# Patient Record
Sex: FEMALE | Race: BLACK OR AFRICAN AMERICAN | NOT HISPANIC OR LATINO | URBAN - METROPOLITAN AREA
[De-identification: names, ages, dates, MRNs, and addresses within clinical notes are randomized per-mention and may not be internally consistent; named-entity substitution may affect disease eponyms.]

---

## 2018-01-27 ENCOUNTER — INPATIENT (INPATIENT)
Facility: HOSPITAL | Age: 68
LOS: 4 days | Discharge: HOME HEALTH | End: 2018-02-01
Attending: INTERNAL MEDICINE | Admitting: INTERNAL MEDICINE
Payer: COMMERCIAL

## 2018-01-27 VITALS
DIASTOLIC BLOOD PRESSURE: 91 MMHG | HEIGHT: 66 IN | RESPIRATION RATE: 20 BRPM | OXYGEN SATURATION: 94 % | SYSTOLIC BLOOD PRESSURE: 123 MMHG | TEMPERATURE: 101 F | WEIGHT: 293 LBS | HEART RATE: 149 BPM

## 2018-01-27 LAB
ALBUMIN SERPL ELPH-MCNC: 2.9 G/DL — LOW (ref 3.3–5)
ALP SERPL-CCNC: 190 U/L — HIGH (ref 40–120)
ALT FLD-CCNC: 34 U/L — SIGNIFICANT CHANGE UP (ref 12–78)
ANION GAP SERPL CALC-SCNC: 7 MMOL/L — SIGNIFICANT CHANGE UP (ref 5–17)
APPEARANCE UR: CLEAR — SIGNIFICANT CHANGE UP
AST SERPL-CCNC: 38 U/L — HIGH (ref 15–37)
BILIRUB SERPL-MCNC: 0.8 MG/DL — SIGNIFICANT CHANGE UP (ref 0.2–1.2)
BILIRUB UR-MCNC: NEGATIVE — SIGNIFICANT CHANGE UP
BUN SERPL-MCNC: 14 MG/DL — SIGNIFICANT CHANGE UP (ref 7–23)
CALCIUM SERPL-MCNC: 8.4 MG/DL — LOW (ref 8.5–10.1)
CHLORIDE SERPL-SCNC: 101 MMOL/L — SIGNIFICANT CHANGE UP (ref 96–108)
CO2 SERPL-SCNC: 28 MMOL/L — SIGNIFICANT CHANGE UP (ref 22–31)
COLOR SPEC: YELLOW — SIGNIFICANT CHANGE UP
CREAT SERPL-MCNC: 1.47 MG/DL — HIGH (ref 0.5–1.3)
DIFF PNL FLD: NEGATIVE — SIGNIFICANT CHANGE UP
FLUAV SPEC QL CULT: NEGATIVE — SIGNIFICANT CHANGE UP
FLUBV AG SPEC QL IA: NEGATIVE — SIGNIFICANT CHANGE UP
GLUCOSE SERPL-MCNC: 123 MG/DL — HIGH (ref 70–99)
GLUCOSE UR QL: NEGATIVE MG/DL — SIGNIFICANT CHANGE UP
HCT VFR BLD CALC: 36.9 % — SIGNIFICANT CHANGE UP (ref 34.5–45)
HGB BLD-MCNC: 11.4 G/DL — LOW (ref 11.5–15.5)
INR BLD: 1.24 RATIO — HIGH (ref 0.88–1.16)
KETONES UR-MCNC: NEGATIVE — SIGNIFICANT CHANGE UP
LACTATE SERPL-SCNC: 0.8 MMOL/L — SIGNIFICANT CHANGE UP (ref 0.7–2)
LEGIONELLA AG UR QL: NEGATIVE — SIGNIFICANT CHANGE UP
LEUKOCYTE ESTERASE UR-ACNC: ABNORMAL
MCHC RBC-ENTMCNC: 27.7 PG — SIGNIFICANT CHANGE UP (ref 27–34)
MCHC RBC-ENTMCNC: 30.9 GM/DL — LOW (ref 32–36)
MCV RBC AUTO: 89.8 FL — SIGNIFICANT CHANGE UP (ref 80–100)
NITRITE UR-MCNC: NEGATIVE — SIGNIFICANT CHANGE UP
NRBC # BLD: 0 /100 WBCS — SIGNIFICANT CHANGE UP (ref 0–0)
NT-PROBNP SERPL-SCNC: 77 PG/ML — SIGNIFICANT CHANGE UP (ref 0–125)
PH UR: 8 — SIGNIFICANT CHANGE UP (ref 5–8)
PLATELET # BLD AUTO: 322 K/UL — SIGNIFICANT CHANGE UP (ref 150–400)
POTASSIUM SERPL-MCNC: 4.7 MMOL/L — SIGNIFICANT CHANGE UP (ref 3.5–5.3)
POTASSIUM SERPL-SCNC: 4.7 MMOL/L — SIGNIFICANT CHANGE UP (ref 3.5–5.3)
PROT SERPL-MCNC: 8.9 GM/DL — HIGH (ref 6–8.3)
PROT UR-MCNC: 30 MG/DL
PROTHROM AB SERPL-ACNC: 13.6 SEC — HIGH (ref 9.8–12.7)
RBC # BLD: 4.11 M/UL — SIGNIFICANT CHANGE UP (ref 3.8–5.2)
RBC # FLD: 13.2 % — SIGNIFICANT CHANGE UP (ref 10.3–14.5)
SODIUM SERPL-SCNC: 136 MMOL/L — SIGNIFICANT CHANGE UP (ref 135–145)
SP GR SPEC: 1.01 — SIGNIFICANT CHANGE UP (ref 1.01–1.02)
TROPONIN I SERPL-MCNC: <.015 NG/ML — SIGNIFICANT CHANGE UP (ref 0.01–0.04)
UROBILINOGEN FLD QL: 8 MG/DL
WBC # BLD: 15.07 K/UL — HIGH (ref 3.8–10.5)
WBC # FLD AUTO: 15.07 K/UL — HIGH (ref 3.8–10.5)

## 2018-01-27 PROCEDURE — 93010 ELECTROCARDIOGRAM REPORT: CPT

## 2018-01-27 PROCEDURE — 74174 CTA ABD&PLVS W/CONTRAST: CPT | Mod: 26

## 2018-01-27 PROCEDURE — 99284 EMERGENCY DEPT VISIT MOD MDM: CPT | Mod: 25

## 2018-01-27 PROCEDURE — 71275 CT ANGIOGRAPHY CHEST: CPT | Mod: 26

## 2018-01-27 PROCEDURE — 71045 X-RAY EXAM CHEST 1 VIEW: CPT | Mod: 26

## 2018-01-27 RX ORDER — CEFTRIAXONE 500 MG/1
1 INJECTION, POWDER, FOR SOLUTION INTRAMUSCULAR; INTRAVENOUS EVERY 24 HOURS
Qty: 0 | Refills: 0 | Status: DISCONTINUED | OUTPATIENT
Start: 2018-01-27 | End: 2018-02-01

## 2018-01-27 RX ORDER — LOSARTAN POTASSIUM 100 MG/1
100 TABLET, FILM COATED ORAL DAILY
Qty: 0 | Refills: 0 | Status: DISCONTINUED | OUTPATIENT
Start: 2018-01-27 | End: 2018-02-01

## 2018-01-27 RX ORDER — ENOXAPARIN SODIUM 100 MG/ML
40 INJECTION SUBCUTANEOUS EVERY 24 HOURS
Qty: 0 | Refills: 0 | Status: DISCONTINUED | OUTPATIENT
Start: 2018-01-27 | End: 2018-02-01

## 2018-01-27 RX ORDER — SODIUM CHLORIDE 9 MG/ML
4500 INJECTION INTRAMUSCULAR; INTRAVENOUS; SUBCUTANEOUS ONCE
Qty: 0 | Refills: 0 | Status: COMPLETED | OUTPATIENT
Start: 2018-01-27 | End: 2018-01-27

## 2018-01-27 RX ORDER — PIPERACILLIN AND TAZOBACTAM 4; .5 G/20ML; G/20ML
3.38 INJECTION, POWDER, LYOPHILIZED, FOR SOLUTION INTRAVENOUS ONCE
Qty: 0 | Refills: 0 | Status: COMPLETED | OUTPATIENT
Start: 2018-01-27 | End: 2018-01-27

## 2018-01-27 RX ORDER — OXYCODONE AND ACETAMINOPHEN 5; 325 MG/1; MG/1
1 TABLET ORAL ONCE
Qty: 0 | Refills: 0 | Status: DISCONTINUED | OUTPATIENT
Start: 2018-01-27 | End: 2018-01-27

## 2018-01-27 RX ORDER — VANCOMYCIN HCL 1 G
1000 VIAL (EA) INTRAVENOUS ONCE
Qty: 0 | Refills: 0 | Status: COMPLETED | OUTPATIENT
Start: 2018-01-27 | End: 2018-01-27

## 2018-01-27 RX ORDER — AZITHROMYCIN 500 MG/1
500 TABLET, FILM COATED ORAL EVERY 24 HOURS
Qty: 0 | Refills: 0 | Status: DISCONTINUED | OUTPATIENT
Start: 2018-01-27 | End: 2018-02-01

## 2018-01-27 RX ORDER — ACETAMINOPHEN 500 MG
975 TABLET ORAL ONCE
Qty: 0 | Refills: 0 | Status: COMPLETED | OUTPATIENT
Start: 2018-01-27 | End: 2018-01-27

## 2018-01-27 RX ORDER — SODIUM CHLORIDE 9 MG/ML
1000 INJECTION INTRAMUSCULAR; INTRAVENOUS; SUBCUTANEOUS ONCE
Qty: 0 | Refills: 0 | Status: DISCONTINUED | OUTPATIENT
Start: 2018-01-27 | End: 2018-01-27

## 2018-01-27 RX ORDER — METOPROLOL TARTRATE 50 MG
100 TABLET ORAL
Qty: 0 | Refills: 0 | Status: DISCONTINUED | OUTPATIENT
Start: 2018-01-27 | End: 2018-02-01

## 2018-01-27 RX ORDER — MORPHINE SULFATE 50 MG/1
8 CAPSULE, EXTENDED RELEASE ORAL ONCE
Qty: 0 | Refills: 0 | Status: DISCONTINUED | OUTPATIENT
Start: 2018-01-27 | End: 2018-01-27

## 2018-01-27 RX ADMIN — ENOXAPARIN SODIUM 40 MILLIGRAM(S): 100 INJECTION SUBCUTANEOUS at 22:04

## 2018-01-27 RX ADMIN — Medication 200 MILLIGRAM(S): at 08:00

## 2018-01-27 RX ADMIN — Medication 975 MILLIGRAM(S): at 08:00

## 2018-01-27 RX ADMIN — AZITHROMYCIN 255 MILLIGRAM(S): 500 TABLET, FILM COATED ORAL at 22:04

## 2018-01-27 RX ADMIN — PIPERACILLIN AND TAZOBACTAM 100 GRAM(S): 4; .5 INJECTION, POWDER, LYOPHILIZED, FOR SOLUTION INTRAVENOUS at 08:30

## 2018-01-27 RX ADMIN — CEFTRIAXONE 100 GRAM(S): 500 INJECTION, POWDER, FOR SOLUTION INTRAMUSCULAR; INTRAVENOUS at 22:04

## 2018-01-27 RX ADMIN — SODIUM CHLORIDE 4500 MILLILITER(S): 9 INJECTION INTRAMUSCULAR; INTRAVENOUS; SUBCUTANEOUS at 08:20

## 2018-01-27 RX ADMIN — MORPHINE SULFATE 8 MILLIGRAM(S): 50 CAPSULE, EXTENDED RELEASE ORAL at 16:55

## 2018-01-27 RX ADMIN — Medication 975 MILLIGRAM(S): at 11:47

## 2018-01-27 RX ADMIN — OXYCODONE AND ACETAMINOPHEN 1 TABLET(S): 5; 325 TABLET ORAL at 23:04

## 2018-01-27 RX ADMIN — Medication 200 MILLIGRAM(S): at 22:03

## 2018-01-27 RX ADMIN — MORPHINE SULFATE 8 MILLIGRAM(S): 50 CAPSULE, EXTENDED RELEASE ORAL at 17:16

## 2018-01-27 RX ADMIN — Medication 250 MILLIGRAM(S): at 11:00

## 2018-01-27 RX ADMIN — LOSARTAN POTASSIUM 100 MILLIGRAM(S): 100 TABLET, FILM COATED ORAL at 22:03

## 2018-01-27 RX ADMIN — Medication 100 MILLIGRAM(S): at 22:04

## 2018-01-27 NOTE — H&P ADULT - FAMILY HISTORY
No pertinent family history in first degree relatives     No pertinent family history, No significant family history

## 2018-01-27 NOTE — ED ADULT NURSE NOTE - PMH
Arthropathy  Arthritis  Dysfunctional uterine bleeding    Essential hypertension  Hypertension  History of pulmonary embolism  2012 s/p IVC filter  Joint infection    Migraine  Migraines  Obesity    Obstructive sleep apnea syndrome  Obstructive sleep apnea  Osteoarthritis    Pulmonary embolism

## 2018-01-27 NOTE — ED PROVIDER NOTE - OBJECTIVE STATEMENT
68 yo F with cough and fever for 1 week.  Cough is productive of green sputum.  When she cough she has severe stabbing chest pain radiating to her back.  This is new, as per patient.  ROS: negative for headache, chest pain, shortness of breath, abd pain, nausea, vomiting, diarrhea, rash, paresthesia, and weakness.   PMH: htn, PE on coumadin, psh- bl knee sx, left knee septic arthritis x3,(transferred in august to [facility]),  hysterectomy; Meds: See EMR; SH: Denies smoking/drinking/drug use

## 2018-01-27 NOTE — ED PROVIDER NOTE - PHYSICAL EXAMINATION
Vitals: tachy at 149, febrile 100.7  Gen: AAOx3, NAD, laying in stretcher, appears uncomfortable, non-toxic  Head: ncat, perrla, eomi b/l  Neck: supple, no lymphadenopathy, no midline deviation  Heart: rrr, no m/r/g  Lungs: CTA b/l, no rales/ronchi/wheezes  Abd: soft, nontender, morbidly obese, albeit non-distended, no rebound or guarding  Ext: no clubbing/cyanosis/edema  Neuro: sensation and muscle strength intact b/l, moving all 4 ext with equal strength, no deficits

## 2018-01-27 NOTE — ED ADULT NURSE REASSESSMENT NOTE - NS ED NURSE REASSESS COMMENT FT1
improvement noted, patient is resting at this time , handoff, endorsed to AM nurse
REJ flushed without difficulty, VS STABLE AFEBRILE CALL HALL IN REACH, SAFETY MAINTAINED PT PLACED ON TELE .

## 2018-01-27 NOTE — H&P ADULT - HISTORY OF PRESENT ILLNESS
68 yo F with cough and fever for 1 week.  Cough is productive of green sputum.  When she cough she has severe stabbing chest pain radiating to her back.

## 2018-01-27 NOTE — H&P ADULT - NSHPPHYSICALEXAM_GEN_ALL_CORE
Constitutional: NAD, well-groomed, well-developed,obese++  HEENT: PERRLA, EOMI, Normal Hearing, MMM  Neck: No LAD, No JVD  Back: Normal spine flexure, No CVA tenderness  Respiratory: CTAB/L   Cardiovascular: S1 and S2, RRR, no M/G/R  Gastrointestinal: BS+, soft, NT/ND. old midline scar  Extremities: No peripheral edema, bilat ant knee scars  Vascular: 2+ peripheral pulses  Neurological: A/O x 3, no focal deficits  Skin: No rashes

## 2018-01-27 NOTE — H&P ADULT - PROBLEM SELECTOR PROBLEM 1
Class 3 severe obesity due to excess calories with serious comorbidity and body mass index (BMI) of 40.0 to 44.9 in adult

## 2018-01-27 NOTE — ED ADULT NURSE NOTE - PSH
History of total knee replacement  with revisions x 3  Other acquired absence of organ  History of cholecystectomy  Status post cholecystectomy    Status post hysterectomy    Status post total hysterectomy  partial  Total knee replacement status

## 2018-01-27 NOTE — ED PROVIDER NOTE - MEDICAL DECISION MAKING DETAILS
66 yo F with fever and cough, likely pneumonia, must r/o dissection based on complaints and PMH, r/o flu, r/o legionella  -labs, including flu, legionella, blood cultures, ua , cx, trop, bnp  -cxr, cta abd/pel/chest, fluids and antbx, robitussin for cough, tylenol for fever

## 2018-01-27 NOTE — ED PROVIDER NOTE - PROGRESS NOTE DETAILS
pt. has pneumonia causing sepsis, likely causing sepsis, lactate normal  will admit to tele for antbx

## 2018-01-27 NOTE — ED ADULT NURSE NOTE - OBJECTIVE STATEMENT
Pt complaining of back  pain  , chest pain , sob, chills and cough, took Robitussin with no relief. denies fever, nausea and vomiting

## 2018-01-27 NOTE — H&P ADULT - NSHPLABSRESULTS_GEN_ALL_CORE
11.4   15.07 )-----------( 322      ( 2018 08:25 )             36.9         136  |  101  |  14  ----------------------------<  123<H>  4.7   |  28  |  1.47<H>    Ca    8.4<L>      2018 08:25    TPro  8.9<H>  /  Alb  2.9<L>  /  TBili  0.8  /  DBili  x   /  AST  38<H>  /  ALT  34  /  AlkPhos  190<H>      PT/INR - ( 2018 08:25 )   PT: 13.6 sec;   INR: 1.24 ratio           Urinalysis Basic - ( 2018 09:28 )    Color: Yellow / Appearance: Clear / S.015 / pH: x  Gluc: x / Ketone: Negative  / Bili: Negative / Urobili: 8 mg/dL   Blood: x / Protein: 30 mg/dL / Nitrite: Negative   Leuk Esterase: Trace / RBC: x / WBC 3-5   Sq Epi: x / Non Sq Epi: Few / Bacteria: x        MICROBIOLOGY:  RECENT CULTURES:

## 2018-01-28 DIAGNOSIS — E66.01 MORBID (SEVERE) OBESITY DUE TO EXCESS CALORIES: ICD-10-CM

## 2018-01-28 DIAGNOSIS — J18.9 PNEUMONIA, UNSPECIFIED ORGANISM: ICD-10-CM

## 2018-01-28 DIAGNOSIS — M54.5 LOW BACK PAIN: ICD-10-CM

## 2018-01-28 DIAGNOSIS — I10 ESSENTIAL (PRIMARY) HYPERTENSION: ICD-10-CM

## 2018-01-28 DIAGNOSIS — G47.33 OBSTRUCTIVE SLEEP APNEA (ADULT) (PEDIATRIC): ICD-10-CM

## 2018-01-28 LAB
ALBUMIN SERPL ELPH-MCNC: 2.5 G/DL — LOW (ref 3.3–5)
ALP SERPL-CCNC: 177 U/L — HIGH (ref 40–120)
ALT FLD-CCNC: 27 U/L — SIGNIFICANT CHANGE UP (ref 12–78)
ANION GAP SERPL CALC-SCNC: 10 MMOL/L — SIGNIFICANT CHANGE UP (ref 5–17)
APTT BLD: 31.5 SEC — SIGNIFICANT CHANGE UP (ref 27.5–37.4)
AST SERPL-CCNC: 18 U/L — SIGNIFICANT CHANGE UP (ref 15–37)
BILIRUB SERPL-MCNC: 0.6 MG/DL — SIGNIFICANT CHANGE UP (ref 0.2–1.2)
BUN SERPL-MCNC: 13 MG/DL — SIGNIFICANT CHANGE UP (ref 7–23)
CALCIUM SERPL-MCNC: 8.1 MG/DL — LOW (ref 8.5–10.1)
CHLORIDE SERPL-SCNC: 103 MMOL/L — SIGNIFICANT CHANGE UP (ref 96–108)
CO2 SERPL-SCNC: 26 MMOL/L — SIGNIFICANT CHANGE UP (ref 22–31)
CREAT SERPL-MCNC: 1.52 MG/DL — HIGH (ref 0.5–1.3)
CULTURE RESULTS: NO GROWTH — SIGNIFICANT CHANGE UP
GLUCOSE SERPL-MCNC: 93 MG/DL — SIGNIFICANT CHANGE UP (ref 70–99)
HCT VFR BLD CALC: 35.4 % — SIGNIFICANT CHANGE UP (ref 34.5–45)
HGB BLD-MCNC: 10.8 G/DL — LOW (ref 11.5–15.5)
MCHC RBC-ENTMCNC: 27.7 PG — SIGNIFICANT CHANGE UP (ref 27–34)
MCHC RBC-ENTMCNC: 30.5 GM/DL — LOW (ref 32–36)
MCV RBC AUTO: 90.8 FL — SIGNIFICANT CHANGE UP (ref 80–100)
NRBC # BLD: 0 /100 WBCS — SIGNIFICANT CHANGE UP (ref 0–0)
PLATELET # BLD AUTO: 261 K/UL — SIGNIFICANT CHANGE UP (ref 150–400)
POTASSIUM SERPL-MCNC: 4 MMOL/L — SIGNIFICANT CHANGE UP (ref 3.5–5.3)
POTASSIUM SERPL-SCNC: 4 MMOL/L — SIGNIFICANT CHANGE UP (ref 3.5–5.3)
PROT SERPL-MCNC: 8.1 GM/DL — SIGNIFICANT CHANGE UP (ref 6–8.3)
RBC # BLD: 3.9 M/UL — SIGNIFICANT CHANGE UP (ref 3.8–5.2)
RBC # FLD: 13.5 % — SIGNIFICANT CHANGE UP (ref 10.3–14.5)
SODIUM SERPL-SCNC: 139 MMOL/L — SIGNIFICANT CHANGE UP (ref 135–145)
SPECIMEN SOURCE: SIGNIFICANT CHANGE UP
WBC # BLD: 13.79 K/UL — HIGH (ref 3.8–10.5)
WBC # FLD AUTO: 13.79 K/UL — HIGH (ref 3.8–10.5)

## 2018-01-28 RX ORDER — OXYCODONE AND ACETAMINOPHEN 5; 325 MG/1; MG/1
1 TABLET ORAL EVERY 6 HOURS
Qty: 0 | Refills: 0 | Status: DISCONTINUED | OUTPATIENT
Start: 2018-01-28 | End: 2018-02-01

## 2018-01-28 RX ADMIN — AZITHROMYCIN 255 MILLIGRAM(S): 500 TABLET, FILM COATED ORAL at 22:11

## 2018-01-28 RX ADMIN — Medication 200 MILLIGRAM(S): at 11:14

## 2018-01-28 RX ADMIN — OXYCODONE AND ACETAMINOPHEN 1 TABLET(S): 5; 325 TABLET ORAL at 18:03

## 2018-01-28 RX ADMIN — OXYCODONE AND ACETAMINOPHEN 1 TABLET(S): 5; 325 TABLET ORAL at 01:46

## 2018-01-28 RX ADMIN — LOSARTAN POTASSIUM 100 MILLIGRAM(S): 100 TABLET, FILM COATED ORAL at 05:22

## 2018-01-28 RX ADMIN — ENOXAPARIN SODIUM 40 MILLIGRAM(S): 100 INJECTION SUBCUTANEOUS at 22:10

## 2018-01-28 RX ADMIN — Medication 200 MILLIGRAM(S): at 23:52

## 2018-01-28 RX ADMIN — OXYCODONE AND ACETAMINOPHEN 1 TABLET(S): 5; 325 TABLET ORAL at 18:40

## 2018-01-28 RX ADMIN — Medication 200 MILLIGRAM(S): at 18:00

## 2018-01-28 RX ADMIN — Medication 200 MILLIGRAM(S): at 05:22

## 2018-01-28 RX ADMIN — CEFTRIAXONE 100 GRAM(S): 500 INJECTION, POWDER, FOR SOLUTION INTRAMUSCULAR; INTRAVENOUS at 22:39

## 2018-01-28 RX ADMIN — Medication 100 MILLIGRAM(S): at 05:22

## 2018-01-28 RX ADMIN — Medication 100 MILLIGRAM(S): at 18:01

## 2018-01-29 LAB
ANION GAP SERPL CALC-SCNC: 9 MMOL/L — SIGNIFICANT CHANGE UP (ref 5–17)
BUN SERPL-MCNC: 19 MG/DL — SIGNIFICANT CHANGE UP (ref 7–23)
CALCIUM SERPL-MCNC: 8.4 MG/DL — LOW (ref 8.5–10.1)
CHLORIDE SERPL-SCNC: 103 MMOL/L — SIGNIFICANT CHANGE UP (ref 96–108)
CO2 SERPL-SCNC: 29 MMOL/L — SIGNIFICANT CHANGE UP (ref 22–31)
CREAT SERPL-MCNC: 1.38 MG/DL — HIGH (ref 0.5–1.3)
GLUCOSE SERPL-MCNC: 94 MG/DL — SIGNIFICANT CHANGE UP (ref 70–99)
HCT VFR BLD CALC: 36 % — SIGNIFICANT CHANGE UP (ref 34.5–45)
HGB BLD-MCNC: 11 G/DL — LOW (ref 11.5–15.5)
MCHC RBC-ENTMCNC: 27.8 PG — SIGNIFICANT CHANGE UP (ref 27–34)
MCHC RBC-ENTMCNC: 30.6 GM/DL — LOW (ref 32–36)
MCV RBC AUTO: 91.1 FL — SIGNIFICANT CHANGE UP (ref 80–100)
NRBC # BLD: 0 /100 WBCS — SIGNIFICANT CHANGE UP (ref 0–0)
PLATELET # BLD AUTO: 282 K/UL — SIGNIFICANT CHANGE UP (ref 150–400)
POTASSIUM SERPL-MCNC: 3.6 MMOL/L — SIGNIFICANT CHANGE UP (ref 3.5–5.3)
POTASSIUM SERPL-SCNC: 3.6 MMOL/L — SIGNIFICANT CHANGE UP (ref 3.5–5.3)
RBC # BLD: 3.95 M/UL — SIGNIFICANT CHANGE UP (ref 3.8–5.2)
RBC # FLD: 13.6 % — SIGNIFICANT CHANGE UP (ref 10.3–14.5)
SODIUM SERPL-SCNC: 141 MMOL/L — SIGNIFICANT CHANGE UP (ref 135–145)
WBC # BLD: 7.92 K/UL — SIGNIFICANT CHANGE UP (ref 3.8–10.5)
WBC # FLD AUTO: 7.92 K/UL — SIGNIFICANT CHANGE UP (ref 3.8–10.5)

## 2018-01-29 RX ADMIN — ENOXAPARIN SODIUM 40 MILLIGRAM(S): 100 INJECTION SUBCUTANEOUS at 21:44

## 2018-01-29 RX ADMIN — Medication 200 MILLIGRAM(S): at 12:32

## 2018-01-29 RX ADMIN — OXYCODONE AND ACETAMINOPHEN 1 TABLET(S): 5; 325 TABLET ORAL at 13:30

## 2018-01-29 RX ADMIN — OXYCODONE AND ACETAMINOPHEN 1 TABLET(S): 5; 325 TABLET ORAL at 18:00

## 2018-01-29 RX ADMIN — Medication 200 MILLIGRAM(S): at 05:07

## 2018-01-29 RX ADMIN — CEFTRIAXONE 100 GRAM(S): 500 INJECTION, POWDER, FOR SOLUTION INTRAMUSCULAR; INTRAVENOUS at 21:44

## 2018-01-29 RX ADMIN — AZITHROMYCIN 255 MILLIGRAM(S): 500 TABLET, FILM COATED ORAL at 21:44

## 2018-01-29 RX ADMIN — OXYCODONE AND ACETAMINOPHEN 1 TABLET(S): 5; 325 TABLET ORAL at 12:30

## 2018-01-29 RX ADMIN — Medication 200 MILLIGRAM(S): at 18:02

## 2018-01-29 RX ADMIN — Medication 100 MILLIGRAM(S): at 18:02

## 2018-01-29 RX ADMIN — LOSARTAN POTASSIUM 100 MILLIGRAM(S): 100 TABLET, FILM COATED ORAL at 05:07

## 2018-01-29 RX ADMIN — OXYCODONE AND ACETAMINOPHEN 1 TABLET(S): 5; 325 TABLET ORAL at 19:00

## 2018-01-29 RX ADMIN — Medication 100 MILLIGRAM(S): at 05:07

## 2018-01-29 RX ADMIN — OXYCODONE AND ACETAMINOPHEN 1 TABLET(S): 5; 325 TABLET ORAL at 05:07

## 2018-01-29 RX ADMIN — OXYCODONE AND ACETAMINOPHEN 1 TABLET(S): 5; 325 TABLET ORAL at 06:07

## 2018-01-30 ENCOUNTER — TRANSCRIPTION ENCOUNTER (OUTPATIENT)
Age: 68
End: 2018-01-30

## 2018-01-30 DIAGNOSIS — J18.9 PNEUMONIA, UNSPECIFIED ORGANISM: ICD-10-CM

## 2018-01-30 LAB
ANION GAP SERPL CALC-SCNC: 7 MMOL/L — SIGNIFICANT CHANGE UP (ref 5–17)
BUN SERPL-MCNC: 23 MG/DL — SIGNIFICANT CHANGE UP (ref 7–23)
CALCIUM SERPL-MCNC: 8.2 MG/DL — LOW (ref 8.5–10.1)
CHLORIDE SERPL-SCNC: 105 MMOL/L — SIGNIFICANT CHANGE UP (ref 96–108)
CO2 SERPL-SCNC: 28 MMOL/L — SIGNIFICANT CHANGE UP (ref 22–31)
CREAT SERPL-MCNC: 1.42 MG/DL — HIGH (ref 0.5–1.3)
GLUCOSE SERPL-MCNC: 88 MG/DL — SIGNIFICANT CHANGE UP (ref 70–99)
HCT VFR BLD CALC: 37.1 % — SIGNIFICANT CHANGE UP (ref 34.5–45)
HGB BLD-MCNC: 11.4 G/DL — LOW (ref 11.5–15.5)
LEGIONELLA AG UR QL: NEGATIVE — SIGNIFICANT CHANGE UP
MCHC RBC-ENTMCNC: 27.9 PG — SIGNIFICANT CHANGE UP (ref 27–34)
MCHC RBC-ENTMCNC: 30.7 GM/DL — LOW (ref 32–36)
MCV RBC AUTO: 90.9 FL — SIGNIFICANT CHANGE UP (ref 80–100)
NRBC # BLD: 0 /100 WBCS — SIGNIFICANT CHANGE UP (ref 0–0)
PLATELET # BLD AUTO: 274 K/UL — SIGNIFICANT CHANGE UP (ref 150–400)
POTASSIUM SERPL-MCNC: 3.7 MMOL/L — SIGNIFICANT CHANGE UP (ref 3.5–5.3)
POTASSIUM SERPL-SCNC: 3.7 MMOL/L — SIGNIFICANT CHANGE UP (ref 3.5–5.3)
RBC # BLD: 4.08 M/UL — SIGNIFICANT CHANGE UP (ref 3.8–5.2)
RBC # FLD: 13.5 % — SIGNIFICANT CHANGE UP (ref 10.3–14.5)
SODIUM SERPL-SCNC: 140 MMOL/L — SIGNIFICANT CHANGE UP (ref 135–145)
WBC # BLD: 6.69 K/UL — SIGNIFICANT CHANGE UP (ref 3.8–10.5)
WBC # FLD AUTO: 6.69 K/UL — SIGNIFICANT CHANGE UP (ref 3.8–10.5)

## 2018-01-30 RX ORDER — ALBUTEROL 90 UG/1
2.5 AEROSOL, METERED ORAL EVERY 6 HOURS
Qty: 0 | Refills: 0 | Status: DISCONTINUED | OUTPATIENT
Start: 2018-01-30 | End: 2018-02-01

## 2018-01-30 RX ADMIN — Medication 200 MILLIGRAM(S): at 23:34

## 2018-01-30 RX ADMIN — Medication 100 MILLIGRAM(S): at 05:41

## 2018-01-30 RX ADMIN — OXYCODONE AND ACETAMINOPHEN 1 TABLET(S): 5; 325 TABLET ORAL at 15:20

## 2018-01-30 RX ADMIN — OXYCODONE AND ACETAMINOPHEN 1 TABLET(S): 5; 325 TABLET ORAL at 21:48

## 2018-01-30 RX ADMIN — OXYCODONE AND ACETAMINOPHEN 1 TABLET(S): 5; 325 TABLET ORAL at 08:00

## 2018-01-30 RX ADMIN — OXYCODONE AND ACETAMINOPHEN 1 TABLET(S): 5; 325 TABLET ORAL at 14:36

## 2018-01-30 RX ADMIN — ENOXAPARIN SODIUM 40 MILLIGRAM(S): 100 INJECTION SUBCUTANEOUS at 21:21

## 2018-01-30 RX ADMIN — Medication 200 MILLIGRAM(S): at 05:41

## 2018-01-30 RX ADMIN — OXYCODONE AND ACETAMINOPHEN 1 TABLET(S): 5; 325 TABLET ORAL at 20:48

## 2018-01-30 RX ADMIN — Medication 200 MILLIGRAM(S): at 17:20

## 2018-01-30 RX ADMIN — LOSARTAN POTASSIUM 100 MILLIGRAM(S): 100 TABLET, FILM COATED ORAL at 05:41

## 2018-01-30 RX ADMIN — Medication 200 MILLIGRAM(S): at 12:17

## 2018-01-30 RX ADMIN — OXYCODONE AND ACETAMINOPHEN 1 TABLET(S): 5; 325 TABLET ORAL at 00:15

## 2018-01-30 RX ADMIN — OXYCODONE AND ACETAMINOPHEN 1 TABLET(S): 5; 325 TABLET ORAL at 01:15

## 2018-01-30 RX ADMIN — Medication 100 MILLIGRAM(S): at 17:20

## 2018-01-30 RX ADMIN — Medication 200 MILLIGRAM(S): at 00:15

## 2018-01-30 RX ADMIN — OXYCODONE AND ACETAMINOPHEN 1 TABLET(S): 5; 325 TABLET ORAL at 07:09

## 2018-01-30 RX ADMIN — CEFTRIAXONE 100 GRAM(S): 500 INJECTION, POWDER, FOR SOLUTION INTRAMUSCULAR; INTRAVENOUS at 21:22

## 2018-01-30 RX ADMIN — AZITHROMYCIN 255 MILLIGRAM(S): 500 TABLET, FILM COATED ORAL at 21:21

## 2018-01-30 NOTE — PHYSICAL THERAPY INITIAL EVALUATION ADULT - CRITERIA FOR SKILLED THERAPEUTIC INTERVENTIONS
functional limitations in following categories/impairments found/rehab potential/anticipated discharge recommendation/risk reduction/prevention

## 2018-01-30 NOTE — DISCHARGE NOTE ADULT - SECONDARY DIAGNOSIS.
Obstructive sleep apnea syndrome Class 3 severe obesity due to excess calories with serious comorbidity and body mass index (BMI) of 40.0 to 44.9 in adult Chronic low back pain without sciatica, unspecified back pain laterality Essential hypertension

## 2018-01-30 NOTE — DISCHARGE NOTE ADULT - PATIENT PORTAL LINK FT
“You can access the Follow[employer] Patient Portal, offered by [facility], by registering with the following website: [email or web address]”

## 2018-01-30 NOTE — DISCHARGE NOTE ADULT - MEDICATION SUMMARY - MEDICATIONS TO TAKE
I will START or STAY ON the medications listed below when I get home from the hospital:    oxyCODONE-acetaminophen 5 mg-325 mg oral tablet  -- 1 tab(s) by mouth every 6 hours, As needed, Moderate Pain (4 - 6) MDD:4  -- Indication: For Pain    losartan 100 mg oral tablet  -- 1 tab(s) by mouth once a day  -- Indication: For HTN    metoprolol tartrate 100 mg oral tablet  -- 1 tab(s) by mouth 2 times a day  -- Indication: For HTN    albuterol 90 mcg/inh inhalation aerosol  -- 1 puff(s) inhaled every 6 hours PRN SOB  -- For inhalation only.  It is very important that you take or use this exactly as directed.  Do not skip doses or discontinue unless directed by your doctor.  Obtain medical advice before taking any non-prescription drugs as some may affect the action of this medication.  Shake well before use.    -- Indication: For SOB    guaiFENesin 100 mg/5 mL oral liquid  -- 10 milliliter(s) by mouth every 6 hours  -- Indication: For Cough    Augmentin 875 mg-125 mg oral tablet  -- 1 milligram(s) by mouth 2 times a day   -- Finish all this medication unless otherwise directed by prescriber.  Take with food or milk.    -- Indication: For PNEUMONIA DUE TO INFECTIOUS ORGANISM

## 2018-01-30 NOTE — PHYSICAL THERAPY INITIAL EVALUATION ADULT - ADDITIONAL COMMENTS
(Per DPT Jackson): Pt lives in a private house with 3 steps to enter home with B handrails, lives with her son and daughter. Pt has a commode, rolling walker, wheelchair and hospital bed, BiPAP at night (used sparingly). Pt ambulates short distances at home, wheelchair used when out. Pt does not drive, wears glasses all the time. There is a bathroom on the 2nd floor but sponge bathes instead secondary to being unable to negotiate steps, living on 1st floor. Pt has a previous history of physical therapy both at home and in short term rehab.

## 2018-01-30 NOTE — PHYSICAL THERAPY INITIAL EVALUATION ADULT - PLANNED THERAPY INTERVENTIONS, PT EVAL
ROM/strengthening/gait training/balance training/bed mobility training/stretching/neuromuscular re-education/postural re-education/transfer training

## 2018-01-30 NOTE — PHYSICAL THERAPY INITIAL EVALUATION ADULT - GENERAL OBSERVATIONS, REHAB EVAL
Patient supine in bed c stable vital signs. AAOx4. Patient reports pain to right knee and shortness of breath at rest. Cough: strong, effective. Cough: strong, dry, non-productive initially. Sputum: (expectorated after Active Cycle of Breathing to improve lung functions and gait) thick, creamy, moderate amount

## 2018-01-30 NOTE — PHYSICAL THERAPY INITIAL EVALUATION ADULT - PERTINENT HX OF CURRENT PROBLEM, REHAB EVAL
Patient came in with flu-like symptoms c cough. Chart reviewed and noted – imaging: right upper lobe patchy opacity representing pneumonia.

## 2018-01-30 NOTE — PHYSICAL THERAPY INITIAL EVALUATION ADULT - MODIFIED CLINICAL TEST OF SENSORY INTEGRATION IN BALANCE TEST
Barthel Index: Feeding Score _10__, Bathing Score _0__, Grooming Score _0__, Dressing Score _5__, Bowels Score _0 _, Bladder Score _0__, Toilet Score _0__, Transfers Score _10__, Mobility Score _0__, Stairs Score _0_,     Total Score __25_

## 2018-01-30 NOTE — PHYSICAL THERAPY INITIAL EVALUATION ADULT - IMPAIRMENTS FOUND, PT EVAL
arousal, attention, and cognition/neuromotor development and sensory integration/ROM/muscle strength/ventilation and respiration/gas exchange/aerobic capacity/endurance/decreased midline orientation/gait, locomotion, and balance/joint integrity and mobility

## 2018-01-30 NOTE — DISCHARGE NOTE ADULT - PLAN OF CARE
Complete ABX Follow up with PMD Follow up with pulm for CPAP Low calorie diet and exercise Pain management Continue home blood pressure medications  Follow up with PCP  Low-sodium diet  AHA Recipes - [email or web address]

## 2018-01-30 NOTE — DISCHARGE NOTE ADULT - CARE PLAN
Principal Discharge DX:	Pneumonia due to infectious organism, unspecified laterality, unspecified part of lung  Goal:	Complete ABX  Assessment and plan of treatment:	Follow up with PMD  Secondary Diagnosis:	Obstructive sleep apnea syndrome  Assessment and plan of treatment:	Follow up with pulm for CPAP  Secondary Diagnosis:	Class 3 severe obesity due to excess calories with serious comorbidity and body mass index (BMI) of 40.0 to 44.9 in adult  Assessment and plan of treatment:	Low calorie diet and exercise  Secondary Diagnosis:	Chronic low back pain without sciatica, unspecified back pain laterality  Assessment and plan of treatment:	Pain management  Secondary Diagnosis:	Essential hypertension  Assessment and plan of treatment:	Continue home blood pressure medications  Follow up with PCP  Low-sodium diet  AHA Recipes - [email or web address]

## 2018-01-30 NOTE — DISCHARGE NOTE ADULT - CARE PROVIDER_API CALL
Roberto Gaytan (DONTA), Internal Medicine  [address] [place] [address]  Phone: [phone]  Fax: [phone]

## 2018-01-30 NOTE — DISCHARGE NOTE ADULT - HOSPITAL COURSE
66 yo F with cough and fever for 1 week.  Cough is productive of green sputum.  When she cough she has severe stabbing chest pain radiating to her back.      Stable for discharge home w/ outpt follow up with PMD and pulmonologist.

## 2018-01-31 LAB
ANION GAP SERPL CALC-SCNC: 11 MMOL/L — SIGNIFICANT CHANGE UP (ref 5–17)
BUN SERPL-MCNC: 25 MG/DL — HIGH (ref 7–23)
CALCIUM SERPL-MCNC: 8.1 MG/DL — LOW (ref 8.5–10.1)
CHLORIDE SERPL-SCNC: 104 MMOL/L — SIGNIFICANT CHANGE UP (ref 96–108)
CO2 SERPL-SCNC: 26 MMOL/L — SIGNIFICANT CHANGE UP (ref 22–31)
CREAT SERPL-MCNC: 1.41 MG/DL — HIGH (ref 0.5–1.3)
GLUCOSE SERPL-MCNC: 102 MG/DL — HIGH (ref 70–99)
HCT VFR BLD CALC: 35.6 % — SIGNIFICANT CHANGE UP (ref 34.5–45)
HGB BLD-MCNC: 10.9 G/DL — LOW (ref 11.5–15.5)
MCHC RBC-ENTMCNC: 27.7 PG — SIGNIFICANT CHANGE UP (ref 27–34)
MCHC RBC-ENTMCNC: 30.6 GM/DL — LOW (ref 32–36)
MCV RBC AUTO: 90.6 FL — SIGNIFICANT CHANGE UP (ref 80–100)
NRBC # BLD: 0 /100 WBCS — SIGNIFICANT CHANGE UP (ref 0–0)
PLATELET # BLD AUTO: 309 K/UL — SIGNIFICANT CHANGE UP (ref 150–400)
POTASSIUM SERPL-MCNC: 3.9 MMOL/L — SIGNIFICANT CHANGE UP (ref 3.5–5.3)
POTASSIUM SERPL-SCNC: 3.9 MMOL/L — SIGNIFICANT CHANGE UP (ref 3.5–5.3)
RBC # BLD: 3.93 M/UL — SIGNIFICANT CHANGE UP (ref 3.8–5.2)
RBC # FLD: 13.4 % — SIGNIFICANT CHANGE UP (ref 10.3–14.5)
SODIUM SERPL-SCNC: 141 MMOL/L — SIGNIFICANT CHANGE UP (ref 135–145)
WBC # BLD: 6.5 K/UL — SIGNIFICANT CHANGE UP (ref 3.8–10.5)
WBC # FLD AUTO: 6.5 K/UL — SIGNIFICANT CHANGE UP (ref 3.8–10.5)

## 2018-01-31 RX ADMIN — ALBUTEROL 2.5 MILLIGRAM(S): 90 AEROSOL, METERED ORAL at 05:22

## 2018-01-31 RX ADMIN — Medication 200 MILLIGRAM(S): at 11:56

## 2018-01-31 RX ADMIN — ALBUTEROL 2.5 MILLIGRAM(S): 90 AEROSOL, METERED ORAL at 11:09

## 2018-01-31 RX ADMIN — LOSARTAN POTASSIUM 100 MILLIGRAM(S): 100 TABLET, FILM COATED ORAL at 06:10

## 2018-01-31 RX ADMIN — OXYCODONE AND ACETAMINOPHEN 1 TABLET(S): 5; 325 TABLET ORAL at 12:30

## 2018-01-31 RX ADMIN — OXYCODONE AND ACETAMINOPHEN 1 TABLET(S): 5; 325 TABLET ORAL at 23:47

## 2018-01-31 RX ADMIN — OXYCODONE AND ACETAMINOPHEN 1 TABLET(S): 5; 325 TABLET ORAL at 06:10

## 2018-01-31 RX ADMIN — OXYCODONE AND ACETAMINOPHEN 1 TABLET(S): 5; 325 TABLET ORAL at 17:51

## 2018-01-31 RX ADMIN — Medication 200 MILLIGRAM(S): at 06:10

## 2018-01-31 RX ADMIN — ALBUTEROL 2.5 MILLIGRAM(S): 90 AEROSOL, METERED ORAL at 01:09

## 2018-01-31 RX ADMIN — CEFTRIAXONE 100 GRAM(S): 500 INJECTION, POWDER, FOR SOLUTION INTRAMUSCULAR; INTRAVENOUS at 22:39

## 2018-01-31 RX ADMIN — OXYCODONE AND ACETAMINOPHEN 1 TABLET(S): 5; 325 TABLET ORAL at 00:47

## 2018-01-31 RX ADMIN — AZITHROMYCIN 255 MILLIGRAM(S): 500 TABLET, FILM COATED ORAL at 22:39

## 2018-01-31 RX ADMIN — ALBUTEROL 2.5 MILLIGRAM(S): 90 AEROSOL, METERED ORAL at 17:17

## 2018-01-31 RX ADMIN — OXYCODONE AND ACETAMINOPHEN 1 TABLET(S): 5; 325 TABLET ORAL at 11:58

## 2018-01-31 RX ADMIN — Medication 200 MILLIGRAM(S): at 17:05

## 2018-01-31 RX ADMIN — OXYCODONE AND ACETAMINOPHEN 1 TABLET(S): 5; 325 TABLET ORAL at 07:10

## 2018-01-31 RX ADMIN — OXYCODONE AND ACETAMINOPHEN 1 TABLET(S): 5; 325 TABLET ORAL at 18:30

## 2018-01-31 RX ADMIN — Medication 100 MILLIGRAM(S): at 06:10

## 2018-01-31 RX ADMIN — ENOXAPARIN SODIUM 40 MILLIGRAM(S): 100 INJECTION SUBCUTANEOUS at 22:39

## 2018-01-31 RX ADMIN — ALBUTEROL 2.5 MILLIGRAM(S): 90 AEROSOL, METERED ORAL at 23:50

## 2018-01-31 RX ADMIN — Medication 100 MILLIGRAM(S): at 17:04

## 2018-01-31 RX ADMIN — Medication 200 MILLIGRAM(S): at 23:47

## 2018-01-31 NOTE — PROGRESS NOTE ADULT - PROBLEM SELECTOR PROBLEM 4
Pneumonia due to infectious organism, unspecified laterality, unspecified part of lung

## 2018-01-31 NOTE — PROGRESS NOTE ADULT - SUBJECTIVE AND OBJECTIVE BOX
Patient is a 67y old  Female who presents with a chief complaint of cough (27 Jan 2018 22:18)      INTERVAL HPI/OVERNIGHT EVENTS:  continues to improved  residual cough    MEDICATIONS  (STANDING):  azithromycin  IVPB 500 milliGRAM(s) IV Intermittent every 24 hours  cefTRIAXone   IVPB 1 Gram(s) IV Intermittent every 24 hours  enoxaparin Injectable 40 milliGRAM(s) SubCutaneous every 24 hours  guaiFENesin    Syrup 200 milliGRAM(s) Oral every 6 hours  losartan 100 milliGRAM(s) Oral daily  metoprolol     tartrate 100 milliGRAM(s) Oral two times a day    MEDICATIONS  (PRN):  oxyCODONE    5 mG/acetaminophen 325 mG 1 Tablet(s) Oral every 6 hours PRN Moderate Pain (4 - 6)        REVIEW OF SYSTEMS:  CONSTITUTIONAL: No fever, weight loss, or fatigue  EYES: No eye pain, visual disturbances, or discharge  ENMT:  No difficulty hearing, tinnitus, vertigo; No sinus or throat pain  NECK: No pain or stiffness  RESPIRATORY: No cough, wheezing, chills or hemoptysis; No shortness of breath  CARDIOVASCULAR: No chest pain, palpitations, dizziness, or leg swelling  GASTROINTESTINAL: No abdominal or epigastric pain. No nausea, vomiting, or hematemesis; No diarrhea or constipation. No melena or hematochezia.  GENITOURINARY: No dysuria, frequency, hematuria, or incontinence  NEUROLOGICAL: No headaches, memory loss, loss of strength, numbness, or tremors  SKIN: No itching, burning, rashes, or lesions      Vital Signs Last 24 Hrs  T(C): 36.7 (29 Jan 2018 11:01), Max: 37.1 (28 Jan 2018 13:01)  T(F): 98 (29 Jan 2018 11:01), Max: 98.7 (28 Jan 2018 13:01)  HR: 72 (29 Jan 2018 11:01) (72 - 90)  BP: 145/70 (29 Jan 2018 11:01) (116/68 - 163/70)  BP(mean): --  RR: 18 (29 Jan 2018 11:01) (15 - 20)  SpO2: 97% (29 Jan 2018 11:01) (94% - 100%)    PHYSICAL EXAM:  GENERAL: NAD, well-groomed, well-developed  HEAD:  Atraumatic, Normocephalic  EYES: EOMI, PERRLA, conjunctiva and sclera clear  ENMT: No tonsillar erythema, exudates, or enlargement; Moist mucous membranes   NECK: Supple, No JVD   NERVOUS SYSTEM:  Alert & Oriented X3, Good concentration; Motor Strength 5/5 B/L upper and lower extremities; DTRs 2+ intact and symmetric  CHEST/LUNG: Clear to percussion bilaterally; No rales, rhonchi, wheezing, or rubs  HEART: Regular rate and rhythm; No murmurs, rubs, or gallops  ABDOMEN: Soft, Nontender, Nondistended; Bowel sounds present  EXTREMITIES:  2+ Peripheral Pulses, No clubbing, cyanosis, or edema  LYMPH: No lymphadenopathy noted  SKIN: No rashes or lesions    LABS:                        11.0   7.92  )-----------( 282      ( 29 Jan 2018 07:01 )             36.0     01-29    141  |  103  |  19  ----------------------------<  94  3.6   |  29  |  1.38<H>    Ca    8.4<L>      29 Jan 2018 07:01    TPro  8.1  /  Alb  2.5<L>  /  TBili  0.6  /  DBili  x   /  AST  18  /  ALT  27  /  AlkPhos  177<H>  01-28    PTT - ( 28 Jan 2018 06:18 )  PTT:31.5 sec    CAPILLARY BLOOD GLUCOSE          RADIOLOGY & ADDITIONAL TESTS:    Imaging Personally Reviewed:  [ ] YES  [ ] NO    Consultant(s) Notes Reviewed:  [ ] YES  [ ] NO    Care Discussed with Consultants/Other Providers [ ] YES  [ ] NO
Patient is a 67y old  Female who presents with a chief complaint of cough (30 Jan 2018 11:37)      INTERVAL HPI/OVERNIGHT EVENTS:  improving  vitals stable  resolved leucocytosis  MEDICATIONS  (STANDING):  ALBUTerol    0.083% 2.5 milliGRAM(s) Nebulizer every 6 hours  azithromycin  IVPB 500 milliGRAM(s) IV Intermittent every 24 hours  cefTRIAXone   IVPB 1 Gram(s) IV Intermittent every 24 hours  enoxaparin Injectable 40 milliGRAM(s) SubCutaneous every 24 hours  guaiFENesin    Syrup 200 milliGRAM(s) Oral every 6 hours  losartan 100 milliGRAM(s) Oral daily  metoprolol     tartrate 100 milliGRAM(s) Oral two times a day    MEDICATIONS  (PRN):  oxyCODONE    5 mG/acetaminophen 325 mG 1 Tablet(s) Oral every 6 hours PRN Moderate Pain (4 - 6)        REVIEW OF SYSTEMS:  CONSTITUTIONAL: No fever, weight loss, or fatigue  EYES: No eye pain, visual disturbances, or discharge  ENMT:  No difficulty hearing, tinnitus, vertigo; No sinus or throat pain  NECK: No pain or stiffness  RESPIRATORY: No cough, wheezing, chills or hemoptysis; No shortness of breath  CARDIOVASCULAR: No chest pain, palpitations, dizziness, or leg swelling  GASTROINTESTINAL: No abdominal or epigastric pain. No nausea, vomiting, or hematemesis; No diarrhea or constipation. No melena or hematochezia.  GENITOURINARY: No dysuria, frequency, hematuria, or incontinence  NEUROLOGICAL: No headaches, memory loss, loss of strength, numbness, or tremors  SKIN: No itching, burning, rashes, or lesions      Vital Signs Last 24 Hrs  T(C): 36.6 (31 Jan 2018 23:20), Max: 36.8 (31 Jan 2018 11:47)  T(F): 97.8 (31 Jan 2018 23:20), Max: 98.2 (31 Jan 2018 11:47)  HR: 84 (31 Jan 2018 23:50) (74 - 97)  BP: 149/75 (31 Jan 2018 23:20) (131/77 - 149/75)  BP(mean): --  RR: 18 (31 Jan 2018 23:20) (16 - 18)  SpO2: 95% (31 Jan 2018 23:50) (91% - 98%)    PHYSICAL EXAM:  GENERAL: NAD, well-groomed, well-developed  HEAD:  Atraumatic, Normocephalic  EYES: EOMI, PERRLA, conjunctiva and sclera clear  ENMT: No tonsillar erythema, exudates, or enlargement; Moist mucous membranes   NECK: Supple, No JVD   NERVOUS SYSTEM:  Alert & Oriented X3, Good concentration; Motor Strength 5/5 B/L upper and lower extremities; DTRs 2+ intact and symmetric  CHEST/LUNG: Clear to percussion bilaterally; No rales, rhonchi, wheezing, or rubs  HEART: Regular rate and rhythm; No murmurs, rubs, or gallops  ABDOMEN: Soft, Nontender, Nondistended; Bowel sounds present  EXTREMITIES:  2+ Peripheral Pulses, No clubbing, cyanosis, or edema  LYMPH: No lymphadenopathy noted  SKIN: No rashes or lesions    LABS:                        10.9   6.50  )-----------( 309      ( 31 Jan 2018 07:10 )             35.6     01-31    141  |  104  |  25<H>  ----------------------------<  102<H>  3.9   |  26  |  1.41<H>    Ca    8.1<L>      31 Jan 2018 07:10          CAPILLARY BLOOD GLUCOSE          RADIOLOGY & ADDITIONAL TESTS:    Imaging Personally Reviewed:  [ ] YES  [ ] NO    Consultant(s) Notes Reviewed:  [ ] YES  [ ] NO    Care Discussed with Consultants/Other Providers [ ] YES  [ ] NO
Patient is a 67y old  Female who presents with a chief complaint of cough (30 Jan 2018 11:37)      INTERVAL HPI/OVERNIGHT EVENTS:  still coughing  wbc normalized    MEDICATIONS  (STANDING):  ALBUTerol    0.083% 2.5 milliGRAM(s) Nebulizer every 6 hours  azithromycin  IVPB 500 milliGRAM(s) IV Intermittent every 24 hours  cefTRIAXone   IVPB 1 Gram(s) IV Intermittent every 24 hours  enoxaparin Injectable 40 milliGRAM(s) SubCutaneous every 24 hours  guaiFENesin    Syrup 200 milliGRAM(s) Oral every 6 hours  losartan 100 milliGRAM(s) Oral daily  metoprolol     tartrate 100 milliGRAM(s) Oral two times a day    MEDICATIONS  (PRN):  oxyCODONE    5 mG/acetaminophen 325 mG 1 Tablet(s) Oral every 6 hours PRN Moderate Pain (4 - 6)        REVIEW OF SYSTEMS:  CONSTITUTIONAL: No fever, weight loss, or fatigue  EYES: No eye pain, visual disturbances, or discharge  ENMT:  No difficulty hearing, tinnitus, vertigo; No sinus or throat pain  NECK: No pain or stiffness  RESPIRATORY: No cough, wheezing, chills or hemoptysis; No shortness of breath  CARDIOVASCULAR: No chest pain, palpitations, dizziness, or leg swelling  GASTROINTESTINAL: No abdominal or epigastric pain. No nausea, vomiting, or hematemesis; No diarrhea or constipation. No melena or hematochezia.  GENITOURINARY: No dysuria, frequency, hematuria, or incontinence  NEUROLOGICAL: No headaches, memory loss, loss of strength, numbness, or tremors  SKIN: No itching, burning, rashes, or lesions      Vital Signs Last 24 Hrs  T(C): 36.9 (30 Jan 2018 17:17), Max: 36.9 (30 Jan 2018 06:25)  T(F): 98.5 (30 Jan 2018 17:17), Max: 98.5 (30 Jan 2018 17:17)  HR: 85 (30 Jan 2018 17:17) (73 - 85)  BP: 131/67 (30 Jan 2018 17:17) (112/69 - 139/68)  BP(mean): --  RR: 17 (30 Jan 2018 17:17) (17 - 17)  SpO2: 97% (30 Jan 2018 17:17) (96% - 97%)    PHYSICAL EXAM:  GENERAL: NAD, well-groomed, well-developed  HEAD:  Atraumatic, Normocephalic  EYES: EOMI, PERRLA, conjunctiva and sclera clear  ENMT: No tonsillar erythema, exudates, or enlargement; Moist mucous membranes   NECK: Supple, No JVD   NERVOUS SYSTEM:  Alert & Oriented X3, Good concentration; Motor Strength 5/5 B/L upper and lower extremities; DTRs 2+ intact and symmetric  CHEST/LUNG: Clear to percussion bilaterally; No rales, rhonchi, wheezing, or rubs  HEART: Regular rate and rhythm; No murmurs, rubs, or gallops  ABDOMEN: Soft, Nontender, Nondistended; Bowel sounds present  EXTREMITIES:  2+ Peripheral Pulses, No clubbing, cyanosis, or edema  LYMPH: No lymphadenopathy noted  SKIN: No rashes or lesions    LABS:                        11.4   6.69  )-----------( 274      ( 30 Jan 2018 07:17 )             37.1     01-30    140  |  105  |  23  ----------------------------<  88  3.7   |  28  |  1.42<H>    Ca    8.2<L>      30 Jan 2018 07:17          CAPILLARY BLOOD GLUCOSE          RADIOLOGY & ADDITIONAL TESTS:    Imaging Personally Reviewed:  [ ] YES  [ ] NO    Consultant(s) Notes Reviewed:  [ ] YES  [ ] NO    Care Discussed with Consultants/Other Providers [ ] YES  [ ] NO
Patient is a 67y old  Female who presents with a chief complaint of cough (2018 22:18)      INTERVAL HPI/OVERNIGHT EVENTS:  still coughing with green sputum ++  back pain+  MEDICATIONS  (STANDING):  azithromycin  IVPB 500 milliGRAM(s) IV Intermittent every 24 hours  cefTRIAXone   IVPB 1 Gram(s) IV Intermittent every 24 hours  enoxaparin Injectable 40 milliGRAM(s) SubCutaneous every 24 hours  guaiFENesin    Syrup 200 milliGRAM(s) Oral every 6 hours  losartan 100 milliGRAM(s) Oral daily  metoprolol     tartrate 100 milliGRAM(s) Oral two times a day    MEDICATIONS  (PRN):        REVIEW OF SYSTEMS:  CONSTITUTIONAL: No fever, weight loss, or fatigue  EYES: No eye pain, visual disturbances, or discharge  ENMT:  No difficulty hearing, tinnitus, vertigo; No sinus or throat pain  NECK: No pain or stiffness  RESPIRATORY: No cough, wheezing, chills or hemoptysis; No shortness of breath  CARDIOVASCULAR: No chest pain, palpitations, dizziness, or leg swelling  GASTROINTESTINAL: No abdominal or epigastric pain. No nausea, vomiting, or hematemesis; No diarrhea or constipation. No melena or hematochezia.  GENITOURINARY: No dysuria, frequency, hematuria, or incontinence  NEUROLOGICAL: No headaches, memory loss, loss of strength, numbness, or tremors  SKIN: No itching, burning, rashes, or lesions      Vital Signs Last 24 Hrs  T(C): 37.1 (2018 13:01), Max: 37.9 (2018 20:12)  T(F): 98.7 (2018 13:01), Max: 100.3 (2018 20:12)  HR: 84 (2018 13:01) (82 - 140)  BP: 116/68 (2018 13:01) (116/68 - 164/74)  BP(mean): --  RR: 20 (2018 13:01) (14 - 25)  SpO2: 94% (2018 13:01) (94% - 100%)    PHYSICAL EXAM:  GENERAL: NAD, well-groomed, well-developed,obese++  HEAD:  Atraumatic, Normocephalic  EYES: EOMI, PERRLA, conjunctiva and sclera clear  ENMT: No tonsillar erythema, exudates, or enlargement; Moist mucous membranes   NECK: Supple, No JVD   NERVOUS SYSTEM:  Alert & Oriented X3, Good concentration; Motor Strength 5/5 B/L upper and lower extremities; DTRs 2+ intact and symmetric  CHEST/LUNG: Clear to percussion bilaterally; No rales, rhonchi, wheezing, or rubs  HEART: Regular rate and rhythm; No murmurs, rubs, or gallops  ABDOMEN: Soft, Nontender, Nondistended; Bowel sounds present  EXTREMITIES:  2+ Peripheral Pulses, No clubbing, cyanosis, or edema  LYMPH: No lymphadenopathy noted  SKIN: No rashes or lesions    LABS:                        10.8   13.79 )-----------( 261      ( 2018 04:37 )             35.4         139  |  103  |  13  ----------------------------<  93  4.0   |  26  |  1.52<H>    Ca    8.1<L>      2018 04:37    TPro  8.1  /  Alb  2.5<L>  /  TBili  0.6  /  DBili  x   /  AST  18  /  ALT  27  /  AlkPhos  177<H>      PT/INR - ( 2018 08:25 )   PT: 13.6 sec;   INR: 1.24 ratio         PTT - ( 2018 06:18 )  PTT:31.5 sec  Urinalysis Basic - ( 2018 09:28 )    Color: Yellow / Appearance: Clear / S.015 / pH: x  Gluc: x / Ketone: Negative  / Bili: Negative / Urobili: 8 mg/dL   Blood: x / Protein: 30 mg/dL / Nitrite: Negative   Leuk Esterase: Trace / RBC: x / WBC 3-5   Sq Epi: x / Non Sq Epi: Few / Bacteria: x      CAPILLARY BLOOD GLUCOSE          RADIOLOGY & ADDITIONAL TESTS:    Imaging Personally Reviewed:  [ ] YES  [ ] NO    Consultant(s) Notes Reviewed:  [ ] YES  [ ] NO    Care Discussed with Consultants/Other Providers [ ] YES  [ ] NO

## 2018-01-31 NOTE — PROGRESS NOTE ADULT - PROBLEM SELECTOR PROBLEM 5
Chronic low back pain without sciatica, unspecified back pain laterality

## 2018-01-31 NOTE — PROGRESS NOTE ADULT - PROBLEM SELECTOR PROBLEM 2
Obstructive sleep apnea syndrome

## 2018-01-31 NOTE — PROGRESS NOTE ADULT - PROBLEM SELECTOR PLAN 2
stable. Pulmonary follow up

## 2018-02-01 VITALS
TEMPERATURE: 98 F | OXYGEN SATURATION: 95 % | SYSTOLIC BLOOD PRESSURE: 132 MMHG | HEART RATE: 81 BPM | DIASTOLIC BLOOD PRESSURE: 73 MMHG | RESPIRATION RATE: 17 BRPM

## 2018-02-01 LAB
ANION GAP SERPL CALC-SCNC: 6 MMOL/L — SIGNIFICANT CHANGE UP (ref 5–17)
BUN SERPL-MCNC: 27 MG/DL — HIGH (ref 7–23)
CALCIUM SERPL-MCNC: 7.9 MG/DL — LOW (ref 8.5–10.1)
CHLORIDE SERPL-SCNC: 105 MMOL/L — SIGNIFICANT CHANGE UP (ref 96–108)
CO2 SERPL-SCNC: 29 MMOL/L — SIGNIFICANT CHANGE UP (ref 22–31)
CREAT SERPL-MCNC: 1.6 MG/DL — HIGH (ref 0.5–1.3)
CULTURE RESULTS: SIGNIFICANT CHANGE UP
CULTURE RESULTS: SIGNIFICANT CHANGE UP
GLUCOSE SERPL-MCNC: 95 MG/DL — SIGNIFICANT CHANGE UP (ref 70–99)
HCT VFR BLD CALC: 35.3 % — SIGNIFICANT CHANGE UP (ref 34.5–45)
HGB BLD-MCNC: 10.9 G/DL — LOW (ref 11.5–15.5)
MCHC RBC-ENTMCNC: 28.2 PG — SIGNIFICANT CHANGE UP (ref 27–34)
MCHC RBC-ENTMCNC: 30.9 GM/DL — LOW (ref 32–36)
MCV RBC AUTO: 91.2 FL — SIGNIFICANT CHANGE UP (ref 80–100)
NRBC # BLD: 0 /100 WBCS — SIGNIFICANT CHANGE UP (ref 0–0)
PLATELET # BLD AUTO: 299 K/UL — SIGNIFICANT CHANGE UP (ref 150–400)
POTASSIUM SERPL-MCNC: 4.2 MMOL/L — SIGNIFICANT CHANGE UP (ref 3.5–5.3)
POTASSIUM SERPL-SCNC: 4.2 MMOL/L — SIGNIFICANT CHANGE UP (ref 3.5–5.3)
RBC # BLD: 3.87 M/UL — SIGNIFICANT CHANGE UP (ref 3.8–5.2)
RBC # FLD: 13.5 % — SIGNIFICANT CHANGE UP (ref 10.3–14.5)
SODIUM SERPL-SCNC: 140 MMOL/L — SIGNIFICANT CHANGE UP (ref 135–145)
SPECIMEN SOURCE: SIGNIFICANT CHANGE UP
SPECIMEN SOURCE: SIGNIFICANT CHANGE UP
WBC # BLD: 6.28 K/UL — SIGNIFICANT CHANGE UP (ref 3.8–10.5)
WBC # FLD AUTO: 6.28 K/UL — SIGNIFICANT CHANGE UP (ref 3.8–10.5)

## 2018-02-01 RX ORDER — ALBUTEROL 90 UG/1
1 AEROSOL, METERED ORAL
Qty: 180 | Refills: 0
Start: 2018-02-01 | End: 2018-03-02

## 2018-02-01 RX ORDER — LOSARTAN POTASSIUM 100 MG/1
1 TABLET, FILM COATED ORAL
Qty: 0 | Refills: 0 | DISCHARGE
Start: 2018-02-01

## 2018-02-01 RX ORDER — METOPROLOL TARTRATE 50 MG
1 TABLET ORAL
Qty: 0 | Refills: 0 | DISCHARGE
Start: 2018-02-01

## 2018-02-01 RX ORDER — INFLUENZA VIRUS VACCINE 15; 15; 15; 15 UG/.5ML; UG/.5ML; UG/.5ML; UG/.5ML
0.5 SUSPENSION INTRAMUSCULAR ONCE
Qty: 0 | Refills: 0 | Status: COMPLETED | OUTPATIENT
Start: 2018-02-01 | End: 2018-02-01

## 2018-02-01 RX ADMIN — Medication 200 MILLIGRAM(S): at 11:03

## 2018-02-01 RX ADMIN — LOSARTAN POTASSIUM 100 MILLIGRAM(S): 100 TABLET, FILM COATED ORAL at 06:22

## 2018-02-01 RX ADMIN — INFLUENZA VIRUS VACCINE 0.5 MILLILITER(S): 15; 15; 15; 15 SUSPENSION INTRAMUSCULAR at 15:10

## 2018-02-01 RX ADMIN — ALBUTEROL 2.5 MILLIGRAM(S): 90 AEROSOL, METERED ORAL at 05:41

## 2018-02-01 RX ADMIN — OXYCODONE AND ACETAMINOPHEN 1 TABLET(S): 5; 325 TABLET ORAL at 06:22

## 2018-02-01 RX ADMIN — OXYCODONE AND ACETAMINOPHEN 1 TABLET(S): 5; 325 TABLET ORAL at 13:30

## 2018-02-01 RX ADMIN — Medication 200 MILLIGRAM(S): at 06:23

## 2018-02-01 RX ADMIN — Medication 100 MILLIGRAM(S): at 06:22

## 2018-02-01 RX ADMIN — OXYCODONE AND ACETAMINOPHEN 1 TABLET(S): 5; 325 TABLET ORAL at 12:53

## 2018-02-01 RX ADMIN — OXYCODONE AND ACETAMINOPHEN 1 TABLET(S): 5; 325 TABLET ORAL at 07:22

## 2018-02-01 RX ADMIN — ALBUTEROL 2.5 MILLIGRAM(S): 90 AEROSOL, METERED ORAL at 11:46

## 2018-02-05 DIAGNOSIS — G40.909 EPILEPSY, UNSPECIFIED, NOT INTRACTABLE, WITHOUT STATUS EPILEPTICUS: ICD-10-CM

## 2018-02-05 DIAGNOSIS — Z96.659 PRESENCE OF UNSPECIFIED ARTIFICIAL KNEE JOINT: ICD-10-CM

## 2018-02-05 DIAGNOSIS — G89.29 OTHER CHRONIC PAIN: ICD-10-CM

## 2018-02-05 DIAGNOSIS — Z99.89 DEPENDENCE ON OTHER ENABLING MACHINES AND DEVICES: ICD-10-CM

## 2018-02-05 DIAGNOSIS — Z88.2 ALLERGY STATUS TO SULFONAMIDES: ICD-10-CM

## 2018-02-05 DIAGNOSIS — E66.01 MORBID (SEVERE) OBESITY DUE TO EXCESS CALORIES: ICD-10-CM

## 2018-02-05 DIAGNOSIS — G47.33 OBSTRUCTIVE SLEEP APNEA (ADULT) (PEDIATRIC): ICD-10-CM

## 2018-02-05 DIAGNOSIS — Z86.711 PERSONAL HISTORY OF PULMONARY EMBOLISM: ICD-10-CM

## 2018-02-05 DIAGNOSIS — A41.9 SEPSIS, UNSPECIFIED ORGANISM: ICD-10-CM

## 2018-02-05 DIAGNOSIS — J18.9 PNEUMONIA, UNSPECIFIED ORGANISM: ICD-10-CM

## 2018-02-05 DIAGNOSIS — M19.90 UNSPECIFIED OSTEOARTHRITIS, UNSPECIFIED SITE: ICD-10-CM

## 2018-02-05 DIAGNOSIS — M54.5 LOW BACK PAIN: ICD-10-CM

## 2018-02-05 DIAGNOSIS — I10 ESSENTIAL (PRIMARY) HYPERTENSION: ICD-10-CM

## 2019-06-21 ENCOUNTER — INPATIENT (INPATIENT)
Facility: HOSPITAL | Age: 69
LOS: 7 days | Discharge: HOME HEALTH | End: 2019-06-29
Attending: INTERNAL MEDICINE | Admitting: INTERNAL MEDICINE
Payer: COMMERCIAL

## 2019-06-21 VITALS
WEIGHT: 279.99 LBS | RESPIRATION RATE: 19 BRPM | HEART RATE: 71 BPM | HEIGHT: 64 IN | TEMPERATURE: 97 F | OXYGEN SATURATION: 100 % | DIASTOLIC BLOOD PRESSURE: 79 MMHG | SYSTOLIC BLOOD PRESSURE: 156 MMHG

## 2019-06-21 LAB
ALBUMIN SERPL ELPH-MCNC: 3 G/DL — LOW (ref 3.3–5)
ALP SERPL-CCNC: 350 U/L — HIGH (ref 40–120)
ALT FLD-CCNC: 146 U/L — HIGH (ref 12–78)
ANION GAP SERPL CALC-SCNC: 8 MMOL/L — SIGNIFICANT CHANGE UP (ref 5–17)
APPEARANCE UR: CLEAR — SIGNIFICANT CHANGE UP
APTT BLD: 24.6 SEC — LOW (ref 27.5–36.3)
AST SERPL-CCNC: 242 U/L — HIGH (ref 15–37)
BACTERIA # UR AUTO: ABNORMAL
BASOPHILS # BLD AUTO: 0.03 K/UL — SIGNIFICANT CHANGE UP (ref 0–0.2)
BASOPHILS NFR BLD AUTO: 0.2 % — SIGNIFICANT CHANGE UP (ref 0–2)
BILIRUB SERPL-MCNC: 2.6 MG/DL — HIGH (ref 0.2–1.2)
BILIRUB UR-MCNC: ABNORMAL
BUN SERPL-MCNC: 25 MG/DL — HIGH (ref 7–23)
CALCIUM SERPL-MCNC: 8.3 MG/DL — LOW (ref 8.5–10.1)
CHLORIDE SERPL-SCNC: 102 MMOL/L — SIGNIFICANT CHANGE UP (ref 96–108)
CO2 SERPL-SCNC: 28 MMOL/L — SIGNIFICANT CHANGE UP (ref 22–31)
COLOR SPEC: ABNORMAL
CREAT SERPL-MCNC: 2.05 MG/DL — HIGH (ref 0.5–1.3)
DIFF PNL FLD: ABNORMAL
EOSINOPHIL # BLD AUTO: 0.02 K/UL — SIGNIFICANT CHANGE UP (ref 0–0.5)
EOSINOPHIL NFR BLD AUTO: 0.1 % — SIGNIFICANT CHANGE UP (ref 0–6)
EPI CELLS # UR: SIGNIFICANT CHANGE UP
GLUCOSE SERPL-MCNC: 111 MG/DL — HIGH (ref 70–99)
GLUCOSE UR QL: NEGATIVE MG/DL — SIGNIFICANT CHANGE UP
HCT VFR BLD CALC: 38.3 % — SIGNIFICANT CHANGE UP (ref 34.5–45)
HGB BLD-MCNC: 11.3 G/DL — LOW (ref 11.5–15.5)
IMM GRANULOCYTES NFR BLD AUTO: 0.8 % — SIGNIFICANT CHANGE UP (ref 0–1.5)
INR BLD: 1.1 RATIO — SIGNIFICANT CHANGE UP (ref 0.88–1.16)
KETONES UR-MCNC: NEGATIVE — SIGNIFICANT CHANGE UP
LACTATE SERPL-SCNC: 3.1 MMOL/L — HIGH (ref 0.7–2)
LEUKOCYTE ESTERASE UR-ACNC: ABNORMAL
LIDOCAIN IGE QN: 169 U/L — SIGNIFICANT CHANGE UP (ref 73–393)
LYMPHOCYTES # BLD AUTO: 0.7 K/UL — LOW (ref 1–3.3)
LYMPHOCYTES # BLD AUTO: 4 % — LOW (ref 13–44)
MCHC RBC-ENTMCNC: 27.6 PG — SIGNIFICANT CHANGE UP (ref 27–34)
MCHC RBC-ENTMCNC: 29.5 GM/DL — LOW (ref 32–36)
MCV RBC AUTO: 93.4 FL — SIGNIFICANT CHANGE UP (ref 80–100)
MONOCYTES # BLD AUTO: 0.64 K/UL — SIGNIFICANT CHANGE UP (ref 0–0.9)
MONOCYTES NFR BLD AUTO: 3.6 % — SIGNIFICANT CHANGE UP (ref 2–14)
NEUTROPHILS # BLD AUTO: 16.12 K/UL — HIGH (ref 1.8–7.4)
NEUTROPHILS NFR BLD AUTO: 91.3 % — HIGH (ref 43–77)
NITRITE UR-MCNC: NEGATIVE — SIGNIFICANT CHANGE UP
NRBC # BLD: 0 /100 WBCS — SIGNIFICANT CHANGE UP (ref 0–0)
NT-PROBNP SERPL-SCNC: 78 PG/ML — SIGNIFICANT CHANGE UP (ref 0–125)
PH UR: 5 — SIGNIFICANT CHANGE UP (ref 5–8)
PLATELET # BLD AUTO: 339 K/UL — SIGNIFICANT CHANGE UP (ref 150–400)
POTASSIUM SERPL-MCNC: 3.9 MMOL/L — SIGNIFICANT CHANGE UP (ref 3.5–5.3)
POTASSIUM SERPL-SCNC: 3.9 MMOL/L — SIGNIFICANT CHANGE UP (ref 3.5–5.3)
PROT SERPL-MCNC: 8.9 GM/DL — HIGH (ref 6–8.3)
PROT UR-MCNC: 30 MG/DL
PROTHROM AB SERPL-ACNC: 12.4 SEC — SIGNIFICANT CHANGE UP (ref 10–12.9)
RBC # BLD: 4.1 M/UL — SIGNIFICANT CHANGE UP (ref 3.8–5.2)
RBC # FLD: 14.2 % — SIGNIFICANT CHANGE UP (ref 10.3–14.5)
RBC CASTS # UR COMP ASSIST: SIGNIFICANT CHANGE UP /HPF (ref 0–4)
SODIUM SERPL-SCNC: 138 MMOL/L — SIGNIFICANT CHANGE UP (ref 135–145)
SP GR SPEC: 1.01 — SIGNIFICANT CHANGE UP (ref 1.01–1.02)
TROPONIN I SERPL-MCNC: 0.04 NG/ML — SIGNIFICANT CHANGE UP (ref 0.01–0.04)
UROBILINOGEN FLD QL: 8 MG/DL
WBC # BLD: 17.65 K/UL — HIGH (ref 3.8–10.5)
WBC # FLD AUTO: 17.65 K/UL — HIGH (ref 3.8–10.5)
WBC UR QL: SIGNIFICANT CHANGE UP

## 2019-06-21 PROCEDURE — 93010 ELECTROCARDIOGRAM REPORT: CPT

## 2019-06-21 PROCEDURE — 71275 CT ANGIOGRAPHY CHEST: CPT | Mod: 26

## 2019-06-21 PROCEDURE — 71045 X-RAY EXAM CHEST 1 VIEW: CPT | Mod: 26

## 2019-06-21 PROCEDURE — 74177 CT ABD & PELVIS W/CONTRAST: CPT | Mod: 26

## 2019-06-21 PROCEDURE — 99291 CRITICAL CARE FIRST HOUR: CPT

## 2019-06-21 RX ORDER — PIPERACILLIN AND TAZOBACTAM 4; .5 G/20ML; G/20ML
3.38 INJECTION, POWDER, LYOPHILIZED, FOR SOLUTION INTRAVENOUS ONCE
Refills: 0 | Status: COMPLETED | OUTPATIENT
Start: 2019-06-21 | End: 2019-06-21

## 2019-06-21 RX ORDER — ACETAMINOPHEN 500 MG
975 TABLET ORAL ONCE
Refills: 0 | Status: COMPLETED | OUTPATIENT
Start: 2019-06-21 | End: 2019-06-21

## 2019-06-21 RX ORDER — ONDANSETRON 8 MG/1
8 TABLET, FILM COATED ORAL ONCE
Refills: 0 | Status: COMPLETED | OUTPATIENT
Start: 2019-06-21 | End: 2019-06-21

## 2019-06-21 RX ORDER — SODIUM CHLORIDE 9 MG/ML
1000 INJECTION INTRAMUSCULAR; INTRAVENOUS; SUBCUTANEOUS ONCE
Refills: 0 | Status: COMPLETED | OUTPATIENT
Start: 2019-06-21 | End: 2019-06-21

## 2019-06-21 RX ORDER — IOHEXOL 300 MG/ML
30 INJECTION, SOLUTION INTRAVENOUS ONCE
Refills: 0 | Status: COMPLETED | OUTPATIENT
Start: 2019-06-21 | End: 2019-06-21

## 2019-06-21 RX ORDER — MORPHINE SULFATE 50 MG/1
4 CAPSULE, EXTENDED RELEASE ORAL ONCE
Refills: 0 | Status: COMPLETED | OUTPATIENT
Start: 2019-06-21 | End: 2019-06-21

## 2019-06-21 RX ORDER — SODIUM CHLORIDE 9 MG/ML
500 INJECTION INTRAMUSCULAR; INTRAVENOUS; SUBCUTANEOUS ONCE
Refills: 0 | Status: COMPLETED | OUTPATIENT
Start: 2019-06-21 | End: 2019-06-21

## 2019-06-21 RX ORDER — FAMOTIDINE 10 MG/ML
20 INJECTION INTRAVENOUS ONCE
Refills: 0 | Status: COMPLETED | OUTPATIENT
Start: 2019-06-21 | End: 2019-06-21

## 2019-06-21 RX ORDER — MORPHINE SULFATE 50 MG/1
4 CAPSULE, EXTENDED RELEASE ORAL ONCE
Refills: 0 | Status: DISCONTINUED | OUTPATIENT
Start: 2019-06-21 | End: 2019-06-21

## 2019-06-21 RX ADMIN — SODIUM CHLORIDE 1000 MILLILITER(S): 9 INJECTION INTRAMUSCULAR; INTRAVENOUS; SUBCUTANEOUS at 20:00

## 2019-06-21 RX ADMIN — IOHEXOL 30 MILLILITER(S): 300 INJECTION, SOLUTION INTRAVENOUS at 19:52

## 2019-06-21 RX ADMIN — SODIUM CHLORIDE 2000 MILLILITER(S): 9 INJECTION INTRAMUSCULAR; INTRAVENOUS; SUBCUTANEOUS at 21:13

## 2019-06-21 RX ADMIN — ONDANSETRON 8 MILLIGRAM(S): 8 TABLET, FILM COATED ORAL at 19:51

## 2019-06-21 RX ADMIN — MORPHINE SULFATE 4 MILLIGRAM(S): 50 CAPSULE, EXTENDED RELEASE ORAL at 19:52

## 2019-06-21 RX ADMIN — SODIUM CHLORIDE 2000 MILLILITER(S): 9 INJECTION INTRAMUSCULAR; INTRAVENOUS; SUBCUTANEOUS at 21:14

## 2019-06-21 RX ADMIN — Medication 975 MILLIGRAM(S): at 21:00

## 2019-06-21 RX ADMIN — FAMOTIDINE 20 MILLIGRAM(S): 10 INJECTION INTRAVENOUS at 19:52

## 2019-06-21 RX ADMIN — PIPERACILLIN AND TAZOBACTAM 200 GRAM(S): 4; .5 INJECTION, POWDER, LYOPHILIZED, FOR SOLUTION INTRAVENOUS at 21:17

## 2019-06-21 NOTE — ED ADULT NURSE NOTE - OBJECTIVE STATEMENT
68 y.o female with a hx of PE, htn and diabetes complains of right sided abdominal pain with nausea and vomiting. patient denies diarrhea but states "it feels like I have to go".

## 2019-06-21 NOTE — ED ADULT NURSE NOTE - NSIMPLEMENTINTERV_GEN_ALL_ED
Implemented All Fall Risk Interventions:  [place] to call system. Call bell, personal items and telephone within reach. Instruct patient to call for assistance. Room bathroom lighting operational. Non-slip footwear when patient is off stretcher. Physically safe environment: no spills, clutter or unnecessary equipment. Stretcher in lowest position, wheels locked, appropriate side rails in place. Provide visual cue, wrist band, yellow gown, etc. Monitor gait and stability. Monitor for mental status changes and reorient to person, place, and time. Review medications for side effects contributing to fall risk. Reinforce activity limits and safety measures with patient and family.

## 2019-06-21 NOTE — ED PROVIDER NOTE - OBJECTIVE STATEMENT
67yo female with pmh HTN, psh: todd present with rt sided abd pain vomiting and epigastric burning pain today. denies diarrhea, constipation, fever.     ROS: No fever/chills. No photophobia/eye pain/changes in vision, No ear pain/sore throat/dysphagia, No chest pain/palpitations. No SOB/cough. + abdominal pain, +N/V, no D, no black/bloody bm. No dysuria/frequency/discharge, No headache. No Dizziness.  No rash.  No numbness/tingling/weakness. 69yo female with pmh HTN, PE on xarelto, psh: todd present with rt sided abd pain vomiting and epigastric burning pain today. denies diarrhea, constipation, fever.     ROS: No fever/chills. No photophobia/eye pain/changes in vision, No ear pain/sore throat/dysphagia, No chest pain/palpitations. No SOB/cough. + abdominal pain, +N/V, no D, no black/bloody bm. No dysuria/frequency/discharge, No headache. No Dizziness.  No rash.  No numbness/tingling/weakness. 67yo female with pmh HTN, PE on xarelto, psh: todd present with rt sided abd pain vomiting and epigastric burning pain today. pt also admits to some cp and sob and inc fatigue similar to when she had her prior PE/DVT. denies diarrhea, constipation, fever.     ROS: No fever/chills. No photophobia/eye pain/changes in vision, No ear pain/sore throat/dysphagia, No chest pain/palpitations. No SOB/cough. + abdominal pain, +N/V, no D, no black/bloody bm. No dysuria/frequency/discharge, No headache. No Dizziness.  No rash.  No numbness/tingling/weakness.

## 2019-06-21 NOTE — ED PROVIDER NOTE - PHYSICAL EXAMINATION
Gen: Alert, appears uncomfortable  Head: NC, AT, PERRL, normal lids/conjunctiva   ENT: Bilateral TM WNL, patent oropharynx without erythema/exudate, uvula midline  Neck: supple, no tenderness/meningismus  Pulm: Bilateral clear BS, normal resp effort  CV: RRR, no M/R/G, +dist pulses   Abd: soft, + rt sided abd tenderness, and epigastric tender,  +BS, no guarding/rebound tenderness  Mskel: extremities x4 with normal ROM. no ctl spine ttp. no edema/erythema/cyanosis   Skin: no rash, no bruising  Neuro: AAOx3, no sensory/motor deficits, CN 2-12 intact Gen: Alert, appears uncomfortable, + warm  Head: NC, AT, PERRL, normal lids/conjunctiva   ENT: Bilateral TM WNL, patent oropharynx without erythema/exudate, uvula midline  Neck: supple, no tenderness/meningismus  Pulm: Bilateral clear BS, normal resp effort  CV: + tachy,  no M/R/G, +dist pulses   Abd: soft, + rt sided abd tenderness, and epigastric tender,  +BS, no guarding/rebound tenderness  Mskel: extremities x4 with normal ROM. no ctl spine ttp. no edema/erythema/cyanosis   Skin: no rash, no bruising  Neuro: AAOx3, no sensory/motor deficits, CN 2-12 intact

## 2019-06-22 DIAGNOSIS — A41.9 SEPSIS, UNSPECIFIED ORGANISM: ICD-10-CM

## 2019-06-22 DIAGNOSIS — N17.9 ACUTE KIDNEY FAILURE, UNSPECIFIED: ICD-10-CM

## 2019-06-22 LAB
-  COAGULASE NEGATIVE STAPHYLOCOCCUS: SIGNIFICANT CHANGE UP
-  K. PNEUMONIAE GROUP: SIGNIFICANT CHANGE UP
-  K. PNEUMONIAE GROUP: SIGNIFICANT CHANGE UP
ALBUMIN SERPL ELPH-MCNC: 2.5 G/DL — LOW (ref 3.3–5)
ALP SERPL-CCNC: 243 U/L — HIGH (ref 40–120)
ALT FLD-CCNC: 110 U/L — HIGH (ref 12–78)
ANION GAP SERPL CALC-SCNC: 7 MMOL/L — SIGNIFICANT CHANGE UP (ref 5–17)
AST SERPL-CCNC: 99 U/L — HIGH (ref 15–37)
BASE EXCESS BLDA CALC-SCNC: -1.6 MMOL/L — SIGNIFICANT CHANGE UP (ref -2–2)
BASE EXCESS BLDA CALC-SCNC: -1.7 MMOL/L — SIGNIFICANT CHANGE UP (ref -2–2)
BILIRUB SERPL-MCNC: 4.6 MG/DL — HIGH (ref 0.2–1.2)
BLOOD GAS COMMENTS: SIGNIFICANT CHANGE UP
BLOOD GAS SOURCE: SIGNIFICANT CHANGE UP
BLOOD GAS SOURCE: SIGNIFICANT CHANGE UP
BUN SERPL-MCNC: 27 MG/DL — HIGH (ref 7–23)
CALCIUM SERPL-MCNC: 8 MG/DL — LOW (ref 8.5–10.1)
CHLORIDE SERPL-SCNC: 100 MMOL/L — SIGNIFICANT CHANGE UP (ref 96–108)
CK MB BLD-MCNC: 0.9 % — SIGNIFICANT CHANGE UP (ref 0–3.5)
CK MB CFR SERPL CALC: 1.7 NG/ML — SIGNIFICANT CHANGE UP (ref 0.5–3.6)
CK SERPL-CCNC: 183 U/L — SIGNIFICANT CHANGE UP (ref 26–192)
CO2 SERPL-SCNC: 27 MMOL/L — SIGNIFICANT CHANGE UP (ref 22–31)
CREAT SERPL-MCNC: 2.24 MG/DL — HIGH (ref 0.5–1.3)
CULTURE RESULTS: NO GROWTH — SIGNIFICANT CHANGE UP
FLU A RESULT: SIGNIFICANT CHANGE UP
FLU A RESULT: SIGNIFICANT CHANGE UP
FLUAV AG NPH QL: SIGNIFICANT CHANGE UP
FLUBV AG NPH QL: SIGNIFICANT CHANGE UP
GLUCOSE SERPL-MCNC: 96 MG/DL — SIGNIFICANT CHANGE UP (ref 70–99)
GRAM STN FLD: SIGNIFICANT CHANGE UP
GRAM STN FLD: SIGNIFICANT CHANGE UP
HCO3 BLDA-SCNC: 25 MMOL/L — SIGNIFICANT CHANGE UP (ref 21–29)
HCO3 BLDA-SCNC: 25 MMOL/L — SIGNIFICANT CHANGE UP (ref 21–29)
HCT VFR BLD CALC: 33.9 % — LOW (ref 34.5–45)
HGB BLD-MCNC: 10.1 G/DL — LOW (ref 11.5–15.5)
HOROWITZ INDEX BLDA+IHG-RTO: 25 — SIGNIFICANT CHANGE UP
HOROWITZ INDEX BLDA+IHG-RTO: 28 — SIGNIFICANT CHANGE UP
LACTATE SERPL-SCNC: 2 MMOL/L — SIGNIFICANT CHANGE UP (ref 0.7–2)
MAGNESIUM SERPL-MCNC: 2.3 MG/DL — SIGNIFICANT CHANGE UP (ref 1.6–2.6)
MCHC RBC-ENTMCNC: 28.5 PG — SIGNIFICANT CHANGE UP (ref 27–34)
MCHC RBC-ENTMCNC: 29.8 GM/DL — LOW (ref 32–36)
MCV RBC AUTO: 95.5 FL — SIGNIFICANT CHANGE UP (ref 80–100)
METHOD TYPE: SIGNIFICANT CHANGE UP
METHOD TYPE: SIGNIFICANT CHANGE UP
NRBC # BLD: 0 /100 WBCS — SIGNIFICANT CHANGE UP (ref 0–0)
PCO2 BLDA: 51 MMHG — HIGH (ref 32–46)
PCO2 BLDA: 52 MMHG — HIGH (ref 32–46)
PH BLD: 7.3 — LOW (ref 7.35–7.45)
PH BLD: 7.3 — LOW (ref 7.35–7.45)
PHOSPHATE SERPL-MCNC: 2.9 MG/DL — SIGNIFICANT CHANGE UP (ref 2.5–4.5)
PLATELET # BLD AUTO: 263 K/UL — SIGNIFICANT CHANGE UP (ref 150–400)
PO2 BLDA: 124 MMHG — HIGH (ref 74–108)
PO2 BLDA: 96 MMHG — SIGNIFICANT CHANGE UP (ref 74–108)
POTASSIUM SERPL-MCNC: 4.9 MMOL/L — SIGNIFICANT CHANGE UP (ref 3.5–5.3)
POTASSIUM SERPL-SCNC: 4.9 MMOL/L — SIGNIFICANT CHANGE UP (ref 3.5–5.3)
PROCALCITONIN SERPL-MCNC: 59.7 NG/ML — HIGH (ref 0.02–0.1)
PROT SERPL-MCNC: 7.9 GM/DL — SIGNIFICANT CHANGE UP (ref 6–8.3)
RAPID RVP RESULT: SIGNIFICANT CHANGE UP
RBC # BLD: 3.55 M/UL — LOW (ref 3.8–5.2)
RBC # FLD: 14.5 % — SIGNIFICANT CHANGE UP (ref 10.3–14.5)
RSV RESULT: SIGNIFICANT CHANGE UP
RSV RNA RESP QL NAA+PROBE: SIGNIFICANT CHANGE UP
SAO2 % BLDA: 97 % — HIGH (ref 92–96)
SAO2 % BLDA: 99 % — HIGH (ref 92–96)
SODIUM SERPL-SCNC: 134 MMOL/L — LOW (ref 135–145)
SPECIMEN SOURCE: SIGNIFICANT CHANGE UP
T3 SERPL-MCNC: 91 NG/DL — SIGNIFICANT CHANGE UP (ref 80–200)
T4 AB SER-ACNC: 8 UG/DL — SIGNIFICANT CHANGE UP (ref 4.6–12)
TSH SERPL-MCNC: 0.43 UIU/ML — SIGNIFICANT CHANGE UP (ref 0.36–3.74)
WBC # BLD: 18.57 K/UL — HIGH (ref 3.8–10.5)
WBC # FLD AUTO: 18.57 K/UL — HIGH (ref 3.8–10.5)

## 2019-06-22 PROCEDURE — 93010 ELECTROCARDIOGRAM REPORT: CPT

## 2019-06-22 RX ORDER — VANCOMYCIN HCL 1 G
1250 VIAL (EA) INTRAVENOUS EVERY 12 HOURS
Refills: 0 | Status: DISCONTINUED | OUTPATIENT
Start: 2019-06-22 | End: 2019-06-22

## 2019-06-22 RX ORDER — CHLORHEXIDINE GLUCONATE 213 G/1000ML
1 SOLUTION TOPICAL EVERY 12 HOURS
Refills: 0 | Status: DISCONTINUED | OUTPATIENT
Start: 2019-06-22 | End: 2019-06-29

## 2019-06-22 RX ORDER — PIPERACILLIN AND TAZOBACTAM 4; .5 G/20ML; G/20ML
3.38 INJECTION, POWDER, LYOPHILIZED, FOR SOLUTION INTRAVENOUS EVERY 8 HOURS
Refills: 0 | Status: DISCONTINUED | OUTPATIENT
Start: 2019-06-22 | End: 2019-06-25

## 2019-06-22 RX ORDER — OXYCODONE AND ACETAMINOPHEN 5; 325 MG/1; MG/1
1 TABLET ORAL EVERY 6 HOURS
Refills: 0 | Status: DISCONTINUED | OUTPATIENT
Start: 2019-06-22 | End: 2019-06-24

## 2019-06-22 RX ORDER — RIVAROXABAN 15 MG-20MG
20 KIT ORAL DAILY
Refills: 0 | Status: DISCONTINUED | OUTPATIENT
Start: 2019-06-22 | End: 2019-06-29

## 2019-06-22 RX ORDER — OXYCODONE AND ACETAMINOPHEN 5; 325 MG/1; MG/1
2 TABLET ORAL EVERY 6 HOURS
Refills: 0 | Status: DISCONTINUED | OUTPATIENT
Start: 2019-06-22 | End: 2019-06-24

## 2019-06-22 RX ORDER — ACETAMINOPHEN 500 MG
650 TABLET ORAL EVERY 6 HOURS
Refills: 0 | Status: DISCONTINUED | OUTPATIENT
Start: 2019-06-22 | End: 2019-06-29

## 2019-06-22 RX ORDER — VANCOMYCIN HCL 1 G
1000 VIAL (EA) INTRAVENOUS EVERY 12 HOURS
Refills: 0 | Status: DISCONTINUED | OUTPATIENT
Start: 2019-06-22 | End: 2019-06-22

## 2019-06-22 RX ORDER — PIPERACILLIN AND TAZOBACTAM 4; .5 G/20ML; G/20ML
3.38 INJECTION, POWDER, LYOPHILIZED, FOR SOLUTION INTRAVENOUS EVERY 8 HOURS
Refills: 0 | Status: DISCONTINUED | OUTPATIENT
Start: 2019-06-22 | End: 2019-06-22

## 2019-06-22 RX ORDER — ALBUTEROL 90 UG/1
2.5 AEROSOL, METERED ORAL EVERY 6 HOURS
Refills: 0 | Status: DISCONTINUED | OUTPATIENT
Start: 2019-06-22 | End: 2019-06-29

## 2019-06-22 RX ORDER — ALBUTEROL 90 UG/1
1 AEROSOL, METERED ORAL EVERY 4 HOURS
Refills: 0 | Status: DISCONTINUED | OUTPATIENT
Start: 2019-06-22 | End: 2019-06-29

## 2019-06-22 RX ORDER — METOPROLOL TARTRATE 50 MG
100 TABLET ORAL
Refills: 0 | Status: DISCONTINUED | OUTPATIENT
Start: 2019-06-22 | End: 2019-06-22

## 2019-06-22 RX ADMIN — CHLORHEXIDINE GLUCONATE 1 APPLICATION(S): 213 SOLUTION TOPICAL at 05:26

## 2019-06-22 RX ADMIN — Medication 100 MILLIGRAM(S): at 05:20

## 2019-06-22 RX ADMIN — OXYCODONE AND ACETAMINOPHEN 2 TABLET(S): 5; 325 TABLET ORAL at 23:15

## 2019-06-22 RX ADMIN — PIPERACILLIN AND TAZOBACTAM 25 GRAM(S): 4; .5 INJECTION, POWDER, LYOPHILIZED, FOR SOLUTION INTRAVENOUS at 13:58

## 2019-06-22 RX ADMIN — OXYCODONE AND ACETAMINOPHEN 2 TABLET(S): 5; 325 TABLET ORAL at 10:00

## 2019-06-22 RX ADMIN — OXYCODONE AND ACETAMINOPHEN 2 TABLET(S): 5; 325 TABLET ORAL at 15:40

## 2019-06-22 RX ADMIN — PIPERACILLIN AND TAZOBACTAM 25 GRAM(S): 4; .5 INJECTION, POWDER, LYOPHILIZED, FOR SOLUTION INTRAVENOUS at 21:20

## 2019-06-22 RX ADMIN — ALBUTEROL 2.5 MILLIGRAM(S): 90 AEROSOL, METERED ORAL at 05:57

## 2019-06-22 RX ADMIN — OXYCODONE AND ACETAMINOPHEN 2 TABLET(S): 5; 325 TABLET ORAL at 22:35

## 2019-06-22 RX ADMIN — PIPERACILLIN AND TAZOBACTAM 25 GRAM(S): 4; .5 INJECTION, POWDER, LYOPHILIZED, FOR SOLUTION INTRAVENOUS at 05:20

## 2019-06-22 RX ADMIN — RIVAROXABAN 20 MILLIGRAM(S): KIT at 12:59

## 2019-06-22 RX ADMIN — ALBUTEROL 2.5 MILLIGRAM(S): 90 AEROSOL, METERED ORAL at 23:53

## 2019-06-22 RX ADMIN — ALBUTEROL 2.5 MILLIGRAM(S): 90 AEROSOL, METERED ORAL at 11:07

## 2019-06-22 RX ADMIN — OXYCODONE AND ACETAMINOPHEN 2 TABLET(S): 5; 325 TABLET ORAL at 16:45

## 2019-06-22 RX ADMIN — OXYCODONE AND ACETAMINOPHEN 2 TABLET(S): 5; 325 TABLET ORAL at 09:00

## 2019-06-22 RX ADMIN — Medication 250 MILLIGRAM(S): at 05:20

## 2019-06-22 RX ADMIN — ALBUTEROL 2.5 MILLIGRAM(S): 90 AEROSOL, METERED ORAL at 17:08

## 2019-06-22 NOTE — H&P ADULT - NSHPSOCIALHISTORY_GEN_ALL_CORE
non smoker, reports 2 sons, 1 daughter, 14 grandchildren and 12 great grandchildren with 2 additional on the way; lives with daughter

## 2019-06-22 NOTE — H&P ADULT - NSHPPHYSICALEXAM_GEN_ALL_CORE
Vital Signs Last 24 Hrs  T(C): 36.9 (22 Jun 2019 02:17), Max: 39.3 (21 Jun 2019 20:30)  T(F): 98.4 (22 Jun 2019 02:17), Max: 102.8 (21 Jun 2019 20:30)  HR: 120 (22 Jun 2019 03:43) (71 - 160)  BP: 122/103 (22 Jun 2019 01:41) (122/103 - 156/79)  RR: 24 (22 Jun 2019 02:17) (19 - 26)  SpO2: 98% (22 Jun 2019 03:43) (95% - 100%)  Constitutional: breathless , well-groomed, well-developed,obese++  HEENT: PERRLA, EOMI, Normal Hearing, MMM  Neck: No JVD, short neck thyroid palpable  Back: Normal spine flexure, No CVA tenderness  Respiratory: diminishhed bibasilar   Cardiovascular: S1 and S2, RRR, tachycardia                          EKG- intraventricular changes sinus tach  Gastrointestinal: BS+, soft, old midline scar, pendulous, right flank trender  Extremities: No peripheral edema, bilat ant knee scars  Vascular: 2+ peripheral pulses  Neurological: A/O x 3, no focal deficits  Skin: No rashes

## 2019-06-22 NOTE — H&P ADULT - PROBLEM SELECTOR PLAN 1
Admit to ICU; Dr. Mancilla. Case d/w Dr Derrick CHILDRESS, Cover for atypical organism, pan culture, mycoplasma, legionella, antibiotics iv with Levaquin, Zosyn and Vancomycin; BIPap  ID consult  Resp: technically difficult to ascertain PE, continue Xarelto, defer heparin gtt as pt with IVC filter  Endo- strict glycemic control, complete TFTs, thyroid ultrasound  Physical therapy consult

## 2019-06-22 NOTE — H&P ADULT - NSICDXPASTSURGICALHX_GEN_ALL_CORE_FT
PAST SURGICAL HISTORY:  History of total knee replacement with revisions x 3    Other acquired absence of organ History of cholecystectomy    Status post cholecystectomy     Status post hysterectomy     Status post total hysterectomy partial    Total knee replacement status

## 2019-06-22 NOTE — H&P ADULT - ASSESSMENT
68 year old female with community acquired recurrent pneumonia  with morbid obesity and h/o PE with IVC filter on Xarelto presents with shortness of breath found with leukocytosis, acute kidney injury and enlarged thyroid

## 2019-06-22 NOTE — PROVIDER CONTACT NOTE (EICU) - ACTION/TREATMENT ORDERED:
start on vanco, zosyn and levquin   BiPAP   f/u ABG  mycoplasma and legionella titre   continue eliquis  panculture

## 2019-06-22 NOTE — H&P ADULT - NSHPREVIEWOFSYSTEMS_GEN_ALL_CORE
Constitutional: No fever, weight loss or fatigue  Eyes: No eye pain, visual disturbances, or discharge  ENT:  No difficulty hearing, tinnitus, vertigo; No sinus or throat pain  Neck: No pain or stiffness  Breasts: No pain, masses or nipple discharge  Respiratory: No  wheezing, chills or hemoptysis  Cardiovascular: No chest pain, palpitations, shortness of breath or dizziness   Gastrointestinal: Positive epigastric pain and nausea, No vomiting or hematemesis; No diarrhea or constipation. No melena or hematochezia.  Genitourinary: No dysuria, frequency, hematuria or incontinence  Rectal: No pain, hemorrhoids or incontinence  Neurological: No headaches, memory loss, loss of strength, numbness or tremors  Skin: No itching, burning, rashes or lesions   Lymph Nodes: No enlarged glands  Endocrine: No heat or cold intolerance; No hair loss  Musculoskeletal: ambulates with walker, bilateral knee pain, positive left knee swelling by one week  Psychiatric: No depression, anxiety, mood swings or difficulty sleeping  Heme/Lymph: No easy bruising or bleeding gums  Allergy and Immunologic: No hives or eczema

## 2019-06-22 NOTE — PROVIDER CONTACT NOTE (CRITICAL VALUE NOTIFICATION) - TEST AND RESULT REPORTED:
1st set - Blood Culture 6/21- Preliminary result- growth in aerobic bottle gram negative rods    2nd set-Blood culture- 6/21- Growth in anerobic bottle gram negative rods and growth in aerobic bottle gram negative rods and gram positive cocci in clusters.

## 2019-06-22 NOTE — H&P ADULT - HISTORY OF PRESENT ILLNESS
67 yo F with PMH morbid obesity, HTN, Bilateral knee replacements complicated by DVT and pulmonary embolism (2013) maintained on Xarelto presents with two week history of URI cough and fever. Cough accompanied by fatigue, shortness of breath and intractable stomach pain. Today difficulty ambulating OOB to bathroom with severe dypsnea feeling like her PE in the past. Patient denies chest pain, palpitations, calf pain

## 2019-06-22 NOTE — PROVIDER CONTACT NOTE (EICU) - BACKGROUND
recent knee surgery  CT chest - bilateral ground glass opacities more UL   CT abdomen- IVC filter   increase LFT's and leukocytosis

## 2019-06-22 NOTE — H&P ADULT - NSHPLABSRESULTS_GEN_ALL_CORE
Lab Results:  CBC  CBC Full  -  ( 2019 20:01 )  WBC Count : 17.65 K/uL  RBC Count : 4.10 M/uL  Hemoglobin : 11.3 g/dL  Hematocrit : 38.3 %  Platelet Count - Automated : 339 K/uL  Mean Cell Volume : 93.4 fl  Mean Cell Hemoglobin : 27.6 pg  Mean Cell Hemoglobin Concentration : 29.5 gm/dL  Auto Neutrophil # : 16.12 K/uL  Auto Lymphocyte # : 0.70 K/uL  Auto Monocyte # : 0.64 K/uL  Auto Eosinophil # : 0.02 K/uL  Auto Basophil # : 0.03 K/uL  Auto Neutrophil % : 91.3 %  Auto Lymphocyte % : 4.0 %  Auto Monocyte % : 3.6 %  Auto Eosinophil % : 0.1 %  Auto Basophil % : 0.2 %    .		Differential:	[] Automated		[] Manual  Chemistry                        11.3   17.65 )-----------( 339      ( 2019 20:01 )             38.3     06-21    138  |  102  |  25<H>  ----------------------------<  111<H>  3.9   |  28  |  2.05<H>    Ca    8.3<L>      2019 20:01    TPro  8.9<H>  /  Alb  3.0<L>  /  TBili  2.6<H>  /  DBili  x   /  AST  242<H>  /  ALT  146<H>  /  AlkPhos  350<H>  -    LIVER FUNCTIONS - ( 2019 20:01 )  Alb: 3.0 g/dL / Pro: 8.9 gm/dL / ALK PHOS: 350 U/L / ALT: 146 U/L / AST: 242 U/L / GGT: x         PT/INR - ( 2019 20:01 )   PT: 12.4 sec;   INR: 1.10 ratio    PTT - ( 2019 20:01 )  PTT:24.6 sec  Urinalysis Basic - ( 2019 21:42 )  Color: Ester / Appearance: Clear / S.015 / pH: x  Gluc: x / Ketone: Negative  / Bili: Moderate / Urobili: 8 mg/dL   Blood: x / Protein: 30 mg/dL / Nitrite: Negative   Leuk Esterase: Trace / RBC: 0-2 /HPF / WBC 0-2   Sq Epi: x / Non Sq Epi: Occasional / Bacteria: Occasional  CARDIAC MARKERS ( 2019 20:01 )  .044 ng/mL    Lactate decreased    ABG - ( 2019 02:41 )  pH, Arterial: x     pH, Blood: 7.30  /  pCO2: 51    /  pO2: 124   / HCO3: 25    / Base Excess: -1.6  /  SaO2: 99      MICROBIOLOGY/CULTURES: pending   RADIOLOGY RESULTS: CT Angio  COMPARISON:    CT ANGIO CHEST WITHOUT AND OR WITH IV CONTRAST 2018 2:35 PM     FINDINGS:    Pulmonary arteries: Evaluation of the pulmonary embolism is difficult   secondary to patient's body habitus and poor visualization of the   distal   arteries. No filling defect is seen in the main pulmonary arteries.    Aorta: Unremarkable. No aortic aneurysm. No aortic dissection.    Thyroid: Left lobe of the thyroid gland is heterogeneous and enlarged   extending into the mediastinum, further evaluation with ultrasound is   recommended.    Lungs: Increase interstitial markings in bilateral lungs likely edema.    Pleural space: Unremarkable. No pneumothorax. No pleural effusion.    Heart: Unremarkable. No cardiomegaly. No pericardial effusion.    Lymph nodes: Unremarkable. No enlarged lymph nodes.    Bones/joints: Unremarkable. No acute fracture.    Soft tissues: Unremarkable.     IMPRESSION:   Evaluation of the pulmonary embolism is difficult secondary to   patient's body   habitus and poor visualization of the distal arteries. No filling defect is   seen in the main pulmonary arteries.     Left lobe of the thyroid gland is heterogeneous and enlarged extending into   the   mediastinum, further evaluation with ultrasound is recommended.   ******PRELIMINARY REPORT******

## 2019-06-22 NOTE — H&P ADULT - ATTENDING COMMENTS
Pt seen and examined on morning rounds with ICU team 6/22.     68F PMH morbid obesity, HTN, bilateral knee replacements complicated by DVT and pulmonary embolism (2013) maintained on Xarelto, hx of septic knee and bacteremia, CKD, LARRY non compliant with nocturnal BiPAP presents with two week history of URI cough and fever. Cough accompanied by fatigue, shortness of breath and right abdominal pain and flank pain associated with chills and rigors and urinary frequency without dysuria. Pt also reporting a "little odor with urine". On day of presentation pt had difficulty ambulating OOB to bathroom with severe dypsnea feeling like her PE in the past. Patient denies chest pain, palpitations, calf pain. Found to be hypercarbic placed on biPAP and admitted to ICU. Dx: severe sepsis, PNA, acute hypercarbic respiratory failure, r/o UTI, gram negative bacteremia, Pt seen and examined on morning rounds with ICU team 6/22.     68F PMH morbid obesity, HTN, bilateral knee replacements complicated by DVT and pulmonary embolism (2013) maintained on Xarelto, hx of septic knee and bacteremia, CKD, LARRY non compliant with nocturnal BiPAP presents with two week history of URI cough and fever. Cough accompanied by fatigue, shortness of breath and right abdominal pain and flank pain associated with chills and rigors and urinary frequency without dysuria. Pt also reporting a "little odor with urine". On day of presentation pt had difficulty ambulating OOB to bathroom with severe dyspnea feeling like her PE in the past. Patient denies chest pain, palpitations, calf pain. Found to be hypercarbic placed on biPAP and admitted to ICU. Dx: severe sepsis, PNA, acute hypercarbic respiratory failure, r/o UTI, gram negative bacteremia, acute on chronic renal insufficiency, elevated LFTs.     1. PULM  - wean off BiPAP  - will cont bipap at night and prn during day  - received zosyn, levaquin, vancomycin on admission  - cont with zosyn, check vanco level  - check sputum cx  - follow up viral nasal swab    2. CV  - BP stable  - will hold antihypertensives in setting of sepsis and monitor BP    3. ID  - blood cultures with gram negatives   - follow up urine cx  - cont zosyn  - s/p vanco  - if cultures without gram positives likely can d/c vanco  - procalcitonin elevated at 59.7    4. GI  - pt complaining of right abdominal pain and right flank pain  - also with complaints of urinary frequency  - CT without hydronephrosis or reports of cystitis/pyelonephritis  - monitor LFTs which may be elevated secondary to sepsis  - will consider abdominal US if no significant improvement    5. RENAL  - acute on chronic renal insufficiency  - sharma with turbid urine and lots of sediments  - trial of void  - trend Cr  - elevated Cr due to sepsis with some ATN, recent contrast use as well  - will cont to monitor    6. ENDO  - endo eval for possible goiter noted on CT      Critical Care Time: 45 min

## 2019-06-22 NOTE — H&P ADULT - NSICDXPASTMEDICALHX_GEN_ALL_CORE_FT
PAST MEDICAL HISTORY:  Arthropathy Arthritis    Dysfunctional uterine bleeding     Essential hypertension Hypertension    History of pulmonary embolism 2012 s/p IVC filter    Joint infection     Migraine Migraines    Morbid obesity     Obesity     Obstructive sleep apnea syndrome Obstructive sleep apnea    Osteoarthritis     Pulmonary embolism

## 2019-06-23 LAB
ALBUMIN SERPL ELPH-MCNC: 2.5 G/DL — LOW (ref 3.3–5)
ALP SERPL-CCNC: 225 U/L — HIGH (ref 40–120)
ALT FLD-CCNC: 87 U/L — HIGH (ref 12–78)
ANION GAP SERPL CALC-SCNC: 9 MMOL/L — SIGNIFICANT CHANGE UP (ref 5–17)
AST SERPL-CCNC: 60 U/L — HIGH (ref 15–37)
BILIRUB SERPL-MCNC: 4.2 MG/DL — HIGH (ref 0.2–1.2)
BUN SERPL-MCNC: 26 MG/DL — HIGH (ref 7–23)
CALCIUM SERPL-MCNC: 8 MG/DL — LOW (ref 8.5–10.1)
CHLORIDE SERPL-SCNC: 99 MMOL/L — SIGNIFICANT CHANGE UP (ref 96–108)
CO2 SERPL-SCNC: 26 MMOL/L — SIGNIFICANT CHANGE UP (ref 22–31)
CREAT SERPL-MCNC: 2.19 MG/DL — HIGH (ref 0.5–1.3)
GLUCOSE SERPL-MCNC: 89 MG/DL — SIGNIFICANT CHANGE UP (ref 70–99)
HCT VFR BLD CALC: 34.4 % — LOW (ref 34.5–45)
HCV AB S/CO SERPL IA: 0.12 S/CO — SIGNIFICANT CHANGE UP (ref 0–0.99)
HCV AB SERPL-IMP: SIGNIFICANT CHANGE UP
HGB BLD-MCNC: 10.1 G/DL — LOW (ref 11.5–15.5)
LEGIONELLA AG UR QL: NEGATIVE — SIGNIFICANT CHANGE UP
MAGNESIUM SERPL-MCNC: 2 MG/DL — SIGNIFICANT CHANGE UP (ref 1.6–2.6)
MCHC RBC-ENTMCNC: 28 PG — SIGNIFICANT CHANGE UP (ref 27–34)
MCHC RBC-ENTMCNC: 29.4 GM/DL — LOW (ref 32–36)
MCV RBC AUTO: 95.3 FL — SIGNIFICANT CHANGE UP (ref 80–100)
NRBC # BLD: 0 /100 WBCS — SIGNIFICANT CHANGE UP (ref 0–0)
PHOSPHATE SERPL-MCNC: 2.8 MG/DL — SIGNIFICANT CHANGE UP (ref 2.5–4.5)
PLATELET # BLD AUTO: 230 K/UL — SIGNIFICANT CHANGE UP (ref 150–400)
POTASSIUM SERPL-MCNC: 4.5 MMOL/L — SIGNIFICANT CHANGE UP (ref 3.5–5.3)
POTASSIUM SERPL-SCNC: 4.5 MMOL/L — SIGNIFICANT CHANGE UP (ref 3.5–5.3)
PROT SERPL-MCNC: 8 GM/DL — SIGNIFICANT CHANGE UP (ref 6–8.3)
RBC # BLD: 3.61 M/UL — LOW (ref 3.8–5.2)
RBC # FLD: 14.4 % — SIGNIFICANT CHANGE UP (ref 10.3–14.5)
SODIUM SERPL-SCNC: 134 MMOL/L — LOW (ref 135–145)
WBC # BLD: 9.94 K/UL — SIGNIFICANT CHANGE UP (ref 3.8–10.5)
WBC # FLD AUTO: 9.94 K/UL — SIGNIFICANT CHANGE UP (ref 3.8–10.5)

## 2019-06-23 PROCEDURE — 76536 US EXAM OF HEAD AND NECK: CPT | Mod: 26

## 2019-06-23 PROCEDURE — 71045 X-RAY EXAM CHEST 1 VIEW: CPT | Mod: 26

## 2019-06-23 RX ORDER — VANCOMYCIN HCL 1 G
1000 VIAL (EA) INTRAVENOUS ONCE
Refills: 0 | Status: COMPLETED | OUTPATIENT
Start: 2019-06-23 | End: 2019-06-23

## 2019-06-23 RX ORDER — VANCOMYCIN HCL 1 G
1000 VIAL (EA) INTRAVENOUS EVERY 12 HOURS
Refills: 0 | Status: DISCONTINUED | OUTPATIENT
Start: 2019-06-23 | End: 2019-06-23

## 2019-06-23 RX ADMIN — OXYCODONE AND ACETAMINOPHEN 2 TABLET(S): 5; 325 TABLET ORAL at 13:00

## 2019-06-23 RX ADMIN — CHLORHEXIDINE GLUCONATE 1 APPLICATION(S): 213 SOLUTION TOPICAL at 05:19

## 2019-06-23 RX ADMIN — OXYCODONE AND ACETAMINOPHEN 2 TABLET(S): 5; 325 TABLET ORAL at 12:09

## 2019-06-23 RX ADMIN — Medication 650 MILLIGRAM(S): at 01:00

## 2019-06-23 RX ADMIN — PIPERACILLIN AND TAZOBACTAM 25 GRAM(S): 4; .5 INJECTION, POWDER, LYOPHILIZED, FOR SOLUTION INTRAVENOUS at 21:06

## 2019-06-23 RX ADMIN — RIVAROXABAN 20 MILLIGRAM(S): KIT at 11:53

## 2019-06-23 RX ADMIN — OXYCODONE AND ACETAMINOPHEN 2 TABLET(S): 5; 325 TABLET ORAL at 21:07

## 2019-06-23 RX ADMIN — PIPERACILLIN AND TAZOBACTAM 25 GRAM(S): 4; .5 INJECTION, POWDER, LYOPHILIZED, FOR SOLUTION INTRAVENOUS at 14:00

## 2019-06-23 RX ADMIN — ALBUTEROL 2.5 MILLIGRAM(S): 90 AEROSOL, METERED ORAL at 05:10

## 2019-06-23 RX ADMIN — Medication 650 MILLIGRAM(S): at 00:01

## 2019-06-23 RX ADMIN — ALBUTEROL 2.5 MILLIGRAM(S): 90 AEROSOL, METERED ORAL at 11:00

## 2019-06-23 RX ADMIN — OXYCODONE AND ACETAMINOPHEN 2 TABLET(S): 5; 325 TABLET ORAL at 22:07

## 2019-06-23 RX ADMIN — Medication 250 MILLIGRAM(S): at 05:14

## 2019-06-23 RX ADMIN — PIPERACILLIN AND TAZOBACTAM 25 GRAM(S): 4; .5 INJECTION, POWDER, LYOPHILIZED, FOR SOLUTION INTRAVENOUS at 05:14

## 2019-06-23 RX ADMIN — ALBUTEROL 2.5 MILLIGRAM(S): 90 AEROSOL, METERED ORAL at 17:00

## 2019-06-23 NOTE — PHYSICAL THERAPY INITIAL EVALUATION ADULT - ACTIVE RANGE OF MOTION EXAMINATION, REHAB EVAL
bilateral upper extremity Active ROM was WFL (within functional limits)/bilateral  lower extremity Active ROM was WFL (within functional limits)/limitations due to soft tissue approximation

## 2019-06-23 NOTE — PROGRESS NOTE ADULT - SUBJECTIVE AND OBJECTIVE BOX
HPI:  68F PMH morbid obesity, HTN, bilateral knee replacements complicated by DVT and pulmonary embolism (2013) maintained on Xarelto, hx of septic knee and bacteremia, CKD, LARRY non compliant with nocturnal BiPAP presents with two week history of URI cough and fever. Cough accompanied by fatigue, shortness of breath and right abdominal pain and flank pain associated with chills and rigors and urinary frequency without dysuria. On day of presentation pt had difficulty ambulating OOB to bathroom with severe dypsnea feeling like her PE in the past. Patient denies chest pain, palpitations, calf pain. Found to be hypercarbic placed on biPAP and admitted to ICU.       24 hr events:  weaned off bipap  doing well on nasal cannula with bipap at night for LARRY  blood cultures with gram negative bacteremia  abdominal pain and flank pain improved  no further chills  SOB resolved  febrile yesterday night 101.5, afebrile this morning    ## ROS:  CONSTITUTIONAL: No chills, no rigors  EYES: No visual disturbances  ENMT:  No difficulty hearing, No sinus or throat pain  NECK: No pain or stiffness  RESPIRATORY: No cough, no wheezing, shortness of breath resolved  CARDIOVASCULAR: No chest pain, no palpitations  GASTROINTESTINAL: right sided abdominal pain and flank pain subsiding "very mild today", no nausea, no emesis  GENITOURINARY: No dysuria,+ urinary frequency  NEUROLOGICAL: No headaches, no loss of strength, no numbness  SKIN: No itching, burning, rashes  MUSCULOSKELETAL: intermittent bilateral knee pain      ## Labs:  CBC:                        10.1   9.94  )-----------( 230      ( 23 Jun 2019 04:09 )             34.4     Chem:  06-23    134<L>  |  99  |  26<H>  --------------------------------<  89  4.5         |  26  |  2.19<H>    Ca    8.0<L>      23 Jun 2019 04:09  Phos  2.8     06-23  Mg     2.0     06-23      TPro  8.0  /  Alb  2.5<L>  /  TBili  4.2<H>  / AST  60<H>  /  ALT  87<H>  /  AlkPhos  225<H>  06-23      Coags:  PT/INR - ( 21 Jun 2019 20:01 )   PT: 12.4 sec;   INR: 1.10 ratio    PTT - ( 21 Jun 2019 20:01 )  PTT:24.6 sec    Culture - Blood (06.22.19 @ 00:43)    Gram Stain:   Growth in anaerobic bottle: Gram Negative Rods  Growth in aerobic bottle: Gram Negative Rods and Gram Positive Cocci in Clusters    -  Coagulase negative Staphylococcus: Detec    -  Klebsiella pneumoniae: Detec    -  Klebsiella pneumoniae: Detec    Specimen Source: .Blood    Organism: Blood Culture PCR        Culture - Blood (06.22.19 @ 00:43)    Specimen Source: .Blood    Culture Results: Growth in aerobic and anaerobic bottles: Gram Negative Rods      ## Imaging:  CXR < from: Xray Chest 1 View- PORTABLE-Urgent (06.21.19 @ 20:33) >  The lungs are clear.  No pleural abnormality is seen.      Cardiomediastinal silhouette is intact.      CT abdomen < from: CT Abdomen and Pelvis w/ Oral Cont and w/ IV Cont (06.21.19 @ 22:38) >  COMPARISON: CT of the abdomen dated 01/27/2019    FINDINGS:    LIVER: Pneumobilia within the left hepatic lobe, unchanged.  BILE DUCTS: Normal caliber.  GALLBLADDER: Status post cholecystectomy.  SPLEEN: Within normal limits.  PANCREAS: Within normal limits.  ADRENALS: Within normal limits.  KIDNEYS/URETERS: Lobulated contour of the left kidney.    BLADDER: Underdistended.  REPRODUCTIVE ORGANS: Status post hysterectomy.    BOWEL: No bowel obstruction. Moderate to large amount of stool within the rectum.  PERITONEUM: No ascites.  VESSELS:  IVC filter.  RETROPERITONEUM: No lymphadenopathy.  ABDOMINAL WALL: Within normal limits.  BONES: Within normal limits.      CT chest < from: CT Angio Chest w/ IV Cont (06.21.19 @ 22:38) >  There is suboptimal opacification of pulmonary arteries and evaluation is   also limited by streak artifact and respiratory motion. No saddle PE identified.    LUNGS AND AIRWAYS: Patent central airways.  Faint groundglass opacities   in the upper lobes. Small bibasilar atelectasis.    PLEURA: No pleural effusion.    MEDIASTINUM AND SILVIA: Large exophytic thyroid nodule which extends into   the superior mediastinum measuring 6.6 x 3.5 x 4.3 cm.    VESSELS: Within normal limits.    HEART: Heart size is normal. No pericardial effusion.    CHEST WALL AND LOWER NECK: Within normal limits.    VISUALIZED UPPER ABDOMEN: Lobulated contour of the visualized left kidney.    BONES: Degenerative changes of the spine.        ## Medications:  piperacillin/tazobactam IVPB.. 3.375 Gram(s) IV Intermittent every 8 hours      ALBUTerol    0.083% 2.5 milliGRAM(s) Nebulizer every 6 hours      rivaroxaban 20 milliGRAM(s) Oral daily      acetaminophen   Tablet .. 650 milliGRAM(s) Oral every 6 hours PRN  oxyCODONE    5 mG/acetaminophen 325 mG 1 Tablet(s) Oral every 6 hours PRN  oxyCODONE    5 mG/acetaminophen 325 mG 2 Tablet(s) Oral every 6 hours PRN      ## Vitals:  T(C): 37.7 (06-23-19 @ 11:15), Max: 38.6 (06-22-19 @ 23:21)  HR: 118 (06-23-19 @ 12:05) (71 - 120)  BP: 141/79 (06-23-19 @ 12:05) (87/57 - 146/91)  BP(mean): 88 (06-23-19 @ 09:00) (54 - 121)  RR: 16 (06-23-19 @ 12:05) (15 - 32)  SpO2: 94% (06-23-19 @ 12:05) (51% - 100%)    ABG: ABG - ( 22 Jun 2019 05:58 )  pH, Arterial: x     pH, Blood: 7.30  /  pCO2: 52    /  pO2: 96    / HCO3: 25    / Base Excess: -1.7  /  SaO2: 97                06-22 @ 07:01  -  06-23 @ 07:00  --------------------------------------------------------  IN: 1380 mL / OUT: 650 mL / NET: 730 mL        ## P/E:  Gen: obese female, in no distress, lying comfortably in bed  HEENT: PERRL, EOMI, large neck circumference   Resp: CTA B/L, no wheeze, no rhonchi  CVS: RRR  Abd: soft NT/ND +BS, obese abdomen, no guarding, no rebound  Ext: no c/c/e, bilateral scars noted over both knees from prior surgery, no erythema, no warmness, no significant tenderness to palpation  Neuro: A&Ox3, moves all extremities equally      CENTRAL LINE: [ ] YES [x] NO  LOCATION:   DATE INSERTED:  REMOVE: [ ] YES [ ] NO      KAUR: [ ] YES [x] NO    DATE INSERTED:  REMOVE:  [ ] YES [ ] NO      A-LINE:  [ ] YES [x] NO  LOCATION:   DATE INSERTED:  REMOVE:  [ ] YES [ ] NO  EXPLAIN:    GLOBAL ISSUE/BEST PRACTICE:  Analgesia: percocet prn  Sedation: n/a  HOB elevation: yes  Stress ulcer prophylaxis: n/a  VTE prophylaxis: on xarelto  Oral Care: n/a  Glycemic control: n/a  Nutrition: po diet    CODE STATUS: [x] full code  [ ] DNR  [ ] DNI  [ ] MOL  Goals of care discussion: [ ] yes

## 2019-06-23 NOTE — PHYSICAL THERAPY INITIAL EVALUATION ADULT - PERTINENT HX OF CURRENT PROBLEM, REHAB EVAL
Pt is a 67yo F admitted with URI symptoms, cough, & fever. Pt admitted & transferred to CCU for management of sepsis due to PNA.

## 2019-06-23 NOTE — PHYSICAL THERAPY INITIAL EVALUATION ADULT - BALANCE TRAINING, PT EVAL
GOAL: pt will be able to independently ambulate 25ft with bariatric rolling walker without loss of balance within 4 weeks

## 2019-06-23 NOTE — PHYSICAL THERAPY INITIAL EVALUATION ADULT - ADDITIONAL COMMENTS
Pt states she lives with her son & daughter in a private home, 3 entry steps (B/L rails), flight to 2nd level (+ rails). Pt sleeps on main floor in a hospital bed. Pt typically performs bed mobility, transfers, & ambulation independently. Pt uses bariatric rolling walker for transfers & ambulation. Pt reports since last admission in 2018 she now only ambulates bed to wheelchair (which is currently broken). Pt also has a bariatric commode at home. Pt wears eye glasses for reading & distance. Pt is retired. Pt is unable to negotiate stairs. Goal of therapy: return home.

## 2019-06-23 NOTE — PHYSICAL THERAPY INITIAL EVALUATION ADULT - TRANSFER TRAINING, PT EVAL
GOAL: pt will be able to perform sit to stand transfers independently with use of bariatric rolling walker within 4 weeks

## 2019-06-23 NOTE — CONSULT NOTE ADULT - SUBJECTIVE AND OBJECTIVE BOX
Patient is a 68y old  Female who presents with a chief complaint of Shortness of breath (22 Jun 2019 04:02)      HPI:    Pt is a 68 y.o. Female with PMHx of Severe Obesity, HTN, OA, Hx of DVT/PE s/p IVC filter presented with right sided flank pain, SOA, and nausea/emesis. She has been coughing and having fevers the past 2 weeks. Admitted for community acquired PNA and acute renal failure. CT of chest noted large exophytic thyroid nodule in the superior mediastinum 6.6 x 3.5 x 4.3 cm.  Endocrine consulted for goiter. Denies previous hx of thyroid disease. No family hx of thyroid cancer.  She complains of dyspnea on exertion for the past month. Denies of neck pain, swelling, or dysphagia of liquids/solids. She has lost ~ 20 lbs in the past few months.  She has difficulty walking due to left foot fracture.  Denies of any other complaints currently.        PAST MEDICAL & SURGICAL HISTORY:  Morbid obesity  History of pulmonary embolism: 2012 s/p IVC filter  Arthropathy: Arthritis  Migraine: Migraines  Obstructive sleep apnea syndrome: Obstructive sleep apnea  Essential hypertension: Hypertension  Obesity  Dysfunctional uterine bleeding  Joint infection  Pulmonary embolism  Osteoarthritis  History of total knee replacement: with revisions x 3  Other acquired absence of organ: History of cholecystectomy  Status post total hysterectomy: partial  Status post cholecystectomy  Status post hysterectomy  Total knee replacement status      Diabetes History:    FAMILY HISTORY:  FHx: hypertension  FH: type 2 diabetes mellitus        Social History:    Allergies    sulfa drugs (Other; Rash)  Sulfur (Unknown)  trimethoprim (Other; Rash)    Intolerances        MEDICATIONS  (STANDING):  ALBUTerol    0.083% 2.5 milliGRAM(s) Nebulizer every 6 hours  ALBUTerol    90 MICROgram(s) HFA Inhaler 1 Puff(s) Inhalation every 4 hours  chlorhexidine 4% Liquid 1 Application(s) Topical every 12 hours  piperacillin/tazobactam IVPB.. 3.375 Gram(s) IV Intermittent every 8 hours  rivaroxaban 20 milliGRAM(s) Oral daily    MEDICATIONS  (PRN):  acetaminophen   Tablet .. 650 milliGRAM(s) Oral every 6 hours PRN Temp greater or equal to 38C (100.4F), Moderate Pain (4 - 6)  oxyCODONE    5 mG/acetaminophen 325 mG 1 Tablet(s) Oral every 6 hours PRN Moderate Pain (4 - 6)  oxyCODONE    5 mG/acetaminophen 325 mG 2 Tablet(s) Oral every 6 hours PRN Severe Pain (7 - 10)      REVIEW OF SYSTEMS:  CONSTITUTIONAL:  as per HPI  HEENT:  Eyes:  No diplopia or blurred vision. ENT:  No earache, sore throat or runny nose.  CARDIOVASCULAR:  No pressure, squeezing, strangling, tightness, heaviness or aching about the chest, neck, axilla or epigastrium.  RESPIRATORY:  SOA w/ exertion and in supine position.   GASTROINTESTINAL:  No nausea, vomiting or diarrhea.  GENITOURINARY:  No dysuria, frequency or urgency. No Blood in urine  MUSCULOSKELETAL:  no joint aches, no muscle pain, myalgia  SKIN:  No change in skin, hair or nails.  NEUROLOGIC:  No paresthesias, fasciculations, seizures or weakness.  PSYCHIATRIC:  No disorder of thought or mood.  ENDOCRINE:  No heat or cold intolerance, polyuria or polydipsia. abnormal weight gain or loss, oral thrush  HEMATOLOGICAL:  No easy bruising or bleeding.     T(C): 36.7 (06-23-19 @ 07:13), Max: 38.6 (06-22-19 @ 23:21)  HR: 105 (06-23-19 @ 09:00) (71 - 119)  BP: 126/76 (06-23-19 @ 09:00) (87/57 - 146/91)  RR: 23 (06-23-19 @ 09:00) (12 - 32)  SpO2: 100% (06-23-19 @ 09:00) (51% - 100%)  Wt(kg): --    PHYSICAL EXAM:  GENERAL: NAD, well-groomed, well-developed  HEAD:  Atraumatic, Normocephalic  NECK: Supple, difficult to palpate thyroid due to obesity.  NERVOUS SYSTEM:  Alert & Oriented X3, Good concentration;  CHEST/LUNG: Clear to percussion bilaterally; diminished  HEART: S1/S2 w/ tachycardia.   ABDOMEN: Soft, Nontender, Nondistended; Bowel sounds present, Obese.   EXTREMITIES:  1+ Peripheral Pulses, No clubbing, cyanosis, or edema  SKIN: No rashes or lesions    CAPILLARY BLOOD GLUCOSE                                10.1   9.94  )-----------( 230      ( 23 Jun 2019 04:09 )             34.4       CMP:  06-23 @ 04:09  SGPT 87  Albumin 2.5   Alk Phos 225   Anion Gap 9   SGOT 60   Total Bili 4.2   BUN 26   Calcium Total 8.0   CO2 26   Chloride 99   Creatinine 2.19   eGFR if AA 26   eGFR if non AA 23   Glucose 89   Potassium 4.5   Protein 8.0   Sodium 134      Thyroid Function Tests:  06-22 @ 10:55 TSH -- FreeT4 -- T3 91 Anti TPO -- Anti Thyroglobulin Ab -- TSI --  06-22 @ 06:14 TSH 0.431 FreeT4 -- T3 -- Anti TPO -- Anti Thyroglobulin Ab -- TSI --

## 2019-06-23 NOTE — PROGRESS NOTE ADULT - ASSESSMENT
68F PMH morbid obesity, HTN, bilateral knee replacements complicated by DVT and pulmonary embolism (2013) maintained on Xarelto, hx of septic knee and bacteremia, CKD, LARRY here with severe sepsis, gram negative bacteremia, PNA, acute hypercarbic respiratory failure requiring non invasive ventilation with BiPAP, acute on chronic renal insufficiency. Incidentally found to have goiter vs thyroid mass/nodule.    1. PULM  - cont nocturnal BiPAP for underlying LARRY  - awake, alert, responsive  - weaned off bipap during the day  - cont antibiotics for underlying PNA    2. CV  - BP stable  - did not require pressors  - lopressor held as pt admitted for sepsis and is bacteremia with SBP 90-120s, if BP continues to uptrend can resume home antihypertensive    3. ID  - sepsis due to gram negative organisms  - klebsiella bacteremia  - follow up sensitivities  - leukocytosis improving  - cont on zosyn  - coag negative staph in one set of blood cultures likely a contaminant  - both sets of culture however with gram negative rods  - originally thought to have UTI due to complaints of urinary frequency and right flank pain with noted very turbid looking urine however urine culture negative to date  - will have ID evaluate    4. GI  - noted pneumobilia on CT abdomen, RUQ non tender on exam  - pneumobilia present on prior CT scan as well  - unclear significance  - will have GI evaluate pt    5. RENAL  - acute on chronic renal insufficiency in setting of sepsis likely component of ATN  - pt voiding  - Cr stable, cont to monitor    6. ENDO  - goiter vs thyroid mass  - pt without respiratory compromise  - follow up neck ultrasound  - appreciate endo input, likely for outpatient follow up     7. GEN  - stable for transfer to medical floor  - physical therapy  - d/w patient

## 2019-06-23 NOTE — PHYSICAL THERAPY INITIAL EVALUATION ADULT - PRECAUTIONS/LIMITATIONS, REHAB EVAL
fall precautions/oxygen therapy device and L/min/obesity precautions/vision precautions/2L O2 via nasal cannula/cardiac precautions

## 2019-06-23 NOTE — PHYSICAL THERAPY INITIAL EVALUATION ADULT - GENERAL OBSERVATIONS, REHAB EVAL
Pt encountered semi-supine in bed + cardiac monitor, IV lock, continuous pulse ox, O2 via nasal cannula at 2L/min, primafit, eye glasses donned

## 2019-06-23 NOTE — CHART NOTE - NSCHARTNOTEFT_GEN_A_CORE
69 yo F with PMH morbid obesity, HTN, Bilateral knee replacements complicated by DVT and pulmonary embolism (2013) maintained on Xarelto presents with two week history of URI cough and fever. Cough accompanied by fatigue, shortness of breath and intractable stomach pain. Today difficulty ambulating OOB to bathroom with severe dypsnea feeling like her PE in the past. Patient denies chest pain, palpitations, calf pain (2019 04:02)        PAST MEDICAL & SURGICAL HISTORY:  Morbid obesity  History of pulmonary embolism:  s/p IVC filter  Arthropathy: Arthritis  Migraine: Migraines  Obstructive sleep apnea syndrome: Obstructive sleep apnea  Essential hypertension: Hypertension  Obesity  Dysfunctional uterine bleeding  Joint infection  Pulmonary embolism  Osteoarthritis  History of total knee replacement: with revisions x 3  Other acquired absence of organ: History of cholecystectomy  Status post total hysterectomy: partial  Status post cholecystectomy  Status post hysterectomy  Total knee replacement status        ICU Vital Signs Last 24 Hrs  T(C): 37.7 (2019 11:15), Max: 38.6 (2019 23:21)  T(F): 99.9 (2019 11:15), Max: 101.5 (2019 23:21)  HR: 118 (2019 12:05) (71 - 120)  BP: 141/79 (2019 12:05) (87/57 - 146/91)  BP(mean): 88 (2019 09:00) (54 - 121)  RR: 16 (2019 12:05) (15 - 32)  SpO2: 94% (2019 12:05) (51% - 100%)    Qtts:     I&O's Summary    2019 07:01  -  2019 07:00  --------------------------------------------------------  IN: 1380 mL / OUT: 650 mL / NET: 730 mL          PHYSICAL EXAM:  gen: awake and alert, NAD off bipap  Chest: CTA b/l  Heart: S1S2, RRR            LABS:                        10.1   9.94  )-----------( 230      ( 2019 04:09 )             34.4     06-23    134<L>  |  99  |  26<H>  ----------------------------<  89  4.5   |  26  |  2.19<H>    Ca    8.0<L>      2019 04:09  Phos  2.8       Mg     2.0         TPro  8.0  /  Alb  2.5<L>  /  TBili  4.2<H>  /  DBili  x   /  AST  60<H>  /  ALT  87<H>  /  AlkPhos  225<H>      PT/INR - ( 2019 20:01 )   PT: 12.4 sec;   INR: 1.10 ratio         PTT - ( 2019 20:01 )  PTT:24.6 sec  Urinalysis Basic - ( 2019 21:42 )    Color: Ester / Appearance: Clear / S.015 / pH: x  Gluc: x / Ketone: Negative  / Bili: Moderate / Urobili: 8 mg/dL   Blood: x / Protein: 30 mg/dL / Nitrite: Negative   Leuk Esterase: Trace / RBC: 0-2 /HPF / WBC 0-2   Sq Epi: x / Non Sq Epi: Occasional / Bacteria: Occasional      ABG - ( 2019 05:58 )  pH, Arterial: x     pH, Blood: 7.30  /  pCO2: 52    /  pO2: 96    / HCO3: 25    / Base Excess: -1.7  /  SaO2: 97                    A/P:  Patient is a68 year old F with PMH of morbid obesity, HTN, b/l TKR, complicated by PE and DVT with IVC filter on Xarelto, LARRY on bipap at night, admitted to icu for sepsis, klebsiella pneumonia bacteremia and pneumonia, improving  Patient is now on Xarelto for PE  and zosyn for G- bacteremia  Patient still needs bipap at night  Patient is now stable for transfer to medicine floor        CRITICAL CARE TIME SPENT: 67 yo F with PMH morbid obesity, HTN, Bilateral knee replacements complicated by DVT and pulmonary embolism (2013) maintained on Xarelto presents with two week history of URI cough and fever. Cough accompanied by fatigue, shortness of breath and intractable stomach pain. Today difficulty ambulating OOB to bathroom with severe dypsnea feeling like her PE in the past. Patient denies chest pain, palpitations, calf pain (2019 04:02)        PAST MEDICAL & SURGICAL HISTORY:  Morbid obesity  History of pulmonary embolism:  s/p IVC filter  Arthropathy: Arthritis  Migraine: Migraines  Obstructive sleep apnea syndrome: Obstructive sleep apnea  Essential hypertension: Hypertension  Obesity  Dysfunctional uterine bleeding  Joint infection  Pulmonary embolism  Osteoarthritis  History of total knee replacement: with revisions x 3  Other acquired absence of organ: History of cholecystectomy  Status post total hysterectomy: partial  Status post cholecystectomy  Status post hysterectomy  Total knee replacement status        ICU Vital Signs Last 24 Hrs  T(C): 37.7 (2019 11:15), Max: 38.6 (2019 23:21)  T(F): 99.9 (2019 11:15), Max: 101.5 (2019 23:21)  HR: 118 (2019 12:05) (71 - 120)  BP: 141/79 (2019 12:05) (87/57 - 146/91)  BP(mean): 88 (2019 09:00) (54 - 121)  RR: 16 (2019 12:05) (15 - 32)  SpO2: 94% (2019 12:05) (51% - 100%)    Qtts:     I&O's Summary    2019 07:01  -  2019 07:00  --------------------------------------------------------  IN: 1380 mL / OUT: 650 mL / NET: 730 mL          PHYSICAL EXAM:  gen: awake and alert, NAD off bipap  Chest: CTA b/l  Heart: S1S2, RRR            LABS:                        10.1   9.94  )-----------( 230      ( 2019 04:09 )             34.4     06-23    134<L>  |  99  |  26<H>  ----------------------------<  89  4.5   |  26  |  2.19<H>    Ca    8.0<L>      2019 04:09  Phos  2.8       Mg     2.0         TPro  8.0  /  Alb  2.5<L>  /  TBili  4.2<H>  /  DBili  x   /  AST  60<H>  /  ALT  87<H>  /  AlkPhos  225<H>      PT/INR - ( 2019 20:01 )   PT: 12.4 sec;   INR: 1.10 ratio         PTT - ( 2019 20:01 )  PTT:24.6 sec  Urinalysis Basic - ( 2019 21:42 )    Color: Ester / Appearance: Clear / S.015 / pH: x  Gluc: x / Ketone: Negative  / Bili: Moderate / Urobili: 8 mg/dL   Blood: x / Protein: 30 mg/dL / Nitrite: Negative   Leuk Esterase: Trace / RBC: 0-2 /HPF / WBC 0-2   Sq Epi: x / Non Sq Epi: Occasional / Bacteria: Occasional      ABG - ( 2019 05:58 )  pH, Arterial: x     pH, Blood: 7.30  /  pCO2: 52    /  pO2: 96    / HCO3: 25    / Base Excess: -1.7  /  SaO2: 97                    A/P:  Patient is a68 year old F with PMH of morbid obesity, HTN, b/l TKR, complicated by PE and DVT with IVC filter on Xarelto, LARRY on bipap at night, admitted to icu for sepsis, klebsiella pneumonia bacteremia and pneumonia, improving  Patient is now on Xarelto for PE  and zosyn for G- bacteremia  Patient still needs bipap at night  Patient is now stable for transfer to medicine floor  signed out to Hospitalist carrying BP #[phone]

## 2019-06-24 DIAGNOSIS — E66.01 MORBID (SEVERE) OBESITY DUE TO EXCESS CALORIES: ICD-10-CM

## 2019-06-24 DIAGNOSIS — E04.1 NONTOXIC SINGLE THYROID NODULE: ICD-10-CM

## 2019-06-24 DIAGNOSIS — J15.6 PNEUMONIA DUE TO OTHER GRAM-NEGATIVE BACTERIA: ICD-10-CM

## 2019-06-24 DIAGNOSIS — R78.81 BACTEREMIA: ICD-10-CM

## 2019-06-24 LAB
-  AMIKACIN: SIGNIFICANT CHANGE UP
-  AMPICILLIN/SULBACTAM: SIGNIFICANT CHANGE UP
-  AMPICILLIN: SIGNIFICANT CHANGE UP
-  AZTREONAM: SIGNIFICANT CHANGE UP
-  CEFEPIME: SIGNIFICANT CHANGE UP
-  CEFOXITIN: SIGNIFICANT CHANGE UP
-  CEFTRIAXONE: SIGNIFICANT CHANGE UP
-  CIPROFLOXACIN: SIGNIFICANT CHANGE UP
-  ERTAPENEM: SIGNIFICANT CHANGE UP
-  GENTAMICIN: SIGNIFICANT CHANGE UP
-  IMIPENEM: SIGNIFICANT CHANGE UP
-  LEVOFLOXACIN: SIGNIFICANT CHANGE UP
-  MEROPENEM: SIGNIFICANT CHANGE UP
-  PIPERACILLIN/TAZOBACTAM: SIGNIFICANT CHANGE UP
-  TOBRAMYCIN: SIGNIFICANT CHANGE UP
-  TRIMETHOPRIM/SULFAMETHOXAZOLE: SIGNIFICANT CHANGE UP
ALBUMIN SERPL ELPH-MCNC: 2.4 G/DL — LOW (ref 3.3–5)
ALP SERPL-CCNC: 229 U/L — HIGH (ref 40–120)
ALT FLD-CCNC: 63 U/L — SIGNIFICANT CHANGE UP (ref 12–78)
ANION GAP SERPL CALC-SCNC: 6 MMOL/L — SIGNIFICANT CHANGE UP (ref 5–17)
AST SERPL-CCNC: 37 U/L — SIGNIFICANT CHANGE UP (ref 15–37)
BASOPHILS # BLD AUTO: 0.01 K/UL — SIGNIFICANT CHANGE UP (ref 0–0.2)
BASOPHILS NFR BLD AUTO: 0.2 % — SIGNIFICANT CHANGE UP (ref 0–2)
BILIRUB SERPL-MCNC: 3.5 MG/DL — HIGH (ref 0.2–1.2)
BUN SERPL-MCNC: 19 MG/DL — SIGNIFICANT CHANGE UP (ref 7–23)
CALCIUM SERPL-MCNC: 8 MG/DL — LOW (ref 8.5–10.1)
CHLORIDE SERPL-SCNC: 101 MMOL/L — SIGNIFICANT CHANGE UP (ref 96–108)
CO2 SERPL-SCNC: 29 MMOL/L — SIGNIFICANT CHANGE UP (ref 22–31)
CREAT SERPL-MCNC: 1.93 MG/DL — HIGH (ref 0.5–1.3)
CULTURE RESULTS: SIGNIFICANT CHANGE UP
EOSINOPHIL # BLD AUTO: 0.11 K/UL — SIGNIFICANT CHANGE UP (ref 0–0.5)
EOSINOPHIL NFR BLD AUTO: 2.2 % — SIGNIFICANT CHANGE UP (ref 0–6)
GLUCOSE SERPL-MCNC: 117 MG/DL — HIGH (ref 70–99)
GRAM STN FLD: SIGNIFICANT CHANGE UP
HCT VFR BLD CALC: 32.7 % — LOW (ref 34.5–45)
HGB BLD-MCNC: 9.6 G/DL — LOW (ref 11.5–15.5)
IMM GRANULOCYTES NFR BLD AUTO: 0.8 % — SIGNIFICANT CHANGE UP (ref 0–1.5)
LYMPHOCYTES # BLD AUTO: 0.84 K/UL — LOW (ref 1–3.3)
LYMPHOCYTES # BLD AUTO: 16.7 % — SIGNIFICANT CHANGE UP (ref 13–44)
MAGNESIUM SERPL-MCNC: 2.7 MG/DL — HIGH (ref 1.6–2.6)
MCHC RBC-ENTMCNC: 27.8 PG — SIGNIFICANT CHANGE UP (ref 27–34)
MCHC RBC-ENTMCNC: 29.4 GM/DL — LOW (ref 32–36)
MCV RBC AUTO: 94.8 FL — SIGNIFICANT CHANGE UP (ref 80–100)
METHOD TYPE: SIGNIFICANT CHANGE UP
MONOCYTES # BLD AUTO: 0.36 K/UL — SIGNIFICANT CHANGE UP (ref 0–0.9)
MONOCYTES NFR BLD AUTO: 7.2 % — SIGNIFICANT CHANGE UP (ref 2–14)
NEUTROPHILS # BLD AUTO: 3.67 K/UL — SIGNIFICANT CHANGE UP (ref 1.8–7.4)
NEUTROPHILS NFR BLD AUTO: 72.9 % — SIGNIFICANT CHANGE UP (ref 43–77)
NRBC # BLD: 0 /100 WBCS — SIGNIFICANT CHANGE UP (ref 0–0)
PHOSPHATE SERPL-MCNC: 3.5 MG/DL — SIGNIFICANT CHANGE UP (ref 2.5–4.5)
PLATELET # BLD AUTO: 231 K/UL — SIGNIFICANT CHANGE UP (ref 150–400)
POTASSIUM SERPL-MCNC: 3.8 MMOL/L — SIGNIFICANT CHANGE UP (ref 3.5–5.3)
POTASSIUM SERPL-SCNC: 3.8 MMOL/L — SIGNIFICANT CHANGE UP (ref 3.5–5.3)
PROT SERPL-MCNC: 8.1 GM/DL — SIGNIFICANT CHANGE UP (ref 6–8.3)
RBC # BLD: 3.45 M/UL — LOW (ref 3.8–5.2)
RBC # FLD: 14.7 % — HIGH (ref 10.3–14.5)
SODIUM SERPL-SCNC: 136 MMOL/L — SIGNIFICANT CHANGE UP (ref 135–145)
SPECIMEN SOURCE: SIGNIFICANT CHANGE UP
T4 AB SER-ACNC: 7.8 UG/DL — SIGNIFICANT CHANGE UP (ref 4.6–12)
THYROPEROXIDASE AB SERPL-ACNC: 21 IU/ML — SIGNIFICANT CHANGE UP (ref 0–34.9)
WBC # BLD: 5.03 K/UL — SIGNIFICANT CHANGE UP (ref 3.8–10.5)
WBC # FLD AUTO: 5.03 K/UL — SIGNIFICANT CHANGE UP (ref 3.8–10.5)

## 2019-06-24 PROCEDURE — 99223 1ST HOSP IP/OBS HIGH 75: CPT

## 2019-06-24 PROCEDURE — 99222 1ST HOSP IP/OBS MODERATE 55: CPT

## 2019-06-24 PROCEDURE — 99233 SBSQ HOSP IP/OBS HIGH 50: CPT

## 2019-06-24 RX ORDER — OXYCODONE AND ACETAMINOPHEN 5; 325 MG/1; MG/1
1 TABLET ORAL EVERY 4 HOURS
Refills: 0 | Status: DISCONTINUED | OUTPATIENT
Start: 2019-06-24 | End: 2019-06-25

## 2019-06-24 RX ORDER — CEFTRIAXONE 500 MG/1
2000 INJECTION, POWDER, FOR SOLUTION INTRAMUSCULAR; INTRAVENOUS EVERY 24 HOURS
Refills: 0 | Status: COMPLETED | OUTPATIENT
Start: 2019-06-24 | End: 2019-06-26

## 2019-06-24 RX ADMIN — OXYCODONE AND ACETAMINOPHEN 1 TABLET(S): 5; 325 TABLET ORAL at 16:11

## 2019-06-24 RX ADMIN — OXYCODONE AND ACETAMINOPHEN 1 TABLET(S): 5; 325 TABLET ORAL at 23:00

## 2019-06-24 RX ADMIN — ALBUTEROL 2.5 MILLIGRAM(S): 90 AEROSOL, METERED ORAL at 18:04

## 2019-06-24 RX ADMIN — ALBUTEROL 2.5 MILLIGRAM(S): 90 AEROSOL, METERED ORAL at 11:41

## 2019-06-24 RX ADMIN — OXYCODONE AND ACETAMINOPHEN 1 TABLET(S): 5; 325 TABLET ORAL at 22:21

## 2019-06-24 RX ADMIN — OXYCODONE AND ACETAMINOPHEN 1 TABLET(S): 5; 325 TABLET ORAL at 17:03

## 2019-06-24 RX ADMIN — CHLORHEXIDINE GLUCONATE 1 APPLICATION(S): 213 SOLUTION TOPICAL at 05:20

## 2019-06-24 RX ADMIN — RIVAROXABAN 20 MILLIGRAM(S): KIT at 11:44

## 2019-06-24 RX ADMIN — CHLORHEXIDINE GLUCONATE 1 APPLICATION(S): 213 SOLUTION TOPICAL at 17:05

## 2019-06-24 RX ADMIN — ALBUTEROL 2.5 MILLIGRAM(S): 90 AEROSOL, METERED ORAL at 05:39

## 2019-06-24 RX ADMIN — ALBUTEROL 2.5 MILLIGRAM(S): 90 AEROSOL, METERED ORAL at 00:58

## 2019-06-24 RX ADMIN — ALBUTEROL 2.5 MILLIGRAM(S): 90 AEROSOL, METERED ORAL at 23:13

## 2019-06-24 RX ADMIN — CEFTRIAXONE 100 MILLIGRAM(S): 500 INJECTION, POWDER, FOR SOLUTION INTRAMUSCULAR; INTRAVENOUS at 16:11

## 2019-06-24 RX ADMIN — PIPERACILLIN AND TAZOBACTAM 25 GRAM(S): 4; .5 INJECTION, POWDER, LYOPHILIZED, FOR SOLUTION INTRAVENOUS at 22:20

## 2019-06-24 RX ADMIN — PIPERACILLIN AND TAZOBACTAM 25 GRAM(S): 4; .5 INJECTION, POWDER, LYOPHILIZED, FOR SOLUTION INTRAVENOUS at 05:20

## 2019-06-24 RX ADMIN — PIPERACILLIN AND TAZOBACTAM 25 GRAM(S): 4; .5 INJECTION, POWDER, LYOPHILIZED, FOR SOLUTION INTRAVENOUS at 13:15

## 2019-06-24 NOTE — CONSULT NOTE ADULT - PROBLEM SELECTOR RECOMMENDATION 2
based on pt's history possibly started as UTI which lead to BSI and then pneumonia   no IA source of infection   CT chest and abdo reviewed

## 2019-06-24 NOTE — PROGRESS NOTE ADULT - SUBJECTIVE AND OBJECTIVE BOX
INTERVAL HPI/OVERNIGHT EVENTS:  Pt seen and examined at bedside.     Allergies/Intolerance: sulfa drugs (Other; Rash)  Sulfur (Unknown)  trimethoprim (Other; Rash)      MEDICATIONS  (STANDING):  ALBUTerol    0.083% 2.5 milliGRAM(s) Nebulizer every 6 hours  ALBUTerol    90 MICROgram(s) HFA Inhaler 1 Puff(s) Inhalation every 4 hours  chlorhexidine 4% Liquid 1 Application(s) Topical every 12 hours  piperacillin/tazobactam IVPB.. 3.375 Gram(s) IV Intermittent every 8 hours  rivaroxaban 20 milliGRAM(s) Oral daily    MEDICATIONS  (PRN):  acetaminophen   Tablet .. 650 milliGRAM(s) Oral every 6 hours PRN Temp greater or equal to 38C (100.4F), Moderate Pain (4 - 6)  oxyCODONE    5 mG/acetaminophen 325 mG 1 Tablet(s) Oral every 6 hours PRN Moderate Pain (4 - 6)  oxyCODONE    5 mG/acetaminophen 325 mG 2 Tablet(s) Oral every 6 hours PRN Severe Pain (7 - 10)        ROS: all systems reviewed and wnl      PHYSICAL EXAMINATION:  Vital Signs Last 24 Hrs  T(C): 36.7 (24 Jun 2019 11:23), Max: 37.6 (23 Jun 2019 23:33)  T(F): 98 (24 Jun 2019 11:23), Max: 99.7 (23 Jun 2019 23:33)  HR: 108 (24 Jun 2019 11:23) (90 - 120)  BP: 123/76 (24 Jun 2019 11:23) (99/72 - 149/78)  BP(mean): --  RR: 18 (24 Jun 2019 11:23) (16 - 20)  SpO2: 99% (24 Jun 2019 11:23) (94% - 100%)  CAPILLARY BLOOD GLUCOSE          06-23 @ 07:01  -  06-24 @ 07:00  --------------------------------------------------------  IN: 800 mL / OUT: 1200 mL / NET: -400 mL        GENERAL:   NECK: supple, No JVD  CHEST/LUNG: clear to auscultation bilaterally; no rales, rhonchi, or wheezing b/l  HEART: normal S1, S2  ABDOMEN: BS+, soft, ND, NT   EXTREMITIES:  pulses palpable; no clubbing, cyanosis, or edema b/l LEs  SKIN: no rashes or lesions      LABS:                        9.6    5.03  )-----------( 231      ( 24 Jun 2019 06:42 )             32.7     06-24    136  |  101  |  19  ----------------------------<  117<H>  3.8   |  29  |  1.93<H>    Ca    8.0<L>      24 Jun 2019 06:42  Phos  3.5     06-24  Mg     2.7     06-24    TPro  8.1  /  Alb  2.4<L>  /  TBili  3.5<H>  /  DBili  x   /  AST  37  /  ALT  63  /  AlkPhos  229<H>  06-24 INTERVAL HPI/OVERNIGHT EVENTS:  Pt seen and examined at bedside.     Allergies/Intolerance: sulfa drugs (Other; Rash)  Sulfur (Unknown)  trimethoprim (Other; Rash)      MEDICATIONS  (STANDING):  ALBUTerol    0.083% 2.5 milliGRAM(s) Nebulizer every 6 hours  ALBUTerol    90 MICROgram(s) HFA Inhaler 1 Puff(s) Inhalation every 4 hours  chlorhexidine 4% Liquid 1 Application(s) Topical every 12 hours  piperacillin/tazobactam IVPB.. 3.375 Gram(s) IV Intermittent every 8 hours  rivaroxaban 20 milliGRAM(s) Oral daily    MEDICATIONS  (PRN):  acetaminophen   Tablet .. 650 milliGRAM(s) Oral every 6 hours PRN Temp greater or equal to 38C (100.4F), Moderate Pain (4 - 6)  oxyCODONE    5 mG/acetaminophen 325 mG 1 Tablet(s) Oral every 6 hours PRN Moderate Pain (4 - 6)  oxyCODONE    5 mG/acetaminophen 325 mG 2 Tablet(s) Oral every 6 hours PRN Severe Pain (7 - 10)        ROS: all systems reviewed and wnl      PHYSICAL EXAMINATION:  Vital Signs Last 24 Hrs  T(C): 36.7 (24 Jun 2019 11:23), Max: 37.6 (23 Jun 2019 23:33)  T(F): 98 (24 Jun 2019 11:23), Max: 99.7 (23 Jun 2019 23:33)  HR: 108 (24 Jun 2019 11:23) (90 - 120)  BP: 123/76 (24 Jun 2019 11:23) (99/72 - 149/78)  BP(mean): --  RR: 18 (24 Jun 2019 11:23) (16 - 20)  SpO2: 99% (24 Jun 2019 11:23) (94% - 100%)  CAPILLARY BLOOD GLUCOSE          06-23 @ 07:01  -  06-24 @ 07:00  --------------------------------------------------------  IN: 800 mL / OUT: 1200 mL / NET: -400 mL        GENERAL: in bed, stable on oxygen, no CP, SOB or fevers   NECK: supple, No JVD  CHEST/LUNG: clear to auscultation bilaterally; no rales, rhonchi, or wheezing b/l  HEART: normal S1, S2  ABDOMEN: BS+, soft, ND, NT   EXTREMITIES:  pulses palpable; no clubbing, cyanosis, or edema b/l LEs  SKIN: no rashes or lesions      LABS:                        9.6    5.03  )-----------( 231      ( 24 Jun 2019 06:42 )             32.7     06-24    136  |  101  |  19  ----------------------------<  117<H>  3.8   |  29  |  1.93<H>    Ca    8.0<L>      24 Jun 2019 06:42  Phos  3.5     06-24  Mg     2.7     06-24    TPro  8.1  /  Alb  2.4<L>  /  TBili  3.5<H>  /  DBili  x   /  AST  37  /  ALT  63  /  AlkPhos  229<H>  06-24

## 2019-06-24 NOTE — CONSULT NOTE ADULT - SUBJECTIVE AND OBJECTIVE BOX
Patient was examined Chart reviewed Detailed note will be inserted below after going over latest data Meanwhile please refer to my previous notes for Pulmonary/Critical Care assessment recommendations MAXWELL OLIVO  00 [id number]   1950 [address]A 2019 DR HEMANT CONTRERAS   ALLERGY     Sulfa Trimethoprim    CONTACT Sloane Stephenson/Edgar  selv  Relative Brian REYNOLDS             Initial evaluation/Pulmonary Critical Care consultation requested on  2019  by Dr Contreras   from Dr Matute   Patient examined chart reviewed    HOSPITAL ADMISSION   PATIENT CAME  FROM (if information available)        TYPE OF VISIT      Initial evaluation/Pulmonary Critical Care consultation requested on  2019  by Dr Contreras   from Dr Matute   REASON FOR VISIT  For list of problems evaluated/addressed, please see problem list in the note below     PATIENT MAXWELL OLIVO  00 509   1950 DOA 2019 DR HEMANT CONTRERAS     PT DESCRIPTION     This section excerpted from ER md note and independently verified by me     · Chief Complaint: The patient is a 68y Female complaining of abdominal pain.  · HPI Objective Statement: 69yo female with pmh HTN, PE on xarelto, psh: todd present with rt sided abd pain vomiting and epigastric burning pain today. pt also admits to some cp and sob and inc fatigue similar to when she had her prior PE/DVT. denies diarrhea, constipation, fever.     ROS: No fever/chills. No photophobia/eye pain/changes in vision, No ear pain/sore throat/dysphagia, No chest pain/palpitations. No SOB/cough. + abdominal pain, +N/V, no D, no black/bloody bm. No dysuria/frequency/discharge, No headache. No Dizziness.  No rash.  No numbness/tingling/weakness.      ALLERGIES AND HOME MEDICATIONS:   Allergies:        Allergies:  trimethoprim: Drug, Other, Rash, Originally Entered as [Rash-General] [Other - Mild] reactions to [trimethoprim] ,[Fever]  sulfa drugs: Drug Category, Other, Rash, Originally Entered as [Rash-General] [Other - Mild] reactions to [sulfa drugs] ,[Fever]  Sulfur: Drug, Unknown    Home Medications:   * Patient Currently Takes Medications as of 2018 14:45 documented in Structured Notes  · Augmentin 875 mg-125 mg oral tablet: 1 milligram(s) orally 2 times a day   · oxyCODONE-acetaminophen 5 mg-325 mg oral tablet: 1 tab(s) orally every 6 hours, As needed, Moderate Pain (4 - 6) MDD:4  · guaiFENesin 100 mg/5 mL oral liquid: 10 milliliter(s) orally every 6 hours  · albuterol 90 mcg/inh inhalation aerosol: 1 puff(s) inhaled every 6 hours PRN SOB  · metoprolol tartrate 100 mg oral tablet: 1 tab(s) orally 2 times a day  · losartan 100 mg oral tablet: 1 tab(s) orally once a day    PHYSICAL EXAM:   · Physical Examination: Gen: Alert, appears uncomfortable, + warm  Head: NC, AT, PERRL, normal lids/conjunctiva   ENT: Bilateral TM WNL, patent oropharynx without erythema/exudate, uvula midline  Neck: supple, no tenderness/meningismus  Pulm: Bilateral clear BS, normal resp effort  CV: + tachy,  no M/R/G, +dist pulses   Abd: soft, + rt sided abd tenderness, and epigastric tender,  +BS, no guarding/rebound tenderness  Mskel: extremities x4 with normal ROM. no ctl spine ttp. no edema/erythema/cyanosis   Skin: no rash, no bruising  Neuro: AAOx3, no sensory/motor deficits, CN 2-12 intact    PATIENT MAXWELL MELISSA OLIVO  00 509   1950 DOA 2019 DR HEMANT CONTRERAS      VITALS LABS   2019 99f 113 130/80   2019 W 5 Hb 9.6 Plt 231 Na 136 K 3.8 CO2 29 Cr 1.9     PATIENT MAXWELL OLIVO  00 509   1950 DOA 2019 DR HEMANT CONTREARS       MEDICATIONS   VTE   Rivaroxab 20 ()   ABIO  rocephin 2 () (Dr BEAULIEU) (Klebs bacteremia)   Zosyn ()   PAIN  percocet ()   COPD   albuterl ()       GLOBAL ISSUE/BEST PRACTICE:      PROBLEM: HOB elevation:   y            PROBLEM: Stress ulcer proph:    na                      PROBLEM: VTE prophylaxis:       PROBLEM: Glycemic control:    na  PROBLEM: Nutrition:   Reg diet ()   PROBLEM: Advanced directive: na     PROBLEM: Allergies:  sulfa trimethoprim     EVENT 2019 Transferred out of icu     REVIEW OF SYMPTOMS     NOTE [place] if any  in ROS and PE are updated in daily HOSPITAL COURSE below      Able to give ROS  Yes     RELIABLE No   CONSTITUTIONAL Weakness Yes  Chills No Vision changes No  ENDOCRINE No unexplained hair loss No heat or cold intolerance    ALLERGY No hives  Sore throat No   RESP Coughing blood no  Shortness of breath YES   NEURO No Headache  Confusion Pain neck No   CARDIAC No Chest pain No Palpitations   GI No Pain abdomen NO   Vomiting NO     PHYSICAL EXAM    HEENT Unremarkable PERRLA atraumatic   RESP Fair air entry EXP prolonged    Harsh breath sound Resp distres mild   CARDIAC S1 S2 No S3     NO JVD    ABDOMEN SOFT BS PRESENT NOT DISTENDED No hepatosplenomegaly PEDAL EDEMA present No calf tenderness  NO rash   GENERAL Not TOXIC

## 2019-06-24 NOTE — CONSULT NOTE ADULT - SUBJECTIVE AND OBJECTIVE BOX
Infectious Diseases - Attending at Dr. Cabrera    HPI:  69 yo F with PMH morbid obesity, HTN, Bilateral knee replacements complicated by DVT and pulmonary embolism (2013) maintained on Xarelto presents with one week history of URI, cough and fever. Cough accompanied by fatigue, shortness of breath and intractable stomach pain. Today difficulty ambulating OOB to bathroom with severe dypsnea feeling like her PE in the past. Patient denies chest pain, palpitations, calf pain (22 Jun 2019 04:02)    She was having urgency and increased frequency of urination for two weeks. was in ICU on admission  and transferred out yesterday   Doing better now     PAST MEDICAL & SURGICAL HISTORY:  Morbid obesity  History of pulmonary embolism: 2012 s/p IVC filter  Arthropathy: Arthritis  Migraine: Migraines  Obstructive sleep apnea syndrome: Obstructive sleep apnea  Essential hypertension: Hypertension  Obesity  Dysfunctional uterine bleeding  Joint infection  Pulmonary embolism  Osteoarthritis  History of total knee replacement: with revisions x 3  Other acquired absence of organ: History of cholecystectomy  Status post total hysterectomy: partial  Status post cholecystectomy  Status post hysterectomy  Total knee replacement status      Allergies    sulfa drugs (Other; Rash)  Sulfur (Unknown)  trimethoprim (Other; Rash)    Intolerances        FAMILY HISTORY:  FHx: hypertension  FH: type 2 diabetes mellitus      SOCIAL HISTORY: non smoker    REVIEW OF SYSTEMS:    Constitutional: No fever, weight loss or fatigue  Eyes: No eye pain, visual disturbances, or discharge  ENT:  No difficulty hearing, tinnitus, vertigo; No sinus or throat pain  Neck: No pain or stiffness  Respiratory: No cough, wheezing, chills or hemoptysis  Cardiovascular: No chest pain, palpitations, shortness of breath, dizziness or leg swelling  Gastrointestinal: No abdominal or epigastric pain. No nausea, vomiting or hematemesis; No diarrhea or constipation. No melena or hematochezia.  Genitourinary: No dysuria, +frequency,+ urgency ,no  hematuria or incontinence  Neurological: No headaches, memory loss, loss of strength, numbness or tremors  Skin: No itching, burning, rashes or lesions       MEDICATIONS  (STANDING):  ALBUTerol    0.083% 2.5 milliGRAM(s) Nebulizer every 6 hours  ALBUTerol    90 MICROgram(s) HFA Inhaler 1 Puff(s) Inhalation every 4 hours  cefTRIAXone   IVPB 2000 milliGRAM(s) IV Intermittent every 24 hours  chlorhexidine 4% Liquid 1 Application(s) Topical every 12 hours  piperacillin/tazobactam IVPB.. 3.375 Gram(s) IV Intermittent every 8 hours  rivaroxaban 20 milliGRAM(s) Oral daily    MEDICATIONS  (PRN):  acetaminophen   Tablet .. 650 milliGRAM(s) Oral every 6 hours PRN Temp greater or equal to 38C (100.4F), Moderate Pain (4 - 6)  oxyCODONE    5 mG/acetaminophen 325 mG 1 Tablet(s) Oral every 6 hours PRN Moderate Pain (4 - 6)      Vital Signs Last 24 Hrs  T(C): 36.7 (24 Jun 2019 11:23), Max: 37.6 (23 Jun 2019 23:33)  T(F): 98 (24 Jun 2019 11:23), Max: 99.7 (23 Jun 2019 23:33)  HR: 100 (24 Jun 2019 12:49) (90 - 114)  BP: 123/76 (24 Jun 2019 11:23) (117/69 - 149/78)  BP(mean): --  RR: 18 (24 Jun 2019 13:14) (18 - 20)  SpO2: 97% (24 Jun 2019 13:14) (95% - 100%)    PHYSICAL EXAM:    Constitutional: NAD, well-groomed, well-developed  HEENT: PERRLA, EOMI, Normal Hearing, MMM  Neck: No LAD, No JVD  Back: Normal spine flexure, No CVA tenderness  Respiratory: CTAB/L  Cardiovascular: S1 and S2, RRR, no M/G/R  Gastrointestinal: BS+, soft, NT/ND  Extremities: No peripheral edema  Vascular: 2+ peripheral pulses  Neurological: A/O x 3, no focal deficits  Skin: No rashes      LABS:                        9.6    5.03  )-----------( 231      ( 24 Jun 2019 06:42 )             32.7     06-24    136  |  101  |  19  ----------------------------<  117<H>  3.8   |  29  |  1.93<H>    Ca    8.0<L>      24 Jun 2019 06:42  Phos  3.5     06-24  Mg     2.7     06-24    TPro  8.1  /  Alb  2.4<L>  /  TBili  3.5<H>  /  DBili  x   /  AST  37  /  ALT  63  /  AlkPhos  229<H>  06-24          MICROBIOLOGY:  RECENT CULTURES:  06-22 .Blood Blood Culture PCR  Klebsiella pneumoniae  Blood Culture PCR   Growth in aerobic and anaerobic bottles: Gram Negative Rods   Growth in aerobic and anaerobic bottles: Klebsiella pneumoniae  See previous culture [id number]    06-22 .Urine XXXX XXXX   No growth          RADIOLOGY & ADDITIONAL STUDIES:

## 2019-06-24 NOTE — PROGRESS NOTE ADULT - SUBJECTIVE AND OBJECTIVE BOX
Patient is a 68y old  Female who presents with a chief complaint of Shortness of breath (24 Jun 2019 11:37)      Interval History: USG of thyroid Pending   thyroid function tests within normal limits     MEDICATIONS  (STANDING):  ALBUTerol    0.083% 2.5 milliGRAM(s) Nebulizer every 6 hours  ALBUTerol    90 MICROgram(s) HFA Inhaler 1 Puff(s) Inhalation every 4 hours  chlorhexidine 4% Liquid 1 Application(s) Topical every 12 hours  piperacillin/tazobactam IVPB.. 3.375 Gram(s) IV Intermittent every 8 hours  rivaroxaban 20 milliGRAM(s) Oral daily    MEDICATIONS  (PRN):  acetaminophen   Tablet .. 650 milliGRAM(s) Oral every 6 hours PRN Temp greater or equal to 38C (100.4F), Moderate Pain (4 - 6)  oxyCODONE    5 mG/acetaminophen 325 mG 1 Tablet(s) Oral every 6 hours PRN Moderate Pain (4 - 6)  oxyCODONE    5 mG/acetaminophen 325 mG 2 Tablet(s) Oral every 6 hours PRN Severe Pain (7 - 10)      Allergies    sulfa drugs (Other; Rash)  Sulfur (Unknown)  trimethoprim (Other; Rash)    Intolerances        REVIEW OF SYSTEMS:  CONSTITUTIONAL: no changes  EYES: No eye pain, visual disturbances, or discharge  ENMT:  No difficulty hearing, No sinus or throat pain  NECK: No pain or stiffness  RESPIRATORY: No cough, wheezing, chills or hemoptysis; No shortness of breath  CARDIOVASCULAR: No chest pain, palpitations or leg swelling  GASTROINTESTINAL: No abdominal or epigastric pain. No nausea, vomiting, or hematemesis; No diarrhea or constipation. No melena or hematochezia.  GENITOURINARY: No dysuria, frequency, hematuria, or incontinence  NEUROLOGICAL: No headaches, memory loss, loss of strength, numbness, or tremors  SKIN: No itching, burning, rashes, or lesions   ENDOCRINE: No heat or cold intolerance; No hair loss  MUSCULOSKELETAL: No joint pain or swelling; No muscle, back, or extremity pain  PSYCHIATRIC: No depression, anxiety, mood swings, or difficulty sleeping  HEME/LYMPH: No easy bruising, or bleeding gums  ALLERY AND IMMUNOLOGIC: No hives or eczema    Vital Signs Last 24 Hrs  T(C): 36.7 (24 Jun 2019 11:23), Max: 37.6 (23 Jun 2019 23:33)  T(F): 98 (24 Jun 2019 11:23), Max: 99.7 (23 Jun 2019 23:33)  HR: 100 (24 Jun 2019 12:49) (90 - 114)  BP: 123/76 (24 Jun 2019 11:23) (117/69 - 149/78)  BP(mean): --  RR: 18 (24 Jun 2019 12:48) (18 - 20)  SpO2: 96% (24 Jun 2019 12:49) (95% - 100%)    PHYSICAL EXAM:  GENERAL:   HEAD: Atraumatic, Normocephalic  EYES: PERRLA, conjunctiva and sclera clear  ENMT: No tonsillar erythema, exudates, or enlargement; Moist mucous membranes, Good dentition, No lesions  NECK: Supple, No JVD, Normal thyroid  NERVOUS SYSTEM:  Alert & Oriented X3, Good concentration; Motor Strength 5/5 B/L upper and lower extremities  CHEST/LUNG: Clear to auscultaion bilaterally; No rales, rhonchi, wheezing, or rubs  HEART: Regular rate and rhythm; No murmurs, rubs, or gallops  ABDOMEN: Soft, Nontender, Nondistended; Bowel sounds present  EXTREMITIES:  2+ Peripheral Pulses, no edema  SKIN: No rashes or lesions    LABS:        CAPILLARY BLOOD GLUCOSE        Lipid panel:           Thyroid:  Diabetes Tests:  Parathyroid Panel:  Adrenals:  RADIOLOGY & ADDITIONAL TESTS:    Imaging Personally Reviewed:  [ ] YES  [ ] NO    Consultant(s) Notes Reviewed:  [ ] YES  [ ] NO    Care Discussed with Consultants/Other Providers [ ] YES  [ ] NO

## 2019-06-24 NOTE — CONSULT NOTE ADULT - ASSESSMENT
1. Large exophytic thyroid nodule extending into the superior mediastinum 6.6 x 3.5 x 4.3 cm.  TSH was 0.431 uIU/ml. Pt appears euthyroid.   No signs of tracheal compression.    - Pending Thyroid US.  - Check Free T4 and TPO abs.   - Patient will follow up with our office for further outpatient work up.     Thank you for allowing us to participate in patient's care.
68 yr old female seen with
HPI:  69 yo F with PMH morbid obesity, HTN, Bilateral knee replacements complicated by DVT and pulmonary embolism (2013) maintained on Xarelto presents with two week history of URI cough and fever. Cough accompanied by fatigue, shortness of breath and intractable stomach pain. Today difficulty ambulating OOB to bathroom with severe dypsnea feeling like her PE in the past. Patient denies chest pain, palpitations, calf pain (22 Jun 2019 04:02)  ----------------------- As Above ----------------------------------------  Consult called for increased LFTs. Patient has history of CBD stones s/p ERCP / cholecystectomy. In addition to above symptoms, patient had N/V and abdominal pain ( RUQ ). Started Friday, resolved Sunday. Increased LFTs which show improvement. Denies Viral   Normal colonoscopy 3 years ago    Increased LFTs, history of CBD stones - Improving  r/o CBD stones, meds ( antibiotics , etc ) 1) MRCP  - patient refusing, only wants open MRI 2) check sono 3) F/U labs. 4) ERCP if LFTs start to increase and patient refuses MRCP
PATIENT MAXWELL OLIVO  [id number]   1950 DOA 2019 DR HEMANT PINO     PT  SUMMARY    68F PMH morbid obesity, HTN, bilateral knee replacements complicated by DVT and pulmonary embolism () maintained on Xarelto, hx of septic knee and bacteremia, CKD, LARRY here with severe sepsis, gram negative bacteremia, PNA, acute hypercarbic respiratory failure requiring non invasive ventilation with BiPAP, acute on chronic renal insufficiency. Incidentally found to have goiter vs thyroid mass/nodule.              PATIENT MAXWELL OLIVO  00 [id number]   1950 DOA 2019 DR HEMANT PINO      PROBLEM/ASSESSMENT/RECOMMENDATIONS (A/R)     RESP GAS EXCHANGE HYPERCAPNIC RESP FAILURE   2019 ra 94%   2019 730/52/96  2019 Check abg am   LARRY  bpap  ()   Cont Rx   HO VENOUS THROMBOEMBOLISM  On Rivaroxab 20 ()   2019 Check echo ro ph    ANEMIA norm  --- Hb 11.3-10.1-10.1-9.6   ELEVATED LFTS   ---  [phone]  [id number]  [id number]  Improving  OC  --- Cr 2- 2.2-2.1-1.9    Possibly sec atn sepsis   INFECTION KLEBSIELLA BACTEREMIA PNEUMONIA   ---  W 17.6-18.5-9.9-5   P  Procalc 59.7   M  blood culture Klebsiella   M  Leg n   m  urine culture n   C 2019 cta ch Faint ggo upper lobes Thyroid goiter   M 2019 Flu AB n RSV n   On rocephin 2 () (Dr BEAULIEU) (Klebs bacteremia)   Zosyn ()   2019 Recommend DC zosyn   POSSIBLE HYPOTHYROID   TSH .431   GOITER  2019 Thyroid us L thyroid enlarged heterogenous 8.2x1.7x4.2 cm with large nodule 5.6x4.1x3.9 cm extending into sup mediastinum   2019 cta ch thyroid goiter extending into upper mediast     TIME SPENT Over 55 minutes aggregate care time spent on encounter; activities included   direct pt care, counseling and/or coordinating care reviewing notes, lab data/ imaging , discussion with multidisciplinary team/ pt /family. Risks, benefits, alternatives  discussed in detail

## 2019-06-24 NOTE — CONSULT NOTE ADULT - SUBJECTIVE AND OBJECTIVE BOX
HPI:  69 yo F with PMH morbid obesity, HTN, Bilateral knee replacements complicated by DVT and pulmonary embolism (2013) maintained on Xarelto presents with two week history of URI cough and fever. Cough accompanied by fatigue, shortness of breath and intractable stomach pain. Today difficulty ambulating OOB to bathroom with severe dypsnea feeling like her PE in the past. Patient denies chest pain, palpitations, calf pain (22 Jun 2019 04:02)  ----------------------- As Above ----------------------------------------  Consult called for increased LFTs. Patient has history of CBD stones s/p ERCP / cholecystectomy. In addition to above symptoms, patient had N/V and abdominal pain ( RUQ ). Started Friday, resolved Sunday. Increased LFTs which show improvement. Denies Viral   Normal colonoscopy 3 years ago      PAST MEDICAL & SURGICAL HISTORY:  Morbid obesity  History of pulmonary embolism: 2012 s/p IVC filter  Arthropathy: Arthritis  Migraine: Migraines  Obstructive sleep apnea syndrome: Obstructive sleep apnea  Essential hypertension: Hypertension  Obesity  Dysfunctional uterine bleeding  Joint infection  Pulmonary embolism  Osteoarthritis  History of total knee replacement: with revisions x 3  Other acquired absence of organ: History of cholecystectomy  Status post total hysterectomy: partial  Status post cholecystectomy  Status post hysterectomy  Total knee replacement status      MEDICATIONS  (STANDING):  ALBUTerol    0.083% 2.5 milliGRAM(s) Nebulizer every 6 hours  ALBUTerol    90 MICROgram(s) HFA Inhaler 1 Puff(s) Inhalation every 4 hours  cefTRIAXone   IVPB 2000 milliGRAM(s) IV Intermittent every 24 hours  chlorhexidine 4% Liquid 1 Application(s) Topical every 12 hours  piperacillin/tazobactam IVPB.. 3.375 Gram(s) IV Intermittent every 8 hours  rivaroxaban 20 milliGRAM(s) Oral daily    MEDICATIONS  (PRN):  acetaminophen   Tablet .. 650 milliGRAM(s) Oral every 6 hours PRN Temp greater or equal to 38C (100.4F), Moderate Pain (4 - 6)  oxyCODONE    5 mG/acetaminophen 325 mG 1 Tablet(s) Oral every 6 hours PRN Moderate Pain (4 - 6)      Allergies    sulfa drugs (Other; Rash)  Sulfur (Unknown)  trimethoprim (Other; Rash)    Intolerances        FAMILY HISTORY:  FHx: hypertension  FH: type 2 diabetes mellitus      REVIEW OF SYSTEMS:    CONSTITUTIONAL: No fever, weight loss, or fatigue  EYES: No eye pain, visual disturbances, or discharge  ENMT:  No difficulty hearing, tinnitus, vertigo; No sinus or throat pain  NECK: No pain or stiffness  BREASTS: No pain, masses, or nipple discharge  RESPIRATORY: No cough, wheezing, chills or hemoptysis;   CARDIOVASCULAR: No chest pain, palpitations, dizziness, or leg swelling  GASTROINTESTINAL: See above  GENITOURINARY: No dysuria, frequency, hematuria, or incontinence  NEUROLOGICAL: No headaches, memory loss, loss of strength, numbness, or tremors  SKIN: No itching, burning, rashes, or lesions   LYMPH NODES: No enlarged glands  ENDOCRINE: No heat or cold intolerance; No hair loss  MUSCULOSKELETAL: No joint pain or swelling; No muscle, back, or extremity pain  PSYCHIATRIC: No depression, anxiety, mood swings, or difficulty sleeping  HEME/LYMPH: No easy bruising, or bleeding gums  ALLERGY AND IMMUNOLOGIC: No hives or eczema          SOCIAL HISTORY:    FAMILY HISTORY:  FHx: hypertension  FH: type 2 diabetes mellitus      Vital Signs Last 24 Hrs  T(C): 36.7 (24 Jun 2019 11:23), Max: 37.6 (23 Jun 2019 23:33)  T(F): 98 (24 Jun 2019 11:23), Max: 99.7 (23 Jun 2019 23:33)  HR: 100 (24 Jun 2019 12:49) (90 - 114)  BP: 123/76 (24 Jun 2019 11:23) (120/78 - 149/78)  BP(mean): --  RR: 18 (24 Jun 2019 13:14) (18 - 18)  SpO2: 97% (24 Jun 2019 13:14) (95% - 100%)    PHYSICAL EXAM:    GENERAL: NAD, well-groomed, well-developed  HEAD:  Atraumatic, Normocephalic  EYES: EOMI, PERRLA, conjunctiva and sclera clear  NECK: Supple, No JVD, Normal thyroid  NERVOUS SYSTEM:  Alert & Oriented X3, Good concentration; Motor Strength 5/5 B/L upper and lower extremities;   CHEST/LUNG: Clear to percussion bilaterally; No rales, rhonchi, wheezing, or rubs  HEART: Regular rate and rhythm; No murmurs, rubs, or gallops  ABDOMEN: Soft, Nontender, Nondistended; Bowel sounds present  EXTREMITIES:  2+ Peripheral Pulses, No clubbing, cyanosis, or edema  LYMPH: No lymphadenopathy noted   RECTAL: No masses, prostate normal size and smooth, Guaiaci negative   BREAST: No palpable masses, skin no lesions no retractions, no discharges. adnexal no palpable masses noted   GYN: uterus normal size, adnexal, no palpable masses, no CMT, no uterine discharge   SKIN: No rashes or lesions    LABS:                        9.6    5.03  )-----------( 231      ( 24 Jun 2019 06:42 )             32.7       CBC:  06-24 @ 06:42  WBC  5.03  HGB 9.6  HCT 32.7 Plate 231  MCV 94.8  06-23 @ 04:09  WBC  9.94  HGB 10.1  HCT 34.4 Plate 230  MCV 95.3  06-22 @ 14:33  WBC  18.57  HGB 10.1  HCT 33.9 Plate 263  MCV 95.5  06-21 @ 20:01  WBC  17.65  HGB 11.3  HCT 38.3 Plate 339  MCV 93.4           24 Jun 2019 06:42    136    |  101    |  19     ----------------------------<  117    3.8     |  29     |  1.93   23 Jun 2019 04:09    134    |  99     |  26     ----------------------------<  89     4.5     |  26     |  2.19   22 Jun 2019 14:34    134    |  100    |  27     ----------------------------<  96     4.9     |  27     |  2.24   21 Jun 2019 20:01    138    |  102    |  25     ----------------------------<  111    3.9     |  28     |  2.05     Ca    8.0        24 Jun 2019 06:42  Ca    8.0        23 Jun 2019 04:09  Ca    8.0        22 Jun 2019 14:34  Ca    8.3        21 Jun 2019 20:01  Phos  3.5       24 Jun 2019 06:42  Phos  2.8       23 Jun 2019 04:09  Phos  2.9       22 Jun 2019 14:34  Mg     2.7       24 Jun 2019 06:42  Mg     2.0       23 Jun 2019 04:09  Mg     2.3       22 Jun 2019 14:34    TPro  8.1    /  Alb  2.4    /  TBili  3.5    /  DBili  x      /  AST  37     /  ALT  63     /  AlkPhos  229    24 Jun 2019 06:42  TPro  8.0    /  Alb  2.5    /  TBili  4.2    /  DBili  x      /  AST  60     /  ALT  87     /  AlkPhos  225    23 Jun 2019 04:09  TPro  7.9    /  Alb  2.5    /  TBili  4.6    /  DBili  x      /  AST  99     /  ALT  110    /  AlkPhos  243    22 Jun 2019 14:34  TPro  8.9    /  Alb  3.0    /  TBili  2.6    /  DBili  x      /  AST  242    /  ALT  146    /  AlkPhos  350    21 Jun 2019 20:01            RADIOLOGY & ADDITIONAL STUDIES:  < from: CT Abdomen and Pelvis w/ Oral Cont and w/ IV Cont (06.21.19 @ 22:38) >    EXAM:  CT ABDOMEN AND PELVIS OC IC                            PROCEDURE DATE:  06/21/2019          INTERPRETATION:  CLINICAL INFORMATION: Right-sided and epigastric   abdominal pain and vomiting    COMPARISON: CT of the abdomen dated 01/27/2019    PROCEDURE:   CT of the Abdomen and Pelvis was performed with intravenous contrast.   Intravenous contrast: 90 ml Omnipaque 350. 10 ml discarded.  Oral contrast: positive contrast was administered.  Sagittal and coronal reformats were performed.    FINDINGS:    Please note that images from this exam are under accession #[id number]    LOWER CHEST: Correlate with dedicated CT of the chest performed on the   same day.    The study is markedly limited by streak artifact.  LIVER: Pneumobilia within the left hepatic lobe, unchanged.  BILE DUCTS: Normal caliber.  GALLBLADDER: Status post cholecystectomy.  SPLEEN: Within normal limits.  PANCREAS: Within normal limits.  ADRENALS: Within normal limits.  KIDNEYS/URETERS: Lobulated contour of the left kidney.    BLADDER: Underdistended.  REPRODUCTIVE ORGANS: Status post hysterectomy.    BOWEL: No bowel obstruction. Moderate to large amount of stool within the   rectum.  PERITONEUM: No ascites.  VESSELS:  IVC filter.  RETROPERITONEUM: No lymphadenopathy.  ABDOMINAL WALL: Within normal limits.  BONES: Within normal limits.    IMPRESSION:     Technically limited study. No bowel obstruction.                    GOLDY CALDERÓN M.D., ATTENDING RADIOLOGIST  This document has been electronically signed. Jun22 2019  1:12PM        < end of copied text >

## 2019-06-24 NOTE — PROGRESS NOTE ADULT - ASSESSMENT
68F PMH morbid obesity, HTN, bilateral knee replacements complicated by DVT and pulmonary embolism (2013) maintained on Xarelto, hx of septic knee and bacteremia, CKD, LARRY here with severe sepsis, gram negative bacteremia, PNA, acute hypercarbic respiratory failure requiring non invasive ventilation with BiPAP, acute on chronic renal insufficiency. Incidentally found to have goiter vs thyroid mass/nodule.    1. PULM  - cont nocturnal BiPAP for underlying LARRY  - awake, alert, responsive, weaned off bipap during the day  - cont antibiotics for underlying PNA    2. CV  - BP stable, did not require pressors  - lopressor held as pt admitted for sepsis and is bacteremia with SBP 90-120s, if BP continues to uptrend can resume home antihypertensive    3. ID  - sepsis due to gram negative organisms  - klebsiella bacteremia  - follow up sensitivities  - leukocytosis improving  - cont on zosyn  - coag negative staph in one set of blood cultures likely a contaminant  - both sets of culture however with gram negative rods  - originally thought to have UTI due to complaints of urinary frequency and right flank pain with noted very turbid looking urine however urine culture negative to date  - will have ID evaluate    4. GI  - noted pneumobilia on CT abdomen, RUQ non tender on exam  - pneumobilia present on prior CT scan as well  - unclear significance  - will have GI evaluate pt    5. RENAL  - acute on chronic renal insufficiency in setting of sepsis likely component of ATN  - pt voiding  - Cr stable, cont to monitor    6. ENDO  - goiter vs thyroid mass  - pt without respiratory compromise  - follow up neck ultrasound  - appreciate endo input, likely for outpatient follow up     7. GEN  - stable for transfer to medical floor  - physical therapy  - d/w patient 68F PMH morbid obesity, HTN, bilateral knee replacements complicated by DVT and pulmonary embolism (2013) maintained on Xarelto, hx of septic knee and bacteremia, CKD, LARRY here with severe sepsis, gram negative bacteremia, PNA, acute hypercarbic respiratory failure requiring non invasive ventilation with BiPAP, acute on chronic renal insufficiency. Incidentally found to have goiter vs thyroid mass/nodule.    Note: BMI of 62 consistent with morbid obesity    1. PULM  - cont nocturnal BiPAP 16/6 25% for underlying LARRY  - awake, alert, responsive, weaned off bipap during the day  - cont antibiotics for underlying questionable PNA. Will order CT chest, doubt need for ABX,     2. CV  - BP stable, did not require pressors  - lopressor held as pt admitted for sepsis and is bacteremia with SBP 90-120s, if BP continues to uptrend can resume home antihypertensive    Will order TTE as routine, given BMI of 62 and morbid obesity. Continue Xarelto 20 mg/day.     3. ID  - sepsis due to gram negative organisms  - klebsiella bacteremia, leukocytosis improving  - cont on zosyn for now  - coag negative staph in one set of blood cultures likely a contaminant  - both sets of culture however with gram negative rods  - originally thought to have UTI due to complaints of urinary frequency and right flank pain with noted very turbid looking urine however urine culture negative to date  - will have ID evaluate    4. GI  - noted pneumobilia on CT abdomen, RUQ non tender on exam  - pneumobilia present on prior CT scan as well  - unclear significance, will have GI evaluate pt, abdominal sonogram ordered.     5. RENAL  - acute on chronic renal insufficiency in setting of sepsis likely component of ATN  - pt voiding, Cr stable, cont to monitor. Creatinine 1.93.     6. ENDO  - goiter vs thyroid mass  - pt without respiratory compromise  - follow up neck ultrasound  - appreciate endo input, all outpatient follow up     7. GEN  - stable for transfer to medical floor  - physical therapy  - d/w patient

## 2019-06-25 DIAGNOSIS — R94.5 ABNORMAL RESULTS OF LIVER FUNCTION STUDIES: ICD-10-CM

## 2019-06-25 LAB
ALBUMIN SERPL ELPH-MCNC: 2.3 G/DL — LOW (ref 3.3–5)
ALP SERPL-CCNC: 260 U/L — HIGH (ref 40–120)
ALT FLD-CCNC: 57 U/L — SIGNIFICANT CHANGE UP (ref 12–78)
AST SERPL-CCNC: 44 U/L — HIGH (ref 15–37)
BASE EXCESS BLDA CALC-SCNC: 1.9 MMOL/L — SIGNIFICANT CHANGE UP (ref -2–2)
BILIRUB DIRECT SERPL-MCNC: 2.9 MG/DL — HIGH (ref 0.05–0.2)
BILIRUB INDIRECT FLD-MCNC: 0.7 MG/DL — SIGNIFICANT CHANGE UP (ref 0.2–1)
BILIRUB SERPL-MCNC: 3.6 MG/DL — HIGH (ref 0.2–1.2)
BLOOD GAS COMMENTS: SIGNIFICANT CHANGE UP
BLOOD GAS COMMENTS: SIGNIFICANT CHANGE UP
BLOOD GAS SOURCE: SIGNIFICANT CHANGE UP
CULTURE RESULTS: SIGNIFICANT CHANGE UP
GRAM STN FLD: SIGNIFICANT CHANGE UP
HAV IGM SER-ACNC: SIGNIFICANT CHANGE UP
HBV CORE IGM SER-ACNC: SIGNIFICANT CHANGE UP
HBV SURFACE AG SER-ACNC: SIGNIFICANT CHANGE UP
HCO3 BLDA-SCNC: 24 MMOL/L — SIGNIFICANT CHANGE UP (ref 21–29)
HCV AB S/CO SERPL IA: 0.14 S/CO — SIGNIFICANT CHANGE UP (ref 0–0.99)
HCV AB SERPL-IMP: SIGNIFICANT CHANGE UP
HOROWITZ INDEX BLDA+IHG-RTO: 21 — SIGNIFICANT CHANGE UP
ORGANISM # SPEC MICROSCOPIC CNT: SIGNIFICANT CHANGE UP
PCO2 BLDA: 29 MMHG — LOW (ref 32–46)
PH BLD: 7.52 — HIGH (ref 7.35–7.45)
PO2 BLDA: 90 MMHG — SIGNIFICANT CHANGE UP (ref 74–108)
PROT SERPL-MCNC: 8.2 GM/DL — SIGNIFICANT CHANGE UP (ref 6–8.3)
SAO2 % BLDA: 99 % — HIGH (ref 92–96)
SPECIMEN SOURCE: SIGNIFICANT CHANGE UP

## 2019-06-25 PROCEDURE — 99232 SBSQ HOSP IP/OBS MODERATE 35: CPT

## 2019-06-25 PROCEDURE — 71250 CT THORAX DX C-: CPT | Mod: 26

## 2019-06-25 PROCEDURE — 99233 SBSQ HOSP IP/OBS HIGH 50: CPT

## 2019-06-25 PROCEDURE — 93306 TTE W/DOPPLER COMPLETE: CPT | Mod: 26

## 2019-06-25 RX ORDER — OXYCODONE AND ACETAMINOPHEN 5; 325 MG/1; MG/1
2 TABLET ORAL EVERY 6 HOURS
Refills: 0 | Status: DISCONTINUED | OUTPATIENT
Start: 2019-06-25 | End: 2019-06-29

## 2019-06-25 RX ADMIN — ALBUTEROL 2.5 MILLIGRAM(S): 90 AEROSOL, METERED ORAL at 23:37

## 2019-06-25 RX ADMIN — CEFTRIAXONE 100 MILLIGRAM(S): 500 INJECTION, POWDER, FOR SOLUTION INTRAMUSCULAR; INTRAVENOUS at 16:45

## 2019-06-25 RX ADMIN — OXYCODONE AND ACETAMINOPHEN 1 TABLET(S): 5; 325 TABLET ORAL at 12:35

## 2019-06-25 RX ADMIN — OXYCODONE AND ACETAMINOPHEN 1 TABLET(S): 5; 325 TABLET ORAL at 06:27

## 2019-06-25 RX ADMIN — ALBUTEROL 2.5 MILLIGRAM(S): 90 AEROSOL, METERED ORAL at 05:01

## 2019-06-25 RX ADMIN — OXYCODONE AND ACETAMINOPHEN 1 TABLET(S): 5; 325 TABLET ORAL at 07:12

## 2019-06-25 RX ADMIN — CHLORHEXIDINE GLUCONATE 1 APPLICATION(S): 213 SOLUTION TOPICAL at 17:25

## 2019-06-25 RX ADMIN — RIVAROXABAN 20 MILLIGRAM(S): KIT at 12:35

## 2019-06-25 RX ADMIN — OXYCODONE AND ACETAMINOPHEN 1 TABLET(S): 5; 325 TABLET ORAL at 13:35

## 2019-06-25 RX ADMIN — CHLORHEXIDINE GLUCONATE 1 APPLICATION(S): 213 SOLUTION TOPICAL at 05:17

## 2019-06-25 RX ADMIN — PIPERACILLIN AND TAZOBACTAM 25 GRAM(S): 4; .5 INJECTION, POWDER, LYOPHILIZED, FOR SOLUTION INTRAVENOUS at 05:17

## 2019-06-25 RX ADMIN — ALBUTEROL 2.5 MILLIGRAM(S): 90 AEROSOL, METERED ORAL at 11:54

## 2019-06-25 RX ADMIN — ALBUTEROL 2.5 MILLIGRAM(S): 90 AEROSOL, METERED ORAL at 17:17

## 2019-06-25 NOTE — CHART NOTE - NSCHARTNOTEFT_GEN_A_CORE
Upon Nutritional Assessment by the Registered Dietitian your patient was determined to meet criteria / has evidence of the following diagnosis/diagnoses:          [ ]  Mild Protein Calorie Malnutrition        [ ]  Moderate Protein Calorie Malnutrition        [ ] Severe Protein Calorie Malnutrition        [ ] Unspecified Protein Calorie Malnutrition        [ ] Underweight / BMI <19        [x ] Morbid Obesity / BMI > 40      Findings as based on:  •  Comprehensive nutrition assessment and consultation  •  Calorie counts (nutrient intake analysis)  •  Food acceptance and intake status from observations by staff  •  Follow up  •  Patient education  •  Intervention secondary to interdisciplinary rounds  •   concerns      Treatment:    The following diet has been recommended:  Continue Regular diet    PROVIDER Section:     By signing this assessment you are acknowledging and agree with the diagnosis/diagnoses assigned by the Registered Dietitian    Comments:

## 2019-06-25 NOTE — PHARMACY COMMUNICATION NOTE - COMMENTS
Dr. Contreras agreed that the piperacillin/tazobactam should be d/josue due to duplicate therapy with ceftriaxone for pan-susceptible K. pneumoniae BSI. I confirmed that I had d/josue the order per Dr. Johnson's note.

## 2019-06-25 NOTE — DIETITIAN INITIAL EVALUATION ADULT. - PERTINENT LABORATORY DATA
06-25 Na 136  Glu 117   K+ 3.8   Cr 1.93   BUN 19   Phos 3.5   Alb 2.3 g/dL<L> PAB n/a   Hgb 9.6   Hct 32.7   HgA1C n/a     24Hr FS:

## 2019-06-25 NOTE — DIETITIAN INITIAL EVALUATION ADULT. - OTHER INFO
Pt lives with daughter & son; pt's family does cooking & food shopping. Pt followed Low Na diet PTA & consumed 2 meals daily(breakfast & dinner). Pt qualifies for food insecurity; program explained. Pt with good appetite consuming % meals during hospitalization. Pt reports last BM x 1(6/21); NP made aware. No GI distress.

## 2019-06-25 NOTE — PROGRESS NOTE ADULT - ASSESSMENT
HPI:  67 yo F with PMH morbid obesity, HTN, Bilateral knee replacements complicated by DVT and pulmonary embolism (2013) maintained on Xarelto presents with two week history of URI cough and fever. Cough accompanied by fatigue, shortness of breath and intractable stomach pain. Today difficulty ambulating OOB to bathroom with severe dypsnea feeling like her PE in the past. Patient denies chest pain, palpitations, calf pain (22 Jun 2019 04:02)  ----------------------- As Above ----------------------------------------  Consult called for increased LFTs. Patient has history of CBD stones s/p ERCP / cholecystectomy. In addition to above symptoms, patient had N/V and abdominal pain ( RUQ ). Started Friday, resolved Sunday. Increased LFTs which show improvement. Denies Viral   Normal colonoscopy 3 years ago    Increased LFTs, history of CBD stones - Improving  r/o CBD stones, meds ( antibiotics , etc ) 1) MRCP  - patient refusing, only wants open MRI 2) check sono 3) F/U labs. 4) ERCP if LFTs start to increase and patient refuses MRCP

## 2019-06-25 NOTE — PROGRESS NOTE ADULT - SUBJECTIVE AND OBJECTIVE BOX
Patient is a 68y old  Female who presents with a chief complaint of Shortness of breath (25 Jun 2019 14:56)      HPI:  67 yo F with PMH morbid obesity, HTN, Bilateral knee replacements complicated by DVT and pulmonary embolism (2013) maintained on Xarelto presents with two week history of URI cough and fever. Cough accompanied by fatigue, shortness of breath and intractable stomach pain. Today difficulty ambulating OOB to bathroom with severe dypsnea feeling like her PE in the past. Patient denies chest pain, palpitations, calf pain (22 Jun 2019 04:02)      INTERVAL HPI/OVERNIGHT EVENTS:  The patient denies melena, hematochezia, hematemesis, nausea, vomiting, abdominal pain, constipation, diarrhea, or change in bowel movements Tolerating regular diet    MEDICATIONS  (STANDING):  ALBUTerol    0.083% 2.5 milliGRAM(s) Nebulizer every 6 hours  ALBUTerol    90 MICROgram(s) HFA Inhaler 1 Puff(s) Inhalation every 4 hours  cefTRIAXone   IVPB 2000 milliGRAM(s) IV Intermittent every 24 hours  chlorhexidine 4% Liquid 1 Application(s) Topical every 12 hours  rivaroxaban 20 milliGRAM(s) Oral daily    MEDICATIONS  (PRN):  acetaminophen   Tablet .. 650 milliGRAM(s) Oral every 6 hours PRN Temp greater or equal to 38C (100.4F), Moderate Pain (4 - 6)  oxyCODONE    5 mG/acetaminophen 325 mG 1 Tablet(s) Oral every 4 hours PRN Mild Pain (1 - 3)      FAMILY HISTORY:  FHx: hypertension  FH: type 2 diabetes mellitus      Allergies    sulfa drugs (Other; Rash)  Sulfur (Unknown)  trimethoprim (Other; Rash)    Intolerances        PMH/PSH:  Morbid obesity  History of pulmonary embolism  Arthropathy  Migraine  Obstructive sleep apnea syndrome  Essential hypertension  Obesity  Dysfunctional uterine bleeding  Joint infection  Pulmonary embolism  Osteoarthritis  History of total knee replacement  Other acquired absence of organ  Status post total hysterectomy  Status post cholecystectomy  Status post hysterectomy  Total knee replacement status        REVIEW OF SYSTEMS:  CONSTITUTIONAL: No fever, weight loss, or fatigue  EYES: No eye pain, visual disturbances, or discharge  ENMT:  No difficulty hearing, tinnitus, vertigo; No sinus or throat pain  NECK: No pain or stiffness  BREASTS: No pain, masses, or nipple discharge  RESPIRATORY: No cough, wheezing, chills or hemoptysis; No shortness of breath  CARDIOVASCULAR: No chest pain, palpitations, dizziness, or leg swelling  GASTROINTESTINAL:  see above  GENITOURINARY: No dysuria, frequency, hematuria, or incontinence  NEUROLOGICAL: No headaches, memory loss, loss of strength, numbness, or tremors  SKIN: No itching, burning, rashes, or lesions   LYMPH NODES: No enlarged glands  ENDOCRINE: No heat or cold intolerance; No hair loss  MUSCULOSKELETAL: No joint pain or swelling; No muscle, back, or extremity pain  PSYCHIATRIC: No depression, anxiety, mood swings, or difficulty sleeping  HEME/LYMPH: No easy bruising, or bleeding gums  ALLERGY AND IMMUNOLOGIC: No hives or eczema    Vital Signs Last 24 Hrs  T(C): 36.7 (25 Jun 2019 13:38), Max: 37.3 (24 Jun 2019 17:00)  T(F): 98 (25 Jun 2019 13:38), Max: 99.1 (24 Jun 2019 17:00)  HR: 97 (25 Jun 2019 14:46) (88 - 113)  BP: 141/69 (25 Jun 2019 14:46) (126/73 - 141/69)  BP(mean): --  RR: 19 (25 Jun 2019 14:46) (17 - 19)  SpO2: 98% (25 Jun 2019 14:46) (94% - 100%)    PHYSICAL EXAM:  GENERAL: NAD, well-groomed, well-developed  HEAD:  Atraumatic, Normocephalic  EYES: EOMI, PERRLA, conjunctiva and sclera clear  NECK: Supple, No JVD, Normal thyroid  NERVOUS SYSTEM:  Alert & Oriented X3, Good concentration; Motor Strength 5/5 B/L upper and lower extremities;   CHEST/LUNG: Clear to percussion bilaterally; No rales, rhonchi, wheezing, or rubs  HEART: Regular rate and rhythm; No murmurs, rubs, or gallops  ABDOMEN: Soft, Nontender, Nondistended; Bowel sounds present  EXTREMITIES:  2+ Peripheral Pulses, No clubbing, cyanosis, or edema  LYMPH: No lymphadenopathy noted  SKIN: No rashes or lesions    LAB  06-21 @ 20:01  amylase --   lipase 169                           9.6    5.03  )-----------( 231      ( 24 Jun 2019 06:42 )             32.7       CBC:  06-24 @ 06:42  WBC 5.03   Hgb 9.6   Hct 32.7   Plts 231  MCV 94.8  06-23 @ 04:09  WBC 9.94   Hgb 10.1   Hct 34.4   Plts 230  MCV 95.3  06-22 @ 14:33  WBC 18.57   Hgb 10.1   Hct 33.9   Plts 263  MCV 95.5  06-21 @ 20:01  WBC 17.65   Hgb 11.3   Hct 38.3   Plts 339  MCV 93.4      Chemistry:  06-24 @ 06:42  Na+ 136  K+ 3.8  Cl- 101  CO2 29  BUN 19  Cr 1.93     06-23 @ 04:09  Na+ 134  K+ 4.5  Cl- 99  CO2 26  BUN 26  Cr 2.19     06-22 @ 14:34  Na+ 134  K+ 4.9  Cl- 100  CO2 27  BUN 27  Cr 2.24     06-21 @ 20:01  Na+ 138  K+ 3.9  Cl- 102  CO2 28  BUN 25  Cr 2.05         Glucose, Serum: 117 mg/dL (06-24 @ 06:42)  Glucose, Serum: 89 mg/dL (06-23 @ 04:09)  Glucose, Serum: 96 mg/dL (06-22 @ 14:34)  Glucose, Serum: 111 mg/dL (06-21 @ 20:01)      24 Jun 2019 06:42    136    |  101    |  19     ----------------------------<  117    3.8     |  29     |  1.93   23 Jun 2019 04:09    134    |  99     |  26     ----------------------------<  89     4.5     |  26     |  2.19   22 Jun 2019 14:34    134    |  100    |  27     ----------------------------<  96     4.9     |  27     |  2.24   21 Jun 2019 20:01    138    |  102    |  25     ----------------------------<  111    3.9     |  28     |  2.05     Ca    8.0        24 Jun 2019 06:42  Ca    8.0        23 Jun 2019 04:09  Ca    8.0        22 Jun 2019 14:34  Ca    8.3        21 Jun 2019 20:01  Phos  3.5       24 Jun 2019 06:42  Phos  2.8       23 Jun 2019 04:09  Phos  2.9       22 Jun 2019 14:34  Mg     2.7       24 Jun 2019 06:42  Mg     2.0       23 Jun 2019 04:09  Mg     2.3       22 Jun 2019 14:34    TPro  8.2    /  Alb  2.3    /  TBili  3.6    /  DBili  2.90   /  AST  44     /  ALT  57     /  AlkPhos  260    25 Jun 2019 06:37  TPro  8.1    /  Alb  2.4    /  TBili  3.5    /  DBili  x      /  AST  37     /  ALT  63     /  AlkPhos  229    24 Jun 2019 06:42  TPro  8.0    /  Alb  2.5    /  TBili  4.2    /  DBili  x      /  AST  60     /  ALT  87     /  AlkPhos  225    23 Jun 2019 04:09  TPro  7.9    /  Alb  2.5    /  TBili  4.6    /  DBili  x      /  AST  99     /  ALT  110    /  AlkPhos  243    22 Jun 2019 14:34  TPro  8.9    /  Alb  3.0    /  TBili  2.6    /  DBili  x      /  AST  242    /  ALT  146    /  AlkPhos  350    21 Jun 2019 20:01              CAPILLARY BLOOD GLUCOSE              RADIOLOGY & ADDITIONAL TESTS:    Imaging Personally Reviewed:  [ ] YES  [ ] NO    Consultant(s) Notes Reviewed:  [ ] YES  [ ] NO    Care Discussed with Consultants/Other Providers [ ] YES  [ ] NO

## 2019-06-25 NOTE — PROGRESS NOTE ADULT - SUBJECTIVE AND OBJECTIVE BOX
Patient is a 68y old  Female who presents with a chief complaint of Shortness of breath (25 Jun 2019 13:29)      INTERVAL HPI / OVERNIGHT EVENTS: doing ok     MEDICATIONS  (STANDING):  ALBUTerol    0.083% 2.5 milliGRAM(s) Nebulizer every 6 hours  ALBUTerol    90 MICROgram(s) HFA Inhaler 1 Puff(s) Inhalation every 4 hours  cefTRIAXone   IVPB 2000 milliGRAM(s) IV Intermittent every 24 hours  chlorhexidine 4% Liquid 1 Application(s) Topical every 12 hours  rivaroxaban 20 milliGRAM(s) Oral daily    MEDICATIONS  (PRN):  acetaminophen   Tablet .. 650 milliGRAM(s) Oral every 6 hours PRN Temp greater or equal to 38C (100.4F), Moderate Pain (4 - 6)  oxyCODONE    5 mG/acetaminophen 325 mG 1 Tablet(s) Oral every 4 hours PRN Mild Pain (1 - 3)      Vital Signs Last 24 Hrs  T(C): 36.7 (25 Jun 2019 13:38), Max: 37.3 (24 Jun 2019 17:00)  T(F): 98 (25 Jun 2019 13:38), Max: 99.1 (24 Jun 2019 17:00)  HR: 97 (25 Jun 2019 14:46) (88 - 113)  BP: 141/69 (25 Jun 2019 14:46) (126/73 - 141/69)  BP(mean): --  RR: 19 (25 Jun 2019 14:46) (17 - 19)  SpO2: 98% (25 Jun 2019 14:46) (94% - 100%)    Review of systems:  General :  right flank pain +, no fever /chills, fatigue  CVS : no chest pain, palpitations  Lungs : no shortness of breath, cough  GI : no abdominal pain, vomiting, diarrhea   : no dysuria, hematuria        PHYSICAL EXAM:  General :NAD  Constitutional:  well-groomed, well-developed  Respiratory: CTAB/L  Cardiovascular: S1 and S2, RRR, no M/G/R  Gastrointestinal: BS+, soft, NT/ND  Extremities: No peripheral edema  Vascular: 2+ peripheral pulses  Skin: No rashes      LABS:                        9.6    5.03  )-----------( 231      ( 24 Jun 2019 06:42 )             32.7     06-24    136  |  101  |  19  ----------------------------<  117<H>  3.8   |  29  |  1.93<H>    Ca    8.0<L>      24 Jun 2019 06:42  Phos  3.5     06-24  Mg     2.7     06-24    TPro  8.2  /  Alb  2.3<L>  /  TBili  3.6<H>  /  DBili  2.90<H>  /  AST  44<H>  /  ALT  57  /  AlkPhos  260<H>  06-25          MICROBIOLOGY:  RECENT CULTURES:  06-24 .Blood XXXX XXXX   No growth to date.    06-22 .Blood Blood Culture PCR  Klebsiella pneumoniae  Blood Culture PCR   Growth in aerobic and anaerobic bottles: Gram Negative Rods   Growth in aerobic and anaerobic bottles: Klebsiella pneumoniae  See previous culture [id number]    06-22 .Urine XXXX XXXX   No growth          RADIOLOGY & ADDITIONAL STUDIES:    CT chest:    IMPRESSION:     Patchy focal right apical and infiltrate. Mild bibasilar dependent   changes.    Left substernal thyroid nodule unchanged.    Other incidental findings as above.    CT abdomen :  LIVER: Pneumobilia within the left hepatic lobe, unchanged.  BILE DUCTS: Normal caliber.  GALLBLADDER: Status post cholecystectomy.  SPLEEN: Within normal limits.  PANCREAS: Within normal limits.  ADRENALS: Within normal limits.  KIDNEYS/URETERS: Lobulated contour of the left kidney.    BLADDER: Underdistended.  REPRODUCTIVE ORGANS: Status post hysterectomy.    BOWEL: No bowel obstruction. Moderate to large amount of stool within the   rectum.  PERITONEUM: No ascites.  VESSELS:  IVC filter.  RETROPERITONEUM: No lymphadenopathy.  ABDOMINAL WALL: Within normal limits.  BONES: Within normal limits.    IMPRESSION:     Technically limited study. No bowel obstruction.

## 2019-06-25 NOTE — PROGRESS NOTE ADULT - ASSESSMENT
PATIENT MAXWELL OLIVO  00 509   1950 DOA 2019 DR HEMANT PINO     PT  SUMMARY    68F nonsmoker disabled severe arthritis not on Home O2 Has sleep apnea but does not use cpap PMH morbid obesity, HTN, bilateral knee replacements complicated by DVT and pulmonary embolism () maintained on Xarelto, hx of septic knee and bacteremia, CKD, LARRY here with severe sepsis, gram negative bacteremia, PNA, acute hypercarbic respiratory failure requiring non invasive ventilation with BiPAP, acute on chronic renal insufficiency. Incidentally found to have goiter vs thyroid mass/nodule.              PATIENT MAXWELL OLIVO  00 509   1950 DOA 2019 DR HEMANT PINO     PT  SUMMARY    68F nonsmoker disabled severe arthritis not on Home O2 Has sleep apnea but does not use cpap PMH morbid obesity, HTN, bilateral knee replacements complicated by DVT and pulmonary embolism (2013) maintained on Xarelto, hx of septic knee and bacteremia, CKD, LARRY here with severe sepsis, gram negative bacteremia, PNA, acute hypercarbic respiratory failure requiring non invasive ventilation with BiPAP, acute on chronic renal insufficiency. Incidentally found to have goiter vs thyroid mass/nodule.              PATIENT MAXWELL OLIVO  00 509   1950 DOA 2019 DR HEMANT PINO      PROBLEM/ASSESSMENT/RECOMMENDATIONS (A/R)     RESP GAS EXCHANGE HYPERCAPNIC RESP FAILURE   2019 ra 752/29/90   2019 ra 94%   2019 730/52/96  2019 Check abg am   LARRY  bpap  ()   Cont Rx   A/R Will need sleep study after discharge Call me for followup [phone] Does not qualify for bpap   HO VENOUS THROMBOEMBOLISM  On Rivaroxab 20 ()   2019 Check echo ro ph    ANEMIA norm  --- Hb 11.3-10.1-10.1-9.6   ELEVATED LFTS   ---  [id number]  -[id number]  -[id number]  Improving  OC  --- Cr 2- 2.2-2.1-1.9    Possibly sec atn sepsis   INFECTION KLEBSIELLA BACTEREMIA PNEUMONIA   ---  W 17.6-18.5-9.9-5   P  Procalc 59.7   M  blood culture n   M  blood culture Klebsiella   M  Leg n   m  urine culture n   C 2019 CT ch patchy focal r apical infiltr Mild basal dependent changes   C 2019 cta ch Faint ggo upper lobes Thyroid goiter   M 2019 Flu AB n RSV n   On rocephin 2 () (Dr BEAULIEU) (Klebs bacteremia)   Zosyn ()   A/R Klebsiella bacteremia Source iunclear ? urine ? pneumonia on Rocephin   2019 Recommend DC zosyn   POSSIBLE HYPOTHYROID   TSH .431   GOITER  2019 Thyroid us L thyroid enlarged heterogenous 8.2x1.7x4.2 cm with large nodule 5.6x4.1x3.9 cm extending into sup mediastinum   2019 cta ch thyroid goiter extending into upper mediast   A/R Will need followup with endocrine and CT surgery after discharge     TIME SPENT Over 25 minutes aggregate care time spent on encounter; activities included   direct pt care, counseling and/or coordinating care reviewing notes, lab data/ imaging , discussion with multidisciplinary team/ pt /family. Risks, benefits, alternatives  discussed in detail

## 2019-06-25 NOTE — PROGRESS NOTE ADULT - ASSESSMENT
68F PMH morbid obesity, HTN, bilateral knee replacements complicated by DVT and pulmonary embolism (2013) maintained on Xarelto, hx of septic knee and bacteremia, CKD, LARRY here with severe sepsis, gram negative bacteremia, PNA, acute hypercarbic respiratory failure requiring non invasive ventilation with BiPAP, acute on chronic renal insufficiency. Incidentally found to have goiter vs thyroid mass/nodule.    Note: BMI of 62 consistent with morbid obesity    1. PULM  - cont nocturnal BiPAP 16/6 25% for underlying LARRY  - awake, alert, responsive, weaned off bipap during the day  - cont antibiotics for underlying questionable PNA. Will order CT chest confirmed pneumonia. Will complete 4 more days of IV Ceftriaxone.       2. CV  - BP stable, did not require pressors  - lopressor held as pt admitted for sepsis and is bacteremia with SBP 90-120s, if BP continues to uptrend can resume home antihypertensive    Will order TTE as routine, given BMI of 62 and morbid obesity. Continue Xarelto 20 mg/day.     3. ID  - sepsis due to gram negative organisms  - klebsiella bacteremia, leukocytosis improving  - cont on Ceftriaxone now, 2 grams per day.    - both sets of culture however with gram negative rods  -   4. GI  - noted pneumobilia on CT abdomen, RUQ non tender on exam  - pneumobilia present on prior CT scan as well  - unclear significance, not willing to do MRI. Observe for now. Not willing to do abdominal sonogram.      5. RENAL  - acute on chronic renal insufficiency in setting of sepsis likely component of ATN  - pt voiding, Cr stable, cont to monitor. Creatinine 1.93.     6. ENDO  - goiter vs thyroid mass  - pt without respiratory compromise  - follow up neck ultrasound  - appreciate endo input, all outpatient follow up     7. GEN  - stable for transfer to medical floor  - physical therapy  - d/w patient,  Discharge home Friday with home PT.

## 2019-06-25 NOTE — DIETITIAN INITIAL EVALUATION ADULT. - PERTINENT MEDS FT
acetaminophen   Tablet .. PRN  ALBUTerol    0.083%  ALBUTerol    90 MICROgram(s) HFA Inhaler  cefTRIAXone   IVPB  chlorhexidine 4% Liquid  oxyCODONE    5 mG/acetaminophen 325 mG PRN  rivaroxaban

## 2019-06-25 NOTE — PROGRESS NOTE ADULT - SUBJECTIVE AND OBJECTIVE BOX
MAXWELL OLIVO  00 509   1950 DOA 2019 DR HEMANT CONTRERAS   ALLERGY     Sulfa Trimethoprim    CONTACT [place] Sachin/Edgar  selv  Relative Brian REYNOLDS             Initial evaluation/Pulmonary Critical Care consultation requested on  2019  by Dr Contreras   from Dr Matute   Patient examined chart reviewed    HOSPITAL ADMISSION   PATIENT CAME  FROM (if information available)        TYPE OF VISIT      Subsequent pulmonary followup      REASON FOR VISIT  For list of problems evaluated/addressed, please see problem list in the note below     PATIENT MAXWELL OLIVO  00 509   1950 DOA 2019 DR HEMANT CONTRERAS      VITALS LABS   2019 afeb 96 130/80 18 100%   2019 99f 113 130/80   2019 W 5 Hb 9.6 Plt 231 Na 136 K 3.8 CO2 29 Cr 1.9     PATIENT MAXWELL OLIVO  00 509   1950 DOA 2019 DR HEMANT CONTRERAS       MEDICATIONS   VTE   Rivaroxab 20 ()   ABIO  rocephin 2 () (Dr BEAULIEU) (Klebs bacteremia)   Zosyn ()   PAIN  percocet ()   COPD   albuterl ()       GLOBAL ISSUE/BEST PRACTICE:      PROBLEM: HOB elevation:   y            PROBLEM: Stress ulcer proph:    na                      PROBLEM: VTE prophylaxis:       PROBLEM: Glycemic control:    na  PROBLEM: Nutrition:   Reg diet ()   PROBLEM: Advanced directive: na     PROBLEM: Allergies:  sulfa trimethoprim     EVENT 2019 Transferred out of icu     REVIEW OF SYMPTOMS     NOTE [place] if any  in ROS and PE are updated in daily HOSPITAL COURSE below      Able to give ROS  Yes     RELIABLE No   CONSTITUTIONAL Weakness Yes  Chills No Vision changes No  ENDOCRINE No unexplained hair loss No heat or cold intolerance    ALLERGY No hives  Sore throat No   RESP Coughing blood no  Shortness of breath YES   NEURO No Headache  Confusion Pain neck No   CARDIAC No Chest pain No Palpitations   GI No Pain abdomen NO   Vomiting NO     PHYSICAL EXAM    HEENT Unremarkable PERRLA atraumatic   RESP Fair air entry EXP prolonged    Harsh breath sound Resp distres mild   CARDIAC S1 S2 No S3     NO JVD    ABDOMEN SOFT BS PRESENT NOT DISTENDED No hepatosplenomegaly PEDAL EDEMA present No calf tenderness  NO rash   GENERAL Not TOXIC

## 2019-06-25 NOTE — PROGRESS NOTE ADULT - ASSESSMENT
1. Large exophytic thyroid nodule extending into the superior mediastinum 6.6 x 3.5 x 4.3 cm per CT of chest.  TSH was 0.431 uIU/ml.    Thyroid and Neck Ultrasound:  5.6 x 4.1 x 3.9 cm of the right lower pole extending into the mediastinum.     Pending TPO abs.    - Patient will follow up with our office for further outpatient work up, will need FNA.

## 2019-06-25 NOTE — PHARMACOTHERAPY INTERVENTION NOTE - COMMENTS
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 Potential sources include urine or lung, though UA is mostly negative, CXR shows clear lungs, and CT chest shows non-specific faint groundglass opacities. Currently on piperacillin/tazobactam. This could be de-escalated to ceftriaxone. Per Dr. Johnson, change piperacillin/tazobactam to ceftriaxone. Ceftriaxone was added but piperacillin/tazobactam was not discontinued. Duplicate therapy. Attempted to contact Dr. Contreras without response. D/Erick piperacillin/tazobactam per ID note from 6/24. Will continue to monitor.

## 2019-06-25 NOTE — PROGRESS NOTE ADULT - SUBJECTIVE AND OBJECTIVE BOX
Patient is a 68y old  Female who presents with a chief complaint of Shortness of breath (24 Jun 2019 16:11)      INTERVAL HPI/OVERNIGHT EVENTS:    Pt not at bedside.   Pt is at Radiology for Echo/CT of chest.     MEDICATIONS  (STANDING):  ALBUTerol    0.083% 2.5 milliGRAM(s) Nebulizer every 6 hours  ALBUTerol    90 MICROgram(s) HFA Inhaler 1 Puff(s) Inhalation every 4 hours  cefTRIAXone   IVPB 2000 milliGRAM(s) IV Intermittent every 24 hours  chlorhexidine 4% Liquid 1 Application(s) Topical every 12 hours  rivaroxaban 20 milliGRAM(s) Oral daily    MEDICATIONS  (PRN):  acetaminophen   Tablet .. 650 milliGRAM(s) Oral every 6 hours PRN Temp greater or equal to 38C (100.4F), Moderate Pain (4 - 6)  oxyCODONE    5 mG/acetaminophen 325 mG 1 Tablet(s) Oral every 4 hours PRN Mild Pain (1 - 3)      REVIEW OF SYSTEMS:  Unable to obtain.     Vital Signs Last 24 Hrs  T(C): 36.1 (25 Jun 2019 05:27), Max: 37.3 (24 Jun 2019 17:00)  T(F): 97 (25 Jun 2019 05:27), Max: 99.1 (24 Jun 2019 17:00)  HR: 96 (25 Jun 2019 08:10) (88 - 113)  BP: 137/83 (25 Jun 2019 05:27) (123/76 - 139/74)  BP(mean): --  RR: 18 (25 Jun 2019 05:27) (18 - 18)  SpO2: 95% (25 Jun 2019 08:10) (94% - 100%)    PHYSICAL EXAM:  Pt not at bedside for examination.       LABS:                        9.6    5.03  )-----------( 231      ( 24 Jun 2019 06:42 )             32.7     06-24    136  |  101  |  19  ----------------------------<  117<H>  3.8   |  29  |  1.93<H>    Ca    8.0<L>      24 Jun 2019 06:42  Phos  3.5     06-24  Mg     2.7     06-24    TPro  8.2  /  Alb  2.3<L>  /  TBili  3.6<H>  /  DBili  2.90<H>  /  AST  44<H>  /  ALT  57  /  AlkPhos  260<H>  06-25        CAPILLARY BLOOD GLUCOSE        Lipid panel:       ABG - ( 25 Jun 2019 08:55 )  pH, Arterial: x     pH, Blood: 7.52  /  pCO2: 29    /  pO2: 90    / HCO3: 24    / Base Excess: 1.9   /  SaO2: 99                      RADIOLOGY & ADDITIONAL TESTS:

## 2019-06-25 NOTE — PROGRESS NOTE ADULT - SUBJECTIVE AND OBJECTIVE BOX
INTERVAL HPI/OVERNIGHT EVENTS:  Pt seen and examined at bedside.     Allergies/Intolerance: sulfa drugs (Other; Rash)  Sulfur (Unknown)  trimethoprim (Other; Rash)      MEDICATIONS  (STANDING):  ALBUTerol    0.083% 2.5 milliGRAM(s) Nebulizer every 6 hours  ALBUTerol    90 MICROgram(s) HFA Inhaler 1 Puff(s) Inhalation every 4 hours  cefTRIAXone   IVPB 2000 milliGRAM(s) IV Intermittent every 24 hours  chlorhexidine 4% Liquid 1 Application(s) Topical every 12 hours  rivaroxaban 20 milliGRAM(s) Oral daily    MEDICATIONS  (PRN):  acetaminophen   Tablet .. 650 milliGRAM(s) Oral every 6 hours PRN Temp greater or equal to 38C (100.4F), Moderate Pain (4 - 6)  oxyCODONE    5 mG/acetaminophen 325 mG 1 Tablet(s) Oral every 4 hours PRN Mild Pain (1 - 3)        ROS: all systems reviewed and wnl      PHYSICAL EXAMINATION:  Vital Signs Last 24 Hrs  T(C): 36.7 (25 Jun 2019 13:38), Max: 37.3 (24 Jun 2019 17:00)  T(F): 98 (25 Jun 2019 13:38), Max: 99.1 (24 Jun 2019 17:00)  HR: 97 (25 Jun 2019 14:46) (88 - 113)  BP: 141/69 (25 Jun 2019 14:46) (126/73 - 141/69)  BP(mean): --  RR: 19 (25 Jun 2019 14:46) (17 - 19)  SpO2: 98% (25 Jun 2019 14:46) (94% - 100%)  CAPILLARY BLOOD GLUCOSE          06-24 @ 07:01  -  06-25 @ 07:00  --------------------------------------------------------  IN: 175 mL / OUT: 800 mL / NET: -625 mL        GENERAL:   NECK: supple, No JVD  CHEST/LUNG: clear to auscultation bilaterally; no rales, rhonchi, or wheezing b/l  HEART: normal S1, S2  ABDOMEN: BS+, soft, ND, NT   EXTREMITIES:  pulses palpable; no clubbing, cyanosis, or edema b/l LEs  SKIN: no rashes or lesions      LABS:                        9.6    5.03  )-----------( 231      ( 24 Jun 2019 06:42 )             32.7     06-24    136  |  101  |  19  ----------------------------<  117<H>  3.8   |  29  |  1.93<H>    Ca    8.0<L>      24 Jun 2019 06:42  Phos  3.5     06-24  Mg     2.7     06-24    TPro  8.2  /  Alb  2.3<L>  /  TBili  3.6<H>  /  DBili  2.90<H>  /  AST  44<H>  /  ALT  57  /  AlkPhos  260<H>  06-25 INTERVAL HPI/OVERNIGHT EVENTS:  Pt seen and examined at bedside.     Allergies/Intolerance: sulfa drugs (Other; Rash)  Sulfur (Unknown)  trimethoprim (Other; Rash)      MEDICATIONS  (STANDING):  ALBUTerol    0.083% 2.5 milliGRAM(s) Nebulizer every 6 hours  ALBUTerol    90 MICROgram(s) HFA Inhaler 1 Puff(s) Inhalation every 4 hours  cefTRIAXone   IVPB 2000 milliGRAM(s) IV Intermittent every 24 hours  chlorhexidine 4% Liquid 1 Application(s) Topical every 12 hours  rivaroxaban 20 milliGRAM(s) Oral daily    MEDICATIONS  (PRN):  acetaminophen   Tablet .. 650 milliGRAM(s) Oral every 6 hours PRN Temp greater or equal to 38C (100.4F), Moderate Pain (4 - 6)  oxyCODONE    5 mG/acetaminophen 325 mG 1 Tablet(s) Oral every 4 hours PRN Mild Pain (1 - 3)        ROS: all systems reviewed and wnl      PHYSICAL EXAMINATION:  Vital Signs Last 24 Hrs  T(C): 36.7 (25 Jun 2019 13:38), Max: 37.3 (24 Jun 2019 17:00)  T(F): 98 (25 Jun 2019 13:38), Max: 99.1 (24 Jun 2019 17:00)  HR: 97 (25 Jun 2019 14:46) (88 - 113)  BP: 141/69 (25 Jun 2019 14:46) (126/73 - 141/69)  BP(mean): --  RR: 19 (25 Jun 2019 14:46) (17 - 19)  SpO2: 98% (25 Jun 2019 14:46) (94% - 100%)  CAPILLARY BLOOD GLUCOSE          06-24 @ 07:01  -  06-25 @ 07:00  --------------------------------------------------------  IN: 175 mL / OUT: 800 mL / NET: -625 mL        GENERAL: stable in bed, comfortable, no fevers or SOB  NECK: supple, No JVD  CHEST/LUNG: clear to auscultation bilaterally; no rales, rhonchi, or wheezing b/l  HEART: normal S1, S2  ABDOMEN: BS+, soft, ND, NT   EXTREMITIES:  pulses palpable; no clubbing, cyanosis, or edema b/l LEs  SKIN: no rashes or lesions      LABS:                        9.6    5.03  )-----------( 231      ( 24 Jun 2019 06:42 )             32.7     06-24    136  |  101  |  19  ----------------------------<  117<H>  3.8   |  29  |  1.93<H>    Ca    8.0<L>      24 Jun 2019 06:42  Phos  3.5     06-24  Mg     2.7     06-24    TPro  8.2  /  Alb  2.3<L>  /  TBili  3.6<H>  /  DBili  2.90<H>  /  AST  44<H>  /  ALT  57  /  AlkPhos  260<H>  06-25

## 2019-06-26 LAB
ALBUMIN SERPL ELPH-MCNC: 2.3 G/DL — LOW (ref 3.3–5)
ALP SERPL-CCNC: 285 U/L — HIGH (ref 40–120)
ALT FLD-CCNC: 54 U/L — SIGNIFICANT CHANGE UP (ref 12–78)
ANION GAP SERPL CALC-SCNC: 6 MMOL/L — SIGNIFICANT CHANGE UP (ref 5–17)
AST SERPL-CCNC: 53 U/L — HIGH (ref 15–37)
BILIRUB DIRECT SERPL-MCNC: 2.44 MG/DL — HIGH (ref 0.05–0.2)
BILIRUB INDIRECT FLD-MCNC: 0.7 MG/DL — SIGNIFICANT CHANGE UP (ref 0.2–1)
BILIRUB SERPL-MCNC: 3.1 MG/DL — HIGH (ref 0.2–1.2)
BUN SERPL-MCNC: 16 MG/DL — SIGNIFICANT CHANGE UP (ref 7–23)
CALCIUM SERPL-MCNC: 8.3 MG/DL — LOW (ref 8.5–10.1)
CHLORIDE SERPL-SCNC: 104 MMOL/L — SIGNIFICANT CHANGE UP (ref 96–108)
CO2 SERPL-SCNC: 28 MMOL/L — SIGNIFICANT CHANGE UP (ref 22–31)
CREAT SERPL-MCNC: 1.76 MG/DL — HIGH (ref 0.5–1.3)
GLUCOSE SERPL-MCNC: 130 MG/DL — HIGH (ref 70–99)
HCT VFR BLD CALC: 33.8 % — LOW (ref 34.5–45)
HGB BLD-MCNC: 10.1 G/DL — LOW (ref 11.5–15.5)
MCHC RBC-ENTMCNC: 27.7 PG — SIGNIFICANT CHANGE UP (ref 27–34)
MCHC RBC-ENTMCNC: 29.9 GM/DL — LOW (ref 32–36)
MCV RBC AUTO: 92.9 FL — SIGNIFICANT CHANGE UP (ref 80–100)
NRBC # BLD: 0 /100 WBCS — SIGNIFICANT CHANGE UP (ref 0–0)
PLATELET # BLD AUTO: 264 K/UL — SIGNIFICANT CHANGE UP (ref 150–400)
POTASSIUM SERPL-MCNC: 4.3 MMOL/L — SIGNIFICANT CHANGE UP (ref 3.5–5.3)
POTASSIUM SERPL-SCNC: 4.3 MMOL/L — SIGNIFICANT CHANGE UP (ref 3.5–5.3)
PROT SERPL-MCNC: 8.2 GM/DL — SIGNIFICANT CHANGE UP (ref 6–8.3)
RBC # BLD: 3.64 M/UL — LOW (ref 3.8–5.2)
RBC # FLD: 15.3 % — HIGH (ref 10.3–14.5)
SODIUM SERPL-SCNC: 138 MMOL/L — SIGNIFICANT CHANGE UP (ref 135–145)
WBC # BLD: 5.63 K/UL — SIGNIFICANT CHANGE UP (ref 3.8–10.5)
WBC # FLD AUTO: 5.63 K/UL — SIGNIFICANT CHANGE UP (ref 3.8–10.5)

## 2019-06-26 PROCEDURE — 99233 SBSQ HOSP IP/OBS HIGH 50: CPT

## 2019-06-26 PROCEDURE — 99232 SBSQ HOSP IP/OBS MODERATE 35: CPT

## 2019-06-26 RX ORDER — CEFTRIAXONE 500 MG/1
2000 INJECTION, POWDER, FOR SOLUTION INTRAMUSCULAR; INTRAVENOUS EVERY 24 HOURS
Refills: 0 | Status: DISCONTINUED | OUTPATIENT
Start: 2019-06-26 | End: 2019-06-29

## 2019-06-26 RX ADMIN — ALBUTEROL 2.5 MILLIGRAM(S): 90 AEROSOL, METERED ORAL at 12:38

## 2019-06-26 RX ADMIN — ALBUTEROL 2.5 MILLIGRAM(S): 90 AEROSOL, METERED ORAL at 23:49

## 2019-06-26 RX ADMIN — OXYCODONE AND ACETAMINOPHEN 2 TABLET(S): 5; 325 TABLET ORAL at 22:41

## 2019-06-26 RX ADMIN — OXYCODONE AND ACETAMINOPHEN 2 TABLET(S): 5; 325 TABLET ORAL at 14:00

## 2019-06-26 RX ADMIN — OXYCODONE AND ACETAMINOPHEN 2 TABLET(S): 5; 325 TABLET ORAL at 04:07

## 2019-06-26 RX ADMIN — CEFTRIAXONE 100 MILLIGRAM(S): 500 INJECTION, POWDER, FOR SOLUTION INTRAMUSCULAR; INTRAVENOUS at 15:20

## 2019-06-26 RX ADMIN — OXYCODONE AND ACETAMINOPHEN 2 TABLET(S): 5; 325 TABLET ORAL at 03:48

## 2019-06-26 RX ADMIN — ALBUTEROL 2.5 MILLIGRAM(S): 90 AEROSOL, METERED ORAL at 05:56

## 2019-06-26 RX ADMIN — OXYCODONE AND ACETAMINOPHEN 2 TABLET(S): 5; 325 TABLET ORAL at 13:03

## 2019-06-26 RX ADMIN — OXYCODONE AND ACETAMINOPHEN 2 TABLET(S): 5; 325 TABLET ORAL at 23:41

## 2019-06-26 RX ADMIN — ALBUTEROL 2.5 MILLIGRAM(S): 90 AEROSOL, METERED ORAL at 17:27

## 2019-06-26 RX ADMIN — RIVAROXABAN 20 MILLIGRAM(S): KIT at 11:06

## 2019-06-26 RX ADMIN — CHLORHEXIDINE GLUCONATE 1 APPLICATION(S): 213 SOLUTION TOPICAL at 17:08

## 2019-06-26 RX ADMIN — CHLORHEXIDINE GLUCONATE 1 APPLICATION(S): 213 SOLUTION TOPICAL at 05:10

## 2019-06-26 NOTE — PROGRESS NOTE ADULT - SUBJECTIVE AND OBJECTIVE BOX
Preliminary note based on available patient data was entered below in order to expedite patient management  Patient will be examined within the next few hours (and this note will be edited if so dictated by the then latest available data) Please note that  by the end of this day the below note should be considered as my final patient progress note for today     MAXWELL OLIVO  00 [id number]   1950 DOA 2019 DR HEMANT CONTRERAS   ALLERGY     Sulfa Trimethoprim    CONTACT Sloane Stephenson/Edgar  selv  Relative Brian REYNOLDS             Initial evaluation/Pulmonary Critical Care consultation requested on  2019  by Dr Contreras   from Dr Matute   Patient examined chart reviewed    HOSPITAL ADMISSION   PATIENT CAME  FROM (if information available)        TYPE OF VISIT      Subsequent pulmonary followup      REASON FOR VISIT  For list of problems evaluated/addressed, please see problem list in the note below     PATIENT MAXWELL OLIVO  00 509   1950 DOA 2019 DR HEMANT CONTRERAS      VITALS LABS   2019 afeb 109 130/82 97%  2019 W 5.6 Hb 10.1 Plt 264 Na 138 K 4.3 CO2 28 Cr 1.7    PATIENT MAXWELL OLIVO  00 509   1950 DOA 2019 DR HEMANT CONTRERAS       MEDICATIONS   VTE   Rivaroxab 20 ()   ABIO  rocephin 2 () (Dr BEAULIEU) (Klebs bacteremia)   Zosyn ()   PAIN  percocet ()   COPD   albuterl ()       GLOBAL ISSUE/BEST PRACTICE:      PROBLEM: HOB elevation:   y            PROBLEM: Stress ulcer proph:    na                      PROBLEM: VTE prophylaxis:       PROBLEM: Glycemic control:    na  PROBLEM: Nutrition:   Reg diet ()   PROBLEM: Advanced directive: na     PROBLEM: Allergies:  sulfa trimethoprim     EVENT 2019 Transferred out of icu     REVIEW OF SYMPTOMS     NOTE [place] if any  in ROS and PE are updated in daily HOSPITAL COURSE below      Able to give ROS  Yes     RELIABLE No   CONSTITUTIONAL Weakness Yes  Chills No Vision changes No  ENDOCRINE No unexplained hair loss No heat or cold intolerance    ALLERGY No hives  Sore throat No   RESP Coughing blood no  Shortness of breath YES   NEURO No Headache  Confusion Pain neck No   CARDIAC No Chest pain No Palpitations   GI No Pain abdomen NO   Vomiting NO     PHYSICAL EXAM    HEENT Unremarkable PERRLA atraumatic   RESP Fair air entry EXP prolonged    Harsh breath sound Resp distres mild   CARDIAC S1 S2 No S3     NO JVD    ABDOMEN SOFT BS PRESENT NOT DISTENDED No hepatosplenomegaly PEDAL EDEMA present No calf tenderness  NO rash   GENERAL Not TOXIC

## 2019-06-26 NOTE — PROGRESS NOTE ADULT - SUBJECTIVE AND OBJECTIVE BOX
INTERVAL HPI/OVERNIGHT EVENTS:  Pt seen and examined at bedside.     Allergies/Intolerance: sulfa drugs (Other; Rash)  Sulfur (Unknown)  trimethoprim (Other; Rash)      MEDICATIONS  (STANDING):  ALBUTerol    0.083% 2.5 milliGRAM(s) Nebulizer every 6 hours  ALBUTerol    90 MICROgram(s) HFA Inhaler 1 Puff(s) Inhalation every 4 hours  cefTRIAXone   IVPB 2000 milliGRAM(s) IV Intermittent every 24 hours  chlorhexidine 4% Liquid 1 Application(s) Topical every 12 hours  rivaroxaban 20 milliGRAM(s) Oral daily    MEDICATIONS  (PRN):  acetaminophen   Tablet .. 650 milliGRAM(s) Oral every 6 hours PRN Temp greater or equal to 38C (100.4F), Moderate Pain (4 - 6)  oxyCODONE    5 mG/acetaminophen 325 mG 2 Tablet(s) Oral every 6 hours PRN Mild Pain (1 - 3)        ROS: all systems reviewed and wnl      PHYSICAL EXAMINATION:  Vital Signs Last 24 Hrs  T(C): 36.7 (26 Jun 2019 11:06), Max: 36.7 (26 Jun 2019 11:06)  T(F): 98 (26 Jun 2019 11:06), Max: 98 (26 Jun 2019 11:06)  HR: 114 (26 Jun 2019 12:45) (77 - 115)  BP: 120/77 (26 Jun 2019 11:06) (120/77 - 141/69)  BP(mean): --  RR: 16 (26 Jun 2019 11:06) (16 - 19)  SpO2: 100% (26 Jun 2019 12:45) (94% - 100%)  CAPILLARY BLOOD GLUCOSE          06-25 @ 07:01 - 06-26 @ 07:00  --------------------------------------------------------  IN: 50 mL / OUT: 650 mL / NET: -600 mL    06-26 @ 07:01 - 06-26 @ 13:47  --------------------------------------------------------  IN: 0 mL / OUT: 1 mL / NET: -1 mL        GENERAL:   NECK: supple, No JVD  CHEST/LUNG: clear to auscultation bilaterally; no rales, rhonchi, or wheezing b/l  HEART: normal S1, S2  ABDOMEN: BS+, soft, ND, NT   EXTREMITIES:  pulses palpable; no clubbing, cyanosis, or edema b/l LEs  SKIN: no rashes or lesions      LABS:                        10.1   5.63  )-----------( 264      ( 26 Jun 2019 10:08 )             33.8     06-26    138  |  104  |  16  ----------------------------<  130<H>  4.3   |  28  |  1.76<H>    Ca    8.3<L>      26 Jun 2019 10:08    TPro  8.2  /  Alb  2.3<L>  /  TBili  3.1<H>  /  DBili  2.44<H>  /  AST  53<H>  /  ALT  54  /  AlkPhos  285<H>  06-26 INTERVAL HPI/OVERNIGHT EVENTS:  Pt seen and examined at bedside.     Allergies/Intolerance: sulfa drugs (Other; Rash)  Sulfur (Unknown)  trimethoprim (Other; Rash)      MEDICATIONS  (STANDING):  ALBUTerol    0.083% 2.5 milliGRAM(s) Nebulizer every 6 hours  ALBUTerol    90 MICROgram(s) HFA Inhaler 1 Puff(s) Inhalation every 4 hours  cefTRIAXone   IVPB 2000 milliGRAM(s) IV Intermittent every 24 hours  chlorhexidine 4% Liquid 1 Application(s) Topical every 12 hours  rivaroxaban 20 milliGRAM(s) Oral daily    MEDICATIONS  (PRN):  acetaminophen   Tablet .. 650 milliGRAM(s) Oral every 6 hours PRN Temp greater or equal to 38C (100.4F), Moderate Pain (4 - 6)  oxyCODONE    5 mG/acetaminophen 325 mG 2 Tablet(s) Oral every 6 hours PRN Mild Pain (1 - 3)        ROS: all systems reviewed and wnl      PHYSICAL EXAMINATION:  Vital Signs Last 24 Hrs  T(C): 36.7 (26 Jun 2019 11:06), Max: 36.7 (26 Jun 2019 11:06)  T(F): 98 (26 Jun 2019 11:06), Max: 98 (26 Jun 2019 11:06)  HR: 114 (26 Jun 2019 12:45) (77 - 115)  BP: 120/77 (26 Jun 2019 11:06) (120/77 - 141/69)  BP(mean): --  RR: 16 (26 Jun 2019 11:06) (16 - 19)  SpO2: 100% (26 Jun 2019 12:45) (94% - 100%)  CAPILLARY BLOOD GLUCOSE          06-25 @ 07:01 - 06-26 @ 07:00  --------------------------------------------------------  IN: 50 mL / OUT: 650 mL / NET: -600 mL    06-26 @ 07:01 - 06-26 @ 13:47  --------------------------------------------------------  IN: 0 mL / OUT: 1 mL / NET: -1 mL        GENERAL: No SOB or fevers, less dyspnea   NECK: supple, No JVD  CHEST/LUNG: clear to auscultation bilaterally; no rales, rhonchi, or wheezing b/l  HEART: normal S1, S2  ABDOMEN: BS+, soft, ND, NT   EXTREMITIES:  pulses palpable; no clubbing, cyanosis, or edema b/l LEs  SKIN: no rashes or lesions      LABS:                        10.1   5.63  )-----------( 264      ( 26 Jun 2019 10:08 )             33.8     06-26    138  |  104  |  16  ----------------------------<  130<H>  4.3   |  28  |  1.76<H>    Ca    8.3<L>      26 Jun 2019 10:08    TPro  8.2  /  Alb  2.3<L>  /  TBili  3.1<H>  /  DBili  2.44<H>  /  AST  53<H>  /  ALT  54  /  AlkPhos  285<H>  06-26

## 2019-06-26 NOTE — PROGRESS NOTE ADULT - SUBJECTIVE AND OBJECTIVE BOX
Patient is a 68y old  Female who presents with a chief complaint of Shortness of breath (26 Jun 2019 15:33)      Interval History: no changes in clinical status   exophytic thyroid nodule but no compressive symptoms       MEDICATIONS  (STANDING):  ALBUTerol    0.083% 2.5 milliGRAM(s) Nebulizer every 6 hours  ALBUTerol    90 MICROgram(s) HFA Inhaler 1 Puff(s) Inhalation every 4 hours  cefTRIAXone   IVPB 2000 milliGRAM(s) IV Intermittent every 24 hours  chlorhexidine 4% Liquid 1 Application(s) Topical every 12 hours  rivaroxaban 20 milliGRAM(s) Oral daily    MEDICATIONS  (PRN):  acetaminophen   Tablet .. 650 milliGRAM(s) Oral every 6 hours PRN Temp greater or equal to 38C (100.4F), Moderate Pain (4 - 6)  oxyCODONE    5 mG/acetaminophen 325 mG 2 Tablet(s) Oral every 6 hours PRN Mild Pain (1 - 3)      Allergies    sulfa drugs (Other; Rash)  Sulfur (Unknown)  trimethoprim (Other; Rash)    Intolerances        REVIEW OF SYSTEMS: deferred       Vital Signs Last 24 Hrs  T(C): 36.6 (26 Jun 2019 17:35), Max: 36.7 (26 Jun 2019 11:06)  T(F): 97.9 (26 Jun 2019 17:35), Max: 98 (26 Jun 2019 11:06)  HR: 105 (26 Jun 2019 17:35) (77 - 115)  BP: 155/93 (26 Jun 2019 17:35) (120/77 - 157/89)  BP(mean): --  RR: 18 (26 Jun 2019 17:35) (16 - 19)  SpO2: 95% (26 Jun 2019 17:35) (94% - 100%)    PHYSICAL EXAM: deferred     LABS:        CAPILLARY BLOOD GLUCOSE        Lipid panel:       ABG - ( 25 Jun 2019 08:55 )  pH, Arterial: x     pH, Blood: 7.52  /  pCO2: 29    /  pO2: 90    / HCO3: 24    / Base Excess: 1.9   /  SaO2: 99                  Thyroid:  Diabetes Tests:  Parathyroid Panel:  Adrenals:  RADIOLOGY & ADDITIONAL TESTS:    Imaging Personally Reviewed:  [ ] YES  [ ] NO    Consultant(s) Notes Reviewed:  [ ] YES  [ ] NO    Care Discussed with Consultants/Other Providers [ ] YES  [ ] NO

## 2019-06-26 NOTE — PROGRESS NOTE ADULT - SUBJECTIVE AND OBJECTIVE BOX
Patient is a 68y old  Female who presents with a chief complaint of Shortness of breath (26 Jun 2019 13:47)      INTERVAL HPI / OVERNIGHT EVENTS: doing ok     MEDICATIONS  (STANDING):  ALBUTerol    0.083% 2.5 milliGRAM(s) Nebulizer every 6 hours  ALBUTerol    90 MICROgram(s) HFA Inhaler 1 Puff(s) Inhalation every 4 hours  chlorhexidine 4% Liquid 1 Application(s) Topical every 12 hours  rivaroxaban 20 milliGRAM(s) Oral daily    MEDICATIONS  (PRN):  acetaminophen   Tablet .. 650 milliGRAM(s) Oral every 6 hours PRN Temp greater or equal to 38C (100.4F), Moderate Pain (4 - 6)  oxyCODONE    5 mG/acetaminophen 325 mG 2 Tablet(s) Oral every 6 hours PRN Mild Pain (1 - 3)      Vital Signs Last 24 Hrs  T(C): 36.7 (26 Jun 2019 11:06), Max: 36.7 (26 Jun 2019 11:06)  T(F): 98 (26 Jun 2019 11:06), Max: 98 (26 Jun 2019 11:06)  HR: 114 (26 Jun 2019 12:45) (77 - 115)  BP: 120/77 (26 Jun 2019 11:06) (120/77 - 134/70)  BP(mean): --  RR: 16 (26 Jun 2019 11:06) (16 - 17)  SpO2: 100% (26 Jun 2019 12:45) (94% - 100%)    Review of systems:  General : no fever /chills,fatigue  CVS : no chest pain, palpitations  Lungs : no shortness of breath, cough  GI : no abdominal pain,vomiting, diarrhea   : no dysuria,hematuria        PHYSICAL EXAM:  General :NAD  Constitutional: morbid obesity  Respiratory: CTAB/L  Cardiovascular: S1 and S2, RRR, no M/G/R  Gastrointestinal: BS+, soft, NT/ND  Extremities: No peripheral edema  Vascular: 2+ peripheral pulses  Skin: No rashes      LABS:                        10.1   5.63  )-----------( 264      ( 26 Jun 2019 10:08 )             33.8     06-26    138  |  104  |  16  ----------------------------<  130<H>  4.3   |  28  |  1.76<H>    Ca    8.3<L>      26 Jun 2019 10:08    TPro  8.2  /  Alb  2.3<L>  /  TBili  3.1<H>  /  DBili  2.44<H>  /  AST  53<H>  /  ALT  54  /  AlkPhos  285<H>  06-26          MICROBIOLOGY:  RECENT CULTURES:  06-24 .Blood XXXX XXXX   No growth to date.    06-22 .Blood Blood Culture PCR  Klebsiella pneumoniae  Blood Culture PCR   Growth in aerobic and anaerobic bottles: Gram Negative Rods   Growth in aerobic and anaerobic bottles: Klebsiella pneumoniae  See previous culture [id number]    06-22 .Urine XXXX XXXX   No growth          RADIOLOGY & ADDITIONAL STUDIES:

## 2019-06-26 NOTE — PROGRESS NOTE ADULT - ASSESSMENT
PATIENT MAXWELL OLIVO  00 509   1950 DOA 2019 DR HEMANT PINO     PT  SUMMARY    68F nonsmoker disabled severe arthritis not on Home O2 Has sleep apnea but does not use cpap PMH morbid obesity, HTN, bilateral knee replacements complicated by DVT and pulmonary embolism () maintained on Xarelto, hx of septic knee and bacteremia, CKD, LARRY here with severe sepsis, gram negative bacteremia, PNA, acute hypercarbic respiratory failure requiring non invasive ventilation with BiPAP, acute on chronic renal insufficiency. Incidentally found to have goiter vs thyroid mass/nodule.              Hospital course   2019 ABG showed no hypercapnia  CT ch showed r apical infiltr Is on rocephin ()  blood culture Klebsiella     PATIENT MAXWELL OLIVO  00 509   1950 DOA 2019 DR HEMANT PINO      PROBLEM/ASSESSMENT/RECOMMENDATIONS (A/R)     RESP GAS EXCHANGE HYPERCAPNIC RESP FAILURE   2019 ra 752/29/90   2019 ra 94%   2019 730/52/96  2019 Check abg am   LARRY  bpap  ()   Cont Rx   A/R Will need sleep study after discharge Call me for followup [phone] Does not qualify for bpap   HO VENOUS THROMBOEMBOLISM  On Rivaroxab 20 ()   2019 Check echo ro ph    ANEMIA norm  ---- Hb 11.3-10.1-10.1-9.6-10.1    ELEVATED LFTS   ----  [phone] - 285  -[id number]  -[id number]   Improving  OC  ---- Cr 2- 2.2-2.1-1.9-1.7    Possibly sec atn sepsis   INFECTION KLEBSIELLA BACTEREMIA PNEUMONIA   ---  W 17.6-18.5-9.9-5   P  Procalc 59.7   M  blood culture n   M  blood culture Klebsiella   M  Leg n   m  urine culture n   C 2019 CT ch patchy focal r apical infiltr Mild basal dependent changes   C 2019 cta ch Faint ggo upper lobes Thyroid goiter   M 2019 Flu AB n RSV n   On rocephin 2 () (Dr BEAULIEU) (Klebs bacteremia)   POSSIBLE HYPOTHYROID   TSH .431   GOITER  2019 Thyroid us L thyroid enlarged heterogenous 8.2x1.7x4.2 cm with large nodule 5.6x4.1x3.9 cm extending into sup mediastinum   2019 cta ch thyroid goiter extending into upper mediast   A/R Will need followup with endocrine and CT surgery after discharge     TIME SPENT Over 25 minutes aggregate care time spent on encounter; activities included   direct pt care, counseling and/or coordinating care reviewing notes, lab data/ imaging , discussion with multidisciplinary team/ pt /family. Risks, benefits, alternatives  discussed in detail.

## 2019-06-26 NOTE — PROGRESS NOTE ADULT - ASSESSMENT
68F PMH morbid obesity, HTN, bilateral knee replacements complicated by DVT and pulmonary embolism (2013) maintained on Xarelto, hx of septic knee and bacteremia, CKD, LARRY here with severe sepsis, gram negative bacteremia, PNA, acute hypercarbic respiratory failure requiring non invasive ventilation with BiPAP, acute on chronic renal insufficiency. Incidentally found to have goiter vs thyroid mass/nodule.    Note: BMI of 62 consistent with morbid obesity    1. PULM  - cont nocturnal BiPAP 16/6 25% for underlying LARRY  - awake, alert, responsive, weaned off bipap during the day  - cont antibiotics for underlying questionable PNA. Will order CT chest confirmed pneumonia. Will complete 4 more days of IV Ceftriaxone.       2. CV  - BP stable, did not require pressors  - lopressor held as pt admitted for sepsis and is bacteremia with SBP 90-120s, if BP continues to uptrend can resume home antihypertensive    Will order TTE as routine, given BMI of 62 and morbid obesity. Continue Xarelto 20 mg/day.     3. ID  - sepsis due to gram negative organisms  - klebsiella bacteremia, leukocytosis improving  - cont on Ceftriaxone now, 2 grams per day.    - both sets of culture however with gram negative rods  -   4. GI  - noted pneumobilia on CT abdomen, RUQ non tender on exam  - pneumobilia present on prior CT scan as well  - unclear significance, not willing to do MRI. Observe for now. Not willing to do abdominal sonogram.      5. RENAL  - acute on chronic renal insufficiency in setting of sepsis likely component of ATN  - pt voiding, Cr stable, cont to monitor. Creatinine 1.93.     6. ENDO  - goiter vs thyroid mass  - pt without respiratory compromise  - follow up neck ultrasound  - appreciate endo input, all outpatient follow up     7. GEN  - stable for transfer to medical floor  - physical therapy  - d/w patient,  Discharge home Friday with home PT. 68F PMH morbid obesity, HTN, bilateral knee replacements complicated by DVT and pulmonary embolism (2013) maintained on Xarelto, hx of septic knee and bacteremia, CKD, LARRY here with severe sepsis, gram negative bacteremia, PNA, acute hypercarbic respiratory failure requiring non invasive ventilation with BiPAP, acute on chronic renal insufficiency. Incidentally found to have goiter vs thyroid mass/nodule.    Note: BMI of 62 consistent with morbid obesity    1. PULM  - cont nocturnal BiPAP 16/6 25% for underlying LARRY. Oxygen sats 98 % on RA.   - awake, alert, responsive, weaned off bipap during the day  - cont antibiotics for underlying questionable PNA. Will order CT chest confirmed pneumonia. Will complete 3 more days of IV Ceftriaxone 2 grams per day.        2. CV  - BP stable, did not require pressors  - lopressor held as pt admitted for sepsis and is bacteremia with SBP 90-120s, if BP continues to uptrend can resume home antihypertensive    Will order TTE as routine, given BMI of 62 and morbid obesity. Continue Xarelto 20 mg/day.     3. ID  - sepsis due to gram negative organisms  - klebsiella bacteremia, leukocytosis improving  - cont on Ceftriaxone now, 2 grams per day.    - both sets of culture however with gram negative rods  -   4. GI  - noted pneumobilia on CT abdomen, RUQ non tender on exam  - pneumobilia present on prior CT scan as well  - unclear significance, not willing to do MRI. Observe for now. Not willing to do abdominal sonogram.      5. RENAL  - acute on chronic renal insufficiency in setting of sepsis likely component of ATN  - pt voiding, Cr stable, cont to monitor. Creatinine 1.93.     6. ENDO  - goiter vs thyroid mass  - pt without respiratory compromise  - follow up neck ultrasound  - appreciate endo input, all outpatient follow up     7. GEN  - stable for transfer to medical floor  - physical therapy  - d/w patient,  Discharge home Friday with home PT.

## 2019-06-26 NOTE — PROGRESS NOTE ADULT - SUBJECTIVE AND OBJECTIVE BOX
Patient is a 68y old  Female who presents with a chief complaint of Shortness of breath (25 Jun 2019 16:19)      HPI:  67 yo F with PMH morbid obesity, HTN, Bilateral knee replacements complicated by DVT and pulmonary embolism (2013) maintained on Xarelto presents with two week history of URI cough and fever. Cough accompanied by fatigue, shortness of breath and intractable stomach pain. Today difficulty ambulating OOB to bathroom with severe dypsnea feeling like her PE in the past. Patient denies chest pain, palpitations, calf pain (22 Jun 2019 04:02)      INTERVAL HPI/OVERNIGHT EVENTS:  Patient's abdominal pain recurred last night for a while similar to her symptoms on admission but less severe. Feels fine this AM. Otherwise, the patient denies melena, hematochezia, hematemesis, nausea, vomiting, constipation, diarrhea, or change in bowel movements     MEDICATIONS  (STANDING):  ALBUTerol    0.083% 2.5 milliGRAM(s) Nebulizer every 6 hours  ALBUTerol    90 MICROgram(s) HFA Inhaler 1 Puff(s) Inhalation every 4 hours  cefTRIAXone   IVPB 2000 milliGRAM(s) IV Intermittent every 24 hours  chlorhexidine 4% Liquid 1 Application(s) Topical every 12 hours  rivaroxaban 20 milliGRAM(s) Oral daily    MEDICATIONS  (PRN):  acetaminophen   Tablet .. 650 milliGRAM(s) Oral every 6 hours PRN Temp greater or equal to 38C (100.4F), Moderate Pain (4 - 6)  oxyCODONE    5 mG/acetaminophen 325 mG 2 Tablet(s) Oral every 6 hours PRN Mild Pain (1 - 3)      FAMILY HISTORY:  FHx: hypertension  FH: type 2 diabetes mellitus      Allergies    sulfa drugs (Other; Rash)  Sulfur (Unknown)  trimethoprim (Other; Rash)    Intolerances        PMH/PSH:  Morbid obesity  History of pulmonary embolism  Arthropathy  Migraine  Obstructive sleep apnea syndrome  Essential hypertension  Obesity  Dysfunctional uterine bleeding  Joint infection  Pulmonary embolism  Osteoarthritis  History of total knee replacement  Other acquired absence of organ  Status post total hysterectomy  Status post cholecystectomy  Status post hysterectomy  Total knee replacement status        REVIEW OF SYSTEMS:  CONSTITUTIONAL: No fever, weight loss, or fatigue  EYES: No eye pain, visual disturbances, or discharge  NECK: No pain or stiffness  BREASTS: No pain, masses, or nipple discharge  RESPIRATORY: No cough, wheezing, chills or hemoptysis; No shortness of breath  CARDIOVASCULAR: No chest pain, palpitations, dizziness, or leg swelling  GASTROINTESTINAL: See above  GENITOURINARY: No dysuria, frequency, hematuria, or incontinence  NEUROLOGICAL: No headaches, memory loss, loss of strength, numbness, or tremors  SKIN: No itching, burning, rashes, or lesions   LYMPH NODES: No enlarged glands  ENDOCRINE: No heat or cold intolerance; No hair loss  MUSCULOSKELETAL: No joint pain or swelling; No muscle, back, or extremity pain  PSYCHIATRIC: No depression, anxiety, mood swings, or difficulty sleeping  HEME/LYMPH: No easy bruising, or bleeding gums  ALLERGY AND IMMUNOLOGIC: No hives or eczema    Vital Signs Last 24 Hrs  T(C): 36.5 (26 Jun 2019 05:12), Max: 36.7 (25 Jun 2019 13:38)  T(F): 97.7 (26 Jun 2019 05:12), Max: 98 (25 Jun 2019 13:38)  HR: 104 (26 Jun 2019 06:36) (97 - 110)  BP: 133/82 (26 Jun 2019 05:12) (125/69 - 141/69)  BP(mean): --  RR: 17 (26 Jun 2019 05:12) (16 - 19)  SpO2: 97% (26 Jun 2019 06:36) (95% - 100%)    PHYSICAL EXAM:  GENERAL: NAD, well-groomed, well-developed  HEAD:  Atraumatic, Normocephalic  EYES: EOMI, PERRLA, conjunctiva and sclera clear  NECK: Supple, No JVD, Normal thyroid  NERVOUS SYSTEM:  Alert & Oriented X3, Good concentration;  CHEST/LUNG: Clear to percussion bilaterally; No rales, rhonchi, wheezing, or rubs  HEART: Regular rate and rhythm; No murmurs, rubs, or gallops  ABDOMEN: Soft, Nontender, Nondistended; Bowel sounds present  EXTREMITIES:  2+ Peripheral Pulses, No clubbing, cyanosis, or edema  LYMPH: No lymphadenopathy noted  SKIN: No rashes or lesions    LAB  06-21 @ 20:01  amylase --   lipase 169         CBC:  06-24 @ 06:42  WBC 5.03   Hgb 9.6   Hct 32.7   Plts 231  MCV 94.8  06-23 @ 04:09  WBC 9.94   Hgb 10.1   Hct 34.4   Plts 230  MCV 95.3  06-22 @ 14:33  WBC 18.57   Hgb 10.1   Hct 33.9   Plts 263  MCV 95.5  06-21 @ 20:01  WBC 17.65   Hgb 11.3   Hct 38.3   Plts 339  MCV 93.4      Chemistry:  06-24 @ 06:42  Na+ 136  K+ 3.8  Cl- 101  CO2 29  BUN 19  Cr 1.93     06-23 @ 04:09  Na+ 134  K+ 4.5  Cl- 99  CO2 26  BUN 26  Cr 2.19     06-22 @ 14:34  Na+ 134  K+ 4.9  Cl- 100  CO2 27  BUN 27  Cr 2.24     06-21 @ 20:01  Na+ 138  K+ 3.9  Cl- 102  CO2 28  BUN 25  Cr 2.05         Glucose, Serum: 117 mg/dL (06-24 @ 06:42)  Glucose, Serum: 89 mg/dL (06-23 @ 04:09)  Glucose, Serum: 96 mg/dL (06-22 @ 14:34)  Glucose, Serum: 111 mg/dL (06-21 @ 20:01)      24 Jun 2019 06:42    136    |  101    |  19     ----------------------------<  117    3.8     |  29     |  1.93   23 Jun 2019 04:09    134    |  99     |  26     ----------------------------<  89     4.5     |  26     |  2.19   22 Jun 2019 14:34    134    |  100    |  27     ----------------------------<  96     4.9     |  27     |  2.24   21 Jun 2019 20:01    138    |  102    |  25     ----------------------------<  111    3.9     |  28     |  2.05     Ca    8.0        24 Jun 2019 06:42  Ca    8.0        23 Jun 2019 04:09  Ca    8.0        22 Jun 2019 14:34  Ca    8.3        21 Jun 2019 20:01  Phos  3.5       24 Jun 2019 06:42  Phos  2.8       23 Jun 2019 04:09  Phos  2.9       22 Jun 2019 14:34  Mg     2.7       24 Jun 2019 06:42  Mg     2.0       23 Jun 2019 04:09  Mg     2.3       22 Jun 2019 14:34    TPro  8.2    /  Alb  2.3    /  TBili  3.6    /  DBili  2.90   /  AST  44     /  ALT  57     /  AlkPhos  260    25 Jun 2019 06:37  TPro  8.1    /  Alb  2.4    /  TBili  3.5    /  DBili  x      /  AST  37     /  ALT  63     /  AlkPhos  229    24 Jun 2019 06:42  TPro  8.0    /  Alb  2.5    /  TBili  4.2    /  DBili  x      /  AST  60     /  ALT  87     /  AlkPhos  225    23 Jun 2019 04:09  TPro  7.9    /  Alb  2.5    /  TBili  4.6    /  DBili  x      /  AST  99     /  ALT  110    /  AlkPhos  243    22 Jun 2019 14:34  TPro  8.9    /  Alb  3.0    /  TBili  2.6    /  DBili  x      /  AST  242    /  ALT  146    /  AlkPhos  350    21 Jun 2019 20:01              CAPILLARY BLOOD GLUCOSE              RADIOLOGY & ADDITIONAL TESTS:    Imaging Personally Reviewed:  [ ] YES  [ ] NO    Consultant(s) Notes Reviewed:  [ ] YES  [ ] NO    Care Discussed with Consultants/Other Providers [ ] YES  [ ] NO

## 2019-06-27 DIAGNOSIS — R10.9 UNSPECIFIED ABDOMINAL PAIN: ICD-10-CM

## 2019-06-27 LAB
ALBUMIN SERPL ELPH-MCNC: 2.3 G/DL — LOW (ref 3.3–5)
ALP SERPL-CCNC: 292 U/L — HIGH (ref 40–120)
ALT FLD-CCNC: 58 U/L — SIGNIFICANT CHANGE UP (ref 12–78)
AST SERPL-CCNC: 62 U/L — HIGH (ref 15–37)
BILIRUB DIRECT SERPL-MCNC: 1.75 MG/DL — HIGH (ref 0.05–0.2)
BILIRUB INDIRECT FLD-MCNC: 0.6 MG/DL — SIGNIFICANT CHANGE UP (ref 0.2–1)
BILIRUB SERPL-MCNC: 2.4 MG/DL — HIGH (ref 0.2–1.2)
PROT SERPL-MCNC: 8.2 GM/DL — SIGNIFICANT CHANGE UP (ref 6–8.3)

## 2019-06-27 PROCEDURE — 99232 SBSQ HOSP IP/OBS MODERATE 35: CPT

## 2019-06-27 RX ORDER — METOPROLOL TARTRATE 50 MG
100 TABLET ORAL DAILY
Refills: 0 | Status: DISCONTINUED | OUTPATIENT
Start: 2019-06-27 | End: 2019-06-29

## 2019-06-27 RX ORDER — DILTIAZEM HCL 120 MG
60 CAPSULE, EXT RELEASE 24 HR ORAL THREE TIMES A DAY
Refills: 0 | Status: DISCONTINUED | OUTPATIENT
Start: 2019-06-27 | End: 2019-06-27

## 2019-06-27 RX ADMIN — Medication 100 MILLIGRAM(S): at 21:38

## 2019-06-27 RX ADMIN — OXYCODONE AND ACETAMINOPHEN 2 TABLET(S): 5; 325 TABLET ORAL at 22:30

## 2019-06-27 RX ADMIN — RIVAROXABAN 20 MILLIGRAM(S): KIT at 12:22

## 2019-06-27 RX ADMIN — CHLORHEXIDINE GLUCONATE 1 APPLICATION(S): 213 SOLUTION TOPICAL at 17:50

## 2019-06-27 RX ADMIN — CEFTRIAXONE 100 MILLIGRAM(S): 500 INJECTION, POWDER, FOR SOLUTION INTRAMUSCULAR; INTRAVENOUS at 15:06

## 2019-06-27 RX ADMIN — ALBUTEROL 2.5 MILLIGRAM(S): 90 AEROSOL, METERED ORAL at 17:16

## 2019-06-27 RX ADMIN — ALBUTEROL 2.5 MILLIGRAM(S): 90 AEROSOL, METERED ORAL at 05:09

## 2019-06-27 RX ADMIN — OXYCODONE AND ACETAMINOPHEN 2 TABLET(S): 5; 325 TABLET ORAL at 21:34

## 2019-06-27 RX ADMIN — CHLORHEXIDINE GLUCONATE 1 APPLICATION(S): 213 SOLUTION TOPICAL at 05:51

## 2019-06-27 RX ADMIN — OXYCODONE AND ACETAMINOPHEN 2 TABLET(S): 5; 325 TABLET ORAL at 12:22

## 2019-06-27 RX ADMIN — OXYCODONE AND ACETAMINOPHEN 2 TABLET(S): 5; 325 TABLET ORAL at 13:30

## 2019-06-27 RX ADMIN — ALBUTEROL 2.5 MILLIGRAM(S): 90 AEROSOL, METERED ORAL at 11:41

## 2019-06-27 NOTE — PROGRESS NOTE ADULT - ASSESSMENT
PATIENT MAXWELL OLIVO  00 509   1950 DOA 2019 DR HEMANT PINO     PT  SUMMARY    68F nonsmoker disabled severe arthritis not on Home O2 Has sleep apnea but does not use cpap PMH morbid obesity, HTN, bilateral knee replacements complicated by DVT and pulmonary embolism () maintained on Xarelto, hx of septic knee and bacteremia, CKD, LARRY here with severe sepsis, gram negative bacteremia, PNA, acute hypercarbic respiratory failure requiring non invasive ventilation with BiPAP, acute on chronic renal insufficiency. Incidentally found to have goiter vs thyroid mass/nodule.              Hospital course   2019 ABG showed no hypercapnia  CT ch showed r apical infiltr Is on rocephin ()  blood culture Klebsiella     PATIENT MAXWELL OLIVO  00 509   1950 DOA 2019 DR HEMANT PINO      PROBLEM/ASSESSMENT/RECOMMENDATIONS (A/R)     RESP GAS EXCHANGE HYPERCAPNIC RESP FAILURE   2019 ra 752/29/90   2019 ra 94%   2019 730/52/96  2019 Check abg am   LARRY  bpap  ()   Cont Rx   A/R Will need sleep study after discharge Call me for followup [phone] Does not qualify for bpap   HO VENOUS THROMBOEMBOLISM  On Rivaroxab 20 ()   2019 Check echo ro ph    ANEMIA norm  ---- Hb 11.3-10.1-10.1-9.6-10.1    ELEVATED LFTS   ----  [phone] - 285  -[id number]  -[id number]   Improving  OC  ---- Cr 2- 2.2-2.1-1.9-1.7    Possibly sec atn sepsis   INFECTION KLEBSIELLA BACTEREMIA PNEUMONIA   ---  W 17.6-18.5-9.9-5   P  Procalc 59.7   M  blood culture n   M  blood culture Klebsiella   M  Leg n   m  urine culture n   C 2019 CT ch patchy focal r apical infiltr Mild basal dependent changes   C 2019 cta ch Faint ggo upper lobes Thyroid goiter   M 2019 Flu AB n RSV n   On rocephin 2 () (Dr BEAULIEU) (Klebs bacteremia)   POSSIBLE HYPOTHYROID   TSH .431   GOITER  2019 Thyroid us L thyroid enlarged heterogenous 8.2x1.7x4.2 cm with large nodule 5.6x4.1x3.9 cm extending into sup mediastinum   2019 cta ch thyroid goiter extending into upper mediast   A/R Will need followup with endocrine and CT surgery after discharge     TIME SPENT Over 25 minutes aggregate care time spent on encounter; activities included   direct pt care, counseling and/or coordinating care reviewing notes, lab data/ imaging , discussion with multidisciplinary team/ pt /family. Risks, benefits, alternatives  discussed in detail.

## 2019-06-27 NOTE — PROGRESS NOTE ADULT - SUBJECTIVE AND OBJECTIVE BOX
INTERVAL HPI/OVERNIGHT EVENTS:  Pt seen and examined at bedside.     Allergies/Intolerance: sulfa drugs (Other; Rash)  Sulfur (Unknown)  trimethoprim (Other; Rash)      MEDICATIONS  (STANDING):  ALBUTerol    0.083% 2.5 milliGRAM(s) Nebulizer every 6 hours  ALBUTerol    90 MICROgram(s) HFA Inhaler 1 Puff(s) Inhalation every 4 hours  cefTRIAXone   IVPB 2000 milliGRAM(s) IV Intermittent every 24 hours  chlorhexidine 4% Liquid 1 Application(s) Topical every 12 hours  rivaroxaban 20 milliGRAM(s) Oral daily    MEDICATIONS  (PRN):  acetaminophen   Tablet .. 650 milliGRAM(s) Oral every 6 hours PRN Temp greater or equal to 38C (100.4F), Moderate Pain (4 - 6)  oxyCODONE    5 mG/acetaminophen 325 mG 2 Tablet(s) Oral every 6 hours PRN Mild Pain (1 - 3)        ROS: all systems reviewed and wnl      PHYSICAL EXAMINATION:  Vital Signs Last 24 Hrs  T(C): 36.1 (27 Jun 2019 04:57), Max: 36.6 (26 Jun 2019 17:35)  T(F): 97 (27 Jun 2019 04:57), Max: 97.9 (26 Jun 2019 17:35)  HR: 96 (27 Jun 2019 11:48) (96 - 115)  BP: 134/74 (27 Jun 2019 04:57) (134/74 - 157/89)  BP(mean): --  RR: 18 (27 Jun 2019 04:57) (18 - 19)  SpO2: 97% (27 Jun 2019 11:48) (95% - 100%)  CAPILLARY BLOOD GLUCOSE          06-26 @ 07:01  -  06-27 @ 07:00  --------------------------------------------------------  IN: 0 mL / OUT: 1 mL / NET: -1 mL        GENERAL:   NECK: supple, No JVD  CHEST/LUNG: clear to auscultation bilaterally; no rales, rhonchi, or wheezing b/l  HEART: normal S1, S2  ABDOMEN: BS+, soft, ND, NT   EXTREMITIES:  pulses palpable; no clubbing, cyanosis, or edema b/l LEs  SKIN: no rashes or lesions      LABS:                        10.1   5.63  )-----------( 264      ( 26 Jun 2019 10:08 )             33.8     06-26    138  |  104  |  16  ----------------------------<  130<H>  4.3   |  28  |  1.76<H>    Ca    8.3<L>      26 Jun 2019 10:08    TPro  8.2  /  Alb  2.3<L>  /  TBili  2.4<H>  /  DBili  1.75<H>  /  AST  62<H>  /  ALT  58  /  AlkPhos  292<H>  06-27 INTERVAL HPI/OVERNIGHT EVENTS:  Pt seen and examined at bedside.     Allergies/Intolerance: sulfa drugs (Other; Rash)  Sulfur (Unknown)  trimethoprim (Other; Rash)      MEDICATIONS  (STANDING):  ALBUTerol    0.083% 2.5 milliGRAM(s) Nebulizer every 6 hours  ALBUTerol    90 MICROgram(s) HFA Inhaler 1 Puff(s) Inhalation every 4 hours  cefTRIAXone   IVPB 2000 milliGRAM(s) IV Intermittent every 24 hours  chlorhexidine 4% Liquid 1 Application(s) Topical every 12 hours  rivaroxaban 20 milliGRAM(s) Oral daily    MEDICATIONS  (PRN):  acetaminophen   Tablet .. 650 milliGRAM(s) Oral every 6 hours PRN Temp greater or equal to 38C (100.4F), Moderate Pain (4 - 6)  oxyCODONE    5 mG/acetaminophen 325 mG 2 Tablet(s) Oral every 6 hours PRN Mild Pain (1 - 3)        ROS: all systems reviewed and wnl      PHYSICAL EXAMINATION:  Vital Signs Last 24 Hrs  T(C): 36.1 (27 Jun 2019 04:57), Max: 36.6 (26 Jun 2019 17:35)  T(F): 97 (27 Jun 2019 04:57), Max: 97.9 (26 Jun 2019 17:35)  HR: 96 (27 Jun 2019 11:48) (96 - 115)  BP: 134/74 (27 Jun 2019 04:57) (134/74 - 157/89)  BP(mean): --  RR: 18 (27 Jun 2019 04:57) (18 - 19)  SpO2: 97% (27 Jun 2019 11:48) (95% - 100%)  CAPILLARY BLOOD GLUCOSE          06-26 @ 07:01  -  06-27 @ 07:00  --------------------------------------------------------  IN: 0 mL / OUT: 1 mL / NET: -1 mL        GENERAL: stable, no abdominal pain, appetite good, no SOB  NECK: supple, No JVD  CHEST/LUNG: clear to auscultation bilaterally; no rales, rhonchi, or wheezing b/l  HEART: normal S1, S2  ABDOMEN: BS+, soft, ND, NT   EXTREMITIES:  pulses palpable; no clubbing, cyanosis, or edema b/l LEs  SKIN: no rashes or lesions      LABS:                        10.1   5.63  )-----------( 264      ( 26 Jun 2019 10:08 )             33.8     06-26    138  |  104  |  16  ----------------------------<  130<H>  4.3   |  28  |  1.76<H>    Ca    8.3<L>      26 Jun 2019 10:08    TPro  8.2  /  Alb  2.3<L>  /  TBili  2.4<H>  /  DBili  1.75<H>  /  AST  62<H>  /  ALT  58  /  AlkPhos  292<H>  06-27

## 2019-06-27 NOTE — PROGRESS NOTE ADULT - SUBJECTIVE AND OBJECTIVE BOX
Patient was examined Chart reviewed Detailed note will be inserted below after going over latest data Meanwhile please refer to my previous notes for Pulmonary/Critical Care assessment recommendations MAXWELL OLIVO  00 509   1950 DOA 2019 DR HEMANT CONTRERAS   ALLERGY     Sulfa Trimethoprim    CONTACT [place] Sachin/Edgar  selv  Relative Brian REYNOLDS             Initial evaluation/Pulmonary Critical Care consultation requested on  2019  by Dr Contreras   from Dr Matute   Patient examined chart reviewed    HOSPITAL ADMISSION   PATIENT CAME  FROM (if information available)        TYPE OF VISIT      Subsequent pulmonary followup      REASON FOR VISIT  For list of problems evaluated/addressed, please see problem list in the note below     PATIENT MAXWELL OLIVO  00 509   1950 DOA 2019 DR HEMANT CONTRERAS      VITALS LABS   2019 afeb 104 160/80 99%  2019 afeb 109 130/82 97%  2019 W 5.6 Hb 10.1 Plt 264 Na 138 K 4.3 CO2 28 Cr 1.7    PATIENT MAXWELL OLIVO  00 509   1950 DOA 2019 DR HEMANT CONTRERAS       MEDICATIONS   VTE   Rivaroxab 20 ()   ABIO  rocephin 2 () (Dr BEAULIEU) (Klebs bacteremia)   Zosyn ()   PAIN  percocet ()   COPD   albuterl ()       GLOBAL ISSUE/BEST PRACTICE:      PROBLEM: HOB elevation:   y            PROBLEM: Stress ulcer proph:    na                      PROBLEM: VTE prophylaxis:    rivaroxa (   PROBLEM: Glycemic control:    na  PROBLEM: Nutrition:   Reg diet ()   PROBLEM: Advanced directive: na     PROBLEM: Allergies:  sulfa trimethoprim     EVENT 2019 Transferred out of icu     REVIEW OF SYMPTOMS     NOTE [place] if any  in ROS and PE are updated in daily HOSPITAL COURSE below      Able to give ROS  Yes     RELIABLE No   CONSTITUTIONAL Weakness Yes  Chills No Vision changes No  ENDOCRINE No unexplained hair loss No heat or cold intolerance    ALLERGY No hives  Sore throat No   RESP Coughing blood no  Shortness of breath YES   NEURO No Headache  Confusion Pain neck No   CARDIAC No Chest pain No Palpitations   GI No Pain abdomen NO   Vomiting NO     PHYSICAL EXAM    HEENT Unremarkable PERRLA atraumatic   RESP Fair air entry EXP prolonged    Harsh breath sound Resp distres mild   CARDIAC S1 S2 No S3     NO JVD    ABDOMEN SOFT BS PRESENT NOT DISTENDED No hepatosplenomegaly PEDAL EDEMA present No calf tenderness  NO rash   GENERAL Not TOXIC

## 2019-06-27 NOTE — PROGRESS NOTE ADULT - ASSESSMENT
HPI:  69 yo F with PMH morbid obesity, HTN, Bilateral knee replacements complicated by DVT and pulmonary embolism (2013) maintained on Xarelto presents with two week history of URI cough and fever. Cough accompanied by fatigue, shortness of breath and intractable stomach pain. Today difficulty ambulating OOB to bathroom with severe dypsnea feeling like her PE in the past. Patient denies chest pain, palpitations, calf pain (22 Jun 2019 04:02)  ----------------------- As Above ----------------------------------------  Consult called for increased LFTs. Patient has history of CBD stones s/p ERCP / cholecystectomy. In addition to above symptoms, patient had N/V and abdominal pain ( RUQ ). Started Friday, resolved Sunday. Increased LFTs which show improvement. Denies Viral   Normal colonoscopy 3 years ago    Increased LFTs, history of CBD stones - Improving  r/o CBD stones, meds ( antibiotics , etc ) 1) MRCP  - patient refusing, only wants open MRI 2) check sono 3) F/U labs. 4) ERCP if LFTs start to increase and patient refuses MRCP

## 2019-06-27 NOTE — PROGRESS NOTE ADULT - SUBJECTIVE AND OBJECTIVE BOX
Patient is a 68y old  Female who presents with a chief complaint of Shortness of breath (27 Jun 2019 13:38)      INTERVAL HPI / OVERNIGHT EVENTS: doing ok     MEDICATIONS  (STANDING):  ALBUTerol    0.083% 2.5 milliGRAM(s) Nebulizer every 6 hours  ALBUTerol    90 MICROgram(s) HFA Inhaler 1 Puff(s) Inhalation every 4 hours  cefTRIAXone   IVPB 2000 milliGRAM(s) IV Intermittent every 24 hours  chlorhexidine 4% Liquid 1 Application(s) Topical every 12 hours  diltiazem    Tablet 60 milliGRAM(s) Oral three times a day  rivaroxaban 20 milliGRAM(s) Oral daily    MEDICATIONS  (PRN):  acetaminophen   Tablet .. 650 milliGRAM(s) Oral every 6 hours PRN Temp greater or equal to 38C (100.4F), Moderate Pain (4 - 6)  oxyCODONE    5 mG/acetaminophen 325 mG 2 Tablet(s) Oral every 6 hours PRN Mild Pain (1 - 3)      Vital Signs Last 24 Hrs  T(C): 36.7 (27 Jun 2019 13:11), Max: 36.7 (27 Jun 2019 13:11)  T(F): 98 (27 Jun 2019 13:11), Max: 98 (27 Jun 2019 13:11)  HR: 104 (27 Jun 2019 13:11) (96 - 111)  BP: 167/83 (27 Jun 2019 13:11) (134/74 - 167/83)  BP(mean): --  RR: 18 (27 Jun 2019 13:11) (18 - 19)  SpO2: 99% (27 Jun 2019 13:11) (95% - 100%)    Review of systems:  General : no fever /chills,fatigue  CVS : no chest pain, palpitations  Lungs : no shortness of breath, cough  GI : no abdominal pain,vomiting, diarrhea   : no dysuria,hematuria        PHYSICAL EXAM:  General :NAD  Constitutional: obese  Respiratory: CTAB/L  Cardiovascular: S1 and S2, RRR, no M/G/R  Gastrointestinal: BS+, soft, NT/ND  Extremities: No peripheral edema  Vascular: 2+ peripheral pulses  Skin: No rashes      LABS:                        10.1   5.63  )-----------( 264      ( 26 Jun 2019 10:08 )             33.8     06-26    138  |  104  |  16  ----------------------------<  130<H>  4.3   |  28  |  1.76<H>    Ca    8.3<L>      26 Jun 2019 10:08    TPro  8.2  /  Alb  2.3<L>  /  TBili  2.4<H>  /  DBili  1.75<H>  /  AST  62<H>  /  ALT  58  /  AlkPhos  292<H>  06-27          MICROBIOLOGY:  RECENT CULTURES:  06-24 .Blood XXXX XXXX   No growth to date.    06-22 .Blood Blood Culture PCR  Klebsiella pneumoniae  Blood Culture PCR   Growth in aerobic and anaerobic bottles: Gram Negative Rods   Growth in aerobic and anaerobic bottles: Klebsiella pneumoniae  See previous culture [id number]    06-22 .Urine XXXX XXXX   No growth          RADIOLOGY & ADDITIONAL STUDIES:

## 2019-06-27 NOTE — PROGRESS NOTE ADULT - ASSESSMENT
68F PMH morbid obesity, HTN, bilateral knee replacements complicated by DVT and pulmonary embolism (2013) maintained on Xarelto, hx of septic knee and bacteremia, CKD, LARRY here with severe sepsis, gram negative bacteremia, PNA, acute hypercarbic respiratory failure requiring non invasive ventilation with BiPAP, acute on chronic renal insufficiency. Incidentally found to have goiter vs thyroid mass/nodule.    Note: BMI of 62 consistent with morbid obesity    1. PULM  - stop BiPAP, RA sat 100 %. awake, alert, responsive. CT chest confirmed pneumonia. Will complete 3 more days of IV Ceftriaxone 2 grams per day.        Finish ABX friday.     2. CV  - BP stable, did not require pressors  - started Cardizem 60 mg tid for HR, BP control.  TTE shows normal LV,RV function. Continue Xarelto 20 mg/day.     3. ID  - sepsis due to gram negative organisms  - klebsiella bacteremia, leukocytosis improving  - cont on Ceftriaxone now, 2 grams per day.    - both sets of culture however with gram negative rods  -   4. GI  - noted pneumobilia on CT abdomen, RUQ non tender on exam  - pneumobilia present on prior CT scan as well  - unclear significance, not willing to do MRI. Observe for now. Not willing to do abdominal sonogram.      Will need open MRCP next week.     5. RENAL  - acute on chronic renal insufficiency in setting of sepsis likely component of ATN  - pt voiding, Cr stable, cont to monitor. Creatinine 1.93.     6. ENDO  - goiter vs thyroid mass  - pt without respiratory compromise  - follow up neck ultrasound  - appreciate endo input, all outpatient follow up     7. GEN  - stable for transfer to medical floor  - physical therapy  - d/w patient,  Discharge home Friday with home PT. 68F PMH morbid obesity, HTN, bilateral knee replacements complicated by DVT and pulmonary embolism (2013) maintained on Xarelto, hx of septic knee and bacteremia, CKD, LARRY here with severe sepsis, gram negative bacteremia, PNA, acute hypercarbic respiratory failure requiring non invasive ventilation with BiPAP, acute on chronic renal insufficiency. Incidentally found to have goiter vs thyroid mass/nodule.    Note: BMI of 62 consistent with morbid obesity    1. PULM  - stop BiPAP, RA sat 100 %. awake, alert, responsive. CT chest confirmed pneumonia. Will complete 3 more days of IV Ceftriaxone 2 grams per day.      Finish ABX friday.     2. CV  - BP stable, did not require pressors. Restarted Toprol 100 mg/day, home meds.  TTE shows normal LV,RV function. Continue Xarelto 20 mg/day.     3. ID  - sepsis due to gram negative organisms  - klebsiella bacteremia, leukocytosis improving  - cont on Ceftriaxone now, 2 grams per day.    - both sets of culture however with gram negative rods  -   4. GI  - noted pneumobilia on CT abdomen, RUQ non tender on exam  - pneumobilia present on prior CT scan as well  - unclear significance, not willing to do MRI. Observe for now. Not willing to do abdominal sonogram.      Will need open MRCP next week.     5. RENAL  - acute on chronic renal insufficiency in setting of sepsis likely component of ATN  - pt voiding, Cr stable, cont to monitor. Creatinine 1.93.     6. ENDO  - goiter vs thyroid mass  - pt without respiratory compromise  - follow up neck ultrasound  - appreciate endo input, all outpatient follow up     7. GEN  - stable for transfer to medical floor  - physical therapy  - d/w patient,  Discharge home Friday with home PT.

## 2019-06-27 NOTE — PROGRESS NOTE ADULT - SUBJECTIVE AND OBJECTIVE BOX
Patient is a 68y old  Female who presents with a chief complaint of Shortness of breath (27 Jun 2019 09:21)      HPI:  69 yo F with PMH morbid obesity, HTN, Bilateral knee replacements complicated by DVT and pulmonary embolism (2013) maintained on Xarelto presents with two week history of URI cough and fever. Cough accompanied by fatigue, shortness of breath and intractable stomach pain. Today difficulty ambulating OOB to bathroom with severe dypsnea feeling like her PE in the past. Patient denies chest pain, palpitations, calf pain (22 Jun 2019 04:02)      INTERVAL HPI/OVERNIGHT EVENTS:  The patient denies melena, hematochezia, hematemesis, nausea, vomiting, abdominal pain, constipation, diarrhea, or change in bowel movements Patient occasionally feels full. tolerating regular diet    MEDICATIONS  (STANDING):  ALBUTerol    0.083% 2.5 milliGRAM(s) Nebulizer every 6 hours  ALBUTerol    90 MICROgram(s) HFA Inhaler 1 Puff(s) Inhalation every 4 hours  cefTRIAXone   IVPB 2000 milliGRAM(s) IV Intermittent every 24 hours  chlorhexidine 4% Liquid 1 Application(s) Topical every 12 hours  rivaroxaban 20 milliGRAM(s) Oral daily    MEDICATIONS  (PRN):  acetaminophen   Tablet .. 650 milliGRAM(s) Oral every 6 hours PRN Temp greater or equal to 38C (100.4F), Moderate Pain (4 - 6)  oxyCODONE    5 mG/acetaminophen 325 mG 2 Tablet(s) Oral every 6 hours PRN Mild Pain (1 - 3)      FAMILY HISTORY:  FHx: hypertension  FH: type 2 diabetes mellitus      Allergies    sulfa drugs (Other; Rash)  Sulfur (Unknown)  trimethoprim (Other; Rash)    Intolerances        PMH/PSH:  Morbid obesity  History of pulmonary embolism  Arthropathy  Migraine  Obstructive sleep apnea syndrome  Essential hypertension  Obesity  Dysfunctional uterine bleeding  Joint infection  Pulmonary embolism  Osteoarthritis  History of total knee replacement  Other acquired absence of organ  Status post total hysterectomy  Status post cholecystectomy  Status post hysterectomy  Total knee replacement status        REVIEW OF SYSTEMS:  CONSTITUTIONAL: No fever, weight loss, or fatigue  EYES: No eye pain, visual disturbances, or discharge  ENMT:  No difficulty hearing, tinnitus, vertigo; No sinus or throat pain  NECK: No pain or stiffness  BREASTS: No pain, masses, or nipple discharge  RESPIRATORY: No cough, wheezing, chills or hemoptysis; No shortness of breath  CARDIOVASCULAR: No chest pain, palpitations, dizziness, or leg swelling  GASTROINTESTINAL:  See above  GENITOURINARY: No dysuria, frequency, hematuria, or incontinence  NEUROLOGICAL: No headaches, memory loss, loss of strength, numbness, or tremors  SKIN: No itching, burning, rashes, or lesions   LYMPH NODES: No enlarged glands  ENDOCRINE: No heat or cold intolerance; No hair loss  MUSCULOSKELETAL: No joint pain or swelling; No muscle, back, or extremity pain  PSYCHIATRIC: No depression, anxiety, mood swings, or difficulty sleeping  HEME/LYMPH: No easy bruising, or bleeding gums  ALLERGY AND IMMUNOLOGIC: No hives or eczema    Vital Signs Last 24 Hrs  T(C): 36.1 (27 Jun 2019 04:57), Max: 36.6 (26 Jun 2019 17:35)  T(F): 97 (27 Jun 2019 04:57), Max: 97.9 (26 Jun 2019 17:35)  HR: 96 (27 Jun 2019 11:48) (96 - 115)  BP: 134/74 (27 Jun 2019 04:57) (134/74 - 157/89)  BP(mean): --  RR: 18 (27 Jun 2019 04:57) (18 - 19)  SpO2: 97% (27 Jun 2019 11:48) (95% - 100%)    PHYSICAL EXAM:  GENERAL: NAD, well-groomed, well-developed  HEAD:  Atraumatic, Normocephalic  EYES: EOMI, PERRLA, conjunctiva and sclera clear  NECK: Supple, No JVD, Normal thyroid  NERVOUS SYSTEM:  Alert & Oriented X3, Good concentration; Motor Strength 5/5 B/L upper and lower extremities;   CHEST/LUNG: Clear to percussion bilaterally; No rales, rhonchi, wheezing, or rubs  HEART: Regular rate and rhythm; No murmurs, rubs, or gallops  ABDOMEN: Soft, Nontender, Nondistended; Bowel sounds present  EXTREMITIES:  2+ Peripheral Pulses, No clubbing, cyanosis, or edema  LYMPH: No lymphadenopathy noted  SKIN: No rashes or lesions    LAB                          10.1   5.63  )-----------( 264      ( 26 Jun 2019 10:08 )             33.8       CBC:  06-26 @ 10:08  WBC 5.63   Hgb 10.1   Hct 33.8   Plts 264  MCV 92.9  06-24 @ 06:42  WBC 5.03   Hgb 9.6   Hct 32.7   Plts 231  MCV 94.8  06-23 @ 04:09  WBC 9.94   Hgb 10.1   Hct 34.4   Plts 230  MCV 95.3  06-22 @ 14:33  WBC 18.57   Hgb 10.1   Hct 33.9   Plts 263  MCV 95.5  06-21 @ 20:01  WBC 17.65   Hgb 11.3   Hct 38.3   Plts 339  MCV 93.4      Chemistry:  06-26 @ 10:08  Na+ 138  K+ 4.3  Cl- 104  CO2 28  BUN 16  Cr 1.76     06-24 @ 06:42  Na+ 136  K+ 3.8  Cl- 101  CO2 29  BUN 19  Cr 1.93     06-23 @ 04:09  Na+ 134  K+ 4.5  Cl- 99  CO2 26  BUN 26  Cr 2.19     06-22 @ 14:34  Na+ 134  K+ 4.9  Cl- 100  CO2 27  BUN 27  Cr 2.24     06-21 @ 20:01  Na+ 138  K+ 3.9  Cl- 102  CO2 28  BUN 25  Cr 2.05         Glucose, Serum: 130 mg/dL (06-26 @ 10:08)  Glucose, Serum: 117 mg/dL (06-24 @ 06:42)  Glucose, Serum: 89 mg/dL (06-23 @ 04:09)  Glucose, Serum: 96 mg/dL (06-22 @ 14:34)  Glucose, Serum: 111 mg/dL (06-21 @ 20:01)      26 Jun 2019 10:08    138    |  104    |  16     ----------------------------<  130    4.3     |  28     |  1.76   24 Jun 2019 06:42    136    |  101    |  19     ----------------------------<  117    3.8     |  29     |  1.93   23 Jun 2019 04:09    134    |  99     |  26     ----------------------------<  89     4.5     |  26     |  2.19   22 Jun 2019 14:34    134    |  100    |  27     ----------------------------<  96     4.9     |  27     |  2.24   21 Jun 2019 20:01    138    |  102    |  25     ----------------------------<  111    3.9     |  28     |  2.05     Ca    8.3        26 Jun 2019 10:08  Ca    8.0        24 Jun 2019 06:42  Ca    8.0        23 Jun 2019 04:09  Ca    8.0        22 Jun 2019 14:34  Ca    8.3        21 Jun 2019 20:01  Phos  3.5       24 Jun 2019 06:42  Phos  2.8       23 Jun 2019 04:09  Phos  2.9       22 Jun 2019 14:34  Mg     2.7       24 Jun 2019 06:42  Mg     2.0       23 Jun 2019 04:09  Mg     2.3       22 Jun 2019 14:34    TPro  8.2    /  Alb  2.3    /  TBili  2.4    /  DBili  1.75   /  AST  62     /  ALT  58     /  AlkPhos  292    27 Jun 2019 08:29  TPro  8.2    /  Alb  2.3    /  TBili  3.1    /  DBili  2.44   /  AST  53     /  ALT  54     /  AlkPhos  285    26 Jun 2019 10:08  TPro  8.2    /  Alb  2.3    /  TBili  3.6    /  DBili  2.90   /  AST  44     /  ALT  57     /  AlkPhos  260    25 Jun 2019 06:37  TPro  8.1    /  Alb  2.4    /  TBili  3.5    /  DBili  x      /  AST  37     /  ALT  63     /  AlkPhos  229    24 Jun 2019 06:42  TPro  8.0    /  Alb  2.5    /  TBili  4.2    /  DBili  x      /  AST  60     /  ALT  87     /  AlkPhos  225    23 Jun 2019 04:09  TPro  7.9    /  Alb  2.5    /  TBili  4.6    /  DBili  x      /  AST  99     /  ALT  110    /  AlkPhos  243    22 Jun 2019 14:34  TPro  8.9    /  Alb  3.0    /  TBili  2.6    /  DBili  x      /  AST  242    /  ALT  146    /  AlkPhos  350    21 Jun 2019 20:01              CAPILLARY BLOOD GLUCOSE              RADIOLOGY & ADDITIONAL TESTS:    Imaging Personally Reviewed:  [ ] YES  [ ] NO    Consultant(s) Notes Reviewed:  [ ] YES  [ ] NO    Care Discussed with Consultants/Other Providers [ ] YES  [ ] NO

## 2019-06-27 NOTE — PROGRESS NOTE ADULT - SUBJECTIVE AND OBJECTIVE BOX
Patient is a 68y old  Female who presents with a chief complaint of Shortness of breath (26 Jun 2019 19:08)      Interval History: no change in clinical status     MEDICATIONS  (STANDING):  ALBUTerol    0.083% 2.5 milliGRAM(s) Nebulizer every 6 hours  ALBUTerol    90 MICROgram(s) HFA Inhaler 1 Puff(s) Inhalation every 4 hours  cefTRIAXone   IVPB 2000 milliGRAM(s) IV Intermittent every 24 hours  chlorhexidine 4% Liquid 1 Application(s) Topical every 12 hours  rivaroxaban 20 milliGRAM(s) Oral daily    MEDICATIONS  (PRN):  acetaminophen   Tablet .. 650 milliGRAM(s) Oral every 6 hours PRN Temp greater or equal to 38C (100.4F), Moderate Pain (4 - 6)  oxyCODONE    5 mG/acetaminophen 325 mG 2 Tablet(s) Oral every 6 hours PRN Mild Pain (1 - 3)      Allergies    sulfa drugs (Other; Rash)  Sulfur (Unknown)  trimethoprim (Other; Rash)    Intolerances        REVIEW OF SYSTEMS: stable   CONSTITUTIONAL: no changes  EYES: No eye pain, visual disturbances, or discharge    Vital Signs Last 24 Hrs  T(C): 36.1 (27 Jun 2019 04:57), Max: 36.7 (26 Jun 2019 11:06)  T(F): 97 (27 Jun 2019 04:57), Max: 98 (26 Jun 2019 11:06)  HR: 103 (27 Jun 2019 05:08) (77 - 115)  BP: 134/74 (27 Jun 2019 04:57) (120/77 - 157/89)  BP(mean): --  RR: 18 (27 Jun 2019 04:57) (16 - 19)  SpO2: 97% (27 Jun 2019 05:08) (95% - 100%)    PHYSICAL EXAM:  GENERAL: deferred   goiter   LABS:        CAPILLARY BLOOD GLUCOSE        Lipid panel:           Thyroid:  Diabetes Tests:  Parathyroid Panel:  Adrenals:  RADIOLOGY & ADDITIONAL TESTS:    Imaging Personally Reviewed:  [ ] YES  [ ] NO    Consultant(s) Notes Reviewed:  [ ] YES  [ ] NO    Care Discussed with Consultants/Other Providers [ ] YES  [ ] NO

## 2019-06-28 ENCOUNTER — TRANSCRIPTION ENCOUNTER (OUTPATIENT)
Age: 69
End: 2019-06-28

## 2019-06-28 LAB
ALBUMIN SERPL ELPH-MCNC: 2.4 G/DL — LOW (ref 3.3–5)
ALP SERPL-CCNC: 332 U/L — HIGH (ref 40–120)
ALT FLD-CCNC: 85 U/L — HIGH (ref 12–78)
AST SERPL-CCNC: 99 U/L — HIGH (ref 15–37)
BILIRUB DIRECT SERPL-MCNC: 0.95 MG/DL — HIGH (ref 0.05–0.2)
BILIRUB INDIRECT FLD-MCNC: 0.6 MG/DL — SIGNIFICANT CHANGE UP (ref 0.2–1)
BILIRUB SERPL-MCNC: 1.6 MG/DL — HIGH (ref 0.2–1.2)
PROT SERPL-MCNC: 8.6 GM/DL — HIGH (ref 6–8.3)

## 2019-06-28 PROCEDURE — 99232 SBSQ HOSP IP/OBS MODERATE 35: CPT

## 2019-06-28 PROCEDURE — 99239 HOSP IP/OBS DSCHRG MGMT >30: CPT

## 2019-06-28 RX ORDER — HYDROCHLOROTHIAZIDE 25 MG
25 TABLET ORAL DAILY
Refills: 0 | Status: DISCONTINUED | OUTPATIENT
Start: 2019-06-28 | End: 2019-06-29

## 2019-06-28 RX ORDER — LOSARTAN POTASSIUM 100 MG/1
100 TABLET, FILM COATED ORAL DAILY
Refills: 0 | Status: DISCONTINUED | OUTPATIENT
Start: 2019-06-28 | End: 2019-06-29

## 2019-06-28 RX ADMIN — OXYCODONE AND ACETAMINOPHEN 2 TABLET(S): 5; 325 TABLET ORAL at 10:15

## 2019-06-28 RX ADMIN — ALBUTEROL 2.5 MILLIGRAM(S): 90 AEROSOL, METERED ORAL at 11:55

## 2019-06-28 RX ADMIN — OXYCODONE AND ACETAMINOPHEN 2 TABLET(S): 5; 325 TABLET ORAL at 09:15

## 2019-06-28 RX ADMIN — OXYCODONE AND ACETAMINOPHEN 2 TABLET(S): 5; 325 TABLET ORAL at 19:37

## 2019-06-28 RX ADMIN — OXYCODONE AND ACETAMINOPHEN 2 TABLET(S): 5; 325 TABLET ORAL at 20:39

## 2019-06-28 RX ADMIN — LOSARTAN POTASSIUM 100 MILLIGRAM(S): 100 TABLET, FILM COATED ORAL at 19:35

## 2019-06-28 RX ADMIN — ALBUTEROL 2.5 MILLIGRAM(S): 90 AEROSOL, METERED ORAL at 17:38

## 2019-06-28 RX ADMIN — CHLORHEXIDINE GLUCONATE 1 APPLICATION(S): 213 SOLUTION TOPICAL at 18:51

## 2019-06-28 RX ADMIN — ALBUTEROL 2.5 MILLIGRAM(S): 90 AEROSOL, METERED ORAL at 00:02

## 2019-06-28 RX ADMIN — RIVAROXABAN 20 MILLIGRAM(S): KIT at 13:16

## 2019-06-28 RX ADMIN — CEFTRIAXONE 100 MILLIGRAM(S): 500 INJECTION, POWDER, FOR SOLUTION INTRAMUSCULAR; INTRAVENOUS at 15:08

## 2019-06-28 RX ADMIN — CHLORHEXIDINE GLUCONATE 1 APPLICATION(S): 213 SOLUTION TOPICAL at 05:16

## 2019-06-28 RX ADMIN — ALBUTEROL 2.5 MILLIGRAM(S): 90 AEROSOL, METERED ORAL at 06:00

## 2019-06-28 RX ADMIN — Medication 200 MILLIGRAM(S): at 21:48

## 2019-06-28 NOTE — PROGRESS NOTE ADULT - ASSESSMENT
PATIENT MAXWELL OLIVO  00 509   1950 DOA 2019 DR HEMANT PINO     PT  SUMMARY    68F nonsmoker disabled severe arthritis not on Home O2 Has sleep apnea but does not use cpap PMH morbid obesity, HTN, bilateral knee replacements complicated by DVT and pulmonary embolism () maintained on Xarelto, hx of septic knee and bacteremia, CKD, LARRY here with severe sepsis, gram negative bacteremia, PNA, acute hypercarbic respiratory failure requiring non invasive ventilation with BiPAP, acute on chronic renal insufficiency. Incidentally found to have goiter vs thyroid mass/nodule.              Hospital course   2019 ABG showed no hypercapnia  CT ch showed r apical infiltr Is on rocephin ()  blood culture Klebsiella     PATIENT MAXWELL OLIVO  00 509   1950 DOA 2019 DR HEMANT PINO      PROBLEM/ASSESSMENT/RECOMMENDATIONS (A/R)     RESP GAS EXCHANGE HYPERCAPNIC RESP FAILURE   2019 ra 752/29/90   2019 ra 94%   2019 730/52/96  LARRY  bpap  ()   Cont Rx   A/R Will need sleep study after discharge Call me for followup [phone] Does not qualify for bpap   HO VENOUS THROMBOEMBOLISM  On Rivaroxab 20 ()   2019 Check echo ro ph    ANEMIA norm  ---- Hb 11.3-10.1-10.1-9.6-10.1    ELEVATED LFTS   ----  [phone] - [id number]  [id number]  [id number]   Improving  OC  ---- Cr 2- 2.2-2.1-1.9-1.7    Possibly sec atn sepsis   INFECTION KLEBSIELLA BACTEREMIA PNEUMONIA   ---  W 17.6-18.5-9.9-5   P  Procalc 59.7   M  blood culture n   M  blood culture Klebsiella   M  Leg n   m  urine culture n   C 2019 CT ch patchy focal r apical infiltr Mild basal dependent changes   C 2019 cta ch Faint ggo upper lobes Thyroid goiter   M 2019 Flu AB n RSV n   On rocephin 2 () (Dr BEAULIEU) (Klebs bacteremia)   POSSIBLE HYPOTHYROID   TSH .431   GOITER  2019 Thyroid us L thyroid enlarged heterogenous 8.2x1.7x4.2 cm with large nodule 5.6x4.1x3.9 cm extending into sup mediastinum   2019 cta ch thyroid goiter extending into upper mediast   A/R Will need followup with endocrine and CT surgery after discharge     TIME SPENT Over 25 minutes aggregate care time spent on encounter; activities included   direct pt care, counseling and/or coordinating care reviewing notes, lab data/ imaging , discussion with multidisciplinary team/ pt /family. Risks, benefits, alternatives  discussed in detail.

## 2019-06-28 NOTE — PROGRESS NOTE ADULT - PROBLEM SELECTOR PLAN 1
likely  sec to pneumonia   cont Rocephin for four more days
continue with same management   work up including repeat USG and FNA ? can be done as out-patient
3.6 / 44 / 57 / 260 - Todays pending. Abdominal pain recurred last night for a while. 1) Patient's BMI to large for MRI machine ( The one in [place] is Open and is appropriate for high BMI patient's 2) Sonogram was refused by patient and refuses it today ( discussed importance )  3)ERCP if signs of cholangitis 4)  f/u labs 5) D/C ceftriaxone when possible
3.6 / 44 / 57 / 260 - slightly worse cholestasis picture. Asymptomatic 1) Patient's BMI to large for MRI machine ( The one in [place] is Open and is appropriate for high BMI patient's 2) Sonogram was cancelled 3)ERCP if signs of cholangitis 4)  f/u labs 5) D/C ceftriaxone when possible
3.6 / 44 / 57 / 260 ==>  2.4 / 62 / 58 / 292  Alk phos unchanged.  Abdominal pain resolved, now c/o of occasional fullness. Not constipated. 1) Patient's BMI to large for MRI machine ( The one in [place] is Open and is appropriate for high BMI patient's 2) Sonogram was refused by patient  )  3)ERCP if signs of cholangitis 4)  f/u labs 5) D/C ceftriaxone when possible
likely  sec to pneumonia   cont Rocephin for  three  more days
likely  sec to pneumonia   cont Rocephin for  two more days
likely  sec to pneumonia   cont Rocephin till tomorrow am and then d/c home
work up can be done as an out-patient   no symptoms of compression
work up of nodule is on   Check USG reports   further work up can be done as out-patient
3.6 / 44 / 57 / 260 ==>  2.4 / 62 / 58 / 292  ==> 1.6 / 99 / 85 / 332 Alk phos continues to rise.  Abdominal pain resolved, . Not constipated. 1) Patient's BMI to large for MRI machine ( The one in [place] is Open and is appropriate for high BMI patient's 2) Sonogram was refused by patient  )  3)ERCP if signs of cholangitis 4)  f/u labs 5) D/C ceftriaxone when possible

## 2019-06-28 NOTE — PROGRESS NOTE ADULT - SUBJECTIVE AND OBJECTIVE BOX
Preliminary note based on available patient data was entered below in order to expedite patient management  Patient will be examined within the next few hours (and this note will be edited if so dictated by the then latest available data) Please note that  by the end of this day the below note should be considered as my final patient progress note for today     MAXWELL OLIVO  00 509   1950 DOA 2019 DR HEMANT CONTRERAS   ALLERGY     Sulfa Trimethoprim    CONTACT Sloane Stephenson/Edgar  selv  Relative rBian REYNOLDS             Initial evaluation/Pulmonary Critical Care consultation requested on  2019  by Dr Contreras   from Dr Matute   Patient examined chart reviewed    HOSPITAL ADMISSION   PATIENT CAME  FROM (if information available)        TYPE OF VISIT      Subsequent pulmonary followup      REASON FOR VISIT  For list of problems evaluated/addressed, please see problem list in the note below     PATIENT MAXWELL OLIVO  00 509   1950 DOA 2019 DR HEMANT CONTRERAS      VITALS LABS   2019 afeb 84 140/90 20 94%   2019 W 5.6 Hb 10.1 Plt 264 Na 138 K 4.3 CO2 28 Cr 1.7    PATIENT MAXWELL OLIVO  00 509   1950 DOA 2019 DR HEMANT CONTRERAS       MEDICATIONS   VTE   Rivaroxab 20 ()   ABIO  rocephin 2 () (Dr BEAULIEU) (Klebs bacteremia)   Zosyn ()   PAIN  percocet ()   COPD   albuterl ()       GLOBAL ISSUE/BEST PRACTICE:      PROBLEM: HOB elevation:   y            PROBLEM: Stress ulcer proph:    na                      PROBLEM: VTE prophylaxis:    rivaroxa (   PROBLEM: Glycemic control:    na  PROBLEM: Nutrition:   Reg diet ()   PROBLEM: Advanced directive: na     PROBLEM: Allergies:  sulfa trimethoprim     EVENT 2019 Transferred out of icu     REVIEW OF SYMPTOMS     NOTE [facility] if any  in ROS and PE are updated in daily HOSPITAL COURSE below      Able to give ROS  Yes     RELIABLE No   CONSTITUTIONAL Weakness Yes  Chills No Vision changes No  ENDOCRINE No unexplained hair loss No heat or cold intolerance    ALLERGY No hives  Sore throat No   RESP Coughing blood no  Shortness of breath YES   NEURO No Headache  Confusion Pain neck No   CARDIAC No Chest pain No Palpitations   GI No Pain abdomen NO   Vomiting NO     PHYSICAL EXAM    HEENT Unremarkable PERRLA atraumatic   RESP Fair air entry EXP prolonged    Harsh breath sound Resp distres mild   CARDIAC S1 S2 No S3     NO JVD    ABDOMEN SOFT BS PRESENT NOT DISTENDED No hepatosplenomegaly PEDAL EDEMA present No calf tenderness  NO rash   GENERAL Not TOXIC

## 2019-06-28 NOTE — PROGRESS NOTE ADULT - SUBJECTIVE AND OBJECTIVE BOX
Preliminary note based on available patient data was entered below in order to expedite patient management  Patient will be examined within the next few hours (and this note will be edited if so dictated by the then latest available data) Please note that  by the end of this day the below note should be considered as my final patient progress note for today     MAXWELL OLIVO  00 509   1950 DOA 2019 DR HEMANT CONTRERAS   ALLERGY     Sulfa Trimethoprim    CONTACT Sloane Stephenson/Edgar  selv  Relative Brian REYNOLDS             Initial evaluation/Pulmonary Critical Care consultation requested on  2019  by Dr Contreras   from Dr Matute   Patient examined chart reviewed    HOSPITAL ADMISSION   PATIENT CAME  FROM (if information available)        TYPE OF VISIT      Subsequent pulmonary followup      REASON FOR VISIT  For list of problems evaluated/addressed, please see problem list in the note below     PATIENT MAXWELL OLIVO  00 509   1950 DOA 2019 DR HEMANT CONTRERAS      VITALS LABS   2019 afeb 84 140/90 20 94%   2019 W 5.6 Hb 10.1 Plt 264 Na 138 K 4.3 CO2 28 Cr 1.7    PATIENT MAXWELL OLIVO  00 509   1950 DOA 2019 DR HEMANT CONTRERAS       MEDICATIONS   VTE   Rivaroxab 20 ()   ABIO  rocephin 2 () (Dr BEAULIEU) (Klebs bacteremia)   Zosyn ()   PAIN  percocet ()   COPD   albuterl ()       GLOBAL ISSUE/BEST PRACTICE:      PROBLEM: HOB elevation:   y            PROBLEM: Stress ulcer proph:    na                      PROBLEM: VTE prophylaxis:    rivaroxa (   PROBLEM: Glycemic control:    na  PROBLEM: Nutrition:   Reg diet ()   PROBLEM: Advanced directive: na     PROBLEM: Allergies:  sulfa trimethoprim     EVENT 2019 Transferred out of icu     REVIEW OF SYMPTOMS     NOTE [facility] if any  in ROS and PE are updated in daily HOSPITAL COURSE below      Able to give ROS  Yes     RELIABLE No   CONSTITUTIONAL Weakness Yes  Chills No Vision changes No  ENDOCRINE No unexplained hair loss No heat or cold intolerance    ALLERGY No hives  Sore throat No   RESP Coughing blood no  Shortness of breath YES   NEURO No Headache  Confusion Pain neck No   CARDIAC No Chest pain No Palpitations   GI No Pain abdomen NO   Vomiting NO     PHYSICAL EXAM    HEENT Unremarkable PERRLA atraumatic   RESP Fair air entry EXP prolonged    Harsh breath sound Resp distres mild   CARDIAC S1 S2 No S3     NO JVD    ABDOMEN SOFT BS PRESENT NOT DISTENDED No hepatosplenomegaly PEDAL EDEMA present No calf tenderness  NO rash   GENERAL Not TOXIC

## 2019-06-28 NOTE — PROGRESS NOTE ADULT - ASSESSMENT
PATIENT MAXWELL OLIVO  00 509   1950 DOA 2019 DR HEMANT PINO     PT  SUMMARY    68F nonsmoker disabled severe arthritis not on Home O2 Has sleep apnea but does not use cpap PMH morbid obesity, HTN, bilateral knee replacements complicated by DVT and pulmonary embolism () maintained on Xarelto, hx of septic knee and bacteremia, CKD, LARRY here with severe sepsis, gram negative bacteremia, PNA, acute hypercarbic respiratory failure requiring non invasive ventilation with BiPAP, acute on chronic renal insufficiency. Incidentally found to have goiter vs thyroid mass/nodule.              Hospital course   2019 ABG showed no hypercapnia  CT ch showed r apical infiltr Is on rocephin ()  blood culture Klebsiella     PATIENT MAXWELL OLIVO  00 509   1950 DOA 2019 DR HEMANT PINO      PROBLEM/ASSESSMENT/RECOMMENDATIONS (A/R)     RESP GAS EXCHANGE HYPERCAPNIC RESP FAILURE   2019 ra 752/29/90   2019 ra 94%   2019 730/52/96  LARRY  bpap  ()   Cont Rx   A/R Will need sleep study after discharge Call me for followup [phone] Does not qualify for bpap   HO VENOUS THROMBOEMBOLISM  On Rivaroxab 20 ()   2019 Check echo ro ph    ANEMIA norm  ---- Hb 11.3-10.1-10.1-9.6-10.1    ELEVATED LFTS   ----  [id number] - 285  -[id number]  -[id number]   Improving  OC  ---- Cr 2- 2.2-2.1-1.9-1.7    Possibly sec atn sepsis   INFECTION KLEBSIELLA BACTEREMIA PNEUMONIA   ---  W 17.6-18.5-9.9-5   P  Procalc 59.7   M  blood culture n   M  blood culture Klebsiella   M  Leg n   m  urine culture n   C 2019 CT ch patchy focal r apical infiltr Mild basal dependent changes   C 2019 cta ch Faint ggo upper lobes Thyroid goiter   M 2019 Flu AB n RSV n   On rocephin 2 () (Dr BEAULIEU) (Klebs bacteremia)   POSSIBLE HYPOTHYROID   TSH .431   GOITER  2019 Thyroid us L thyroid enlarged heterogenous 8.2x1.7x4.2 cm with large nodule 5.6x4.1x3.9 cm extending into sup mediastinum   2019 cta ch thyroid goiter extending into upper mediast   A/R Will need followup with endocrine and CT surgery after discharge     TIME SPENT Over 25 minutes aggregate care time spent on encounter; activities included   direct pt care, counseling and/or coordinating care reviewing notes, lab data/ imaging , discussion with multidisciplinary team/ pt /family. Risks, benefits, alternatives  discussed in detail.

## 2019-06-28 NOTE — PROGRESS NOTE ADULT - SUBJECTIVE AND OBJECTIVE BOX
INTERVAL HPI/OVERNIGHT EVENTS:  Pt seen and examined at bedside.     Allergies/Intolerance: sulfa drugs (Other; Rash)  Sulfur (Unknown)  trimethoprim (Other; Rash)      MEDICATIONS  (STANDING):  ALBUTerol    0.083% 2.5 milliGRAM(s) Nebulizer every 6 hours  ALBUTerol    90 MICROgram(s) HFA Inhaler 1 Puff(s) Inhalation every 4 hours  cefTRIAXone   IVPB 2000 milliGRAM(s) IV Intermittent every 24 hours  chlorhexidine 4% Liquid 1 Application(s) Topical every 12 hours  metoprolol succinate  milliGRAM(s) Oral daily  rivaroxaban 20 milliGRAM(s) Oral daily    MEDICATIONS  (PRN):  acetaminophen   Tablet .. 650 milliGRAM(s) Oral every 6 hours PRN Temp greater or equal to 38C (100.4F), Moderate Pain (4 - 6)  oxyCODONE    5 mG/acetaminophen 325 mG 2 Tablet(s) Oral every 6 hours PRN Mild Pain (1 - 3)        ROS: all systems reviewed and wnl      PHYSICAL EXAMINATION:  Vital Signs Last 24 Hrs  T(C): 36.7 (28 Jun 2019 11:46), Max: 36.7 (27 Jun 2019 13:11)  T(F): 98 (28 Jun 2019 11:46), Max: 98 (27 Jun 2019 13:11)  HR: 84 (28 Jun 2019 11:46) (84 - 107)  BP: 143/96 (28 Jun 2019 11:46) (118/74 - 167/83)  BP(mean): --  RR: 20 (28 Jun 2019 11:46) (18 - 20)  SpO2: 94% (28 Jun 2019 11:46) (94% - 99%)  CAPILLARY BLOOD GLUCOSE          06-27 @ 07:01  -  06-28 @ 07:00  --------------------------------------------------------  IN: 0 mL / OUT: 1001 mL / NET: -1001 mL        GENERAL:   NECK: supple, No JVD  CHEST/LUNG: clear to auscultation bilaterally; no rales, rhonchi, or wheezing b/l  HEART: normal S1, S2  ABDOMEN: BS+, soft, ND, NT   EXTREMITIES:  pulses palpable; no clubbing, cyanosis, or edema b/l LEs  SKIN: no rashes or lesions      LABS:        TPro  8.6<H>  /  Alb  2.4<L>  /  TBili  1.6<H>  /  DBili  .95<H>  /  AST  99<H>  /  ALT  85<H>  /  AlkPhos  332<H>  06-28 INTERVAL HPI/OVERNIGHT EVENTS:  Pt seen and examined at bedside.     Allergies/Intolerance: sulfa drugs (Other; Rash)  Sulfur (Unknown)  trimethoprim (Other; Rash)      MEDICATIONS  (STANDING):  ALBUTerol    0.083% 2.5 milliGRAM(s) Nebulizer every 6 hours  ALBUTerol    90 MICROgram(s) HFA Inhaler 1 Puff(s) Inhalation every 4 hours  cefTRIAXone   IVPB 2000 milliGRAM(s) IV Intermittent every 24 hours  chlorhexidine 4% Liquid 1 Application(s) Topical every 12 hours  metoprolol succinate  milliGRAM(s) Oral daily  rivaroxaban 20 milliGRAM(s) Oral daily    MEDICATIONS  (PRN):  acetaminophen   Tablet .. 650 milliGRAM(s) Oral every 6 hours PRN Temp greater or equal to 38C (100.4F), Moderate Pain (4 - 6)  oxyCODONE    5 mG/acetaminophen 325 mG 2 Tablet(s) Oral every 6 hours PRN Mild Pain (1 - 3)        ROS: all systems reviewed and wnl      PHYSICAL EXAMINATION:  Vital Signs Last 24 Hrs  T(C): 36.7 (28 Jun 2019 11:46), Max: 36.7 (27 Jun 2019 13:11)  T(F): 98 (28 Jun 2019 11:46), Max: 98 (27 Jun 2019 13:11)  HR: 84 (28 Jun 2019 11:46) (84 - 107)  BP: 143/96 (28 Jun 2019 11:46) (118/74 - 167/83)  BP(mean): --  RR: 20 (28 Jun 2019 11:46) (18 - 20)  SpO2: 94% (28 Jun 2019 11:46) (94% - 99%)  CAPILLARY BLOOD GLUCOSE          06-27 @ 07:01  -  06-28 @ 07:00  --------------------------------------------------------  IN: 0 mL / OUT: 1001 mL / NET: -1001 mL        GENERAL: comfortable, no fevers or SOB  NECK: supple, No JVD  CHEST/LUNG: clear to auscultation bilaterally; no rales, rhonchi, or wheezing b/l  HEART: normal S1, S2  ABDOMEN: BS+, soft, ND, NT   EXTREMITIES:  pulses palpable; no clubbing, cyanosis, or edema b/l LEs  SKIN: no rashes or lesions      LABS:        TPro  8.6<H>  /  Alb  2.4<L>  /  TBili  1.6<H>  /  DBili  .95<H>  /  AST  99<H>  /  ALT  85<H>  /  AlkPhos  332<H>  06-28

## 2019-06-28 NOTE — PROGRESS NOTE ADULT - PROBLEM SELECTOR PROBLEM 2
Gram-negative pneumonia
Abdominal pain, unspecified abdominal location
Gram-negative pneumonia
Abdominal pain, unspecified abdominal location

## 2019-06-28 NOTE — PROGRESS NOTE ADULT - ASSESSMENT
68F PMH morbid obesity, HTN, bilateral knee replacements complicated by DVT and pulmonary embolism (2013) maintained on Xarelto, hx of septic knee and bacteremia, CKD, LARRY here with severe sepsis, gram negative bacteremia, PNA, acute hypercarbic respiratory failure requiring non invasive ventilation with BiPAP, acute on chronic renal insufficiency. Incidentally found to have goiter vs thyroid mass/nodule.    Note: BMI of 62 consistent with morbid obesity    1. PULM  - stop BiPAP, RA sat 100 %. awake, alert, responsive. CT chest confirmed pneumonia. Will complete 1 more days of IV Ceftriaxone 2 grams per day.      Finish ABX Saturday AM.      2. CV  - BP stable, did not require pressors. Restarted Toprol 100 mg/day, home meds.  TTE shows normal LV,RV function. Continue Xarelto 20 mg/day.     HR decreased to 84 today on Toprol.      3. ID  - sepsis due to gram negative organisms  - klebsiella bacteremia, leukocytosis improving  - cont on Ceftriaxone now, 2 grams per day.    - both sets of culture however with gram negative rods  -   4. GI  - noted pneumobilia on CT abdomen, RUQ non tender on exam  - pneumobilia present on prior CT scan as well  - unclear significance, not willing to do MRI. Observe for now. Not willing to do abdominal sonogram.      Will need open MRCP next week.     5. RENAL  - acute on chronic renal insufficiency in setting of sepsis likely component of ATN  - pt voiding, Cr stable, cont to monitor. Creatinine 1.93.     6. ENDO  - goiter vs thyroid mass  - pt without respiratory compromise  - follow up neck ultrasound  - appreciate endo input, all outpatient follow up     7. GEN  - stable for transfer to medical floor  - physical therapy  - d/w patient,  Discharge home Saturday AM with home PT.

## 2019-06-28 NOTE — PROGRESS NOTE ADULT - PROBLEM SELECTOR PLAN 2
as above
No pain, just (?) fullness - supportive care  ( oob to chair )
as above
Resolved

## 2019-06-28 NOTE — PROGRESS NOTE ADULT - SUBJECTIVE AND OBJECTIVE BOX
Patient is a 68y old  Female who presents with a chief complaint of Shortness of breath (28 Jun 2019 14:52)      INTERVAL HPI / OVERNIGHT EVENTS: doing ok     MEDICATIONS  (STANDING):  ALBUTerol    0.083% 2.5 milliGRAM(s) Nebulizer every 6 hours  ALBUTerol    90 MICROgram(s) HFA Inhaler 1 Puff(s) Inhalation every 4 hours  cefTRIAXone   IVPB 2000 milliGRAM(s) IV Intermittent every 24 hours  chlorhexidine 4% Liquid 1 Application(s) Topical every 12 hours  hydrochlorothiazide 25 milliGRAM(s) Oral daily  losartan 100 milliGRAM(s) Oral daily  metoprolol succinate  milliGRAM(s) Oral daily  rivaroxaban 20 milliGRAM(s) Oral daily    MEDICATIONS  (PRN):  acetaminophen   Tablet .. 650 milliGRAM(s) Oral every 6 hours PRN Temp greater or equal to 38C (100.4F), Moderate Pain (4 - 6)  oxyCODONE    5 mG/acetaminophen 325 mG 2 Tablet(s) Oral every 6 hours PRN Mild Pain (1 - 3)      Vital Signs Last 24 Hrs  T(C): 36.7 (28 Jun 2019 11:46), Max: 36.7 (28 Jun 2019 11:46)  T(F): 98 (28 Jun 2019 11:46), Max: 98 (28 Jun 2019 11:46)  HR: 90 (28 Jun 2019 13:16) (84 - 107)  BP: 143/96 (28 Jun 2019 11:46) (118/74 - 157/96)  BP(mean): --  RR: 20 (28 Jun 2019 11:46) (18 - 20)  SpO2: 97% (28 Jun 2019 13:16) (94% - 98%)    Review of systems:  General : no fever /chills,fatigue  CVS : no chest pain, palpitations  Lungs : no shortness of breath, cough  GI : no abdominal pain,vomiting, diarrhea   : no dysuria,hematuria        PHYSICAL EXAM:  General :NAD  Constitutional: obese  Respiratory: CTAB/L  Cardiovascular: S1 and S2, RRR, no M/G/R  Gastrointestinal: BS+, soft, NT/ND  Extremities: No peripheral edema  Vascular: 2+ peripheral pulses  Skin: No rashes      LABS:        TPro  8.6<H>  /  Alb  2.4<L>  /  TBili  1.6<H>  /  DBili  .95<H>  /  AST  99<H>  /  ALT  85<H>  /  AlkPhos  332<H>  06-28          MICROBIOLOGY:  RECENT CULTURES:  06-24 .Blood XXXX XXXX   No growth to date.    06-22 .Blood Blood Culture PCR  Klebsiella pneumoniae  Blood Culture PCR   Growth in aerobic and anaerobic bottles: Gram Negative Rods   Growth in aerobic and anaerobic bottles: Klebsiella pneumoniae  See previous culture [id number]    06-22 .Urine XXXX XXXX   No growth          RADIOLOGY & ADDITIONAL STUDIES:

## 2019-06-28 NOTE — DISCHARGE NOTE NURSING/CASE MANAGEMENT/SOCIAL WORK - NSDCDPATPORTLINK_GEN_ALL_CORE
You can access the [facility] Patient Portal, offered by [facility], by registering with the following website: [email or web address]

## 2019-06-28 NOTE — PROGRESS NOTE ADULT - SUBJECTIVE AND OBJECTIVE BOX
Patient is a 68y old  Female who presents with a chief complaint of Shortness of breath (28 Jun 2019 15:37)      HPI:  69 yo F with PMH morbid obesity, HTN, Bilateral knee replacements complicated by DVT and pulmonary embolism (2013) maintained on Xarelto presents with two week history of URI cough and fever. Cough accompanied by fatigue, shortness of breath and intractable stomach pain. Today difficulty ambulating OOB to bathroom with severe dypsnea feeling like her PE in the past. Patient denies chest pain, palpitations, calf pain (22 Jun 2019 04:02)      INTERVAL HPI/OVERNIGHT EVENTS:  The patient denies melena, hematochezia, hematemesis, nausea, vomiting, abdominal pain, constipation, diarrhea, or change in bowel movements Tolerating diet    MEDICATIONS  (STANDING):  ALBUTerol    0.083% 2.5 milliGRAM(s) Nebulizer every 6 hours  ALBUTerol    90 MICROgram(s) HFA Inhaler 1 Puff(s) Inhalation every 4 hours  cefTRIAXone   IVPB 2000 milliGRAM(s) IV Intermittent every 24 hours  chlorhexidine 4% Liquid 1 Application(s) Topical every 12 hours  hydrochlorothiazide 25 milliGRAM(s) Oral daily  losartan 100 milliGRAM(s) Oral daily  metoprolol succinate  milliGRAM(s) Oral daily  rivaroxaban 20 milliGRAM(s) Oral daily    MEDICATIONS  (PRN):  acetaminophen   Tablet .. 650 milliGRAM(s) Oral every 6 hours PRN Temp greater or equal to 38C (100.4F), Moderate Pain (4 - 6)  oxyCODONE    5 mG/acetaminophen 325 mG 2 Tablet(s) Oral every 6 hours PRN Mild Pain (1 - 3)      FAMILY HISTORY:  FHx: hypertension  FH: type 2 diabetes mellitus      Allergies    sulfa drugs (Other; Rash)  Sulfur (Unknown)  trimethoprim (Other; Rash)    Intolerances        PMH/PSH:  Morbid obesity  History of pulmonary embolism  Arthropathy  Migraine  Obstructive sleep apnea syndrome  Essential hypertension  Obesity  Dysfunctional uterine bleeding  Joint infection  Pulmonary embolism  Osteoarthritis  History of total knee replacement  Other acquired absence of organ  Status post total hysterectomy  Status post cholecystectomy  Status post hysterectomy  Total knee replacement status        REVIEW OF SYSTEMS:  CONSTITUTIONAL: No fever, weight loss, or fatigue  EYES: No eye pain, visual disturbances, or discharge  ENMT:  No difficulty hearing, tinnitus, vertigo; No sinus or throat pain  NECK: No pain or stiffness  BREASTS: No pain, masses, or nipple discharge  RESPIRATORY: No cough, wheezing, chills or hemoptysis; No shortness of breath  CARDIOVASCULAR: No chest pain, palpitations, dizziness, or leg swelling  GASTROINTESTINAL: See above  GENITOURINARY: No dysuria, frequency, hematuria, or incontinence  NEUROLOGICAL: No headaches, memory loss, loss of strength, numbness, or tremors  SKIN: No itching, burning, rashes, or lesions   LYMPH NODES: No enlarged glands  ENDOCRINE: No heat or cold intolerance; No hair loss  MUSCULOSKELETAL: No joint pain or swelling; No muscle, back, or extremity pain  PSYCHIATRIC: No depression, anxiety, mood swings, or difficulty sleeping  HEME/LYMPH: No easy bruising, or bleeding gums  ALLERGY AND IMMUNOLOGIC: No hives or eczema    Vital Signs Last 24 Hrs  T(C): 36.7 (28 Jun 2019 11:46), Max: 36.7 (28 Jun 2019 11:46)  T(F): 98 (28 Jun 2019 11:46), Max: 98 (28 Jun 2019 11:46)  HR: 90 (28 Jun 2019 13:16) (84 - 107)  BP: 143/96 (28 Jun 2019 11:46) (118/74 - 157/96)  BP(mean): --  RR: 20 (28 Jun 2019 11:46) (18 - 20)  SpO2: 97% (28 Jun 2019 13:16) (94% - 98%)    PHYSICAL EXAM:  GENERAL: NAD, well-groomed, well-developed  HEAD:  Atraumatic, Normocephalic  EYES: EOMI, PERRLA, conjunctiva and sclera clear  ENMT: No tonsillar erythema, exudates, or enlargement; Moist mucous membranes, Good dentition, No lesions  NECK: Supple, No JVD, Normal thyroid  NERVOUS SYSTEM:  Alert & Oriented X3, Good concentration;   CHEST/LUNG: Clear to percussion bilaterally; No rales, rhonchi, wheezing, or rubs  HEART: Regular rate and rhythm; No murmurs, rubs, or gallops  ABDOMEN: Soft, Nontender, Nondistended; Bowel sounds present  EXTREMITIES:  2+ Peripheral Pulses, No clubbing, cyanosis, or edema  LYMPH: No lymphadenopathy noted  SKIN: No rashes or lesions    LAB        CBC:  06-26 @ 10:08  WBC 5.63   Hgb 10.1   Hct 33.8   Plts 264  MCV 92.9  06-24 @ 06:42  WBC 5.03   Hgb 9.6   Hct 32.7   Plts 231  MCV 94.8  06-23 @ 04:09  WBC 9.94   Hgb 10.1   Hct 34.4   Plts 230  MCV 95.3  06-22 @ 14:33  WBC 18.57   Hgb 10.1   Hct 33.9   Plts 263  MCV 95.5  06-21 @ 20:01  WBC 17.65   Hgb 11.3   Hct 38.3   Plts 339  MCV 93.4      Chemistry:  06-26 @ 10:08  Na+ 138  K+ 4.3  Cl- 104  CO2 28  BUN 16  Cr 1.76     06-24 @ 06:42  Na+ 136  K+ 3.8  Cl- 101  CO2 29  BUN 19  Cr 1.93     06-23 @ 04:09  Na+ 134  K+ 4.5  Cl- 99  CO2 26  BUN 26  Cr 2.19     06-22 @ 14:34  Na+ 134  K+ 4.9  Cl- 100  CO2 27  BUN 27  Cr 2.24     06-21 @ 20:01  Na+ 138  K+ 3.9  Cl- 102  CO2 28  BUN 25  Cr 2.05         Glucose, Serum: 130 mg/dL (06-26 @ 10:08)  Glucose, Serum: 117 mg/dL (06-24 @ 06:42)  Glucose, Serum: 89 mg/dL (06-23 @ 04:09)  Glucose, Serum: 96 mg/dL (06-22 @ 14:34)  Glucose, Serum: 111 mg/dL (06-21 @ 20:01)      26 Jun 2019 10:08    138    |  104    |  16     ----------------------------<  130    4.3     |  28     |  1.76   24 Jun 2019 06:42    136    |  101    |  19     ----------------------------<  117    3.8     |  29     |  1.93   23 Jun 2019 04:09    134    |  99     |  26     ----------------------------<  89     4.5     |  26     |  2.19   22 Jun 2019 14:34    134    |  100    |  27     ----------------------------<  96     4.9     |  27     |  2.24   21 Jun 2019 20:01    138    |  102    |  25     ----------------------------<  111    3.9     |  28     |  2.05     Ca    8.3        26 Jun 2019 10:08  Ca    8.0        24 Jun 2019 06:42  Ca    8.0        23 Jun 2019 04:09  Ca    8.0        22 Jun 2019 14:34  Ca    8.3        21 Jun 2019 20:01  Phos  3.5       24 Jun 2019 06:42  Phos  2.8       23 Jun 2019 04:09  Phos  2.9       22 Jun 2019 14:34  Mg     2.7       24 Jun 2019 06:42  Mg     2.0       23 Jun 2019 04:09  Mg     2.3       22 Jun 2019 14:34    TPro  8.6    /  Alb  2.4    /  TBili  1.6    /  DBili  .95    /  AST  99     /  ALT  85     /  AlkPhos  332    28 Jun 2019 08:05  TPro  8.2    /  Alb  2.3    /  TBili  2.4    /  DBili  1.75   /  AST  62     /  ALT  58     /  AlkPhos  292    27 Jun 2019 08:29  TPro  8.2    /  Alb  2.3    /  TBili  3.1    /  DBili  2.44   /  AST  53     /  ALT  54     /  AlkPhos  285    26 Jun 2019 10:08  TPro  8.2    /  Alb  2.3    /  TBili  3.6    /  DBili  2.90   /  AST  44     /  ALT  57     /  AlkPhos  260    25 Jun 2019 06:37  TPro  8.1    /  Alb  2.4    /  TBili  3.5    /  DBili  x      /  AST  37     /  ALT  63     /  AlkPhos  229    24 Jun 2019 06:42  TPro  8.0    /  Alb  2.5    /  TBili  4.2    /  DBili  x      /  AST  60     /  ALT  87     /  AlkPhos  225    23 Jun 2019 04:09  TPro  7.9    /  Alb  2.5    /  TBili  4.6    /  DBili  x      /  AST  99     /  ALT  110    /  AlkPhos  243    22 Jun 2019 14:34              CAPILLARY BLOOD GLUCOSE              RADIOLOGY & ADDITIONAL TESTS:    Imaging Personally Reviewed:  [ ] YES  [ ] NO    Consultant(s) Notes Reviewed:  [ ] YES  [ ] NO    Care Discussed with Consultants/Other Providers [ ] YES  [ ] NO

## 2019-06-28 NOTE — PROGRESS NOTE ADULT - PROBLEM SELECTOR PROBLEM 1
Thyroid nodule
Bacteremia due to Klebsiella pneumoniae
Elevated liver function tests
Thyroid nodule
Thyroid nodule
Elevated liver function tests
Bacteremia due to Klebsiella pneumoniae

## 2019-06-28 NOTE — PROGRESS NOTE ADULT - PROBLEM SELECTOR PLAN 4
CT abdo : no biliary or liver abnormality  monitor LFT while on Rocephin (Bili and ALP improving )

## 2019-06-29 ENCOUNTER — TRANSCRIPTION ENCOUNTER (OUTPATIENT)
Age: 69
End: 2019-06-29

## 2019-06-29 VITALS
RESPIRATION RATE: 18 BRPM | HEART RATE: 88 BPM | OXYGEN SATURATION: 99 % | DIASTOLIC BLOOD PRESSURE: 61 MMHG | TEMPERATURE: 98 F | SYSTOLIC BLOOD PRESSURE: 142 MMHG

## 2019-06-29 LAB
CULTURE RESULTS: SIGNIFICANT CHANGE UP
SPECIMEN SOURCE: SIGNIFICANT CHANGE UP

## 2019-06-29 PROCEDURE — 99232 SBSQ HOSP IP/OBS MODERATE 35: CPT

## 2019-06-29 PROCEDURE — 99239 HOSP IP/OBS DSCHRG MGMT >30: CPT

## 2019-06-29 RX ORDER — ALBUTEROL 90 UG/1
2 AEROSOL, METERED ORAL
Qty: 1 | Refills: 0
Start: 2019-06-29 | End: 2019-07-28

## 2019-06-29 RX ORDER — METOPROLOL TARTRATE 50 MG
1 TABLET ORAL
Qty: 30 | Refills: 0
Start: 2019-06-29 | End: 2019-07-28

## 2019-06-29 RX ORDER — RIVAROXABAN 15 MG-20MG
1 KIT ORAL
Qty: 30 | Refills: 0
Start: 2019-06-29 | End: 2019-07-28

## 2019-06-29 RX ADMIN — OXYCODONE AND ACETAMINOPHEN 2 TABLET(S): 5; 325 TABLET ORAL at 05:48

## 2019-06-29 RX ADMIN — OXYCODONE AND ACETAMINOPHEN 2 TABLET(S): 5; 325 TABLET ORAL at 11:42

## 2019-06-29 RX ADMIN — RIVAROXABAN 20 MILLIGRAM(S): KIT at 11:04

## 2019-06-29 RX ADMIN — CEFTRIAXONE 100 MILLIGRAM(S): 500 INJECTION, POWDER, FOR SOLUTION INTRAMUSCULAR; INTRAVENOUS at 11:05

## 2019-06-29 RX ADMIN — Medication 25 MILLIGRAM(S): at 05:45

## 2019-06-29 RX ADMIN — CHLORHEXIDINE GLUCONATE 1 APPLICATION(S): 213 SOLUTION TOPICAL at 05:45

## 2019-06-29 RX ADMIN — OXYCODONE AND ACETAMINOPHEN 2 TABLET(S): 5; 325 TABLET ORAL at 06:40

## 2019-06-29 NOTE — DISCHARGE NOTE PROVIDER - NSDCHOSPICE_GEN_A_CORE
Detail Level: Detailed Body Location Override (Optional - Billing Will Still Be Based On Selected Body Map Location If Applicable): Right neck No X Size Of Lesion In Cm (Optional): 0

## 2019-06-29 NOTE — DISCHARGE NOTE PROVIDER - CARE PROVIDER_API CALL
Candido Josue)  Medicine  [address] [place] [address]  Phone: [phone]  Fax: [phone]  Follow Up Time:     Sonali Johnson)  Infectious Disease; Internal Medicine  [address] [place] [address]  Phone: [phone]  Fax: [phone]  Follow Up Time:     Arnoldo Parmar)  EndocrinologyMetabDiabetes  [address] [facility] [facility], [address]  [place] [address]  Phone: [phone]  Fax: [phone]  Follow Up Time:     Aubrey Matute)  Critical Care Medicine; Internal Medicine; Pulmonary Disease  [address] [place] [address]  Phone: [phone]  Fax: [phone]  Follow Up Time:

## 2019-06-29 NOTE — PROGRESS NOTE ADULT - PROVIDER SPECIALTY LIST ADULT
Endocrinology
Gastroenterology
Hospitalist
Infectious Disease
Pulmonology
Gastroenterology
Critical Care
Endocrinology
Infectious Disease

## 2019-06-29 NOTE — PROGRESS NOTE ADULT - ASSESSMENT
68F PMH morbid obesity, HTN, bilateral knee replacements complicated by DVT and pulmonary embolism (2013) maintained on Xarelto, hx of septic knee and bacteremia, CKD, LARRY here with severe sepsis, gram negative bacteremia, PNA, acute hypercarbic respiratory failure requiring non invasive ventilation with BiPAP, acute on chronic renal insufficiency. Incidentally found to have goiter vs thyroid mass/nodule.    Note: BMI of 62 consistent with morbid obesity    1. PULM  - stop BiPAP, RA sat 100 %. awake, alert, responsive. CT chest confirmed pneumonia. s/p rocephin    Finished ABX Saturday AM.      2. CV  HTN BP stable, ,    TTE shows normal LV,RV function.   Continue Xarelto 20 mg/day.          3. ID  - sepsis due to klebsiella bacteremia, leukocytosis improving  - s/p Ceftriaxone.    -   4. GI  - noted pneumobilia on CT abdomen, RUQ non tender on exam  - pneumobilia present on prior CT scan as well  - unclear significance, not willing to do MRI. Observe for now. Not willing to do abdominal sonogram.      Will need open MRCP next week. to follow up with GI     5. RENAL   bhargav resolving   - acute on chronic renal insufficiency in setting of sepsis likely component of ATN  - pt voiding, Cr stable, cont to monitor. Creatinine 1.93.     6. ENDO   thyroid mass   - goiter vs thyroid mass  - pt without respiratory compromise  - follow up neck ultrasound  - appreciate endo input, all outpatient follow up     7. GEN    - physical therapy  - d/w patient,  Discharge home today 68F PMH morbid obesity, HTN, bilateral knee replacements complicated by DVT and pulmonary embolism (2013) maintained on Xarelto, hx of septic knee and bacteremia, CKD, LARRY here with severe sepsis, gram negative bacteremia, PNA, acute hypercarbic respiratory failure requiring non invasive ventilation with BiPAP, acute on chronic renal insufficiency. Incidentally found to have goiter vs thyroid mass/nodule.    Note: BMI of 62 consistent with morbid obesity    1. PULM  - stop BiPAP, RA sat 100 %. awake, alert, responsive. CT chest confirmed RUL peumonia. s/p rocephin    Finished ABX Saturday AM.      2. CV  HTN BP stable, ,    TTE shows normal LV,RV function.   Continue Xarelto 20 mg/day.          3. ID  - sepsis due to klebsiella bacteremia, leukocytosis improving  - s/p Ceftriaxone.    -   4. GI  - noted pneumobilia on CT abdomen, RUQ non tender on exam  - pneumobilia present on prior CT scan as well  - unclear significance, not willing to do MRI. Observe for now. Not willing to do abdominal sonogram.      Will need open MRCP next week. to follow up with GI     5. RENAL   bhargav resolving   - acute on chronic renal insufficiency in setting of sepsis likely component of ATN  - pt voiding, Cr stable, cont to monitor. Creatinine 1.93.     6. ENDO   thyroid mass   - goiter vs thyroid mass  - pt without respiratory compromise  - follow up neck ultrasound  - appreciate endo input, all outpatient follow up     7. GEN    - physical therapy  - d/w patient,  Discharge home today

## 2019-06-29 NOTE — PROGRESS NOTE ADULT - SUBJECTIVE AND OBJECTIVE BOX
INTERVAL HPI/OVERNIGHT EVENTS:  Pt seen and examined at bedside.     Allergies/Intolerance: sulfa drugs (Other; Rash)  Sulfur (Unknown)  trimethoprim (Other; Rash)    MEDICATIONS  (STANDING):  ALBUTerol    0.083% 2.5 milliGRAM(s) Nebulizer every 6 hours  ALBUTerol    90 MICROgram(s) HFA Inhaler 1 Puff(s) Inhalation every 4 hours  cefTRIAXone   IVPB 2000 milliGRAM(s) IV Intermittent every 24 hours  chlorhexidine 4% Liquid 1 Application(s) Topical every 12 hours  hydrochlorothiazide 25 milliGRAM(s) Oral daily  losartan 100 milliGRAM(s) Oral daily  metoprolol succinate  milliGRAM(s) Oral daily  rivaroxaban 20 milliGRAM(s) Oral daily    MEDICATIONS  (PRN):  acetaminophen   Tablet .. 650 milliGRAM(s) Oral every 6 hours PRN Temp greater or equal to 38C (100.4F), Moderate Pain (4 - 6)  guaiFENesin    Syrup 200 milliGRAM(s) Oral every 6 hours PRN Cough  oxyCODONE    5 mG/acetaminophen 325 mG 2 Tablet(s) Oral every 6 hours PRN Mild Pain (1 - 3)      ROS: all systems reviewed and wnl        Vital Signs Last 24 Hrs  T(C): 36.6 (29 Jun 2019 04:30), Max: 36.7 (28 Jun 2019 11:46)  T(F): 97.8 (29 Jun 2019 04:30), Max: 98 (28 Jun 2019 11:46)  HR: 92 (29 Jun 2019 04:30) (84 - 93)  BP: 118/85 (29 Jun 2019 04:30) (118/85 - 151/84)  BP(mean): --  RR: 18 (29 Jun 2019 04:30) (18 - 20)  SpO2: 99% (29 Jun 2019 04:30) (94% - 99%)      GENERAL: comfortable, no fevers or SOB  NECK: supple, No JVD  CHEST/LUNG: clear to auscultation bilaterally; no rales, rhonchi, or wheezing b/l  HEART: normal S1, S2  ABDOMEN: BS+, soft, ND, NT   EXTREMITIES:  pulses palpable; no clubbing, cyanosis, or edema b/l LEs  SKIN: no rashes or lesions      LABS:

## 2019-06-29 NOTE — DISCHARGE NOTE PROVIDER - HOSPITAL COURSE
Assessment and Plan:     · Assessment	    68F PMH morbid obesity, HTN, bilateral knee replacements complicated by DVT and pulmonary embolism (2013) maintained on Xarelto, hx of septic knee and bacteremia, CKD, LARRY here with severe sepsis, gram negative bacteremia, PNA, acute hypercarbic respiratory failure requiring non invasive ventilation with BiPAP, acute on chronic renal insufficiency. Incidentally found to have goiter vs thyroid mass/nodule.        Note: BMI of 62 consistent with morbid obesity        1. PULM    - stop BiPAP, RA sat 100 %. awake, alert, responsive. CT chest confirmed RUL peumonia. s/p rocephin    Finished ABX Saturday AM.       per pulm presume     LARRY need outpt f/u with clover zheng and sleep study .     2. CV    HTN BP stable, ,      TTE shows normal LV,RV function.     Continue Xarelto 20 mg/day. h/o dvt               3. ID    - sepsis due to klebsiella bacteremia, leukocytosis resolved     - s/p Ceftriaxone.      -     4. GI    - noted pneumobilia on CT abdomen, RUQ non tender on exam    - pneumobilia present on prior CT scan as well    - unclear significance, not willing to do MRI. Observe for now. Not willing to do abdominal sonogram.        Will need open MRCP next week. to follow up with GI         5. RENAL     bhargav resolving     - acute on chronic renal insufficiency in setting of sepsis likely component of ATN    - pt voiding, Cr stable, cont to monitor. Creatinine 1.76.         6. ENDO     thyroid mass     - goiter vs thyroid mass    - pt without respiratory compromise    - follow up neck ultrasound    - appreciate endo input, all outpatient follow up         7. GEN        - physical therapy at home     - d/w patient,  Discharge home today      TIME SPENT COMPLETING DISCHARGE AND COORDINATING CARE WITH NURSING,  AND CASE MANAGEMENT APPROX 40MINUTES

## 2019-06-29 NOTE — DISCHARGE NOTE PROVIDER - PROVIDER TOKENS
PROVIDER:[TOKEN:[[id number]]],PROVIDER:[TOKEN:[[id number]]],PROVIDER:[TOKEN:[[id number]]],PROVIDER:[TOKEN:[[id number]]]

## 2019-06-29 NOTE — PROGRESS NOTE ADULT - SUBJECTIVE AND OBJECTIVE BOX
Patient was examined Chart reviewed Detailed note will be inserted below after going over latest data Meanwhile please refer to my previous notes for Pulmonary/Critical Care assessment recommendations MAXWELL OLIVO  00 509   1950 DOA 2019 DR HEMANT CONTRERAS   ALLERGY     Sulfa Trimethoprim    CONTACT Sloane Stephenson/Edgar  selv  Relative Brian REYNOLDS             Initial evaluation/Pulmonary Critical Care consultation requested on  2019  by Dr Contreras   from Dr Matute   Patient examined chart reviewed    HOSPITAL ADMISSION   PATIENT CAME  FROM (if information available)        TYPE OF VISIT      Subsequent pulmonary followup      REASON FOR VISIT  For list of problems evaluated/addressed, please see problem list in the note below     PATIENT MAXWELL OLIVO  00 509   1950 DOA 2019 DR HEMANT CONTRERAS      VITALS LABS   2019 afeb 88 140/60 18 99%   2019 W 5.6 Hb 10.1 Plt 264 Na 138 K 4.3 CO2 28 Cr 1.7    PATIENT MAXWELL OLIVO  00 509   1950 DOA 2019 DR HEMANT CONTRERAS       MEDICATIONS   VTE   Rivaroxab 20 ()   ABIO  rocephin 2 () (Dr BEAULIEU) (Klebs bacteremia)   Zosyn ()   PAIN  percocet ()   COPD   albuterl ()       GLOBAL ISSUE/BEST PRACTICE:      PROBLEM: HOB elevation:   y            PROBLEM: Stress ulcer proph:    na                      PROBLEM: VTE prophylaxis:    rivaroxa (   PROBLEM: Glycemic control:    na  PROBLEM: Nutrition:   Reg diet ()   PROBLEM: Advanced directive: na     PROBLEM: Allergies:  sulfa trimethoprim     EVENT 2019 Transferred out of icu     REVIEW OF SYMPTOMS     NOTE [place] if any  in ROS and PE are updated in daily HOSPITAL COURSE below      Able to give ROS  Yes     RELIABLE No   CONSTITUTIONAL Weakness Yes  Chills No Vision changes No  ENDOCRINE No unexplained hair loss No heat or cold intolerance    ALLERGY No hives  Sore throat No   RESP Coughing blood no  Shortness of breath YES   NEURO No Headache  Confusion Pain neck No   CARDIAC No Chest pain No Palpitations   GI No Pain abdomen NO   Vomiting NO     PHYSICAL EXAM    HEENT Unremarkable PERRLA atraumatic   RESP Fair air entry EXP prolonged    Harsh breath sound Resp distres mild   CARDIAC S1 S2 No S3     NO JVD    ABDOMEN SOFT BS PRESENT NOT DISTENDED No hepatosplenomegaly PEDAL EDEMA present No calf tenderness  NO rash   GENERAL Not TOXIC

## 2019-06-29 NOTE — DISCHARGE NOTE PROVIDER - NSDCCPCAREPLAN_GEN_ALL_CORE_FT
PRINCIPAL DISCHARGE DIAGNOSIS  Diagnosis: Morbid obesity with body mass index (BMI) of 40.0 to 44.9 in adult  Assessment and Plan of Treatment:       SECONDARY DISCHARGE DIAGNOSES  Diagnosis: Deep vein thrombosis (DVT)  Assessment and Plan of Treatment: continue with xarelto    Diagnosis: LARRY (obstructive sleep apnea)  Assessment and Plan of Treatment: needs follw up sanford zheng    Diagnosis: Gram-negative pneumonia  Assessment and Plan of Treatment: Gram-negative pneumonia    Diagnosis: Thyroid nodule  Assessment and Plan of Treatment: need follow up dr. murphy    Diagnosis: OC (acute kidney injury)  Assessment and Plan of Treatment: OC (acute kidney injury)    Diagnosis: Elevated liver function tests  Assessment and Plan of Treatment: need follwo up dr. ng

## 2019-06-29 NOTE — PROGRESS NOTE ADULT - ASSESSMENT
PATIENT MAXWELL OLIVO  00 509   1950 DOA 2019 DR HEMANT PINO     PT  SUMMARY    68F nonsmoker disabled severe arthritis not on Home O2 Has sleep apnea but does not use cpap PMH morbid obesity, HTN, bilateral knee replacements complicated by DVT and pulmonary embolism () maintained on Xarelto, hx of septic knee and bacteremia, CKD, LARRY here with severe sepsis, gram negative bacteremia, PNA, acute hypercarbic respiratory failure requiring non invasive ventilation with BiPAP, acute on chronic renal insufficiency. Incidentally found to have goiter vs thyroid mass/nodule.              Hospital course   2019 ABG showed no hypercapnia  CT ch showed r apical infiltr Is on rocephin ()  blood culture Klebsiella     PATIENT MAXWELL OLIVO  00 509   1950 DOA 2019 DR HEMANT PINO      PROBLEM/ASSESSMENT/RECOMMENDATIONS (A/R)     RESP GAS EXCHANGE HYPERCAPNIC RESP FAILURE   2019 ra 752/29/90   2019 ra 94%   2019 730/52/96  LARRY  bpap  ()   Cont Rx   A/R Will need sleep study after discharge Call me for followup [phone] Does not qualify for bpap   HO VENOUS THROMBOEMBOLISM  On Rivaroxab 20 ()   2019 Check echo ro ph    ANEMIA norm  ---- Hb 11.3-10.1-10.1-9.6-10.1    ELEVATED LFTS   ----  -129-176-229 - 285  -99-10-27-53  -848-24-63-54   Improving  OC  ---- Cr 2- 2.2-2.1-1.9-1.7    Possibly sec atn sepsis   INFECTION KLEBSIELLA BACTEREMIA PNEUMONIA   ---  W 17.6-18.5-9.9-5   P  Procalc 59.7   M  blood culture n   M  blood culture Klebsiella   M  Leg n   m  urine culture n   C 2019 CT ch patchy focal r apical infiltr Mild basal dependent changes   C 2019 cta ch Faint ggo upper lobes Thyroid goiter   M 2019 Flu AB n RSV n   On rocephin 2 () (Dr BEAULIEU) (Klebs bacteremia)   POSSIBLE HYPOTHYROID   TSH .431   GOITER  2019 Thyroid us L thyroid enlarged heterogenous 8.2x1.7x4.2 cm with large nodule 5.6x4.1x3.9 cm extending into sup mediastinum   2019 cta ch thyroid goiter extending into upper mediast   A/R Will need followup with endocrine and CT surgery after discharge     TIME SPENT Over 25 minutes aggregate care time spent on encounter; activities included   direct pt care, counseling and/or coordinating care reviewing notes, lab data/ imaging , discussion with multidisciplinary team/ pt /family. Risks, benefits, alternatives  discussed in detail.

## 2019-07-04 DIAGNOSIS — E66.01 MORBID (SEVERE) OBESITY DUE TO EXCESS CALORIES: ICD-10-CM

## 2019-07-04 DIAGNOSIS — Z88.2 ALLERGY STATUS TO SULFONAMIDES: ICD-10-CM

## 2019-07-04 DIAGNOSIS — R06.02 SHORTNESS OF BREATH: ICD-10-CM

## 2019-07-04 DIAGNOSIS — Z86.718 PERSONAL HISTORY OF OTHER VENOUS THROMBOSIS AND EMBOLISM: ICD-10-CM

## 2019-07-04 DIAGNOSIS — J18.9 PNEUMONIA, UNSPECIFIED ORGANISM: ICD-10-CM

## 2019-07-04 DIAGNOSIS — A41.59 OTHER GRAM-NEGATIVE SEPSIS: ICD-10-CM

## 2019-07-04 DIAGNOSIS — Z96.653 PRESENCE OF ARTIFICIAL KNEE JOINT, BILATERAL: ICD-10-CM

## 2019-07-04 DIAGNOSIS — R65.20 SEVERE SEPSIS WITHOUT SEPTIC SHOCK: ICD-10-CM

## 2019-07-04 DIAGNOSIS — Z86.711 PERSONAL HISTORY OF PULMONARY EMBOLISM: ICD-10-CM

## 2019-07-04 DIAGNOSIS — J44.9 CHRONIC OBSTRUCTIVE PULMONARY DISEASE, UNSPECIFIED: ICD-10-CM

## 2019-07-04 DIAGNOSIS — Z79.01 LONG TERM (CURRENT) USE OF ANTICOAGULANTS: ICD-10-CM

## 2019-07-04 DIAGNOSIS — I10 ESSENTIAL (PRIMARY) HYPERTENSION: ICD-10-CM

## 2019-07-04 DIAGNOSIS — N17.0 ACUTE KIDNEY FAILURE WITH TUBULAR NECROSIS: ICD-10-CM

## 2019-07-04 DIAGNOSIS — G47.33 OBSTRUCTIVE SLEEP APNEA (ADULT) (PEDIATRIC): ICD-10-CM

## 2019-07-04 DIAGNOSIS — Z79.1 LONG TERM (CURRENT) USE OF NON-STEROIDAL ANTI-INFLAMMATORIES (NSAID): ICD-10-CM

## 2019-07-04 DIAGNOSIS — Z79.891 LONG TERM (CURRENT) USE OF OPIATE ANALGESIC: ICD-10-CM

## 2019-07-04 DIAGNOSIS — Z53.29 PROCEDURE AND TREATMENT NOT CARRIED OUT BECAUSE OF PATIENT'S DECISION FOR OTHER REASONS: ICD-10-CM

## 2019-07-04 DIAGNOSIS — J96.02 ACUTE RESPIRATORY FAILURE WITH HYPERCAPNIA: ICD-10-CM

## 2019-07-04 DIAGNOSIS — G43.909 MIGRAINE, UNSPECIFIED, NOT INTRACTABLE, WITHOUT STATUS MIGRAINOSUS: ICD-10-CM

## 2019-09-16 NOTE — DISCHARGE NOTE PROVIDER - CARE PROVIDERS DIRECT ADDRESSES
Outpatient Infusion Center Short Visit Progress Note    1330  Patient admitted to [facility] for Venofer 2/5 ambulatory in stable condition. Assessment completed. Patient c/o back pain and swelling in bilateral legs. Vital Signs:  Patient Vitals for the past 12 hrs:   Temp Pulse Resp BP   09/16/19 1536  85 18 106/57   09/16/19 1331 97.8 °F (36.6 °C) 94 18 118/58       PIV placed in right AC  with positive blood return. No labs drawn today. Medications:  Medications Administered     iron sucrose (VENOFER) 200 mg in 0.9% sodium chloride 100 mL, overfill volume 10 mL IVPB     Admin Date  09/16/2019 Action  New Bag Dose  200 mg Rate  110 mL/hr Route  IntraVENous Administered By  Beba Mccollum RN            *administered over 60 minutes*    PIV flushed and removed. Pressure dressing applied. 1545 Patient tolerated treatment well. Patient discharged from [facility] ambulatory in no distress at 1545. Patient aware of next appointment.     Future Appointments   Date Time Provider Dhruv Biswas   9/18/2019  2:00 PM SS INF7 CH2 <1H [facility]   9/19/2019  9:00 AM Stevie Ortiz MD [address] [place] [address]   9/19/2019 10:15 AM ULTRASOUND 1 SFM [facility]   9/20/2019  2:30 PM [facility]   9/23/2019  7:30 AM Stevie Ortiz MD [address] [place] [address]   10/31/2019  4:20 PM Nadine Hassan MD [address] [place] [address]
,DirectAddress_Unknown,[email or web address],DirectAddress_Unknown,[email or web address]

## 2019-10-10 ENCOUNTER — HOSPITAL ENCOUNTER (EMERGENCY)
Facility: HOSPITAL | Age: 69
Discharge: HOME | End: 2019-10-10
Attending: EMERGENCY MEDICINE
Payer: MEDICARE

## 2019-10-10 VITALS
DIASTOLIC BLOOD PRESSURE: 87 MMHG | HEIGHT: 59 IN | OXYGEN SATURATION: 97 % | RESPIRATION RATE: 18 BRPM | WEIGHT: 144 LBS | HEART RATE: 86 BPM | BODY MASS INDEX: 29.03 KG/M2 | SYSTOLIC BLOOD PRESSURE: 167 MMHG | TEMPERATURE: 98 F

## 2019-10-10 DIAGNOSIS — I99.8 ISCHEMIC TOE: Primary | ICD-10-CM

## 2019-10-10 PROCEDURE — 36415 COLL VENOUS BLD VENIPUNCTURE: CPT

## 2019-10-10 PROCEDURE — 99283 EMERGENCY DEPT VISIT LOW MDM: CPT

## 2019-10-10 PROCEDURE — 85025 COMPLETE CBC W/AUTO DIFF WBC: CPT | Performed by: EMERGENCY MEDICINE

## 2019-10-10 PROCEDURE — 80048 BASIC METABOLIC PNL TOTAL CA: CPT | Performed by: EMERGENCY MEDICINE

## 2019-10-10 RX ORDER — TRAMADOL HYDROCHLORIDE 50 MG/1
50 TABLET ORAL EVERY 6 HOURS PRN
Qty: 10 TABLET | Refills: 0 | Status: SHIPPED | OUTPATIENT
Start: 2019-10-10 | End: 2019-10-17

## 2019-10-10 NOTE — ED PROVIDER NOTES
Patient Seen in: [facility] AND [facility] Emergency Department      History   Patient presents with:  Lower Extremity Injury (musculoskeletal)    Stated Complaint:     HPI    The patient is a 42-year-old female with a history of coronary artery disease status p Cardiovascular: Normal rate, regular rhythm, normal heart sounds and intact distal pulses. Dorsalis pedal pulses obtained by Doppler bilaterally   Pulmonary/Chest: Effort normal and breath sounds normal.   Abdominal: Soft.  Bowel sounds are normal. She ex Cardiac Monitor: Pulse Readings from Last 1 Encounters:  10/10/19 : 86  , sinus, normal        Disposition and Plan     Clinical Impression:  Ischemic toe  (primary encounter diagnosis)    Disposition:  Discharge  10/15/2019  4:47 pm    Follow-up:  [facility]

## 2019-10-24 VITALS — WEIGHT: 143 LBS | BODY MASS INDEX: 29 KG/M2

## 2019-10-24 RX ORDER — PNV NO.95/FERROUS FUM/FOLIC AC 28MG-0.8MG
TABLET ORAL DAILY
COMMUNITY

## 2019-10-24 RX ORDER — ACETAMINOPHEN 500 MG
1000 TABLET ORAL EVERY 6 HOURS PRN
COMMUNITY

## 2019-10-24 RX ORDER — SPIRONOLACTONE 25 MG/1
25 TABLET ORAL DAILY
COMMUNITY

## 2019-10-24 RX ORDER — BUMETANIDE 1 MG/1
3 TABLET ORAL 2 TIMES DAILY
COMMUNITY

## 2019-10-24 RX ORDER — MULTIVITAMIN WITH IRON
250 TABLET ORAL DAILY
COMMUNITY

## 2019-10-25 RX ORDER — SODIUM CHLORIDE 9 MG/ML
INJECTION, SOLUTION INTRAVENOUS CONTINUOUS
Status: CANCELLED | OUTPATIENT
Start: 2019-10-25

## 2019-10-28 ENCOUNTER — HOSPITAL ENCOUNTER (OUTPATIENT)
Dept: INTERVENTIONAL RADIOLOGY/VASCULAR | Facility: HOSPITAL | Age: 69
Discharge: HOME | End: 2019-10-28
Attending: SURGERY
Payer: MEDICARE

## 2019-11-11 ENCOUNTER — HOSPITAL ENCOUNTER (EMERGENCY)
Facility: HOSPITAL | Age: 69
Discharge: HOME | End: 2019-11-11
Attending: EMERGENCY MEDICINE
Payer: MEDICARE

## 2019-11-11 ENCOUNTER — APPOINTMENT (OUTPATIENT)
Dept: GENERAL RADIOLOGY | Facility: HOSPITAL | Age: 69
End: 2019-11-11
Attending: EMERGENCY MEDICINE
Payer: MEDICARE

## 2019-11-11 VITALS
HEART RATE: 90 BPM | BODY MASS INDEX: 28.02 KG/M2 | RESPIRATION RATE: 18 BRPM | HEIGHT: 59 IN | OXYGEN SATURATION: 96 % | DIASTOLIC BLOOD PRESSURE: 65 MMHG | SYSTOLIC BLOOD PRESSURE: 144 MMHG | TEMPERATURE: 98 F | WEIGHT: 139 LBS

## 2019-11-11 DIAGNOSIS — R06.02 SHORTNESS OF BREATH: Primary | ICD-10-CM

## 2019-11-11 PROCEDURE — 84484 ASSAY OF TROPONIN QUANT: CPT | Performed by: EMERGENCY MEDICINE

## 2019-11-11 PROCEDURE — 99284 EMERGENCY DEPT VISIT MOD MDM: CPT

## 2019-11-11 PROCEDURE — 94640 AIRWAY INHALATION TREATMENT: CPT

## 2019-11-11 PROCEDURE — 93010 ELECTROCARDIOGRAM REPORT: CPT | Performed by: EMERGENCY MEDICINE

## 2019-11-11 PROCEDURE — 93005 ELECTROCARDIOGRAM TRACING: CPT

## 2019-11-11 PROCEDURE — 36415 COLL VENOUS BLD VENIPUNCTURE: CPT

## 2019-11-11 PROCEDURE — 71045 X-RAY EXAM CHEST 1 VIEW: CPT | Performed by: EMERGENCY MEDICINE

## 2019-11-11 PROCEDURE — 83880 ASSAY OF NATRIURETIC PEPTIDE: CPT | Performed by: EMERGENCY MEDICINE

## 2019-11-11 PROCEDURE — 85379 FIBRIN DEGRADATION QUANT: CPT | Performed by: EMERGENCY MEDICINE

## 2019-11-11 PROCEDURE — 85025 COMPLETE CBC W/AUTO DIFF WBC: CPT | Performed by: EMERGENCY MEDICINE

## 2019-11-11 PROCEDURE — 80048 BASIC METABOLIC PNL TOTAL CA: CPT | Performed by: EMERGENCY MEDICINE

## 2019-11-11 RX ORDER — PREDNISONE 20 MG/1
40 TABLET ORAL DAILY
Qty: 10 TABLET | Refills: 0 | Status: SHIPPED | OUTPATIENT
Start: 2019-11-11 | End: 2019-11-16

## 2019-11-11 RX ORDER — PREDNISONE 20 MG/1
60 TABLET ORAL ONCE
Status: COMPLETED | OUTPATIENT
Start: 2019-11-11 | End: 2019-11-11

## 2019-11-11 RX ORDER — IPRATROPIUM BROMIDE AND ALBUTEROL SULFATE 2.5; .5 MG/3ML; MG/3ML
3 SOLUTION RESPIRATORY (INHALATION) ONCE
Status: COMPLETED | OUTPATIENT
Start: 2019-11-11 | End: 2019-11-11

## 2019-11-12 NOTE — ED PROVIDER NOTES
Patient Seen in: [facility] AND [facility] Emergency Department      History   Patient presents with:  Dyspnea DENTON SOB (respiratory)    Stated Complaint: sob    HPI    27-year-old female with history of CAD, CHF, COPD, diabetes, hypertension, hyperlipidemia, lup Negative for dysuria, flank pain and frequency. Musculoskeletal: Negative for back pain. Skin: Negative for rash. Neurological: Negative for weakness, light-headedness and headaches. All other systems reviewed and are negative.       Positive for st normal speech             ED Course     EKG    Rate, intervals and axes as noted on EKG Report.   Rate: 93  Rhythm: Sinus Rhythm, RBBB  Reading: normal for rate, abnormal for rhythm, ST depressions in V1-V6      Cardiac Monitor:    Patient placed on the car Range    Pro-Beta Natriuretic Peptide 1,024 (H) <125 pg/mL   CBC W/ DIFFERENTIAL    Collection Time: 11/11/19  7:56 PM   Result Value Ref Range    WBC 7.8 4.0 - 11.0 x10(3) uL    RBC 4.40 3.80 - 5.30 x10(6)uL    HGB 12.6 12.0 - 16.0 g/dL    HCT 38.9 35.0 - electrolytes reassuring  - duoneb ordered  - pt feeling improved after neb  - prednisone ordered  - supportive care discussed  - return precautions discussed      The patient was informed of their elevated blood pressure reading in the Emergency Department medications    predniSONE 20 MG Oral Tab  Take 2 tablets (40 mg total) by mouth daily for 5 days.   Qty: 10 tablet Refills: 0

## 2019-11-12 NOTE — CM/SW NOTE
Spoke with patient regarding transportation home. Patient stated that she has no one to pick her up and no money for a taxi. LYFT arranged. Walked patient to waiting area. RN updated.

## 2020-01-12 NOTE — PATIENT PROFILE ADULT. - FUNCTIONAL SCREEN CURRENT LEVEL: COMMUNICATION, MLM
PLEASE READ YOUR DISCHARGE INSTRUCTIONS ENTIRELY AS IT CONTAINS IMPORTANT INFORMATION.      Please drink plenty of fluids.    Please get plenty of rest.    Please return here or go to the Emergency Department for any concerns or worsening of condition.    Can continue mucinex    If not allergic, please take over the counter Tylenol (Acetaminophen) and/or Motrin (Ibuprofen) as directed for control of pain and/or fever.  Please follow up with your primary care doctor or specialist as needed.    Sore throat recommendations: Warm fluids, warm salt water gargles, throat lozenges, tea, honey, soup, rest, hydration.    Use over the counter flonase: one spray each nostril twice daily OR two sprays each nostril once daily.     Sinus rinses DO NOT USE TAP WATER, if you must, water must be a rolling boil for 1 minute, let it cool, then use.  May use distilled water, or over the counter nasal saline rinses.  Vics vapor rub in shower to help open nasal passages.  May use nasal gel to keep passages moisturized.  May use Nasal saline sprays during the day for added relief of congestion.   For those who go to the gym, please do not use the sauna or steam room now to clear sinuses.    If you  smoke, please stop smoking.      Please return or see your primary care doctor if you develop new or worsening symptoms.     Please arrange follow up with your primary medical clinic as soon as possible. You must understand that you've received an Urgent Care treatment only and that you may be released before all of your medical problems are known or treated. You, the patient, will arrange for follow up as instructed. If your symptoms worsen or fail to improve you should go to the Emergency Room.    Sinusitis (Antibiotic Treatment)    The sinuses are air-filled spaces within the bones of the face. They connect to the inside of the nose. Sinusitis is an inflammation of the tissue lining the sinus cavity. Sinus inflammation can occur during a  cold. It can also be due to allergies to pollens and other particles in the air. Sinusitis can cause symptoms of sinus congestion and fullness. A sinus infection causes fever, headache and facial pain. There is often green or yellow drainage from the nose or into the back of the throat (post-nasal drip). You have been given antibiotics to treat this condition.  Home care:  · Take the full course of antibiotics as instructed. Do not stop taking them, even if you feel better.  · Drink plenty of water, hot tea, and other liquids. This may help thin mucus. It also may promote sinus drainage.  · Heat may help soothe painful areas of the face. Use a towel soaked in hot water. Or,  the shower and direct the hot spray onto your face. Using a vaporizer along with a menthol rub at night may also help.   · An expectorant containing guaifenesin may help thin the mucus and promote drainage from the sinuses.  · Over-the-counter decongestants may be used unless a similar medicine was prescribed. Nasal sprays work the fastest. Use one that contains phenylephrine or oxymetazoline. First blow the nose gently. Then use the spray. Do not use these medicines more often than directed on the label or symptoms may get worse. You may also use tablets containing pseudoephedrine. Avoid products that combine ingredients, because side effects may be increased. Read labels. You can also ask the pharmacist for help. (NOTE: Persons with high blood pressure should not use decongestants. They can raise blood pressure.)  · Over-the-counter antihistamines may help if allergies contributed to your sinusitis.    · Do not use nasal rinses or irrigation during an acute sinus infection, unless told to by your health care provider. Rinsing may spread the infection to other sinuses.  · Use acetaminophen or ibuprofen to control pain, unless another pain medicine was prescribed. (If you have chronic liver or kidney disease or ever had a stomach ulcer,  talk with your doctor before using these medicines. Aspirin should never be used in anyone under 18 years of age who is ill with a fever. It may cause severe liver damage.)  · Don't smoke. This can worsen symptoms.  Follow-up care  Follow up with your healthcare provider or our staff if you are not improving within the next week.  When to seek medical advice  Call your healthcare provider if any of these occur:  · Facial pain or headache becoming more severe  · Stiff neck  · Unusual drowsiness or confusion  · Swelling of the forehead or eyelids  · Vision problems, including blurred or double vision  · Fever of 100.4ºF (38ºC) or higher, or as directed by your healthcare provider  · Seizure  · Breathing problems  · Symptoms not resolving within 10 days  Date Last Reviewed: 4/13/2015  © 8605-5153 [employer]. [address] [place] [address], [place] [address]. All rights reserved. This information is not intended as a substitute for professional medical care. Always follow your healthcare professional's instructions.         (0) understands/communicates without difficulty

## 2020-01-30 NOTE — H&P ADULT - NSHPPOACENTRALVENOUSCATHETER_GEN_ALL_CORE
NS dose changed to 1000 mL secondary to pt dialysis hx. Order from ROMERO Linares via secure chat   no

## 2020-02-03 NOTE — DISCHARGE NOTE ADULT - DO YOU HAVE DIFFICULTY CLIMBING STAIRS
Yes Imiquimod Counseling:  I discussed with the patient the risks of imiquimod including but not limited to erythema, scaling, itching, weeping, crusting, and pain.  Patient understands that the inflammatory response to imiquimod is variable from person to person and was educated regarded proper titration schedule.  If flu-like symptoms develop, patient knows to discontinue the medication and contact us.

## 2021-05-11 NOTE — DISCHARGE NOTE NURSING/CASE MANAGEMENT/SOCIAL WORK - NSDCCRNUMBER_GEN_ALL_CORE_FT
[address] [facility] ACUTE CARE PHYSICAL THERAPY EVALUATION  Ghulam Padilla, 1941, 1115/1115-A, 5/11/2021    History  [place]:  The primary encounter diagnosis was Injury of left knee, initial encounter. A diagnosis of Closed fracture of left tibial plateau, initial encounter was also pertinent to this visit. Patient  has a past medical history of Acid reflux, Anxiety, Bronchitis, Cancer ([address] Carotid stenosis, Cerebral embolism with cerebral infarction ([address] Chronic back pain, Depression, History of blood transfusion, History of external beam radiation therapy, Hyperlipidemia, Hypertension, Prolonged emergence from general anesthesia, RA (rheumatoid arthritis) ([address] Seizure ([address] Sleep apnea, Teeth missing, Urinary incontinence, UTI (urinary tract infection), Wears dentures, Wears glasses, and Wears partial dentures. Patient  has a past surgical history that includes Carpal tunnel release; Dental surgery; Colonoscopy (In Past); Hysterectomy, vaginal (1960's); Dilation and curettage of uterus (1960's); Carotid endarterectomy (Right, [id number]); Port Surgery (N/A, 3/2/2021); and ARTHROCENTESIS / ASPIRATION / INJECTION KNEE (5/11/2021). Subjective:  Patient states:  \"I've got a lot going on. \"    Pain:  8/10 pain L knee, aching.     Communication with other providers:  Handoff to RN, OT  Restrictions: general precautions, WBAT L LE, fall risk    Home Setup/Prior level of function  Social/Functional History  Lives With: Son(pt reports son is handicapped; daughter stays at night but works during day)  Type of Home: [address] [facility] [facility] [place],[address]: One level  Home Access: Stairs to enter without rails  Entrance Stairs - Number of Steps: 1  Bathroom Shower/Tub: Tub/Shower unit, Shower chair with back  [employer]: Grab bars in [address], 4 wheeled walker(Uses cane in home; Delsie Frock in community)  [address] Help From: Family  ADL Assistance: Needs assistance(family assists with tub transfers and lower pain, decreased endurance, impaired balance, impaired transfers, and impaired gait. Pt would benefit from continued acute care PT as well as SNF placement after discharge to continue to address impairments. Complexity: moderate    Prognosis: Good, no significant barriers to participation at this time.      Plan Times per week: 3+/week     Equipment: TBD at next level of care    Goals:  Short term goals  Time Frame for Short term goals: 1 week  Short term goal 1: Pt to complete all bed mobility mod I  Short term goal 2: Pt to complete all STS transfers to/from bed, commode, and chair mod I  Short term goal 3: Pt to ambulate 48' with LRAD CGA       Treatment plan:  Bed mobility, transfers, balance, gait, TA, TX    Recommendations for NURSING mobility: amb with RW and gait belt    Time:   Time in: 0958  Time out: 1031  Timed treatment minutes: 23  Total time: 33    Electronically signed by:    Anne Marie Kendrick PT  5/11/2021, 12:34 PM [phone]

## 2022-04-22 ENCOUNTER — INPATIENT (INPATIENT)
Facility: HOSPITAL | Age: 72
LOS: 7 days | Discharge: HOME | End: 2022-04-30
Attending: INTERNAL MEDICINE | Admitting: INTERNAL MEDICINE
Payer: MEDICARE

## 2022-04-22 VITALS
SYSTOLIC BLOOD PRESSURE: 183 MMHG | HEART RATE: 119 BPM | HEIGHT: 66 IN | DIASTOLIC BLOOD PRESSURE: 103 MMHG | RESPIRATION RATE: 22 BRPM | TEMPERATURE: 98 F | OXYGEN SATURATION: 97 % | WEIGHT: 293 LBS

## 2022-04-22 DIAGNOSIS — I10 ESSENTIAL (PRIMARY) HYPERTENSION: ICD-10-CM

## 2022-04-22 DIAGNOSIS — R79.89 OTHER SPECIFIED ABNORMAL FINDINGS OF BLOOD CHEMISTRY: ICD-10-CM

## 2022-04-22 DIAGNOSIS — E66.01 MORBID (SEVERE) OBESITY DUE TO EXCESS CALORIES: ICD-10-CM

## 2022-04-22 DIAGNOSIS — R04.2 HEMOPTYSIS: ICD-10-CM

## 2022-04-22 DIAGNOSIS — N18.9 CHRONIC KIDNEY DISEASE, UNSPECIFIED: ICD-10-CM

## 2022-04-22 DIAGNOSIS — G47.33 OBSTRUCTIVE SLEEP APNEA (ADULT) (PEDIATRIC): ICD-10-CM

## 2022-04-22 DIAGNOSIS — R91.8 OTHER NONSPECIFIC ABNORMAL FINDING OF LUNG FIELD: ICD-10-CM

## 2022-04-22 LAB
ALBUMIN SERPL ELPH-MCNC: 2.3 G/DL — LOW (ref 3.3–5)
ALP SERPL-CCNC: 180 U/L — HIGH (ref 40–120)
ALT FLD-CCNC: 11 U/L — LOW (ref 12–78)
ANION GAP SERPL CALC-SCNC: 10 MMOL/L — SIGNIFICANT CHANGE UP (ref 5–17)
APTT BLD: 31.5 SEC — SIGNIFICANT CHANGE UP (ref 27.5–35.5)
AST SERPL-CCNC: 13 U/L — LOW (ref 15–37)
BASE EXCESS BLDA CALC-SCNC: -2.9 MMOL/L — LOW (ref -2–3)
BASOPHILS # BLD AUTO: 0.02 K/UL — SIGNIFICANT CHANGE UP (ref 0–0.2)
BASOPHILS NFR BLD AUTO: 0.2 % — SIGNIFICANT CHANGE UP (ref 0–2)
BILIRUB SERPL-MCNC: 0.2 MG/DL — SIGNIFICANT CHANGE UP (ref 0.2–1.2)
BLD GP AB SCN SERPL QL: SIGNIFICANT CHANGE UP
BLOOD GAS COMMENTS: SIGNIFICANT CHANGE UP
BLOOD GAS SOURCE: SIGNIFICANT CHANGE UP
BUN SERPL-MCNC: 52 MG/DL — HIGH (ref 7–23)
CALCIUM SERPL-MCNC: 6.6 MG/DL — LOW (ref 8.5–10.1)
CHLORIDE SERPL-SCNC: 109 MMOL/L — HIGH (ref 96–108)
CO2 BLDA-SCNC: 24 MMOL/L — SIGNIFICANT CHANGE UP (ref 19–24)
CO2 SERPL-SCNC: 24 MMOL/L — SIGNIFICANT CHANGE UP (ref 22–31)
CREAT SERPL-MCNC: 7.23 MG/DL — HIGH (ref 0.5–1.3)
D DIMER BLD IA.RAPID-MCNC: 802 NG/ML DDU — HIGH
EGFR: 6 ML/MIN/1.73M2 — LOW
EOSINOPHIL # BLD AUTO: 0.15 K/UL — SIGNIFICANT CHANGE UP (ref 0–0.5)
EOSINOPHIL NFR BLD AUTO: 1.7 % — SIGNIFICANT CHANGE UP (ref 0–6)
FERRITIN SERPL-MCNC: 358 NG/ML — HIGH (ref 15–150)
FLUAV AG NPH QL: SIGNIFICANT CHANGE UP
FLUBV AG NPH QL: SIGNIFICANT CHANGE UP
GLUCOSE SERPL-MCNC: 93 MG/DL — SIGNIFICANT CHANGE UP (ref 70–99)
HCO3 BLDA-SCNC: 23 MMOL/L — SIGNIFICANT CHANGE UP (ref 21–28)
HCT VFR BLD CALC: 31.6 % — LOW (ref 34.5–45)
HGB BLD-MCNC: 9.8 G/DL — LOW (ref 11.5–15.5)
HOROWITZ INDEX BLDA+IHG-RTO: 32 — SIGNIFICANT CHANGE UP
IMM GRANULOCYTES NFR BLD AUTO: 0.6 % — SIGNIFICANT CHANGE UP (ref 0–1.5)
INR BLD: 1.04 RATIO — SIGNIFICANT CHANGE UP (ref 0.88–1.16)
LACTATE SERPL-SCNC: 1.1 MMOL/L — SIGNIFICANT CHANGE UP (ref 0.7–2)
LYMPHOCYTES # BLD AUTO: 1.7 K/UL — SIGNIFICANT CHANGE UP (ref 1–3.3)
LYMPHOCYTES # BLD AUTO: 19.3 % — SIGNIFICANT CHANGE UP (ref 13–44)
MAGNESIUM SERPL-MCNC: 1.3 MG/DL — LOW (ref 1.6–2.6)
MCHC RBC-ENTMCNC: 30.4 PG — SIGNIFICANT CHANGE UP (ref 27–34)
MCHC RBC-ENTMCNC: 31 G/DL — LOW (ref 32–36)
MCV RBC AUTO: 98.1 FL — SIGNIFICANT CHANGE UP (ref 80–100)
MONOCYTES # BLD AUTO: 0.35 K/UL — SIGNIFICANT CHANGE UP (ref 0–0.9)
MONOCYTES NFR BLD AUTO: 4 % — SIGNIFICANT CHANGE UP (ref 2–14)
NEUTROPHILS # BLD AUTO: 6.55 K/UL — SIGNIFICANT CHANGE UP (ref 1.8–7.4)
NEUTROPHILS NFR BLD AUTO: 74.2 % — SIGNIFICANT CHANGE UP (ref 43–77)
NRBC # BLD: 0 /100 WBCS — SIGNIFICANT CHANGE UP (ref 0–0)
NT-PROBNP SERPL-SCNC: 6540 PG/ML — HIGH (ref 0–125)
PCO2 BLDA: 41 MMHG — SIGNIFICANT CHANGE UP (ref 32–46)
PH BLD: 7.35 — SIGNIFICANT CHANGE UP (ref 7.35–7.45)
PLATELET # BLD AUTO: 303 K/UL — SIGNIFICANT CHANGE UP (ref 150–400)
PO2 BLDA: 136 MMHG — HIGH (ref 83–108)
POTASSIUM SERPL-MCNC: 4.5 MMOL/L — SIGNIFICANT CHANGE UP (ref 3.5–5.3)
POTASSIUM SERPL-SCNC: 4.5 MMOL/L — SIGNIFICANT CHANGE UP (ref 3.5–5.3)
PROT SERPL-MCNC: 7.3 GM/DL — SIGNIFICANT CHANGE UP (ref 6–8.3)
PROTHROM AB SERPL-ACNC: 12.4 SEC — SIGNIFICANT CHANGE UP (ref 10.5–13.4)
RBC # BLD: 3.22 M/UL — LOW (ref 3.8–5.2)
RBC # FLD: 12.5 % — SIGNIFICANT CHANGE UP (ref 10.3–14.5)
SAO2 % BLDA: 99.4 % — HIGH (ref 94–98)
SARS-COV-2 RNA SPEC QL NAA+PROBE: SIGNIFICANT CHANGE UP
SODIUM SERPL-SCNC: 143 MMOL/L — SIGNIFICANT CHANGE UP (ref 135–145)
TROPONIN I, HIGH SENSITIVITY RESULT: 18.3 NG/L — SIGNIFICANT CHANGE UP
WBC # BLD: 8.82 K/UL — SIGNIFICANT CHANGE UP (ref 3.8–10.5)
WBC # FLD AUTO: 8.82 K/UL — SIGNIFICANT CHANGE UP (ref 3.8–10.5)

## 2022-04-22 PROCEDURE — 88346 IMFLUOR 1ST 1ANTB STAIN PX: CPT | Mod: 26

## 2022-04-22 PROCEDURE — 88305 TISSUE EXAM BY PATHOLOGIST: CPT | Mod: 26

## 2022-04-22 PROCEDURE — 88350 IMFLUOR EA ADDL 1ANTB STN PX: CPT | Mod: 26

## 2022-04-22 PROCEDURE — 99291 CRITICAL CARE FIRST HOUR: CPT

## 2022-04-22 PROCEDURE — 99223 1ST HOSP IP/OBS HIGH 75: CPT

## 2022-04-22 PROCEDURE — 76770 US EXAM ABDO BACK WALL COMP: CPT | Mod: 26

## 2022-04-22 PROCEDURE — 93010 ELECTROCARDIOGRAM REPORT: CPT

## 2022-04-22 PROCEDURE — 74176 CT ABD & PELVIS W/O CONTRAST: CPT | Mod: 26,MA

## 2022-04-22 PROCEDURE — 88348 ELECTRON MICROSCOPY DX: CPT | Mod: 26

## 2022-04-22 PROCEDURE — 88313 SPECIAL STAINS GROUP 2: CPT | Mod: 26

## 2022-04-22 PROCEDURE — 71275 CT ANGIOGRAPHY CHEST: CPT | Mod: 26,MA

## 2022-04-22 RX ORDER — AMLODIPINE BESYLATE 2.5 MG/1
5 TABLET ORAL DAILY
Refills: 0 | Status: DISCONTINUED | OUTPATIENT
Start: 2022-04-22 | End: 2022-04-25

## 2022-04-22 RX ORDER — ACETAMINOPHEN 500 MG
650 TABLET ORAL EVERY 6 HOURS
Refills: 0 | Status: DISCONTINUED | OUTPATIENT
Start: 2022-04-22 | End: 2022-04-30

## 2022-04-22 RX ORDER — LANOLIN ALCOHOL/MO/W.PET/CERES
3 CREAM (GRAM) TOPICAL AT BEDTIME
Refills: 0 | Status: DISCONTINUED | OUTPATIENT
Start: 2022-04-22 | End: 2022-04-30

## 2022-04-22 RX ORDER — ALBUTEROL 90 UG/1
2 AEROSOL, METERED ORAL EVERY 6 HOURS
Refills: 0 | Status: DISCONTINUED | OUTPATIENT
Start: 2022-04-22 | End: 2022-04-30

## 2022-04-22 RX ORDER — ACETAMINOPHEN 500 MG
975 TABLET ORAL ONCE
Refills: 0 | Status: COMPLETED | OUTPATIENT
Start: 2022-04-22 | End: 2022-04-22

## 2022-04-22 RX ORDER — METOPROLOL TARTRATE 50 MG
100 TABLET ORAL DAILY
Refills: 0 | Status: DISCONTINUED | OUTPATIENT
Start: 2022-04-22 | End: 2022-04-30

## 2022-04-22 RX ORDER — SODIUM CHLORIDE 9 MG/ML
1000 INJECTION, SOLUTION INTRAVENOUS
Refills: 0 | Status: DISCONTINUED | OUTPATIENT
Start: 2022-04-22 | End: 2022-04-28

## 2022-04-22 RX ORDER — MAGNESIUM SULFATE 500 MG/ML
1 VIAL (ML) INJECTION ONCE
Refills: 0 | Status: COMPLETED | OUTPATIENT
Start: 2022-04-22 | End: 2022-04-22

## 2022-04-22 RX ORDER — OXYCODONE HYDROCHLORIDE 5 MG/1
10 TABLET ORAL ONCE
Refills: 0 | Status: DISCONTINUED | OUTPATIENT
Start: 2022-04-22 | End: 2022-04-22

## 2022-04-22 RX ADMIN — Medication 100 MILLIGRAM(S): at 18:34

## 2022-04-22 RX ADMIN — OXYCODONE HYDROCHLORIDE 10 MILLIGRAM(S): 5 TABLET ORAL at 22:53

## 2022-04-22 RX ADMIN — Medication 975 MILLIGRAM(S): at 13:43

## 2022-04-22 RX ADMIN — Medication 3 MILLIGRAM(S): at 22:13

## 2022-04-22 RX ADMIN — Medication 100 GRAM(S): at 16:31

## 2022-04-22 RX ADMIN — AMLODIPINE BESYLATE 5 MILLIGRAM(S): 2.5 TABLET ORAL at 22:13

## 2022-04-22 NOTE — H&P ADULT - PROBLEM SELECTOR PLAN 3
CT chest with Enlarging semisolid opacity at the right lung apex, which may represent adenocarcinoma  pulmonary consulted- Dr Canseco  oncology consulted- Dr Meneses

## 2022-04-22 NOTE — ED PROVIDER NOTE - PHYSICAL EXAMINATION
Gen: Alert, NAD  Head: NC, AT   Eyes: PERRL, EOMI, normal lids/conjunctiva  ENT: normal hearing, patent oropharynx without erythema/exudate, uvula midline  Neck: supple, no tenderness, Trachea midline  Pulm: Bilateral BS, normal resp effort, no wheeze/stridor/retractions. active hemoptysis. on 3lNC   CV: RRR, no M/R/G, 2+ radial and dp pulses bl, no edema  Abd: soft, NT/ND, +BS, no hepatosplenomegaly  Mskel: extremities x4 with normal ROM and no joint effusions. no ctl spine ttp.   Skin: no rash, no bruising   Neuro: AAOx3, no sensory/motor deficits, CN 2-12 intact

## 2022-04-22 NOTE — ED ADULT NURSE NOTE - OBJECTIVE STATEMENT
As per patient, coughing up blood starting this morning. Endorsing SOB and headache. hx: DVT. pt is axo4, breathing spontaneous and unlabored.

## 2022-04-22 NOTE — CONSULT NOTE ADULT - SUBJECTIVE AND OBJECTIVE BOX
Patient chart reviewed, full consult to follow.     No NSAIDs or constitutional sx  Increasing PINTO for 1 month     OC, lung mass hemoptysis;   R/o lung ca vs occult pulm renal vasculitis     Serologies; empiric IVF  IR, Heme onc eval    Will follow course     Thank you for the courtesy of this consultation. [facility] NEPHROLOGY SERVICES, [facility]  NEPHROLOGY AND HYPERTENSION  [address]  [address]  [place] [address]  [phone]    MD ARVIND VENCES MD ANDREY GONCHARUK, MD MADHU KORRAPATI, MD YELENA ROSENBERG, MD BINNY KOSHY, MD CHRISTOPHER CAPUTO, MD EDWARD BOVER, MD      Information from chart:  "Patient is a 71y old  Female who presents with a chief complaint of OC, hemoptysis, right lung mass (2022 17:40)    HPI:  71 years old female with h/o HTN, LARRY not using CPAP, h/o TKA complicated by DVT/PE in  ( stopped AC in ), morbid obesity prsent to ED with hemoptysis for 1 day. Patient reported gradual worsening SOB for 1 month, associated with loss of appetite. Hemoptysis started today AM with coughing up some blood clots. Not on blood thinner. Never smoke. No family h/o malignancy.  Hypertensive, tachycardic in ED. EKG with sinus tachy, , QTc 516. No leukocytosis, Plt 303, ddimer 802, K 4.5, Cr 7.23 ( Cr 1.7 in 2021 [facility]), hsTnT 18.3, proBNP 6540, magnesium 1.3. CT chest with no PE. Enlarging semisolid opacity at the right lung apex, which may represent adenocarcinoma. CT abd/pelvis with no acute pathology    SH- no toxic habits  FH: mother-HTN, no family h/o malignancy (2022 15:35)   "    Dyspnea on exertion for 1 month  Hemoptysis for 1 day  hx of DVT and PE post knee surgery in the past     Cr 1.7 in 2021    PAST MEDICAL & SURGICAL HISTORY:  Osteoarthritis    Pulmonary embolism    Joint infection    Dysfunctional uterine bleeding    Obesity    Essential hypertension  Hypertension    Obstructive sleep apnea syndrome  Obstructive sleep apnea    History of pulmonary embolism   s/p IVC filter    Arthropathy  Arthritis    Migraine  Migraines    Morbid obesity    Total knee replacement status    Status post hysterectomy    Status post cholecystectomy    Other acquired absence of organ  History of cholecystectomy    Status post total hysterectomy  partial    History of total knee replacement  with revisions x 3      FAMILY HISTORY:  FH: type 2 diabetes mellitus    FHx: hypertension      Allergies    sulfa drugs (Other; Rash)  Sulfur (Unknown)  trimethoprim (Other; Rash)    Intolerances      Home Medications:  losartan 100 mg oral tablet: 1 tab(s) orally once a day (2022 13:15)    MEDICATIONS  (STANDING):  amLODIPine   Tablet 5 milliGRAM(s) Oral daily  metoprolol succinate  milliGRAM(s) Oral daily    MEDICATIONS  (PRN):  acetaminophen     Tablet .. 650 milliGRAM(s) Oral every 6 hours PRN Mild Pain (1 - 3), Moderate Pain (4 - 6)  ALBUTerol    90 MICROgram(s) HFA Inhaler 2 Puff(s) Inhalation every 6 hours PRN Shortness of Breath and/or Wheezing  melatonin 3 milliGRAM(s) Oral at bedtime PRN Insomnia    Vital Signs Last 24 Hrs  T(C): 36.9 (2022 17:49), Max: 36.9 (2022 17:49)  T(F): 98.4 (2022 17:49), Max: 98.4 (2022 17:49)  HR: 105 (2022 17:49) (105 - 119)  BP: 178/96 (2022 17:49) (156/78 - 183/103)  BP(mean): --  RR: 16 (2022 17:49) ( - )  SpO2: 98% (2022 17:49) (97% - 99%)    Daily Height in cm: 167.64 (2022 12:09)    Daily Weight in k.5 (2022 17:49)    CAPILLARY BLOOD GLUCOSE        PHYSICAL EXAM:      T(C): 36.9 (22 @ 17:49), Max: 36.9 (22 @ 17:49)  HR: 105 (22 @ 17:49) (105 - 119)  BP: 178/96 (22 @ 17:49) (156/78 - 183/103)  RR: 16 (22 @ 17:49) ( - )  SpO2: 98% (22 @ 17:49) (97% - 99%)  Wt(kg): --  Lungs clear  Heart S1S2  Abd soft NT ND  Extremities:   tr edema                  143  |  109<H>  |  52<H>  ----------------------------<  93  4.5   |  24  |  7.23<H>    Ca    6.6<L>      2022 13:02  Mg     1.3         TPro  7.3  /  Alb  2.3<L>  /  TBili  0.2  /  DBili  x   /  AST  13<L>  /  ALT  11<L>  /  AlkPhos  180<H>                            9.8    8.82  )-----------( 303      ( 2022 13:02 )             31.6     Creatinine Trend: 7.23<--    ABG - ( 2022 12:41 )  pH, Arterial: x     pH, Blood: 7.35  /  pCO2: 41    /  pO2: 136   / HCO3: 23    / Base Excess: -2.9  /  SaO2: 99.4                  Assessment     Plan      Chay Mora MD        OC, CKD 3, advancing; lung mass hemoptysis;   R/o lung ca vs occult pulm renal vasculitis; CTD disease;     Plan  Serologies; empiric IVF  IR, Heme onc eval  Will follow course.    Will follow course     Thank you for the courtesy of this consultation. [facility] NEPHROLOGY SERVICES, [facility]  NEPHROLOGY AND HYPERTENSION  [address]  [address]  [place] [address]  [phone]    MD ARVIND VENCES MD ANDREY GONCHARUK, MD MADHU KORRAPATI, MD YELENA ROSENBERG, MD BINNY KOSHY, MD CHRISTOPHER CAPUTO, MD EDWARD BOVER, MD      Information from chart:  "Patient is a 71y old  Female who presents with a chief complaint of OC, hemoptysis, right lung mass (2022 17:40)    HPI:  71 years old female with h/o HTN, LARRY not using CPAP, h/o TKA complicated by DVT/PE in  ( stopped AC in ), morbid obesity prsent to ED with hemoptysis for 1 day. Patient reported gradual worsening SOB for 1 month, associated with loss of appetite. Hemoptysis started today AM with coughing up some blood clots. Not on blood thinner. Never smoke. No family h/o malignancy.  Hypertensive, tachycardic in ED. EKG with sinus tachy, , QTc 516. No leukocytosis, Plt 303, ddimer 802, K 4.5, Cr 7.23 ( Cr 1.7 in 2021 [facility]), hsTnT 18.3, proBNP 6540, magnesium 1.3. CT chest with no PE. Enlarging semisolid opacity at the right lung apex, which may represent adenocarcinoma. CT abd/pelvis with no acute pathology    SH- no toxic habits  FH: mother-HTN, no family h/o malignancy (2022 15:35)   "    Dyspnea on exertion for 1 month  Hemoptysis for 1 day  hx of DVT and PE post knee surgery in the past     Cr 1.7 in 2021    PAST MEDICAL & SURGICAL HISTORY:  Osteoarthritis    Pulmonary embolism    Joint infection    Dysfunctional uterine bleeding    Obesity    Essential hypertension  Hypertension    Obstructive sleep apnea syndrome  Obstructive sleep apnea    History of pulmonary embolism   s/p IVC filter    Arthropathy  Arthritis    Migraine  Migraines    Morbid obesity    Total knee replacement status    Status post hysterectomy    Status post cholecystectomy    Other acquired absence of organ  History of cholecystectomy    Status post total hysterectomy  partial    History of total knee replacement  with revisions x 3      FAMILY HISTORY:  FH: type 2 diabetes mellitus    FHx: hypertension      Allergies    sulfa drugs (Other; Rash)  Sulfur (Unknown)  trimethoprim (Other; Rash)    Intolerances      Home Medications:  losartan 100 mg oral tablet: 1 tab(s) orally once a day (2022 13:15)    MEDICATIONS  (STANDING):  amLODIPine   Tablet 5 milliGRAM(s) Oral daily  metoprolol succinate  milliGRAM(s) Oral daily    MEDICATIONS  (PRN):  acetaminophen     Tablet .. 650 milliGRAM(s) Oral every 6 hours PRN Mild Pain (1 - 3), Moderate Pain (4 - 6)  ALBUTerol    90 MICROgram(s) HFA Inhaler 2 Puff(s) Inhalation every 6 hours PRN Shortness of Breath and/or Wheezing  melatonin 3 milliGRAM(s) Oral at bedtime PRN Insomnia    Vital Signs Last 24 Hrs  T(C): 36.9 (2022 17:49), Max: 36.9 (2022 17:49)  T(F): 98.4 (2022 17:49), Max: 98.4 (2022 17:49)  HR: 105 (2022 17:49) (105 - 119)  BP: 178/96 (2022 17:49) (156/78 - 183/103)  BP(mean): --  RR: 16 (2022 17:49) ( - )  SpO2: 98% (2022 17:49) (97% - 99%)    Daily Height in cm: 167.64 (2022 12:09)    Daily Weight in k.5 (2022 17:49)    CAPILLARY BLOOD GLUCOSE        PHYSICAL EXAM:      T(C): 36.9 (22 @ 17:49), Max: 36.9 (22 @ 17:49)  HR: 105 (22 @ 17:49) (105 - 119)  BP: 178/96 (22 @ 17:49) (156/78 - 183/103)  RR: 16 (22 @ 17:49) ( - )  SpO2: 98% (22 @ 17:49) (97% - 99%)  Wt(kg): --  Lungs clear  Heart S1S2  Abd soft NT ND  Extremities:   tr edema                  143  |  109<H>  |  52<H>  ----------------------------<  93  4.5   |  24  |  7.23<H>    Ca    6.6<L>      2022 13:02  Mg     1.3         TPro  7.3  /  Alb  2.3<L>  /  TBili  0.2  /  DBili  x   /  AST  13<L>  /  ALT  11<L>  /  AlkPhos  180<H>                            9.8    8.82  )-----------( 303      ( 2022 13:02 )             31.6     Creatinine Trend: 7.23<--    ABG - ( 2022 12:41 )  pH, Arterial: x     pH, Blood: 7.35  /  pCO2: 41    /  pO2: 136   / HCO3: 23    / Base Excess: -2.9  /  SaO2: 99.4                  Assessment     OC, CKD 3, advancing; lung mass hemoptysis;   R/o lung ca vs occult pulm renal vasculitis; CTD disease;     Plan  Serologies; empiric IVF  IR, Heme onc eval  Will follow course.        Thank you for the courtesy of this consultation.      Chay Mora MD

## 2022-04-22 NOTE — ED ADULT TRIAGE NOTE - CHIEF COMPLAINT QUOTE
BIBA from home coughing up blood with clots, Hx DVT, b/l knee replacement, HTN 93% on room air placed on 4L nc  by EMS, did not take metoprolol and amlodipine today. c/o SOB BIBA from home accompanied by granddaughter  coughing up blood with clots, Hx DVT, b/l knee replacement, HTN 93% on room air placed on 4L nc  by EMS, did not take metoprolol and amlodipine today. c/o SOB

## 2022-04-22 NOTE — H&P ADULT - NSHPPHYSICALEXAM_GEN_ALL_CORE
CONSTITUTIONAL: Well developed, large body habitus, alert and cooperative, no acute distress  EYES: PERRL, no scleral icterus  ENT: Mucosa moist, tongue normal.   NECK: Neck supple, trachea midline, non-tender.  CARDIAC: Normal S1 and S2. Regular rate and rhythms. No murmurs. No Pedal edema.   LUNGS: Equal air entry both lungs. increase respiratory effort.   ABDOMEN: Soft, nondistended, nontender. No guarding or rebound tenderness. No hepatomegaly or splenomegaly. Bowel sound normal  MUSCULOSKELETAL: Normocephalic, atraumatic. No significant deformity or joint abnormality  NEUROLOGICAL: No gross motor or sensory deficits.  SKIN: no lesions or eruptions. Normal turgor  PSYCHIATRIC: A&O x 3, appropriate mood and affect

## 2022-04-22 NOTE — H&P ADULT - HISTORY OF PRESENT ILLNESS
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 Patient reported gradual worsening SOB for 1 month, associated with loss of appetite. Hemoptysis started today AM with coughing up some blood clots. Not on blood thinner. Never smoke. No family h/o malignancy.  Hypertensive, tachycardic in ED. EKG with sinus tachy, , QTc 516. No leukocytosis, Plt 303, ddimer 802, K 4.5, Cr 7.23 ( Cr 1.7 in 07/2021 [facility]), hsTnT 18.3, proBNP 6540, magnesium 1.3. CT chest with no PE. Enlarging semisolid opacity at the right lung apex, which may represent adenocarcinoma. CT abd/pelvis with no acute pathology    SH- no toxic habits  FH: mother-HTN, no family h/o malignancy

## 2022-04-22 NOTE — ED ADULT NURSE NOTE - CHIEF COMPLAINT QUOTE
BIBA from home accompanied by granddaughter  coughing up blood with clots, Hx DVT, b/l knee replacement, HTN 93% on room air placed on 4L nc  by EMS, did not take metoprolol and amlodipine today. c/o SOB

## 2022-04-22 NOTE — H&P ADULT - ASSESSMENT
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 Patient reported gradual worsening SOB for 1 month, associated with loss of appetite. Hemoptysis started today AM with coughing up some blood clots. Not on blood thinner. Never smoke. No family h/o malignancy.  Hypertensive, tachycardic in ED. EKG with sinus tachy, , QTc 516. No leukocytosis, Plt 303, ddimer 802, K 4.5, Cr 7.23 ( Cr 1.7 in 07/2021 [facility]), hsTnT 18.3, proBNP 6540, magnesium 1.3. CT chest with no PE. Enlarging semisolid opacity at the right lung apex, which may represent adenocarcinoma. CT abd/pelvis with no acute pathology    Admitted with OC, right lung mass, elevated BNP

## 2022-04-22 NOTE — PATIENT PROFILE ADULT - FALL HARM RISK - HARM RISK INTERVENTIONS
No
Assistance with ambulation/Assistance OOB with selected safe patient handling equipment/Communicate Risk of Fall with Harm to all staff/Discuss with provider need for PT consult/Monitor gait and stability/Provide patient with walking aids - walker, cane, crutches/Reinforce activity limits and safety measures with patient and family/Tailored Fall Risk Interventions/Visual Cue: Yellow wristband and red socks/Bed in lowest position, wheels locked, appropriate side rails in place/Call bell, personal items and telephone in reach/Instruct patient to call for assistance before getting out of bed or chair/Non-slip footwear when patient is out of bed/[place] to call system/Physically safe environment - no spills, clutter or unnecessary equipment/Purposeful Proactive Rounding/Room/bathroom lighting operational, light cord in reach

## 2022-04-22 NOTE — ED ADULT TRIAGE NOTE - ACCOMPANIED BY
EMT/paramedic I have reviewed and confirmed nurses' notes for patient's medications, allergies, medical history, and surgical history.

## 2022-04-22 NOTE — ED PROVIDER NOTE - CLINICAL SUMMARY MEDICAL DECISION MAKING FREE TEXT BOX
hemoptysis. pe vs cancer vs tb vs pna. hemoptysis. pe vs cancer vs tb vs pna.  I read ekg as sinus tach rate 110, rbbb, no st elevation or depression, qtc 516. hemoptysis. pe vs cancer vs tb vs pna. creatinine is noted to be elevated, but given active hemoptysis, the dx of pe or vascular pathology of the lungs is critical to know. will perform cta anyway and have nephrology help treat renal failure. will hold on IVF for now, given elevated bnp and mild hypoxia.   I read ekg as sinus tach rate 110, rbbb, no st elevation or depression, qtc 516. hemoptysis. pe vs cancer vs tb vs pna. creatinine is noted to be elevated, but given active hemoptysis, the dx of pe or vascular pathology of the lungs is critical to know. will perform cta anyway and have nephrology help treat renal failure. will hold on IVF for now, given elevated bnp and mild hypoxia.   I read ekg as sinus tach rate 110, rbbb, no st elevation or depression, qtc 516.  ct shows likely lung cancer. case dw dr patterson from nephrology. will admit.

## 2022-04-22 NOTE — ED CLERICAL - BED REQUESTED
22-Apr-2022 14:57 Post-Care Instructions: I reviewed with the patient in detail post-care instructions. Patient is to keep the biopsy site dry overnight, and then apply bacitracin twice daily until healed. Patient may apply hydrogen peroxide soaks to remove any crusting.

## 2022-04-22 NOTE — H&P ADULT - PROBLEM SELECTOR PLAN 1
Labs with significantly elevated Cr. Cr 7.23 ( Cr 1.7 in 07/2021 [facility])  Hold HCTZ  Monitor renal function  Renal/bladder ultrasound  Protein/Cr ration  Nephrology consulted Labs with significantly elevated Cr. Cr 7.23 ( Cr 1.7 in 07/2021 [facility])  Hold HCTZ  Monitor renal function  Renal/bladder ultrasound  Protein/Cr ration  Nephrology consulted  Mg 1.3, replaced with IV magnesium Labs with significantly elevated Cr. Cr 7.23 ( Cr 1.7 in 07/2021 [facility])  Hold HCTZ  Monitor renal function  Renal/bladder ultrasound  Protein/Cr ration  Nephrology consulted by ED  Mg 1.3, replaced with IV magnesium

## 2022-04-22 NOTE — H&P ADULT - PROBLEM SELECTOR PLAN 2
Hemoptysis for 1 day  CT chest with no PE but noted Enlarging semisolid opacity at the right lung apex, which may represent adenocarcinoma  Likely due to malignancy  Monitor respiratory status  Pul/oncology consulted

## 2022-04-22 NOTE — ED PROVIDER NOTE - INTERNATIONAL TRAVEL
No
If you are a smoker, it is important for your health to stop smoking. Please be aware that second hand smoke is also harmful.

## 2022-04-22 NOTE — H&P ADULT - NSHPLABSRESULTS_GEN_ALL_CORE
ECHO in 06/2019  Summary:   1. Left ventricular ejection fraction, by visual estimation, is 55 to 60%.   2. Technically difficult study.   3. Normal global left ventricular systolic function.   4. Normal left ventricular internal cavity size.   5. Spectral Doppler shows impaired relaxation pattern of left ventricular myocardial filling (Grade I diastolic dysfunction).   6. There is mild concentric left ventricular hypertrophy.   7. Normal right ventricular size and function.   8. There is no evidence of pericardial effusion.   9. Mild thickening and calcification of the anterior and posterior mitral valve leaflets.  10. Trace mitral valve regurgitation.  11. No clear evidence of valvular vegetations however limited study hampers the overall interpretation of this study. Consider HUSSAIN if clinically needed.

## 2022-04-23 DIAGNOSIS — G43.909 MIGRAINE, UNSPECIFIED, NOT INTRACTABLE, WITHOUT STATUS MIGRAINOSUS: ICD-10-CM

## 2022-04-23 DIAGNOSIS — E83.51 HYPOCALCEMIA: ICD-10-CM

## 2022-04-23 DIAGNOSIS — Z86.39 PERSONAL HISTORY OF OTHER ENDOCRINE, NUTRITIONAL AND METABOLIC DISEASE: ICD-10-CM

## 2022-04-23 LAB
ALBUMIN SERPL ELPH-MCNC: 2.1 G/DL — LOW (ref 3.3–5)
ALP SERPL-CCNC: 171 U/L — HIGH (ref 40–120)
ALT FLD-CCNC: 13 U/L — SIGNIFICANT CHANGE UP (ref 12–78)
ANION GAP SERPL CALC-SCNC: 9 MMOL/L — SIGNIFICANT CHANGE UP (ref 5–17)
APPEARANCE UR: ABNORMAL
AST SERPL-CCNC: 9 U/L — LOW (ref 15–37)
BACTERIA # UR AUTO: ABNORMAL
BILIRUB SERPL-MCNC: 0.3 MG/DL — SIGNIFICANT CHANGE UP (ref 0.2–1.2)
BILIRUB UR-MCNC: NEGATIVE — SIGNIFICANT CHANGE UP
BUN SERPL-MCNC: 59 MG/DL — HIGH (ref 7–23)
CALCIUM SERPL-MCNC: 6.2 MG/DL — CRITICAL LOW (ref 8.4–10.5)
CALCIUM SERPL-MCNC: 6.2 MG/DL — CRITICAL LOW (ref 8.5–10.1)
CHLORIDE SERPL-SCNC: 106 MMOL/L — SIGNIFICANT CHANGE UP (ref 96–108)
CO2 SERPL-SCNC: 24 MMOL/L — SIGNIFICANT CHANGE UP (ref 22–31)
COLOR SPEC: YELLOW — SIGNIFICANT CHANGE UP
CREAT ?TM UR-MCNC: 68 MG/DL — SIGNIFICANT CHANGE UP
CREAT SERPL-MCNC: 7.51 MG/DL — HIGH (ref 0.5–1.3)
DIFF PNL FLD: ABNORMAL
EGFR: 5 ML/MIN/1.73M2 — LOW
EPI CELLS # UR: ABNORMAL
GLUCOSE SERPL-MCNC: 83 MG/DL — SIGNIFICANT CHANGE UP (ref 70–99)
GLUCOSE UR QL: NEGATIVE MG/DL — SIGNIFICANT CHANGE UP
HCT VFR BLD CALC: 30.8 % — LOW (ref 34.5–45)
HGB BLD-MCNC: 9.1 G/DL — LOW (ref 11.5–15.5)
IRON SATN MFR SERPL: 27 % — SIGNIFICANT CHANGE UP (ref 14–50)
IRON SATN MFR SERPL: 54 UG/DL — SIGNIFICANT CHANGE UP (ref 30–160)
KETONES UR-MCNC: NEGATIVE — SIGNIFICANT CHANGE UP
LEUKOCYTE ESTERASE UR-ACNC: ABNORMAL
MAGNESIUM SERPL-MCNC: 1.6 MG/DL — SIGNIFICANT CHANGE UP (ref 1.6–2.6)
MCHC RBC-ENTMCNC: 29.5 G/DL — LOW (ref 32–36)
MCHC RBC-ENTMCNC: 29.7 PG — SIGNIFICANT CHANGE UP (ref 27–34)
MCV RBC AUTO: 100.7 FL — HIGH (ref 80–100)
NITRITE UR-MCNC: NEGATIVE — SIGNIFICANT CHANGE UP
NRBC # BLD: 0 /100 WBCS — SIGNIFICANT CHANGE UP (ref 0–0)
PH UR: 6 — SIGNIFICANT CHANGE UP (ref 5–8)
PHOSPHATE SERPL-MCNC: 5.7 MG/DL — HIGH (ref 2.5–4.5)
PLATELET # BLD AUTO: 307 K/UL — SIGNIFICANT CHANGE UP (ref 150–400)
POTASSIUM SERPL-MCNC: 4.7 MMOL/L — SIGNIFICANT CHANGE UP (ref 3.5–5.3)
POTASSIUM SERPL-SCNC: 4.7 MMOL/L — SIGNIFICANT CHANGE UP (ref 3.5–5.3)
PROT ?TM UR-MCNC: 632 MG/DL — HIGH (ref 0–12)
PROT ?TM UR-MCNC: 664 MG/DL — HIGH (ref 0–12)
PROT SERPL-MCNC: 5.8 G/DL — LOW (ref 6–8.3)
PROT SERPL-MCNC: 5.8 G/DL — LOW (ref 6–8.3)
PROT SERPL-MCNC: 6.9 GM/DL — SIGNIFICANT CHANGE UP (ref 6–8.3)
PROT UR-MCNC: 500 MG/DL
PROT/CREAT UR-RTO: 9.3 RATIO — HIGH (ref 0–0.2)
PTH-INTACT FLD-MCNC: 325 PG/ML — HIGH (ref 15–65)
RBC # BLD: 3.06 M/UL — LOW (ref 3.8–5.2)
RBC # FLD: 12.5 % — SIGNIFICANT CHANGE UP (ref 10.3–14.5)
RBC CASTS # UR COMP ASSIST: ABNORMAL /HPF (ref 0–4)
SODIUM SERPL-SCNC: 139 MMOL/L — SIGNIFICANT CHANGE UP (ref 135–145)
SP GR SPEC: 1.01 — SIGNIFICANT CHANGE UP (ref 1.01–1.02)
T4 FREE SERPL-MCNC: 0.8 NG/DL — LOW (ref 0.9–1.8)
TIBC SERPL-MCNC: 202 UG/DL — LOW (ref 220–430)
TSH SERPL-MCNC: 2.25 UIU/ML — SIGNIFICANT CHANGE UP (ref 0.36–3.74)
UIBC SERPL-MCNC: 148 UG/DL — SIGNIFICANT CHANGE UP (ref 110–370)
UROBILINOGEN FLD QL: NEGATIVE MG/DL — SIGNIFICANT CHANGE UP
WBC # BLD: 8.51 K/UL — SIGNIFICANT CHANGE UP (ref 3.8–10.5)
WBC # FLD AUTO: 8.51 K/UL — SIGNIFICANT CHANGE UP (ref 3.8–10.5)
WBC UR QL: ABNORMAL

## 2022-04-23 PROCEDURE — 99233 SBSQ HOSP IP/OBS HIGH 50: CPT

## 2022-04-23 PROCEDURE — 93306 TTE W/DOPPLER COMPLETE: CPT | Mod: 26

## 2022-04-23 RX ORDER — METOCLOPRAMIDE HCL 10 MG
5 TABLET ORAL EVERY 8 HOURS
Refills: 0 | Status: DISCONTINUED | OUTPATIENT
Start: 2022-04-23 | End: 2022-04-30

## 2022-04-23 RX ORDER — CALCIUM CARBONATE 500(1250)
1 TABLET ORAL ONCE
Refills: 0 | Status: COMPLETED | OUTPATIENT
Start: 2022-04-23 | End: 2022-04-24

## 2022-04-23 RX ORDER — METOCLOPRAMIDE HCL 10 MG
5 TABLET ORAL EVERY 8 HOURS
Refills: 0 | Status: DISCONTINUED | OUTPATIENT
Start: 2022-04-23 | End: 2022-04-23

## 2022-04-23 RX ORDER — OXYCODONE AND ACETAMINOPHEN 5; 325 MG/1; MG/1
1 TABLET ORAL EVERY 6 HOURS
Refills: 0 | Status: DISCONTINUED | OUTPATIENT
Start: 2022-04-23 | End: 2022-04-30

## 2022-04-23 RX ADMIN — Medication 100 MILLIGRAM(S): at 05:12

## 2022-04-23 RX ADMIN — OXYCODONE AND ACETAMINOPHEN 1 TABLET(S): 5; 325 TABLET ORAL at 22:25

## 2022-04-23 RX ADMIN — OXYCODONE AND ACETAMINOPHEN 1 TABLET(S): 5; 325 TABLET ORAL at 23:25

## 2022-04-23 RX ADMIN — SODIUM CHLORIDE 100 MILLILITER(S): 9 INJECTION, SOLUTION INTRAVENOUS at 02:42

## 2022-04-23 RX ADMIN — AMLODIPINE BESYLATE 5 MILLIGRAM(S): 2.5 TABLET ORAL at 05:12

## 2022-04-23 RX ADMIN — OXYCODONE AND ACETAMINOPHEN 1 TABLET(S): 5; 325 TABLET ORAL at 12:39

## 2022-04-23 RX ADMIN — OXYCODONE AND ACETAMINOPHEN 1 TABLET(S): 5; 325 TABLET ORAL at 13:39

## 2022-04-23 RX ADMIN — Medication 5 MILLIGRAM(S): at 12:39

## 2022-04-23 RX ADMIN — SODIUM CHLORIDE 100 MILLILITER(S): 9 INJECTION, SOLUTION INTRAVENOUS at 06:35

## 2022-04-23 NOTE — CONSULT NOTE ADULT - SUBJECTIVE AND OBJECTIVE BOX
Patient is a 71y old  Female who presents with a chief complaint of OC, hemoptysis, right lung mass (2022 11:34)    HPI:  71 years old female HTN, Morbid Obesity, LARRY not using CPAP, Arthritis, S/P TKA complicated by DVT/PE in  ( stopped AC in ), Migraine and tachycardia.  Presented  to ED with hemoptysis for 1 day, which started in the morning of presentation as coughing up some blood clots. Reports having sore throat few days prior to this episode. Denies high fever, chills or chest pain.  Reports  gradual worsening of  SOB for 1 month, associated with loss of appetite.     Never smoke, but 2nd hand exposure from  for 20 years,  No family h/o malignancy.  Hypertensive, tachycardic in ED. EKG with sinus tachy,    PAST MEDICAL & SURGICAL HISTORY:  Osteoarthritis    Pulmonary embolism    Joint infection    Dysfunctional uterine bleeding    Obesity    Essential hypertension  Hypertension    Obstructive sleep apnea syndrome  Obstructive sleep apnea    History of pulmonary embolism   s/p IVC filter    Arthropathy  Arthritis    Migraine  Migraines    Morbid obesity    Total knee replacement status    Status post hysterectomy    Status post cholecystectomy    Other acquired absence of organ  History of cholecystectomy    Status post total hysterectomy  partial    History of total knee replacement  with revisions x 3    FAMILY HISTORY:  FH: type 2 diabetes mellitus    FHx: hypertension    SOCIAL HISTORY:  non smoker    Allergies  sulfa drugs (Other; Rash)  Sulfur (Unknown)  trimethoprim (Other; Rash)    MEDICATIONS  (STANDING):  amLODIPine   Tablet 5 milliGRAM(s) Oral daily  metoprolol succinate  milliGRAM(s) Oral daily  sodium chloride 0.45%. 1000 milliLiter(s) (100 mL/Hr) IV Continuous <Continuous>    MEDICATIONS  (PRN):  acetaminophen     Tablet .. 650 milliGRAM(s) Oral every 6 hours PRN Mild Pain (1 - 3), Moderate Pain (4 - 6)  ALBUTerol    90 MICROgram(s) HFA Inhaler 2 Puff(s) Inhalation every 6 hours PRN Shortness of Breath and/or Wheezing  melatonin 3 milliGRAM(s) Oral at bedtime PRN Insomnia  metoclopramide Injectable 5 milliGRAM(s) IV Push every 8 hours PRN headache/nausea  oxycodone    5 mG/acetaminophen 325 mG 1 Tablet(s) Oral every 6 hours PRN Severe Pain (7 - 10)    REVIEW OF SYSTEMS:    Constitutional:            No fever, +ve weight loss.  HEENT:                       No difficulty hearing, tinnitus, vertigo; No sinus or throat pain  Respiratory:               hemoptysis  Cardiovascular:           No chest pain, palpitations  Gastrointestinal:        No abdominal or epigastric pain. No N/V/diarrhea or hematemesis  Genitourinary:            No dysuria, frequency, hematuria or incontinence  SKIN:                             no rash  Musculoskeletal:        No joint pain or swelling  Extremities:                No swelling  Neurological:              No headaches  Psychiatric:                 No depression, anxiety    MACRA & MIPS:  non smoker.  no flu or pneumovax,   BP: 133/81  Meds reviewed.      Vital Signs Last 24 Hrs  T(C): 36.7 (2022 17:27), Max: 36.9 (2022 23:10)  T(F): 98 (:27), Max: 98.5 (2022 23:10)  HR: 76 (:) (76 - 90)  BP: 133/81 (2022 17:27) (133/81 - 154/80)  BP(mean): --  RR: 18 (2022 17:27) (17 - 18)  SpO2: 98% (:27) (95% - 98%)    PHYSICAL EXAM:  GEN:         Awake, responsive and comfortable.  HEENT:    Normal.    RESP:      no wheezing  CVS:         Regular rate and rhythm.   ABD:         Soft, non-tender, non-distended;   SKIN:           Warm and dry.  EXTR:            No clubbing, cyanosis or edema  CNS:              Intact sensory and motor function.  PSYCH:        cooperative, no anxiety or depression    LABS:                        9.1    8.51  )-----------( 307      ( 2022 06:15 )             30.8         139  |  106  |  59<H>  ----------------------------<  83  4.7   |  24  |  7.51<H>    Ca    6.2<LL>      2022 06:15  Phos  5.7       Mg     1.6         TPro  5.8<L>  /  Alb  x   /  TBili  x   /  DBili  x   /  AST  x   /  ALT  x   /  AlkPhos  x       PT/INR - ( 2022 13:02 )   PT: 12.4 sec;   INR: 1.04 ratio      PTT - ( 2022 13:02 )  PTT:31.5 sec   @ 12:41  pH: 7.35  pCO2: 41  pO2: 136  SaO2: 99.4    Urinalysis Basic - ( 2022 14:41 )    Color: Yellow / Appearance: Slightly Turbid / S.010 / pH: x  Gluc: x / Ketone: Negative  / Bili: Negative / Urobili: Negative mg/dL   Blood: x / Protein: 500 mg/dL / Nitrite: Negative   Leuk Esterase: Trace / RBC: 3-5 /HPF / WBC 6-10   Sq Epi: x / Non Sq Epi: Moderate / Bacteria: Few    EKG:  sinus rhythm, RBBB    RADIOLOGY & ADDITIONAL STUDIES:  < from: CT Angio Chest PE Protocol w/ IV Cont (22 @ 14:06) >  ACC: [id number] EXAM:  CT ANGIO CHEST PULM ART [facility]                        ACC: [id number] EXAM:  CT ABDOMEN AND PELVIS                          PROCEDURE DATE:  2022      INTERPRETATION:  CLINICAL INFORMATION: Shortness of breath. Right-sided   and epigastric pain, vomiting.    COMPARISON: 2019.    CONTRAST/COMPLICATIONS:  IV Contrast: IV contrast documented in associated exam (accession   [id number]), Omnipaque 350 (accession [id number])  90 cc administered   10 cc   discarded  Oral Contrast: NONE  Complications: None reported at time of study completion    PROCEDURE:  CT Angiography of the Chest was performed followed by portal venous phase   imaging of the Abdomen and Pelvis. There is mild respiratory motion   artifact.  Sagittal and coronal reformats were performed as well as 3D (MIP)   reconstructions.    FINDINGS:  CHEST:  LUNGS AND LARGE AIRWAYS: Patent central airways. There has been increase   in density of a semisolid 11 mm nodular opacity at the right lung apex on   image 28 of series 5 with trace adjacent groundglass density. No   confluent airspace opacity is identified.  PLEURA: No pleural effusion. No pneumothorax or pneumomediastinum.  VESSELS: There is mild respiratory motion artifact. Otherwise, there is   no evidence of filling defect in the first, second, or third or branches   of the pulmonary arteries. The pulmonary trunk and main pulmonary   arteries are unremarkable. The thoracic aorta and great vessels are   unremarkable.  HEART: Heart size is normal. No pericardial effusion.  MEDIASTINUM AND SILVIA: No lymphadenopathy.  CHEST WALL AND LOWER NECK: There is been interval increase in size of a   left mediastinal goiter with mass effect upon the trachea and left   brachiocephalic vein.    ABDOMEN AND PELVIS:  LIVER: Within normal limits.  BILE DUCTS: Mild pneumobilia.  GALLBLADDER: Removed.  SPLEEN: Within normal limits.  PANCREAS: Moderately atrophic and fatty replaced.  ADRENALS: Within normal limits.  KIDNEYS/URETERS: Moderate left andmild right renal cortical scarring. No   evidence of hydronephrosis, mass, stone, or perinephric stranding   bilaterally. Normal limits.    BLADDER: Within normal limits.  REPRODUCTIVE ORGANS: 3.8 cm left ovarian cyst. 2.4 cm right ovarian cyst.   The uterus has been removed.    BOWEL: No bowel obstruction. Appendix is normal.  PERITONEUM: No ascites.  VESSELS: IVC filter. The abdominal aorta is nonaneurysmal.  RETROPERITONEUM/LYMPH NODES: No lymphadenopathy.  ABDOMINAL WALL: Within normal limits.  BONES: Within normal limits.    IMPRESSION:  1. Enlarging semisolid opacity at the right lung apex, which may   represent adenocarcinoma. Correlate with smoking history.  2. No evidence of pulmonary embolism.  3. No evidence of acute inflammatory or obstructive process in the   abdomen and pelvis.    ROSA RICHARDSON MD; Attending Radiologist  This document has been electronically signed. 2022  2:43PM    ASSESSMENT AND PLAN:  ·	Hemoptysis episode likely from URI.  ·	RUL lung nodule.  ·	Morbid Obesity.  ·	LARRY, non compliant with CPAP.  ·	HTN.  ·	Arthritis.  ·	Migraine.  ·	Anemia.    No more hemoptysis since this morning.   SPO2 95% on room air at rest.  Follow up chest CT in 2-3 months.  Weight reduction.

## 2022-04-23 NOTE — CONSULT NOTE ADULT - SUBJECTIVE AND OBJECTIVE BOX
Patient is a 71y old  Female who presents with a chief complaint of OC, hemoptysis, right lung mass (23 Apr 2022 11:34)      Reason For Consult:     HPI:  71 years old female with h/o HTN, LARRY not using CPAP, h/o TKA complicated by DVT/PE in 2013 ( stopped AC in 2015), morbid obesity prsent to ED with hemoptysis for 1 day. Patient reported gradual worsening SOB for 1 month, associated with loss of appetite. Hemoptysis started today AM with coughing up some blood clots. Not on blood thinner. Never smoke. No family h/o malignancy.  Hypertensive, tachycardic in ED. EKG with sinus tachy, , QTc 516. No leukocytosis, Plt 303, ddimer 802, K 4.5, Cr 7.23 ( Cr 1.7 in 07/2021 [facility]), hsTnT 18.3, proBNP 6540, magnesium 1.3. CT chest with no PE. Enlarging semisolid opacity at the right lung apex, which may represent adenocarcinoma. CT abd/pelvis with no acute pathology    SH- no toxic habits  FH: mother-HTN, no family h/o malignancy (22 Apr 2022 15:35)  Endocrine History : Patient's TSH is 2.4.  She has a left-sided mediastinal thyroid extension.  Possibly multinodular trachea is deviated.  She is being worked up for a left lung apical mass.  Clinically euthyroid  PAST MEDICAL & SURGICAL HISTORY:  Osteoarthritis    Pulmonary embolism    Joint infection    Dysfunctional uterine bleeding    Obesity    Essential hypertension  Hypertension    Obstructive sleep apnea syndrome  Obstructive sleep apnea    History of pulmonary embolism  2012 s/p IVC filter    Arthropathy  Arthritis    Migraine  Migraines    Morbid obesity    Total knee replacement status    Status post hysterectomy    Status post cholecystectomy    Other acquired absence of organ  History of cholecystectomy    Status post total hysterectomy  partial    History of total knee replacement  with revisions x 3        FAMILY HISTORY:  FH: type 2 diabetes mellitus    FHx: hypertension          Social History:    MEDICATIONS  (STANDING):  amLODIPine   Tablet 5 milliGRAM(s) Oral daily  metoprolol succinate  milliGRAM(s) Oral daily  sodium chloride 0.45%. 1000 milliLiter(s) (100 mL/Hr) IV Continuous <Continuous>    MEDICATIONS  (PRN):  acetaminophen     Tablet .. 650 milliGRAM(s) Oral every 6 hours PRN Mild Pain (1 - 3), Moderate Pain (4 - 6)  ALBUTerol    90 MICROgram(s) HFA Inhaler 2 Puff(s) Inhalation every 6 hours PRN Shortness of Breath and/or Wheezing  melatonin 3 milliGRAM(s) Oral at bedtime PRN Insomnia  metoclopramide Injectable 5 milliGRAM(s) IV Push every 8 hours PRN headache/nausea  oxycodone    5 mG/acetaminophen 325 mG 1 Tablet(s) Oral every 6 hours PRN Severe Pain (7 - 10)      REVIEW OF SYSTEMS:      T(C): 36.7 (04-23-22 @ 17:27), Max: 36.9 (04-22-22 @ 23:10)  HR: 76 (04-23-22 @ 17:27) (76 - 90)  BP: 133/81 (04-23-22 @ 17:27) (133/81 - 154/80)  RR: 18 (04-23-22 @ 17:27) (17 - 18)  SpO2: 98% (04-23-22 @ 17:27) (95% - 98%)  Wt(kg): --    PHYSICAL EXAM: morbidly obese  GENERAL: NAD, well-groomed, well-developed  HEAD:  Atraumatic, Normocephalic  EYES: PERRLA, conjunctiva and sclera clear  ENMT: No  exudates,, Moist mucous membranes,, No lesions  NECK: Supple, No JVD,   NERVOUS SYSTEM:  Alert & Oriented   CHEST/LUNG: Clear to percussion bilaterally; No rales, rhonchi, wheezing, or rubs  HEART: Regular rate and rhythm; No murmurs, rubs, or gallops  ABDOMEN: Soft, Nontender, Nondistended; Bowel sounds present  EXTREMITIES:  2+ Peripheral Pulses, No clubbing, cyanosis, or edema  LYMPH: No lymphadenopathy noted  SKIN: No rashes or lesions    CAPILLARY BLOOD GLUCOSE                                9.1    8.51  )-----------( 307      ( 23 Apr 2022 06:15 )             30.8       CMP:  04-23 @ 11:29  SGPT --  Albumin --   Alk Phos --   Anion Gap --   SGOT --   Total Bili --   BUN --   Calcium Total --   CO2 --   Chloride --   Creatinine --   eGFR if AA --   eGFR if non AA --   Glucose --   Potassium --   Protein 5.8   Sodium --      Thyroid Function Tests:  04-23 @ 16:41 TSH 2.250 FreeT4 -- T3 -- Anti TPO -- Anti Thyroglobulin Ab -- TSI --      Diabetes Tests:     Parathyroids:     Adrenals:       Radiology:

## 2022-04-23 NOTE — PROGRESS NOTE ADULT - SUBJECTIVE AND OBJECTIVE BOX
Patient is a 71y old  Female who presents with a chief complaint of OC, hemoptysis, right lung mass (23 Apr 2022 10:32)      INTERVAL HPI/OVERNIGHT EVENTS: c/o HA, no new hemoptysis     MEDICATIONS  (STANDING):  amLODIPine   Tablet 5 milliGRAM(s) Oral daily  metoprolol succinate  milliGRAM(s) Oral daily  sodium chloride 0.45%. 1000 milliLiter(s) (100 mL/Hr) IV Continuous <Continuous>    MEDICATIONS  (PRN):  acetaminophen     Tablet .. 650 milliGRAM(s) Oral every 6 hours PRN Mild Pain (1 - 3), Moderate Pain (4 - 6)  ALBUTerol    90 MICROgram(s) HFA Inhaler 2 Puff(s) Inhalation every 6 hours PRN Shortness of Breath and/or Wheezing  melatonin 3 milliGRAM(s) Oral at bedtime PRN Insomnia  oxycodone    5 mG/acetaminophen 325 mG 1 Tablet(s) Oral every 6 hours PRN Severe Pain (7 - 10)      Allergies    sulfa drugs (Other; Rash)  Sulfur (Unknown)  trimethoprim (Other; Rash)    Intolerances        REVIEW OF SYSTEMS:  CONSTITUTIONAL: No fever, weight loss  EYES: No eye pain, visual disturbances, or discharge  ENMT:  No difficulty hearing, tinnitus, vertigo; No sinus or throat pain  RESPIRATORY: No cough, wheezing, chills or hemoptysis; No shortness of breath  CARDIOVASCULAR: No chest pain, palpitations, dizziness, or leg swelling  GASTROINTESTINAL: No abdominal or epigastric pain. No nausea, vomiting, or hematemesis; No diarrhea or constipation. No melena or hematochezia.  GENITOURINARY: No dysuria, frequency, hematuria, or incontinence  NEUROLOGICAL: No headaches, memory loss, loss of strength, numbness, or tremors  SKIN: No itching, burning, rashes, or lesions   MUSCULOSKELETAL: No joint pain or swelling; No muscle, back, or extremity pain  PSYCHIATRIC: No depression, anxiety, mood swings, or difficulty sleeping  HEME/LYMPH: No easy bruising, or bleeding gums      Vital Signs Last 24 Hrs  T(C): 36.6 (23 Apr 2022 11:10), Max: 36.9 (22 Apr 2022 17:49)  T(F): 97.8 (23 Apr 2022 11:10), Max: 98.5 (22 Apr 2022 23:10)  HR: 80 (23 Apr 2022 11:10) (80 - 119)  BP: 146/83 (23 Apr 2022 11:10) (146/83 - 183/103)  BP(mean): --  RR: 18 (23 Apr 2022 11:10) (11 - 22)  SpO2: 97% (23 Apr 2022 11:10) (95% - 99%)    PHYSICAL EXAM:  GENERAL: NAD  HEAD:  Atraumatic, Normocephalic  EYES: EOMI, PERRLA, conjunctiva and sclera clear  ENMT: No tonsillar erythema, exudates, or enlargement;   NECK: Supple, Normal thyroid  NERVOUS SYSTEM:  Alert & Oriented X3, Good concentration; Motor Strength 5/5 B/L upper and lower extremities; DTRs 2+ intact and symmetric  CHEST/LUNG: CTABL; No rales, rhonchi, wheezing, or rubs  HEART: Regular rate and rhythm; No murmurs, rubs, or gallops  ABDOMEN: Soft, Nontender, Nondistended; Bowel sounds present  EXTREMITIES:  2+ Peripheral Pulses, No clubbing, cyanosis, or edema  LYMPH: No lymphadenopathy noted  SKIN: No rashes or lesions    LABS:                        9.1    8.51  )-----------( 307      ( 23 Apr 2022 06:15 )             30.8     04-23    139  |  106  |  59<H>  ----------------------------<  83  4.7   |  24  |  7.51<H>    Ca    6.2<LL>      23 Apr 2022 06:15  Phos  5.7     04-23  Mg     1.6     04-23    TPro  6.9  /  Alb  2.1<L>  /  TBili  0.3  /  DBili  x   /  AST  9<L>  /  ALT  13  /  AlkPhos  171<H>  04-23    PT/INR - ( 22 Apr 2022 13:02 )   PT: 12.4 sec;   INR: 1.04 ratio         PTT - ( 22 Apr 2022 13:02 )  PTT:31.5 sec    CAPILLARY BLOOD GLUCOSE          RADIOLOGY & ADDITIONAL TESTS:    Imaging Personally Reviewed:  [ ] YES  [ ] NO    Consultant(s) Notes Reviewed:  [ ] YES  [ ] NO    Care Discussed with Consultants/Other Providers [ ] YES  [ ] NO

## 2022-04-23 NOTE — CONSULT NOTE ADULT - PROBLEM SELECTOR RECOMMENDATION 9
Patient is morbidly obese and most of her shortness of breath etc. are coming from it.  Although she has left-sided extension of her goiter into the mediastinum, it is not involved in any significant shortness of breath.  Check thyroid antibodies.  All these thyroid abnormalities can be managed as an outpatient  Thank you for the courtesy of this consult
- 1.1 cm lung Nodule RUL   - Pulmonary Consult  - PET/CT as outpatient  - Renal F/u   - enlarged thyroid - suggest Endocrine Evaluation

## 2022-04-23 NOTE — PROGRESS NOTE ADULT - PROBLEM SELECTOR PLAN 8
CT shows there is been interval increase in size of a   left mediastinal goiter with mass effect upon the trachea and left   brachiocephalic vein.  - unsure if this is causing the bleeding but unlikely its eroding into the trachea. will check TFTs, and get endocrine

## 2022-04-23 NOTE — PROVIDER CONTACT NOTE (CRITICAL VALUE NOTIFICATION) - ACTION/TREATMENT ORDERED:
Spoke with Patient and inquired about symptoms  Denies pain at this time but states that he had "bad pain" last night  Patient states that he spoke with Dr Shayy Jamison this morning, "about 5 minutes ago" and was told that the appointment on 2/5/2020 would be canceled and Dr Shayy Jamison would be scheduling a procedure to "take out the stone"  Patient is still requesting pain medication as he is unsure as to date/time of procedure at this time  Informed that prescription for hydrocodone was sent to pharmacy as requested  Will route to provider to review and advise on upcoming procedure  Patient repeats back understanding and agrees with plan  No intervention at this moment. Awaiting for further instructions.

## 2022-04-23 NOTE — PROGRESS NOTE ADULT - SUBJECTIVE AND OBJECTIVE BOX
Subjective: denies nausea, pain.       MEDICATIONS  (STANDING):  amLODIPine   Tablet 5 milliGRAM(s) Oral daily  metoprolol succinate  milliGRAM(s) Oral daily  sodium chloride 0.45%. 1000 milliLiter(s) (100 mL/Hr) IV Continuous <Continuous>    MEDICATIONS  (PRN):  acetaminophen     Tablet .. 650 milliGRAM(s) Oral every 6 hours PRN Mild Pain (1 - 3), Moderate Pain (4 - 6)  ALBUTerol    90 MICROgram(s) HFA Inhaler 2 Puff(s) Inhalation every 6 hours PRN Shortness of Breath and/or Wheezing  melatonin 3 milliGRAM(s) Oral at bedtime PRN Insomnia          T(C): 36.9 (04-23-22 @ 05:25), Max: 36.9 (04-22-22 @ 17:49)  HR: 90 (04-23-22 @ 05:25) (88 - 119)  BP: 147/94 (04-23-22 @ 05:25) (147/94 - 183/103)  RR: 18 (04-23-22 @ 05:25) (11 - 22)  SpO2: 97% (04-23-22 @ 05:25) (95% - 99%)  Wt(kg): --    ABG - ( 22 Apr 2022 12:41 )  pH, Arterial: x     pH, Blood: 7.35  /  pCO2: 41    /  pO2: 136   / HCO3: 23    / Base Excess: -2.9  /  SaO2: 99.4                I&O's Detail    22 Apr 2022 07:01  -  23 Apr 2022 07:00  --------------------------------------------------------  IN:  Total IN: 0 mL    OUT:    Voided (mL): 500 mL  Total OUT: 500 mL    Total NET: -500 mL               PHYSICAL EXAM:    GENERAL: NAD  NECK: Supple, no inc in JVP  CHEST/LUNG: dec BS  HEART: S1S2  ABDOMEN: Soft  EXTREMITIES:  no edema  NEURO: no asterixis      LABS:  CBC Full  -  ( 23 Apr 2022 06:15 )  WBC Count : 8.51 K/uL  RBC Count : 3.06 M/uL  Hemoglobin : 9.1 g/dL  Hematocrit : 30.8 %  Platelet Count - Automated : 307 K/uL  Mean Cell Volume : 100.7 fl  Mean Cell Hemoglobin : 29.7 pg  Mean Cell Hemoglobin Concentration : 29.5 g/dL  Auto Neutrophil # : x  Auto Lymphocyte # : x  Auto Monocyte # : x  Auto Eosinophil # : x  Auto Basophil # : x  Auto Neutrophil % : x  Auto Lymphocyte % : x  Auto Monocyte % : x  Auto Eosinophil % : x  Auto Basophil % : x    04-23    139  |  106  |  59<H>  ----------------------------<  83  4.7   |  24  |  7.51<H>    Ca    6.2<LL>      23 Apr 2022 06:15  Phos  5.7     04-23  Mg     1.6     04-23    TPro  6.9  /  Alb  2.1<L>  /  TBili  0.3  /  DBili  x   /  AST  9<L>  /  ALT  13  /  AlkPhos  171<H>  04-23    PT/INR - ( 22 Apr 2022 13:02 )   PT: 12.4 sec;   INR: 1.04 ratio         PTT - ( 22 Apr 2022 13:02 )  PTT:31.5 sec          Assessment :  OC, CKD 3. Renal US with echogenic kidneys. UA still not done   Lung mass hemoptysis;   DDx; pulmonary-renal vasculitis, malignancy associated paraneoplastic GN, nephrosis.     Plan:  Obtain UA, SPC  Extensive serologic w/u requested  SPEP, UPEP  No dialytic urgency. Will cont to reeval daily  IR, Heme onc eval  Will follow course.

## 2022-04-23 NOTE — CONSULT NOTE ADULT - SUBJECTIVE AND OBJECTIVE BOX
Reason for Consultation: RUL Lung Nodule     HPI: Patient is a 71y Female seen on consultatioin for the evaluation and management ofRUL Lung Nodule    71 years old female with h/o HTN, LARRY not using CPAP, h/o TKA complicated by DVT/PE in 2013 ( stopped AC in 2015), morbid obesity prsent to ED with hemoptysis for 1 day. Patient reported gradual worsening SOB for 1 month, associated with loss of appetite. Hemoptysis started today AM with coughing up some blood clots. Not on blood thinner. Never smoke. No family h/o malignancy.  Hypertensive, tachycardic in ED. EKG with sinus tachy, , QTc 516. No leukocytosis, Plt 303, ddimer 802, K 4.5, Cr 7.23 ( Cr 1.7 in 07/2021 [facility]), hsTnT 18.3, proBNP 6540, magnesium 1.3. CT chest with no PE. Enlarging semisolid opacity at the right lung apex, which may represent adenocarcinoma. CT abd/pelvis with no acute pathology    Exposed to second hand smoke -  was smoker for 20 years, never had colonscopy    PAST MEDICAL & SURGICAL HISTORY:  Osteoarthritis    Pulmonary embolism    Joint infection    Dysfunctional uterine bleeding    Obesity    Essential hypertension  Hypertension    Obstructive sleep apnea syndrome  Obstructive sleep apnea    History of pulmonary embolism  2012 s/p IVC filter    Arthropathy  Arthritis    Migraine  Migraines    Morbid obesity    Total knee replacement status    Status post hysterectomy    Status post cholecystectomy    Other acquired absence of organ  History of cholecystectomy    Status post total hysterectomy  partial    History of total knee replacement  with revisions x 3      MEDICATIONS  (STANDING):  amLODIPine   Tablet 5 milliGRAM(s) Oral daily  metoprolol succinate  milliGRAM(s) Oral daily  sodium chloride 0.45%. 1000 milliLiter(s) (100 mL/Hr) IV Continuous <Continuous>    MEDICATIONS  (PRN):  acetaminophen     Tablet .. 650 milliGRAM(s) Oral every 6 hours PRN Mild Pain (1 - 3), Moderate Pain (4 - 6)  ALBUTerol    90 MICROgram(s) HFA Inhaler 2 Puff(s) Inhalation every 6 hours PRN Shortness of Breath and/or Wheezing  melatonin 3 milliGRAM(s) Oral at bedtime PRN Insomnia      Allergies    sulfa drugs (Other; Rash)  Sulfur (Unknown)  trimethoprim (Other; Rash)    Intolerances        SOCIAL HISTORY:    Smoking Status: no  Alcohol: no  Occupation: retired    FAMILY HISTORY:  FH: type 2 diabetes mellitus    FHx: hypertension        Vital Signs Last 24 Hrs  T(C): 36.9 (23 Apr 2022 05:25), Max: 36.9 (22 Apr 2022 17:49)  T(F): 98.4 (23 Apr 2022 05:25), Max: 98.5 (22 Apr 2022 23:10)  HR: 90 (23 Apr 2022 05:25) (88 - 119)  BP: 147/94 (23 Apr 2022 05:25) (147/94 - 183/103)  BP(mean): --  RR: 18 (23 Apr 2022 05:25) (11 - 22)  SpO2: 97% (23 Apr 2022 05:25) (95% - 99%)    PHYSICAL EXAM:    general - AAO x 3  HEENT - No Icterus  CVS - RRR  RS - AE B/L  Abd - soft, NT  Ext - Pulses +        LABS:                        9.1    8.51  )-----------( 307      ( 23 Apr 2022 06:15 )             30.8     04-23    139  |  106  |  59<H>  ----------------------------<  83  4.7   |  24  |  7.51<H>    Ca    6.2<LL>      23 Apr 2022 06:15  Phos  5.7     04-23  Mg     1.6     04-23    TPro  6.9  /  Alb  2.1<L>  /  TBili  0.3  /  DBili  x   /  AST  9<L>  /  ALT  13  /  AlkPhos  171<H>  04-23    PT/INR - ( 22 Apr 2022 13:02 )   PT: 12.4 sec;   INR: 1.04 ratio         PTT - ( 22 Apr 2022 13:02 )  PTT:31.5 sec

## 2022-04-24 LAB
A1C WITH ESTIMATED AVERAGE GLUCOSE RESULT: 4.6 % — SIGNIFICANT CHANGE UP (ref 4–5.6)
ALBUMIN SERPL ELPH-MCNC: 2 G/DL — LOW (ref 3.3–5)
ALP SERPL-CCNC: 175 U/L — HIGH (ref 40–120)
ALT FLD-CCNC: 15 U/L — SIGNIFICANT CHANGE UP (ref 12–78)
ANION GAP SERPL CALC-SCNC: 8 MMOL/L — SIGNIFICANT CHANGE UP (ref 5–17)
AST SERPL-CCNC: 10 U/L — LOW (ref 15–37)
BILIRUB SERPL-MCNC: 0.2 MG/DL — SIGNIFICANT CHANGE UP (ref 0.2–1.2)
BUN SERPL-MCNC: 60 MG/DL — HIGH (ref 7–23)
CALCIUM SERPL-MCNC: 6.5 MG/DL — CRITICAL LOW (ref 8.5–10.1)
CHLORIDE SERPL-SCNC: 106 MMOL/L — SIGNIFICANT CHANGE UP (ref 96–108)
CO2 SERPL-SCNC: 23 MMOL/L — SIGNIFICANT CHANGE UP (ref 22–31)
CREAT SERPL-MCNC: 7.36 MG/DL — HIGH (ref 0.5–1.3)
CREATININE, URINE RESULT: 71 MG/DL — SIGNIFICANT CHANGE UP
EGFR: 6 ML/MIN/1.73M2 — LOW
ESTIMATED AVERAGE GLUCOSE: 85 MG/DL — SIGNIFICANT CHANGE UP (ref 68–114)
FOLATE SERPL-MCNC: 3.3 NG/ML — LOW
GLUCOSE SERPL-MCNC: 86 MG/DL — SIGNIFICANT CHANGE UP (ref 70–99)
HCT VFR BLD CALC: 31.1 % — LOW (ref 34.5–45)
HGB BLD-MCNC: 9.3 G/DL — LOW (ref 11.5–15.5)
MCHC RBC-ENTMCNC: 29.9 G/DL — LOW (ref 32–36)
MCHC RBC-ENTMCNC: 30 PG — SIGNIFICANT CHANGE UP (ref 27–34)
MCV RBC AUTO: 100.3 FL — HIGH (ref 80–100)
NRBC # BLD: 0 /100 WBCS — SIGNIFICANT CHANGE UP (ref 0–0)
PLATELET # BLD AUTO: 266 K/UL — SIGNIFICANT CHANGE UP (ref 150–400)
POTASSIUM SERPL-MCNC: 4.2 MMOL/L — SIGNIFICANT CHANGE UP (ref 3.5–5.3)
POTASSIUM SERPL-SCNC: 4.2 MMOL/L — SIGNIFICANT CHANGE UP (ref 3.5–5.3)
PROT SERPL-MCNC: 6.1 G/DL — SIGNIFICANT CHANGE UP (ref 6–8.3)
PROT SERPL-MCNC: 6.1 G/DL — SIGNIFICANT CHANGE UP (ref 6–8.3)
PROT SERPL-MCNC: 6.7 GM/DL — SIGNIFICANT CHANGE UP (ref 6–8.3)
RBC # BLD: 3.1 M/UL — LOW (ref 3.8–5.2)
RBC # BLD: 3.1 M/UL — LOW (ref 3.8–5.2)
RBC # FLD: 12.5 % — SIGNIFICANT CHANGE UP (ref 10.3–14.5)
RETICS #: 76.6 K/UL — SIGNIFICANT CHANGE UP (ref 25–125)
RETICS/RBC NFR: 2.5 % — SIGNIFICANT CHANGE UP (ref 0.5–2.5)
SODIUM SERPL-SCNC: 137 MMOL/L — SIGNIFICANT CHANGE UP (ref 135–145)
T4 FREE SERPL-MCNC: 0.8 NG/DL — LOW (ref 0.9–1.8)
VIT B12 SERPL-MCNC: 635 PG/ML — SIGNIFICANT CHANGE UP (ref 232–1245)
WBC # BLD: 8.44 K/UL — SIGNIFICANT CHANGE UP (ref 3.8–10.5)
WBC # FLD AUTO: 8.44 K/UL — SIGNIFICANT CHANGE UP (ref 3.8–10.5)

## 2022-04-24 PROCEDURE — 99233 SBSQ HOSP IP/OBS HIGH 50: CPT

## 2022-04-24 RX ORDER — CALCIUM GLUCONATE 100 MG/ML
2 VIAL (ML) INTRAVENOUS ONCE
Refills: 0 | Status: COMPLETED | OUTPATIENT
Start: 2022-04-24 | End: 2022-04-24

## 2022-04-24 RX ORDER — FOLIC ACID 0.8 MG
1 TABLET ORAL DAILY
Refills: 0 | Status: DISCONTINUED | OUTPATIENT
Start: 2022-04-24 | End: 2022-04-30

## 2022-04-24 RX ADMIN — AMLODIPINE BESYLATE 5 MILLIGRAM(S): 2.5 TABLET ORAL at 05:18

## 2022-04-24 RX ADMIN — OXYCODONE AND ACETAMINOPHEN 1 TABLET(S): 5; 325 TABLET ORAL at 23:27

## 2022-04-24 RX ADMIN — Medication 100 MILLIGRAM(S): at 05:18

## 2022-04-24 RX ADMIN — OXYCODONE AND ACETAMINOPHEN 1 TABLET(S): 5; 325 TABLET ORAL at 13:40

## 2022-04-24 RX ADMIN — Medication 200 GRAM(S): at 10:08

## 2022-04-24 RX ADMIN — Medication 1 MILLIGRAM(S): at 15:52

## 2022-04-24 RX ADMIN — SODIUM CHLORIDE 100 MILLILITER(S): 9 INJECTION, SOLUTION INTRAVENOUS at 22:26

## 2022-04-24 RX ADMIN — SODIUM CHLORIDE 100 MILLILITER(S): 9 INJECTION, SOLUTION INTRAVENOUS at 05:19

## 2022-04-24 RX ADMIN — Medication 1 TABLET(S): at 05:18

## 2022-04-24 RX ADMIN — SODIUM CHLORIDE 100 MILLILITER(S): 9 INJECTION, SOLUTION INTRAVENOUS at 12:19

## 2022-04-24 RX ADMIN — OXYCODONE AND ACETAMINOPHEN 1 TABLET(S): 5; 325 TABLET ORAL at 14:10

## 2022-04-24 RX ADMIN — OXYCODONE AND ACETAMINOPHEN 1 TABLET(S): 5; 325 TABLET ORAL at 22:27

## 2022-04-24 NOTE — PROGRESS NOTE ADULT - SUBJECTIVE AND OBJECTIVE BOX
Patient is a 71y old  Female who presents with a chief complaint of OC, hemoptysis, right lung mass (2022 10:32)      INTERVAL HPI/OVERNIGHT EVENTS:  no new hemoptysis     MEDICATIONS  (STANDING):  amLODIPine   Tablet 5 milliGRAM(s) Oral daily  metoprolol succinate  milliGRAM(s) Oral daily  sodium chloride 0.45%. 1000 milliLiter(s) (100 mL/Hr) IV Continuous <Continuous>    MEDICATIONS  (PRN):  acetaminophen     Tablet .. 650 milliGRAM(s) Oral every 6 hours PRN Mild Pain (1 - 3), Moderate Pain (4 - 6)  ALBUTerol    90 MICROgram(s) HFA Inhaler 2 Puff(s) Inhalation every 6 hours PRN Shortness of Breath and/or Wheezing  melatonin 3 milliGRAM(s) Oral at bedtime PRN Insomnia  metoclopramide Injectable 5 milliGRAM(s) IV Push every 8 hours PRN headache/nausea  oxycodone    5 mG/acetaminophen 325 mG 1 Tablet(s) Oral every 6 hours PRN Severe Pain (7 - 10)    Allergies    sulfa drugs (Other; Rash)  Sulfur (Unknown)  trimethoprim (Other; Rash)    Intolerances        REVIEW OF SYSTEMS:  CONSTITUTIONAL: No fever, weight loss  EYES: No eye pain, visual disturbances, or discharge  ENMT:  No difficulty hearing, tinnitus, vertigo; No sinus or throat pain  RESPIRATORY: No cough, wheezing, chills or hemoptysis; No shortness of breath  CARDIOVASCULAR: No chest pain, palpitations, dizziness, or leg swelling  GASTROINTESTINAL: No abdominal or epigastric pain. No nausea, vomiting, or hematemesis; No diarrhea or constipation. No melena or hematochezia.  GENITOURINARY: No dysuria, frequency, hematuria, or incontinence  NEUROLOGICAL: No headaches, memory loss, loss of strength, numbness, or tremors  SKIN: No itching, burning, rashes, or lesions   MUSCULOSKELETAL: No joint pain or swelling; No muscle, back, or extremity pain  PSYCHIATRIC: No depression, anxiety, mood swings, or difficulty sleeping  HEME/LYMPH: No easy bruising, or bleeding gums      Vital Signs Last 24 Hrs  T(C): 36.7 (2022 05:08), Max: 36.7 (2022 17:27)  T(F): 98 (2022 05:08), Max: 98.1 (2022 23:19)  HR: 75 (2022 05:08) (75 - 77)  BP: 159/83 (2022 05:08) (132/84 - 159/83)  BP(mean): --  RR: 18 (2022 05:08) (18 - 18)  SpO2: 99% (2022 05:08) (97% - 99%)      PHYSICAL EXAM:  GENERAL: NAD  HEAD:  Atraumatic, Normocephalic  EYES: EOMI, PERRLA, conjunctiva and sclera clear  ENMT: No tonsillar erythema, exudates, or enlargement;   NECK: Supple, Normal thyroid  NERVOUS SYSTEM:  Alert & Oriented X3, Good concentration; Motor Strength 5/5 B/L upper and lower extremities; DTRs 2+ intact and symmetric  CHEST/LUNG: CTABL; No rales, rhonchi, wheezing, or rubs  HEART: Regular rate and rhythm; No murmurs, rubs, or gallops  ABDOMEN: Soft, Nontender, Nondistended; Bowel sounds present  EXTREMITIES:  2+ Peripheral Pulses, No clubbing, cyanosis, or edema  LYMPH: No lymphadenopathy noted  SKIN: No rashes or lesions    LABS:                                             9.3    8.44  )-----------( 266      ( 2022 06:35 )             31.1     04    137  |  106  |  60<H>  ----------------------------<  86  4.2   |  23  |  7.36<H>    Ca    6.5<LL>      2022 06:35  Phos  5.7       Mg     1.6         TPro  6.7  /  Alb  2.0<L>  /  TBili  0.2  /  DBili  x   /  AST  10<L>  /  ALT  15  /  AlkPhos  175<H>  04-24    PT/INR - ( 2022 13:02 )   PT: 12.4 sec;   INR: 1.04 ratio         PTT - ( 2022 13:02 )  PTT:31.5 sec  Urinalysis Basic - ( 2022 14:41 )    Color: Yellow / Appearance: Slightly Turbid / S.010 / pH: x  Gluc: x / Ketone: Negative  / Bili: Negative / Urobili: Negative mg/dL   Blood: x / Protein: 500 mg/dL / Nitrite: Negative   Leuk Esterase: Trace / RBC: 3-5 /HPF / WBC 6-10   Sq Epi: x / Non Sq Epi: Moderate / Bacteria: Few      CAPILLARY BLOOD GLUCOSE          RADIOLOGY & ADDITIONAL TESTS:    Imaging Personally Reviewed:  [ ] YES  [ ] NO    Consultant(s) Notes Reviewed:  [ ] YES  [ ] NO    Care Discussed with Consultants/Other Providers [ ] YES  [ ] NO

## 2022-04-24 NOTE — PROGRESS NOTE ADULT - SUBJECTIVE AND OBJECTIVE BOX
feels well    Vital Signs Last 24 Hrs  T(C): 36.7 (2022 05:08), Max: 36.7 (2022 17:27)  T(F): 98 (2022 05:08), Max: 98.1 (2022 23:19)  HR: 75 (2022 05:08) (75 - 80)  BP: 159/83 (2022 05:08) (132/84 - 159/83)  BP(mean): --  RR: 18 (2022 05:08) (18 - 18)  SpO2: 99% (2022 05:08) (97% - 99%)    PHYSICAL EXAM:    general - AAO x 3  HEENT - No Icterus  CVS - RRR  RS - AE B/L  Abd - soft, NT  Ext - Pulses +        LABS:                        9.3    8.44  )-----------( 266      ( 2022 06:35 )             31.1     04-24    137  |  106  |  60<H>  ----------------------------<  86  4.2   |  23  |  7.36<H>    Ca    6.5<LL>      2022 06:35  Phos  5.7     04-23  Mg     1.6     04-23    TPro  6.7  /  Alb  2.0<L>  /  TBili  0.2  /  DBili  x   /  AST  10<L>  /  ALT  15  /  AlkPhos  175<H>  04-24    PT/INR - ( 2022 13:02 )   PT: 12.4 sec;   INR: 1.04 ratio         PTT - ( 2022 13:02 )  PTT:31.5 sec  Urinalysis Basic - ( 2022 14:41 )    Color: Yellow / Appearance: Slightly Turbid / S.010 / pH: x  Gluc: x / Ketone: Negative  / Bili: Negative / Urobili: Negative mg/dL   Blood: x / Protein: 500 mg/dL / Nitrite: Negative   Leuk Esterase: Trace / RBC: 3-5 /HPF / WBC 6-10   Sq Epi: x / Non Sq Epi: Moderate / Bacteria: Few        Culture - Blood (collected 2022 00:27)  Source: .Blood Blood  Preliminary Report (2022 01:13):    No growth to date.    Culture - Blood (collected 2022 00:27)  Source: .Blood Blood  Preliminary Report (2022 01:13):    No growth to date.

## 2022-04-24 NOTE — PROGRESS NOTE ADULT - SUBJECTIVE AND OBJECTIVE BOX
Patient is a 71y old  Female who presents with a chief complaint of OC, hemoptysis, right lung mass (2022 11:30)      Interval History: Stable without any compressive symptoms from the goiter.  Free thyroxine is still pending.  TSH is normal clinically better     MEDICATIONS  (STANDING):  amLODIPine   Tablet 5 milliGRAM(s) Oral daily  folic acid Injectable 1 milliGRAM(s) IV Push daily  metoprolol succinate  milliGRAM(s) Oral daily  sodium chloride 0.45%. 1000 milliLiter(s) (100 mL/Hr) IV Continuous <Continuous>    MEDICATIONS  (PRN):  acetaminophen     Tablet .. 650 milliGRAM(s) Oral every 6 hours PRN Mild Pain (1 - 3), Moderate Pain (4 - 6)  ALBUTerol    90 MICROgram(s) HFA Inhaler 2 Puff(s) Inhalation every 6 hours PRN Shortness of Breath and/or Wheezing  melatonin 3 milliGRAM(s) Oral at bedtime PRN Insomnia  metoclopramide Injectable 5 milliGRAM(s) IV Push every 8 hours PRN headache/nausea  oxycodone    5 mG/acetaminophen 325 mG 1 Tablet(s) Oral every 6 hours PRN Severe Pain (7 - 10)      Allergies    sulfa drugs (Other; Rash)  Sulfur (Unknown)  trimethoprim (Other; Rash)    Intolerances        REVIEW OF SYSTEMS:  CONSTITUTIONAL: no changes  EYES: No eye pain, visual disturbances, or discharge  ENMT:  No difficulty hearing, No sinus or throat pain  NECK: No pain or stiffness  RESPIRATORY: No cough, wheezing, chills or hemoptysis; No shortness of breath  CARDIOVASCULAR: No chest pain, palpitations or leg swelling  GASTROINTESTINAL: No abdominal or epigastric pain. No nausea, vomiting, or hematemesis; No diarrhea or constipation. No melena or hematochezia.  GENITOURINARY: No dysuria, frequency, hematuria, or incontinence  NEUROLOGICAL: No headaches, memory loss, loss of strength, numbness, or tremors  SKIN: No itching, burning, rashes, or lesions   ENDOCRINE: No heat or cold intolerance; No hair loss  MUSCULOSKELETAL: No joint pain or swelling; No muscle, back, or extremity pain  PSYCHIATRIC: No depression, anxiety, mood swings, or difficulty sleeping  HEME/LYMPH: No easy bruising, or bleeding gums  ALLERY AND IMMUNOLOGIC: No hives or eczema    Vital Signs Last 24 Hrs  T(C): 36.9 (2022 17:00), Max: 36.9 (2022 17:00)  T(F): 98.5 (2022 17:00), Max: 98.5 (2022 17:00)  HR: 77 (2022 17:00) (75 - 86)  BP: 142/72 (2022 17:00) (132/84 - 159/83)  BP(mean): --  RR: 18 (2022 17:00) (18 - 18)  SpO2: 100% (2022 17:00) (97% - 100%)    PHYSICAL EXAM:  GENERAL:   HEAD: Atraumatic, Normocephalic  EYES: PERRLA, conjunctiva and sclera clear  ENMT: No  exudates,; Moist mucous membranes,, No lesions  NECK: Supple, No JVD, Normal thyroid  NERVOUS SYSTEM:  Alert & Oriented,   CHEST/LUNG: Clear to auscultation bilaterally; No rales, rhonchi, wheezing, or rubs  HEART: Regular rate and rhythm; No murmurs, rubs, or gallops  ABDOMEN: Soft, Nontender, Nondistended; Bowel sounds present  EXTREMITIES:  2+ Peripheral Pulses, no edema  SKIN: No rashes or lesions    LABS:      Urinalysis Basic - ( 2022 14:41 )    Color: Yellow / Appearance: Slightly Turbid / S.010 / pH: x  Gluc: x / Ketone: Negative  / Bili: Negative / Urobili: Negative mg/dL   Blood: x / Protein: 500 mg/dL / Nitrite: Negative   Leuk Esterase: Trace / RBC: 3-5 /HPF / WBC 6-10   Sq Epi: x / Non Sq Epi: Moderate / Bacteria: Few      CAPILLARY BLOOD GLUCOSE        Lipid panel:           Thyroid: @ 10:45 TSH -- <<Free T4>> 0.8 <<T3>> -- <<TG Ab>> -- <<ATPOAB>> -- <<TBG>> -- <<TSI>> -- Reverse T3 --    Diabetes Tests:  Parathyroid Panel:  Adrenals:  RADIOLOGY & ADDITIONAL TESTS:    Imaging Personally Reviewed:  [ ] YES  [ ] NO    Consultant(s) Notes Reviewed:  [ ] YES  [ ] NO    Care Discussed with Consultants/Other Providers [ ] YES  [ ] NO

## 2022-04-24 NOTE — PROGRESS NOTE ADULT - SUBJECTIVE AND OBJECTIVE BOX
Subjective: no complaints today. Denies nausea, depressed appetite.       MEDICATIONS  (STANDING):  amLODIPine   Tablet 5 milliGRAM(s) Oral daily  metoprolol succinate  milliGRAM(s) Oral daily  sodium chloride 0.45%. 1000 milliLiter(s) (100 mL/Hr) IV Continuous <Continuous>    MEDICATIONS  (PRN):  acetaminophen     Tablet .. 650 milliGRAM(s) Oral every 6 hours PRN Mild Pain (1 - 3), Moderate Pain (4 - 6)  ALBUTerol    90 MICROgram(s) HFA Inhaler 2 Puff(s) Inhalation every 6 hours PRN Shortness of Breath and/or Wheezing  melatonin 3 milliGRAM(s) Oral at bedtime PRN Insomnia  metoclopramide Injectable 5 milliGRAM(s) IV Push every 8 hours PRN headache/nausea  oxycodone    5 mG/acetaminophen 325 mG 1 Tablet(s) Oral every 6 hours PRN Severe Pain (7 - 10)          T(C): 36.7 (22 @ 05:08), Max: 36.7 (22 @ 17:27)  HR: 75 (22 @ 05:08) (75 - 80)  BP: 159/83 (22 @ 05:08) (132/84 - 159/83)  RR: 18 (22 @ 05:08) (18 - 18)  SpO2: 99% (22 @ 05:08) (97% - 99%)  Wt(kg): --    ABG - ( 2022 12:41 )  pH, Arterial: x     pH, Blood: 7.35  /  pCO2: 41    /  pO2: 136   / HCO3: 23    / Base Excess: -2.9  /  SaO2: 99.4                I&O's Detail    2022 07:01  -  2022 07:00  --------------------------------------------------------  IN:    Oral Fluid: 480 mL    sodium chloride 0.45%: 1200 mL  Total IN: 1680 mL    OUT:  Total OUT: 0 mL    Total NET: 1680 mL               PHYSICAL EXAM:    GENERAL: NAD  NECK: Supple, no inc in JVP  CHEST/LUNG: Clear  HEART: S1S2  ABDOMEN: Soft, Nontender, Nondistended; Bowel sounds present  EXTREMITIES: no edema   NEURO: no asterixis      LABS:  CBC Full  -  ( 2022 06:35 )  WBC Count : 8.44 K/uL  RBC Count : 3.10 M/uL  Hemoglobin : 9.3 g/dL  Hematocrit : 31.1 %  Platelet Count - Automated : 266 K/uL  Mean Cell Volume : 100.3 fl  Mean Cell Hemoglobin : 30.0 pg  Mean Cell Hemoglobin Concentration : 29.9 g/dL  Auto Neutrophil # : x  Auto Lymphocyte # : x  Auto Monocyte # : x  Auto Eosinophil # : x  Auto Basophil # : x  Auto Neutrophil % : x  Auto Lymphocyte % : x  Auto Monocyte % : x  Auto Eosinophil % : x  Auto Basophil % : x        137  |  106  |  60<H>  ----------------------------<  86  4.2   |  23  |  7.36<H>    Ca    6.5<LL>      2022 06:35  Phos  5.7       Mg     1.6         TPro  6.7  /  Alb  2.0<L>  /  TBili  0.2  /  DBili  x   /  AST  10<L>  /  ALT  15  /  AlkPhos  175<H>      PT/INR - ( 2022 13:02 )   PT: 12.4 sec;   INR: 1.04 ratio         PTT - ( 2022 13:02 )  PTT:31.5 sec  Urinalysis Basic - ( 2022 14:41 )    Color: Yellow / Appearance: Slightly Turbid / S.010 / pH: x  Gluc: x / Ketone: Negative  / Bili: Negative / Urobili: Negative mg/dL   Blood: x / Protein: 500 mg/dL / Nitrite: Negative   Leuk Esterase: Trace / RBC: 3-5 /HPF / WBC 6-10   Sq Epi: x / Non Sq Epi: Moderate / Bacteria: Few            Assessment :  OC, CKD 3. Renal US with echogenic kidneys. 9-10g proteinuria, hypoAlb  Lung mass hemoptysis;   DDx; pulmonary-renal vasculitis, malignancy associated paraneoplastic GN, nephrosis.     Plan:  Extensive serologic w/u requested  SPEP, UPEP  No dialytic urgency. Will cont to reeval daily  Heme onc eval  Will follow course.

## 2022-04-25 LAB
24R-OH-CALCIDIOL SERPL-MCNC: 5.1 NG/ML — LOW (ref 30–80)
ACE SERPL-CCNC: 55 U/L — SIGNIFICANT CHANGE UP (ref 14–82)
ANION GAP SERPL CALC-SCNC: 7 MMOL/L — SIGNIFICANT CHANGE UP (ref 5–17)
BUN SERPL-MCNC: 57 MG/DL — HIGH (ref 7–23)
C3 SERPL-MCNC: 178 MG/DL — HIGH (ref 81–157)
C4 SERPL-MCNC: 45 MG/DL — HIGH (ref 13–39)
CA-I BLD-SCNC: 3.5 MG/DL — LOW (ref 4.5–5.6)
CALCIUM SERPL-MCNC: 6.6 MG/DL — LOW (ref 8.5–10.1)
CHLORIDE SERPL-SCNC: 105 MMOL/L — SIGNIFICANT CHANGE UP (ref 96–108)
CO2 SERPL-SCNC: 25 MMOL/L — SIGNIFICANT CHANGE UP (ref 22–31)
CREAT SERPL-MCNC: 6.94 MG/DL — HIGH (ref 0.5–1.3)
EGFR: 6 ML/MIN/1.73M2 — LOW
GAMMA INTERFERON BACKGROUND BLD IA-ACNC: 0 IU/ML — SIGNIFICANT CHANGE UP
GBM IGG SER-ACNC: 3 — SIGNIFICANT CHANGE UP (ref 0–20)
GLUCOSE SERPL-MCNC: 91 MG/DL — SIGNIFICANT CHANGE UP (ref 70–99)
IGA FLD-MCNC: 214 MG/DL — SIGNIFICANT CHANGE UP (ref 84–499)
IGG FLD-MCNC: 979 MG/DL — SIGNIFICANT CHANGE UP (ref 610–1660)
IGM SERPL-MCNC: 157 MG/DL — SIGNIFICANT CHANGE UP (ref 35–242)
KAPPA LC SER QL IFE: 11.11 MG/DL — HIGH (ref 0.33–1.94)
KAPPA/LAMBDA FREE LIGHT CHAIN RATIO, SERUM: 1.56 RATIO — SIGNIFICANT CHANGE UP (ref 0.26–1.65)
LAMBDA LC SER QL IFE: 7.12 MG/DL — HIGH (ref 0.57–2.63)
M TB IFN-G BLD-IMP: NEGATIVE — SIGNIFICANT CHANGE UP
M TB IFN-G CD4+ BCKGRND COR BLD-ACNC: 0 IU/ML — SIGNIFICANT CHANGE UP
M TB IFN-G CD4+CD8+ BCKGRND COR BLD-ACNC: 0 IU/ML — SIGNIFICANT CHANGE UP
POTASSIUM SERPL-MCNC: 4.4 MMOL/L — SIGNIFICANT CHANGE UP (ref 3.5–5.3)
POTASSIUM SERPL-SCNC: 4.4 MMOL/L — SIGNIFICANT CHANGE UP (ref 3.5–5.3)
PROT ?TM UR-MCNC: 632 MG/DL — HIGH (ref 0–12)
QUANT TB PLUS MITOGEN MINUS NIL: 0.69 IU/ML — SIGNIFICANT CHANGE UP
SODIUM SERPL-SCNC: 137 MMOL/L — SIGNIFICANT CHANGE UP (ref 135–145)
THYROGLOB AB FLD IA-ACNC: <20 IU/ML — SIGNIFICANT CHANGE UP
THYROGLOB AB SERPL-ACNC: <20 IU/ML — SIGNIFICANT CHANGE UP
THYROPEROXIDASE AB SERPL-ACNC: <10 IU/ML — SIGNIFICANT CHANGE UP

## 2022-04-25 PROCEDURE — 99232 SBSQ HOSP IP/OBS MODERATE 35: CPT

## 2022-04-25 RX ORDER — AMLODIPINE BESYLATE 2.5 MG/1
10 TABLET ORAL DAILY
Refills: 0 | Status: DISCONTINUED | OUTPATIENT
Start: 2022-04-25 | End: 2022-04-30

## 2022-04-25 RX ADMIN — OXYCODONE AND ACETAMINOPHEN 1 TABLET(S): 5; 325 TABLET ORAL at 17:58

## 2022-04-25 RX ADMIN — OXYCODONE AND ACETAMINOPHEN 1 TABLET(S): 5; 325 TABLET ORAL at 11:30

## 2022-04-25 RX ADMIN — SODIUM CHLORIDE 100 MILLILITER(S): 9 INJECTION, SOLUTION INTRAVENOUS at 21:31

## 2022-04-25 RX ADMIN — AMLODIPINE BESYLATE 5 MILLIGRAM(S): 2.5 TABLET ORAL at 06:05

## 2022-04-25 RX ADMIN — SODIUM CHLORIDE 100 MILLILITER(S): 9 INJECTION, SOLUTION INTRAVENOUS at 10:38

## 2022-04-25 RX ADMIN — OXYCODONE AND ACETAMINOPHEN 1 TABLET(S): 5; 325 TABLET ORAL at 10:35

## 2022-04-25 RX ADMIN — Medication 1 MILLIGRAM(S): at 12:18

## 2022-04-25 RX ADMIN — Medication 100 MILLIGRAM(S): at 06:05

## 2022-04-25 RX ADMIN — OXYCODONE AND ACETAMINOPHEN 1 TABLET(S): 5; 325 TABLET ORAL at 16:58

## 2022-04-25 RX ADMIN — Medication 3 MILLIGRAM(S): at 21:29

## 2022-04-25 NOTE — PROGRESS NOTE ADULT - SUBJECTIVE AND OBJECTIVE BOX
[facility] NEPHROLOGY SERVICES, [facility]  NEPHROLOGY AND HYPERTENSION  [address]  [address]  [place] [address]  [phone]    MD ARVIND VENCES, MD MUNA PEREZ, MD YADIEL ANGELO, MD MEAGAN JO, MD GONZALEZ FLORES, MD BELIA CHA MD          Patient events noted    MEDICATIONS  (STANDING):  amLODIPine   Tablet 10 milliGRAM(s) Oral daily  folic acid Injectable 1 milliGRAM(s) IV Push daily  metoprolol succinate  milliGRAM(s) Oral daily  sodium chloride 0.45%. 1000 milliLiter(s) (100 mL/Hr) IV Continuous <Continuous>    MEDICATIONS  (PRN):  acetaminophen     Tablet .. 650 milliGRAM(s) Oral every 6 hours PRN Mild Pain (1 - 3), Moderate Pain (4 - 6)  ALBUTerol    90 MICROgram(s) HFA Inhaler 2 Puff(s) Inhalation every 6 hours PRN Shortness of Breath and/or Wheezing  melatonin 3 milliGRAM(s) Oral at bedtime PRN Insomnia  metoclopramide Injectable 5 milliGRAM(s) IV Push every 8 hours PRN headache/nausea  oxycodone    5 mG/acetaminophen 325 mG 1 Tablet(s) Oral every 6 hours PRN Severe Pain (7 - 10)      04-24-22 @ 07:01  -  04-25-22 @ 07:00  --------------------------------------------------------  IN: 2040 mL / OUT: 0 mL / NET: 2040 mL    04-25-22 @ 07:01  -  04-25-22 @ 22:48  --------------------------------------------------------  IN: 0 mL / OUT: 1 mL / NET: -1 mL      PHYSICAL EXAM:      T(C): 37.1 (04-25-22 @ 15:22), Max: 37.1 (04-25-22 @ 15:22)  HR: 77 (04-25-22 @ 15:22) (77 - 80)  BP: 144/73 (04-25-22 @ 15:22) (122/72 - 144/73)  RR: 18 (04-25-22 @ 15:22) (18 - 18)  SpO2: 98% (04-25-22 @ 15:22) (97% - 98%)  Wt(kg): --  Lungs clear  Heart S1S2  Abd soft NT ND  Extremities:   tr edema                                    9.3    8.44  )-----------( 266      ( 24 Apr 2022 06:35 )             31.1     04-25    137  |  105  |  57<H>  ----------------------------<  91  4.4   |  25  |  6.94<H>    Ca    6.6<L>      25 Apr 2022 07:32    TPro  6.7  /  Alb  2.0<L>  /  TBili  0.2  /  DBili  x   /  AST  10<L>  /  ALT  15  /  AlkPhos  175<H>  04-24      LIVER FUNCTIONS - ( 24 Apr 2022 06:35 )  Alb: 2.0 g/dL / Pro: 6.7 gm/dL / ALK PHOS: 175 U/L / ALT: 15 U/L / AST: 10 U/L / GGT: x           Creatinine Trend: 6.94<--, 7.36<--, 7.51<--, 7.23<--        Assessment :  OC, CKD 3. Renal US with echogenic kidneys. 9-10g proteinuria, hypoAlb  Lung mass hemoptysis;   DDx; pulmonary-renal vasculitis, malignancy associated paraneoplastic GN, nephrosis.     Plan    Follow serologies  SPEP, UPEP  No dialytic urgency. Will cont to reeval daily  Renal bx Wed  Heme onc eval  Will follow course.      Chay Mora MD

## 2022-04-25 NOTE — DIETITIAN INITIAL EVALUATION ADULT - OTHER INFO
Pt lives c daughter who does shopping & cooking; reports that she follows a low Na diet at home. Pt admitted c SOB x 1 month; coughing up blood day PTA; found c lung mass; recommendation is for outpatient f/u in 2 months.  Denies any V/C; c/o some nausea & loose BMs today; no chewing/swallowing difficulty.  Pt reports wt has been stable & appetite is usually good. Reviewed DASH diet recommendations; educational material provided.

## 2022-04-25 NOTE — PROGRESS NOTE ADULT - SUBJECTIVE AND OBJECTIVE BOX
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  Presented  to ED with hemoptysis for 1 day, which started in the morning of presentation as coughing up some blood clots. Reports having sore throat few days prior to this episode. Denies high fever, chills or chest pain.  Reports  gradual worsening of  SOB for 1 month, associated with loss of appetite.     Never smoke, but 2nd hand exposure from  for 20 years,  No family h/o malignancy.  Hypertensive, tachycardic in ED. EKG with sinus tachy,    OVERNIGHT EVENTS:  No more hemoptysis.    Vital Signs Last 24 Hrs  T(C): 37.1 (25 Apr 2022 15:22), Max: 37.1 (25 Apr 2022 15:22)  T(F): 98.7 (25 Apr 2022 15:22), Max: 98.7 (25 Apr 2022 15:22)  HR: 77 (25 Apr 2022 15:22) (77 - 80)  BP: 144/73 (25 Apr 2022 15:22) (122/72 - 144/73)  BP(mean): --  RR: 18 (25 Apr 2022 15:22) (18 - 18)  SpO2: 98% (25 Apr 2022 15:22) (97% - 98%)    PHYSICAL EXAM:  GEN:         Awake, responsive and comfortable.  HEENT:    Normal.    RESP:        no wheezing  CVS:             Regular rate and rhythm.     MEDICATIONS  (STANDING):  amLODIPine   Tablet 10 milliGRAM(s) Oral daily  folic acid Injectable 1 milliGRAM(s) IV Push daily  metoprolol succinate  milliGRAM(s) Oral daily  sodium chloride 0.45%. 1000 milliLiter(s) (100 mL/Hr) IV Continuous <Continuous>    MEDICATIONS  (PRN):  acetaminophen     Tablet .. 650 milliGRAM(s) Oral every 6 hours PRN Mild Pain (1 - 3), Moderate Pain (4 - 6)  ALBUTerol    90 MICROgram(s) HFA Inhaler 2 Puff(s) Inhalation every 6 hours PRN Shortness of Breath and/or Wheezing  melatonin 3 milliGRAM(s) Oral at bedtime PRN Insomnia  metoclopramide Injectable 5 milliGRAM(s) IV Push every 8 hours PRN headache/nausea  oxycodone    5 mG/acetaminophen 325 mG 1 Tablet(s) Oral every 6 hours PRN Severe Pain (7 - 10)    LABS:                        9.3    8.44  )-----------( 266      ( 24 Apr 2022 06:35 )             31.1     04-25    137  |  105  |  57<H>  ----------------------------<  91  4.4   |  25  |  6.94<H>    Ca    6.6<L>      25 Apr 2022 07:32    TPro  6.7  /  Alb  2.0<L>  /  TBili  0.2  /  DBili  x   /  AST  10<L>  /  ALT  15  /  AlkPhos  175<H>  04-24 04-22 @ 12:41  pH: 7.35  pCO2: 41  pO2: 136  SaO2: 99.4    ASSESSMENT AND PLAN:  ·	Hemoptysis episode likely from URI.  ·	RUL lung nodule.  ·	Morbid Obesity.  ·	LARRY, non compliant with CPAP.  ·	CKD  ·	HTN.  ·	Arthritis.  ·	Migraine.  ·	Anemia.    Hemoptysis resolved.  Follow up chest CT in 2-3 months.  Nephrology working up for Vasculitis.

## 2022-04-25 NOTE — PROGRESS NOTE ADULT - SUBJECTIVE AND OBJECTIVE BOX
Patient is a 71y old  Female who presents with a chief complaint of OC, hemoptysis, right lung mass (2022 10:32)      INTERVAL HPI/OVERNIGHT EVENTS:  no new hemoptysis     MEDICATIONS  (STANDING):  amLODIPine   Tablet 5 milliGRAM(s) Oral daily  folic acid Injectable 1 milliGRAM(s) IV Push daily  metoprolol succinate  milliGRAM(s) Oral daily  sodium chloride 0.45%. 1000 milliLiter(s) (100 mL/Hr) IV Continuous <Continuous>    MEDICATIONS  (PRN):  acetaminophen     Tablet .. 650 milliGRAM(s) Oral every 6 hours PRN Mild Pain (1 - 3), Moderate Pain (4 - 6)  ALBUTerol    90 MICROgram(s) HFA Inhaler 2 Puff(s) Inhalation every 6 hours PRN Shortness of Breath and/or Wheezing  melatonin 3 milliGRAM(s) Oral at bedtime PRN Insomnia  metoclopramide Injectable 5 milliGRAM(s) IV Push every 8 hours PRN headache/nausea  oxycodone    5 mG/acetaminophen 325 mG 1 Tablet(s) Oral every 6 hours PRN Severe Pain (7 - 10)      Allergies    sulfa drugs (Other; Rash)  Sulfur (Unknown)  trimethoprim (Other; Rash)    Intolerances        REVIEW OF SYSTEMS:  CONSTITUTIONAL: No fever, weight loss  EYES: No eye pain, visual disturbances, or discharge  ENMT:  No difficulty hearing, tinnitus, vertigo; No sinus or throat pain  RESPIRATORY: No cough, wheezing, chills or hemoptysis; No shortness of breath  CARDIOVASCULAR: No chest pain, palpitations, dizziness, or leg swelling  GASTROINTESTINAL: No abdominal or epigastric pain. No nausea, vomiting, or hematemesis; No diarrhea or constipation. No melena or hematochezia.  GENITOURINARY: No dysuria, frequency, hematuria, or incontinence  NEUROLOGICAL: No headaches, memory loss, loss of strength, numbness, or tremors  SKIN: No itching, burning, rashes, or lesions   MUSCULOSKELETAL: No joint pain or swelling; No muscle, back, or extremity pain  PSYCHIATRIC: No depression, anxiety, mood swings, or difficulty sleeping  HEME/LYMPH: No easy bruising, or bleeding gums      Vital Signs Last 24 Hrs  T(C): 36.7 (2022 05:13), Max: 36.9 (2022 17:00)  T(F): 98.1 (2022 05:13), Max: 98.5 (2022 17:00)  HR: 79 (2022 05:13) (77 - 80)  BP: 126/66 (2022 05:13) (122/72 - 142/72)  BP(mean): --  RR: 18 (2022 05:13) (18 - 18)  SpO2: 97% (2022 05:13) (97% - 100%)    PHYSICAL EXAM:  GENERAL: NAD  HEAD:  Atraumatic, Normocephalic  EYES: EOMI, PERRLA, conjunctiva and sclera clear  ENMT: No tonsillar erythema, exudates, or enlargement;   NECK: Supple, Normal thyroid  NERVOUS SYSTEM:  Alert & Oriented X3, Good concentration; Motor Strength 5/5 B/L upper and lower extremities; DTRs 2+ intact and symmetric  CHEST/LUNG: CTABL; No rales, rhonchi, wheezing, or rubs  HEART: Regular rate and rhythm; No murmurs, rubs, or gallops  ABDOMEN: Soft, Nontender, Nondistended; Bowel sounds present  EXTREMITIES:  2+ Peripheral Pulses, No clubbing, cyanosis, or edema  LYMPH: No lymphadenopathy noted  SKIN: No rashes or lesions    LABS:                                 9.3    8.44  )-----------( 266      ( 2022 06:35 )             31.1         137  |  105  |  57<H>  ----------------------------<  91  4.4   |  25  |  6.94<H>    Ca    6.6<L>      2022 07:32    TPro  6.7  /  Alb  2.0<L>  /  TBili  0.2  /  DBili  x   /  AST  10<L>  /  ALT  15  /  AlkPhos  175<H>  -24      Urinalysis Basic - ( 2022 14:41 )    Color: Yellow / Appearance: Slightly Turbid / S.010 / pH: x  Gluc: x / Ketone: Negative  / Bili: Negative / Urobili: Negative mg/dL   Blood: x / Protein: 500 mg/dL / Nitrite: Negative   Leuk Esterase: Trace / RBC: 3-5 /HPF / WBC 6-10   Sq Epi: x / Non Sq Epi: Moderate / Bacteria: Few                                   CAPILLARY BLOOD GLUCOSE          RADIOLOGY & ADDITIONAL TESTS:    Imaging Personally Reviewed:  [x ] YES  [ ] NO    Consultant(s) Notes Reviewed:  [ ] YES  [ ] NO    Care Discussed with Consultants/Other Providers [ ] YES  [ ] NO

## 2022-04-25 NOTE — PROGRESS NOTE ADULT - SUBJECTIVE AND OBJECTIVE BOX
lying in bed    Vital Signs Last 24 Hrs  T(C): 36.7 (2022 05:13), Max: 36.9 (2022 17:00)  T(F): 98.1 (2022 05:13), Max: 98.5 (2022 17:00)  HR: 79 (2022 05:13) (77 - 86)  BP: 126/66 (2022 05:13) (122/72 - 142/87)  BP(mean): --  RR: 18 (2022 05:13) (18 - 18)  SpO2: 97% (2022 05:13) (97% - 100%)    PHYSICAL EXAM:    general - AAO x 3  HEENT - No Icterus  CVS - RRR  RS - AE B/L  Abd - soft, NT  Ext - Pulses +        LABS:                        9.3    8.44  )-----------( 266      ( 2022 06:35 )             31.1     04-    137  |  105  |  57<H>  ----------------------------<  91  4.4   |  25  |  6.94<H>    Ca    6.6<L>      2022 07:32    TPro  6.7  /  Alb  2.0<L>  /  TBili  0.2  /  DBili  x   /  AST  10<L>  /  ALT  15  /  AlkPhos  175<H>  04-24      Urinalysis Basic - ( 2022 14:41 )    Color: Yellow / Appearance: Slightly Turbid / S.010 / pH: x  Gluc: x / Ketone: Negative  / Bili: Negative / Urobili: Negative mg/dL   Blood: x / Protein: 500 mg/dL / Nitrite: Negative   Leuk Esterase: Trace / RBC: 3-5 /HPF / WBC 6-10   Sq Epi: x / Non Sq Epi: Moderate / Bacteria: Few        Culture - Blood (collected 2022 00:27)  Source: .Blood Blood  Preliminary Report (2022 01:13):    No growth to date.    Culture - Blood (collected 2022 00:27)  Source: .Blood Blood  Preliminary Report (2022 01:13):    No growth to date.

## 2022-04-25 NOTE — PROGRESS NOTE ADULT - SUBJECTIVE AND OBJECTIVE BOX
Patient is a 71y old  Female who presents with a chief complaint of OC, hemoptysis, right lung mass (25 Apr 2022 22:48)      Interval History: Patient has hyperinsulinemia with HbA1c of 4.6.  Also has elevated intact PTH secondary to hypocalcemia (secondary hyperparathyroidism).  Also has left-sided thyroid enlargement extending into the anterior mediastinum but without any compressive symptoms    MEDICATIONS  (STANDING):  amLODIPine   Tablet 10 milliGRAM(s) Oral daily  folic acid Injectable 1 milliGRAM(s) IV Push daily  metoprolol succinate  milliGRAM(s) Oral daily  sodium chloride 0.45%. 1000 milliLiter(s) (100 mL/Hr) IV Continuous <Continuous>    MEDICATIONS  (PRN):  acetaminophen     Tablet .. 650 milliGRAM(s) Oral every 6 hours PRN Mild Pain (1 - 3), Moderate Pain (4 - 6)  ALBUTerol    90 MICROgram(s) HFA Inhaler 2 Puff(s) Inhalation every 6 hours PRN Shortness of Breath and/or Wheezing  melatonin 3 milliGRAM(s) Oral at bedtime PRN Insomnia  metoclopramide Injectable 5 milliGRAM(s) IV Push every 8 hours PRN headache/nausea  oxycodone    5 mG/acetaminophen 325 mG 1 Tablet(s) Oral every 6 hours PRN Severe Pain (7 - 10)      Allergies    sulfa drugs (Other; Rash)  Sulfur (Unknown)  trimethoprim (Other; Rash)    Intolerances        REVIEW OF SYSTEMS:  CONSTITUTIONAL: no changes  EYES: No eye pain, visual disturbances, or discharge  ENMT:  No difficulty hearing, No sinus or throat pain  NECK: No pain or stiffness  RESPIRATORY: No cough, wheezing, chills or hemoptysis; No shortness of breath  CARDIOVASCULAR: No chest pain, palpitations or leg swelling  GASTROINTESTINAL: No abdominal or epigastric pain. No nausea, vomiting, or hematemesis; No diarrhea or constipation. No melena or hematochezia.  GENITOURINARY: No dysuria, frequency, hematuria, or incontinence  NEUROLOGICAL: No headaches, memory loss, loss of strength, numbness, or tremors  SKIN: No itching, burning, rashes, or lesions   ENDOCRINE: No heat or cold intolerance; No hair loss  MUSCULOSKELETAL: No joint pain or swelling; No muscle, back, or extremity pain  PSYCHIATRIC: No depression, anxiety, mood swings, or difficulty sleeping  HEME/LYMPH: No easy bruising, or bleeding gums  ALLERY AND IMMUNOLOGIC: No hives or eczema    Vital Signs Last 24 Hrs  T(C): 37.1 (25 Apr 2022 15:22), Max: 37.1 (25 Apr 2022 15:22)  T(F): 98.7 (25 Apr 2022 15:22), Max: 98.7 (25 Apr 2022 15:22)  HR: 77 (25 Apr 2022 15:22) (77 - 79)  BP: 144/73 (25 Apr 2022 15:22) (126/66 - 144/73)  BP(mean): --  RR: 18 (25 Apr 2022 15:22) (18 - 18)  SpO2: 98% (25 Apr 2022 15:22) (97% - 98%)    PHYSICAL EXAM:  GENERAL: Morbidly obese  HEAD: Atraumatic, Normocephalic  EYES: PERRLA, conjunctiva and sclera clear  ENMT: No  exudates,; Moist mucous membranes,, No lesions  NECK: Supple, No JVD, Normal thyroid  NERVOUS SYSTEM:  Alert & Oriented,   CHEST/LUNG: Clear to auscultation bilaterally; No rales, rhonchi, wheezing, or rubs  HEART: Regular rate and rhythm; No murmurs, rubs, or gallops  ABDOMEN: Soft, Nontender, Nondistended; Bowel sounds present  EXTREMITIES:  2+ Peripheral Pulses, no edema  SKIN: No rashes or lesions    LABS:        CAPILLARY BLOOD GLUCOSE        Lipid panel:           Thyroid:  Diabetes Tests:  Parathyroid Panel:  Adrenals:  RADIOLOGY & ADDITIONAL TESTS:    Imaging Personally Reviewed:  [ ] YES  [ ] NO    Consultant(s) Notes Reviewed:  [ ] YES  [ ] NO    Care Discussed with Consultants/Other Providers [ ] YES  [ ] NO

## 2022-04-25 NOTE — DIETITIAN INITIAL EVALUATION ADULT - PERTINENT LABORATORY DATA
04-25    137  |  105  |  57<H>  ----------------------------<  91  4.4   |  25  |  6.94<H>    Ca    6.6<L>      25 Apr 2022     TPro  6.7  /  Alb  2.0<L>  /  TBili  0.2  /  DBili  x   /  AST  10<L>  /  ALT  15  /  AlkPhos  175<H>  04-24  A1C Result: 4.6 % (04-23-22)

## 2022-04-26 LAB
ANION GAP SERPL CALC-SCNC: 8 MMOL/L — SIGNIFICANT CHANGE UP (ref 5–17)
BUN SERPL-MCNC: 57 MG/DL — HIGH (ref 7–23)
CALCIUM SERPL-MCNC: 6.5 MG/DL — CRITICAL LOW (ref 8.5–10.1)
CHLORIDE SERPL-SCNC: 107 MMOL/L — SIGNIFICANT CHANGE UP (ref 96–108)
CO2 SERPL-SCNC: 23 MMOL/L — SIGNIFICANT CHANGE UP (ref 22–31)
CREAT SERPL-MCNC: 6.7 MG/DL — HIGH (ref 0.5–1.3)
EGFR: 6 ML/MIN/1.73M2 — LOW
GLUCOSE SERPL-MCNC: 93 MG/DL — SIGNIFICANT CHANGE UP (ref 70–99)
POTASSIUM SERPL-MCNC: 4.8 MMOL/L — SIGNIFICANT CHANGE UP (ref 3.5–5.3)
POTASSIUM SERPL-SCNC: 4.8 MMOL/L — SIGNIFICANT CHANGE UP (ref 3.5–5.3)
SODIUM SERPL-SCNC: 138 MMOL/L — SIGNIFICANT CHANGE UP (ref 135–145)

## 2022-04-26 PROCEDURE — 99232 SBSQ HOSP IP/OBS MODERATE 35: CPT

## 2022-04-26 RX ORDER — CALCIUM GLUCONATE 100 MG/ML
2 VIAL (ML) INTRAVENOUS ONCE
Refills: 0 | Status: COMPLETED | OUTPATIENT
Start: 2022-04-26 | End: 2022-04-26

## 2022-04-26 RX ORDER — ERGOCALCIFEROL 1.25 MG/1
50000 CAPSULE ORAL ONCE
Refills: 0 | Status: COMPLETED | OUTPATIENT
Start: 2022-04-26 | End: 2022-04-26

## 2022-04-26 RX ORDER — CALCIUM CARBONATE 500(1250)
3 TABLET ORAL AT BEDTIME
Refills: 0 | Status: DISCONTINUED | OUTPATIENT
Start: 2022-04-26 | End: 2022-04-30

## 2022-04-26 RX ORDER — CALCITRIOL 0.5 UG/1
0.5 CAPSULE ORAL DAILY
Refills: 0 | Status: DISCONTINUED | OUTPATIENT
Start: 2022-04-26 | End: 2022-04-30

## 2022-04-26 RX ADMIN — SODIUM CHLORIDE 100 MILLILITER(S): 9 INJECTION, SOLUTION INTRAVENOUS at 07:47

## 2022-04-26 RX ADMIN — Medication 100 MILLIGRAM(S): at 05:52

## 2022-04-26 RX ADMIN — SODIUM CHLORIDE 75 MILLILITER(S): 9 INJECTION, SOLUTION INTRAVENOUS at 17:58

## 2022-04-26 RX ADMIN — OXYCODONE AND ACETAMINOPHEN 1 TABLET(S): 5; 325 TABLET ORAL at 15:04

## 2022-04-26 RX ADMIN — OXYCODONE AND ACETAMINOPHEN 1 TABLET(S): 5; 325 TABLET ORAL at 16:04

## 2022-04-26 RX ADMIN — OXYCODONE AND ACETAMINOPHEN 1 TABLET(S): 5; 325 TABLET ORAL at 06:51

## 2022-04-26 RX ADMIN — SODIUM CHLORIDE 75 MILLILITER(S): 9 INJECTION, SOLUTION INTRAVENOUS at 12:00

## 2022-04-26 RX ADMIN — ERGOCALCIFEROL 50000 UNIT(S): 1.25 CAPSULE ORAL at 11:50

## 2022-04-26 RX ADMIN — Medication 200 GRAM(S): at 18:23

## 2022-04-26 RX ADMIN — SODIUM CHLORIDE 100 MILLILITER(S): 9 INJECTION, SOLUTION INTRAVENOUS at 05:58

## 2022-04-26 RX ADMIN — OXYCODONE AND ACETAMINOPHEN 1 TABLET(S): 5; 325 TABLET ORAL at 05:51

## 2022-04-26 RX ADMIN — AMLODIPINE BESYLATE 10 MILLIGRAM(S): 2.5 TABLET ORAL at 05:53

## 2022-04-26 RX ADMIN — CALCITRIOL 0.5 MICROGRAM(S): 0.5 CAPSULE ORAL at 11:50

## 2022-04-26 RX ADMIN — Medication 3 TABLET(S): at 21:01

## 2022-04-26 RX ADMIN — Medication 1 MILLIGRAM(S): at 11:50

## 2022-04-26 NOTE — PROGRESS NOTE ADULT - SUBJECTIVE AND OBJECTIVE BOX
Patient is a 71y old  Female who presents with a chief complaint of OC, hemoptysis, right lung mass (23 Apr 2022 10:32)      INTERVAL HPI/OVERNIGHT EVENTS:  no new hemoptysis     MEDICATIONS  (STANDING):  amLODIPine   Tablet 10 milliGRAM(s) Oral daily  folic acid Injectable 1 milliGRAM(s) IV Push daily  metoprolol succinate  milliGRAM(s) Oral daily  sodium chloride 0.45%. 1000 milliLiter(s) (100 mL/Hr) IV Continuous <Continuous>    MEDICATIONS  (PRN):  acetaminophen     Tablet .. 650 milliGRAM(s) Oral every 6 hours PRN Mild Pain (1 - 3), Moderate Pain (4 - 6)  ALBUTerol    90 MICROgram(s) HFA Inhaler 2 Puff(s) Inhalation every 6 hours PRN Shortness of Breath and/or Wheezing  melatonin 3 milliGRAM(s) Oral at bedtime PRN Insomnia  metoclopramide Injectable 5 milliGRAM(s) IV Push every 8 hours PRN headache/nausea  oxycodone    5 mG/acetaminophen 325 mG 1 Tablet(s) Oral every 6 hours PRN Severe Pain (7 - 10)        Allergies    sulfa drugs (Other; Rash)  Sulfur (Unknown)  trimethoprim (Other; Rash)    Intolerances        REVIEW OF SYSTEMS:  CONSTITUTIONAL: No fever, weight loss  EYES: No eye pain, visual disturbances, or discharge  ENMT:  No difficulty hearing, tinnitus, vertigo; No sinus or throat pain  RESPIRATORY: No cough, wheezing, chills or hemoptysis; No shortness of breath  CARDIOVASCULAR: No chest pain, palpitations, dizziness, or leg swelling  GASTROINTESTINAL: No abdominal or epigastric pain. No nausea, vomiting, or hematemesis; No diarrhea or constipation. No melena or hematochezia.  GENITOURINARY: No dysuria, frequency, hematuria, or incontinence  NEUROLOGICAL: No headaches, memory loss, loss of strength, numbness, or tremors  SKIN: No itching, burning, rashes, or lesions   MUSCULOSKELETAL: No joint pain or swelling; No muscle, back, or extremity pain  PSYCHIATRIC: No depression, anxiety, mood swings, or difficulty sleeping  HEME/LYMPH: No easy bruising, or bleeding gums      Vital Signs Last 24 Hrs  T(C): 36.3 (26 Apr 2022 05:57), Max: 37.1 (25 Apr 2022 15:22)  T(F): 97.4 (26 Apr 2022 05:57), Max: 98.7 (25 Apr 2022 15:22)  HR: 82 (26 Apr 2022 05:57) (77 - 82)  BP: 145/98 (26 Apr 2022 05:57) (133/89 - 145/98)  BP(mean): --  RR: 18 (26 Apr 2022 05:57) (18 - 18)  SpO2: 97% (26 Apr 2022 05:57) (97% - 99%)    PHYSICAL EXAM:  GENERAL: NAD  HEAD:  Atraumatic, Normocephalic  EYES: EOMI, PERRLA, conjunctiva and sclera clear  ENMT: No tonsillar erythema, exudates, or enlargement;   NECK: Supple, Normal thyroid  NERVOUS SYSTEM:  Alert & Oriented X3, Good concentration; Motor Strength 5/5 B/L upper and lower extremities; DTRs 2+ intact and symmetric  CHEST/LUNG: CTABL; No rales, rhonchi, wheezing, or rubs  HEART: Regular rate and rhythm; No murmurs, rubs, or gallops  ABDOMEN: Soft, Nontender, Nondistended; Bowel sounds present  EXTREMITIES:  2+ Peripheral Pulses, No clubbing, cyanosis, or edema  LYMPH: No lymphadenopathy noted  SKIN: No rashes or lesions    LABS:      04-26    138  |  107  |  57<H>  ----------------------------<  93  4.8   |  23  |  6.70<H>    Ca    6.5<LL>      26 Apr 2022 07:24                                             CAPILLARY BLOOD GLUCOSE          RADIOLOGY & ADDITIONAL TESTS:    Imaging Personally Reviewed:  [x ] YES  [ ] NO    Consultant(s) Notes Reviewed:  [ ] YES  [ ] NO    Care Discussed with Consultants/Other Providers [ ] YES  [ ] NO

## 2022-04-26 NOTE — PROGRESS NOTE ADULT - SUBJECTIVE AND OBJECTIVE BOX
Patient is a 71y old  Female who presents with a chief complaint of OC, hemoptysis, right lung mass (26 Apr 2022 18:16)      Interval History: No change in endocrine situation.  Goiter left-sided without any obstructive features.  Hyperinsulinemia with morbid obesity    MEDICATIONS  (STANDING):  amLODIPine   Tablet 10 milliGRAM(s) Oral daily  calcitriol   Capsule 0.5 MICROGram(s) Oral daily  calcium carbonate    500 mG (Tums) Chewable 3 Tablet(s) Chew at bedtime  desmopressin IVPB 21 MICROGram(s) IV Intermittent once  folic acid Injectable 1 milliGRAM(s) IV Push daily  metoprolol succinate  milliGRAM(s) Oral daily  sodium chloride 0.45%. 1000 milliLiter(s) (75 mL/Hr) IV Continuous <Continuous>    MEDICATIONS  (PRN):  acetaminophen     Tablet .. 650 milliGRAM(s) Oral every 6 hours PRN Mild Pain (1 - 3), Moderate Pain (4 - 6)  ALBUTerol    90 MICROgram(s) HFA Inhaler 2 Puff(s) Inhalation every 6 hours PRN Shortness of Breath and/or Wheezing  melatonin 3 milliGRAM(s) Oral at bedtime PRN Insomnia  metoclopramide Injectable 5 milliGRAM(s) IV Push every 8 hours PRN headache/nausea  oxycodone    5 mG/acetaminophen 325 mG 1 Tablet(s) Oral every 6 hours PRN Severe Pain (7 - 10)      Allergies    sulfa drugs (Other; Rash)  Sulfur (Unknown)  trimethoprim (Other; Rash)    Intolerances        REVIEW OF SYSTEMS:  CONSTITUTIONAL: no changes      Vital Signs Last 24 Hrs  T(C): 36.8 (27 Apr 2022 05:57), Max: 36.9 (26 Apr 2022 11:28)  T(F): 98.2 (27 Apr 2022 05:57), Max: 98.5 (26 Apr 2022 23:24)  HR: 88 (27 Apr 2022 05:57) (78 - 88)  BP: 131/84 (27 Apr 2022 05:57) (118/78 - 153/84)  BP(mean): --  RR: 18 (27 Apr 2022 05:57) (17 - 18)  SpO2: 97% (27 Apr 2022 05:57) (97% - 100%)    PHYSICAL EXAM:  GENERAL:   HEAD: Atraumatic, Normocephalic  EYES: PERRLA, conjunctiva and sclera clear  ENMT: No  exudates,; Moist mucous membranes,, No lesions  NECK: Supple, No JVD, Normal thyroid  NERVOUS SYSTEM:  Alert & Oriented,   CHEST/LUNG: Clear to auscultation bilaterally; No rales, rhonchi, wheezing, or rubs  HEART: Regular rate and rhythm; No murmurs, rubs, or gallops  ABDOMEN: Soft, Nontender, Nondistended; Bowel sounds present  EXTREMITIES:  2+ Peripheral Pulses, no edema  SKIN: No rashes or lesions    LABS:        CAPILLARY BLOOD GLUCOSE        Lipid panel:           Thyroid:  Diabetes Tests:  Parathyroid Panel:  Adrenals:  RADIOLOGY & ADDITIONAL TESTS:    Imaging Personally Reviewed:  [ ] YES  [ ] NO    Consultant(s) Notes Reviewed:  [ ] YES  [ ] NO    Care Discussed with Consultants/Other Providers [ ] YES  [ ] NO

## 2022-04-26 NOTE — PROGRESS NOTE ADULT - SUBJECTIVE AND OBJECTIVE BOX
[facility] NEPHROLOGY SERVICES, [facility]  NEPHROLOGY AND HYPERTENSION  [address]  [address]  [place] [address]  [phone]    MD ARVIND VENCES MD ANDREY GONCHARUK, MD MADHU KORRAPATI, MD YELENA ROSENBERG, MD BINNY KOSHY, MD CHRISTOPHER CAPUTO, MD BELIA CHA MD          Patient events noted  No distress    MEDICATIONS  (STANDING):  amLODIPine   Tablet 10 milliGRAM(s) Oral daily  calcitriol   Capsule 0.5 MICROGram(s) Oral daily  calcium carbonate    500 mG (Tums) Chewable 3 Tablet(s) Chew at bedtime  folic acid Injectable 1 milliGRAM(s) IV Push daily  metoprolol succinate  milliGRAM(s) Oral daily  sodium chloride 0.45%. 1000 milliLiter(s) (75 mL/Hr) IV Continuous <Continuous>    MEDICATIONS  (PRN):  acetaminophen     Tablet .. 650 milliGRAM(s) Oral every 6 hours PRN Mild Pain (1 - 3), Moderate Pain (4 - 6)  ALBUTerol    90 MICROgram(s) HFA Inhaler 2 Puff(s) Inhalation every 6 hours PRN Shortness of Breath and/or Wheezing  melatonin 3 milliGRAM(s) Oral at bedtime PRN Insomnia  metoclopramide Injectable 5 milliGRAM(s) IV Push every 8 hours PRN headache/nausea  oxycodone    5 mG/acetaminophen 325 mG 1 Tablet(s) Oral every 6 hours PRN Severe Pain (7 - 10)      04-25-22 @ 07:01  -  04-26-22 @ 07:00  --------------------------------------------------------  IN: 1840 mL / OUT: 851 mL / NET: 989 mL      PHYSICAL EXAM:      T(C): 36.9 (04-26-22 @ 11:28), Max: 36.9 (04-26-22 @ 11:28)  HR: 78 (04-26-22 @ 11:28) (78 - 82)  BP: 124/75 (04-26-22 @ 11:28) (124/75 - 145/98)  RR: 17 (04-26-22 @ 11:28) (17 - 18)  SpO2: 99% (04-26-22 @ 11:28) (97% - 99%)  Wt(kg): --  Lungs clear  Heart S1S2  Abd soft NT ND  Extremities:   tr edema                04-26    138  |  107  |  57<H>  ----------------------------<  93  4.8   |  23  |  6.70<H>    Ca    6.5<LL>      26 Apr 2022 07:24          Creatinine Trend: 6.70<--, 6.94<--, 7.36<--, 7.51<--, 7.23<--        Assessment :  OC, CKD 3. Renal US with echogenic kidneys. 9-10g proteinuria, hypoAlb  Lung mass hemoptysis;   DDx; pulmonary-renal vasculitis, malignancy associated paraneoplastic GN, nephrosis.     Plan    Follow serologies  PO Calcium and Vitamin D after IVP'   SPEP, UPEP  No dialytic urgency. Will cont to reeval daily  Renal bx Wed  Heme onc eval  Will follow course.      Chay Mora MD

## 2022-04-26 NOTE — PROGRESS NOTE ADULT - SUBJECTIVE AND OBJECTIVE BOX
lying in bed    Vital Signs Last 24 Hrs  T(C): 36.3 (26 Apr 2022 05:57), Max: 37.1 (25 Apr 2022 15:22)  T(F): 97.4 (26 Apr 2022 05:57), Max: 98.7 (25 Apr 2022 15:22)  HR: 82 (26 Apr 2022 05:57) (77 - 82)  BP: 145/98 (26 Apr 2022 05:57) (133/89 - 145/98)  BP(mean): --  RR: 18 (26 Apr 2022 05:57) (18 - 18)  SpO2: 97% (26 Apr 2022 05:57) (97% - 99%)    PHYSICAL EXAM:    general - AAO x 3  HEENT - No Icterus  CVS - RRR  RS - AE B/L  Abd - soft, NT  Ext - Pulses +        LABS:    04-26    138  |  107  |  57<H>  ----------------------------<  93  4.8   |  23  |  6.70<H>    Ca    6.5<LL>      26 Apr 2022 07:24            Culture - Blood (collected 23 Apr 2022 00:27)  Source: .Blood Blood  Preliminary Report (24 Apr 2022 01:13):    No growth to date.    Culture - Blood (collected 23 Apr 2022 00:27)  Source: .Blood Blood  Preliminary Report (24 Apr 2022 01:13):    No growth to date.

## 2022-04-26 NOTE — PROVIDER CONTACT NOTE (CRITICAL VALUE NOTIFICATION) - SITUATION
Patient recently admitted due to hemoptysis
calcium has been critically low and pt recently received calcium gluconate.   pt stable with no signs of distress
Patient recently admitted due to hemoptysis

## 2022-04-26 NOTE — PROVIDER CONTACT NOTE (CRITICAL VALUE NOTIFICATION) - BACKGROUND
Hx of obesity, LARRY, hypertension, migraines
came in w/ hemoptysis, has kdiney disease. nephro on case
Hx of migraine, arthropathy, PE, Pt will remain free of clot in extremities and organs. Pt will maintain anticoagulation therapy if applicable, OA, LARRY

## 2022-04-26 NOTE — PROGRESS NOTE ADULT - SUBJECTIVE AND OBJECTIVE BOX
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  Presented  to ED with hemoptysis for 1 day, which started in the morning of presentation as coughing up some blood clots. Reports having sore throat few days prior to this episode. Denies high fever, chills or chest pain.  Reports  gradual worsening of  SOB for 1 month, associated with loss of appetite.     Never smoke, but 2nd hand exposure from  for 20 years,  No family h/o malignancy.  Hypertensive, tachycardic in ED. EKG with sinus tachy,    OVERNIGHT EVENTS:  Awake and comfortable.    Vital Signs Last 24 Hrs  T(C): 36.4 (26 Apr 2022 17:03), Max: 36.9 (26 Apr 2022 11:28)  T(F): 97.5 (26 Apr 2022 17:03), Max: 98.4 (26 Apr 2022 11:28)  HR: 81 (26 Apr 2022 17:03) (78 - 82)  BP: 118/78 (26 Apr 2022 17:03) (118/78 - 145/98)  BP(mean): --  RR: 18 (26 Apr 2022 17:03) (17 - 18)  SpO2: 100% (26 Apr 2022 17:03) (97% - 100%)    PHYSICAL EXAM:  GEN:         Awake, responsive and comfortable.  HEENT:    Normal.    RESP:      no wheezing  CVS:         Regular rate and rhythm.     MEDICATIONS  (STANDING):  amLODIPine   Tablet 10 milliGRAM(s) Oral daily  calcitriol   Capsule 0.5 MICROGram(s) Oral daily  calcium carbonate    500 mG (Tums) Chewable 3 Tablet(s) Chew at bedtime  folic acid Injectable 1 milliGRAM(s) IV Push daily  metoprolol succinate  milliGRAM(s) Oral daily  sodium chloride 0.45%. 1000 milliLiter(s) (75 mL/Hr) IV Continuous <Continuous>    MEDICATIONS  (PRN):  acetaminophen     Tablet .. 650 milliGRAM(s) Oral every 6 hours PRN Mild Pain (1 - 3), Moderate Pain (4 - 6)  ALBUTerol    90 MICROgram(s) HFA Inhaler 2 Puff(s) Inhalation every 6 hours PRN Shortness of Breath and/or Wheezing  melatonin 3 milliGRAM(s) Oral at bedtime PRN Insomnia  metoclopramide Injectable 5 milliGRAM(s) IV Push every 8 hours PRN headache/nausea  oxycodone    5 mG/acetaminophen 325 mG 1 Tablet(s) Oral every 6 hours PRN Severe Pain (7 - 10)    LABS:    04-26    138  |  107  |  57<H>  ----------------------------<  93  4.8   |  23  |  6.70<H>    Ca    6.5<LL>      26 Apr 2022 07:24    04-22 @ 12:41  pH: 7.35  pCO2: 41  pO2: 136  SaO2: 99.4    ASSESSMENT AND PLAN:  ·	Hemoptysis episode likely from URI.  ·	RUL lung nodule.  ·	Morbid Obesity.  ·	LARRY, non compliant with CPAP.  ·	CKD  ·	HTN.  ·	Arthritis.  ·	Migraine.  ·	Anemia.    Oxygenation status improved.  hemoptysis resolved.  Follow chest CT in 2-3 months.  Being worked up for vasculitis.  Will get sedrate, CRP  For renal biopsy.

## 2022-04-27 ENCOUNTER — RESULT REVIEW (OUTPATIENT)
Age: 72
End: 2022-04-27

## 2022-04-27 DIAGNOSIS — R60.9 EDEMA, UNSPECIFIED: ICD-10-CM

## 2022-04-27 LAB
% ALBUMIN: 41.9 % — SIGNIFICANT CHANGE UP
% ALPHA 1: 7.1 % — SIGNIFICANT CHANGE UP
% ALPHA 2: 20.6 % — SIGNIFICANT CHANGE UP
% BETA: 13 % — SIGNIFICANT CHANGE UP
% GAMMA, URINE: 12.6 % — SIGNIFICANT CHANGE UP
% GAMMA: 17.4 % — SIGNIFICANT CHANGE UP
ALBUMIN 24H MFR UR ELPH: 56.1 % — SIGNIFICANT CHANGE UP
ALBUMIN SERPL ELPH-MCNC: 2.4 G/DL — LOW (ref 3.6–5.5)
ALBUMIN/GLOB SERPL ELPH: 0.7 RATIO — SIGNIFICANT CHANGE UP
ALPHA1 GLOB 24H MFR UR ELPH: 13.9 % — SIGNIFICANT CHANGE UP
ALPHA1 GLOB SERPL ELPH-MCNC: 0.4 G/DL — SIGNIFICANT CHANGE UP (ref 0.1–0.4)
ALPHA2 GLOB 24H MFR UR ELPH: 7.6 % — SIGNIFICANT CHANGE UP
ALPHA2 GLOB SERPL ELPH-MCNC: 1.2 G/DL — HIGH (ref 0.5–1)
ANA TITR SER: NEGATIVE — SIGNIFICANT CHANGE UP
ANION GAP SERPL CALC-SCNC: 9 MMOL/L — SIGNIFICANT CHANGE UP (ref 5–17)
AUTO DIFF PNL BLD: NEGATIVE — SIGNIFICANT CHANGE UP
B-GLOBULIN 24H MFR UR ELPH: 9.8 % — SIGNIFICANT CHANGE UP
B-GLOBULIN SERPL ELPH-MCNC: 0.8 G/DL — SIGNIFICANT CHANGE UP (ref 0.5–1)
BUN SERPL-MCNC: 57 MG/DL — HIGH (ref 7–23)
C-ANCA SER-ACNC: NEGATIVE — SIGNIFICANT CHANGE UP
CALCIUM SERPL-MCNC: 7.1 MG/DL — LOW (ref 8.5–10.1)
CHLORIDE SERPL-SCNC: 108 MMOL/L — SIGNIFICANT CHANGE UP (ref 96–108)
CO2 SERPL-SCNC: 23 MMOL/L — SIGNIFICANT CHANGE UP (ref 22–31)
CREAT SERPL-MCNC: 6.88 MG/DL — HIGH (ref 0.5–1.3)
CRP SERPL-MCNC: 42 MG/L — HIGH
EGFR: 6 ML/MIN/1.73M2 — LOW
ERYTHROCYTE [SEDIMENTATION RATE] IN BLOOD: 109 MM/HR — HIGH (ref 0–20)
FLUAV AG NPH QL: SIGNIFICANT CHANGE UP
FLUBV AG NPH QL: SIGNIFICANT CHANGE UP
GAMMA GLOBULIN: 1 G/DL — SIGNIFICANT CHANGE UP (ref 0.6–1.6)
GLUCOSE SERPL-MCNC: 87 MG/DL — SIGNIFICANT CHANGE UP (ref 70–99)
HCT VFR BLD CALC: 29.2 % — LOW (ref 34.5–45)
HGB BLD-MCNC: 8.8 G/DL — LOW (ref 11.5–15.5)
INTERPRETATION 24H UR IFE-IMP: SIGNIFICANT CHANGE UP
INTERPRETATION 24H UR IFE-IMP: SIGNIFICANT CHANGE UP
INTERPRETATION SERPL IFE-IMP: SIGNIFICANT CHANGE UP
M PROTEIN 24H UR ELPH-MRATE: SIGNIFICANT CHANGE UP
M PROTEIN 24H UR ELPH-MRATE: SIGNIFICANT CHANGE UP
MCHC RBC-ENTMCNC: 29.9 PG — SIGNIFICANT CHANGE UP (ref 27–34)
MCHC RBC-ENTMCNC: 30.1 G/DL — LOW (ref 32–36)
MCV RBC AUTO: 99.3 FL — SIGNIFICANT CHANGE UP (ref 80–100)
NRBC # BLD: 0 /100 WBCS — SIGNIFICANT CHANGE UP (ref 0–0)
P-ANCA SER-ACNC: NEGATIVE — SIGNIFICANT CHANGE UP
PLATELET # BLD AUTO: 272 K/UL — SIGNIFICANT CHANGE UP (ref 150–400)
POTASSIUM SERPL-MCNC: 4.7 MMOL/L — SIGNIFICANT CHANGE UP (ref 3.5–5.3)
POTASSIUM SERPL-SCNC: 4.7 MMOL/L — SIGNIFICANT CHANGE UP (ref 3.5–5.3)
PROT ?TM UR-MCNC: 632 MG/DL — HIGH (ref 0–12)
PROT PATTERN 24H UR ELPH-IMP: SIGNIFICANT CHANGE UP
PROT PATTERN SERPL ELPH-IMP: SIGNIFICANT CHANGE UP
RBC # BLD: 2.94 M/UL — LOW (ref 3.8–5.2)
RBC # FLD: 12.7 % — SIGNIFICANT CHANGE UP (ref 10.3–14.5)
SARS-COV-2 RNA SPEC QL NAA+PROBE: SIGNIFICANT CHANGE UP
SODIUM SERPL-SCNC: 140 MMOL/L — SIGNIFICANT CHANGE UP (ref 135–145)
TOTAL VOLUME - 24 HOUR: SIGNIFICANT CHANGE UP ML
URINE CREATININE CALCULATION: SIGNIFICANT CHANGE UP G/24 H (ref 0.8–1.8)
VIT D25+D1,25 OH+D1,25 PNL SERPL-MCNC: 7.9 PG/ML — LOW (ref 19.9–79.3)
WBC # BLD: 8.23 K/UL — SIGNIFICANT CHANGE UP (ref 3.8–10.5)
WBC # FLD AUTO: 8.23 K/UL — SIGNIFICANT CHANGE UP (ref 3.8–10.5)

## 2022-04-27 PROCEDURE — 50200 RENAL BIOPSY PERQ: CPT | Mod: 50

## 2022-04-27 PROCEDURE — 77012 CT SCAN FOR NEEDLE BIOPSY: CPT | Mod: 26

## 2022-04-27 PROCEDURE — 99233 SBSQ HOSP IP/OBS HIGH 50: CPT

## 2022-04-27 PROCEDURE — 93971 EXTREMITY STUDY: CPT | Mod: 26

## 2022-04-27 RX ORDER — DESMOPRESSIN ACETATE 0.1 MG/1
44 TABLET ORAL ONCE
Refills: 0 | Status: COMPLETED | OUTPATIENT
Start: 2022-04-27 | End: 2022-04-27

## 2022-04-27 RX ORDER — DESMOPRESSIN ACETATE 0.1 MG/1
21 TABLET ORAL ONCE
Refills: 0 | Status: DISCONTINUED | OUTPATIENT
Start: 2022-04-27 | End: 2022-04-27

## 2022-04-27 RX ADMIN — OXYCODONE AND ACETAMINOPHEN 1 TABLET(S): 5; 325 TABLET ORAL at 22:10

## 2022-04-27 RX ADMIN — DESMOPRESSIN ACETATE 244 MICROGRAM(S): 0.1 TABLET ORAL at 10:45

## 2022-04-27 RX ADMIN — OXYCODONE AND ACETAMINOPHEN 1 TABLET(S): 5; 325 TABLET ORAL at 15:30

## 2022-04-27 RX ADMIN — OXYCODONE AND ACETAMINOPHEN 1 TABLET(S): 5; 325 TABLET ORAL at 21:10

## 2022-04-27 RX ADMIN — SODIUM CHLORIDE 75 MILLILITER(S): 9 INJECTION, SOLUTION INTRAVENOUS at 12:57

## 2022-04-27 RX ADMIN — Medication 1 MILLIGRAM(S): at 14:35

## 2022-04-27 RX ADMIN — SODIUM CHLORIDE 75 MILLILITER(S): 9 INJECTION, SOLUTION INTRAVENOUS at 01:22

## 2022-04-27 RX ADMIN — CALCITRIOL 0.5 MICROGRAM(S): 0.5 CAPSULE ORAL at 12:55

## 2022-04-27 RX ADMIN — Medication 3 MILLIGRAM(S): at 21:09

## 2022-04-27 RX ADMIN — AMLODIPINE BESYLATE 10 MILLIGRAM(S): 2.5 TABLET ORAL at 05:35

## 2022-04-27 RX ADMIN — Medication 100 MILLIGRAM(S): at 05:35

## 2022-04-27 RX ADMIN — OXYCODONE AND ACETAMINOPHEN 1 TABLET(S): 5; 325 TABLET ORAL at 14:35

## 2022-04-27 RX ADMIN — Medication 3 TABLET(S): at 21:08

## 2022-04-27 RX ADMIN — OXYCODONE AND ACETAMINOPHEN 1 TABLET(S): 5; 325 TABLET ORAL at 01:21

## 2022-04-27 RX ADMIN — OXYCODONE AND ACETAMINOPHEN 1 TABLET(S): 5; 325 TABLET ORAL at 02:21

## 2022-04-27 NOTE — PROGRESS NOTE ADULT - SUBJECTIVE AND OBJECTIVE BOX
lying in bed    Vital Signs Last 24 Hrs  T(C): 36.8 (27 Apr 2022 05:57), Max: 36.9 (26 Apr 2022 11:28)  T(F): 98.2 (27 Apr 2022 05:57), Max: 98.5 (26 Apr 2022 23:24)  HR: 88 (27 Apr 2022 05:57) (78 - 88)  BP: 131/84 (27 Apr 2022 05:57) (118/78 - 153/84)  BP(mean): --  RR: 18 (27 Apr 2022 05:57) (17 - 18)  SpO2: 97% (27 Apr 2022 05:57) (97% - 100%)    PHYSICAL EXAM:    general - AAO x 3  HEENT - No Icterus  CVS - RRR  RS - AE B/L  Abd - soft, NT  Ext - Pulses +        LABS:    04-27    140  |  108  |  57<H>  ----------------------------<  87  4.7   |  23  |  6.88<H>    Ca    7.1<L>      27 Apr 2022 06:33            Culture - Blood (collected 23 Apr 2022 00:27)  Source: .Blood Blood  Preliminary Report (24 Apr 2022 01:13):    No growth to date.    Culture - Blood (collected 23 Apr 2022 00:27)  Source: .Blood Blood  Preliminary Report (24 Apr 2022 01:13):    No growth to date.

## 2022-04-27 NOTE — PROGRESS NOTE ADULT - SUBJECTIVE AND OBJECTIVE BOX
Patient is a 71y old  Female who presents with a chief complaint of OC, hemoptysis, right lung mass (23 Apr 2022 10:32)      INTERVAL HPI/OVERNIGHT EVENTS:  no new hemoptysis     MEDICATIONS  (STANDING):  amLODIPine   Tablet 10 milliGRAM(s) Oral daily  calcitriol   Capsule 0.5 MICROGram(s) Oral daily  calcium carbonate    500 mG (Tums) Chewable 3 Tablet(s) Chew at bedtime  folic acid Injectable 1 milliGRAM(s) IV Push daily  metoprolol succinate  milliGRAM(s) Oral daily  sodium chloride 0.45%. 1000 milliLiter(s) (75 mL/Hr) IV Continuous <Continuous>    MEDICATIONS  (PRN):  acetaminophen     Tablet .. 650 milliGRAM(s) Oral every 6 hours PRN Mild Pain (1 - 3), Moderate Pain (4 - 6)  ALBUTerol    90 MICROgram(s) HFA Inhaler 2 Puff(s) Inhalation every 6 hours PRN Shortness of Breath and/or Wheezing  melatonin 3 milliGRAM(s) Oral at bedtime PRN Insomnia  metoclopramide Injectable 5 milliGRAM(s) IV Push every 8 hours PRN headache/nausea  oxycodone    5 mG/acetaminophen 325 mG 1 Tablet(s) Oral every 6 hours PRN Severe Pain (7 - 10)        Allergies    sulfa drugs (Other; Rash)  Sulfur (Unknown)  trimethoprim (Other; Rash)    Intolerances      Vital Signs Last 24 Hrs  T(C): 36.4 (27 Apr 2022 11:30), Max: 36.9 (26 Apr 2022 23:24)  T(F): 97.5 (27 Apr 2022 11:30), Max: 98.5 (26 Apr 2022 23:24)  HR: 83 (27 Apr 2022 11:30) (81 - 88)  BP: 150/78 (27 Apr 2022 11:30) (118/78 - 153/84)  BP(mean): --  RR: 18 (27 Apr 2022 11:30) (18 - 18)  SpO2: 100% (27 Apr 2022 11:30) (96% - 100%)      PHYSICAL EXAM:  GENERAL: NAD  HEAD:  Atraumatic, Normocephalic  EYES: EOMI, PERRLA, conjunctiva and sclera clear  ENMT: No tonsillar erythema, exudates, or enlargement;   NECK: Supple, Normal thyroid  NERVOUS SYSTEM:  Alert & Oriented X3, Good concentration; Motor Strength 5/5 B/L upper and lower extremities; DTRs 2+ intact and symmetric  CHEST/LUNG: CTABL; No rales, rhonchi, wheezing, or rubs  HEART: Regular rate and rhythm; No murmurs, rubs, or gallops  ABDOMEN: Soft, Nontender, Nondistended; Bowel sounds present  EXTREMITIES:  2+ Peripheral Pulses, No clubbing, cyanosis, or edema  SKIN: No rashes or lesions    LABS:                                             CAPILLARY BLOOD GLUCOSE          RADIOLOGY & ADDITIONAL TESTS:    Imaging Personally Reviewed:  [x ] YES  [ ] NO    Consultant(s) Notes Reviewed:  [ ] YES  [ ] NO    Care Discussed with Consultants/Other Providers [ ] YES  [ ] NO Patient is a 71y old  Female who presents with a chief complaint of OC, hemoptysis, right lung mass (23 Apr 2022 10:32)      INTERVAL HPI/OVERNIGHT EVENTS:  no new hemoptysis     MEDICATIONS  (STANDING):  amLODIPine   Tablet 10 milliGRAM(s) Oral daily  calcitriol   Capsule 0.5 MICROGram(s) Oral daily  calcium carbonate    500 mG (Tums) Chewable 3 Tablet(s) Chew at bedtime  folic acid Injectable 1 milliGRAM(s) IV Push daily  metoprolol succinate  milliGRAM(s) Oral daily  sodium chloride 0.45%. 1000 milliLiter(s) (75 mL/Hr) IV Continuous <Continuous>    MEDICATIONS  (PRN):  acetaminophen     Tablet .. 650 milliGRAM(s) Oral every 6 hours PRN Mild Pain (1 - 3), Moderate Pain (4 - 6)  ALBUTerol    90 MICROgram(s) HFA Inhaler 2 Puff(s) Inhalation every 6 hours PRN Shortness of Breath and/or Wheezing  melatonin 3 milliGRAM(s) Oral at bedtime PRN Insomnia  metoclopramide Injectable 5 milliGRAM(s) IV Push every 8 hours PRN headache/nausea  oxycodone    5 mG/acetaminophen 325 mG 1 Tablet(s) Oral every 6 hours PRN Severe Pain (7 - 10)        Allergies    sulfa drugs (Other; Rash)  Sulfur (Unknown)  trimethoprim (Other; Rash)    Intolerances      Vital Signs Last 24 Hrs  T(C): 36.4 (27 Apr 2022 11:30), Max: 36.9 (26 Apr 2022 23:24)  T(F): 97.5 (27 Apr 2022 11:30), Max: 98.5 (26 Apr 2022 23:24)  HR: 83 (27 Apr 2022 11:30) (81 - 88)  BP: 150/78 (27 Apr 2022 11:30) (118/78 - 153/84)  BP(mean): --  RR: 18 (27 Apr 2022 11:30) (18 - 18)  SpO2: 100% (27 Apr 2022 11:30) (96% - 100%)      PHYSICAL EXAM:  GENERAL: NAD  HEAD:  Atraumatic, Normocephalic  EYES: EOMI, PERRLA, conjunctiva and sclera clear  ENMT: No tonsillar erythema, exudates, or enlargement;   NECK: Supple, Normal thyroid  NERVOUS SYSTEM:  Alert & Oriented X3, Good concentration; Motor Strength 5/5 B/L upper and lower extremities; DTRs 2+ intact and symmetric  CHEST/LUNG: CTABL; No rales, rhonchi, wheezing, or rubs  HEART: Regular rate and rhythm; No murmurs, rubs, or gallops  ABDOMEN: Soft, Nontender, Nondistended; Bowel sounds present  EXTREMITIES:  2+ Peripheral Pulses, No clubbing, cyanosis, left lower extremity  edema  SKIN: No rashes or lesions    LABS:                                             CAPILLARY BLOOD GLUCOSE          RADIOLOGY & ADDITIONAL TESTS:    Imaging Personally Reviewed:  [x ] YES  [ ] NO    Consultant(s) Notes Reviewed:  [ ] YES  [ ] NO    Care Discussed with Consultants/Other Providers [ ] YES  [ ] NO

## 2022-04-27 NOTE — PRE-OP CHECKLIST - LOOSE TEETH
Initial [facility] SW Note    Phoned patient this date and introduced self to patient and reason for call. Patient reported she was unable to talk at this time. Patient would prefer a call back in a few days between 9-11.   Reviewed chart and patient does have Medicare and Medicaid. Will explore with patient cost of medications     SW will complete comprehensive assessment at next encounter. Unable to complete on brief assessment.  Did not complete assessment yet  Housing:    Nutrition:    Financial:    Employment:    Transportation:    Advance Directives:  Aware of note in chart regarding phone number changes on 5 wishes. Will continue to follow in the chart or assist as needed    Legal:    Family/ patient coping:    Insurance:    Supportive in home services:    End of Life Planning:    Additional Information:       NICHOLAS next step:      SW next contact:    Early next week    
Population Health SW Follow Up Outreach    SW note:    Received referral from telephonic [facility] regarding patient having difficulty paying for medications.     Aware of referral and will follow up with patient early next week to assess patient needs  SW next step:   Follow up with to assess needs      Next Contact:  Week of March 5th           
no

## 2022-04-27 NOTE — PROGRESS NOTE ADULT - SUBJECTIVE AND OBJECTIVE BOX
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  Presented  to ED with hemoptysis for 1 day, which started in the morning of presentation as coughing up some blood clots. Reports having sore throat few days prior to this episode. Denies high fever, chills or chest pain.  Reports  gradual worsening of  SOB for 1 month, associated with loss of appetite.     Never smoke, but 2nd hand exposure from  for 20 years,  No family h/o malignancy.  Hypertensive, tachycardic in ED. EKG with sinus tachy,  04/27/22:  Right renal biopsy.    OVERNIGHT EVENTS:  Comfortable in bed.    Vital Signs Last 24 Hrs  T(C): 36.3 (27 Apr 2022 12:46), Max: 36.9 (26 Apr 2022 23:24)  T(F): 97.4 (27 Apr 2022 12:46), Max: 98.5 (26 Apr 2022 23:24)  HR: 85 (27 Apr 2022 12:46) (81 - 88)  BP: 125/67 (27 Apr 2022 12:46) (118/78 - 153/84)  BP(mean): --  RR: 18 (27 Apr 2022 12:46) (18 - 18)  SpO2: 96% (27 Apr 2022 12:46) (96% - 100%)    PHYSICAL EXAM:  GEN:         Awake, responsive and comfortable.  HEENT:    Normal.    RESP:        no wheezing.  CVS:          Regular rate and rhythm.     MEDICATIONS  (STANDING):  amLODIPine   Tablet 10 milliGRAM(s) Oral daily  calcitriol   Capsule 0.5 MICROGram(s) Oral daily  calcium carbonate    500 mG (Tums) Chewable 3 Tablet(s) Chew at bedtime  folic acid Injectable 1 milliGRAM(s) IV Push daily  metoprolol succinate  milliGRAM(s) Oral daily  sodium chloride 0.45%. 1000 milliLiter(s) (75 mL/Hr) IV Continuous <Continuous>    MEDICATIONS  (PRN):  acetaminophen     Tablet .. 650 milliGRAM(s) Oral every 6 hours PRN Mild Pain (1 - 3), Moderate Pain (4 - 6)  ALBUTerol    90 MICROgram(s) HFA Inhaler 2 Puff(s) Inhalation every 6 hours PRN Shortness of Breath and/or Wheezing  melatonin 3 milliGRAM(s) Oral at bedtime PRN Insomnia  metoclopramide Injectable 5 milliGRAM(s) IV Push every 8 hours PRN headache/nausea  oxycodone    5 mG/acetaminophen 325 mG 1 Tablet(s) Oral every 6 hours PRN Severe Pain (7 - 10)    LABS:    04-27    140  |  108  |  57<H>  ----------------------------<  87  4.7   |  23  |  6.88<H>    Ca    7.1<L>      27 Apr 2022 06:33  04-22 @ 12:41  pH: 7.35  pCO2: 41  pO2: 136  SaO2: 99.4    ASSESSMENT AND PLAN:  ·	Hemoptysis episode likely from URI.  ·	RUL lung nodule.  ·	Morbid Obesity.  ·	LARRY, non compliant with CPAP.  ·	CKD, S/P right renal biopsy.  ·	HTN.  ·	Arthritis.  ·	Migraine.  ·	Anemia.    Had right renal biopsy today.  SPO2 96% on room air,  hemoptysis resolved.  Follow chest CT in 2-3 months.  Being worked up for vasculitis.  Will get sedrate, CRP

## 2022-04-27 NOTE — PROGRESS NOTE ADULT - PROBLEM SELECTOR PLAN 8
CT shows there is been interval increase in size of a   left mediastinal goiter with mass effect upon the trachea and left   brachiocephalic vein.  - unsure if this is causing the bleeding but unlikely its eroding into the trachea. will check TFTs, and get endocrine CT shows there is been interval increase in size of a   left mediastinal goiter with mass effect upon the trachea and left   brachiocephalic vein.  appreciate endocrine note

## 2022-04-27 NOTE — PROGRESS NOTE ADULT - SUBJECTIVE AND OBJECTIVE BOX
Patient is a 71y old  Female who presents with a chief complaint of OC, hemoptysis, right lung mass (27 Apr 2022 12:42)      Interval History: Endocrine wise  stable   TSH normal and L sided goiter without any obstructive features   HbA1C 4.9 and severe hyperinsulinemia and morbid obesity     MEDICATIONS  (STANDING):  amLODIPine   Tablet 10 milliGRAM(s) Oral daily  calcitriol   Capsule 0.5 MICROGram(s) Oral daily  calcium carbonate    500 mG (Tums) Chewable 3 Tablet(s) Chew at bedtime  folic acid Injectable 1 milliGRAM(s) IV Push daily  metoprolol succinate  milliGRAM(s) Oral daily  sodium chloride 0.45%. 1000 milliLiter(s) (75 mL/Hr) IV Continuous <Continuous>    MEDICATIONS  (PRN):  acetaminophen     Tablet .. 650 milliGRAM(s) Oral every 6 hours PRN Mild Pain (1 - 3), Moderate Pain (4 - 6)  ALBUTerol    90 MICROgram(s) HFA Inhaler 2 Puff(s) Inhalation every 6 hours PRN Shortness of Breath and/or Wheezing  melatonin 3 milliGRAM(s) Oral at bedtime PRN Insomnia  metoclopramide Injectable 5 milliGRAM(s) IV Push every 8 hours PRN headache/nausea  oxycodone    5 mG/acetaminophen 325 mG 1 Tablet(s) Oral every 6 hours PRN Severe Pain (7 - 10)      Allergies    sulfa drugs (Other; Rash)  Sulfur (Unknown)  trimethoprim (Other; Rash)    Intolerances        REVIEW OF SYSTEMS:  CONSTITUTIONAL: no changes      Vital Signs Last 24 Hrs  T(C): 36.3 (27 Apr 2022 12:46), Max: 36.9 (26 Apr 2022 23:24)  T(F): 97.4 (27 Apr 2022 12:46), Max: 98.5 (26 Apr 2022 23:24)  HR: 85 (27 Apr 2022 12:46) (81 - 88)  BP: 125/67 (27 Apr 2022 12:46) (118/78 - 153/84)  BP(mean): --  RR: 18 (27 Apr 2022 12:46) (18 - 18)  SpO2: 96% (27 Apr 2022 12:46) (96% - 100%)    PHYSICAL EXAM:  GENERAL:   HEAD: Atraumatic, Normocephalic  EYES: PERRLA, conjunctiva and sclera clear  ENMT: No  exudates,; Moist mucous membranes,, No lesions  NECK: Supple, No JVD, Normal thyroid  NERVOUS SYSTEM:  Alert & Oriented,   CHEST/LUNG: Clear to auscultation bilaterally; No rales, rhonchi, wheezing, or rubs  HEART: Regular rate and rhythm; No murmurs, rubs, or gallops  ABDOMEN: Soft, Nontender, Nondistended; Bowel sounds present  EXTREMITIES:  2+ Peripheral Pulses, no edema  SKIN: No rashes or lesions    LABS:        CAPILLARY BLOOD GLUCOSE        Lipid panel:           Thyroid:  Diabetes Tests:  Parathyroid Panel:  Adrenals:  RADIOLOGY & ADDITIONAL TESTS:    Imaging Personally Reviewed:  [ ] YES  [ ] NO    Consultant(s) Notes Reviewed:  [ ] YES  [ ] NO    Care Discussed with Consultants/Other Providers [ ] YES  [ ] NO

## 2022-04-27 NOTE — PRE PROCEDURE NOTE - PRE PROCEDURE EVALUATION
Interventional Radiology  HPI: 71 years old female with h/o HTN, LARRY not using CPAP, h/o TKA complicated by DVT/PE in 2013 ( stopped AC in 2015), morbid obesity prsent to ED with hemoptysis for 1 day reports  coughing up some blood clots (4/22). Patient reported gradual worsening SOB for 1 month, associated with loss of appetite. Found to have OC on CKD and presents to IR for kidney biopsy   to r/o pulmonary-renal vasculitis, malignancy associated paraneoplastic GN, nephrosis.    Allergies:   sulfa drugs (Other; Rash)  trimethoprim (Other; Rash)    Medications (Abx/Cardiac/Anticoagulation/Blood Products)  amLODIPine   Tablet: 10 milliGRAM(s) Oral (04-27 @ 05:35)  metoprolol succinate ER: 100 milliGRAM(s) Oral (04-27 @ 05:35)    Data:  T(C): 36.8  HR: 88  BP: 131/84  RR: 18  SpO2: 97%    Exam  General: No acute distress  Chest: Non labored breathing  Abdomen: Non-distended  Extremities: No swelling, warm    -WBC 8.44 / HgB 9.3 / Hct 31.1 / Plt 266  -Na 140 / Cl 108 / BUN 57 / Glucose 87  -K 4.7 / CO2 23 / Cr 6.88  -ALT -- / Alk Phos -- / T.Bili --  -INR1.04    Plan: 71y Female presents for kidney biopsy; will give DDAVP pre-procedure  -Risks/Benefits/alternatives explained with the patient and/or healthcare proxy and witnessed informed consent obtained.    Interventional Radiology  HPI: 71 years old female with h/o HTN, LARRY not using CPAP, h/o TKA complicated by DVT/PE in 2013 ( stopped AC in 2015), morbid obesity prsent to ED with hemoptysis for 1 day reports  coughing up some blood clots (4/22). Patient reported gradual worsening SOB for 1 month, associated with loss of appetite. Found to have OC on CKD and presents to IR for kidney biopsy   to r/o pulmonary-renal vasculitis, malignancy associated paraneoplastic GN, nephrosis.    Allergies:   sulfa drugs (Other; Rash)  trimethoprim (Other; Rash)    Medications (Abx/Cardiac/Anticoagulation/Blood Products)  amLODIPine   Tablet: 10 milliGRAM(s) Oral (04-27 @ 05:35)  metoprolol succinate ER: 100 milliGRAM(s) Oral (04-27 @ 05:35)    Data:  T(C): 36.8  HR: 88  BP: 131/84  RR: 18  SpO2: 97%    Exam  General: No acute distress  Chest: Non labored breathing  Abdomen: Non-distended  Extremities: No swelling, warm    -WBC 8.44 / HgB 9.3 / Hct 31.1 / Plt 266  -Na 140 / Cl 108 / BUN 57 / Glucose 87  -K 4.7 / CO2 23 / Cr 6.88  -ALT -- / Alk Phos -- / T.Bili --  -INR1.04    Plan: 71y Female presents for kidney biopsy; will give DDAVP pre-procedure ( dose re-ordered to 44mcg IVPB as per pharmacy recommendation)  -Risks/Benefits/alternatives explained with the patient and/or healthcare proxy and witnessed informed consent obtained.

## 2022-04-28 DIAGNOSIS — M25.511 PAIN IN RIGHT SHOULDER: ICD-10-CM

## 2022-04-28 LAB
% ALBUMIN: 41.9 % — SIGNIFICANT CHANGE UP
% ALPHA 1: 7.4 % — SIGNIFICANT CHANGE UP
% ALPHA 2: 20.1 % — SIGNIFICANT CHANGE UP
% BETA: 13.3 % — SIGNIFICANT CHANGE UP
% GAMMA: 17.3 % — SIGNIFICANT CHANGE UP
ALBUMIN SERPL ELPH-MCNC: 2.6 G/DL — LOW (ref 3.6–5.5)
ALBUMIN/GLOB SERPL ELPH: 0.7 RATIO — SIGNIFICANT CHANGE UP
ALPHA1 GLOB SERPL ELPH-MCNC: 0.5 G/DL — HIGH (ref 0.1–0.4)
ALPHA2 GLOB SERPL ELPH-MCNC: 1.2 G/DL — HIGH (ref 0.5–1)
B-GLOBULIN SERPL ELPH-MCNC: 0.8 G/DL — SIGNIFICANT CHANGE UP (ref 0.5–1)
CA-I BLD-SCNC: 4 MG/DL — LOW (ref 4.5–5.6)
CULTURE RESULTS: SIGNIFICANT CHANGE UP
CULTURE RESULTS: SIGNIFICANT CHANGE UP
GAMMA GLOBULIN: 1.1 G/DL — SIGNIFICANT CHANGE UP (ref 0.6–1.6)
INTERPRETATION SERPL IFE-IMP: SIGNIFICANT CHANGE UP
PROT PATTERN SERPL ELPH-IMP: SIGNIFICANT CHANGE UP
SPECIMEN SOURCE: SIGNIFICANT CHANGE UP
SPECIMEN SOURCE: SIGNIFICANT CHANGE UP

## 2022-04-28 PROCEDURE — 36410 VNPNXR 3YR/> PHY/QHP DX/THER: CPT

## 2022-04-28 PROCEDURE — 76937 US GUIDE VASCULAR ACCESS: CPT | Mod: 26

## 2022-04-28 PROCEDURE — 36569 INSJ PICC 5 YR+ W/O IMAGING: CPT

## 2022-04-28 PROCEDURE — 99233 SBSQ HOSP IP/OBS HIGH 50: CPT

## 2022-04-28 PROCEDURE — 73200 CT UPPER EXTREMITY W/O DYE: CPT | Mod: 26,RT

## 2022-04-28 PROCEDURE — 72125 CT NECK SPINE W/O DYE: CPT | Mod: 26

## 2022-04-28 PROCEDURE — 76937 US GUIDE VASCULAR ACCESS: CPT | Mod: 26,59

## 2022-04-28 RX ADMIN — OXYCODONE AND ACETAMINOPHEN 1 TABLET(S): 5; 325 TABLET ORAL at 11:37

## 2022-04-28 RX ADMIN — CALCITRIOL 0.5 MICROGRAM(S): 0.5 CAPSULE ORAL at 12:20

## 2022-04-28 RX ADMIN — OXYCODONE AND ACETAMINOPHEN 1 TABLET(S): 5; 325 TABLET ORAL at 10:37

## 2022-04-28 RX ADMIN — Medication 3 TABLET(S): at 21:20

## 2022-04-28 RX ADMIN — OXYCODONE AND ACETAMINOPHEN 1 TABLET(S): 5; 325 TABLET ORAL at 22:16

## 2022-04-28 RX ADMIN — OXYCODONE AND ACETAMINOPHEN 1 TABLET(S): 5; 325 TABLET ORAL at 21:19

## 2022-04-28 RX ADMIN — AMLODIPINE BESYLATE 10 MILLIGRAM(S): 2.5 TABLET ORAL at 05:25

## 2022-04-28 RX ADMIN — Medication 100 MILLIGRAM(S): at 05:25

## 2022-04-28 NOTE — PROGRESS NOTE ADULT - SUBJECTIVE AND OBJECTIVE BOX
Patient is a 71y old  Female who presents with a chief complaint of OC, hemoptysis, right lung mass (28 Apr 2022 16:33)      Interval History: no interval changes   MEDICATIONS  (STANDING):  amLODIPine   Tablet 10 milliGRAM(s) Oral daily  calcitriol   Capsule 0.5 MICROGram(s) Oral daily  calcium carbonate    500 mG (Tums) Chewable 3 Tablet(s) Chew at bedtime  folic acid Injectable 1 milliGRAM(s) IV Push daily  metoprolol succinate  milliGRAM(s) Oral daily    MEDICATIONS  (PRN):  acetaminophen     Tablet .. 650 milliGRAM(s) Oral every 6 hours PRN Mild Pain (1 - 3), Moderate Pain (4 - 6)  ALBUTerol    90 MICROgram(s) HFA Inhaler 2 Puff(s) Inhalation every 6 hours PRN Shortness of Breath and/or Wheezing  melatonin 3 milliGRAM(s) Oral at bedtime PRN Insomnia  metoclopramide Injectable 5 milliGRAM(s) IV Push every 8 hours PRN headache/nausea  oxycodone    5 mG/acetaminophen 325 mG 1 Tablet(s) Oral every 6 hours PRN Severe Pain (7 - 10)      Allergies    sulfa drugs (Other; Rash)  Sulfur (Unknown)  trimethoprim (Other; Rash)    Intolerances        REVIEW OF SYSTEMS:  CONSTITUTIONAL: no changes  EYES: No eye pain, visual disturbances, or discharge  ENMT:  No difficulty hearing, No sinus or throat pain  NECK: No pain or stiffness  RESPIRATORY: No cough, wheezing, chills or hemoptysis; No shortness of breath  CARDIOVASCULAR: No chest pain, palpitations or leg swelling  GASTROINTESTINAL: No abdominal or epigastric pain. No nausea, vomiting, or hematemesis; No diarrhea or constipation. No melena or hematochezia.  GENITOURINARY: No dysuria, frequency, hematuria, or incontinence  NEUROLOGICAL: No headaches, memory loss, loss of strength, numbness, or tremors  SKIN: No itching, burning, rashes, or lesions   ENDOCRINE: No heat or cold intolerance; No hair loss  MUSCULOSKELETAL: No joint pain or swelling; No muscle, back, or extremity pain  PSYCHIATRIC: No depression, anxiety, mood swings, or difficulty sleeping  HEME/LYMPH: No easy bruising, or bleeding gums  ALLERY AND IMMUNOLOGIC: No hives or eczema    Vital Signs Last 24 Hrs  T(C): 37.2 (28 Apr 2022 16:34), Max: 37.2 (28 Apr 2022 16:34)  T(F): 98.9 (28 Apr 2022 16:34), Max: 98.9 (28 Apr 2022 16:34)  HR: 95 (28 Apr 2022 16:34) (80 - 95)  BP: 146/87 (28 Apr 2022 16:34) (126/71 - 146/87)  BP(mean): --  RR: 17 (28 Apr 2022 16:34) (17 - 18)  SpO2: 100% (28 Apr 2022 16:34) (92% - 100%)    PHYSICAL EXAM:  GENERAL:   HEAD: Atraumatic, Normocephalic  EYES: PERRLA, conjunctiva and sclera clear  ENMT: No  exudates,; Moist mucous membranes,, No lesions  NECK: Supple, No JVD, Normal thyroid  NERVOUS SYSTEM:  Alert & Oriented,   CHEST/LUNG: Clear to auscultation bilaterally; No rales, rhonchi, wheezing, or rubs  HEART: Regular rate and rhythm; No murmurs, rubs, or gallops  ABDOMEN: Soft, Nontender, Nondistended; Bowel sounds present  EXTREMITIES:  2+ Peripheral Pulses, no edema  SKIN: No rashes or lesions    LABS:        CAPILLARY BLOOD GLUCOSE        Lipid panel:           Thyroid:  Diabetes Tests:  Parathyroid Panel:  Adrenals:  RADIOLOGY & ADDITIONAL TESTS:    Imaging Personally Reviewed:  [ ] YES  [ ] NO    Consultant(s) Notes Reviewed:  [ ] YES  [ ] NO    Care Discussed with Consultants/Other Providers [ ] YES  [ ] NO

## 2022-04-28 NOTE — PROGRESS NOTE ADULT - PROBLEM SELECTOR PLAN 8
CT shows there is been interval increase in size of a   left mediastinal goiter with mass effect upon the trachea and left   brachiocephalic vein.  appreciate endocrine note

## 2022-04-28 NOTE — PROGRESS NOTE ADULT - SUBJECTIVE AND OBJECTIVE BOX
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  Presented  to ED with hemoptysis for 1 day, which started in the morning of presentation as coughing up some blood clots. Reports having sore throat few days prior to this episode. Denies high fever, chills or chest pain.  Reports  gradual worsening of  SOB for 1 month, associated with loss of appetite.     Never smoke, but 2nd hand exposure from  for 20 years,  No family h/o malignancy.  Hypertensive, tachycardic in ED. EKG with sinus tachy,  04/27/22:  Right renal biopsy.    OVERNIGHT EVENTS:  Awake, comfortable in chair.    Vital Signs Last 24 Hrs  T(C): 36.8 (28 Apr 2022 11:17), Max: 36.8 (28 Apr 2022 11:17)  T(F): 98.3 (28 Apr 2022 11:17), Max: 98.3 (28 Apr 2022 11:17)  HR: 86 (28 Apr 2022 11:17) (80 - 87)  BP: 126/71 (28 Apr 2022 11:17) (111/66 - 144/82)  BP(mean): --  RR: 17 (28 Apr 2022 11:17) (17 - 18)  SpO2: 92% (28 Apr 2022 11:17) (92% - 98%)    PHYSICAL EXAM:  GEN:         Awake, responsive and comfortable.  HEENT:    Normal.    RESP:       no wheezing  CVS:          Regular rate and rhythm.   ABD:         Soft, non-tender, non-distended;     MEDICATIONS  (STANDING):  amLODIPine   Tablet 10 milliGRAM(s) Oral daily  calcitriol   Capsule 0.5 MICROGram(s) Oral daily  calcium carbonate    500 mG (Tums) Chewable 3 Tablet(s) Chew at bedtime  folic acid Injectable 1 milliGRAM(s) IV Push daily  metoprolol succinate  milliGRAM(s) Oral daily  sodium chloride 0.45%. 1000 milliLiter(s) (75 mL/Hr) IV Continuous <Continuous>    MEDICATIONS  (PRN):  acetaminophen     Tablet .. 650 milliGRAM(s) Oral every 6 hours PRN Mild Pain (1 - 3), Moderate Pain (4 - 6)  ALBUTerol    90 MICROgram(s) HFA Inhaler 2 Puff(s) Inhalation every 6 hours PRN Shortness of Breath and/or Wheezing  melatonin 3 milliGRAM(s) Oral at bedtime PRN Insomnia  metoclopramide Injectable 5 milliGRAM(s) IV Push every 8 hours PRN headache/nausea  oxycodone    5 mG/acetaminophen 325 mG 1 Tablet(s) Oral every 6 hours PRN Severe Pain (7 - 10)    LABS:                        8.8    8.23  )-----------( 272      ( 27 Apr 2022 22:55 )             29.2     04-27    140  |  108  |  57<H>  ----------------------------<  87  4.7   |  23  |  6.88<H>    Ca    7.1<L>      27 Apr 2022 06:33    04-22 @ 12:41  pH: 7.35  pCO2: 41  pO2: 136  SaO2: 99.4    ASSESSMENT AND PLAN:  ·	Hemoptysis episode likely from URI.  ·	RUL lung nodule.  ·	Morbid Obesity.  ·	LARRY, non compliant with CPAP.  ·	CKD, S/P right renal biopsy.  ·	HTN.  ·	Arthritis.  ·	Migraine.  ·	Anemia.    SPO2 stable on room air.  Follow serologies and renal biopsy results.  Sed rate and CRP elevated,  Follow up chest CT in 2-3 months.

## 2022-04-28 NOTE — CONSULT NOTE ADULT - ASSESSMENT
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 Patient is presently being managed by medical service and nephrology for kidney injury. Patient noted to have a semisolid 11 mm nodular opacity at the right lung apex. IR evaluated CT and stated due to size and location of nodular opacity, they are unable to biopsy.    Recommend continuing medical management and supportive care while patient is admitted to the hospital  Patient requires outpatient follow up, post discharge, for further workup of semisolid opacity.
RUL Lung Nodule

## 2022-04-28 NOTE — CONSULT NOTE ADULT - SUBJECTIVE AND OBJECTIVE BOX
Chief Complaint:  Patient is a 71y old  Female who presents with a chief complaint of OC, hemoptysis, right lung mass (2022 13:26)    Patient seen and examined at bedside, no longer complaining of hemoptysis. States hemoptysis resolved after 1 day.    HPI:  71 years old female with h/o HTN, LARRY not using CPAP, h/o TKA complicated by DVT/PE in  ( stopped AC in ), morbid obesity prsent to ED with hemoptysis for 1 day. Patient reported gradual worsening SOB for 1 month, associated with loss of appetite. Hemoptysis started today AM with coughing up some blood clots. Not on blood thinner. Never smoke. No family h/o malignancy.  Hypertensive, tachycardic in ED. EKG with sinus tachy, , QTc 516. No leukocytosis, Plt 303, ddimer 802, K 4.5, Cr 7.23 ( Cr 1.7 in 2021 [facility]), hsTnT 18.3, proBNP 6540, magnesium 1.3. CT chest with no PE. Enlarging semisolid opacity at the right lung apex, which may represent adenocarcinoma. CT abd/pelvis with no acute pathology    SH- no toxic habits  FH: mother-HTN, no family h/o malignancy (2022 15:35)      PMH/PSH:PAST MEDICAL & SURGICAL HISTORY:  Osteoarthritis    Pulmonary embolism    Joint infection    Dysfunctional uterine bleeding    Obesity    Essential hypertension  Hypertension    Obstructive sleep apnea syndrome  Obstructive sleep apnea    History of pulmonary embolism   s/p IVC filter    Arthropathy  Arthritis    Migraine  Migraines    Morbid obesity    Total knee replacement status    Status post hysterectomy    Status post cholecystectomy    Other acquired absence of organ  History of cholecystectomy    Status post total hysterectomy  partial    History of total knee replacement  with revisions x 3        Allergies:  sulfa drugs (Other; Rash)  Sulfur (Unknown)  trimethoprim (Other; Rash)      Medications:  acetaminophen     Tablet .. 650 milliGRAM(s) Oral every 6 hours PRN  ALBUTerol    90 MICROgram(s) HFA Inhaler 2 Puff(s) Inhalation every 6 hours PRN  amLODIPine   Tablet 10 milliGRAM(s) Oral daily  calcitriol   Capsule 0.5 MICROGram(s) Oral daily  calcium carbonate    500 mG (Tums) Chewable 3 Tablet(s) Chew at bedtime  folic acid Injectable 1 milliGRAM(s) IV Push daily  melatonin 3 milliGRAM(s) Oral at bedtime PRN  metoclopramide Injectable 5 milliGRAM(s) IV Push every 8 hours PRN  metoprolol succinate  milliGRAM(s) Oral daily  oxycodone    5 mG/acetaminophen 325 mG 1 Tablet(s) Oral every 6 hours PRN      REVIEW OF SYSTEMS:  All other review of systems is negative unless indicated above.    Relevant Family History:   FAMILY HISTORY:  FH: type 2 diabetes mellitus    FHx: hypertension        Relevant Social History:  Denies ETOH or tobacco history    Physical Exam:    Vital Signs:  Vital Signs Last 24 Hrs  T(C): 37.2 (2022 16:34), Max: 37.2 (2022 16:34)  T(F): 98.9 (2022 16:34), Max: 98.9 (2022 16:34)  HR: 95 (2022 16:34) (80 - 95)  BP: 146/87 (2022 16:34) (111/66 - 146/87)  RR: 17 (2022 16:34) (17 - 18)  SpO2: 100% (2022 16:34) (92% - 100%)  Daily Weight in k.1 (2022 06:05)    Constitutional: Resting comfortably in bed; NAD  HEENT: NC/AT, PERRL, EOMI, anicteric sclera, no nasal discharge; uvula midline, no oropharyngeal erythema or exudates  Neck: supple; no JVD or thyromegaly  Respiratory: CTA B/L; no W/R/R, no retractions  Cardiac: +S1/S2; RRR; no M/R/G; PMI non-displaced  Gastrointestinal: soft, NT/ND; no rebound or guarding; +BS   Extremities: , no clubbing or cyanosis; no peripheral edema  Musculoskeletal:  no joint swelling, tenderness or erythema  Vascular: 2+ radial, femoral, DP/PT pulses B/L  Skin: warm, dry and intact; no rashes, wounds, or scars  Neurologic: AAOx3; CNS grossly intact; no focal deficits no asterixis, no tremor, no encephalopathy    Laboratory:                          8.8    8.23  )-----------( 272      ( 2022 22:55 )             29.2     04-    140  |  108  |  57<H>  ----------------------------<  87  4.7   |  23  |  6.88<H>    Ca    7.1<L>      2022 06:33                Intake and Output    22 @ 07:01  -  22 @ 07:00  --------------------------------------------------------  IN: 480 mL / OUT: 0 mL / NET: 480 mL        Imaging:  < from: CT Angio Chest PE Protocol w/ IV Cont (22 @ 14:06) >    ACC: [id number] EXAM:  CT ANGIO CHEST PULM ART [facility]                        ACC: [id number] EXAM:  CT ABDOMEN AND PELVIS                          PROCEDURE DATE:  2022          INTERPRETATION:  CLINICAL INFORMATION: Shortness of breath. Right-sided   and epigastric pain, vomiting.    COMPARISON: 2019.    CONTRAST/COMPLICATIONS:  IV Contrast: IV contrast documented in associated exam (accession   [id number]), Omnipaque 350 (accession [id number])  90 cc administered   10 cc   discarded  Oral Contrast: NONE  Complications: None reported at time of study completion    PROCEDURE:  CT Angiography of the Chest was performed followed by portal venous phase   imaging of the Abdomen and Pelvis. There is mild respiratory motion   artifact.  Sagittal and coronal reformats were performed as well as 3D (MIP)   reconstructions.    FINDINGS:  CHEST:  LUNGS AND LARGE AIRWAYS: Patent central airways. There has been increase   in density of a semisolid 11 mm nodular opacity at the right lung apex on   image 28 of series 5 with trace adjacent groundglass density. No   confluent airspace opacity is identified.  PLEURA: No pleural effusion. No pneumothorax or pneumomediastinum.  VESSELS: There is mild respiratory motion artifact. Otherwise, there is   no evidence of filling defect in the first, second, or third or branches   of the pulmonary arteries. The pulmonary trunk and main pulmonary   arteries are unremarkable. The thoracic aorta and great vessels are   unremarkable.  HEART: Heart size is normal. No pericardial effusion.  MEDIASTINUM AND SILVIA: No lymphadenopathy.  CHEST WALL AND LOWER NECK: There is been interval increase in size of a   left mediastinal goiter with mass effect upon the trachea and left   brachiocephalic vein.    ABDOMEN AND PELVIS:  LIVER: Within normal limits.  BILE DUCTS: Mild pneumobilia.  GALLBLADDER: Removed.  SPLEEN: Within normal limits.  PANCREAS: Moderately atrophic and fatty replaced.  ADRENALS: Within normal limits.  KIDNEYS/URETERS: Moderate left andmild right renal cortical scarring. No   evidence of hydronephrosis, mass, stone, or perinephric stranding   bilaterally. Normal limits.    BLADDER: Within normal limits.  REPRODUCTIVE ORGANS: 3.8 cm left ovarian cyst. 2.4 cm right ovarian cyst.   The uterus has been removed.    BOWEL: No bowel obstruction. Appendix is normal.  PERITONEUM: No ascites.  VESSELS: IVC filter. The abdominal aorta is nonaneurysmal.  RETROPERITONEUM/LYMPH NODES: No lymphadenopathy.  ABDOMINAL WALL: Within normal limits.  BONES: Within normal limits.    IMPRESSION:  1. Enlarging semisolid opacity at the right lung apex, which may   represent adenocarcinoma. Correlate with smoking history.  2. No evidence of pulmonary embolism.  3. No evidence of acute inflammatory or obstructive process in the   abdomen and pelvis.

## 2022-04-28 NOTE — PROGRESS NOTE ADULT - SUBJECTIVE AND OBJECTIVE BOX
Patient is a 71y old  Female who presents with a chief complaint of OC, hemoptysis, right lung mass (23 Apr 2022 10:32)      INTERVAL HPI/OVERNIGHT EVENTS:  no new hemoptysis   MEDICATIONS  (STANDING):  amLODIPine   Tablet 10 milliGRAM(s) Oral daily  calcitriol   Capsule 0.5 MICROGram(s) Oral daily  calcium carbonate    500 mG (Tums) Chewable 3 Tablet(s) Chew at bedtime  folic acid Injectable 1 milliGRAM(s) IV Push daily  metoprolol succinate  milliGRAM(s) Oral daily  sodium chloride 0.45%. 1000 milliLiter(s) (75 mL/Hr) IV Continuous <Continuous>    MEDICATIONS  (PRN):  acetaminophen     Tablet .. 650 milliGRAM(s) Oral every 6 hours PRN Mild Pain (1 - 3), Moderate Pain (4 - 6)  ALBUTerol    90 MICROgram(s) HFA Inhaler 2 Puff(s) Inhalation every 6 hours PRN Shortness of Breath and/or Wheezing  melatonin 3 milliGRAM(s) Oral at bedtime PRN Insomnia  metoclopramide Injectable 5 milliGRAM(s) IV Push every 8 hours PRN headache/nausea  oxycodone    5 mG/acetaminophen 325 mG 1 Tablet(s) Oral every 6 hours PRN Severe Pain (7 - 10)      Allergies    sulfa drugs (Other; Rash)  Sulfur (Unknown)  trimethoprim (Other; Rash)    Intolerances    Vital Signs Last 24 Hrs  T(C): 36.8 (28 Apr 2022 11:17), Max: 36.8 (28 Apr 2022 11:17)  T(F): 98.3 (28 Apr 2022 11:17), Max: 98.3 (28 Apr 2022 11:17)  HR: 86 (28 Apr 2022 11:17) (80 - 87)  BP: 126/71 (28 Apr 2022 11:17) (111/66 - 144/82)  BP(mean): --  RR: 17 (28 Apr 2022 11:17) (17 - 18)  SpO2: 92% (28 Apr 2022 11:17) (92% - 98%)    PHYSICAL EXAM:  GENERAL: NAD  HEAD:  Atraumatic, Normocephalic  EYES: EOMI, PERRLA, conjunctiva and sclera clear  ENMT: No tonsillar erythema, exudates, or enlargement;   NECK: Supple, Normal thyroid  NERVOUS SYSTEM:  Alert & Oriented X3, Good concentration; Motor Strength 5/5 B/L upper and lower extremities; DTRs 2+ intact and symmetric  CHEST/LUNG: CTABL; No rales, rhonchi, wheezing, or rubs  HEART: Regular rate and rhythm; No murmurs, rubs, or gallops  ABDOMEN: Soft, Nontender, Nondistended; Bowel sounds present  EXTREMITIES:  2+ Peripheral Pulses, No clubbing, cyanosis, left lower extremity  edema  SKIN: No rashes or lesions    LABS:                            8.8    8.23  )-----------( 272      ( 27 Apr 2022 22:55 )             29.2   04-27    140  |  108  |  57<H>  ----------------------------<  87  4.7   |  23  |  6.88<H>    Ca    7.1<L>      27 Apr 2022 06:33                                           CAPILLARY BLOOD GLUCOSE          RADIOLOGY & ADDITIONAL TESTS:    Imaging Personally Reviewed:  [x ] YES  [ ] NO    Consultant(s) Notes Reviewed:  [ ] YES  [ ] NO    Care Discussed with Consultants/Other Providers [ ] YES  [ ] NO Patient is a 71y old  Female who presents with a chief complaint of OC, hemoptysis, right lung mass (23 Apr 2022 10:32)      INTERVAL HPI/OVERNIGHT EVENTS:  no new hemoptysis   MEDICATIONS  (STANDING):  amLODIPine   Tablet 10 milliGRAM(s) Oral daily  calcitriol   Capsule 0.5 MICROGram(s) Oral daily  calcium carbonate    500 mG (Tums) Chewable 3 Tablet(s) Chew at bedtime  folic acid Injectable 1 milliGRAM(s) IV Push daily  metoprolol succinate  milliGRAM(s) Oral daily  sodium chloride 0.45%. 1000 milliLiter(s) (75 mL/Hr) IV Continuous <Continuous>    MEDICATIONS  (PRN):  acetaminophen     Tablet .. 650 milliGRAM(s) Oral every 6 hours PRN Mild Pain (1 - 3), Moderate Pain (4 - 6)  ALBUTerol    90 MICROgram(s) HFA Inhaler 2 Puff(s) Inhalation every 6 hours PRN Shortness of Breath and/or Wheezing  melatonin 3 milliGRAM(s) Oral at bedtime PRN Insomnia  metoclopramide Injectable 5 milliGRAM(s) IV Push every 8 hours PRN headache/nausea  oxycodone    5 mG/acetaminophen 325 mG 1 Tablet(s) Oral every 6 hours PRN Severe Pain (7 - 10)      Allergies    sulfa drugs (Other; Rash)  Sulfur (Unknown)  trimethoprim (Other; Rash)    Intolerances    Vital Signs Last 24 Hrs  T(C): 36.8 (28 Apr 2022 11:17), Max: 36.8 (28 Apr 2022 11:17)  T(F): 98.3 (28 Apr 2022 11:17), Max: 98.3 (28 Apr 2022 11:17)  HR: 86 (28 Apr 2022 11:17) (80 - 87)  BP: 126/71 (28 Apr 2022 11:17) (111/66 - 144/82)  BP(mean): --  RR: 17 (28 Apr 2022 11:17) (17 - 18)  SpO2: 92% (28 Apr 2022 11:17) (92% - 98%)    PHYSICAL EXAM: c/o right hand cramping  GENERAL: NAD  HEAD:  Atraumatic, Normocephalic  EYES: EOMI, PERRLA, conjunctiva and sclera clear  ENMT: No tonsillar erythema, exudates, or enlargement;   NECK: Supple, Normal thyroid  NERVOUS SYSTEM:  Alert & Oriented X3, Good concentration; Motor Strength 5/5 B/L upper and lower extremities; DTRs 2+ intact and symmetric  CHEST/LUNG: CTABL; No rales, rhonchi, wheezing, or rubs  HEART: Regular rate and rhythm; No murmurs, rubs, or gallops  ABDOMEN: Soft, Nontender, Nondistended; Bowel sounds present  EXTREMITIES:  2+ Peripheral Pulses, No clubbing, cyanosis, left lower extremity  edema, right decreased hand  and decreased rom with arm behind head and pain in right shoulder  SKIN: No rashes or lesions    LABS:                            8.8    8.23  )-----------( 272      ( 27 Apr 2022 22:55 )             29.2   04-27    140  |  108  |  57<H>  ----------------------------<  87  4.7   |  23  |  6.88<H>    Ca    7.1<L>      27 Apr 2022 06:33                                           CAPILLARY BLOOD GLUCOSE          RADIOLOGY & ADDITIONAL TESTS:    Imaging Personally Reviewed:  [x ] YES  [ ] NO    Consultant(s) Notes Reviewed:  [ ] YES  [ ] NO    Care Discussed with Consultants/Other Providers [ ] YES  [ ] NO

## 2022-04-28 NOTE — PROGRESS NOTE ADULT - SUBJECTIVE AND OBJECTIVE BOX
NEPHROLOGY PROGRESS NOTE    CHIEF COMPLAINT:  OC/CKD    HPI:  Renal function stable.  Had some post bx back pain which resolved.  No hematuria.  BP controlled.    ROS:  no SOB;  c/o hand cramps    EXAM:  T(F): 98.3 (04-28-22 @ 11:17)  HR: 86 (04-28-22 @ 11:17)  BP: 126/71 (04-28-22 @ 11:17)  RR: 17 (04-28-22 @ 11:17)  SpO2: 92% (04-28-22 @ 11:17)    Conversant, in no apparent distress  Normal respiratory effort, lungs clear bilaterally  Heart RRR with no murmur, no peripheral edema         LABS                             8.8    8.23  )-----------( 272      ( 27 Apr 2022 22:55 )             29.2          04-27    140  |  108  |  57<H>  ----------------------------<  87  4.7   |  23  |  6.88<H>    Ca    7.1<L>      27 Apr 2022 06:33      anti-GBM, ANCA, C3/C4 - negative    25-hydroxy vitamin D 5.1        Prelim renal biopsy:    90% chronic scarring with severe vascular sclerosis;  2 gloms preserved;  no active inflammation           Assessment :  OC, CKD 3 - bx consistent with 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

## 2022-04-28 NOTE — PROCEDURE NOTE - PROCEDURE FINDINGS AND DETAILS
Right renal cortex biopsy, 4 cores obtained
Verbal Consent obtained from patient after risks/benefits/alternatives explained.   Upper extremity prepped and draped in usual sterile fashion. Time out performed with RN. 4Fr 20 cm single lumen midline catheter placed using ultrasound guided Seldinger technique, R basilic vein. Flushes well, with good venous return. Patient tolerated procedure well without complication and was left in no acute distress.

## 2022-04-28 NOTE — PROGRESS NOTE ADULT - SUBJECTIVE AND OBJECTIVE BOX
s/p Biopsy    Vital Signs Last 24 Hrs  T(C): 36.5 (28 Apr 2022 06:05), Max: 36.5 (27 Apr 2022 23:52)  T(F): 97.7 (28 Apr 2022 06:05), Max: 97.7 (27 Apr 2022 23:52)  HR: 80 (28 Apr 2022 06:05) (80 - 87)  BP: 131/88 (28 Apr 2022 06:05) (111/66 - 150/78)  BP(mean): --  RR: 18 (28 Apr 2022 06:05) (18 - 18)  SpO2: 98% (28 Apr 2022 06:05) (96% - 100%)    PHYSICAL EXAM:    general - AAO x 3  HEENT - No Icterus  CVS - RRR  RS - AE B/L  Abd - soft, NT  Ext - Pulses +        LABS:                        8.8    8.23  )-----------( 272      ( 27 Apr 2022 22:55 )             29.2     04-27    140  |  108  |  57<H>  ----------------------------<  87  4.7   |  23  |  6.88<H>    Ca    7.1<L>      27 Apr 2022 06:33            Culture - Blood (collected 23 Apr 2022 00:27)  Source: .Blood Blood  Final Report (28 Apr 2022 01:01):    No Growth Final    Culture - Blood (collected 23 Apr 2022 00:27)  Source: .Blood Blood  Final Report (28 Apr 2022 01:01):    No Growth Final

## 2022-04-28 NOTE — PROCEDURE NOTE - PLAN
-f/u path  -Chest CT reviewed, the right apex nodule is not percutaneously accessible for biopsy. Consider surveillance imaging and/or CTS consult
OK to use midline

## 2022-04-29 LAB
ANION GAP SERPL CALC-SCNC: 9 MMOL/L — SIGNIFICANT CHANGE UP (ref 5–17)
BUN SERPL-MCNC: 60 MG/DL — HIGH (ref 7–23)
CALCIUM SERPL-MCNC: 7 MG/DL — LOW (ref 8.5–10.1)
CHLORIDE SERPL-SCNC: 111 MMOL/L — HIGH (ref 96–108)
CO2 SERPL-SCNC: 22 MMOL/L — SIGNIFICANT CHANGE UP (ref 22–31)
CREAT SERPL-MCNC: 6.65 MG/DL — HIGH (ref 0.5–1.3)
EGFR: 6 ML/MIN/1.73M2 — LOW
GLUCOSE SERPL-MCNC: 145 MG/DL — HIGH (ref 70–99)
HCT VFR BLD CALC: 27.3 % — LOW (ref 34.5–45)
HGB BLD-MCNC: 8.4 G/DL — LOW (ref 11.5–15.5)
MAGNESIUM SERPL-MCNC: 1.6 MG/DL — SIGNIFICANT CHANGE UP (ref 1.6–2.6)
MCHC RBC-ENTMCNC: 30.3 PG — SIGNIFICANT CHANGE UP (ref 27–34)
MCHC RBC-ENTMCNC: 30.8 G/DL — LOW (ref 32–36)
MCV RBC AUTO: 98.6 FL — SIGNIFICANT CHANGE UP (ref 80–100)
NRBC # BLD: 0 /100 WBCS — SIGNIFICANT CHANGE UP (ref 0–0)
PHOSPHATE SERPL-MCNC: 4 MG/DL — SIGNIFICANT CHANGE UP (ref 2.5–4.5)
PLATELET # BLD AUTO: 291 K/UL — SIGNIFICANT CHANGE UP (ref 150–400)
POTASSIUM SERPL-MCNC: 4.5 MMOL/L — SIGNIFICANT CHANGE UP (ref 3.5–5.3)
POTASSIUM SERPL-SCNC: 4.5 MMOL/L — SIGNIFICANT CHANGE UP (ref 3.5–5.3)
RBC # BLD: 2.77 M/UL — LOW (ref 3.8–5.2)
RBC # FLD: 12.8 % — SIGNIFICANT CHANGE UP (ref 10.3–14.5)
SODIUM SERPL-SCNC: 142 MMOL/L — SIGNIFICANT CHANGE UP (ref 135–145)
WBC # BLD: 7.97 K/UL — SIGNIFICANT CHANGE UP (ref 3.8–10.5)
WBC # FLD AUTO: 7.97 K/UL — SIGNIFICANT CHANGE UP (ref 3.8–10.5)

## 2022-04-29 PROCEDURE — 73030 X-RAY EXAM OF SHOULDER: CPT | Mod: 26,RT

## 2022-04-29 PROCEDURE — 72040 X-RAY EXAM NECK SPINE 2-3 VW: CPT | Mod: 26

## 2022-04-29 PROCEDURE — 99232 SBSQ HOSP IP/OBS MODERATE 35: CPT

## 2022-04-29 RX ORDER — MAGNESIUM SULFATE 500 MG/ML
1 VIAL (ML) INJECTION ONCE
Refills: 0 | Status: COMPLETED | OUTPATIENT
Start: 2022-04-29 | End: 2022-04-29

## 2022-04-29 RX ADMIN — OXYCODONE AND ACETAMINOPHEN 1 TABLET(S): 5; 325 TABLET ORAL at 16:02

## 2022-04-29 RX ADMIN — OXYCODONE AND ACETAMINOPHEN 1 TABLET(S): 5; 325 TABLET ORAL at 15:19

## 2022-04-29 RX ADMIN — Medication 1 MILLIGRAM(S): at 11:39

## 2022-04-29 RX ADMIN — Medication 100 GRAM(S): at 14:36

## 2022-04-29 RX ADMIN — Medication 3 TABLET(S): at 22:13

## 2022-04-29 RX ADMIN — OXYCODONE AND ACETAMINOPHEN 1 TABLET(S): 5; 325 TABLET ORAL at 22:13

## 2022-04-29 RX ADMIN — OXYCODONE AND ACETAMINOPHEN 1 TABLET(S): 5; 325 TABLET ORAL at 23:13

## 2022-04-29 RX ADMIN — Medication 100 MILLIGRAM(S): at 05:21

## 2022-04-29 RX ADMIN — Medication 3 MILLIGRAM(S): at 22:13

## 2022-04-29 RX ADMIN — AMLODIPINE BESYLATE 10 MILLIGRAM(S): 2.5 TABLET ORAL at 05:21

## 2022-04-29 RX ADMIN — CALCITRIOL 0.5 MICROGRAM(S): 0.5 CAPSULE ORAL at 11:39

## 2022-04-29 NOTE — PROGRESS NOTE ADULT - SUBJECTIVE AND OBJECTIVE BOX
lying in bed    Vital Signs Last 24 Hrs  T(C): 37 (29 Apr 2022 05:06), Max: 37.2 (28 Apr 2022 16:34)  T(F): 98.6 (29 Apr 2022 05:06), Max: 98.9 (28 Apr 2022 16:34)  HR: 89 (29 Apr 2022 05:06) (86 - 95)  BP: 159/81 (29 Apr 2022 05:06) (126/71 - 159/81)  BP(mean): --  RR: 18 (29 Apr 2022 05:06) (17 - 18)  SpO2: 100% (29 Apr 2022 05:06) (92% - 100%)    PHYSICAL EXAM:    general - AAO x 3  HEENT - No Icterus  CVS - RRR  RS - AE B/L  Abd - soft, NT  Ext - Pulses +        LABS:                        8.8    8.23  )-----------( 272      ( 27 Apr 2022 22:55 )             29.2                 Culture - Blood (collected 23 Apr 2022 00:27)  Source: .Blood Blood  Final Report (28 Apr 2022 01:01):    No Growth Final    Culture - Blood (collected 23 Apr 2022 00:27)  Source: .Blood Blood  Final Report (28 Apr 2022 01:01):    No Growth Final

## 2022-04-29 NOTE — PROGRESS NOTE ADULT - SUBJECTIVE AND OBJECTIVE BOX
[facility] NEPHROLOGY SERVICES, [facility]  NEPHROLOGY AND HYPERTENSION  [address]  [address]  [place] [address]  [phone]    MD ARVIND VENCES, MD MUNA PEREZ, MD YADIEL ANGELO, MD MEAGAN JO, MD GONZALEZ FLORES, MD BELIA CHA MD          Patient events noted    MEDICATIONS  (STANDING):  amLODIPine   Tablet 10 milliGRAM(s) Oral daily  calcitriol   Capsule 0.5 MICROGram(s) Oral daily  calcium carbonate    500 mG (Tums) Chewable 3 Tablet(s) Chew at bedtime  folic acid Injectable 1 milliGRAM(s) IV Push daily  metoprolol succinate  milliGRAM(s) Oral daily    MEDICATIONS  (PRN):  acetaminophen     Tablet .. 650 milliGRAM(s) Oral every 6 hours PRN Mild Pain (1 - 3), Moderate Pain (4 - 6)  ALBUTerol    90 MICROgram(s) HFA Inhaler 2 Puff(s) Inhalation every 6 hours PRN Shortness of Breath and/or Wheezing  melatonin 3 milliGRAM(s) Oral at bedtime PRN Insomnia  metoclopramide Injectable 5 milliGRAM(s) IV Push every 8 hours PRN headache/nausea  oxycodone    5 mG/acetaminophen 325 mG 1 Tablet(s) Oral every 6 hours PRN Severe Pain (7 - 10)      04-28-22 @ 07:01  -  04-29-22 @ 07:00  --------------------------------------------------------  IN: 240 mL / OUT: 0 mL / NET: 240 mL    04-29-22 @ 07:01  -  04-29-22 @ 23:38  --------------------------------------------------------  IN: 100 mL / OUT: 0 mL / NET: 100 mL      PHYSICAL EXAM:      T(C): 36.8 (04-29-22 @ 17:27), Max: 37 (04-29-22 @ 05:06)  HR: 88 (04-29-22 @ 17:27) (79 - 89)  BP: 178/80 (04-29-22 @ 17:27) (149/79 - 178/80)  RR: 18 (04-29-22 @ 17:27) (18 - 18)  SpO2: 96% (04-29-22 @ 17:27) (96% - 100%)  Wt(kg): --  Lungs clear  Heart S1S2  Abd soft NT ND  Extremities:   tr edema                                    8.4    7.97  )-----------( 291      ( 29 Apr 2022 11:35 )             27.3     04-29    142  |  111<H>  |  60<H>  ----------------------------<  145<H>  4.5   |  22  |  6.65<H>    Ca    7.0<L>      29 Apr 2022 11:35  Phos  4.0     04-29  Mg     1.6     04-29          Creatinine Trend: 6.65<--, 6.88<--, 6.70<--, 6.94<--, 7.36<--, 7.51<--      Assessment   CKD 5; renal bx preliminary c/w advanced nephrosclerosis and interstitial fibrosis      Plan:  BP medications adjustments   Will follow as outpatient, stable from renal view     Chay Mora MD

## 2022-04-29 NOTE — PROGRESS NOTE ADULT - PROBLEM SELECTOR PLAN 5
Monitor BP and titrate BP meds  Continue amlodipine 10 mg, metoprolol ER 100mg   Hold HCTZ
Monitor BP and titrate BP meds  Continue amlodipine 10 mg, metoprolol ER 100mg   Hold HCTZ  4/28/2022 bp stable
Monitor BP and titrate BP meds  Continue amlodipine 5mg, metoprolol ER 100mg   Hold HCTZ
Monitor BP and titrate BP meds  Continue amlodipine 5mg, metoprolol ER 100mg   Hold HCTZ
Monitor BP and titrate BP meds  Continue amlodipine 10 mg, metoprolol ER 100mg   Hold HCTZ
Monitor BP and titrate BP meds  Continue amlodipine 10 mg, metoprolol ER 100mg   Hold HCTZ  4/28/2022 bp stable
Monitor BP and titrate BP meds  Continue amlodipine 10 mg, metoprolol ER 100mg   Hold HCTZ

## 2022-04-29 NOTE — PROGRESS NOTE ADULT - NUTRITIONAL ASSESSMENT
This patient has been assessed with a concern for Malnutrition and has been determined to have a diagnosis/diagnoses of Morbid obesity (BMI > 40).    This patient is being managed with:   Diet DASH/TLC-  Sodium & Cholesterol Restricted  Entered: Apr 27 2022 10:58AM    
This patient has been assessed with a concern for Malnutrition and has been determined to have a diagnosis/diagnoses of Morbid obesity (BMI > 40).    This patient is being managed with:   Diet DASH/TLC-  Sodium & Cholesterol Restricted  Entered: Apr 22 2022  3:20PM    
This patient has been assessed with a concern for Malnutrition and has been determined to have a diagnosis/diagnoses of Morbid obesity (BMI > 40).    This patient is being managed with:   Diet DASH/TLC-  Sodium & Cholesterol Restricted  Entered: Apr 27 2022 10:58AM    
This patient has been assessed with a concern for Malnutrition and has been determined to have a diagnosis/diagnoses of Morbid obesity (BMI > 40).    This patient is being managed with:   Diet DASH/TLC-  Sodium & Cholesterol Restricted  Entered: Apr 27 2022 10:58AM

## 2022-04-29 NOTE — PROGRESS NOTE ADULT - PROBLEM SELECTOR PLAN 9
eleavted pth,  check  vit d,
check pth, vit d, ionized ca
secondary to vit d deficiency   elevated pth,   - vit d--5 ,  - s/p vitamin d as given by my colleague   will need weekly doses  4/28/2022 on calcium and calcitriol per renal
secondary to vit d deficiency   elevated pth,   - vit d--5 ,  - s/p vitamin d as given by my colleague   will need weekly doses
check pth, vit d, ionized ca
secondary to vit d deficiency   elevated pth,   - vit d--5 ,  - give 50k of vitamin d today and 2 g calcium gluc
secondary to vit d deficiency   elevated pth,   - vit d--5 ,  - s/p vitamin d as given by my colleague   will need weekly doses  4/28/2022 on calcium and calcitriol per renal

## 2022-04-29 NOTE — PROGRESS NOTE ADULT - PROBLEM SELECTOR PLAN 4
proBNP 6540  Does not appear to be significantly volume overloaded but does complain of exertional dyspnea and orthopnea. Denies angina    ECHO shows normal EF but does have chronic grade 1 DHF. PAP could not be assessed   dyspnea could be related to obesity, renal failure, possible thyroid goiter but less likely   pt non-complaint with cpap   needs referral for outpatient stress test
proBNP 6540  Does not appear to be significantly volume overloaded but does complain of exertional dyspnea and orthopnea. Denies angina    ECHO shows normal EF but does have chronic grade 1 DHF. PAP could not be assessed   dyspnea could be related to obesity, renal failure, possible thyroid goiter but less likely   pt non-complaint with cpap   will needs referral for outpatient stress test- linhwos with PMD in [place] but will establish care here
proBNP 6540  Does not appear to be significantly volume overloaded  ECHO  Monitor I/O
proBNP 6540  Does not appear to be significantly volume overloaded but does complain of exertional dyspnea and orthopnea. Denies angina    ECHO shows normal EF but does have chronic grade 1 DHF. PAP could not be assessed   dyspnea could be related to obesity, renal failure, possible thyroid goiter but less likely   pt non-complaint with cpap   needs referral for outpatient stress test
proBNP 6540  Does not appear to be significantly volume overloaded but does complain of exertional dyspnea and orthopnea. Denies angina    ECHO shows normal EF but does have chronic grade 1 DHF. PAP could not be assessed   dyspnea could be related to obesity, renal failure, possible thyroid goiter but less likely   pt non-complaint with cpap   needs referral for outpatient stress test
proBNP 6540  Does not appear to be significantly volume overloaded but does complain of exertional dyspnea and orthopnea. Denies angina    ECHO shows normal EF but does have chronic grade 1 DHF. PAP could not be assessed   dyspnea could be related to obesity, renal failure, possible thyroid goiter but less likely   pt non-complaint with cpap   will needs referral for outpatient stress test- linhwos with PMD in [place] but will establish care here
proBNP 6540  Does not appear to be significantly volume overloaded but does complain of exertional dyspnea and orthopnea. Denies angina    ECHO shows normal EF but does have chronic grade 1 DHF. PAP could not be assessed   dyspnea could be related to obesity, renal failure, possible thyroid goiter but less likely   pt non-complaint with cpap   needs referral for outpatient stress test

## 2022-04-29 NOTE — PROGRESS NOTE ADULT - PROBLEM SELECTOR PROBLEM 7
LARRY (obstructive sleep apnea)

## 2022-04-29 NOTE — PROGRESS NOTE ADULT - PROBLEM SELECTOR PLAN 6
weight loss and lifestyle modification

## 2022-04-29 NOTE — PROGRESS NOTE ADULT - PROBLEM SELECTOR PLAN 3
CT chest with Enlarging semisolid opacity at the right lung apex, which may represent adenocarcinoma  pulmonary consulted- Dr Canseco  oncology consulted- Dr Meneses  bleeding stable
CT chest with Enlarging semisolid opacity at the right lung apex, which may represent adenocarcinoma  pulmonary consulted- Dr Canseco  oncology consulted- Dr Meneses  bleeding stable
CT chest with Enlarging semisolid opacity at the right lung apex, which may represent adenocarcinoma  pulmonary consulted- Dr Canseco  oncology consulted- Dr Meneses  bleeding stable    4/28/2022 will consult CTS for eval of lung lesion to see if amenable for biopsy , per IR not amenable for them
CT chest with Enlarging semisolid opacity at the right lung apex, which may represent adenocarcinoma  pulmonary consulted- Dr Canseco  oncology consulted- Dr Meneses  bleeding stable
CT chest with Enlarging semisolid opacity at the right lung apex, which may represent adenocarcinoma  pulmonary consulted- Dr Canseco  oncology consulted- Dr Meneses  bleeding stable
CT chest with Enlarging semisolid opacity at the right lung apex, which may represent adenocarcinoma  pulmonary consulted- Dr Canseco  oncology consulted- Dr Meneses  bleeding stable    4/28/2022 will consult CTS for eval of lung lesion to see if amenable for biopsy , per IR not amenable for them  4/29/2022 CTS consult noted outpt f/u
CT chest with Enlarging semisolid opacity at the right lung apex, which may represent adenocarcinoma  pulmonary consulted- Dr Canseco  oncology consulted- Dr Meneses  bleeding stable

## 2022-04-29 NOTE — PROGRESS NOTE ADULT - SUBJECTIVE AND OBJECTIVE BOX
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

## 2022-04-29 NOTE — PROGRESS NOTE ADULT - PROBLEM SELECTOR PLAN 7
LARRY not using CPAP since 2013  Outpatient pul follow up
LARRY not using CPAP since 2013  Outpatient pul follow up
LARRY not using CPAP since 2013  Outpatient pulmonary follow up
LARRY not using CPAP since 2013  Outpatient pul follow up
LARRY not using CPAP since 2013  Outpatient pulmonary follow up

## 2022-04-29 NOTE — PROGRESS NOTE ADULT - PROBLEM SELECTOR PLAN 11
of left  lower extremity will get dopplers of lower extremity rule out r/o DVT-no  DVT-of left leg   now has upper extremity  swelling where IV infiltrated will provide supportive care
of left  lower extremity will get dopplers of lower extremity rule out r/o DVT-no  DVT-of left leg   now has upper extremity  swelling where IV infiltrated will provide supportive care
of left  lower extremity will get dopplers of lower extremity rule out dvt

## 2022-04-29 NOTE — PROGRESS NOTE ADULT - PROBLEM SELECTOR PROBLEM 3
Mass of right lung

## 2022-04-29 NOTE — PROGRESS NOTE ADULT - SUBJECTIVE AND OBJECTIVE BOX
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  Presented  to ED with hemoptysis for 1 day, which started in the morning of presentation as coughing up some blood clots. Reports having sore throat few days prior to this episode. Denies high fever, chills or chest pain.  Reports  gradual worsening of  SOB for 1 month, associated with loss of appetite.     Never smoke, but 2nd hand exposure from  for 20 years,  No family h/o malignancy.  Hypertensive, tachycardic in ED. EKG with sinus tachy,  04/27/22:  Right renal biopsy.    OVERNIGHT EVENTS:  Reports exertional SOB    Vital Signs Last 24 Hrs  T(C): 36.6 (29 Apr 2022 11:47), Max: 37.2 (28 Apr 2022 16:34)  T(F): 97.9 (29 Apr 2022 11:47), Max: 98.9 (28 Apr 2022 16:34)  HR: 79 (29 Apr 2022 11:47) (79 - 95)  BP: 149/79 (29 Apr 2022 11:47) (146/87 - 159/81)  BP(mean): --  RR: 18 (29 Apr 2022 11:47) (17 - 18)  SpO2: 99% (29 Apr 2022 11:47) (99% - 100%)    PHYSICAL EXAM:  GEN:         Awake, responsive and comfortable.  HEENT:    Normal.    RESP:        no wheezing.  CVS:          Regular rate and rhythm.     MEDICATIONS  (STANDING):  amLODIPine   Tablet 10 milliGRAM(s) Oral daily  calcitriol   Capsule 0.5 MICROGram(s) Oral daily  calcium carbonate    500 mG (Tums) Chewable 3 Tablet(s) Chew at bedtime  folic acid Injectable 1 milliGRAM(s) IV Push daily  magnesium sulfate  IVPB 1 Gram(s) IV Intermittent once  metoprolol succinate  milliGRAM(s) Oral daily    MEDICATIONS  (PRN):  acetaminophen     Tablet .. 650 milliGRAM(s) Oral every 6 hours PRN Mild Pain (1 - 3), Moderate Pain (4 - 6)  ALBUTerol    90 MICROgram(s) HFA Inhaler 2 Puff(s) Inhalation every 6 hours PRN Shortness of Breath and/or Wheezing  melatonin 3 milliGRAM(s) Oral at bedtime PRN Insomnia  metoclopramide Injectable 5 milliGRAM(s) IV Push every 8 hours PRN headache/nausea  oxycodone    5 mG/acetaminophen 325 mG 1 Tablet(s) Oral every 6 hours PRN Severe Pain (7 - 10)    LABS:                        8.4    7.97  )-----------( 291      ( 29 Apr 2022 11:35 )             27.3     04-29    142  |  111<H>  |  60<H>  ----------------------------<  145<H>  4.5   |  22  |  6.65<H>    Ca    7.0<L>      29 Apr 2022 11:35  Phos  4.0     04-29  Mg     1.6     04-29    ASSESSMENT AND PLAN:  ·	Hemoptysis episode likely from URI.  ·	RUL lung nodule.  ·	Morbid Obesity.  ·	LARRY, non compliant with CPAP.  ·	CKD, S/P right renal biopsy.  ·	HTN.  ·	Arthritis.  ·	Migraine.  ·	Anemia.    pulmonary status at base line.  Awaiting Thoracic surgery evaluation per DR. Serrato,  If no intervention by Thoracic surgery, follow chest CT in 2-3 months.

## 2022-04-29 NOTE — PROGRESS NOTE ADULT - PROBLEM SELECTOR PLAN 2
Hemoptysis for 1 day now resolved   CT chest with no PE but noted Enlarging semisolid opacity at the right lung apex, which may represent adenocarcinoma  lesions likely too small to biopsy   outpatient f/u to repeat CT and PET   Pul/oncology consulted  4/28/2022 will consult CTS for eval of lung lesion to see if amenable for biopsy , per IR not amenable for them
Hemoptysis for 1 day now resolved   CT chest with no PE but noted Enlarging semisolid opacity at the right lung apex, which may represent adenocarcinoma  lesions likely too small to biopsy   outpatient f/u to repeat CT and PET   Pul/oncology consulted
Hemoptysis for 1 day  CT chest with no PE but noted Enlarging semisolid opacity at the right lung apex, which may represent adenocarcinoma  also with goiter causing mass effect on the trachea   Monitor respiratory status  Pul/oncology consulted
Hemoptysis for 1 day now resolved   CT chest with no PE but noted Enlarging semisolid opacity at the right lung apex, which may represent adenocarcinoma  lesions likely too small to biopsy   outpatient f/u to repeat CT and PET   Pul/oncology consulted
Hemoptysis for 1 day now resolved   CT chest with no PE but noted Enlarging semisolid opacity at the right lung apex, which may represent adenocarcinoma  lesions likely too small to biopsy   outpatient f/u to repeat CT and PET   Pul/oncology consulted  4/28/2022 will consult CTS for eval of lung lesion to see if amenable for biopsy , per IR not amenable for them  4/29/2022 CTS consult noted outpt f/u

## 2022-04-29 NOTE — PROGRESS NOTE ADULT - PROBLEM SELECTOR PROBLEM 4
Elevated brain natriuretic peptide (BNP) level

## 2022-04-29 NOTE — PROGRESS NOTE ADULT - SUBJECTIVE AND OBJECTIVE BOX
Patient is a 71y old  Female who presents with a chief complaint of OC, hemoptysis, right lung mass (29 Apr 2022 16:10)      Interval History: No change in endocrine status.  Patient with hyperinsulinemia and morbid obesity.  Also left-sided goiter with encroachment into the mediastinum but with no compressive symptoms.  Thyroid function test within normal range    MEDICATIONS  (STANDING):  amLODIPine   Tablet 10 milliGRAM(s) Oral daily  calcitriol   Capsule 0.5 MICROGram(s) Oral daily  calcium carbonate    500 mG (Tums) Chewable 3 Tablet(s) Chew at bedtime  folic acid Injectable 1 milliGRAM(s) IV Push daily  metoprolol succinate  milliGRAM(s) Oral daily    MEDICATIONS  (PRN):  acetaminophen     Tablet .. 650 milliGRAM(s) Oral every 6 hours PRN Mild Pain (1 - 3), Moderate Pain (4 - 6)  ALBUTerol    90 MICROgram(s) HFA Inhaler 2 Puff(s) Inhalation every 6 hours PRN Shortness of Breath and/or Wheezing  melatonin 3 milliGRAM(s) Oral at bedtime PRN Insomnia  metoclopramide Injectable 5 milliGRAM(s) IV Push every 8 hours PRN headache/nausea  oxycodone    5 mG/acetaminophen 325 mG 1 Tablet(s) Oral every 6 hours PRN Severe Pain (7 - 10)      Allergies    sulfa drugs (Other; Rash)  Sulfur (Unknown)  trimethoprim (Other; Rash)    Intolerances        REVIEW OF SYSTEMS:  CONSTITUTIONAL: no changes    Vital Signs Last 24 Hrs  T(C): 36.8 (29 Apr 2022 17:27), Max: 37 (29 Apr 2022 05:06)  T(F): 98.2 (29 Apr 2022 17:27), Max: 98.6 (29 Apr 2022 05:06)  HR: 88 (29 Apr 2022 17:27) (79 - 89)  BP: 178/80 (29 Apr 2022 17:27) (148/88 - 178/80)  BP(mean): --  RR: 18 (29 Apr 2022 17:27) (18 - 18)  SpO2: 96% (29 Apr 2022 17:27) (96% - 100%)    PHYSICAL EXAM:  GENERAL: obese  HEAD: Atraumatic, Normocephalic  EYES: PERRLA, conjunctiva and sclera clear  ENMT: No  exudates,; Moist mucous membranes,, No lesions  NECK: Supple, No JVD, Normal thyroid  NERVOUS SYSTEM:  Alert & Oriented,   CHEST/LUNG: Clear to auscultation bilaterally; No rales, rhonchi, wheezing, or rubs  HEART: Regular rate and rhythm; No murmurs, rubs, or gallops  ABDOMEN: Soft, Nontender, Nondistended; Bowel sounds present  EXTREMITIES:  2+ Peripheral Pulses, no edema  SKIN: No rashes or lesions    LABS:        CAPILLARY BLOOD GLUCOSE        Lipid panel:           Thyroid:  Diabetes Tests:  Parathyroid Panel:  Adrenals:  RADIOLOGY & ADDITIONAL TESTS:    Imaging Personally Reviewed:  [ ] YES  [ ] NO    Consultant(s) Notes Reviewed:  [ ] YES  [ ] NO    Care Discussed with Consultants/Other Providers [ ] YES  [ ] NO

## 2022-04-29 NOTE — CONSULT NOTE ADULT - REASON FOR ADMISSION
OC, hemoptysis, right lung mass

## 2022-04-29 NOTE — PROGRESS NOTE ADULT - SUBJECTIVE AND OBJECTIVE BOX
Patient is a 71y old  Female who presents with a chief complaint of OC, hemoptysis, right lung mass (23 Apr 2022 10:32)      INTERVAL HPI/OVERNIGHT EVENTS:  no new hemoptysis     MEDICATIONS  (STANDING):  amLODIPine   Tablet 10 milliGRAM(s) Oral daily  calcitriol   Capsule 0.5 MICROGram(s) Oral daily  calcium carbonate    500 mG (Tums) Chewable 3 Tablet(s) Chew at bedtime  folic acid Injectable 1 milliGRAM(s) IV Push daily  metoprolol succinate  milliGRAM(s) Oral daily    MEDICATIONS  (PRN):  acetaminophen     Tablet .. 650 milliGRAM(s) Oral every 6 hours PRN Mild Pain (1 - 3), Moderate Pain (4 - 6)  ALBUTerol    90 MICROgram(s) HFA Inhaler 2 Puff(s) Inhalation every 6 hours PRN Shortness of Breath and/or Wheezing  melatonin 3 milliGRAM(s) Oral at bedtime PRN Insomnia  metoclopramide Injectable 5 milliGRAM(s) IV Push every 8 hours PRN headache/nausea  oxycodone    5 mG/acetaminophen 325 mG 1 Tablet(s) Oral every 6 hours PRN Severe Pain (7 - 10)      Allergies    sulfa drugs (Other; Rash)  Sulfur (Unknown)  trimethoprim (Other; Rash)    Intolerances    Vital Signs Last 24 Hrs  T(C): 36.6 (29 Apr 2022 11:47), Max: 37.2 (28 Apr 2022 16:34)  T(F): 97.9 (29 Apr 2022 11:47), Max: 98.9 (28 Apr 2022 16:34)  HR: 79 (29 Apr 2022 11:47) (79 - 95)  BP: 149/79 (29 Apr 2022 11:47) (146/87 - 159/81)  BP(mean): --  RR: 18 (29 Apr 2022 11:47) (17 - 18)  SpO2: 99% (29 Apr 2022 11:47) (99% - 100%)    PHYSICAL EXAM: c/o right hand cramping  GENERAL: NAD  HEAD:  Atraumatic, Normocephalic  EYES: EOMI, PERRLA, conjunctiva and sclera clear  ENMT: No tonsillar erythema, exudates, or enlargement;   NECK: Supple, Normal thyroid  NERVOUS SYSTEM:  Alert & Oriented X3, Good concentration; Motor Strength 5/5 B/L upper and lower extremities; DTRs 2+ intact and symmetric  CHEST/LUNG: CTABL; No rales, rhonchi, wheezing, or rubs  HEART: Regular rate and rhythm; No murmurs, rubs, or gallops  ABDOMEN: Soft, Nontender, Nondistended; Bowel sounds present  EXTREMITIES:  2+ Peripheral Pulses, No clubbing, cyanosis, left lower extremity  edema, right decreased hand  and decreased rom with arm behind head and pain in right shoulder  SKIN: No rashes or lesions    LABS:                                     8.4    7.97  )-----------( 291      ( 29 Apr 2022 11:35 )             27.3   04-29    142  |  111<H>  |  60<H>  ----------------------------<  145<H>  4.5   |  22  |  6.65<H>    Ca    7.0<L>      29 Apr 2022 11:35  Phos  4.0     04-29  Mg     1.6     04-29                              CAPILLARY BLOOD GLUCOSE          RADIOLOGY & ADDITIONAL TESTS:    < from: CT Cervical Spine No Cont (04.28.22 @ 16:19) >  IMPRESSION:    Cervical intervertebral disc spaces show multilevel degenerative disease   and spondylosis with loss of intervertebral disc height and associated   degenerative endplate changes.  There is suggestion multilevel narrowing   of the neural foramina on a degenerative basis, however evaluation is   limited by artifact from patient body habitus.  Evaluation of the spinal   canal is limited by patient body habitus.  Nonspecific airspace consolidations at the right lung apex are present,   and further evaluation with dedicated CT chest is recommended.    < end of copied text >      Imaging Personally Reviewed:  [x ] YES  [ ] NO    Consultant(s) Notes Reviewed:  [ ] YES  [ ] NO    < from: CT Shoulder No Cont, Right (04.28.22 @ 16:18) >    IMPRESSION:    1.  Mild glenohumeral arthrosis.  2.  Severe acromioclavicular arthropathy.  3.  Increased size of nodular opacities in the right upper lobe when   compared with 4/22/2022. Acute increase in size may reflect atelectasis   or infection. Short follow-up in 4-6 weeks with CT of the chest is   recommended to show marked resolution.  4.  Air in the biliary tree without change compared to 4/27/2022.    < end of copied text >  Care Discussed with Consultants/Other Providers [ ] YES  [ ] NO Patient is a 71y old  Female who presents with a chief complaint of OC, hemoptysis, right lung mass (23 Apr 2022 10:32)      INTERVAL HPI/OVERNIGHT EVENTS:  no new hemoptysis     MEDICATIONS  (STANDING):  amLODIPine   Tablet 10 milliGRAM(s) Oral daily  calcitriol   Capsule 0.5 MICROGram(s) Oral daily  calcium carbonate    500 mG (Tums) Chewable 3 Tablet(s) Chew at bedtime  folic acid Injectable 1 milliGRAM(s) IV Push daily  metoprolol succinate  milliGRAM(s) Oral daily    MEDICATIONS  (PRN):  acetaminophen     Tablet .. 650 milliGRAM(s) Oral every 6 hours PRN Mild Pain (1 - 3), Moderate Pain (4 - 6)  ALBUTerol    90 MICROgram(s) HFA Inhaler 2 Puff(s) Inhalation every 6 hours PRN Shortness of Breath and/or Wheezing  melatonin 3 milliGRAM(s) Oral at bedtime PRN Insomnia  metoclopramide Injectable 5 milliGRAM(s) IV Push every 8 hours PRN headache/nausea  oxycodone    5 mG/acetaminophen 325 mG 1 Tablet(s) Oral every 6 hours PRN Severe Pain (7 - 10)      Allergies    sulfa drugs (Other; Rash)  Sulfur (Unknown)  trimethoprim (Other; Rash)    Intolerances    Vital Signs Last 24 Hrs  T(C): 36.6 (29 Apr 2022 11:47), Max: 37.2 (28 Apr 2022 16:34)  T(F): 97.9 (29 Apr 2022 11:47), Max: 98.9 (28 Apr 2022 16:34)  HR: 79 (29 Apr 2022 11:47) (79 - 95)  BP: 149/79 (29 Apr 2022 11:47) (146/87 - 159/81)  BP(mean): --  RR: 18 (29 Apr 2022 11:47) (17 - 18)  SpO2: 99% (29 Apr 2022 11:47) (99% - 100%)    PHYSICAL EXAM: c/o right hand cramping  GENERAL: NAD  HEAD:  Atraumatic, Normocephalic  EYES: EOMI, PERRLA, conjunctiva and sclera clear  ENMT: No tonsillar erythema, exudates, or enlargement;   NECK: Supple,   NERVOUS SYSTEM:  Alert & Oriented X3, Good concentration;  CHEST/LUNG: CTABL; No rales, rhonchi, wheezing, or rubs  HEART: Regular rate and rhythm; No murmurs, rubs, or gallops  ABDOMEN: Soft, Nontender, Nondistended; Bowel sounds present  EXTREMITIES:  2+ Peripheral Pulses, No clubbing, cyanosis, left lower extremity  edema, right decreased hand  and decreased rom with arm behind head and pain in right shoulder  SKIN: No rashes or lesions    LABS:                                     8.4    7.97  )-----------( 291      ( 29 Apr 2022 11:35 )             27.3   04-29    142  |  111<H>  |  60<H>  ----------------------------<  145<H>  4.5   |  22  |  6.65<H>    Ca    7.0<L>      29 Apr 2022 11:35  Phos  4.0     04-29  Mg     1.6     04-29                              CAPILLARY BLOOD GLUCOSE          RADIOLOGY & ADDITIONAL TESTS:    < from: CT Cervical Spine No Cont (04.28.22 @ 16:19) >  IMPRESSION:    Cervical intervertebral disc spaces show multilevel degenerative disease   and spondylosis with loss of intervertebral disc height and associated   degenerative endplate changes.  There is suggestion multilevel narrowing   of the neural foramina on a degenerative basis, however evaluation is   limited by artifact from patient body habitus.  Evaluation of the spinal   canal is limited by patient body habitus.  Nonspecific airspace consolidations at the right lung apex are present,   and further evaluation with dedicated CT chest is recommended.    < end of copied text >      Imaging Personally Reviewed:  [x ] YES  [ ] NO    Consultant(s) Notes Reviewed:  [ ] YES  [ ] NO    < from: CT Shoulder No Cont, Right (04.28.22 @ 16:18) >    IMPRESSION:    1.  Mild glenohumeral arthrosis.  2.  Severe acromioclavicular arthropathy.  3.  Increased size of nodular opacities in the right upper lobe when   compared with 4/22/2022. Acute increase in size may reflect atelectasis   or infection. Short follow-up in 4-6 weeks with CT of the chest is   recommended to show marked resolution.  4.  Air in the biliary tree without change compared to 4/27/2022.    < end of copied text >  Care Discussed with Consultants/Other Providers [ ] YES  [ ] NO

## 2022-04-29 NOTE — PROGRESS NOTE ADULT - PROBLEM SELECTOR PLAN 12
along with numbness and tingling and cervical neck pain and decerased hand . will get ct scan csoine and right shoulder  4/29/2022 ortho consulted ct cervical shows djd and right shoulder cat scan shows severe AC joint disease
along with numbness and tingling and cervical neck pain and decerased hand . will get ct scan csoine and right shoulder

## 2022-04-29 NOTE — CONSULT NOTE ADULT - SUBJECTIVE AND OBJECTIVE BOX
Patient is a 71y morbidly obese Female ambulates with cane at baseline since left ankle surgery in distant past. Admitted due to hemoptysis and OC on CKD3 with renal biopsy performed showing end stage kidney disease due to ateriolonephrosclerosis. Also found to have a right lung mass. Patient state that yesterday she began to have hand camping and states her fingers on BL hand would feel stuck in flexion. She states this has never happened before and has not happened today. States she had right dorsal and palmar hand numbness that is intermittent and started two weeks ago. Denies numbness today. Denies neck pain or radicular pain. Endorses chronic right shoulder pain that has not changed in intensity or quality. Denies fall/trauma.       PAST MEDICAL & SURGICAL HISTORY:  Osteoarthritis    Pulmonary embolism    Joint infection    Dysfunctional uterine bleeding    Obesity    Essential hypertension  Hypertension    Obstructive sleep apnea syndrome  Obstructive sleep apnea    History of pulmonary embolism  2012 s/p IVC filter    Arthropathy  Arthritis    Migraine  Migraines    Morbid obesity    Total knee replacement status    Status post hysterectomy    Status post cholecystectomy    Other acquired absence of organ  History of cholecystectomy    Status post total hysterectomy  partial    History of total knee replacement  with revisions x 3        Allergies: sulfa drugs (Other; Rash)  Sulfur (Unknown)  trimethoprim (Other; Rash)      Medications: acetaminophen     Tablet .. 650 milliGRAM(s) Oral every 6 hours PRN  ALBUTerol    90 MICROgram(s) HFA Inhaler 2 Puff(s) Inhalation every 6 hours PRN  amLODIPine   Tablet 10 milliGRAM(s) Oral daily  calcitriol   Capsule 0.5 MICROGram(s) Oral daily  calcium carbonate    500 mG (Tums) Chewable 3 Tablet(s) Chew at bedtime  folic acid Injectable 1 milliGRAM(s) IV Push daily  melatonin 3 milliGRAM(s) Oral at bedtime PRN  metoclopramide Injectable 5 milliGRAM(s) IV Push every 8 hours PRN  metoprolol succinate  milliGRAM(s) Oral daily  oxycodone    5 mG/acetaminophen 325 mG 1 Tablet(s) Oral every 6 hours PRN               8.4    7.97  )-----------( 291      ( 29 Apr 2022 11:35 )             27.3     04-29    142  |  111<H>  |  60<H>  ----------------------------<  145<H>  4.5   |  22  |  6.65<H>    Ca    7.0<L>      29 Apr 2022 11:35  Phos  4.0     04-29  Mg     1.6     04-29          Imaging: Pertinent imaging reviewed. CT head/neck, CT Chest/Abdomen reviewed by Trauma Team and/or ED physician.    PHYSICAL EXAM:    Vital Signs Last 24 Hrs  T(C): 36.6 (29 Apr 2022 11:47), Max: 37.2 (28 Apr 2022 16:34)  T(F): 97.9 (29 Apr 2022 11:47), Max: 98.9 (28 Apr 2022 16:34)  HR: 79 (29 Apr 2022 11:47) (79 - 95)  BP: 149/79 (29 Apr 2022 11:47) (146/87 - 159/81)  BP(mean): --  RR: 18 (29 Apr 2022 11:47) (17 - 18)  SpO2: 99% (29 Apr 2022 11:47) (99% - 100%)    Physical Exam:  General: NAD, Alert, Awake and oriented    Musculoskeletal:      Motor:                   C5                C6              C7               C8           T1   R            5/5                5/5            5/5             5/5          5/5  L             5/5               4+/5             5/5             5/5          5/5      Sensory:            C5         C6         C7      C8       T1        (0=absent, 1=impaired, 2=normal, NT=not testable)  R         2            2           2        2         2  L          2            2           2        2         2    Negative Block     Negative Tinels      RIGHT UE: No open skin.   No obvious deformities or other signs of trauma at shoulder, upper arm, elbow, forearm, wrist or hand/digits.    Full baseline ROM at shoulder, elbow, wrist, and .   No bony TTP.   SILT in axillary, radial, median, and ulnar distributions.   + radial pulse palpable with brisk cap refill at distal finger tips.   Compartments soft and compressible of upper arm and forearm.     Imaging:  CT of c spine demonstrating diffuse degenerative disc disease  CT of right shoulder demonstrating glenohumeral and AC joint arthropathy    Orthopedic A/P:    Pt is a  71y Female with right AC/glenohumeral arthropathy and BL hand cramping and intermittent numbness        -For right shoulder pain likely due to AC/glenohumeral arthropathy would recommend conservative management with NSAID, Ice, PT. NO acute surgical intervention indicated FU outpatient as needed  -DDD of the spine without symptoms such as neck or radicular pain, likely not the source of hand cramping and intermittent right hand numbness; in setting of lung mass could consider MR of brachial plexus however would defer to CT surgery regarding imaging necessity and management should this be positive  -WBAT   -No acute orthopedic surgical intervention indicated at this time. Stable for discharge from orthopedic standpoint. With outpatient follow up with Dr. Adkins as needed  -Plan of care discussed with patient who voiced full understanding and agreement  -Discussed imaging and clinical exam with Dr. Adkins who agrees with above   -Medical management and DVT ppx per primary team

## 2022-04-29 NOTE — PROGRESS NOTE ADULT - PROBLEM SELECTOR PLAN 10
h/o of migraine with aura, usually takes nsaid but is currently contraindicated. triptan is sulf based and she is allergic  will give percocet and reglan prn for now
h/o of migraine with aura, usually takes nsaid but is currently contraindicated. triptan is sulf based and she is allergic  will give percocet and reglan prn for now
h/o of migraine with aura, usually takes nsaid but is currently contraindicated. triptan is sulf based and she is allergic  s/p percocet and Reglan prn as ordered by my colleague
h/o of migraine with aura, usually takes nsaid but is currently contraindicated. triptan is sulf based and she is allergic  s/p percocet and Reglan prn as ordered by my colleague
h/o of migraine with aura, usually takes nsaid but is currently contraindicated. triptan is sulf based and she is allergic  will give percocet and reglan prn for now
h/o of migraine with aura, usually takes nsaid but is currently contraindicated. triptan is sulf based and she is allergic  will give percocet and reglan prn for now
h/o of migraine with aura, usually takes nsaid but is currently contraindicated. triptan is sulf based and she is allergic  s/p percocet and Reglan prn as ordered by my colleague

## 2022-04-29 NOTE — CONSULT NOTE ADULT - CONSULT REQUESTED DATE/TIME
22-Apr-2022 17:40
28-Apr-2022 16:39
23-Apr-2022 10:33
23-Apr-2022 18:04
29-Apr-2022 16:10
23-Apr-2022 14:12

## 2022-04-29 NOTE — PROGRESS NOTE ADULT - PROBLEM SELECTOR PROBLEM 10
Migraine headache

## 2022-04-30 ENCOUNTER — TRANSCRIPTION ENCOUNTER (OUTPATIENT)
Age: 72
End: 2022-04-30

## 2022-04-30 VITALS
OXYGEN SATURATION: 100 % | RESPIRATION RATE: 18 BRPM | DIASTOLIC BLOOD PRESSURE: 83 MMHG | SYSTOLIC BLOOD PRESSURE: 141 MMHG | TEMPERATURE: 98 F | HEART RATE: 77 BPM

## 2022-04-30 PROCEDURE — 99239 HOSP IP/OBS DSCHRG MGMT >30: CPT

## 2022-04-30 RX ORDER — CALCIUM CARBONATE 500(1250)
3 TABLET ORAL
Qty: 90 | Refills: 0
Start: 2022-04-30 | End: 2022-05-29

## 2022-04-30 RX ORDER — FOLIC ACID 0.8 MG
1 TABLET ORAL
Qty: 30 | Refills: 0
Start: 2022-04-30 | End: 2022-05-29

## 2022-04-30 RX ORDER — CX-024414 0.2 MG/ML
0.25 INJECTION, SUSPENSION INTRAMUSCULAR ONCE
Refills: 0 | Status: COMPLETED | OUTPATIENT
Start: 2022-04-30 | End: 2022-04-30

## 2022-04-30 RX ORDER — CALCITRIOL 0.5 UG/1
1 CAPSULE ORAL
Qty: 0 | Refills: 0 | DISCHARGE
Start: 2022-04-30

## 2022-04-30 RX ORDER — AMLODIPINE BESYLATE 2.5 MG/1
1 TABLET ORAL
Qty: 30 | Refills: 0
Start: 2022-04-30 | End: 2022-05-29

## 2022-04-30 RX ORDER — CALCIUM CARBONATE 500(1250)
3 TABLET ORAL
Qty: 0 | Refills: 0 | DISCHARGE
Start: 2022-04-30

## 2022-04-30 RX ORDER — CHOLECALCIFEROL (VITAMIN D3) 125 MCG
1 CAPSULE ORAL
Qty: 4 | Refills: 0
Start: 2022-04-30 | End: 2022-05-29

## 2022-04-30 RX ORDER — METOPROLOL TARTRATE 50 MG
1 TABLET ORAL
Qty: 30 | Refills: 0
Start: 2022-04-30 | End: 2022-05-29

## 2022-04-30 RX ORDER — CALCITRIOL 0.5 UG/1
1 CAPSULE ORAL
Qty: 30 | Refills: 0
Start: 2022-04-30 | End: 2022-05-29

## 2022-04-30 RX ORDER — ALBUTEROL 90 UG/1
2 AEROSOL, METERED ORAL
Qty: 0 | Refills: 0 | DISCHARGE
Start: 2022-04-30

## 2022-04-30 RX ORDER — ALBUTEROL 90 UG/1
2 AEROSOL, METERED ORAL
Qty: 1 | Refills: 0
Start: 2022-04-30 | End: 2022-05-29

## 2022-04-30 RX ADMIN — OXYCODONE AND ACETAMINOPHEN 1 TABLET(S): 5; 325 TABLET ORAL at 12:16

## 2022-04-30 RX ADMIN — CALCITRIOL 0.5 MICROGRAM(S): 0.5 CAPSULE ORAL at 12:10

## 2022-04-30 RX ADMIN — OXYCODONE AND ACETAMINOPHEN 1 TABLET(S): 5; 325 TABLET ORAL at 13:16

## 2022-04-30 RX ADMIN — Medication 100 MILLIGRAM(S): at 05:49

## 2022-04-30 RX ADMIN — Medication 1 MILLIGRAM(S): at 12:10

## 2022-04-30 RX ADMIN — AMLODIPINE BESYLATE 10 MILLIGRAM(S): 2.5 TABLET ORAL at 05:49

## 2022-04-30 RX ADMIN — CX-024414 0.25 MILLILITER(S): 0.2 INJECTION, SUSPENSION INTRAMUSCULAR at 16:48

## 2022-04-30 NOTE — DISCHARGE NOTE PROVIDER - PROVIDER TOKENS
PROVIDER:[TOKEN:[[id number]]],PROVIDER:[TOKEN:[[id number]]],PROVIDER:[TOKEN:[[id number]]],PROVIDER:[TOKEN:[[id number]]],PROVIDER:[TOKEN:[[id number]]],FREE:[LAST:[ros],FIRST:[marcelina],PHONE:[(   )    -],FAX:[(   )    -]],PROVIDER:[TOKEN:[[id number]]]

## 2022-04-30 NOTE — PROGRESS NOTE ADULT - PROVIDER SPECIALTY LIST ADULT
Thoracic Surgery
Endocrinology
Endocrinology
Heme/Onc
Heme/Onc
Nephrology
Pulmonology
Endocrinology
Endocrinology
Nephrology
Nephrology
Endocrinology
Heme/Onc
Endocrinology
Heme/Onc
Endocrinology
Heme/Onc
Hospitalist

## 2022-04-30 NOTE — DISCHARGE NOTE PROVIDER - NSDCHC_MEDRECSTATUS_GEN_ALL_CORE
patient returning call would like for PA to give him, a call back states it is okay to leave a detailed message if unavailable    Admission Reconciliation is Completed  Discharge Reconciliation is Completed

## 2022-04-30 NOTE — DISCHARGE NOTE PROVIDER - CARE PROVIDER_API CALL
Myles Adkins (DO)  Orthopaedic Surgery Surgery  [address], [address]  [place] [address]  Phone: [phone]  Fax: [phone]  Follow Up Time:     Jon Canseco)  Medicine  [address], [address]  [place] [address]  Phone: [phone]  Fax: [phone]  Follow Up Time:     Chay Mora  INTERNAL MEDICINE  [address], [address]  [place], [place] [id number]  Phone: [phone]  Fax: [phone]  Follow Up Time:     Jeovany Meneses)  Hematology; Internal Medicine; Medical Oncology  [address], [address]  [place] [address]  Phone: [phone]  Fax: [phone]  Follow Up Time:     Pb Ahumada  SURGERY  [address], [address]  [place] [address]  Phone: [phone]  Fax: [phone]  Follow Up Time:     marcelina sommer  Phone: (   )    -  Fax: (   )    -  Follow Up Time:     Arnoldo Parmar)  EndocrinologyMetabDiabetes  [address]  [place] [address]  Phone: [phone]  Fax: [phone]  Follow Up Time:

## 2022-04-30 NOTE — PROGRESS NOTE ADULT - SUBJECTIVE AND OBJECTIVE BOX
Patient is a 71y old  Female who presents with a chief complaint of OC, hemoptysis, right lung mass (30 Apr 2022 12:15)      Interval History: discharge planning is on   stable Endocrine wise     MEDICATIONS  (STANDING):  amLODIPine   Tablet 10 milliGRAM(s) Oral daily  calcitriol   Capsule 0.5 MICROGram(s) Oral daily  calcium carbonate    500 mG (Tums) Chewable 3 Tablet(s) Chew at bedtime  folic acid Injectable 1 milliGRAM(s) IV Push daily  metoprolol succinate  milliGRAM(s) Oral daily    MEDICATIONS  (PRN):  acetaminophen     Tablet .. 650 milliGRAM(s) Oral every 6 hours PRN Mild Pain (1 - 3), Moderate Pain (4 - 6)  ALBUTerol    90 MICROgram(s) HFA Inhaler 2 Puff(s) Inhalation every 6 hours PRN Shortness of Breath and/or Wheezing  melatonin 3 milliGRAM(s) Oral at bedtime PRN Insomnia  metoclopramide Injectable 5 milliGRAM(s) IV Push every 8 hours PRN headache/nausea  oxycodone    5 mG/acetaminophen 325 mG 1 Tablet(s) Oral every 6 hours PRN Severe Pain (7 - 10)      Allergies    sulfa drugs (Other; Rash)  Sulfur (Unknown)  trimethoprim (Other; Rash)    Intolerances        REVIEW OF SYSTEMS:  CONSTITUTIONAL: no changes  EYES: No eye pain, visual disturbances, or discharge  ENMT:  No difficulty hearing, No sinus or throat pain  NECK: No pain or stiffness  RESPIRATORY: No cough, wheezing, chills or hemoptysis; No shortness of breath  CARDIOVASCULAR: No chest pain, palpitations or leg swelling  GASTROINTESTINAL: No abdominal or epigastric pain. No nausea, vomiting, or hematemesis; No diarrhea or constipation. No melena or hematochezia.  GENITOURINARY: No dysuria, frequency, hematuria, or incontinence  NEUROLOGICAL: No headaches, memory loss, loss of strength, numbness, or tremors  SKIN: No itching, burning, rashes, or lesions   ENDOCRINE: No heat or cold intolerance; No hair loss  MUSCULOSKELETAL: No joint pain or swelling; No muscle, back, or extremity pain  PSYCHIATRIC: No depression, anxiety, mood swings, or difficulty sleeping  HEME/LYMPH: No easy bruising, or bleeding gums  ALLERY AND IMMUNOLOGIC: No hives or eczema    Vital Signs Last 24 Hrs  T(C): 36.9 (30 Apr 2022 17:20), Max: 36.9 (30 Apr 2022 17:20)  T(F): 98.5 (30 Apr 2022 17:20), Max: 98.5 (30 Apr 2022 17:20)  HR: 77 (30 Apr 2022 17:20) (77 - 82)  BP: 141/83 (30 Apr 2022 17:20) (137/79 - 168/85)  BP(mean): --  RR: 18 (30 Apr 2022 17:20) (18 - 19)  SpO2: 100% (30 Apr 2022 17:20) (97% - 100%)    PHYSICAL EXAM:  GENERAL: Obese  HEAD: Atraumatic, Normocephalic  EYES: PERRLA, conjunctiva and sclera clear  ENMT: No  exudates,; Moist mucous membranes,, No lesions  NECK: Supple, No JVD, Normal thyroid  NERVOUS SYSTEM:  Alert & Oriented,   CHEST/LUNG: Clear to auscultation bilaterally; No rales, rhonchi, wheezing, or rubs  HEART: Regular rate and rhythm; No murmurs, rubs, or gallops  ABDOMEN: Soft, Nontender, Nondistended; Bowel sounds present  EXTREMITIES:  2+ Peripheral Pulses, no edema  SKIN: No rashes or lesions    LABS:        CAPILLARY BLOOD GLUCOSE        Lipid panel:           Thyroid:  Diabetes Tests:  Parathyroid Panel:  Adrenals:  RADIOLOGY & ADDITIONAL TESTS:    Imaging Personally Reviewed:  [ ] YES  [ ] NO    Consultant(s) Notes Reviewed:  [ ] YES  [ ] NO    Care Discussed with Consultants/Other Providers [ ] YES  [ ] NO

## 2022-04-30 NOTE — PROGRESS NOTE ADULT - PROBLEM SELECTOR PLAN 1
- seen by CTS - recommended outpatient follow up  - gave patient my phone number and gave he option to follow up with me as outpatient  - f/u renal biopsy  - possibly anemia of renal insufficecny  - DVT prophylaxsis
Currently no active treatment for the goiter is required.  Sonogram and if required FNA etc. can be done as an outpatient.  Patient has no compressive symptoms.  Thyroid function tests are within normal range  Hyperinsulinemia with morbid obesity is very much present.  Diet and exercise to achieve weight loss
- PET/CT scan as outpatient  - RUL Lung nodule, will d/w IR if mass biopsiable or if too small at current time   - for renal biopsy today  - renal f/u
- for kidney biopsy   - folic aicd supplementation  - anemia possibly anemia of renal insufficecny  - lung mass - outpatient PET/CT or repeat CT scan 2-3 months
Endocrine wise stable.  Currently not to have any active intervention as there is no compressive symptoms from the goiter.  She has a left sided anterior mediastinal goiter.  Sonogram and FNA etc. can be done as an outpatient.  Also has severe hyperinsulinemia with morbid obesity.  Short frequent meals low glycemic index carbohydrates and activity reduction should be undertaken as an outpatient
Labs with significantly elevated Cr. Cr 7.23 ( Cr 1.7 in 07/2021 [facility])  Hold HCTZ  Monitor renal function  Renal/bladder ultrasound shows medical reanl dz   . 9-10g proteinuria, hypoAlb  Lung mass hemoptysis;   DDx; pulmonary-renal vasculitis, malignancy associated paraneoplastic GN, nephrosis.
Patient goiter has most compressive symptoms.  Thyroid function tests are normal.  This can be worked up as an outpatient including any follow-up sonogram, FNA or other management for the goiter.  Patient also has severe hyperinsulinemia which may require short frequent meals, weight reduction
Labs with significantly elevated Cr. Cr 7.23 ( Cr 1.7 in 07/2021 [facility])  Hold HCTZ  Monitor renal function  Renal/bladder ultrasound  Protein/Cr ration  Nephrology consulted   Mg 1.3, replaced with IV magnesium
work up as out-patient currently stable   without any compressive symptoms   severe hyperinsulinemia   short frequent meals and low glycemic index carbohydrates will help stabilize blood glucose and eliminate fluctuations and decrease hyperinsulinemia
- d/w IR nodule to small to biopsy, want repeat Imaging or CTS evaluation can be done as outapteint  - renal f/u for renal biopsy  - d/w patient about needing follow up of lung nodule - she is former nurse and expressed understanding of above  - physical therapy  - DVT prophylaxsis
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
Currently no treatment is required.  Other work-up of thyroid including ultrasound fine-needle biopsy if required etc. can be done as an outpatient  Short frequent meals will help to decrease her hyperinsulinemia and ultimately her weight  Work-up of secondary hyperparathyroidism as per renal
Most of the work-up of vital can be done as an outpatient.  Patient currently does not have any compressive symptoms.  Free thyroxine is pending and needs to be checked.  Also, most of her symptoms are probably not related to longstanding goiter
-  seen by CTS - recommended outpatient follow up  - gave patient my phone number and gave he option to follow up with me as outpatient  - f/u renal biopsy  - possibly anemia of renal insufficecny  - DVT prophylaxsis
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
Currently no changes.  No active intervention for the goiter.  All of this can be worked up as an outpatient.  Patient must lose weight
Labs with significantly elevated Cr. Cr 7.23 ( Cr 1.7 in 07/2021 [facility])  Hold HCTZ  Monitor renal function  Renal/bladder ultrasound shows medical reanl dz   . 9-10g proteinuria, hypoAlb  Lung mass hemoptysis;   DDx; pulmonary-renal vasculitis, malignancy associated paraneoplastic GN, nephrosis.  s/p renal biopsy on today    +/- dialysis  4/28/2022 s/p renal biopsy hgb stable .  4/29/2022 renal biopsy noted as above
Labs with significantly elevated Cr. Cr 7.23 ( Cr 1.7 in 07/2021 [facility])  Hold HCTZ  Monitor renal function  Renal/bladder ultrasound shows medical reanl dz   . 9-10g proteinuria, hypoAlb  Lung mass hemoptysis;   DDx; pulmonary-renal vasculitis, malignancy associated paraneoplastic GN, nephrosis.  will need renal biopsy on Wednesday
Labs with significantly elevated Cr. Cr 7.23 ( Cr 1.7 in 07/2021 [facility])  Hold HCTZ  Monitor renal function  Renal/bladder ultrasound shows medical reanl dz   . 9-10g proteinuria, hypoAlb  Lung mass hemoptysis;   DDx; pulmonary-renal vasculitis, malignancy associated paraneoplastic GN, nephrosis.  s/p renal biopsy on today    +/- dialysis
Labs with significantly elevated Cr. Cr 7.23 ( Cr 1.7 in 07/2021 [facility])  Hold HCTZ  Monitor renal function  Renal/bladder ultrasound shows medical reanl dz   . 9-10g proteinuria, hypoAlb  Lung mass hemoptysis;   DDx; pulmonary-renal vasculitis, malignancy associated paraneoplastic GN, nephrosis.  s/p renal biopsy on today    +/- dialysis  4/28/2022 s/p renal biopsy hgb stable .
Labs with significantly elevated Cr. Cr 7.23 ( Cr 1.7 in 07/2021 [facility])  Hold HCTZ  Monitor renal function  Renal/bladder ultrasound shows medical reanl dz   . 9-10g proteinuria, hypoAlb  Lung mass hemoptysis;   DDx; pulmonary-renal vasculitis, malignancy associated paraneoplastic GN, nephrosis.  will need renal biopsy on Wednesday then f/u biopsy results on Thursday. +/- dialysis

## 2022-04-30 NOTE — DISCHARGE NOTE NURSING/CASE MANAGEMENT/SOCIAL WORK - NSDCVIVACCINE_GEN_ALL_CORE_FT
COVID-19, mRNA, LNP-S, PF, 100 mcg/ 0.5 mL dose (Moderna); 30-Apr-2022 16:48; Sailaja Godinez (RN); [employer] [id number] (Exp. Date: 28-May-2022); IntraMuscular; Deltoid Left.; 0.25 milliLiter(s);   influenza, injectable, quadrivalent, preservative free; 01-Feb-2018 15:10; Mey Hastings (RN); [employer]; [id number]; IntraMuscular; Deltoid Left.; 0.5 milliLiter(s); VIS (VIS Published: 07-Aug-2015, VIS Presented: 01-Feb-2018);

## 2022-04-30 NOTE — DISCHARGE NOTE PROVIDER - CARE PROVIDERS DIRECT ADDRESSES
,DirectAddress_Unknown,DirectAddress_Unknown,[email or web address],DirectAddress_Unknown,DirectAddress_Unknown,DirectAddress_Unknown,DirectAddress_Unknown

## 2022-04-30 NOTE — PROGRESS NOTE ADULT - REASON FOR ADMISSION
OC, hemoptysis, right lung mass
OC, hemoptysis, right lung nodule
OC, hemoptysis, right lung mass

## 2022-04-30 NOTE — DISCHARGE NOTE NURSING/CASE MANAGEMENT/SOCIAL WORK - NSDCPEFALRISK_GEN_ALL_CORE
For information on Fall & Injury Prevention, visit: [email or web address] OR  [email or web address] OR  https://www.cdc.gov/steadi/patient.html

## 2022-04-30 NOTE — DISCHARGE NOTE NURSING/CASE MANAGEMENT/SOCIAL WORK - PATIENT PORTAL LINK FT
You can access the FollowMyHealth Patient Portal offered by [facility] by registering at the following website: [email or web address]. By joining [facility]’s FollowMyHealth portal, you will also be able to view your health information using other applications (apps) compatible with our system.

## 2022-04-30 NOTE — PROGRESS NOTE ADULT - PROBLEM SELECTOR PROBLEM 1
H/O goiter
Mass of right lung
Acute kidney injury superimposed on CKD
H/O goiter
Mass of right lung
Acute kidney injury superimposed on CKD
Mass of right lung
Mass of right lung
H/O goiter
H/O goiter
Mass of right lung
H/O goiter
Acute kidney injury superimposed on CKD

## 2022-04-30 NOTE — DISCHARGE NOTE PROVIDER - DETAILS OF MALNUTRITION DIAGNOSIS/DIAGNOSES
This patient has been assessed with a concern for Malnutrition and was treated during this hospitalization for the following Nutrition diagnosis/diagnoses:     -  04/25/2022: Morbid obesity (BMI > 40)

## 2022-04-30 NOTE — DISCHARGE NOTE PROVIDER - HOSPITAL COURSE
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 Patient reported gradual worsening SOB for 1 month, associated with loss of appetite. Hemoptysis started today AM with coughing up some blood clots. Not on blood thinner. Never smoke. No family h/o malignancy.  Hypertensive, tachycardic in ED. EKG with sinus tachy, , QTc 516. No leukocytosis, Plt 303, ddimer 802, K 4.5, Cr 7.23 ( Cr 1.7 in 07/2021 [facility]), hsTnT 18.3, proBNP 6540, magnesium 1.3. CT chest with no PE. Enlarging semisolid opacity at the right lung apex, which may represent adenocarcinoma. CT abd/pelvis with no acute pathology  Admitted with OC, right lung mass, elevated BNP     Problem/Plan - 1:  ·  Problem: Acute kidney injury superimposed on CKD.   ·  Plan: Labs with significantly elevated Cr. Cr 7.23 ( Cr 1.7 in 07/2021 [facility])  Hold HCTZ  Monitor renal function  Renal/bladder ultrasound shows medical reanl dz   . 9-10g proteinuria, hypoAlb  Lung mass hemoptysis;   DDx; pulmonary-renal vasculitis, malignancy associated paraneoplastic GN, nephrosis.  s/p renal biopsy on today    +/- dialysis  4/28/2022 s/p renal biopsy hgb stable .  4/29/2022 renal biopsy noted as above. c/w severe renal disesase not prompting dialysis at this momemt  per  renal     Problem/Plan - 2:  ·  Problem: Hemoptysis.   ·  Plan: Hemoptysis for 1 day now resolved   CT chest with no PE but noted Enlarging semisolid opacity at the right lung apex, which may represent adenocarcinoma  lesions likely too small to biopsy   outpatient f/u to repeat CT and PET   Pul/oncology consulted  4/28/2022 will consult CTS for eval of lung lesion to see if amenable for biopsy , per IR not amenable for them  4/29/2022 CTS consult noted outpt f/u with dr. cosme     Problem/Plan - 3:  ·  Problem: Mass of right lung.   ·  Plan: CT chest with Enlarging semisolid opacity at the right lung apex, which may represent adenocarcinoma  pulmonary consulted- Dr Johns  oncology consulted- Dr Meneses  bleeding stable  4/28/2022 will consult CTS for eval of lung lesion to see if amenable for biopsy , per IR not amenable for them  4/29/2022 CTS consult noted outpt f/u with dr. mayo and dr. johns along with repeat ct chest in 4 weeks  i d/w dr. Job Conrad  on 4/29/2022 and provided him with plan of care  regarding outpt f/u      Problem/Plan - 4:  ·  Problem: Elevated brain natriuretic peptide (BNP) level.   ·  Plan: proBNP 6540  Does not appear to be significantly volume overloaded but does complain of exertional dyspnea and orthopnea. Denies angina    ECHO shows normal EF but does have chronic grade 1 DHF. PAP could not be assessed   dyspnea could be related to obesity, renal failure, possible thyroid goiter but less likely   pt non-complaint with cpap   will needs referral for outpatient stress test- follows with PMD in [place] but will establish care here.       Problem/Plan - 5:  ·  Problem: Benign essential HTN.   ·  Plan: Monitor BP and titrate BP meds  Continue amlodipine 10 mg, metoprolol ER 100mg   Hold HCTZ  4/28/2022 bp stable.     Problem/Plan - 7:  ·  Problem: LARRY (obstructive sleep apnea).   ·  Plan: LARRY not using CPAP since 2013  Outpatient pulmonary follow up dr. johns     Problem/Plan - 8:  ·  Problem: H/O goiter.   ·  Plan: CT shows there is been interval increase in size of a   left mediastinal goiter with mass effect upon the trachea and left   brachiocephalic vein.  appreciate endocrine note f/u with dr. murphy     Problem/Plan - 9:  ·  Problem: Hypocalcemia.   ·  Plan: secondary to vit d deficiency   elevated pth,   - vit d--5 ,  - s/p vitamin d as given by my colleague   will need weekly doses  4/28/2022 on calcium and calcitriol per renal f/u with dr. patterson     Problem/Plan - 10:  ·  Problem: Migraine headache.   ·  Plan; h/o of migraine with aura, usually takes nsaid but is currently contraindicated. triptan is sulf based and she is allergic  s/p percocet and Reglan prn as ordered by my colleague.     Problem/Plan - 11:  ·  Problem: Edema.   ·  Plan: of left  lower extremity will get dopplers of lower extremity rule out r/o DVT-no  DVT-of left leg   now has upper extremity  swelling where IV infiltrated will provide supportive care.     Problem/Plan - 12:  ·  Problem: Pain in right shoulder.   ·  Plan: along with numbness and tingling and cervical neck pain and decerased hand . will get ct scan c spine and right shoulder  f/u dr. perez

## 2022-04-30 NOTE — DISCHARGE NOTE PROVIDER - NSDCMRMEDTOKEN_GEN_ALL_CORE_FT
albuterol 90 mcg/inh inhalation aerosol: 2 puff(s) inhaled every 6 hours, As needed, Shortness of Breath and/or Wheezing  amLODIPine 10 mg oral tablet: 1 tab(s) orally once a day  calcitriol 0.5 mcg oral capsule: 1 cap(s) orally once a day  calcium carbonate 500 mg (200 mg elemental calcium) oral tablet, chewable: 3 tab(s) orally once a day (at bedtime)  folic acid 1 mg oral tablet: 1 tab(s) orally once a day   metoprolol succinate 100 mg oral tablet, extended release: 1 tab(s) orally once a day  Vitamin D3 1250 mcg (50,000 intl units) oral capsule: 1 cap(s) orally every 7 days

## 2022-04-30 NOTE — PROGRESS NOTE ADULT - ASSESSMENT
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 Patient reported gradual worsening SOB for 1 month, associated with loss of appetite. Hemoptysis started today AM with coughing up some blood clots. Not on blood thinner. Never smoke. No family h/o malignancy.  Hypertensive, tachycardic in ED. EKG with sinus tachy, , QTc 516. No leukocytosis, Plt 303, ddimer 802, K 4.5, Cr 7.23 ( Cr 1.7 in 07/2021 [facility]), hsTnT 18.3, proBNP 6540, magnesium 1.3. CT chest with no PE. Enlarging semisolid opacity at the right lung apex, which may represent adenocarcinoma. CT abd/pelvis with no acute pathology    Admitted with OC, right lung mass, elevated BNP
Lung Mass
RUL Lung nodule
goiter   hyperinsulinemia 
hyperinsulinemia   goiter
hyperinsulinemia   goiter
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 Patient reported gradual worsening SOB for 1 month, associated with loss of appetite. Hemoptysis started today AM with coughing up some blood clots. Not on blood thinner. Never smoke. No family h/o malignancy.  Hypertensive, tachycardic in ED. EKG with sinus tachy, , QTc 516. No leukocytosis, Plt 303, ddimer 802, K 4.5, Cr 7.23 ( Cr 1.7 in 07/2021 [facility]), hsTnT 18.3, proBNP 6540, magnesium 1.3. CT chest with no PE. Enlarging semisolid opacity at the right lung apex, which may represent adenocarcinoma. CT abd/pelvis with no acute pathology    Admitted with OC, right lung mass, elevated BNP
LUNG Nodule
thyroid nodule
Lung Mass
goiter
goiter  hyperinsulinemia 
lung nodule
Lung Nodule
Pulmonary Nodule
goiter
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 Patient reported gradual worsening SOB for 1 month, associated with loss of appetite. Hemoptysis started today AM with coughing up some blood clots. Not on blood thinner. Never smoke. No family h/o malignancy.  Hypertensive, tachycardic in ED. EKG with sinus tachy, , QTc 516. No leukocytosis, Plt 303, ddimer 802, K 4.5, Cr 7.23 ( Cr 1.7 in 07/2021 [facility]), hsTnT 18.3, proBNP 6540, magnesium 1.3. CT chest with no PE. Enlarging semisolid opacity at the right lung apex, which may represent adenocarcinoma. CT abd/pelvis with no acute pathology    Admitted with OC, right lung mass, elevated BNP
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 Patient reported gradual worsening SOB for 1 month, associated with loss of appetite. Hemoptysis started today AM with coughing up some blood clots. Not on blood thinner. Never smoke. No family h/o malignancy.  Hypertensive, tachycardic in ED. EKG with sinus tachy, , QTc 516. No leukocytosis, Plt 303, ddimer 802, K 4.5, Cr 7.23 ( Cr 1.7 in 07/2021 [facility]), hsTnT 18.3, proBNP 6540, magnesium 1.3. CT chest with no PE. Enlarging semisolid opacity at the right lung apex, which may represent adenocarcinoma. CT abd/pelvis with no acute pathology    Admitted with OC, right lung mass, elevated BNP
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 Patient reported gradual worsening SOB for 1 month, associated with loss of appetite. Hemoptysis started today AM with coughing up some blood clots. Not on blood thinner. Never smoke. No family h/o malignancy.  Hypertensive, tachycardic in ED. EKG with sinus tachy, , QTc 516. No leukocytosis, Plt 303, ddimer 802, K 4.5, Cr 7.23 ( Cr 1.7 in 07/2021 [facility]), hsTnT 18.3, proBNP 6540, magnesium 1.3. CT chest with no PE. Enlarging semisolid opacity at the right lung apex, which may represent adenocarcinoma. CT abd/pelvis with no acute pathology    Admitted with OC, right lung mass, elevated BNP
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 Patient reported gradual worsening SOB for 1 month, associated with loss of appetite. Hemoptysis started today AM with coughing up some blood clots. Not on blood thinner. Never smoke. No family h/o malignancy.  Hypertensive, tachycardic in ED. EKG with sinus tachy, , QTc 516. No leukocytosis, Plt 303, ddimer 802, K 4.5, Cr 7.23 ( Cr 1.7 in 07/2021 [facility]), hsTnT 18.3, proBNP 6540, magnesium 1.3. CT chest with no PE. Enlarging semisolid opacity at the right lung apex, which may represent adenocarcinoma. CT abd/pelvis with no acute pathology    Admitted with OC, right lung mass, elevated BNP
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 Patient reported gradual worsening SOB for 1 month, associated with loss of appetite. Hemoptysis started today AM with coughing up some blood clots. Not on blood thinner. Never smoke. No family h/o malignancy.  Hypertensive, tachycardic in ED. EKG with sinus tachy, , QTc 516. No leukocytosis, Plt 303, ddimer 802, K 4.5, Cr 7.23 ( Cr 1.7 in 07/2021 [facility]), hsTnT 18.3, proBNP 6540, magnesium 1.3. CT chest with no PE. Enlarging semisolid opacity at the right lung apex, which may represent adenocarcinoma. CT abd/pelvis with no acute pathology    Admitted with OC, right lung mass, elevated BNP

## 2022-04-30 NOTE — PROGRESS NOTE ADULT - SUBJECTIVE AND OBJECTIVE BOX
lying in bed    Vital Signs Last 24 Hrs  T(C): 36.4 (30 Apr 2022 05:53), Max: 36.8 (29 Apr 2022 17:27)  T(F): 97.5 (30 Apr 2022 05:53), Max: 98.2 (29 Apr 2022 17:27)  HR: 82 (30 Apr 2022 05:53) (79 - 88)  BP: 148/97 (30 Apr 2022 05:53) (148/97 - 178/80)  BP(mean): --  RR: 19 (30 Apr 2022 05:53) (18 - 19)  SpO2: 97% (30 Apr 2022 05:53) (96% - 99%)    PHYSICAL EXAM:    general - AAO x 3  HEENT - No Icterus  CVS - RRR  RS - AE B/L  Abd - soft, NT  Ext - Pulses +        LABS:                        8.4    7.97  )-----------( 291      ( 29 Apr 2022 11:35 )             27.3     04-29    142  |  111<H>  |  60<H>  ----------------------------<  145<H>  4.5   |  22  |  6.65<H>    Ca    7.0<L>      29 Apr 2022 11:35  Phos  4.0     04-29  Mg     1.6     04-29            Culture - Blood (collected 23 Apr 2022 00:27)  Source: .Blood Blood  Final Report (28 Apr 2022 01:01):    No Growth Final    Culture - Blood (collected 23 Apr 2022 00:27)  Source: .Blood Blood  Final Report (28 Apr 2022 01:01):    No Growth Final

## 2022-04-30 NOTE — DISCHARGE NOTE PROVIDER - NSDCCPCAREPLAN_GEN_ALL_CORE_FT
PRINCIPAL DISCHARGE DIAGNOSIS  Diagnosis: Hemoptysis  Assessment and Plan of Treatment: f/u dr. meneses and dr. cosme and dr. johns . NEED PET SCAN   Problem/Plan - 1:  ·  Problem: Acute kidney injury superimposed on CKD.   ·  Plan: Labs with significantly elevated Cr. Cr 7.23 ( Cr 1.7 in 07/2021 [facility])  Hold HCTZ  Monitor renal function  Renal/bladder ultrasound shows medical reanl dz   . 9-10g proteinuria, hypoAlb  Lung mass hemoptysis;   DDx; pulmonary-renal vasculitis, malignancy associated paraneoplastic GN, nephrosis.  s/p renal biopsy on today    +/- dialysis  4/28/2022 s/p renal biopsy hgb stable .  4/29/2022 renal biopsy noted as above. c/w severe renal disesase not prompting dialysis at this [facility]  per  renal   Problem/Plan - 2:  ·  Problem: Hemoptysis.   ·  Plan: Hemoptysis for 1 day now resolved   CT chest with no PE but noted Enlarging semisolid opacity at the right lung apex, which may represent adenocarcinoma  lesions likely too small to biopsy   outpatient f/u to repeat CT and PET   Pul/oncology consulted  4/28/2022 will consult CTS for eval of lung lesion to see if amenable for biopsy , per IR not amenable for them  4/29/2022 CTS consult noted outpt f/u with dr. cosme   Problem/Plan - 3:  ·  Problem: Mass of right lung.   ·  Plan: CT chest with Enlarging semisolid opacity at the right lung apex, which may represent adenocarcinoma  pulmonary consulted- Dr Johns  oncology consulted- Dr Meneses  bleeding stable  4/28/2022 will consult CTS for eval of lung lesion to see if amenable for biopsy , per IR not amenable for them  4/29/2022 CTS consult noted outpt f/u with dr. mayo and dr. johns along with repeat ct chest in 4 weeks  i d/w dr. Job Conrad  on 4/29/2022 and provided him with plan of care  regarding outpt f/u      SECONDARY DISCHARGE DIAGNOSES  Diagnosis: Mass of right lung  Assessment and Plan of Treatment: f/u dr. meneses and dr. cosme and dr. johns . NEED PET SCAN    Diagnosis: Hemoptysis  Assessment and Plan of Treatment:     Diagnosis: Benign essential HTN  Assessment and Plan of Treatment:     Diagnosis: Morbid obesity  Assessment and Plan of Treatment: weight loss    Diagnosis: H/O goiter  Assessment and Plan of Treatment: f/u dr. murphy    Diagnosis: LARRY (obstructive sleep apnea)  Assessment and Plan of Treatment: has been non complinat f/u dr. johns    Diagnosis: Acute kidney injury superimposed on CKD  Assessment and Plan of Treatment: f/u dr. patterson    Diagnosis: Right shoulder pain  Assessment and Plan of Treatment: f/u dr. perez

## 2022-04-30 NOTE — DISCHARGE NOTE PROVIDER - ATTENDING DISCHARGE PHYSICAL EXAMINATION:
Vital Signs Last 24 Hrs  T(C): 36.7 (30 Apr 2022 11:18), Max: 36.8 (29 Apr 2022 17:27)  T(F): 98 (30 Apr 2022 11:18), Max: 98.2 (29 Apr 2022 17:27)  HR: 81 (30 Apr 2022 11:18) (80 - 88)  BP: 137/79 (30 Apr 2022 11:18) (137/79 - 178/80)  BP(mean): --  RR: 18 (30 Apr 2022 11:18) (18 - 19)  SpO2: 100% (30 Apr 2022 11:18) (96% - 100%)    HEAD:  Atraumatic, Normocephalic  EYES: EOMI, PERRLA, conjunctiva and sclera clear  ENMT: No tonsillar erythema, exudates, or enlargement;   NECK: Supple,   NERVOUS SYSTEM:  Alert & Oriented X3, Good concentration;   CHEST/LUNG: CTABL; No rales, rhonchi, wheezing, or rubs  HEART: Regular rate and rhythm; No murmurs, rubs, or gallops  ABDOMEN: Soft, Nontender, Nondistended; Bowel sounds present  EXTREMITIES:  2+ Peripheral Pulses, No clubbing, cyanosis, left lower extremity  edema, right decreased hand  and decreased rom with arm behind head and pain in right shoulder  SKIN: No rashes or lesions

## 2022-05-06 DIAGNOSIS — D63.1 ANEMIA IN CHRONIC KIDNEY DISEASE: ICD-10-CM

## 2022-05-06 DIAGNOSIS — Z91.19 PATIENT'S NONCOMPLIANCE WITH OTHER MEDICAL TREATMENT AND REGIMEN: ICD-10-CM

## 2022-05-06 DIAGNOSIS — C34.91 MALIGNANT NEOPLASM OF UNSPECIFIED PART OF RIGHT BRONCHUS OR LUNG: ICD-10-CM

## 2022-05-06 DIAGNOSIS — E83.42 HYPOMAGNESEMIA: ICD-10-CM

## 2022-05-06 DIAGNOSIS — R04.2 HEMOPTYSIS: ICD-10-CM

## 2022-05-06 DIAGNOSIS — G47.00 INSOMNIA, UNSPECIFIED: ICD-10-CM

## 2022-05-06 DIAGNOSIS — Z90.711 ACQUIRED ABSENCE OF UTERUS WITH REMAINING CERVICAL STUMP: ICD-10-CM

## 2022-05-06 DIAGNOSIS — I12.0 HYPERTENSIVE CHRONIC KIDNEY DISEASE WITH STAGE 5 CHRONIC KIDNEY DISEASE OR END STAGE RENAL DISEASE: ICD-10-CM

## 2022-05-06 DIAGNOSIS — N17.9 ACUTE KIDNEY FAILURE, UNSPECIFIED: ICD-10-CM

## 2022-05-06 DIAGNOSIS — Z86.711 PERSONAL HISTORY OF PULMONARY EMBOLISM: ICD-10-CM

## 2022-05-06 DIAGNOSIS — Z90.49 ACQUIRED ABSENCE OF OTHER SPECIFIED PARTS OF DIGESTIVE TRACT: ICD-10-CM

## 2022-05-06 DIAGNOSIS — Z79.51 LONG TERM (CURRENT) USE OF INHALED STEROIDS: ICD-10-CM

## 2022-05-06 DIAGNOSIS — G43.909 MIGRAINE, UNSPECIFIED, NOT INTRACTABLE, WITHOUT STATUS MIGRAINOSUS: ICD-10-CM

## 2022-05-06 DIAGNOSIS — E55.9 VITAMIN D DEFICIENCY, UNSPECIFIED: ICD-10-CM

## 2022-05-06 DIAGNOSIS — M25.511 PAIN IN RIGHT SHOULDER: ICD-10-CM

## 2022-05-06 DIAGNOSIS — N18.5 CHRONIC KIDNEY DISEASE, STAGE 5: ICD-10-CM

## 2022-05-06 DIAGNOSIS — E16.1 OTHER HYPOGLYCEMIA: ICD-10-CM

## 2022-05-06 DIAGNOSIS — E66.01 MORBID (SEVERE) OBESITY DUE TO EXCESS CALORIES: ICD-10-CM

## 2022-05-06 DIAGNOSIS — E04.9 NONTOXIC GOITER, UNSPECIFIED: ICD-10-CM

## 2022-05-06 DIAGNOSIS — R79.89 OTHER SPECIFIED ABNORMAL FINDINGS OF BLOOD CHEMISTRY: ICD-10-CM

## 2022-05-06 DIAGNOSIS — E88.09 OTHER DISORDERS OF PLASMA-PROTEIN METABOLISM, NOT ELSEWHERE CLASSIFIED: ICD-10-CM

## 2022-05-06 DIAGNOSIS — N25.81 SECONDARY HYPERPARATHYROIDISM OF RENAL ORIGIN: ICD-10-CM

## 2022-05-06 DIAGNOSIS — G47.33 OBSTRUCTIVE SLEEP APNEA (ADULT) (PEDIATRIC): ICD-10-CM

## 2022-05-06 DIAGNOSIS — E83.51 HYPOCALCEMIA: ICD-10-CM

## 2022-05-06 DIAGNOSIS — Z96.651 PRESENCE OF RIGHT ARTIFICIAL KNEE JOINT: ICD-10-CM

## 2022-05-06 DIAGNOSIS — Z96.659 PRESENCE OF UNSPECIFIED ARTIFICIAL KNEE JOINT: ICD-10-CM

## 2022-05-06 DIAGNOSIS — Z95.828 PRESENCE OF OTHER VASCULAR IMPLANTS AND GRAFTS: ICD-10-CM

## 2022-09-26 NOTE — ED ADULT TRIAGE NOTE - HEIGHT IN INCHES
6 Erivedge Counseling- I discussed with the patient the risks of Erivedge including but not limited to nausea, vomiting, diarrhea, constipation, weight loss, changes in the sense of taste, decreased appetite, muscle spasms, and hair loss.  The patient verbalized understanding of the proper use and possible adverse effects of Erivedge.  All of the patient's questions and concerns were addressed.

## 2022-12-27 NOTE — ED PROVIDER NOTE - DOMESTIC TRAVEL HIGH RISK QUESTION
Orders Placed This Encounter   Procedures    Wound cleansing and dressings     Remove dressings from lower legs and heel ulcer  Wash lower legs and ulcers with soap and water  Pat dry  Elastic Tubular Stocking-F    Tubular elastic bandage: Apply to right lower leg from base of toes to behind the knee  Apply every morining, may remove for sleep  Avoid prolonged standing in one place  Elevate leg(s) above the level of the heart when sitting or as much as possible  Apply HydroFera Blue to left lower leg ulcers, then apply dry dressing, unna boot and coban  Cover with tubifast yellow  Change dressing 2 times per week and as needed for excessive drainage or leakage  This was done today  [facility] to change dressing/unna boot on Friday's  Follow up one week on Tuesday at the [address] [facility],[address]       Standing Status:   Future     Standing Expiration Date:   12/27/2023 No

## 2023-01-30 ENCOUNTER — INPATIENT (INPATIENT)
Facility: HOSPITAL | Age: 73
LOS: 9 days | Discharge: HOME HEALTH | End: 2023-02-09
Attending: STUDENT IN AN ORGANIZED HEALTH CARE EDUCATION/TRAINING PROGRAM | Admitting: STUDENT IN AN ORGANIZED HEALTH CARE EDUCATION/TRAINING PROGRAM
Payer: MEDICARE

## 2023-01-30 VITALS
HEIGHT: 66 IN | DIASTOLIC BLOOD PRESSURE: 100 MMHG | TEMPERATURE: 98 F | RESPIRATION RATE: 20 BRPM | WEIGHT: 293 LBS | HEART RATE: 87 BPM | SYSTOLIC BLOOD PRESSURE: 184 MMHG | OXYGEN SATURATION: 95 %

## 2023-01-30 LAB
ALBUMIN SERPL ELPH-MCNC: 2.8 G/DL — LOW (ref 3.3–5)
ALBUMIN SERPL ELPH-MCNC: 3 G/DL — LOW (ref 3.3–5)
ALP SERPL-CCNC: 168 U/L — HIGH (ref 40–120)
ALP SERPL-CCNC: 182 U/L — HIGH (ref 40–120)
ALT FLD-CCNC: 11 U/L — LOW (ref 12–78)
ALT FLD-CCNC: 12 U/L — SIGNIFICANT CHANGE UP (ref 12–78)
ANION GAP SERPL CALC-SCNC: 12 MMOL/L — SIGNIFICANT CHANGE UP (ref 5–17)
ANION GAP SERPL CALC-SCNC: 15 MMOL/L — SIGNIFICANT CHANGE UP (ref 5–17)
APPEARANCE UR: CLEAR — SIGNIFICANT CHANGE UP
AST SERPL-CCNC: 11 U/L — LOW (ref 15–37)
AST SERPL-CCNC: 13 U/L — LOW (ref 15–37)
BASOPHILS # BLD AUTO: 0.03 K/UL — SIGNIFICANT CHANGE UP (ref 0–0.2)
BASOPHILS NFR BLD AUTO: 0.3 % — SIGNIFICANT CHANGE UP (ref 0–2)
BILIRUB SERPL-MCNC: 0.2 MG/DL — SIGNIFICANT CHANGE UP (ref 0.2–1.2)
BILIRUB SERPL-MCNC: 0.2 MG/DL — SIGNIFICANT CHANGE UP (ref 0.2–1.2)
BILIRUB UR-MCNC: NEGATIVE — SIGNIFICANT CHANGE UP
BUN SERPL-MCNC: 110 MG/DL — HIGH (ref 7–23)
BUN SERPL-MCNC: 112 MG/DL — HIGH (ref 7–23)
CALCIUM SERPL-MCNC: 5.7 MG/DL — CRITICAL LOW (ref 8.5–10.1)
CALCIUM SERPL-MCNC: 5.9 MG/DL — CRITICAL LOW (ref 8.5–10.1)
CHLORIDE SERPL-SCNC: 105 MMOL/L — SIGNIFICANT CHANGE UP (ref 96–108)
CHLORIDE SERPL-SCNC: 105 MMOL/L — SIGNIFICANT CHANGE UP (ref 96–108)
CO2 SERPL-SCNC: 16 MMOL/L — LOW (ref 22–31)
CO2 SERPL-SCNC: 18 MMOL/L — LOW (ref 22–31)
COLOR SPEC: YELLOW — SIGNIFICANT CHANGE UP
COMMENT - URINE: SIGNIFICANT CHANGE UP
CREAT SERPL-MCNC: 15.9 MG/DL — HIGH (ref 0.5–1.3)
CREAT SERPL-MCNC: 16.2 MG/DL — HIGH (ref 0.5–1.3)
DIFF PNL FLD: ABNORMAL
EGFR: 2 ML/MIN/1.73M2 — LOW
EGFR: 2 ML/MIN/1.73M2 — LOW
EOSINOPHIL # BLD AUTO: 0.15 K/UL — SIGNIFICANT CHANGE UP (ref 0–0.5)
EOSINOPHIL NFR BLD AUTO: 1.7 % — SIGNIFICANT CHANGE UP (ref 0–6)
EPI CELLS # UR: SIGNIFICANT CHANGE UP
FLUAV AG NPH QL: SIGNIFICANT CHANGE UP
FLUBV AG NPH QL: SIGNIFICANT CHANGE UP
GLUCOSE BLDC GLUCOMTR-MCNC: 94 MG/DL — SIGNIFICANT CHANGE UP (ref 70–99)
GLUCOSE SERPL-MCNC: 81 MG/DL — SIGNIFICANT CHANGE UP (ref 70–99)
GLUCOSE SERPL-MCNC: 93 MG/DL — SIGNIFICANT CHANGE UP (ref 70–99)
GLUCOSE UR QL: NEGATIVE MG/DL — SIGNIFICANT CHANGE UP
HCT VFR BLD CALC: 28.8 % — LOW (ref 34.5–45)
HGB BLD-MCNC: 8.5 G/DL — LOW (ref 11.5–15.5)
IMM GRANULOCYTES NFR BLD AUTO: 0.5 % — SIGNIFICANT CHANGE UP (ref 0–0.9)
KETONES UR-MCNC: NEGATIVE — SIGNIFICANT CHANGE UP
LEUKOCYTE ESTERASE UR-ACNC: ABNORMAL
LIDOCAIN IGE QN: 386 U/L — SIGNIFICANT CHANGE UP (ref 73–393)
LYMPHOCYTES # BLD AUTO: 0.97 K/UL — LOW (ref 1–3.3)
LYMPHOCYTES # BLD AUTO: 11 % — LOW (ref 13–44)
MAGNESIUM SERPL-MCNC: 1.6 MG/DL — SIGNIFICANT CHANGE UP (ref 1.6–2.6)
MCHC RBC-ENTMCNC: 29.2 PG — SIGNIFICANT CHANGE UP (ref 27–34)
MCHC RBC-ENTMCNC: 29.5 G/DL — LOW (ref 32–36)
MCV RBC AUTO: 99 FL — SIGNIFICANT CHANGE UP (ref 80–100)
MONOCYTES # BLD AUTO: 0.23 K/UL — SIGNIFICANT CHANGE UP (ref 0–0.9)
MONOCYTES NFR BLD AUTO: 2.6 % — SIGNIFICANT CHANGE UP (ref 2–14)
NEUTROPHILS # BLD AUTO: 7.4 K/UL — SIGNIFICANT CHANGE UP (ref 1.8–7.4)
NEUTROPHILS NFR BLD AUTO: 83.9 % — HIGH (ref 43–77)
NITRITE UR-MCNC: NEGATIVE — SIGNIFICANT CHANGE UP
NRBC # BLD: 0 /100 WBCS — SIGNIFICANT CHANGE UP (ref 0–0)
PH UR: 8 — SIGNIFICANT CHANGE UP (ref 5–8)
PHOSPHATE SERPL-MCNC: 8.3 MG/DL — HIGH (ref 2.5–4.5)
PLATELET # BLD AUTO: 325 K/UL — SIGNIFICANT CHANGE UP (ref 150–400)
POTASSIUM SERPL-MCNC: 5.9 MMOL/L — HIGH (ref 3.5–5.3)
POTASSIUM SERPL-MCNC: 6.1 MMOL/L — HIGH (ref 3.5–5.3)
POTASSIUM SERPL-SCNC: 5.9 MMOL/L — HIGH (ref 3.5–5.3)
POTASSIUM SERPL-SCNC: 6.1 MMOL/L — HIGH (ref 3.5–5.3)
PROT SERPL-MCNC: 7.6 GM/DL — SIGNIFICANT CHANGE UP (ref 6–8.3)
PROT SERPL-MCNC: 8.6 GM/DL — HIGH (ref 6–8.3)
PROT UR-MCNC: 500 MG/DL
RBC # BLD: 2.91 M/UL — LOW (ref 3.8–5.2)
RBC # FLD: 13.1 % — SIGNIFICANT CHANGE UP (ref 10.3–14.5)
RBC CASTS # UR COMP ASSIST: SIGNIFICANT CHANGE UP /HPF (ref 0–4)
SARS-COV-2 RNA SPEC QL NAA+PROBE: SIGNIFICANT CHANGE UP
SODIUM SERPL-SCNC: 135 MMOL/L — SIGNIFICANT CHANGE UP (ref 135–145)
SODIUM SERPL-SCNC: 136 MMOL/L — SIGNIFICANT CHANGE UP (ref 135–145)
SP GR SPEC: 1.01 — SIGNIFICANT CHANGE UP (ref 1.01–1.02)
TROPONIN I, HIGH SENSITIVITY RESULT: 23.8 NG/L — SIGNIFICANT CHANGE UP
UROBILINOGEN FLD QL: NEGATIVE MG/DL — SIGNIFICANT CHANGE UP
WBC # BLD: 8.82 K/UL — SIGNIFICANT CHANGE UP (ref 3.8–10.5)
WBC # FLD AUTO: 8.82 K/UL — SIGNIFICANT CHANGE UP (ref 3.8–10.5)
WBC UR QL: ABNORMAL

## 2023-01-30 PROCEDURE — 70450 CT HEAD/BRAIN W/O DYE: CPT | Mod: 26,MA

## 2023-01-30 PROCEDURE — 99223 1ST HOSP IP/OBS HIGH 75: CPT

## 2023-01-30 PROCEDURE — 93010 ELECTROCARDIOGRAM REPORT: CPT

## 2023-01-30 PROCEDURE — 99285 EMERGENCY DEPT VISIT HI MDM: CPT

## 2023-01-30 RX ORDER — MECLIZINE HCL 12.5 MG
25 TABLET ORAL ONCE
Refills: 0 | Status: COMPLETED | OUTPATIENT
Start: 2023-01-30 | End: 2023-01-30

## 2023-01-30 RX ORDER — INSULIN HUMAN 100 [IU]/ML
5 INJECTION, SOLUTION SUBCUTANEOUS ONCE
Refills: 0 | Status: COMPLETED | OUTPATIENT
Start: 2023-01-30 | End: 2023-01-30

## 2023-01-30 RX ORDER — HYDRALAZINE HCL 50 MG
10 TABLET ORAL ONCE
Refills: 0 | Status: COMPLETED | OUTPATIENT
Start: 2023-01-30 | End: 2023-01-30

## 2023-01-30 RX ORDER — HEPARIN SODIUM 5000 [USP'U]/ML
7500 INJECTION INTRAVENOUS; SUBCUTANEOUS EVERY 12 HOURS
Refills: 0 | Status: DISCONTINUED | OUTPATIENT
Start: 2023-01-30 | End: 2023-02-03

## 2023-01-30 RX ORDER — AMLODIPINE BESYLATE 2.5 MG/1
10 TABLET ORAL DAILY
Refills: 0 | Status: DISCONTINUED | OUTPATIENT
Start: 2023-01-30 | End: 2023-02-07

## 2023-01-30 RX ORDER — CALCITRIOL 0.5 UG/1
0.5 CAPSULE ORAL DAILY
Refills: 0 | Status: DISCONTINUED | OUTPATIENT
Start: 2023-01-30 | End: 2023-02-09

## 2023-01-30 RX ORDER — FOLIC ACID 0.8 MG
1 TABLET ORAL DAILY
Refills: 0 | Status: DISCONTINUED | OUTPATIENT
Start: 2023-01-30 | End: 2023-02-09

## 2023-01-30 RX ORDER — SODIUM ZIRCONIUM CYCLOSILICATE 10 G/10G
10 POWDER, FOR SUSPENSION ORAL ONCE
Refills: 0 | Status: COMPLETED | OUTPATIENT
Start: 2023-01-30 | End: 2023-01-30

## 2023-01-30 RX ORDER — CALCIUM ACETATE 667 MG
2001 TABLET ORAL
Refills: 0 | Status: DISCONTINUED | OUTPATIENT
Start: 2023-01-30 | End: 2023-02-04

## 2023-01-30 RX ORDER — DEXTROSE 50 % IN WATER 50 %
50 SYRINGE (ML) INTRAVENOUS ONCE
Refills: 0 | Status: COMPLETED | OUTPATIENT
Start: 2023-01-30 | End: 2023-01-30

## 2023-01-30 RX ORDER — ONDANSETRON 8 MG/1
4 TABLET, FILM COATED ORAL ONCE
Refills: 0 | Status: DISCONTINUED | OUTPATIENT
Start: 2023-01-30 | End: 2023-01-30

## 2023-01-30 RX ORDER — METOPROLOL TARTRATE 50 MG
100 TABLET ORAL DAILY
Refills: 0 | Status: DISCONTINUED | OUTPATIENT
Start: 2023-01-30 | End: 2023-02-09

## 2023-01-30 RX ORDER — ACETAMINOPHEN 500 MG
650 TABLET ORAL EVERY 6 HOURS
Refills: 0 | Status: DISCONTINUED | OUTPATIENT
Start: 2023-01-30 | End: 2023-02-09

## 2023-01-30 RX ORDER — CHOLECALCIFEROL (VITAMIN D3) 125 MCG
1000 CAPSULE ORAL DAILY
Refills: 0 | Status: DISCONTINUED | OUTPATIENT
Start: 2023-01-30 | End: 2023-02-09

## 2023-01-30 RX ORDER — CALCIUM GLUCONATE 100 MG/ML
2 VIAL (ML) INTRAVENOUS ONCE
Refills: 0 | Status: COMPLETED | OUTPATIENT
Start: 2023-01-30 | End: 2023-01-30

## 2023-01-30 RX ADMIN — Medication 50 MILLILITER(S): at 16:37

## 2023-01-30 RX ADMIN — Medication 1000 UNIT(S): at 22:54

## 2023-01-30 RX ADMIN — Medication 2 GRAM(S): at 18:08

## 2023-01-30 RX ADMIN — ONDANSETRON 4 MILLIGRAM(S): 8 TABLET, FILM COATED ORAL at 16:37

## 2023-01-30 RX ADMIN — Medication 650 MILLIGRAM(S): at 22:54

## 2023-01-30 RX ADMIN — Medication 200 GRAM(S): at 16:38

## 2023-01-30 RX ADMIN — CALCITRIOL 0.5 MICROGRAM(S): 0.5 CAPSULE ORAL at 22:53

## 2023-01-30 RX ADMIN — INSULIN HUMAN 5 UNIT(S): 100 INJECTION, SOLUTION SUBCUTANEOUS at 16:50

## 2023-01-30 RX ADMIN — Medication 2001 MILLIGRAM(S): at 22:54

## 2023-01-30 RX ADMIN — SODIUM ZIRCONIUM CYCLOSILICATE 10 GRAM(S): 10 POWDER, FOR SUSPENSION ORAL at 16:37

## 2023-01-30 RX ADMIN — Medication 25 MILLIGRAM(S): at 16:37

## 2023-01-30 RX ADMIN — Medication 650 MILLIGRAM(S): at 23:24

## 2023-01-30 RX ADMIN — Medication 10 MILLIGRAM(S): at 18:55

## 2023-01-30 NOTE — CONSULT NOTE ADULT - SUBJECTIVE AND OBJECTIVE BOX
Patient chart reviewed, full consult to follow.   Will semi electively arrange for HD this admission.   Thank you for the courtesy of this consultation. [facility] NEPH[facility], [facility]  NEPHROLOGY AND HYPERTENSION  [address]  [address]  [place] [address]  [phone]    MD ARVIND VENCES MD YELENA ROSENBERG, MD BINNY KOSHY, MD CHRISTOPHER CAPUTO, MD EDWARD BOVER, MD      Information from chart:  "Patient is a 72y old  Female who presents with a chief complaint of Hypertensive urgency, OC on CKD (30 Jan 2023 18:37)    HPI:       72 years old female with h/o HTN, LARRY not using CPAP, h/o TKA complicated by DVT/PE in 2013 ( stopped AC in 2015), CKD  (30 Jan 2023 18:37)   "      Patient last seen by nephrologist was April 2022 here in [facility] under my care. She underwent a renal biopsy, revealing advanced renal disease. She was instructed to follow with me at discharge but did not.     No labs since April 2022;         PAST MEDICAL & SURGICAL HISTORY:  Osteoarthritis      Pulmonary embolism      Joint infection      Dysfunctional uterine bleeding      Obesity      Essential hypertension  Hypertension      Obstructive sleep apnea syndrome  Obstructive sleep apnea      History of pulmonary embolism  2012 s/p IVC filter      Arthropathy  Arthritis      Migraine  Migraines      Morbid obesity      Total knee replacement status      Status post hysterectomy      Status post cholecystectomy      Other acquired absence of organ  History of cholecystectomy      Status post total hysterectomy  partial      History of total knee replacement  with revisions x 3        FAMILY HISTORY:  FH: type 2 diabetes mellitus    FHx: hypertension      Allergies    sulfa drugs (Other; Rash)  Sulfur (Unknown)  trimethoprim (Other; Rash)    Intolerances      Home Medications:  calcitriol 0.5 mcg oral capsule: 1 cap(s) orally once a day (30 Apr 2022 14:24)  calcium carbonate 500 mg (200 mg elemental calcium) oral tablet, chewable: 3 tab(s) orally once a day (at bedtime) (30 Apr 2022 14:24)    MEDICATIONS  (STANDING):  amLODIPine   Tablet 10 milliGRAM(s) Oral daily  calcitriol   Capsule 0.5 MICROGram(s) Oral daily  cholecalciferol 1000 Unit(s) Oral daily  folic acid 1 milliGRAM(s) Oral daily  heparin   Injectable 7500 Unit(s) SubCutaneous every 12 hours  hydrALAZINE Injectable 10 milliGRAM(s) IV Push once  metoprolol succinate  milliGRAM(s) Oral daily    MEDICATIONS  (PRN):  acetaminophen     Tablet .. 650 milliGRAM(s) Oral every 6 hours PRN Temp greater or equal to 38C (100.4F), Mild Pain (1 - 3)    Vital Signs Last 24 Hrs  T(C): 36.7 (30 Jan 2023 18:00), Max: 36.8 (30 Jan 2023 12:05)  T(F): 98 (30 Jan 2023 18:00), Max: 98.2 (30 Jan 2023 12:05)  HR: 85 (30 Jan 2023 18:00) (85 - 87)  BP: 199/84 (30 Jan 2023 18:00) (184/100 - 199/84)  BP(mean): --  RR: 19 (30 Jan 2023 18:00) (19 - 20)  SpO2: 96% (30 Jan 2023 18:00) (95% - 96%)    Parameters below as of 30 Jan 2023 18:00  Patient On (Oxygen Delivery Method): room air        Daily Height in cm: 167.64 (30 Jan 2023 12:05)    Daily     CAPILLARY BLOOD GLUCOSE      POCT Blood Glucose.: 94 mg/dL (30 Jan 2023 16:40)    PHYSICAL EXAM:      T(C): 36.7 (01-30-23 @ 18:00), Max: 36.8 (01-30-23 @ 12:05)  HR: 85 (01-30-23 @ 18:00) (85 - 87)  BP: 199/84 (01-30-23 @ 18:00) (184/100 - 199/84)  RR: 19 (01-30-23 @ 18:00) (19 - 20)  SpO2: 96% (01-30-23 @ 18:00) (95% - 96%)  Wt(kg): --  Lungs clear  Heart S1S2  Abd soft NT ND obese  Extremities:   tr edema              01-30    135  |  105  |  110<H>  ----------------------------<  93  5.9<H>   |  18<L>  |  16.20<H>    Ca    5.7<LL>      30 Jan 2023 15:15  Phos  8.3     01-30  Mg     1.6     01-30    TPro  8.6<H>  /  Alb  3.0<L>  /  TBili  0.2  /  DBili  x   /  AST  11<L>  /  ALT  11<L>  /  AlkPhos  182<H>  01-30                          8.5    8.82  )-----------( 325      ( 30 Jan 2023 15:15 )             28.8     Creatinine Trend: 16.20<--          Assessment   CKD 5 advanced; now suspected ESRD  Hypocalcemia, hyperk and phos related to advanced CKD, low Vit D    Plan  Will electively initiate HD tomorrow  Arrange HD center and transportation  Phoslo; Vit D  Sylva catheter placement  Medical rx K and Ca    Chay Mora MD

## 2023-01-30 NOTE — PATIENT PROFILE ADULT - FALL HARM RISK - HARM RISK INTERVENTIONS

## 2023-01-30 NOTE — H&P ADULT - HISTORY OF PRESENT ILLNESS
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 Pt reports dizziness; described as unsteadiness when she stands, for the past month which seemed worse today thus presented to the ED. Pt also reports SOB on exertions, and HA, associated w/ nausea. Pt reports decrease PO intake due to poor appetite for the past month as well. Pt states significant urinary frequency earlier in the month, was diagnosed w/ UTI and treated w/ 5 day course of Cipro which she completed on Friday. Pt denies fever or chills. Of note pt reports prior to the last 2 days, she does not recall the last time she took any of her medications. Pt denies cp, LE edema, orthopnea or PND, abdominal pain, emesis or diarrhea.    In ED initial /100, rest of vitals stable. Labs H/H 8.5/28.8, K 5.8, BUN/Cr 110/16.2, Calcium of 5.2, see below for rest of labs. EKG wide QRS/incomplete RBBB, QTc 527. CT head neg for acute pathology. Pt given HyperK cocktail including calcium Glu, Lokelma, Renal consulted.

## 2023-01-30 NOTE — ED ADULT NURSE NOTE - CAS EDN DISCHARGE ASSESSMENT
Mother reports patient started with cough and sore throat yesterday and was unable to get a doctors appt.    Alert and oriented to person, place and time

## 2023-01-30 NOTE — ED PROVIDER NOTE - SKIN, MLM
[place] Progress Note    Impression  · Healthy late   female,  1 day old, delivered vaginally at Gestational Age: 36w1d after onset of premature labor; received  course of betamethasone at 28 weeks  · Maternal Group B Strep unknown,  Received one dose antibiotic prior to delivery  ·  hypoglycemia - initial blood sugar low, received oral glucose x 2  · At increased risk for jaundice and feeding problems    Plan  · Continue  care  · Monitor minimum 48 hours due to unknown GBS status  · Follow blood sugars per protocol  · Monitor feedings and check bilirubin every 12 hours (threshhold for phototherapy for this gestational age and hours of age 6)  _____________________________________________________________    History  · Day of life: 1 day  · This baby is a 1 day old female infant, delivered at Gestational Age: 36w1d on 2017., delivered byVaginal, Spontaneous Delivery [250]  · Feeding:Natural Human Milk  · Urine Occurrence: 1 (18 1550)   ·   · Birthweight: 3.128 kg  Weight    18 1238 18 1358 18 0045   Weight: 3.128 kg 3.128 kg 3.05 kg   Weight change since birth: -3%    Active problems:   Patient Active Problem List   Diagnosis   • Single liveborn infant delivered vaginally   •  , gestational age 36 completed weeks   •  hypoglycemia     Interval concerns: hypoglycemia - received 2 doses oral glucose gel     Recent Labs  Lab 18  1405 18  1412 18  1448 18  1548 18  1749 18  2013 18  2308 18  0042 18  0318   GLUCOSE BEDSIDE 19* 45 40 39 42 59 41 51 48      Physical  Vitals:   Visit Vitals  Pulse 140   Temp 99.4 °F (37.4 °C)   Resp 46   Ht 19.5\" (49.5 cm)   Wt 3.05 kg   HC 34 cm (13.39\")   BMI 12.43 kg/m²   ·   · General: Alert, well appearing, no distress  · HEENT: Normocephalic. Eyes normal. Ears well formed.   · Neck: Supple  · Clavicles: Normal  · Lungs: Clear to auscultation.  Respiratory rate and effort normal.  · Heart: Regular rate and rhythm, no murmur.  · Abdomen: Soft, nontender. No mass or hepatosplenomegaly.  · Clinical jaundice:absent    Today's TCB:   · TCB Result: 5.4 (07/08/18 0045)  · TCB Site: Head  · Hours of age-Transcutaneous Biliribin: 12 Hrs  · High-intermediate Risk       Skin normal color for race, warm, dry and intact. No evidence of rash.

## 2023-01-30 NOTE — H&P ADULT - ASSESSMENT
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    # Severe renal failure  # Hyperkalemia  - Dr Mora following  - s/p HyperK cocktail, will rpt BMP  - tele moniting  - Pt to start HD  - avoid nephrotoxins  - monitor I/Os    # Hypertensive Urgency  - due to noncompliance  - resume Metoprolol and Amlodipine  - tele monitoring  - cont to monitor BP    # Anemia of Chronic dx  - H/H stable  - c/w folic acid    # Hypocalcemia  - c/w Calcitriol and Vit d    #Lung Mass  - seen in April, pt reports she never followed up because she was told she could not make an appt in [place] if she lived in [place], also same reason she never followed up w/ nephrology  - would refer to another outpt Physician    # DVT ppx - HSQ

## 2023-01-30 NOTE — ED PROVIDER NOTE - CARE PLAN
Principal Discharge DX:	Dizziness  Secondary Diagnosis:	Nausea and vomiting  Secondary Diagnosis:	Hyperkalemia  Secondary Diagnosis:	OC (acute kidney injury)   1

## 2023-01-30 NOTE — ED ADULT NURSE NOTE - OBJECTIVE STATEMENT
Patient c/o nausea and vomiting x 1 month, but worsening today. last meal yesterday morning solids. no pain abd . no change in BM. current UTI on abx ciprofloxacin   1/27 finished for 1 weeks.   no urinary sx, no hematuria. no abd pain, no diarrhea.  pt has one episode of vomiting every day x 1 month, worsening upon sitting up in the am. today new onset of dizziness upon standing. took BP meds today

## 2023-01-30 NOTE — H&P ADULT - NSHPPHYSICALEXAM_GEN_ALL_CORE
PHYSICAL EXAM:    Vital Signs Last 24 Hrs  T(C): 36.7 (30 Jan 2023 18:00), Max: 36.8 (30 Jan 2023 12:05)  T(F): 98 (30 Jan 2023 18:00), Max: 98.2 (30 Jan 2023 12:05)  HR: 85 (30 Jan 2023 18:00) (85 - 87)  BP: 199/84 (30 Jan 2023 18:00) (184/100 - 199/84)  BP(mean): --  RR: 19 (30 Jan 2023 18:00) (19 - 20)  SpO2: 96% (30 Jan 2023 18:00) (95% - 96%)    Parameters below as of 30 Jan 2023 18:00  Patient On (Oxygen Delivery Method): room air        GENERAL: Pt lying in bed comfortably in NAD  HEENT:  Atraumatic, EOMI, PERRL, conjunctiva and sclera clear, MMM  NECK: Supple, No JVD  CHEST/LUNG: Clear to auscultation bilaterally; No rales, rhonchi, wheezing or rubs. Unlabored respirations  HEART: Regular rate and rhythm; No murmurs, rubs, or gallops  ABDOMEN: Bowel sounds present; Soft, Nontender, Nondistended. No guarding or rigidity    EXTREMITIES:  2+ Peripheral Pulses, brisk capillary refill. No clubbing, cyanosis, or edema  NEUROLOGICAL:  Alert & Oriented X3, speech clear. Answers questions appropriately. Full and equal strength B/L upper and lower extremities. No deficits   MSK: FROM x 4 extremities   SKIN: No rashes or lesions PHYSICAL EXAM:    Vital Signs Last 24 Hrs  T(C): 36.7 (30 Jan 2023 18:00), Max: 36.8 (30 Jan 2023 12:05)  T(F): 98 (30 Jan 2023 18:00), Max: 98.2 (30 Jan 2023 12:05)  HR: 85 (30 Jan 2023 18:00) (85 - 87)  BP: 199/84 (30 Jan 2023 18:00) (184/100 - 199/84)  BP(mean): --  RR: 19 (30 Jan 2023 18:00) (19 - 20)  SpO2: 96% (30 Jan 2023 18:00) (95% - 96%)    Parameters below as of 30 Jan 2023 18:00  Patient On (Oxygen Delivery Method): room air        GENERAL: Pt lying in bed comfortably in NAD  HEENT:  Atraumatic, EOMI, PERRL, conjunctiva and sclera clear, MMM  NECK: Supple,  CHEST/LUNG: Clear to auscultation bilaterally  HEART: Regular rate and rhythm  ABDOMEN: Bowel sounds present; Soft, Nontender, Nondistended.   EXTREMITIES:  2+ Peripheral Pulses, brisk capillary refill. No edema  NEUROLOGICAL:  Alert & Oriented X3, No deficits   MSK: FROM x 4 extremities   SKIN: No rashes or lesions

## 2023-01-30 NOTE — ED PROVIDER NOTE - PROGRESS NOTE DETAILS
RADHA Amos: patient with worsening Cr, April 2022 Cr 6.6, now Cr 16, hyperkalemic 5.9, hypocalcemic 5.7, adjusted albumin 7.3, insulin, d50, calcium gluconate, lokelma ordered. Nephrology consulted, Dr Mora aware of patient. Zofran discontinued given prolonged QTc on EKG, phos added onto labs, patient aware of all results. Patient being moved to monitored bed. Pending CT results, nephro recommendations prior to admission. Patient is aware of the plan, no questions at this time, will continue to monitor and reassess. RADHA Amos: patient moved to a monitored bed, Morgan seen in ER, will admit to tele and plan for dialysis tomorrow.

## 2023-01-30 NOTE — H&P ADULT - NSHPLABSRESULTS_GEN_ALL_CORE
T(C): 36.7 (01-30-23 @ 18:00), Max: 36.8 (01-30-23 @ 12:05)  HR: 85 (01-30-23 @ 18:00) (85 - 87)  BP: 199/84 (01-30-23 @ 18:00) (184/100 - 199/84)  RR: 19 (01-30-23 @ 18:00) (19 - 20)  SpO2: 96% (01-30-23 @ 18:00) (95% - 96%)                        8.5    8.82  )-----------( 325      ( 30 Jan 2023 15:15 )             28.8     01-30    135  |  105  |  110<H>  ----------------------------<  93  5.9<H>   |  18<L>  |  16.20<H>    Ca    5.7<LL>      30 Jan 2023 15:15  Phos  8.3     01-30  Mg     1.6     01-30    TPro  8.6<H>  /  Alb  3.0<L>  /  TBili  0.2  /  DBili  x   /  AST  11<L>  /  ALT  11<L>  /  AlkPhos  182<H>  01-30    LIVER FUNCTIONS - ( 30 Jan 2023 15:15 )  Alb: 3.0 g/dL / Pro: 8.6 gm/dL / ALK PHOS: 182 U/L / ALT: 11 U/L / AST: 11 U/L / GGT: x             < from: CT Head No Cont (01.30.23 @ 16:20) >    IMPRESSION:  No acute intracranial hemorrhage or acute territorial infarct.  If   symptoms persist, follow-up MRI exam recommended.    --- End of Report ---      < end of copied text >      acetaminophen     Tablet .. 650 milliGRAM(s) Oral every 6 hours PRN  amLODIPine   Tablet 10 milliGRAM(s) Oral daily  calcitriol   Capsule 0.5 MICROGram(s) Oral daily  cholecalciferol 1000 Unit(s) Oral daily  folic acid 1 milliGRAM(s) Oral daily  heparin   Injectable 7500 Unit(s) SubCutaneous every 12 hours  metoprolol succinate  milliGRAM(s) Oral daily T(C): 36.7 (01-30-23 @ 18:00), Max: 36.8 (01-30-23 @ 12:05)  HR: 85 (01-30-23 @ 18:00) (85 - 87)  BP: 199/84 (01-30-23 @ 18:00) (184/100 - 199/84)  RR: 19 (01-30-23 @ 18:00) (19 - 20)  SpO2: 96% (01-30-23 @ 18:00) (95% - 96%)                        8.5    8.82  )-----------( 325      ( 30 Jan 2023 15:15 )             28.8     01-30    135  |  105  |  110<H>  ----------------------------<  93  5.9<H>   |  18<L>  |  16.20<H>    Ca    5.7<LL>      30 Jan 2023 15:15  Phos  8.3     01-30  Mg     1.6     01-30    TPro  8.6<H>  /  Alb  3.0<L>  /  TBili  0.2  /  DBili  x   /  AST  11<L>  /  ALT  11<L>  /  AlkPhos  182<H>  01-30    LIVER FUNCTIONS - ( 30 Jan 2023 15:15 )  Alb: 3.0 g/dL / Pro: 8.6 gm/dL / ALK PHOS: 182 U/L / ALT: 11 U/L / AST: 11 U/L / GGT: x             < from: CT Head No Cont (01.30.23 @ 16:20) >    IMPRESSION:  No acute intracranial hemorrhage or acute territorial infarct.  If   symptoms persist, follow-up MRI exam recommended.    --- End of Report ---      < end of copied text >    acetaminophen     Tablet .. 650 milliGRAM(s) Oral every 6 hours PRN  amLODIPine   Tablet 10 milliGRAM(s) Oral daily  calcitriol   Capsule 0.5 MICROGram(s) Oral daily  cholecalciferol 1000 Unit(s) Oral daily  folic acid 1 milliGRAM(s) Oral daily  heparin   Injectable 7500 Unit(s) SubCutaneous every 12 hours  metoprolol succinate  milliGRAM(s) Oral daily

## 2023-01-30 NOTE — ED PROVIDER NOTE - OBJECTIVE STATEMENT
72y Female with HTN noncompliant with medications presents to the ER for nausea. Patient reports intermittent self spinning, nausea and vomiting for the last month. States today it was worse so came in for an evaluation. States symptoms typically come on with quick changing of positions. Reports mild generalized headache. Denies fever, chills, acute changes in vision, chest pain, SOB, palpitations, abdominal pain, urinary complaints.

## 2023-01-30 NOTE — ED PROVIDER NOTE - CLINICAL SUMMARY MEDICAL DECISION MAKING FREE TEXT BOX
72y Female with HTN noncompliant with medications presents to the ER for nausea. Reports intermittent self spinning, nausea and vomiting for the last month.  Reports mild generalized headache. Denies fever, chills, acute changes in vision, chest pain, SOB, palpitations, abdominal pain, urinary complaints. Vital signs stable, BP elevated, took meds today, no focal neuro deficit on exam, likely vertigo, less likely anemia, electrolyte abnormality, ACS or intracranial pathology. Will get labs, meds, CT head, reassess. Dispo pending results.

## 2023-01-31 LAB
ANION GAP SERPL CALC-SCNC: 13 MMOL/L — SIGNIFICANT CHANGE UP (ref 5–17)
ANION GAP SERPL CALC-SCNC: 13 MMOL/L — SIGNIFICANT CHANGE UP (ref 5–17)
APTT BLD: 28.8 SEC — SIGNIFICANT CHANGE UP (ref 27.5–35.5)
BUN SERPL-MCNC: 111 MG/DL — HIGH (ref 7–23)
BUN SERPL-MCNC: 116 MG/DL — HIGH (ref 7–23)
CALCIUM SERPL-MCNC: 5.9 MG/DL — CRITICAL LOW (ref 8.5–10.1)
CALCIUM SERPL-MCNC: 6.6 MG/DL — LOW (ref 8.5–10.1)
CHLORIDE SERPL-SCNC: 102 MMOL/L — SIGNIFICANT CHANGE UP (ref 96–108)
CHLORIDE SERPL-SCNC: 105 MMOL/L — SIGNIFICANT CHANGE UP (ref 96–108)
CO2 SERPL-SCNC: 17 MMOL/L — LOW (ref 22–31)
CO2 SERPL-SCNC: 20 MMOL/L — LOW (ref 22–31)
CREAT SERPL-MCNC: 15.8 MG/DL — HIGH (ref 0.5–1.3)
CREAT SERPL-MCNC: 16.2 MG/DL — HIGH (ref 0.5–1.3)
EGFR: 2 ML/MIN/1.73M2 — LOW
EGFR: 2 ML/MIN/1.73M2 — LOW
GLUCOSE SERPL-MCNC: 79 MG/DL — SIGNIFICANT CHANGE UP (ref 70–99)
GLUCOSE SERPL-MCNC: 83 MG/DL — SIGNIFICANT CHANGE UP (ref 70–99)
HBV SURFACE AB SER-ACNC: SIGNIFICANT CHANGE UP
HBV SURFACE AG SER-ACNC: SIGNIFICANT CHANGE UP
HCT VFR BLD CALC: 27.1 % — LOW (ref 34.5–45)
HCT VFR BLD CALC: 27.4 % — LOW (ref 34.5–45)
HCV AB S/CO SERPL IA: 1.03 S/CO — HIGH (ref 0–0.99)
HCV AB SERPL-IMP: ABNORMAL
HCV RNA FLD QL NAA+PROBE: SIGNIFICANT CHANGE UP
HCV RNA SPEC QL PROBE+SIG AMP: SIGNIFICANT CHANGE UP
HGB BLD-MCNC: 8 G/DL — LOW (ref 11.5–15.5)
HGB BLD-MCNC: 8.3 G/DL — LOW (ref 11.5–15.5)
INR BLD: 1.05 RATIO — SIGNIFICANT CHANGE UP (ref 0.88–1.16)
MAGNESIUM SERPL-MCNC: 1.5 MG/DL — LOW (ref 1.6–2.6)
MCHC RBC-ENTMCNC: 29.2 PG — SIGNIFICANT CHANGE UP (ref 27–34)
MCHC RBC-ENTMCNC: 29.5 G/DL — LOW (ref 32–36)
MCHC RBC-ENTMCNC: 29.7 PG — SIGNIFICANT CHANGE UP (ref 27–34)
MCHC RBC-ENTMCNC: 30.3 G/DL — LOW (ref 32–36)
MCV RBC AUTO: 98.2 FL — SIGNIFICANT CHANGE UP (ref 80–100)
MCV RBC AUTO: 98.9 FL — SIGNIFICANT CHANGE UP (ref 80–100)
NRBC # BLD: 0 /100 WBCS — SIGNIFICANT CHANGE UP (ref 0–0)
NRBC # BLD: 0 /100 WBCS — SIGNIFICANT CHANGE UP (ref 0–0)
PLATELET # BLD AUTO: 303 K/UL — SIGNIFICANT CHANGE UP (ref 150–400)
PLATELET # BLD AUTO: 314 K/UL — SIGNIFICANT CHANGE UP (ref 150–400)
POTASSIUM SERPL-MCNC: 5.9 MMOL/L — HIGH (ref 3.5–5.3)
POTASSIUM SERPL-MCNC: 6.4 MMOL/L — CRITICAL HIGH (ref 3.5–5.3)
POTASSIUM SERPL-SCNC: 5.9 MMOL/L — HIGH (ref 3.5–5.3)
POTASSIUM SERPL-SCNC: 6.4 MMOL/L — CRITICAL HIGH (ref 3.5–5.3)
PROTHROM AB SERPL-ACNC: 12.6 SEC — SIGNIFICANT CHANGE UP (ref 10.5–13.4)
RBC # BLD: 2.74 M/UL — LOW (ref 3.8–5.2)
RBC # BLD: 2.79 M/UL — LOW (ref 3.8–5.2)
RBC # FLD: 13.2 % — SIGNIFICANT CHANGE UP (ref 10.3–14.5)
RBC # FLD: 13.2 % — SIGNIFICANT CHANGE UP (ref 10.3–14.5)
SODIUM SERPL-SCNC: 135 MMOL/L — SIGNIFICANT CHANGE UP (ref 135–145)
SODIUM SERPL-SCNC: 135 MMOL/L — SIGNIFICANT CHANGE UP (ref 135–145)
WBC # BLD: 8.2 K/UL — SIGNIFICANT CHANGE UP (ref 3.8–10.5)
WBC # BLD: 8.32 K/UL — SIGNIFICANT CHANGE UP (ref 3.8–10.5)
WBC # FLD AUTO: 8.2 K/UL — SIGNIFICANT CHANGE UP (ref 3.8–10.5)
WBC # FLD AUTO: 8.32 K/UL — SIGNIFICANT CHANGE UP (ref 3.8–10.5)

## 2023-01-31 PROCEDURE — 99233 SBSQ HOSP IP/OBS HIGH 50: CPT

## 2023-01-31 RX ORDER — MAGNESIUM SULFATE 500 MG/ML
2 VIAL (ML) INJECTION ONCE
Refills: 0 | Status: COMPLETED | OUTPATIENT
Start: 2023-01-31 | End: 2023-01-31

## 2023-01-31 RX ORDER — CALCIUM GLUCONATE 100 MG/ML
2 VIAL (ML) INTRAVENOUS ONCE
Refills: 0 | Status: COMPLETED | OUTPATIENT
Start: 2023-01-31 | End: 2023-01-31

## 2023-01-31 RX ORDER — HYDRALAZINE HCL 50 MG
5 TABLET ORAL ONCE
Refills: 0 | Status: COMPLETED | OUTPATIENT
Start: 2023-01-31 | End: 2023-01-31

## 2023-01-31 RX ORDER — INSULIN HUMAN 100 [IU]/ML
10 INJECTION, SOLUTION SUBCUTANEOUS ONCE
Refills: 0 | Status: COMPLETED | OUTPATIENT
Start: 2023-01-31 | End: 2023-01-31

## 2023-01-31 RX ORDER — SODIUM ZIRCONIUM CYCLOSILICATE 10 G/10G
10 POWDER, FOR SUSPENSION ORAL EVERY 8 HOURS
Refills: 0 | Status: COMPLETED | OUTPATIENT
Start: 2023-01-31 | End: 2023-01-31

## 2023-01-31 RX ORDER — LABETALOL HCL 100 MG
10 TABLET ORAL ONCE
Refills: 0 | Status: COMPLETED | OUTPATIENT
Start: 2023-01-31 | End: 2023-01-31

## 2023-01-31 RX ORDER — HYDRALAZINE HCL 50 MG
25 TABLET ORAL
Refills: 0 | Status: DISCONTINUED | OUTPATIENT
Start: 2023-01-31 | End: 2023-02-07

## 2023-01-31 RX ORDER — SODIUM BICARBONATE 1 MEQ/ML
0.08 SYRINGE (ML) INTRAVENOUS
Qty: 150 | Refills: 0 | Status: COMPLETED | OUTPATIENT
Start: 2023-01-31 | End: 2023-02-01

## 2023-01-31 RX ORDER — ONDANSETRON 8 MG/1
4 TABLET, FILM COATED ORAL EVERY 6 HOURS
Refills: 0 | Status: DISCONTINUED | OUTPATIENT
Start: 2023-01-31 | End: 2023-02-09

## 2023-01-31 RX ORDER — DEXTROSE 50 % IN WATER 50 %
50 SYRINGE (ML) INTRAVENOUS ONCE
Refills: 0 | Status: COMPLETED | OUTPATIENT
Start: 2023-01-31 | End: 2023-01-31

## 2023-01-31 RX ADMIN — SODIUM ZIRCONIUM CYCLOSILICATE 10 GRAM(S): 10 POWDER, FOR SUSPENSION ORAL at 13:19

## 2023-01-31 RX ADMIN — CALCITRIOL 0.5 MICROGRAM(S): 0.5 CAPSULE ORAL at 13:19

## 2023-01-31 RX ADMIN — Medication 25 GRAM(S): at 11:43

## 2023-01-31 RX ADMIN — Medication 1 MILLIGRAM(S): at 11:50

## 2023-01-31 RX ADMIN — HEPARIN SODIUM 7500 UNIT(S): 5000 INJECTION INTRAVENOUS; SUBCUTANEOUS at 18:06

## 2023-01-31 RX ADMIN — AMLODIPINE BESYLATE 10 MILLIGRAM(S): 2.5 TABLET ORAL at 06:57

## 2023-01-31 RX ADMIN — Medication 2001 MILLIGRAM(S): at 18:06

## 2023-01-31 RX ADMIN — Medication 50 GRAM(S): at 09:43

## 2023-01-31 RX ADMIN — Medication 100 MILLIGRAM(S): at 07:24

## 2023-01-31 RX ADMIN — Medication 10 MILLIGRAM(S): at 20:52

## 2023-01-31 RX ADMIN — Medication 2001 MILLIGRAM(S): at 13:19

## 2023-01-31 RX ADMIN — SODIUM ZIRCONIUM CYCLOSILICATE 10 GRAM(S): 10 POWDER, FOR SUSPENSION ORAL at 22:12

## 2023-01-31 RX ADMIN — HEPARIN SODIUM 7500 UNIT(S): 5000 INJECTION INTRAVENOUS; SUBCUTANEOUS at 06:57

## 2023-01-31 RX ADMIN — Medication 25 MILLIGRAM(S): at 19:43

## 2023-01-31 RX ADMIN — Medication 2001 MILLIGRAM(S): at 10:03

## 2023-01-31 RX ADMIN — Medication 1000 UNIT(S): at 13:19

## 2023-01-31 RX ADMIN — ONDANSETRON 4 MILLIGRAM(S): 8 TABLET, FILM COATED ORAL at 13:23

## 2023-01-31 RX ADMIN — Medication 5 MILLIGRAM(S): at 08:10

## 2023-02-01 DIAGNOSIS — E87.5 HYPERKALEMIA: ICD-10-CM

## 2023-02-01 DIAGNOSIS — I10 ESSENTIAL (PRIMARY) HYPERTENSION: ICD-10-CM

## 2023-02-01 DIAGNOSIS — N18.6 END STAGE RENAL DISEASE: ICD-10-CM

## 2023-02-01 DIAGNOSIS — E66.01 MORBID (SEVERE) OBESITY DUE TO EXCESS CALORIES: ICD-10-CM

## 2023-02-01 DIAGNOSIS — E83.51 HYPOCALCEMIA: ICD-10-CM

## 2023-02-01 DIAGNOSIS — I50.32 CHRONIC DIASTOLIC (CONGESTIVE) HEART FAILURE: ICD-10-CM

## 2023-02-01 LAB
ANION GAP SERPL CALC-SCNC: 14 MMOL/L — SIGNIFICANT CHANGE UP (ref 5–17)
BUN SERPL-MCNC: 119 MG/DL — HIGH (ref 7–23)
CA-I BLD-SCNC: 3.3 MG/DL — LOW (ref 4.5–5.6)
CALCIUM SERPL-MCNC: 6.1 MG/DL — CRITICAL LOW (ref 8.5–10.1)
CHLORIDE SERPL-SCNC: 107 MMOL/L — SIGNIFICANT CHANGE UP (ref 96–108)
CO2 SERPL-SCNC: 17 MMOL/L — LOW (ref 22–31)
CREAT SERPL-MCNC: 16.6 MG/DL — HIGH (ref 0.5–1.3)
CULTURE RESULTS: SIGNIFICANT CHANGE UP
EGFR: 2 ML/MIN/1.73M2 — LOW
GLUCOSE BLDC GLUCOMTR-MCNC: 126 MG/DL — HIGH (ref 70–99)
GLUCOSE BLDC GLUCOMTR-MCNC: 90 MG/DL — SIGNIFICANT CHANGE UP (ref 70–99)
GLUCOSE SERPL-MCNC: 98 MG/DL — SIGNIFICANT CHANGE UP (ref 70–99)
INR BLD: 1.06 RATIO — SIGNIFICANT CHANGE UP (ref 0.88–1.16)
POTASSIUM SERPL-MCNC: 5.8 MMOL/L — HIGH (ref 3.5–5.3)
POTASSIUM SERPL-SCNC: 5.8 MMOL/L — HIGH (ref 3.5–5.3)
PROTHROM AB SERPL-ACNC: 12.7 SEC — SIGNIFICANT CHANGE UP (ref 10.5–13.4)
SODIUM SERPL-SCNC: 138 MMOL/L — SIGNIFICANT CHANGE UP (ref 135–145)
SPECIMEN SOURCE: SIGNIFICANT CHANGE UP

## 2023-02-01 PROCEDURE — 76937 US GUIDE VASCULAR ACCESS: CPT | Mod: 26

## 2023-02-01 PROCEDURE — 77001 FLUOROGUIDE FOR VEIN DEVICE: CPT | Mod: 26

## 2023-02-01 PROCEDURE — 36556 INSERT NON-TUNNEL CV CATH: CPT

## 2023-02-01 PROCEDURE — 36410 VNPNXR 3YR/> PHY/QHP DX/THER: CPT | Mod: 59

## 2023-02-01 PROCEDURE — 99232 SBSQ HOSP IP/OBS MODERATE 35: CPT

## 2023-02-01 RX ORDER — CALCIUM GLUCONATE 100 MG/ML
2 VIAL (ML) INTRAVENOUS ONCE
Refills: 0 | Status: COMPLETED | OUTPATIENT
Start: 2023-02-01 | End: 2023-02-01

## 2023-02-01 RX ORDER — SODIUM ZIRCONIUM CYCLOSILICATE 10 G/10G
10 POWDER, FOR SUSPENSION ORAL ONCE
Refills: 0 | Status: COMPLETED | OUTPATIENT
Start: 2023-02-01 | End: 2023-02-01

## 2023-02-01 RX ADMIN — ONDANSETRON 4 MILLIGRAM(S): 8 TABLET, FILM COATED ORAL at 17:48

## 2023-02-01 RX ADMIN — Medication 2001 MILLIGRAM(S): at 08:12

## 2023-02-01 RX ADMIN — HEPARIN SODIUM 7500 UNIT(S): 5000 INJECTION INTRAVENOUS; SUBCUTANEOUS at 17:48

## 2023-02-01 RX ADMIN — Medication 50 MILLILITER(S): at 00:35

## 2023-02-01 RX ADMIN — Medication 2001 MILLIGRAM(S): at 17:48

## 2023-02-01 RX ADMIN — Medication 25 MILLIGRAM(S): at 11:43

## 2023-02-01 RX ADMIN — Medication 1000 UNIT(S): at 14:46

## 2023-02-01 RX ADMIN — AMLODIPINE BESYLATE 10 MILLIGRAM(S): 2.5 TABLET ORAL at 06:26

## 2023-02-01 RX ADMIN — Medication 100 MILLIGRAM(S): at 06:25

## 2023-02-01 RX ADMIN — Medication 25 MILLIGRAM(S): at 02:11

## 2023-02-01 RX ADMIN — HEPARIN SODIUM 7500 UNIT(S): 5000 INJECTION INTRAVENOUS; SUBCUTANEOUS at 06:25

## 2023-02-01 RX ADMIN — SODIUM ZIRCONIUM CYCLOSILICATE 10 GRAM(S): 10 POWDER, FOR SUSPENSION ORAL at 11:42

## 2023-02-01 RX ADMIN — CALCITRIOL 0.5 MICROGRAM(S): 0.5 CAPSULE ORAL at 11:43

## 2023-02-01 RX ADMIN — Medication 2001 MILLIGRAM(S): at 11:44

## 2023-02-01 RX ADMIN — Medication 25 MILLIGRAM(S): at 06:26

## 2023-02-01 RX ADMIN — INSULIN HUMAN 10 UNIT(S): 100 INJECTION, SOLUTION SUBCUTANEOUS at 00:36

## 2023-02-01 RX ADMIN — Medication 75 MEQ/KG/HR: at 00:35

## 2023-02-01 RX ADMIN — Medication 200 GRAM(S): at 11:42

## 2023-02-01 RX ADMIN — Medication 1 MILLIGRAM(S): at 11:42

## 2023-02-01 NOTE — PRE PROCEDURE NOTE - PRE PROCEDURE EVALUATION
PRE-INTERVENTIONAL RADIOLOGY PROCEDURE NOTE  ============================  Rafita Paula MD  Interventional Radiology - PGY3  [facility] [id number] / [phone] / [facility] ext [phone]  ============================  Patient Name: MELISSA ARREOLA    Patient Age: 72y    Patient Gender: Female    Procedure: Non-tunnelled dialysis catheter insertion    Diagnosis/Indication:     Interventional Radiology Attending Physician: Dr. Halaibeh    Ordering Attending Physician: Dr. Barton    Pertinent Medical History: hyperkalemia requiring new HD    Pertinent labs:                      8.0    8.20  )-----------( 314      ( 31 Jan 2023 19:35 )             27.1       02-01    138  |  107  |  119<H>  ----------------------------<  98  5.8<H>   |  17<L>  |  16.60<H>    Ca    6.1<LL>      01 Feb 2023 08:15  Mg     1.5     01-31    TPro  7.6  /  Alb  2.8<L>  /  TBili  0.2  /  DBili  x   /  AST  13<L>  /  ALT  12  /  AlkPhos  168<H>  01-30      PT/INR - ( 01 Feb 2023 00:05 )   PT: 12.7 sec;   INR: 1.06 ratio         PTT - ( 31 Jan 2023 19:35 )  PTT:28.8 sec    Patient and Family Aware ? Yes; consent to be obtained   PRE-INTERVENTIONAL RADIOLOGY PROCEDURE NOTE  ============================  Rafita Paula MD  Interventional Radiology - PGY3  [facility] [id number] / [phone] / [facility] ext [phone]  ============================  Patient Name: MELISSA ARREOLA    Patient Age: 72y    Patient Gender: Female    Procedure: Non-tunnelled dialysis catheter insertion    Diagnosis/Indication:     Interventional Radiology Attending Physician: Dr. Halaibeh    Ordering Attending Physician: Dr. Barton    Pertinent Medical History: hyperkalemia requiring new HD    Pertinent labs:                      8.0    8.20  )-----------( 314      ( 31 Jan 2023 19:35 )             27.1       02-01    138  |  107  |  119<H>  ----------------------------<  98  5.8<H>   |  17<L>  |  16.60<H>    Ca    6.1<LL>      01 Feb 2023 08:15  Mg     1.5     01-31    TPro  7.6  /  Alb  2.8<L>  /  TBili  0.2  /  DBili  x   /  AST  13<L>  /  ALT  12  /  AlkPhos  168<H>  01-30      PT/INR - ( 01 Feb 2023 00:05 )   PT: 12.7 sec;   INR: 1.06 ratio         PTT - ( 31 Jan 2023 19:35 )  PTT:28.8 sec    Patient and Family Aware ? Yes; informed consent obtained and documentation placed in chart

## 2023-02-02 LAB
ALBUMIN SERPL ELPH-MCNC: 2.5 G/DL — LOW (ref 3.3–5)
ALP SERPL-CCNC: 145 U/L — HIGH (ref 40–120)
ALT FLD-CCNC: 7 U/L — LOW (ref 12–78)
ANION GAP SERPL CALC-SCNC: 11 MMOL/L — SIGNIFICANT CHANGE UP (ref 5–17)
AST SERPL-CCNC: 9 U/L — LOW (ref 15–37)
BILIRUB SERPL-MCNC: 0.2 MG/DL — SIGNIFICANT CHANGE UP (ref 0.2–1.2)
BUN SERPL-MCNC: 83 MG/DL — HIGH (ref 7–23)
CALCIUM SERPL-MCNC: 6.5 MG/DL — CRITICAL LOW (ref 8.5–10.1)
CHLORIDE SERPL-SCNC: 102 MMOL/L — SIGNIFICANT CHANGE UP (ref 96–108)
CO2 SERPL-SCNC: 25 MMOL/L — SIGNIFICANT CHANGE UP (ref 22–31)
CREAT SERPL-MCNC: 12.6 MG/DL — HIGH (ref 0.5–1.3)
EGFR: 3 ML/MIN/1.73M2 — LOW
GLUCOSE SERPL-MCNC: 111 MG/DL — HIGH (ref 70–99)
HCT VFR BLD CALC: 24.4 % — LOW (ref 34.5–45)
HGB BLD-MCNC: 7.3 G/DL — LOW (ref 11.5–15.5)
MAGNESIUM SERPL-MCNC: 1.7 MG/DL — SIGNIFICANT CHANGE UP (ref 1.6–2.6)
MCHC RBC-ENTMCNC: 29.3 PG — SIGNIFICANT CHANGE UP (ref 27–34)
MCHC RBC-ENTMCNC: 29.9 G/DL — LOW (ref 32–36)
MCV RBC AUTO: 98 FL — SIGNIFICANT CHANGE UP (ref 80–100)
NRBC # BLD: 0 /100 WBCS — SIGNIFICANT CHANGE UP (ref 0–0)
PHOSPHATE SERPL-MCNC: 6 MG/DL — HIGH (ref 2.5–4.5)
PLATELET # BLD AUTO: 250 K/UL — SIGNIFICANT CHANGE UP (ref 150–400)
POTASSIUM SERPL-MCNC: 4.5 MMOL/L — SIGNIFICANT CHANGE UP (ref 3.5–5.3)
POTASSIUM SERPL-SCNC: 4.5 MMOL/L — SIGNIFICANT CHANGE UP (ref 3.5–5.3)
PROT SERPL-MCNC: 6.5 GM/DL — SIGNIFICANT CHANGE UP (ref 6–8.3)
RBC # BLD: 2.49 M/UL — LOW (ref 3.8–5.2)
RBC # FLD: 13.3 % — SIGNIFICANT CHANGE UP (ref 10.3–14.5)
SODIUM SERPL-SCNC: 138 MMOL/L — SIGNIFICANT CHANGE UP (ref 135–145)
WBC # BLD: 6.83 K/UL — SIGNIFICANT CHANGE UP (ref 3.8–10.5)
WBC # FLD AUTO: 6.83 K/UL — SIGNIFICANT CHANGE UP (ref 3.8–10.5)

## 2023-02-02 PROCEDURE — 99233 SBSQ HOSP IP/OBS HIGH 50: CPT

## 2023-02-02 RX ORDER — CHLORHEXIDINE GLUCONATE 213 G/1000ML
1 SOLUTION TOPICAL DAILY
Refills: 0 | Status: DISCONTINUED | OUTPATIENT
Start: 2023-02-02 | End: 2023-02-03

## 2023-02-02 RX ORDER — CALCIUM GLUCONATE 100 MG/ML
2 VIAL (ML) INTRAVENOUS ONCE
Refills: 0 | Status: COMPLETED | OUTPATIENT
Start: 2023-02-02 | End: 2023-02-02

## 2023-02-02 RX ORDER — POLYETHYLENE GLYCOL 3350 17 G/17G
17 POWDER, FOR SOLUTION ORAL ONCE
Refills: 0 | Status: COMPLETED | OUTPATIENT
Start: 2023-02-02 | End: 2023-02-02

## 2023-02-02 RX ORDER — SODIUM CHLORIDE 9 MG/ML
10 INJECTION INTRAMUSCULAR; INTRAVENOUS; SUBCUTANEOUS
Refills: 0 | Status: DISCONTINUED | OUTPATIENT
Start: 2023-02-02 | End: 2023-02-09

## 2023-02-02 RX ADMIN — Medication 25 MILLIGRAM(S): at 12:07

## 2023-02-02 RX ADMIN — Medication 25 MILLIGRAM(S): at 05:19

## 2023-02-02 RX ADMIN — Medication 25 MILLIGRAM(S): at 23:42

## 2023-02-02 RX ADMIN — Medication 2001 MILLIGRAM(S): at 08:06

## 2023-02-02 RX ADMIN — POLYETHYLENE GLYCOL 3350 17 GRAM(S): 17 POWDER, FOR SOLUTION ORAL at 12:27

## 2023-02-02 RX ADMIN — Medication 1 MILLIGRAM(S): at 12:07

## 2023-02-02 RX ADMIN — Medication 100 MILLIGRAM(S): at 05:19

## 2023-02-02 RX ADMIN — Medication 25 MILLIGRAM(S): at 00:00

## 2023-02-02 RX ADMIN — CALCITRIOL 0.5 MICROGRAM(S): 0.5 CAPSULE ORAL at 12:07

## 2023-02-02 RX ADMIN — Medication 2001 MILLIGRAM(S): at 18:57

## 2023-02-02 RX ADMIN — Medication 25 MILLIGRAM(S): at 18:57

## 2023-02-02 RX ADMIN — AMLODIPINE BESYLATE 10 MILLIGRAM(S): 2.5 TABLET ORAL at 05:19

## 2023-02-02 RX ADMIN — ONDANSETRON 4 MILLIGRAM(S): 8 TABLET, FILM COATED ORAL at 08:05

## 2023-02-02 RX ADMIN — Medication 200 GRAM(S): at 18:56

## 2023-02-02 RX ADMIN — Medication 2001 MILLIGRAM(S): at 12:07

## 2023-02-02 RX ADMIN — HEPARIN SODIUM 7500 UNIT(S): 5000 INJECTION INTRAVENOUS; SUBCUTANEOUS at 18:57

## 2023-02-02 RX ADMIN — Medication 1000 UNIT(S): at 12:07

## 2023-02-02 RX ADMIN — HEPARIN SODIUM 7500 UNIT(S): 5000 INJECTION INTRAVENOUS; SUBCUTANEOUS at 05:20

## 2023-02-02 RX ADMIN — CHLORHEXIDINE GLUCONATE 1 APPLICATION(S): 213 SOLUTION TOPICAL at 12:07

## 2023-02-02 NOTE — PHYSICAL THERAPY INITIAL EVALUATION ADULT - GENERAL OBSERVATIONS, REHAB EVAL
Pt found semisupine in bed + R midline(on HD), + primafit + telemetry, morbidly obese. A&Ox4, cooperative. Bariatric RW used.

## 2023-02-02 NOTE — PHYSICAL THERAPY INITIAL EVALUATION ADULT - PERTINENT HX OF CURRENT PROBLEM, REHAB EVAL
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 Pt reports dizziness; described as unsteadiness when she stands, for the past month which seemed worse today thus presented to the ED. Pt also reports SOB on exertions, and HA, associated w/ nausea. Pt reports decrease PO intake due to poor appetite for the past month as well. Pt states significant urinary frequency earlier in the month, was diagnosed w/ UTI and treated w/ 5 day course of Cipro which she completed on Friday.

## 2023-02-02 NOTE — PHYSICAL THERAPY INITIAL EVALUATION ADULT - TRANSFER TRAINING, PT EVAL
pt will be able to perform sit to stand transfers independently with use of bariatric rolling walker within 4 weeks

## 2023-02-02 NOTE — PHYSICAL THERAPY INITIAL EVALUATION ADULT - NSPTDMEREC_GEN_A_CORE
Pt reports since moving from a house to the [facility], she longer has a bariatric hospital bed, RW, commode. Pt reports she is unable to locate her bariatric cane.

## 2023-02-02 NOTE — PHYSICAL THERAPY INITIAL EVALUATION ADULT - GAIT TRAINING, PT EVAL
pt will be able to independently ambulate 25ft with bariatric rolling walker without loss of balance within 4 weeks

## 2023-02-02 NOTE — PHYSICAL THERAPY INITIAL EVALUATION ADULT - ADDITIONAL COMMENTS
Pt reports she lives with her family in a 1st floor apartment. There are 6 [address] 1 HR and she requires assist for stair negotiation. Pt states she is able to ambulate independently with a straight cane and needs assist with ADLS from granddaughter. No HHA services. Pt reports since moving from a house to the [facility], she longer has a bariatric hospital bed, RW, commode. Pt reports she is unable to locate her cane.

## 2023-02-03 LAB
ALBUMIN SERPL ELPH-MCNC: 2.6 G/DL — LOW (ref 3.3–5)
ALP SERPL-CCNC: 143 U/L — HIGH (ref 40–120)
ALT FLD-CCNC: 9 U/L — LOW (ref 12–78)
ANION GAP SERPL CALC-SCNC: 10 MMOL/L — SIGNIFICANT CHANGE UP (ref 5–17)
ANION GAP SERPL CALC-SCNC: 8 MMOL/L — SIGNIFICANT CHANGE UP (ref 5–17)
AST SERPL-CCNC: 5 U/L — LOW (ref 15–37)
BILIRUB SERPL-MCNC: 0.2 MG/DL — SIGNIFICANT CHANGE UP (ref 0.2–1.2)
BUN SERPL-MCNC: 27 MG/DL — HIGH (ref 7–23)
BUN SERPL-MCNC: 45 MG/DL — HIGH (ref 7–23)
CALCIUM SERPL-MCNC: 7 MG/DL — LOW (ref 8.5–10.1)
CALCIUM SERPL-MCNC: 7.7 MG/DL — LOW (ref 8.5–10.1)
CHLORIDE SERPL-SCNC: 102 MMOL/L — SIGNIFICANT CHANGE UP (ref 96–108)
CHLORIDE SERPL-SCNC: 105 MMOL/L — SIGNIFICANT CHANGE UP (ref 96–108)
CO2 SERPL-SCNC: 27 MMOL/L — SIGNIFICANT CHANGE UP (ref 22–31)
CO2 SERPL-SCNC: 27 MMOL/L — SIGNIFICANT CHANGE UP (ref 22–31)
CREAT SERPL-MCNC: 5.74 MG/DL — HIGH (ref 0.5–1.3)
CREAT SERPL-MCNC: 7.87 MG/DL — HIGH (ref 0.5–1.3)
EGFR: 5 ML/MIN/1.73M2 — LOW
EGFR: 7 ML/MIN/1.73M2 — LOW
GLUCOSE SERPL-MCNC: 110 MG/DL — HIGH (ref 70–99)
GLUCOSE SERPL-MCNC: 95 MG/DL — SIGNIFICANT CHANGE UP (ref 70–99)
HAV IGM SER-ACNC: SIGNIFICANT CHANGE UP
HBV CORE IGM SER-ACNC: SIGNIFICANT CHANGE UP
HBV CORE IGM SER-ACNC: SIGNIFICANT CHANGE UP
HBV DNA # SERPL NAA+PROBE: SIGNIFICANT CHANGE UP
HBV DNA SERPL NAA+PROBE-LOG#: SIGNIFICANT CHANGE UP LOGIU/ML
HBV SURFACE AB SER-ACNC: SIGNIFICANT CHANGE UP
HBV SURFACE AG SER-ACNC: SIGNIFICANT CHANGE UP
HBV SURFACE AG SER-ACNC: SIGNIFICANT CHANGE UP
HCT VFR BLD CALC: 23.7 % — LOW (ref 34.5–45)
HCV AB S/CO SERPL IA: 1.02 S/CO — HIGH (ref 0–0.99)
HCV AB SERPL-IMP: ABNORMAL
HCV RNA FLD QL NAA+PROBE: SIGNIFICANT CHANGE UP
HCV RNA SPEC QL PROBE+SIG AMP: SIGNIFICANT CHANGE UP
HGB BLD-MCNC: 7.1 G/DL — LOW (ref 11.5–15.5)
MCHC RBC-ENTMCNC: 29.2 PG — SIGNIFICANT CHANGE UP (ref 27–34)
MCHC RBC-ENTMCNC: 30 G/DL — LOW (ref 32–36)
MCV RBC AUTO: 97.5 FL — SIGNIFICANT CHANGE UP (ref 80–100)
NRBC # BLD: 0 /100 WBCS — SIGNIFICANT CHANGE UP (ref 0–0)
PHOSPHATE SERPL-MCNC: 4.6 MG/DL — HIGH (ref 2.5–4.5)
PLATELET # BLD AUTO: 241 K/UL — SIGNIFICANT CHANGE UP (ref 150–400)
POTASSIUM SERPL-MCNC: 3.6 MMOL/L — SIGNIFICANT CHANGE UP (ref 3.5–5.3)
POTASSIUM SERPL-MCNC: 4 MMOL/L — SIGNIFICANT CHANGE UP (ref 3.5–5.3)
POTASSIUM SERPL-SCNC: 3.6 MMOL/L — SIGNIFICANT CHANGE UP (ref 3.5–5.3)
POTASSIUM SERPL-SCNC: 4 MMOL/L — SIGNIFICANT CHANGE UP (ref 3.5–5.3)
PROT SERPL-MCNC: 6.9 GM/DL — SIGNIFICANT CHANGE UP (ref 6–8.3)
RBC # BLD: 2.43 M/UL — LOW (ref 3.8–5.2)
RBC # FLD: 13.3 % — SIGNIFICANT CHANGE UP (ref 10.3–14.5)
SODIUM SERPL-SCNC: 137 MMOL/L — SIGNIFICANT CHANGE UP (ref 135–145)
SODIUM SERPL-SCNC: 142 MMOL/L — SIGNIFICANT CHANGE UP (ref 135–145)
WBC # BLD: 6.26 K/UL — SIGNIFICANT CHANGE UP (ref 3.8–10.5)
WBC # FLD AUTO: 6.26 K/UL — SIGNIFICANT CHANGE UP (ref 3.8–10.5)

## 2023-02-03 PROCEDURE — 99233 SBSQ HOSP IP/OBS HIGH 50: CPT

## 2023-02-03 RX ORDER — CHLORHEXIDINE GLUCONATE 213 G/1000ML
1 SOLUTION TOPICAL
Refills: 0 | Status: DISCONTINUED | OUTPATIENT
Start: 2023-02-03 | End: 2023-02-09

## 2023-02-03 RX ORDER — HEPARIN SODIUM 5000 [USP'U]/ML
7500 INJECTION INTRAVENOUS; SUBCUTANEOUS EVERY 12 HOURS
Refills: 0 | Status: DISCONTINUED | OUTPATIENT
Start: 2023-02-03 | End: 2023-02-09

## 2023-02-03 RX ORDER — CHLORHEXIDINE GLUCONATE 213 G/1000ML
1 SOLUTION TOPICAL
Refills: 0 | Status: DISCONTINUED | OUTPATIENT
Start: 2023-02-03 | End: 2023-02-03

## 2023-02-03 RX ORDER — CALCIUM GLUCONATE 100 MG/ML
2 VIAL (ML) INTRAVENOUS ONCE
Refills: 0 | Status: COMPLETED | OUTPATIENT
Start: 2023-02-03 | End: 2023-02-03

## 2023-02-03 RX ADMIN — CHLORHEXIDINE GLUCONATE 1 APPLICATION(S): 213 SOLUTION TOPICAL at 14:15

## 2023-02-03 RX ADMIN — CALCITRIOL 0.5 MICROGRAM(S): 0.5 CAPSULE ORAL at 14:06

## 2023-02-03 RX ADMIN — Medication 100 MILLIGRAM(S): at 05:19

## 2023-02-03 RX ADMIN — Medication 2001 MILLIGRAM(S): at 17:37

## 2023-02-03 RX ADMIN — HEPARIN SODIUM 7500 UNIT(S): 5000 INJECTION INTRAVENOUS; SUBCUTANEOUS at 17:41

## 2023-02-03 RX ADMIN — AMLODIPINE BESYLATE 10 MILLIGRAM(S): 2.5 TABLET ORAL at 05:19

## 2023-02-03 RX ADMIN — HEPARIN SODIUM 7500 UNIT(S): 5000 INJECTION INTRAVENOUS; SUBCUTANEOUS at 05:22

## 2023-02-03 RX ADMIN — Medication 1 MILLIGRAM(S): at 14:07

## 2023-02-03 RX ADMIN — Medication 2001 MILLIGRAM(S): at 14:06

## 2023-02-03 RX ADMIN — Medication 200 GRAM(S): at 22:22

## 2023-02-03 RX ADMIN — Medication 25 MILLIGRAM(S): at 05:21

## 2023-02-03 RX ADMIN — Medication 1000 UNIT(S): at 14:06

## 2023-02-03 RX ADMIN — Medication 25 MILLIGRAM(S): at 14:06

## 2023-02-03 RX ADMIN — Medication 25 MILLIGRAM(S): at 17:37

## 2023-02-03 NOTE — PROCEDURE NOTE - PLAN
OK TO USE HD CATHETER IMMEDIATELY; position confirmed at completion of the case
Line is ready for use

## 2023-02-03 NOTE — PROCEDURE NOTE - PROCEDURE FINDINGS AND DETAILS
Successful exchange of temporary catheter to tunneled line
Successful insertion of a non-tunnelled HD catheter into the RIJ; position confirmed at completion of case

## 2023-02-04 LAB
ALBUMIN SERPL ELPH-MCNC: 2.5 G/DL — LOW (ref 3.3–5)
ALP SERPL-CCNC: 136 U/L — HIGH (ref 40–120)
ALT FLD-CCNC: 10 U/L — LOW (ref 12–78)
ANION GAP SERPL CALC-SCNC: 9 MMOL/L — SIGNIFICANT CHANGE UP (ref 5–17)
AST SERPL-CCNC: 13 U/L — LOW (ref 15–37)
BILIRUB SERPL-MCNC: 0.2 MG/DL — SIGNIFICANT CHANGE UP (ref 0.2–1.2)
BLD GP AB SCN SERPL QL: SIGNIFICANT CHANGE UP
BUN SERPL-MCNC: 32 MG/DL — HIGH (ref 7–23)
CA-I BLD-SCNC: 3.7 MG/DL — LOW (ref 4.5–5.6)
CA-I BLD-SCNC: 4.4 MG/DL — LOW (ref 4.5–5.6)
CALCIUM SERPL-MCNC: 7.7 MG/DL — LOW (ref 8.4–10.5)
CALCIUM SERPL-MCNC: 7.7 MG/DL — LOW (ref 8.5–10.1)
CHLORIDE SERPL-SCNC: 101 MMOL/L — SIGNIFICANT CHANGE UP (ref 96–108)
CO2 SERPL-SCNC: 28 MMOL/L — SIGNIFICANT CHANGE UP (ref 22–31)
CREAT SERPL-MCNC: 6.72 MG/DL — HIGH (ref 0.5–1.3)
EGFR: 6 ML/MIN/1.73M2 — LOW
FERRITIN SERPL-MCNC: 356 NG/ML — HIGH (ref 15–150)
GLUCOSE SERPL-MCNC: 100 MG/DL — HIGH (ref 70–99)
HCT VFR BLD CALC: 23.2 % — LOW (ref 34.5–45)
HGB BLD-MCNC: 6.9 G/DL — CRITICAL LOW (ref 11.5–15.5)
IRON SATN MFR SERPL: 24 % — SIGNIFICANT CHANGE UP (ref 14–50)
IRON SATN MFR SERPL: 43 UG/DL — SIGNIFICANT CHANGE UP (ref 30–160)
MAGNESIUM SERPL-MCNC: 1.6 MG/DL — SIGNIFICANT CHANGE UP (ref 1.6–2.6)
MCHC RBC-ENTMCNC: 29.6 PG — SIGNIFICANT CHANGE UP (ref 27–34)
MCHC RBC-ENTMCNC: 29.7 G/DL — LOW (ref 32–36)
MCV RBC AUTO: 99.6 FL — SIGNIFICANT CHANGE UP (ref 80–100)
NRBC # BLD: 0 /100 WBCS — SIGNIFICANT CHANGE UP (ref 0–0)
PHOSPHATE SERPL-MCNC: 3.2 MG/DL — SIGNIFICANT CHANGE UP (ref 2.5–4.5)
PLATELET # BLD AUTO: 216 K/UL — SIGNIFICANT CHANGE UP (ref 150–400)
POTASSIUM SERPL-MCNC: 3.5 MMOL/L — SIGNIFICANT CHANGE UP (ref 3.5–5.3)
POTASSIUM SERPL-SCNC: 3.5 MMOL/L — SIGNIFICANT CHANGE UP (ref 3.5–5.3)
PROT SERPL-MCNC: 6.6 GM/DL — SIGNIFICANT CHANGE UP (ref 6–8.3)
PTH-INTACT FLD-MCNC: 308 PG/ML — HIGH (ref 15–65)
RBC # BLD: 2.33 M/UL — LOW (ref 3.8–5.2)
RBC # FLD: 13.2 % — SIGNIFICANT CHANGE UP (ref 10.3–14.5)
SODIUM SERPL-SCNC: 138 MMOL/L — SIGNIFICANT CHANGE UP (ref 135–145)
TIBC SERPL-MCNC: 180 UG/DL — LOW (ref 220–430)
UIBC SERPL-MCNC: 137 UG/DL — SIGNIFICANT CHANGE UP (ref 110–370)
WBC # BLD: 6.29 K/UL — SIGNIFICANT CHANGE UP (ref 3.8–10.5)
WBC # FLD AUTO: 6.29 K/UL — SIGNIFICANT CHANGE UP (ref 3.8–10.5)

## 2023-02-04 PROCEDURE — 99233 SBSQ HOSP IP/OBS HIGH 50: CPT

## 2023-02-04 RX ORDER — ERYTHROPOIETIN 10000 [IU]/ML
10000 INJECTION, SOLUTION INTRAVENOUS; SUBCUTANEOUS ONCE
Refills: 0 | Status: COMPLETED | OUTPATIENT
Start: 2023-02-04 | End: 2023-02-04

## 2023-02-04 RX ORDER — CALCIUM ACETATE 667 MG
667 TABLET ORAL
Refills: 0 | Status: DISCONTINUED | OUTPATIENT
Start: 2023-02-04 | End: 2023-02-09

## 2023-02-04 RX ADMIN — Medication 25 MILLIGRAM(S): at 00:29

## 2023-02-04 RX ADMIN — AMLODIPINE BESYLATE 10 MILLIGRAM(S): 2.5 TABLET ORAL at 05:55

## 2023-02-04 RX ADMIN — CHLORHEXIDINE GLUCONATE 1 APPLICATION(S): 213 SOLUTION TOPICAL at 06:04

## 2023-02-04 RX ADMIN — Medication 25 MILLIGRAM(S): at 05:55

## 2023-02-04 RX ADMIN — CALCITRIOL 0.5 MICROGRAM(S): 0.5 CAPSULE ORAL at 15:01

## 2023-02-04 RX ADMIN — HEPARIN SODIUM 7500 UNIT(S): 5000 INJECTION INTRAVENOUS; SUBCUTANEOUS at 17:33

## 2023-02-04 RX ADMIN — Medication 25 MILLIGRAM(S): at 15:01

## 2023-02-04 RX ADMIN — Medication 2001 MILLIGRAM(S): at 08:20

## 2023-02-04 RX ADMIN — Medication 650 MILLIGRAM(S): at 05:54

## 2023-02-04 RX ADMIN — Medication 667 MILLIGRAM(S): at 17:32

## 2023-02-04 RX ADMIN — HEPARIN SODIUM 7500 UNIT(S): 5000 INJECTION INTRAVENOUS; SUBCUTANEOUS at 05:57

## 2023-02-04 RX ADMIN — Medication 100 MILLIGRAM(S): at 05:54

## 2023-02-04 RX ADMIN — Medication 650 MILLIGRAM(S): at 08:44

## 2023-02-04 RX ADMIN — ERYTHROPOIETIN 10000 UNIT(S): 10000 INJECTION, SOLUTION INTRAVENOUS; SUBCUTANEOUS at 12:46

## 2023-02-04 RX ADMIN — Medication 1 MILLIGRAM(S): at 15:00

## 2023-02-04 RX ADMIN — Medication 1000 UNIT(S): at 14:59

## 2023-02-04 RX ADMIN — Medication 25 MILLIGRAM(S): at 17:32

## 2023-02-04 NOTE — PROVIDER CONTACT NOTE (CRITICAL VALUE NOTIFICATION) - SITUATION
Ting Goldsmith critical calcium level of 5.9
critical calcium of 5.9
Pt. is on dialysis. pt. is acute renal failure.

## 2023-02-04 NOTE — PROVIDER CONTACT NOTE (CRITICAL VALUE NOTIFICATION) - PERSON GIVING RESULT:
Mandy mckeon
[facility]/ [place]
Melissa Hicks, Lab
Paul Valadez
[facility] [place]
Haile Ojeda
Mary Krishnan
[employer] gonzalez
laboratory

## 2023-02-04 NOTE — DIETITIAN INITIAL EVALUATION ADULT - NS FNS DIET ORDER
Diet, DASH/TLC:   Sodium & Cholesterol Restricted  For patients receiving Renal Replacement - No Protein Restr, No Conc K, No Conc Phos, Low Sodium (RENAL) (01-30-23 @ 17:40) [Active]

## 2023-02-04 NOTE — DIETITIAN INITIAL EVALUATION ADULT - PERTINENT LABORATORY DATA
02-03    137  |  102  |  27<H>  ----------------------------<  110<H>  3.6   |  27  |  5.74<H>    Ca    7.7<L>      03 Feb 2023 20:30  Phos  4.6     02-03  Mg     1.7     02-02    TPro  6.9  /  Alb  2.6<L>  /  TBili  0.2  /  DBili  x   /  AST  5<L>  /  ALT  9<L>  /  AlkPhos  143<H>  02-03  A1C with Estimated Average Glucose Result: 4.6 % (04-23-22 @ 12:22)

## 2023-02-04 NOTE — PROVIDER CONTACT NOTE (CRITICAL VALUE NOTIFICATION) - NAME OF MD/NP/PA/DO NOTIFIED:
RADHA Lubin
RADHA Sawyer
RADHA Lloyd
RADHA Mcdonald
RADHA Pham
mikey LARSEN ed
valentina Siddiqui
DR talbot

## 2023-02-04 NOTE — DIETITIAN NUTRITION RISK NOTIFICATION - TREATMENT: THE FOLLOWING DIET HAS BEEN RECOMMENDED
Diet, Renal Restrictions:   For patients receiving Renal Replacement - No Protein Restr, No Conc K, No Conc Phos, Low Sodium  DASH/TLC {Sodium & Cholesterol Restricted}  Supplement Feeding Modality:  Oral  Nepro Cans or Servings Per Day:  1       Frequency:  Daily (02-04-23 @ 11:30) [Pending Verification By Attending]  Diet, DASH/TLC:   Sodium & Cholesterol Restricted  For patients receiving Renal Replacement - No Protein Restr, No Conc K, No Conc Phos, Low Sodium (RENAL) (01-30-23 @ 17:40) [Active]

## 2023-02-04 NOTE — DIETITIAN INITIAL EVALUATION ADULT - PERTINENT MEDS FT
MEDICATIONS  (STANDING):  amLODIPine   Tablet 10 milliGRAM(s) Oral daily  calcitriol   Capsule 0.5 MICROGram(s) Oral daily  calcium acetate 2001 milliGRAM(s) Oral three times a day with meals  chlorhexidine 2% Cloths 1 Application(s) Topical <User Schedule>  cholecalciferol 1000 Unit(s) Oral daily  folic acid 1 milliGRAM(s) Oral daily  heparin   Injectable 7500 Unit(s) SubCutaneous every 12 hours  hydrALAZINE 25 milliGRAM(s) Oral four times a day  metoprolol succinate  milliGRAM(s) Oral daily    MEDICATIONS  (PRN):  acetaminophen     Tablet .. 650 milliGRAM(s) Oral every 6 hours PRN Temp greater or equal to 38C (100.4F), Mild Pain (1 - 3)  ondansetron Injectable 4 milliGRAM(s) IV Push every 6 hours PRN Nausea and/or Vomiting  sodium chloride 0.9% lock flush 10 milliLiter(s) IV Push every 1 hour PRN Pre/post blood products, medications, blood draw, and to maintain line patency

## 2023-02-04 NOTE — DIETITIAN INITIAL EVALUATION ADULT - ETIOLOGY
Increased demand for protein Decreased ability to consume sufficient protein-energy due to ESRD now on HD

## 2023-02-04 NOTE — DIETITIAN INITIAL EVALUATION ADULT - PERSON TAUGHT/METHOD
Provided pt with renal diet education; discussed importance of high protein intake, low Na, no conc phos, no conc potassium while on HD. Also discussed wt loss tips to promote desirable, healthy wt loss once appetite improved & pt discharged from hospital/verbal instruction/written material/patient instructed

## 2023-02-04 NOTE — PROVIDER CONTACT NOTE (CRITICAL VALUE NOTIFICATION) - TEST AND RESULT REPORTED:
Hgb 6.9/ Hct 23.2
hepatitis C reaction
calcium 6.5
Calcium 5.9
calcium 6.1
Potassium 6.4
critical calcium 5.9
calcium 5.7
HCV interpretation weakly reactive

## 2023-02-04 NOTE — PROVIDER CONTACT NOTE (CRITICAL VALUE NOTIFICATION) - ACTION/TREATMENT ORDERED:
RADHA Sawyer to put in additional orders.
PA to review chart
Epogen 35307 units given as ordered
pt on calcitriol 0.5mg. no new order placed.
no order given , continue standard precaution

## 2023-02-04 NOTE — DIETITIAN INITIAL EVALUATION ADULT - OTHER INFO
Pt seen at HD; pt with ESRD initiated on HD this admission.  Pt lives with her daughter who does food shopping & cooking; pt uses a cane for ambulation.  Pt does not follow any dietary restrictions at home; reports decreased appetite & po intake x 1 month PTA (<75% of nutrition needs x 1 month)  Pt continues with decreased appetite & po intake during admission, currently consuming 50% or less of meals. Denies any chew/swallowing difficulty; reports diarrhea since yesterday.  Pt reports ht 5'6" &  lbs.; most recent wt 322 lbs.; pt with 0.93% wt loss x 1 month.

## 2023-02-05 LAB
HCT VFR BLD CALC: 27.5 % — LOW (ref 34.5–45)
HGB BLD-MCNC: 8.2 G/DL — LOW (ref 11.5–15.5)
MCHC RBC-ENTMCNC: 28.9 PG — SIGNIFICANT CHANGE UP (ref 27–34)
MCHC RBC-ENTMCNC: 29.8 G/DL — LOW (ref 32–36)
MCV RBC AUTO: 96.8 FL — SIGNIFICANT CHANGE UP (ref 80–100)
NRBC # BLD: 0 /100 WBCS — SIGNIFICANT CHANGE UP (ref 0–0)
PLATELET # BLD AUTO: 192 K/UL — SIGNIFICANT CHANGE UP (ref 150–400)
RBC # BLD: 2.84 M/UL — LOW (ref 3.8–5.2)
RBC # FLD: 15.5 % — HIGH (ref 10.3–14.5)
WBC # BLD: 9.02 K/UL — SIGNIFICANT CHANGE UP (ref 3.8–10.5)
WBC # FLD AUTO: 9.02 K/UL — SIGNIFICANT CHANGE UP (ref 3.8–10.5)

## 2023-02-05 PROCEDURE — 99233 SBSQ HOSP IP/OBS HIGH 50: CPT

## 2023-02-05 RX ORDER — ERYTHROPOIETIN 10000 [IU]/ML
10000 INJECTION, SOLUTION INTRAVENOUS; SUBCUTANEOUS
Refills: 0 | Status: DISCONTINUED | OUTPATIENT
Start: 2023-02-06 | End: 2023-02-09

## 2023-02-05 RX ADMIN — AMLODIPINE BESYLATE 10 MILLIGRAM(S): 2.5 TABLET ORAL at 06:49

## 2023-02-05 RX ADMIN — Medication 1 MILLIGRAM(S): at 11:40

## 2023-02-05 RX ADMIN — Medication 25 MILLIGRAM(S): at 17:21

## 2023-02-05 RX ADMIN — Medication 667 MILLIGRAM(S): at 17:21

## 2023-02-05 RX ADMIN — Medication 1000 UNIT(S): at 11:40

## 2023-02-05 RX ADMIN — Medication 100 MILLIGRAM(S): at 06:49

## 2023-02-05 RX ADMIN — Medication 25 MILLIGRAM(S): at 06:50

## 2023-02-05 RX ADMIN — CHLORHEXIDINE GLUCONATE 1 APPLICATION(S): 213 SOLUTION TOPICAL at 06:53

## 2023-02-05 RX ADMIN — Medication 25 MILLIGRAM(S): at 00:07

## 2023-02-05 RX ADMIN — HEPARIN SODIUM 7500 UNIT(S): 5000 INJECTION INTRAVENOUS; SUBCUTANEOUS at 17:22

## 2023-02-05 RX ADMIN — CALCITRIOL 0.5 MICROGRAM(S): 0.5 CAPSULE ORAL at 11:40

## 2023-02-05 RX ADMIN — HEPARIN SODIUM 7500 UNIT(S): 5000 INJECTION INTRAVENOUS; SUBCUTANEOUS at 06:50

## 2023-02-05 RX ADMIN — Medication 25 MILLIGRAM(S): at 11:41

## 2023-02-05 RX ADMIN — Medication 667 MILLIGRAM(S): at 10:10

## 2023-02-06 LAB
ANION GAP SERPL CALC-SCNC: 9 MMOL/L — SIGNIFICANT CHANGE UP (ref 5–17)
APPEARANCE UR: ABNORMAL
BACTERIA # UR AUTO: ABNORMAL
BILIRUB UR-MCNC: NEGATIVE — SIGNIFICANT CHANGE UP
BUN SERPL-MCNC: 36 MG/DL — HIGH (ref 7–23)
CA-I BLD-SCNC: 4.2 MG/DL — LOW (ref 4.5–5.6)
CALCIUM SERPL-MCNC: 7.3 MG/DL — LOW (ref 8.5–10.1)
CHLORIDE SERPL-SCNC: 102 MMOL/L — SIGNIFICANT CHANGE UP (ref 96–108)
CO2 SERPL-SCNC: 29 MMOL/L — SIGNIFICANT CHANGE UP (ref 22–31)
COLOR SPEC: YELLOW — SIGNIFICANT CHANGE UP
COMMENT - URINE: SIGNIFICANT CHANGE UP
CREAT SERPL-MCNC: 7.14 MG/DL — HIGH (ref 0.5–1.3)
DIFF PNL FLD: ABNORMAL
EGFR: 6 ML/MIN/1.73M2 — LOW
EPI CELLS # UR: SIGNIFICANT CHANGE UP
FLUAV AG NPH QL: SIGNIFICANT CHANGE UP
FLUBV AG NPH QL: SIGNIFICANT CHANGE UP
GLUCOSE SERPL-MCNC: 112 MG/DL — HIGH (ref 70–99)
GLUCOSE UR QL: NEGATIVE MG/DL — SIGNIFICANT CHANGE UP
HCT VFR BLD CALC: 27.5 % — LOW (ref 34.5–45)
HGB BLD-MCNC: 8.2 G/DL — LOW (ref 11.5–15.5)
KETONES UR-MCNC: NEGATIVE — SIGNIFICANT CHANGE UP
LEUKOCYTE ESTERASE UR-ACNC: ABNORMAL
MCHC RBC-ENTMCNC: 29.5 PG — SIGNIFICANT CHANGE UP (ref 27–34)
MCHC RBC-ENTMCNC: 29.8 G/DL — LOW (ref 32–36)
MCV RBC AUTO: 98.9 FL — SIGNIFICANT CHANGE UP (ref 80–100)
NITRITE UR-MCNC: NEGATIVE — SIGNIFICANT CHANGE UP
NRBC # BLD: 0 /100 WBCS — SIGNIFICANT CHANGE UP (ref 0–0)
PH UR: 6.5 — SIGNIFICANT CHANGE UP (ref 5–8)
PLATELET # BLD AUTO: 193 K/UL — SIGNIFICANT CHANGE UP (ref 150–400)
POTASSIUM SERPL-MCNC: 3.3 MMOL/L — LOW (ref 3.5–5.3)
POTASSIUM SERPL-SCNC: 3.3 MMOL/L — LOW (ref 3.5–5.3)
PROT UR-MCNC: 500 MG/DL
RBC # BLD: 2.78 M/UL — LOW (ref 3.8–5.2)
RBC # FLD: 15.1 % — HIGH (ref 10.3–14.5)
RBC CASTS # UR COMP ASSIST: ABNORMAL /HPF (ref 0–4)
SARS-COV-2 RNA SPEC QL NAA+PROBE: SIGNIFICANT CHANGE UP
SODIUM SERPL-SCNC: 140 MMOL/L — SIGNIFICANT CHANGE UP (ref 135–145)
SP GR SPEC: 1 — LOW (ref 1.01–1.02)
UROBILINOGEN FLD QL: NEGATIVE MG/DL — SIGNIFICANT CHANGE UP
WBC # BLD: 9.6 K/UL — SIGNIFICANT CHANGE UP (ref 3.8–10.5)
WBC # FLD AUTO: 9.6 K/UL — SIGNIFICANT CHANGE UP (ref 3.8–10.5)
WBC UR QL: >50

## 2023-02-06 PROCEDURE — 99233 SBSQ HOSP IP/OBS HIGH 50: CPT

## 2023-02-06 RX ORDER — CEFTRIAXONE 500 MG/1
1000 INJECTION, POWDER, FOR SOLUTION INTRAMUSCULAR; INTRAVENOUS EVERY 24 HOURS
Refills: 0 | Status: COMPLETED | OUTPATIENT
Start: 2023-02-06 | End: 2023-02-08

## 2023-02-06 RX ORDER — TRAMADOL HYDROCHLORIDE 50 MG/1
25 TABLET ORAL ONCE
Refills: 0 | Status: DISCONTINUED | OUTPATIENT
Start: 2023-02-06 | End: 2023-02-06

## 2023-02-06 RX ADMIN — Medication 667 MILLIGRAM(S): at 17:45

## 2023-02-06 RX ADMIN — Medication 25 MILLIGRAM(S): at 00:16

## 2023-02-06 RX ADMIN — AMLODIPINE BESYLATE 10 MILLIGRAM(S): 2.5 TABLET ORAL at 05:48

## 2023-02-06 RX ADMIN — Medication 650 MILLIGRAM(S): at 19:47

## 2023-02-06 RX ADMIN — ERYTHROPOIETIN 10000 UNIT(S): 10000 INJECTION, SOLUTION INTRAVENOUS; SUBCUTANEOUS at 12:10

## 2023-02-06 RX ADMIN — Medication 650 MILLIGRAM(S): at 20:47

## 2023-02-06 RX ADMIN — TRAMADOL HYDROCHLORIDE 25 MILLIGRAM(S): 50 TABLET ORAL at 23:22

## 2023-02-06 RX ADMIN — HEPARIN SODIUM 7500 UNIT(S): 5000 INJECTION INTRAVENOUS; SUBCUTANEOUS at 17:46

## 2023-02-06 RX ADMIN — CALCITRIOL 0.5 MICROGRAM(S): 0.5 CAPSULE ORAL at 15:17

## 2023-02-06 RX ADMIN — TRAMADOL HYDROCHLORIDE 25 MILLIGRAM(S): 50 TABLET ORAL at 22:22

## 2023-02-06 RX ADMIN — Medication 25 MILLIGRAM(S): at 23:39

## 2023-02-06 RX ADMIN — Medication 1 MILLIGRAM(S): at 15:19

## 2023-02-06 RX ADMIN — HEPARIN SODIUM 7500 UNIT(S): 5000 INJECTION INTRAVENOUS; SUBCUTANEOUS at 05:47

## 2023-02-06 RX ADMIN — Medication 25 MILLIGRAM(S): at 17:46

## 2023-02-06 RX ADMIN — CEFTRIAXONE 100 MILLIGRAM(S): 500 INJECTION, POWDER, FOR SOLUTION INTRAMUSCULAR; INTRAVENOUS at 16:51

## 2023-02-06 RX ADMIN — Medication 100 MILLIGRAM(S): at 05:48

## 2023-02-06 RX ADMIN — Medication 1000 UNIT(S): at 15:17

## 2023-02-06 RX ADMIN — Medication 25 MILLIGRAM(S): at 05:48

## 2023-02-06 RX ADMIN — CHLORHEXIDINE GLUCONATE 1 APPLICATION(S): 213 SOLUTION TOPICAL at 05:49

## 2023-02-07 ENCOUNTER — TRANSCRIPTION ENCOUNTER (OUTPATIENT)
Age: 73
End: 2023-02-07

## 2023-02-07 LAB
CULTURE RESULTS: SIGNIFICANT CHANGE UP
SPECIMEN SOURCE: SIGNIFICANT CHANGE UP

## 2023-02-07 PROCEDURE — 99232 SBSQ HOSP IP/OBS MODERATE 35: CPT

## 2023-02-07 PROCEDURE — 71046 X-RAY EXAM CHEST 2 VIEWS: CPT | Mod: 26

## 2023-02-07 RX ORDER — AMLODIPINE BESYLATE 2.5 MG/1
1 TABLET ORAL
Qty: 0 | Refills: 0 | DISCHARGE
Start: 2023-02-07

## 2023-02-07 RX ORDER — CHOLECALCIFEROL (VITAMIN D3) 125 MCG
1000 CAPSULE ORAL
Qty: 0 | Refills: 0 | DISCHARGE
Start: 2023-02-07

## 2023-02-07 RX ORDER — CEFPODOXIME PROXETIL 100 MG
1 TABLET ORAL
Qty: 10 | Refills: 0
Start: 2023-02-07 | End: 2023-02-11

## 2023-02-07 RX ORDER — FOLIC ACID 0.8 MG
1 TABLET ORAL
Qty: 0 | Refills: 0 | DISCHARGE
Start: 2023-02-07

## 2023-02-07 RX ORDER — METOPROLOL TARTRATE 50 MG
1 TABLET ORAL
Qty: 0 | Refills: 0 | DISCHARGE
Start: 2023-02-07

## 2023-02-07 RX ORDER — CALCITRIOL 0.5 UG/1
1 CAPSULE ORAL
Qty: 0 | Refills: 0 | DISCHARGE
Start: 2023-02-07

## 2023-02-07 RX ADMIN — HEPARIN SODIUM 7500 UNIT(S): 5000 INJECTION INTRAVENOUS; SUBCUTANEOUS at 17:22

## 2023-02-07 RX ADMIN — Medication 667 MILLIGRAM(S): at 12:48

## 2023-02-07 RX ADMIN — CHLORHEXIDINE GLUCONATE 1 APPLICATION(S): 213 SOLUTION TOPICAL at 05:20

## 2023-02-07 RX ADMIN — CALCITRIOL 0.5 MICROGRAM(S): 0.5 CAPSULE ORAL at 13:43

## 2023-02-07 RX ADMIN — Medication 667 MILLIGRAM(S): at 17:22

## 2023-02-07 RX ADMIN — Medication 667 MILLIGRAM(S): at 09:26

## 2023-02-07 RX ADMIN — HEPARIN SODIUM 7500 UNIT(S): 5000 INJECTION INTRAVENOUS; SUBCUTANEOUS at 05:21

## 2023-02-07 RX ADMIN — Medication 1000 UNIT(S): at 12:48

## 2023-02-07 RX ADMIN — AMLODIPINE BESYLATE 10 MILLIGRAM(S): 2.5 TABLET ORAL at 05:20

## 2023-02-07 RX ADMIN — CEFTRIAXONE 100 MILLIGRAM(S): 500 INJECTION, POWDER, FOR SOLUTION INTRAMUSCULAR; INTRAVENOUS at 15:28

## 2023-02-07 RX ADMIN — Medication 25 MILLIGRAM(S): at 05:21

## 2023-02-07 RX ADMIN — Medication 100 MILLIGRAM(S): at 05:20

## 2023-02-07 RX ADMIN — Medication 1 MILLIGRAM(S): at 12:49

## 2023-02-07 NOTE — PROGRESS NOTE ADULT - PROBLEM SELECTOR PROBLEM 5
Hyperkalemia
Chronic diastolic heart failure
Hyperkalemia
Hyperkalemia

## 2023-02-07 NOTE — DISCHARGE NOTE PROVIDER - CARE PROVIDER_API CALL
Jon Canseco)  Medicine  [address], [address]  [place] [address]  Phone: [phone]  Fax: [phone]  Follow Up Time:

## 2023-02-07 NOTE — PROGRESS NOTE ADULT - PROBLEM/PLAN-4
DISPLAY PLAN FREE TEXT
Home
DISPLAY PLAN FREE TEXT

## 2023-02-07 NOTE — PROGRESS NOTE ADULT - PROBLEM SELECTOR PROBLEM 2
ESRD (end stage renal disease)

## 2023-02-07 NOTE — DISCHARGE NOTE PROVIDER - NSDCMRMEDTOKEN_GEN_ALL_CORE_FT
albuterol 90 mcg/inh inhalation aerosol: 2 puff(s) inhaled every 6 hours, As needed, Shortness of Breath and/or Wheezing  amLODIPine 10 mg oral tablet: 1 tab(s) orally once a day  calcitriol 0.5 mcg oral capsule: 1 cap(s) orally once a day  cholecalciferol oral tablet: 1000 unit(s) orally once a day  folic acid 1 mg oral tablet: 1 tab(s) orally once a day  metoprolol succinate 100 mg oral tablet, extended release: 1 tab(s) orally once a day

## 2023-02-07 NOTE — DISCHARGE NOTE PROVIDER - NSDCCPCAREPLAN_GEN_ALL_CORE_FT
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  ESRD  -now with dialyis will need dialysis center   -would benefit from rehab however would rather go home   -Discharge planning to [facility]  # Hyperkalemia  - Dr Mora following  - s/p HyperK cocktail,   - HD  - avoid nephrotoxins  # Hypertensive Urgency  - due to noncompliance  -  Metoprolol and Amlodipine hydralazine  - cont to monitor BP  # acute UTI- started on iv ceftx, follow ucx  Anemia of CKD  - c/w folic acid  -s/p PRBC X 1,   # Hypocalcemia  - c/w Calcitriol and Vit d calcium gluconate supplementation  #Lung Mass  -4/22 CT chest; 1. Enlarging semisolid opacity at the right lung apex, which may represent adenocarcinoma. Correlate with smoking history. 2. No evidence of pulmonary embolism.  3. No evidence of acute inflammatory or obstructive process in the abdomen and pelvis.  - seen in April, pt reports she never followed up because she was told she could not make an appt in [place] if she lived in [place], also same reason she never followed up w/ nephrology  - would refer to another outpt Physician for CT chest follow up  Morbid obesity (BMI > 40).  This patient is being managed with:   Diet DASH/TLC-  Sodium & Cholesterol Restricted  For patients receiving Renal Replacement - No Protein Restr No Conc K No Conc Phos Low Sodium (RENAL)        SECONDARY DISCHARGE DIAGNOSES  Diagnosis: Nausea and vomiting  Assessment and Plan of Treatment:     Diagnosis: Hyperkalemia  Assessment and Plan of Treatment:     Diagnosis: OC (acute kidney injury)  Assessment and Plan of Treatment:

## 2023-02-07 NOTE — DISCHARGE NOTE PROVIDER - NSDCFUADDAPPT_GEN_ALL_CORE_FT
It is important to see your primary physician as well as other necessary consultants within the next week to perform a comprehensive medical review.  Call their offices for an appointment as soon as you leave the hospital.  If you do not have a primary physician or cant reach him/her, contact the [facility] Physician Referral Service at [phone].  Your medical issues appear to be stable at this time, but if your symptoms recur or worsen, contact your physicians and/or return to the hospital if necessary.  If you encounter any issues or questions with your medication, call your physicians before stopping the medication.

## 2023-02-07 NOTE — DISCHARGE NOTE PROVIDER - HOSPITAL COURSE
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    ESRD  -now with dialyis will need dialysis center   -would benefit from rehab however would rather go home   -Discharge planning to [facility]    # Hyperkalemia  - Dr Mora following  - s/p HyperK cocktail,   - HD  - avoid nephrotoxins    # Hypertensive Urgency  - due to noncompliance  -  Metoprolol and Amlodipine hydralazine  - cont to monitor BP    # acute UTI- started on iv ceftx, follow ucx  Anemia of CKD  - c/w folic acid  -s/p PRBC X 1,     # Hypocalcemia  - c/w Calcitriol and Vit d calcium gluconate supplementation    #Lung Mass  -4/22 CT chest; 1. Enlarging semisolid opacity at the right lung apex, which may represent adenocarcinoma. Correlate with smoking history. 2. No evidence of pulmonary embolism.  3. No evidence of acute inflammatory or obstructive process in the abdomen and pelvis.  - seen in April, pt reports she never followed up because she was told she could not make an appt in [place] if she lived in [place], also same reason she never followed up w/ nephrology  - would refer to another outpt Physician for CT chest follow up    Morbid obesity (BMI > 40).  This patient is being managed with:   Diet DASH/TLC-  Sodium & Cholesterol Restricted  For patients receiving Renal Replacement - No Protein Restr No Conc K No Conc Phos Low Sodium (RENAL)

## 2023-02-07 NOTE — DISCHARGE NOTE PROVIDER - ATTENDING DISCHARGE PHYSICAL EXAMINATION:
Vital Signs Last 24 Hrs  T(C): 36.7 (07 Feb 2023 11:08), Max: 36.9 (06 Feb 2023 20:20)  T(F): 98.1 (07 Feb 2023 11:08), Max: 98.5 (06 Feb 2023 20:20)  HR: 86 (07 Feb 2023 11:08) (79 - 105)  BP: 100/- (07 Feb 2023 11:08) (100/- - 137/84)  BP(mean): 97 (06 Feb 2023 17:34) (88 - 97)  RR: 18 (07 Feb 2023 11:08) (16 - 18)  SpO2: 98% (07 Feb 2023 11:08) (96% - 100%)    Parameters below as of 07 Feb 2023 11:08  Patient On (Oxygen Delivery Method): room air            GENERAL: NAD  HEAD:  Atraumatic, Normocephalic  EYES: EOMI, PERRLA, conjunctiva and sclera clear  ENMT: No tonsillar erythema, exudates, or enlargement; Moist mucous membranes, Good dentition, No lesions  NECK: Supple, No JVD, Normal thyroid  NERVOUS SYSTEM:  Alert & Oriented X3, Good concentration; Motor Strength 5/5 B/L upper and lower extremities;   CHEST/LUNG: Clear to percussion bilaterally;   HEART: Regular rate and rhythm;   ABDOMEN: Soft, Nontender, Nondistended; Bowel sounds present  EXTREMITIES:  2+ Peripheral Pulses, No clubbing, cyanosis, or edema  SKIN: No rashes or lesions GENERAL: NAD  HEAD:  Atraumatic, Normocephalic  EYES: EOMI, PERRLA, conjunctiva and sclera clear  ENMT: No tonsillar erythema, exudates, or enlargement; Moist mucous membranes, Good dentition, No lesions  NECK: Supple, No JVD, Normal thyroid  NERVOUS SYSTEM:  Alert & Oriented X3, Good concentration; Motor Strength 5/5 B/L upper and lower extremities;   CHEST/LUNG: Clear to percussion bilaterally;   HEART: Regular rate and rhythm;   ABDOMEN: Soft, Nontender, Nondistended; Bowel sounds present  EXTREMITIES:  2+ Peripheral Pulses, No clubbing, cyanosis, or edema  SKIN: No rashes or lesions

## 2023-02-07 NOTE — PROGRESS NOTE ADULT - PROBLEM SELECTOR PROBLEM 4
Hypocalcemia

## 2023-02-08 ENCOUNTER — TRANSCRIPTION ENCOUNTER (OUTPATIENT)
Age: 73
End: 2023-02-08

## 2023-02-08 LAB
HCT VFR BLD CALC: 30.2 % — LOW (ref 34.5–45)
HGB BLD-MCNC: 8.9 G/DL — LOW (ref 11.5–15.5)
MCHC RBC-ENTMCNC: 28.9 PG — SIGNIFICANT CHANGE UP (ref 27–34)
MCHC RBC-ENTMCNC: 29.5 G/DL — LOW (ref 32–36)
MCV RBC AUTO: 98.1 FL — SIGNIFICANT CHANGE UP (ref 80–100)
NRBC # BLD: 0 /100 WBCS — SIGNIFICANT CHANGE UP (ref 0–0)
PLATELET # BLD AUTO: 186 K/UL — SIGNIFICANT CHANGE UP (ref 150–400)
RBC # BLD: 3.08 M/UL — LOW (ref 3.8–5.2)
RBC # FLD: 15.4 % — HIGH (ref 10.3–14.5)
WBC # BLD: 8.31 K/UL — SIGNIFICANT CHANGE UP (ref 3.8–10.5)
WBC # FLD AUTO: 8.31 K/UL — SIGNIFICANT CHANGE UP (ref 3.8–10.5)

## 2023-02-08 PROCEDURE — 99232 SBSQ HOSP IP/OBS MODERATE 35: CPT

## 2023-02-08 RX ADMIN — HEPARIN SODIUM 7500 UNIT(S): 5000 INJECTION INTRAVENOUS; SUBCUTANEOUS at 17:17

## 2023-02-08 RX ADMIN — ERYTHROPOIETIN 10000 UNIT(S): 10000 INJECTION, SOLUTION INTRAVENOUS; SUBCUTANEOUS at 11:29

## 2023-02-08 RX ADMIN — Medication 100 MILLIGRAM(S): at 06:08

## 2023-02-08 RX ADMIN — CEFTRIAXONE 100 MILLIGRAM(S): 500 INJECTION, POWDER, FOR SOLUTION INTRAMUSCULAR; INTRAVENOUS at 14:28

## 2023-02-08 RX ADMIN — CHLORHEXIDINE GLUCONATE 1 APPLICATION(S): 213 SOLUTION TOPICAL at 06:04

## 2023-02-08 RX ADMIN — Medication 667 MILLIGRAM(S): at 17:16

## 2023-02-08 RX ADMIN — HEPARIN SODIUM 7500 UNIT(S): 5000 INJECTION INTRAVENOUS; SUBCUTANEOUS at 06:07

## 2023-02-08 RX ADMIN — Medication 667 MILLIGRAM(S): at 08:25

## 2023-02-08 NOTE — PROGRESS NOTE ADULT - SUBJECTIVE AND OBJECTIVE BOX
[facility] NEPHROLOGY SERVICES, [facility]  NEPHROLOGY AND HYPERTENSION  [address]  [address]  [place] [address]  [phone]    MD ARVIND VENCES MD ANDREY GONCHARUK, MD MADHU KORRAPATI, MD YELENA ROSENBERG, MD BINNY KOSHY, MD CHRISTOPHER CAPUTO, MD BELIA CHA MD          Patient events noted    MEDICATIONS  (STANDING):  amLODIPine   Tablet 10 milliGRAM(s) Oral daily  calcitriol   Capsule 0.5 MICROGram(s) Oral daily  calcium acetate 667 milliGRAM(s) Oral three times a day with meals  cefTRIAXone   IVPB 1000 milliGRAM(s) IV Intermittent every 24 hours  chlorhexidine 2% Cloths 1 Application(s) Topical <User Schedule>  cholecalciferol 1000 Unit(s) Oral daily  epoetin kiley-epbx (RETACRIT) Injectable 18843 Unit(s) IV Push <User Schedule>  folic acid 1 milliGRAM(s) Oral daily  heparin   Injectable 7500 Unit(s) SubCutaneous every 12 hours  hydrALAZINE 25 milliGRAM(s) Oral four times a day  metoprolol succinate  milliGRAM(s) Oral daily    MEDICATIONS  (PRN):  acetaminophen     Tablet .. 650 milliGRAM(s) Oral every 6 hours PRN Temp greater or equal to 38C (100.4F), Mild Pain (1 - 3)  ondansetron Injectable 4 milliGRAM(s) IV Push every 6 hours PRN Nausea and/or Vomiting  sodium chloride 0.9% lock flush 10 milliLiter(s) IV Push every 1 hour PRN Pre/post blood products, medications, blood draw, and to maintain line patency      02-05-23 @ 07:01  -  02-06-23 @ 07:00  --------------------------------------------------------  IN: 0 mL / OUT: 50 mL / NET: -50 mL    02-06-23 @ 07:01  -  02-06-23 @ 19:31  --------------------------------------------------------  IN: 0 mL / OUT: 1000 mL / NET: -1000 mL      PHYSICAL EXAM:      T(C): 36.2 (02-06-23 @ 17:34), Max: 36.8 (02-05-23 @ 23:47)  HR: 97 (02-06-23 @ 17:34) (93 - 106)  BP: 119/72 (02-06-23 @ 17:34) (114/73 - 144/77)  RR: 18 (02-06-23 @ 17:34) (16 - 18)  SpO2: 98% (02-06-23 @ 17:34) (95% - 100%)  Wt(kg): --  Lungs clear  Heart S1S2  Abd soft NT ND  Extremities:   1-2  edema                                    8.2    9.60  )-----------( 193      ( 06 Feb 2023 10:20 )             27.5     02-06    140  |  102  |  36<H>  ----------------------------<  112<H>  3.3<L>   |  29  |  7.14<H>    Ca    7.3<L>      06 Feb 2023 10:20          Creatinine Trend: 7.14<--, 6.72<--, 5.74<--, 7.87<--, 12.60<--, 16.60<--      Assessment   CKD 5; advancing; ESRD:       Plan  HD for today  UF as tolerated  Retacrit 10K units IV at dialysis;  no need for IV iron  Discharge planning to [facility] or [facility] pending transport options  Check UA and urine culture      Chay Mora MD
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 Pt reports dizziness; described as unsteadiness when she stands, for the past month which seemed worse today thus presented to the ED. Pt also reports SOB on exertions, and HA, associated w/ nausea. Pt reports decrease PO intake due to poor appetite for the past month as well. Pt states significant urinary frequency earlier in the month, was diagnosed w/ UTI and treated w/ 5 day course of Cipro which she completed on Friday. Pt denies fever or chills. Of note pt reports prior to the last 2 days, she does not recall the last time she took any of her medications. Pt denies cp, LE edema, orthopnea or PND, abdominal pain, emesis or diarrhea.    In ED initial /100, rest of vitals stable. Labs H/H 8.5/28.8, K 5.8, BUN/Cr 110/16.2, Calcium of 5.2, see below for rest of labs. EKG wide QRS/incomplete RBBB, QTc 527. CT head neg for acute pathology. Pt given HyperK cocktail including calcium Glu, Lokelma, Renal consulted.  (2023 18:37)    Patient is a 72y old  Female who presents with a chief complaint of Hypertensive urgency, OC on CKD (2023 18:50)      INTERVAL HPI/OVERNIGHT EVENTS: no acute events overnight agrees to dialysis     MEDICATIONS  (STANDING):  amLODIPine   Tablet 10 milliGRAM(s) Oral daily  calcitriol   Capsule 0.5 MICROGram(s) Oral daily  calcium acetate 2001 milliGRAM(s) Oral three times a day with meals  cholecalciferol 1000 Unit(s) Oral daily  folic acid 1 milliGRAM(s) Oral daily  heparin   Injectable 7500 Unit(s) SubCutaneous every 12 hours  hydrALAZINE 25 milliGRAM(s) Oral four times a day  metoprolol succinate  milliGRAM(s) Oral daily    MEDICATIONS  (PRN):  acetaminophen     Tablet .. 650 milliGRAM(s) Oral every 6 hours PRN Temp greater or equal to 38C (100.4F), Mild Pain (1 - 3)  ondansetron Injectable 4 milliGRAM(s) IV Push every 6 hours PRN Nausea and/or Vomiting      Allergies    sulfa drugs (Other; Rash)  Sulfur (Unknown)  trimethoprim (Other; Rash)    Intolerances        REVIEW OF SYSTEMS:  CONSTITUTIONAL: No fever, weight loss, or fatigue  EYES: No eye pain, visual disturbances, or discharge  ENMT:  No difficulty hearing, tinnitus, vertigo; No sinus or throat pain  NECK: No pain or stiffness  BREASTS: No pain, masses, or nipple discharge  RESPIRATORY: No cough, wheezing, chills or hemoptysis; No shortness of breath  CARDIOVASCULAR: No chest pain, palpitations, dizziness, or leg swelling  GASTROINTESTINAL: No abdominal or epigastric pain. No nausea, vomiting, or hematemesis; No diarrhea or constipation. No melena or hematochezia.  GENITOURINARY: No dysuria, frequency, hematuria, or incontinence  NEUROLOGICAL: No headaches, memory loss, loss of strength, numbness, or tremors  SKIN: No itching, burning, rashes, or lesions   LYMPH NODES: No enlarged glands  ENDOCRINE: No heat or cold intolerance; No hair loss  MUSCULOSKELETAL: No joint pain or swelling; No muscle, back, or extremity pain  PSYCHIATRIC: No depression, anxiety, mood swings, or difficulty sleeping  HEME/LYMPH: No easy bruising, or bleeding gums  ALLERGY AND IMMUNOLOGIC: No hives or eczema    Vital Signs Last 24 Hrs  T(C): 36.2 (2023 16:42), Max: 36.9 (2023 16:15)  T(F): 97.2 (2023 16:42), Max: 98.5 (2023 16:15)  HR: 84 (2023 16:42) (80 - 88)  BP: 111/73 (2023 16:42) (110/69 - 164/71)  BP(mean): 89 (2023 22:01) (89 - 89)  RR: 18 (2023 16:42) (11 - 20)  SpO2: 96% (2023 16:42) (94% - 100%)    Parameters below as of 2023 11:07  Patient On (Oxygen Delivery Method): room air        PHYSICAL EXAM:  GENERAL: NAD, morbid obesity HEAD:  Atraumatic, Normocephalic  EYES: EOMI, PERRLA, conjunctiva and sclera clear  ENMT: No tonsillar erythema, exudates, or enlargement; Moist mucous membranes, Good dentition, No lesions  NECK: Supple, No JVD, Normal thyroid  NERVOUS SYSTEM:  Alert & Oriented X3, Good concentration; Motor Strength 5/5 B/L upper and lower extremities; DTRs 2+ intact and symmetric  CHEST/LUNG: Clear to ascultation  bilaterally; No rales, rhonchi, wheezing, or rubs  HEART: Regular rate and rhythm; No murmurs, rubs, or gallops  ABDOMEN: Soft, Nontender, Nondiste edema  LYMPH: No lymphadenopathy noted  SKIN: No rashes or lesions    LABS:                        8.0    8.20  )-----------( 314      ( 2023 19:35 )             27.1         138  |  107  |  119<H>  ----------------------------<  98  5.8<H>   |  17<L>  |  16.60<H>    Ca    6.1<LL>      2023 08:15  Mg     1.5         TPro  7.6  /  Alb  2.8<L>  /  TBili  0.2  /  DBili  x   /  AST  13<L>  /  ALT  12  /  AlkPhos  168<H>      PT/INR - ( 2023 00:05 )   PT: 12.7 sec;   INR: 1.06 ratio         PTT - ( 2023 19:35 )  PTT:28.8 sec  Urinalysis Basic - ( 2023 21:10 )    Color: Yellow / Appearance: Clear / S.010 / pH: x  Gluc: x / Ketone: Negative  / Bili: Negative / Urobili: Negative mg/dL   Blood: x / Protein: 500 mg/dL / Nitrite: Negative   Leuk Esterase: Small / RBC: 0-2 /HPF / WBC 6-10   Sq Epi: x / Non Sq Epi: Occasional / Bacteria: x      CAPILLARY BLOOD GLUCOSE      POCT Blood Glucose.: 126 mg/dL (2023 01:18)  POCT Blood Glucose.: 90 mg/dL (2023 00:33)      RADIOLOGY & ADDITIONAL TESTS:    Imaging Personally Reviewed:  [ ] YES  [ ] NO    Consultant(s) Notes Reviewed:  [ ] YES  [ ] NO    Care Discussed with Consultants/Other Providers [ ] YES  [ ] NO
NEPHROLOGY PROGRESS NOTE    CHIEF COMPLAINT:  ESRD    HPI:  Tolerated 1st HD well.    ROS:  denies SOB    EXAM:  T(F): 97.7 (02-02-23 @ 04:49)  HR: 83 (02-02-23 @ 04:49)  BP: 163/84 (02-02-23 @ 04:49)  RR: 18 (02-02-23 @ 04:49)  SpO2: 97% (02-02-23 @ 04:49)    Conversant, in no apparent distress  Normal respiratory effort, lungs clear bilaterally  Heart RRR with no murmur, no peripheral edema         LABS                             8.0    8.20  )-----------( 314      ( 31 Jan 2023 19:35 )             27.1          02-01    138  |  107  |  119<H>  ----------------------------<  98  5.8<H>   |  17<L>  |  16.60<H>    Ca    6.1<LL>      01 Feb 2023 08:15      Assessment   Suspected ESRD and associated electrolyte derangements    Plan:  HD # 2 today and # 3 tomorrow as ordered  Perm cath FRIDAY with IR  Continue calcium acetate and calcitriol  DC planning to outpatient dialysis        
Patient is a 72y old  Female who presents with a chief complaint of Hypertensive urgency, OC on CKD (04 Feb 2023 12:28)      OVERNIGHT EVENTS:  Patients seen and examined at bedside this morning. No acute events overnight.    REVIEW OF SYSTEMS: denies chest pain/SOB, diaphoresis, no F/C, cough, dizziness, headache, blurry vision, nausea, vomiting, abdominal pain. All others review of systems negative     MEDICATIONS  (STANDING):  amLODIPine   Tablet 10 milliGRAM(s) Oral daily  calcitriol   Capsule 0.5 MICROGram(s) Oral daily  calcium acetate 667 milliGRAM(s) Oral three times a day with meals  chlorhexidine 2% Cloths 1 Application(s) Topical <User Schedule>  cholecalciferol 1000 Unit(s) Oral daily  folic acid 1 milliGRAM(s) Oral daily  heparin   Injectable 7500 Unit(s) SubCutaneous every 12 hours  hydrALAZINE 25 milliGRAM(s) Oral four times a day  metoprolol succinate  milliGRAM(s) Oral daily    MEDICATIONS  (PRN):  acetaminophen     Tablet .. 650 milliGRAM(s) Oral every 6 hours PRN Temp greater or equal to 38C (100.4F), Mild Pain (1 - 3)  ondansetron Injectable 4 milliGRAM(s) IV Push every 6 hours PRN Nausea and/or Vomiting  sodium chloride 0.9% lock flush 10 milliLiter(s) IV Push every 1 hour PRN Pre/post blood products, medications, blood draw, and to maintain line patency      Allergies    sulfa drugs (Other; Rash)  Sulfur (Unknown)  trimethoprim (Other; Rash)    Intolerances        T(F): 98 (02-04-23 @ 12:27), Max: 98.9 (02-03-23 @ 17:22)  HR: 85 (02-04-23 @ 12:27) (84 - 95)  BP: 153/68 (02-04-23 @ 12:27) (124/69 - 169/67)  RR: 17 (02-04-23 @ 12:27) (17 - 20)  SpO2: 100% (02-04-23 @ 12:27) (94% - 100%)  Wt(kg): --    PHYSICAL EXAM:  GENERAL: NAD  HEAD:  Atraumatic, Normocephalic  EYES: PERRLA, conjunctiva and sclera clear  ENMT: Moist mucous membranes  NECK: Supple, No JVD, Normal thyroid  NERVOUS SYSTEM:  Alert & Awake  CHEST/LUNG: Clear to percussion bilaterally;   HEART: Regular rate and rhythm;   ABDOMEN: Soft, Nontender, Nondistended; Bowel sounds present  EXTREMITIES:  no edema BL LE  SKIN: moist    LABS:                        6.9    6.29  )-----------( 216      ( 04 Feb 2023 09:20 )             23.2     02-04    138  |  101  |  32<H>  ----------------------------<  100<H>  3.5   |  28  |  6.72<H>    Ca    7.7<L>      04 Feb 2023 09:20  Phos  3.2     02-04  Mg     1.6     02-04    TPro  6.6  /  Alb  2.5<L>  /  TBili  0.2  /  DBili  x   /  AST  13<L>  /  ALT  10<L>  /  AlkPhos  136<H>  02-04        Cultures;   CAPILLARY BLOOD GLUCOSE        Lipid panel:           RADIOLOGY & ADDITIONAL TESTS:    Imaging Personally Reviewed:  [x ] YES      Consultant(s) Notes Reviewed:  [x ] YES     Care Discussed with [x ] Consultants [X ] Patient [ ] Family  [x ]    [x ]  Other; RN
[facility] NEPHROLOGY SERVICES, [facility]  NEPHROLOGY AND HYPERTENSION  [address]  [address]  [place] [address]  [phone]    MD ARVIND GARCIA, MD YADIEL GONZALEZ, MD MEAGAN JO, MD GONZALEZ FLORES, MD BELIA CHA MD          Patient events noted  No distress    MEDICATIONS  (STANDING):  calcitriol   Capsule 0.5 MICROGram(s) Oral daily  calcium acetate 667 milliGRAM(s) Oral three times a day with meals  chlorhexidine 2% Cloths 1 Application(s) Topical <User Schedule>  cholecalciferol 1000 Unit(s) Oral daily  epoetin kiley-epbx (RETACRIT) Injectable 14273 Unit(s) IV Push <User Schedule>  folic acid 1 milliGRAM(s) Oral daily  heparin   Injectable 7500 Unit(s) SubCutaneous every 12 hours  metoprolol succinate  milliGRAM(s) Oral daily    MEDICATIONS  (PRN):  acetaminophen     Tablet .. 650 milliGRAM(s) Oral every 6 hours PRN Temp greater or equal to 38C (100.4F), Mild Pain (1 - 3)  ondansetron Injectable 4 milliGRAM(s) IV Push every 6 hours PRN Nausea and/or Vomiting  sodium chloride 0.9% lock flush 10 milliLiter(s) IV Push every 1 hour PRN Pre/post blood products, medications, blood draw, and to maintain line patency      02-07-23 @ 07:01  -  02-08-23 @ 07:00  --------------------------------------------------------  IN: 695 mL / OUT: 525 mL / NET: 170 mL    02-08-23 @ 07:01  -  02-08-23 @ 18:06  --------------------------------------------------------  IN: 0 mL / OUT: 2500 mL / NET: -2500 mL      PHYSICAL EXAM:      T(C): 37.3 (02-08-23 @ 17:12), Max: 37.3 (02-08-23 @ 17:12)  HR: 95 (02-08-23 @ 17:12) (87 - 100)  BP: 109/61 (02-08-23 @ 17:12) (109/61 - 165/79)  RR: 18 (02-08-23 @ 17:12) (17 - 18)  SpO2: 96% (02-08-23 @ 17:12) (96% - 100%)  Wt(kg): --  Lungs clear  Heart S1S2  Abd soft NT ND  Extremities:   1 edema                                    8.9    8.31  )-----------( 186      ( 08 Feb 2023 11:30 )             30.2               Creatinine Trend: 7.14<--, 6.72<--, 5.74<--, 7.87<--, 12.60<--, 16.60<--        Assessment   ESRD, maintenance     Plan:  HD for today  UF as tolerated   PT evaluation pre discharge  [facility]  Discharge planning     MD Chay Garcia MD
[facility] NEPHROLOGY SERVICES, [facility]  NEPHROLOGY AND HYPERTENSION  [address]  [address]  [place] [address]  [phone]    MD ARVIND VENCES, MD YADIEL GONZALEZ, MD MEAGAN JO, MD GONZALEZ FLORES, MD BELIA CHA MD          Patient events noted  No distress  Post IJ daisy placement    MEDICATIONS  (STANDING):  amLODIPine   Tablet 10 milliGRAM(s) Oral daily  calcitriol   Capsule 0.5 MICROGram(s) Oral daily  calcium acetate 2001 milliGRAM(s) Oral three times a day with meals  cholecalciferol 1000 Unit(s) Oral daily  folic acid 1 milliGRAM(s) Oral daily  heparin   Injectable 7500 Unit(s) SubCutaneous every 12 hours  hydrALAZINE 25 milliGRAM(s) Oral four times a day  metoprolol succinate  milliGRAM(s) Oral daily    MEDICATIONS  (PRN):  acetaminophen     Tablet .. 650 milliGRAM(s) Oral every 6 hours PRN Temp greater or equal to 38C (100.4F), Mild Pain (1 - 3)  ondansetron Injectable 4 milliGRAM(s) IV Push every 6 hours PRN Nausea and/or Vomiting      01-31-23 @ 07:01  -  02-01-23 @ 07:00  --------------------------------------------------------  IN: 0 mL / OUT: 200 mL / NET: -200 mL    02-01-23 @ 07:01  -  02-01-23 @ 18:50  --------------------------------------------------------  IN: 100 mL / OUT: 0 mL / NET: 100 mL      PHYSICAL EXAM:      T(C): 36.2 (02-01-23 @ 16:42), Max: 36.9 (02-01-23 @ 16:15)  HR: 84 (02-01-23 @ 16:42) (80 - 88)  BP: 111/73 (02-01-23 @ 16:42) (110/69 - 178/77)  RR: 18 (02-01-23 @ 16:42) (11 - 20)  SpO2: 96% (02-01-23 @ 16:42) (94% - 100%)  Wt(kg): --  Lungs clear  Heart S1S2  Abd soft NT ND  Extremities:   tr edema                                    8.0    8.20  )-----------( 314      ( 31 Jan 2023 19:35 )             27.1     02-01    138  |  107  |  119<H>  ----------------------------<  98  5.8<H>   |  17<L>  |  16.60<H>    Ca    6.1<LL>      01 Feb 2023 08:15  Mg     1.5     01-31    TPro  7.6  /  Alb  2.8<L>  /  TBili  0.2  /  DBili  x   /  AST  13<L>  /  ALT  12  /  AlkPhos  168<H>  01-30      LIVER FUNCTIONS - ( 30 Jan 2023 20:50 )  Alb: 2.8 g/dL / Pro: 7.6 gm/dL / ALK PHOS: 168 U/L / ALT: 12 U/L / AST: 13 U/L / GGT: x           Creatinine Trend: 16.60<--, 16.20<--, 15.80<--, 15.90<--, 16.20<--        Assessment   Suspected ESRD and associated electrolyte derangements      Plan:  HD for today, tomorrow, Friday  Perm cath FRIDAY  Discharge planning   Medical rx BUZZ Mora MD
[facility] NEPHROLOGY SERVICES, [facility]  NEPHROLOGY AND HYPERTENSION  [address]  [address]  [place] [address]  [phone]    MD ARVIND VENCES MD ANDREY GONCHARUK, MD MADHU KORRAPATI, MD YELENA ROSENBERG, MD BINNY KOSHY, MD CHRISTOPHER CAPUTO, MD BELIA CHA MD          Patient events noted  No distress    MEDICATIONS  (STANDING):  calcitriol   Capsule 0.5 MICROGram(s) Oral daily  calcium acetate 667 milliGRAM(s) Oral three times a day with meals  cefTRIAXone   IVPB 1000 milliGRAM(s) IV Intermittent every 24 hours  chlorhexidine 2% Cloths 1 Application(s) Topical <User Schedule>  cholecalciferol 1000 Unit(s) Oral daily  epoetin kiley-epbx (RETACRIT) Injectable 48359 Unit(s) IV Push <User Schedule>  folic acid 1 milliGRAM(s) Oral daily  heparin   Injectable 7500 Unit(s) SubCutaneous every 12 hours  metoprolol succinate  milliGRAM(s) Oral daily    MEDICATIONS  (PRN):  acetaminophen     Tablet .. 650 milliGRAM(s) Oral every 6 hours PRN Temp greater or equal to 38C (100.4F), Mild Pain (1 - 3)  ondansetron Injectable 4 milliGRAM(s) IV Push every 6 hours PRN Nausea and/or Vomiting  sodium chloride 0.9% lock flush 10 milliLiter(s) IV Push every 1 hour PRN Pre/post blood products, medications, blood draw, and to maintain line patency      02-06-23 @ 07:01  -  02-07-23 @ 07:00  --------------------------------------------------------  IN: 120 mL / OUT: 1000 mL / NET: -880 mL      PHYSICAL EXAM:      T(C): 36.8 (02-07-23 @ 17:02), Max: 36.8 (02-07-23 @ 17:02)  HR: 88 (02-07-23 @ 17:02) (86 - 105)  BP: 118/65 (02-07-23 @ 17:02) (100/- - 137/84)  RR: 18 (02-07-23 @ 17:02) (17 - 18)  SpO2: 95% (02-07-23 @ 17:02) (95% - 98%)  Wt(kg): --  Lungs clear  Heart S1S2  Abd soft NT ND  Extremities:   tr edema                                    8.2    9.60  )-----------( 193      ( 06 Feb 2023 10:20 )             27.5     02-06    140  |  102  |  36<H>  ----------------------------<  112<H>  3.3<L>   |  29  |  7.14<H>    Ca    7.3<L>      06 Feb 2023 10:20          Creatinine Trend: 7.14<--, 6.72<--, 5.74<--, 7.87<--, 12.60<--, 16.60<--      Assessment   ESRD, maintenance     Plan:  HD for tomorrow   Discharge planning     Chay Mora MD
NEPHROLOGY PROGRESS NOTE    CHIEF COMPLAINT:  ESRD    HPI:  Seen on dialysis.  BP a little high.  Access works well.    ROS:  still with mild nausea and anorexia    EXAM:  T(F): 98 (02-03-23 @ 09:00)  HR: 83 (02-03-23 @ 09:00)  BP: 177/80 (02-03-23 @ 09:00)  RR: 20 (02-03-23 @ 09:00)  SpO2: 98% (02-03-23 @ 09:00)    Conversant, in no apparent distress  Normal respiratory effort, lungs clear bilaterally  Heart RRR with no murmur, no peripheral edema         LABS                             7.1    6.26  )-----------( 241      ( 03 Feb 2023 09:50 )             23.7          02-03    142  |  105  |  45<H>  ----------------------------<  95  4.0   |  27  |  7.87<H>    Ca    7.0<L>      03 Feb 2023 09:50  Phos  4.6     02-03  Mg     1.7     02-02    TPro  6.5  /  Alb  2.5<L>  /  TBili  0.2  /  DBili  x   /  AST  9<L>  /  ALT  7<L>  /  AlkPhos  145<H>  02-02      Assessment   ESRD s/p dialysis initiation    Plan  Complete HD today and tomorrow then go on MWF schedule  Tunneled dialysis catheter later today  Discharge planning to [facility]      
NEPHROLOGY PROGRESS NOTE    CHIEF COMPLAINT:  ESRD    HPI:  Seen on dialysis.  BP stable.  New access working well.  Appetite still not great.    ROS:  no SOB    EXAM:  T(F): 98.7 (02-04-23 @ 09:00)  HR: 84 (02-04-23 @ 09:00)  BP: 169/67 (02-04-23 @ 09:00)  RR: 17 (02-04-23 @ 09:00)  SpO2: 94% (02-04-23 @ 09:00)    Conversant, in no apparent distress  Normal respiratory effort, lungs clear bilaterally  Heart RRR with no murmur, no peripheral edema         LABS                             7.1    6.26  )-----------( 241      ( 03 Feb 2023 09:50 )             23.7          02-04    138  |  101  |  32<H>  ----------------------------<  100<H>  3.5   |  28  |  6.72<H>    Ca    7.7<L>      04 Feb 2023 09:20  Phos  3.2     02-04  Mg     1.6     02-04    TPro  6.6  /  Alb  2.5<L>  /  TBili  0.2  /  DBili  x   /  AST  13<L>  /  ALT  10<L>  /  AlkPhos  136<H>  02-04           Assessment   ESRD s/p dialysis initiation  Worsening normocytic anemia    Plan  Complete HD today and rest tomorrow  Retacrit 10K  units IV at dialysis today;  check TSAT and ferritin  Discharge planning to [facility]        
Patient is a 72y old  Female who presents with a chief complaint of Hypertensive urgency, OC on CKD (07 Feb 2023 21:47)    INTERVAL HPI/OVERNIGHT EVENTS: Patients seen and examined at bedside this morning. No acute events overnight.    MEDICATIONS  (STANDING):  calcitriol   Capsule 0.5 MICROGram(s) Oral daily  calcium acetate 667 milliGRAM(s) Oral three times a day with meals  cefTRIAXone   IVPB 1000 milliGRAM(s) IV Intermittent every 24 hours  chlorhexidine 2% Cloths 1 Application(s) Topical <User Schedule>  cholecalciferol 1000 Unit(s) Oral daily  epoetin kiley-epbx (RETACRIT) Injectable 37819 Unit(s) IV Push <User Schedule>  folic acid 1 milliGRAM(s) Oral daily  heparin   Injectable 7500 Unit(s) SubCutaneous every 12 hours  metoprolol succinate  milliGRAM(s) Oral daily    MEDICATIONS  (PRN):  acetaminophen     Tablet .. 650 milliGRAM(s) Oral every 6 hours PRN Temp greater or equal to 38C (100.4F), Mild Pain (1 - 3)  ondansetron Injectable 4 milliGRAM(s) IV Push every 6 hours PRN Nausea and/or Vomiting  sodium chloride 0.9% lock flush 10 milliLiter(s) IV Push every 1 hour PRN Pre/post blood products, medications, blood draw, and to maintain line patency    Allergies    sulfa drugs (Other; Rash)  Sulfur (Unknown)  trimethoprim (Other; Rash)    Intolerances      REVIEW OF SYSTEMS:  All other systems reviewed and are negative    Vital Signs Last 24 Hrs  T(C): 36.8 (08 Feb 2023 09:00), Max: 36.8 (07 Feb 2023 17:02)  T(F): 98.2 (08 Feb 2023 09:00), Max: 98.3 (07 Feb 2023 17:02)  HR: 87 (08 Feb 2023 09:00) (87 - 98)  BP: 164/75 (08 Feb 2023 09:00) (118/65 - 165/79)  BP(mean): --  RR: 17 (08 Feb 2023 09:00) (17 - 18)  SpO2: 98% (08 Feb 2023 09:00) (95% - 98%)    Parameters below as of 08 Feb 2023 09:00  Patient On (Oxygen Delivery Method): room air      Daily     Daily   I&O's Summary    07 Feb 2023 07:01  -  08 Feb 2023 07:00  --------------------------------------------------------  IN: 695 mL / OUT: 525 mL / NET: 170 mL      CAPILLARY BLOOD GLUCOSE        PHYSICAL EXAM:  GENERAL: NAD  HEAD:  Atraumatic, Normocephalic  EYES: PERRLA, conjunctiva and sclera clear  ENMT: Moist mucous membranes  NECK: Supple, No JVD, Normal thyroid  NERVOUS SYSTEM:  Alert & Awake  CHEST/LUNG: Clear to percussion bilaterally;   HEART: Regular rate and rhythm;   ABDOMEN: Soft, Nontender, Nondistended; Bowel sounds present  EXTREMITIES:  no edema BL LE  SKIN: moist      Labs                          8.9    8.31  )-----------( 186      ( 08 Feb 2023 11:30 )             30.2                                   Radiology and Imaging reviewed.
[facility] NEPHROLOGY SERVICES, [facility]  NEPHROLOGY AND HYPERTENSION  [address]  [address]  [place] [address]  [phone]    MD ARVIND VENCES, MD YADIEL GONZALEZ MD YELENA ROSENBERG, MD BINNY KOSHY, MD CHRISTOPHER CAPUTO, MD BELIA CHA MD          Patient events noted    Intermittent nausea   No sob    MEDICATIONS  (STANDING):  amLODIPine   Tablet 10 milliGRAM(s) Oral daily  calcitriol   Capsule 0.5 MICROGram(s) Oral daily  calcium acetate 2001 milliGRAM(s) Oral three times a day with meals  cholecalciferol 1000 Unit(s) Oral daily  folic acid 1 milliGRAM(s) Oral daily  heparin   Injectable 7500 Unit(s) SubCutaneous every 12 hours  hydrALAZINE 25 milliGRAM(s) Oral four times a day  metoprolol succinate  milliGRAM(s) Oral daily  sodium zirconium cyclosilicate 10 Gram(s) Oral every 8 hours    MEDICATIONS  (PRN):  acetaminophen     Tablet .. 650 milliGRAM(s) Oral every 6 hours PRN Temp greater or equal to 38C (100.4F), Mild Pain (1 - 3)  ondansetron Injectable 4 milliGRAM(s) IV Push every 6 hours PRN Nausea and/or Vomiting      PHYSICAL EXAM:      T(C): 36.6 (01-31-23 @ 15:45), Max: 36.6 (01-31-23 @ 11:22)  HR: 80 (01-31-23 @ 15:45) (80 - 85)  BP: 157/67 (01-31-23 @ 15:45) (157/67 - 190/73)  RR: 12 (01-31-23 @ 15:45) (10 - 13)  SpO2: 97% (01-31-23 @ 15:45) (96% - 99%)  Wt(kg): --  Lungs clear  Heart S1S2  Abd soft NT ND  Extremities:   tr edema                                    8.3    8.32  )-----------( 303      ( 31 Jan 2023 05:41 )             27.4     01-31    135  |  105  |  111<H>  ----------------------------<  83  5.9<H>   |  17<L>  |  15.80<H>    Ca    5.9<LL>      31 Jan 2023 05:41  Phos  8.3     01-30  Mg     1.5     01-31    TPro  7.6  /  Alb  2.8<L>  /  TBili  0.2  /  DBili  x   /  AST  13<L>  /  ALT  12  /  AlkPhos  168<H>  01-30      LIVER FUNCTIONS - ( 30 Jan 2023 20:50 )  Alb: 2.8 g/dL / Pro: 7.6 gm/dL / ALK PHOS: 168 U/L / ALT: 12 U/L / AST: 13 U/L / GGT: x           Creatinine Trend: 15.80<--, 15.90<--, 16.20<--      Assessment   Suspected ESRD and associated electrolyte derangements      Plan:  Medical rx K  IV calcium and Mg  Phoslo  Perm cath tomorrow with initiation of HD    Chay Mora MD
NEPHROLOGY PROGRESS NOTE    CHIEF COMPLAINT:  ESRD    HPI:  She is c/o dysuria today.  She received 1 unit blood yesterday for Hg 6.9.    ROS:  appetite improving;  no nausea    EXAM:  T(F): 98.2 (02-05-23 @ 04:55)  HR: 103 (02-05-23 @ 04:55)  BP: 163/81 (02-05-23 @ 04:55)  RR: 18 (02-05-23 @ 01:42)  SpO2: 95% (02-05-23 @ 04:55)    Conversant, in no apparent distress  Normal respiratory effort, lungs clear bilaterally  Heart RRR with no murmur, no peripheral edema         LABS                             8.2    9.02  )-----------( 192      ( 05 Feb 2023 08:30 )             27.5          02-04    138  |  101  |  32<H>  ----------------------------<  100<H>  3.5   |  28  |  6.72<H>    Ca    7.7<L>      04 Feb 2023 09:20  Phos  3.2     02-04  Mg     1.6     02-04    TPro  6.6  /  Alb  2.5<L>  /  TBili  0.2  /  DBili  x   /  AST  13<L>  /  ALT  10<L>  /  AlkPhos  136<H>  02-04    TSAT 24  Ferritin 356             Assessment   1.  ESRD s/p dialysis initiation  2.  Worsening normocytic anemia  3.  Dysuria    Plan  No dialysis today;  resume Monday  Retacrit 10K units IV at dialysis;  no need for IV iron  Discharge planning to [facility]  Check UA and urine culture          
Patient is a 72y old  Female who presents with a chief complaint of Hypertensive urgency, OC on CKD (07 Feb 2023 13:20)      OVERNIGHT EVENTS:  Patients seen and examined at bedside this morning. No acute events overnight.    REVIEW OF SYSTEMS: denies chest pain/SOB, diaphoresis, no F/C, cough, dizziness, headache, blurry vision, nausea, vomiting, abdominal pain. All others review of systems negative     MEDICATIONS  (STANDING):  amLODIPine   Tablet 10 milliGRAM(s) Oral daily  calcitriol   Capsule 0.5 MICROGram(s) Oral daily  calcium acetate 667 milliGRAM(s) Oral three times a day with meals  cefTRIAXone   IVPB 1000 milliGRAM(s) IV Intermittent every 24 hours  chlorhexidine 2% Cloths 1 Application(s) Topical <User Schedule>  cholecalciferol 1000 Unit(s) Oral daily  epoetin kiley-epbx (RETACRIT) Injectable 49550 Unit(s) IV Push <User Schedule>  folic acid 1 milliGRAM(s) Oral daily  heparin   Injectable 7500 Unit(s) SubCutaneous every 12 hours  hydrALAZINE 25 milliGRAM(s) Oral four times a day  metoprolol succinate  milliGRAM(s) Oral daily    MEDICATIONS  (PRN):  acetaminophen     Tablet .. 650 milliGRAM(s) Oral every 6 hours PRN Temp greater or equal to 38C (100.4F), Mild Pain (1 - 3)  ondansetron Injectable 4 milliGRAM(s) IV Push every 6 hours PRN Nausea and/or Vomiting  sodium chloride 0.9% lock flush 10 milliLiter(s) IV Push every 1 hour PRN Pre/post blood products, medications, blood draw, and to maintain line patency      Allergies    sulfa drugs (Other; Rash)  Sulfur (Unknown)  trimethoprim (Other; Rash)    Intolerances        T(F): 98.1 (02-07-23 @ 11:08), Max: 98.5 (02-06-23 @ 20:20)  HR: 86 (02-07-23 @ 11:08) (79 - 105)  BP: 100/- (02-07-23 @ 11:08) (100/- - 137/84)  RR: 18 (02-07-23 @ 11:08) (16 - 18)  SpO2: 98% (02-07-23 @ 11:08) (96% - 100%)  Wt(kg): --    PHYSICAL EXAM:  GENERAL: NAD  HEAD:  Atraumatic, Normocephalic  EYES: PERRLA, conjunctiva and sclera clear  ENMT: Moist mucous membranes  NECK: Supple, No JVD, Normal thyroid  NERVOUS SYSTEM:  Alert & Awake  CHEST/LUNG: Clear to percussion bilaterally;   HEART: Regular rate and rhythm;   ABDOMEN: Soft, Nontender, Nondistended; Bowel sounds present  EXTREMITIES:  no edema BL LE  SKIN: moist    LABS:                        8.2    9.60  )-----------( 193      ( 06 Feb 2023 10:20 )             27.5     02-06    140  |  102  |  36<H>  ----------------------------<  112<H>  3.3<L>   |  29  |  7.14<H>    Ca    7.3<L>      06 Feb 2023 10:20          Cultures;   CAPILLARY BLOOD GLUCOSE        Lipid panel:           RADIOLOGY & ADDITIONAL TESTS:    Imaging Personally Reviewed:  [x ] YES      Consultant(s) Notes Reviewed:  [x ] YES     Care Discussed with [x ] Consultants [X ] Patient [ ] Family  [x ]    [x ]  Other; RN
Patient is a 72y old  Female who presents with a chief complaint of Hypertensive urgency, OC on CKD (2023 11:29)      OVERNIGHT EVENTS:  Patients seen and examined at bedside this morning. No acute events overnight.    REVIEW OF SYSTEMS: denies chest pain/SOB, diaphoresis, no F/C, cough, dizziness, headache, blurry vision, nausea, vomiting, abdominal pain. All others review of systems negative     MEDICATIONS  (STANDING):  amLODIPine   Tablet 10 milliGRAM(s) Oral daily  calcitriol   Capsule 0.5 MICROGram(s) Oral daily  calcium acetate 667 milliGRAM(s) Oral three times a day with meals  cefTRIAXone   IVPB 1000 milliGRAM(s) IV Intermittent every 24 hours  chlorhexidine 2% Cloths 1 Application(s) Topical <User Schedule>  cholecalciferol 1000 Unit(s) Oral daily  epoetin kiley-epbx (RETACRIT) Injectable 41179 Unit(s) IV Push <User Schedule>  folic acid 1 milliGRAM(s) Oral daily  heparin   Injectable 7500 Unit(s) SubCutaneous every 12 hours  hydrALAZINE 25 milliGRAM(s) Oral four times a day  metoprolol succinate  milliGRAM(s) Oral daily    MEDICATIONS  (PRN):  acetaminophen     Tablet .. 650 milliGRAM(s) Oral every 6 hours PRN Temp greater or equal to 38C (100.4F), Mild Pain (1 - 3)  ondansetron Injectable 4 milliGRAM(s) IV Push every 6 hours PRN Nausea and/or Vomiting  sodium chloride 0.9% lock flush 10 milliLiter(s) IV Push every 1 hour PRN Pre/post blood products, medications, blood draw, and to maintain line patency      Allergies    sulfa drugs (Other; Rash)  Sulfur (Unknown)  trimethoprim (Other; Rash)    Intolerances        T(F): 97.8 (23 @ 14:55), Max: 98.4 (23 @ 18:14)  HR: 99 (23 @ 14:55) (93 - 106)  BP: 120/73 (23 @ 14:55) (114/73 - 144/77)  RR: 18 (23 @ 14:55) (16 - 18)  SpO2: 96% (23 @ 14:55) (95% - 100%)  Wt(kg): --    PHYSICAL EXAM:  GENERAL: NAD  HEAD:  Atraumatic, Normocephalic  EYES: PERRLA, conjunctiva and sclera clear  ENMT: Moist mucous membranes  NECK: Supple, No JVD, Normal thyroid  NERVOUS SYSTEM:  Alert & Awake  CHEST/LUNG: Clear to percussion bilaterally;   HEART: Regular rate and rhythm;   ABDOMEN: Soft, Nontender, Nondistended; Bowel sounds present  EXTREMITIES:  no edema BL LE  SKIN: moist    LABS:                        8.2    9.60  )-----------( 193      ( 2023 10:20 )             27.5     02-06    140  |  102  |  36<H>  ----------------------------<  112<H>  3.3<L>   |  29  |  7.14<H>    Ca    7.3<L>      2023 10:20        Urinalysis Basic - ( 2023 06:00 )    Color: Yellow / Appearance: Slightly Turbid / S.005 / pH: x  Gluc: x / Ketone: Negative  / Bili: Negative / Urobili: Negative mg/dL   Blood: x / Protein: 500 mg/dL / Nitrite: Negative   Leuk Esterase: Moderate / RBC: 11-25 /HPF / WBC >50   Sq Epi: x / Non Sq Epi: Few / Bacteria: Many      Cultures;   CAPILLARY BLOOD GLUCOSE        Lipid panel:           RADIOLOGY & ADDITIONAL TESTS:    Imaging Personally Reviewed:  [x ] YES      Consultant(s) Notes Reviewed:  [x ] YES     Care Discussed with [x ] Consultants [X ] Patient [ ] Family  [x ]    [x ]  Other; RN
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 Pt reports dizziness; described as unsteadiness when she stands, for the past month which seemed worse today thus presented to the ED. Pt also reports SOB on exertions, and HA, associated w/ nausea. Pt reports decrease PO intake due to poor appetite for the past month as well. Pt states significant urinary frequency earlier in the month, was diagnosed w/ UTI and treated w/ 5 day course of Cipro which she completed on Friday. Pt denies fever or chills. Of note pt reports prior to the last 2 days, she does not recall the last time she took any of her medications. Pt denies cp, LE edema, orthopnea or PND, abdominal pain, emesis or diarrhea.    In ED initial /100, rest of vitals stable. Labs H/H 8.5/28.8, K 5.8, BUN/Cr 110/16.2, Calcium of 5.2, see below for rest of labs. EKG wide QRS/incomplete RBBB, QTc 527. CT head neg for acute pathology. Pt given HyperK cocktail including calcium Glu, Lokelma, Renal consulted.  (30 Jan 2023 18:37)    Patient is a 72y old  Female who presents with a chief complaint of Hypertensive urgency, OC on CKD (03 Feb 2023 11:48)      INTERVAL HPI/OVERNIGHT EVENTS: no acute events had dialysis today had tunneled catheter  placed     MEDICATIONS  (STANDING):  amLODIPine   Tablet 10 milliGRAM(s) Oral daily  calcitriol   Capsule 0.5 MICROGram(s) Oral daily  calcium acetate 2001 milliGRAM(s) Oral three times a day with meals  calcium gluconate IVPB 2 Gram(s) IV Intermittent once  chlorhexidine 2% Cloths 1 Application(s) Topical <User Schedule>  cholecalciferol 1000 Unit(s) Oral daily  folic acid 1 milliGRAM(s) Oral daily  heparin   Injectable 7500 Unit(s) SubCutaneous every 12 hours  hydrALAZINE 25 milliGRAM(s) Oral four times a day  metoprolol succinate  milliGRAM(s) Oral daily    MEDICATIONS  (PRN):  acetaminophen     Tablet .. 650 milliGRAM(s) Oral every 6 hours PRN Temp greater or equal to 38C (100.4F), Mild Pain (1 - 3)  ondansetron Injectable 4 milliGRAM(s) IV Push every 6 hours PRN Nausea and/or Vomiting  sodium chloride 0.9% lock flush 10 milliLiter(s) IV Push every 1 hour PRN Pre/post blood products, medications, blood draw, and to maintain line patency      Allergies    sulfa drugs (Other; Rash)  Sulfur (Unknown)  trimethoprim (Other; Rash)    Intolerances        REVIEW OF SYSTEMS:  CONSTITUTIONAL: Fatigue following dialysis   EYES: No eye pain, visual disturbances, or discharge  ENMT:  No difficulty hearing, tinnitus, vertigo; No sinus or throat pain  NECK: No pain or stiffness  BREASTS: No pain, masses, or nipple discharge  RESPIRATORY: No cough, wheezing, chills or hemoptysis; No shortness of breath  CARDIOVASCULAR: No chest pain, palpitations, dizziness, or leg swelling  GASTROINTESTINAL: No abdominal or epigastric pain. No nausea, vomiting, or hematemesis; No diarrhea or constipation. No melena or hematochezia.  GENITOURINARY: No dysuria, frequency, hematuria, or incontinence  NEUROLOGICAL: No headaches, memory loss, loss of strength, numbness, or tremors  SKIN: No itching, burning, rashes, or lesions   LYMPH NODES: No enlarged glands  ENDOCRINE: No heat or cold intolerance; No hair loss  MUSCULOSKELETAL: No joint pain or swelling; No muscle, back, or extremity pain  PSYCHIATRIC: No depression, anxiety, mood swings, or difficulty sleeping  HEME/LYMPH: No easy bruising, or bleeding gums  ALLERGY AND IMMUNOLOGIC: No hives or eczema    Vital Signs Last 24 Hrs  T(C): 37.2 (03 Feb 2023 17:22), Max: 37.2 (03 Feb 2023 17:22)  T(F): 98.9 (03 Feb 2023 17:22), Max: 98.9 (03 Feb 2023 17:22)  HR: 84 (03 Feb 2023 17:22) (55 - 94)  BP: 124/69 (03 Feb 2023 17:22) (124/69 - 177/80)  BP(mean): 98 (03 Feb 2023 14:15) (98 - 107)  RR: 20 (03 Feb 2023 17:22) (18 - 20)  SpO2: 96% (03 Feb 2023 17:22) (96% - 100%)    Parameters below as of 03 Feb 2023 17:22  Patient On (Oxygen Delivery Method): room air        PHYSICAL EXAM:  GENERAL: NAD,   HEAD:  Atraumatic, Normocephalic  EYES: EOMI, PERRLA, conjunctiva and sclera clear  ENMT: No tonsillar erythema, exudates, or enlargement; Moist mucous membranes, Good dentition, No lesions  NECK: Supple, No JVD, Normal thyroid  NERVOUS SYSTEM:  Alert & Oriented X3, Good concentration; Motor Strength 5/5 B/L upper and lower extremities; DTRs 2+ intact and symmetric  CHEST/LUNG: Clear to ascultation  bilaterally; No rales, rhonchi, wheezing, or rubs  right chest wall permacatt  HEART: Regular rate and rhythm; No murmurs, rubs, or gallops  ABDOMEN: Soft, Nontender, Nondistended; Bowel sounds present  EXTREMITIES:  edema present   LYMPH: No lymphadenopathy noted  SKIN: No rashes or lesions    LABS:                        7.1    6.26  )-----------( 241      ( 03 Feb 2023 09:50 )             23.7     02-03    142  |  105  |  45<H>  ----------------------------<  95  4.0   |  27  |  7.87<H>    Ca    7.0<L>      03 Feb 2023 09:50  Phos  4.6     02-03  Mg     1.7     02-02    TPro  6.5  /  Alb  2.5<L>  /  TBili  0.2  /  DBili  x   /  AST  9<L>  /  ALT  7<L>  /  AlkPhos  145<H>  02-02        CAPILLARY BLOOD GLUCOSE          RADIOLOGY & ADDITIONAL TESTS:    Imaging Personally Reviewed:  [ ] YES  [ ] NO    Consultant(s) Notes Reviewed:  [ ] YES  [ ] NO    Care Discussed with Consultants/Other Providers [ ] YES  [ ] NO
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 Pt reports dizziness; described as unsteadiness when she stands, for the past month which seemed worse today thus presented to the ED. Pt also reports SOB on exertions, and HA, associated w/ nausea. Pt reports decrease PO intake due to poor appetite for the past month as well. Pt states significant urinary frequency earlier in the month, was diagnosed w/ UTI and treated w/ 5 day course of Cipro which she completed on Friday. Pt denies fever or chills. Of note pt reports prior to the last 2 days, she does not recall the last time she took any of her medications. Pt denies cp, LE edema, orthopnea or PND, abdominal pain, emesis or diarrhea.    In ED initial /100, rest of vitals stable. Labs H/H 8.5/28.8, K 5.8, BUN/Cr 110/16.2, Calcium of 5.2, see below for rest of labs. EKG wide QRS/incomplete RBBB, QTc 527. CT head neg for acute pathology. Pt given HyperK cocktail including calcium Glu, Lokelma, Renal consulted.  (2023 18:37)    Patient is a 72y old  Female who presents with a chief complaint of Hypertensive urgency, OC on CKD (2023 18:50)      INTERVAL HPI/OVERNIGHT EVENTS: no acute events overnight agrees to dialysis     MEDICATIONS  (STANDING):  amLODIPine   Tablet 10 milliGRAM(s) Oral daily  calcitriol   Capsule 0.5 MICROGram(s) Oral daily  calcium acetate 2001 milliGRAM(s) Oral three times a day with meals  cholecalciferol 1000 Unit(s) Oral daily  folic acid 1 milliGRAM(s) Oral daily  heparin   Injectable 7500 Unit(s) SubCutaneous every 12 hours  hydrALAZINE 25 milliGRAM(s) Oral four times a day  metoprolol succinate  milliGRAM(s) Oral daily    MEDICATIONS  (PRN):  acetaminophen     Tablet .. 650 milliGRAM(s) Oral every 6 hours PRN Temp greater or equal to 38C (100.4F), Mild Pain (1 - 3)  ondansetron Injectable 4 milliGRAM(s) IV Push every 6 hours PRN Nausea and/or Vomiting      Allergies    sulfa drugs (Other; Rash)  Sulfur (Unknown)  trimethoprim (Other; Rash)    Intolerances        REVIEW OF SYSTEMS:  CONSTITUTIONAL: No fever, weight loss, or fatigue  EYES: No eye pain, visual disturbances, or discharge  ENMT:  No difficulty hearing, tinnitus, vertigo; No sinus or throat pain  NECK: No pain or stiffness  BREASTS: No pain, masses, or nipple discharge  RESPIRATORY: No cough, wheezing, chills or hemoptysis; No shortness of breath  CARDIOVASCULAR: No chest pain, palpitations, dizziness, or leg swelling  GASTROINTESTINAL: No abdominal or epigastric pain. No nausea, vomiting, or hematemesis; No diarrhea or constipation. No melena or hematochezia.  GENITOURINARY: No dysuria, frequency, hematuria, or incontinence  NEUROLOGICAL: No headaches, memory loss, loss of strength, numbness, or tremors  SKIN: No itching, burning, rashes, or lesions   LYMPH NODES: No enlarged glands  ENDOCRINE: No heat or cold intolerance; No hair loss  MUSCULOSKELETAL: No joint pain or swelling; No muscle, back, or extremity pain  PSYCHIATRIC: No depression, anxiety, mood swings, or difficulty sleeping  HEME/LYMPH: No easy bruising, or bleeding gums  ALLERGY AND IMMUNOLOGIC: No hives or eczema    Vital Signs Last 24 Hrs  T(C): 36.2 (2023 16:42), Max: 36.9 (2023 16:15)  T(F): 97.2 (2023 16:42), Max: 98.5 (2023 16:15)  HR: 84 (2023 16:42) (80 - 88)  BP: 111/73 (2023 16:42) (110/69 - 164/71)  BP(mean): 89 (2023 22:01) (89 - 89)  RR: 18 (2023 16:42) (11 - 20)  SpO2: 96% (2023 16:42) (94% - 100%)    Parameters below as of 2023 11:07  Patient On (Oxygen Delivery Method): room air        PHYSICAL EXAM:  GENERAL: NAD, morbid obesity HEAD:  Atraumatic, Normocephalic  EYES: EOMI, PERRLA, conjunctiva and sclera clear  ENMT: No tonsillar erythema, exudates, or enlargement; Moist mucous membranes, Good dentition, No lesions  NECK: Supple, No JVD, Normal thyroid  NERVOUS SYSTEM:  Alert & Oriented X3, Good concentration; Motor Strength 5/5 B/L upper and lower extremities; DTRs 2+ intact and symmetric  CHEST/LUNG: Clear to ascultation  bilaterally; No rales, rhonchi, wheezing, or rubs  HEART: Regular rate and rhythm; No murmurs, rubs, or gallops  ABDOMEN: Soft, Nontender, Nondiste edema  LYMPH: No lymphadenopathy noted  SKIN: No rashes or lesions    LABS:                        8.0    8.20  )-----------( 314      ( 2023 19:35 )             27.1         138  |  107  |  119<H>  ----------------------------<  98  5.8<H>   |  17<L>  |  16.60<H>    Ca    6.1<LL>      2023 08:15  Mg     1.5         TPro  7.6  /  Alb  2.8<L>  /  TBili  0.2  /  DBili  x   /  AST  13<L>  /  ALT  12  /  AlkPhos  168<H>      PT/INR - ( 2023 00:05 )   PT: 12.7 sec;   INR: 1.06 ratio         PTT - ( 2023 19:35 )  PTT:28.8 sec  Urinalysis Basic - ( 2023 21:10 )    Color: Yellow / Appearance: Clear / S.010 / pH: x  Gluc: x / Ketone: Negative  / Bili: Negative / Urobili: Negative mg/dL   Blood: x / Protein: 500 mg/dL / Nitrite: Negative   Leuk Esterase: Small / RBC: 0-2 /HPF / WBC 6-10   Sq Epi: x / Non Sq Epi: Occasional / Bacteria: x      CAPILLARY BLOOD GLUCOSE      POCT Blood Glucose.: 126 mg/dL (2023 01:18)  POCT Blood Glucose.: 90 mg/dL (2023 00:33)      RADIOLOGY & ADDITIONAL TESTS:    Imaging Personally Reviewed:  [ ] YES  [ ] NO    Consultant(s) Notes Reviewed:  [ ] YES  [ ] NO    Care Discussed with Consultants/Other Providers [ ] YES  [ ] NO
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 Pt reports dizziness; described as unsteadiness when she stands, for the past month which seemed worse today thus presented to the ED. Pt also reports SOB on exertions, and HA, associated w/ nausea. Pt reports decrease PO intake due to poor appetite for the past month as well. Pt states significant urinary frequency earlier in the month, was diagnosed w/ UTI and treated w/ 5 day course of Cipro which she completed on Friday. Pt denies fever or chills. Of note pt reports prior to the last 2 days, she does not recall the last time she took any of her medications. Pt denies cp, LE edema, orthopnea or PND, abdominal pain, emesis or diarrhea.    In ED initial /100, rest of vitals stable. Labs H/H 8.5/28.8, K 5.8, BUN/Cr 110/16.2, Calcium of 5.2, see below for rest of labs. EKG wide QRS/incomplete RBBB, QTc 527. CT head neg for acute pathology. Pt given HyperK cocktail including calcium Glu, Lokelma, Renal consulted.  (2023 18:37)    Patient is a 72y old  Female who presents with a chief complaint of Hypertensive urgency, OC on CKD (2023 18:50)      INTERVAL HPI/OVERNIGHT EVENTS: no acute events overnight agrees to dialysis     MEDICATIONS  (STANDING):  amLODIPine   Tablet 10 milliGRAM(s) Oral daily  calcitriol   Capsule 0.5 MICROGram(s) Oral daily  calcium acetate 2001 milliGRAM(s) Oral three times a day with meals  cholecalciferol 1000 Unit(s) Oral daily  folic acid 1 milliGRAM(s) Oral daily  heparin   Injectable 7500 Unit(s) SubCutaneous every 12 hours  hydrALAZINE 25 milliGRAM(s) Oral four times a day  metoprolol succinate  milliGRAM(s) Oral daily    MEDICATIONS  (PRN):  acetaminophen     Tablet .. 650 milliGRAM(s) Oral every 6 hours PRN Temp greater or equal to 38C (100.4F), Mild Pain (1 - 3)  ondansetron Injectable 4 milliGRAM(s) IV Push every 6 hours PRN Nausea and/or Vomiting      Allergies    sulfa drugs (Other; Rash)  Sulfur (Unknown)  trimethoprim (Other; Rash)    Intolerances        REVIEW OF SYSTEMS:  CONSTITUTIONAL: No fever, weight loss, or fatigue  EYES: No eye pain, visual disturbances, or discharge  ENMT:  No difficulty hearing, tinnitus, vertigo; No sinus or throat pain  NECK: No pain or stiffness  BREASTS: No pain, masses, or nipple discharge  RESPIRATORY: No cough, wheezing, chills or hemoptysis; No shortness of breath  CARDIOVASCULAR: No chest pain, palpitations, dizziness, or leg swelling  GASTROINTESTINAL: No abdominal or epigastric pain. No nausea, vomiting, or hematemesis; No diarrhea or constipation. No melena or hematochezia.  GENITOURINARY: No dysuria, frequency, hematuria, or incontinence  NEUROLOGICAL: No headaches, memory loss, loss of strength, numbness, or tremors  SKIN: No itching, burning, rashes, or lesions   LYMPH NODES: No enlarged glands  ENDOCRINE: No heat or cold intolerance; No hair loss  MUSCULOSKELETAL: No joint pain or swelling; No muscle, back, or extremity pain  PSYCHIATRIC: No depression, anxiety, mood swings, or difficulty sleeping  HEME/LYMPH: No easy bruising, or bleeding gums  ALLERGY AND IMMUNOLOGIC: No hives or eczema    Vital Signs Last 24 Hrs  T(C): 36.2 (2023 16:42), Max: 36.9 (2023 16:15)  T(F): 97.2 (2023 16:42), Max: 98.5 (2023 16:15)  HR: 84 (2023 16:42) (80 - 88)  BP: 111/73 (2023 16:42) (110/69 - 164/71)  BP(mean): 89 (2023 22:01) (89 - 89)  RR: 18 (2023 16:42) (11 - 20)  SpO2: 96% (2023 16:42) (94% - 100%)    Parameters below as of 2023 11:07  Patient On (Oxygen Delivery Method): room air        PHYSICAL EXAM:  GENERAL: NAD, morbid obesity HEAD:  Atraumatic, Normocephalic  EYES: EOMI, PERRLA, conjunctiva and sclera clear  ENMT: No tonsillar erythema, exudates, or enlargement; Moist mucous membranes, Good dentition, No lesions  NECK: Supple, No JVD, Normal thyroid  NERVOUS SYSTEM:  Alert & Oriented X3, Good concentration; Motor Strength 5/5 B/L upper and lower extremities; DTRs 2+ intact and symmetric  CHEST/LUNG: Clear to ascultation  bilaterally; No rales, rhonchi, wheezing, or rubs  HEART: Regular rate and rhythm; No murmurs, rubs, or gallops  ABDOMEN: Soft, Nontender, Nondiste edema  LYMPH: No lymphadenopathy noted  SKIN: No rashes or lesions    LABS:                        8.0    8.20  )-----------( 314      ( 2023 19:35 )             27.1         138  |  107  |  119<H>  ----------------------------<  98  5.8<H>   |  17<L>  |  16.60<H>    Ca    6.1<LL>      2023 08:15  Mg     1.5         TPro  7.6  /  Alb  2.8<L>  /  TBili  0.2  /  DBili  x   /  AST  13<L>  /  ALT  12  /  AlkPhos  168<H>      PT/INR - ( 2023 00:05 )   PT: 12.7 sec;   INR: 1.06 ratio         PTT - ( 2023 19:35 )  PTT:28.8 sec  Urinalysis Basic - ( 2023 21:10 )    Color: Yellow / Appearance: Clear / S.010 / pH: x  Gluc: x / Ketone: Negative  / Bili: Negative / Urobili: Negative mg/dL   Blood: x / Protein: 500 mg/dL / Nitrite: Negative   Leuk Esterase: Small / RBC: 0-2 /HPF / WBC 6-10   Sq Epi: x / Non Sq Epi: Occasional / Bacteria: x      CAPILLARY BLOOD GLUCOSE      POCT Blood Glucose.: 126 mg/dL (2023 01:18)  POCT Blood Glucose.: 90 mg/dL (2023 00:33)      RADIOLOGY & ADDITIONAL TESTS:    Imaging Personally Reviewed:  [ ] YES  [ ] NO    Consultant(s) Notes Reviewed:  [ ] YES  [ ] NO    Care Discussed with Consultants/Other Providers [ ] YES  [ ] NO
Patient is a 72y old  Female who presents with a chief complaint of Hypertensive urgency, OC on CKD (04 Feb 2023 16:12)      OVERNIGHT EVENTS:  Patients seen and examined at bedside this morning. No acute events overnight.    REVIEW OF SYSTEMS: denies chest pain/SOB, diaphoresis, no F/C, cough, dizziness, headache, blurry vision, nausea, vomiting, abdominal pain. All others review of systems negative     MEDICATIONS  (STANDING):  amLODIPine   Tablet 10 milliGRAM(s) Oral daily  calcitriol   Capsule 0.5 MICROGram(s) Oral daily  calcium acetate 667 milliGRAM(s) Oral three times a day with meals  chlorhexidine 2% Cloths 1 Application(s) Topical <User Schedule>  cholecalciferol 1000 Unit(s) Oral daily  folic acid 1 milliGRAM(s) Oral daily  heparin   Injectable 7500 Unit(s) SubCutaneous every 12 hours  hydrALAZINE 25 milliGRAM(s) Oral four times a day  metoprolol succinate  milliGRAM(s) Oral daily    MEDICATIONS  (PRN):  acetaminophen     Tablet .. 650 milliGRAM(s) Oral every 6 hours PRN Temp greater or equal to 38C (100.4F), Mild Pain (1 - 3)  ondansetron Injectable 4 milliGRAM(s) IV Push every 6 hours PRN Nausea and/or Vomiting  sodium chloride 0.9% lock flush 10 milliLiter(s) IV Push every 1 hour PRN Pre/post blood products, medications, blood draw, and to maintain line patency      Allergies    sulfa drugs (Other; Rash)  Sulfur (Unknown)  trimethoprim (Other; Rash)    Intolerances        T(F): 98.2 (02-05-23 @ 04:55), Max: 98.8 (02-04-23 @ 22:50)  HR: 103 (02-05-23 @ 04:55) (85 - 103)  BP: 163/81 (02-05-23 @ 04:55) (121/73 - 163/81)  RR: 18 (02-05-23 @ 01:42) (17 - 20)  SpO2: 95% (02-05-23 @ 04:55) (94% - 100%)  Wt(kg): --    PHYSICAL EXAM:  GENERAL: NAD  HEAD:  Atraumatic, Normocephalic  EYES: PERRLA, conjunctiva and sclera clear  ENMT: Moist mucous membranes  NECK: Supple, No JVD, Normal thyroid  NERVOUS SYSTEM:  Alert & Awake  CHEST/LUNG: Clear to percussion bilaterally;   HEART: Regular rate and rhythm;   ABDOMEN: Soft, Nontender, Nondistended; Bowel sounds present  EXTREMITIES:  no edema BL LE  SKIN: moist    LABS:                        8.2    9.02  )-----------( 192      ( 05 Feb 2023 08:30 )             27.5     02-04    138  |  101  |  32<H>  ----------------------------<  100<H>  3.5   |  28  |  6.72<H>    Ca    7.7<L>      04 Feb 2023 09:20  Phos  3.2     02-04  Mg     1.6     02-04    TPro  6.6  /  Alb  2.5<L>  /  TBili  0.2  /  DBili  x   /  AST  13<L>  /  ALT  10<L>  /  AlkPhos  136<H>  02-04        Cultures;   CAPILLARY BLOOD GLUCOSE        Lipid panel:           RADIOLOGY & ADDITIONAL TESTS:    Imaging Personally Reviewed:  [x ] YES      Consultant(s) Notes Reviewed:  [x ] YES     Care Discussed with [x ] Consultants [X ] Patient [ ] Family  [x ]    [x ]  Other; RN

## 2023-02-08 NOTE — PHYSICAL THERAPY INITIAL EVALUATION ADULT - PHYSICAL ASSIST/NONPHYSICAL ASSIST, REHAB EVAL
OFFICE VISIT    Patient: Iza Davidson   : 1959 MRN: [id number]    SUBJECTIVE:  Chief Complaint   Patient presents with   • Office Visit   • Follow-up   • Arthritis   • Pain     Pt c/o pain in Rt hip and leg   • Eye Problem     Pt c/o dark circles around eyes       Patient has given consent to record this visit for documentation in their clinical record.    Patient ID: The patient is a 63 year old female who presents for follow-up visit.     HPI     Historian: Self, accompanied by a child.    CONCETTA on CPAP: Using CPAP every night. Underwent sleep study test in the past. Got a new machine recently.    Moderate persistent asthma without complication/Abscess of lower lobe of left lung with pneumonia/Pulmonary emphysema, unspecified emphysema type/Chronic respiratory failure with hypoxia: On Symbicort and Albuterol inhaler. Has appointment for CT scan in May. She was inhaling all the cool air and next day she had cough during hospitalization.    Morbid obesity with BMI of 60.0-69.9, adult: States that she lost weight.    Controlled type 2 diabetes mellitus with hyperglycemia, without long-term current use of insulin: Has a history of diabetes mellitus.  Previous Hba1c was normal. Denies eating sugar.    Chronic right hip pain: Complains of chronic pain the right hip from years. Is wheelchair bound.     Periorbital hyperpigmentation: Reports discoloration in both eyes like a rash. Denies using eye drops or keeping any eye shadow on the eye. Denies trouble with vision.    Pain of left thumb: Has pain in the left thumb.    Primary osteoarthritis of both knees: Complains bilateral knee pain. Left knee pain is worse. Inquiries about doing kneel down.    Chronic pain of both shoulders: Complains of pain in both shoulders, but left shoulder pain is worse.     Benign hypertension with CKD (chronic kidney disease) stage III: Has high blood pressure. Blood pressure measured by MA is 107/66 mmHg. Denies eating  salt.    Diabetes mellitus due to underlying condition with stage 3 chronic kidney disease, without long-term current use of insulin, unspecified whether stage 3a or 3b CKD: Visited nephrologist when she was in the hospital. States that she does not have any kidney problem. Drinks adequate water, 64 ounces a day.  Inquires about whether drinking plenty of  water  causes fluid retention.      Additional comments:  Health maintenance:  Breast cancer screening: Up-to-date. Last mammogram was done in November which was normal.  Screening labs: Due for labs. Previous labs reveal total cholesterol of 127 mg/dl, triglycerides was 119 mg/dl, and LDL was 58 mg/dl.  Immunization: Due for COVID-19 booster.   Fall history: No recent fall history.    PAST MEDICAL HISTORY:   Past Medical History:   Diagnosis Date   • Acute exacerbation of CHF (congestive heart failure) (CMS/[facility]) 2019   • Acute URI 2020   • Acute UTI 2020   • Arthritis    • Atrial fibrillation (CMS/[facility])    • Bilateral lower extremity edema 2019   • Congestive cardiac failure (CMS/[facility])    • Cough 2020   • Diabetes mellitus (CMS/[facility])    • Essential (primary) hypertension    • Hematuria 2020   • Moderate persistent asthma with exacerbation 10/30/2019   • RAD (reactive airway disease)    • Sarcoidosis        PAST SURGICAL HISTORY:   Past Surgical History:   Procedure Laterality Date   •  section, low transverse     • Kidney stone surgery     • Tubal ligation         FAMILY HISTORY:   Family History   Problem Relation Age of Onset   • Cancer, Breast Mother 82   • Cancer, Ovarian Neg Hx    • Cancer, Colon Neg Hx    • Cancer, Endometrial Neg Hx        SOCIAL HISTORY:   Social History     Tobacco Use   • Smoking status: Never   • Smokeless tobacco: Never   Vaping Use   • Vaping Use: never used   Substance Use Topics   • Alcohol use: Not Currently     Comment: Social   • Drug use: No       Drug Use:    No                 ALLERGIES:   ALLERGIES:  No Known Allergies    MEDICATIONS:   Current Outpatient Medications   Medication Sig Dispense Refill   • magnesium oxide (MAG-OX) 400 MG tablet TAKE 1 TABLET BY MOUTH EVERY DAY FOR LOW MAGNESIUM 30 tablet 10   • metoPROLOL succinate (TOPROL-XL) 100 MG 24 hr tablet TAKE 1 TABLET BY MOUTH EVERY DAY FOR HEART 30 tablet 10   • digoxin (LANOXIN) 0.125 MG tablet TAKE 1 TABLET BY MOUTH DAILY FOR HEART RHYTHM 30 tablet 10   • losartan (COZAAR) 25 MG tablet TAKE 1 TABLET BY MOUTH DAILY FOR BLOOD PRESSURE AND HEART 30 tablet 10   • Symbicort 160-4.5 MCG/ACT inhaler INHALE TWO (2) PUFFS BY MOUTH AND INTO THE LUNGS TWICE DAILY 10.2 g 10   • spironolactone (ALDACTONE) 25 MG tablet TAKE 1 TABLET BY MOUTH DAILY FOR HEART 30 tablet 10   • oxybutynin (DITROPAN-XL) 5 MG 24 hr tablet Take 1 tablet by mouth nightly.     • tamsulosin (FLOMAX) 0.4 MG Cap TAKE 1 CAPSULE BY MOUTH DAILY AFTER A MEAL 30 capsule 10   • Rybelsus 14 MG Tab TAKE 1 TABLET BY MOUTH DAILY. TAKE ON AN EMPTY STOMACH 30 MINUTES BEFORE ALL FOOD, MEDICATIONS, AND DRINKS. TAKE WITH < 4OZ WATER *DOSE INCREASE* 30 tablet 10   • bumetanide (BUMEX) 1 MG tablet TAKE 1 TABLET BY MOUTH EVERY MORNING AND 1 TABLET IN THE AFTERNOON AS NEEDED IF SWELLING/WORSENING FOR SHORTNESS OF BREATH 60 tablet 10   • atorvastatin (LIPITOR) 40 MG tablet Take 1 tablet by mouth daily. 90 tablet 3   • HYDROcodone-acetaminophen (NORCO)  MG per tablet Take 1 tablet by mouth every 4 hours as needed for Pain. 24 tablet 0   • efinaconazole 10 % topical solution Apply topically daily. For toenail fungal infection. 4 mL 11   • oxybutynin (DITROPAN-XL) 5 MG 24 hr tablet Take 1 tablet by mouth nightly. 90 tablet 3   • ipratropium-albuterol (DUONEB) 0.5-2.5 (3) MG/3ML nebulizer solution Use 1 vial in the nebulizer every 4-6 hours as needed for shortness of breath or wheezing 360 mL 1   • Xarelto 20 MG Tab TAKE 1 TABLET BY MOUTH DAILY (WITH BREAKFAST). BLOOD THINNER TO  PREVENT STROKE. MUST BE TAKEN WITH FOOD. 30 tablet 10   • albuterol 108 (90 Base) MCG/ACT inhaler INHALE TWO (2) PUFFS BY MOUTH EVERY 4-6 HOURS AS NEEDED 8.5 g 10   • Multiple Vitamins-Minerals (ALIVE MULTI-VITAMIN PO) Take 1 tablet by mouth daily.     • OneTouch Delica Lancets 30G Misc Inject 1 Units into the skin daily. Check blood sugar up to 2 times daily as directed. 100 each 1   • Menthol, Topical Analgesic, 4 % Gel Apply topically to knees 1-2 times daily as needed for arthritis     • blood glucose test strip Test blood sugar up to 2 times daily as directed. Diagnosis: type 2 diabetes, meter one touch veriod 100 strip 5   • Blood Pressure Monitoring (BLOOD PRESSURE MONITOR/L CUFF) Misc Indications: High Blood Pressure Disorder Use LARGE upper arm cuff to check blood pressure daily. 1 each 0     No current facility-administered medications for this visit.         Review of Systems     Constitutional: As per HPI.  Eyes: As per HPI.  Respiratory: As per HPI.  Musculoskeletal: As per HPI.  Allergic: As per HPI.     Physical Exam     Constitutional: In no acute distress. Well-developed.  Eyes: Periorbital discoloration of lash is noticed.  Skin: Skin moisture and turgor normal.  Psychiatric: Oriented to time, place, and person. The mood was normal. The effect was normal. The memory was unimpaired.  Pulmonary: Breath sounds clear to auscultation bilaterally, but no respiratory distress and normal respiratory rate and effort.  Cardiovascular: Normal rate, regular rhythm, normal S1, normal S2 and edema was not present in the lower extremities.    Assessment   Problem List Items Addressed This Visit     CONCETTA on CPAP - Primary    Morbid obesity with BMI of 60.0-69.9, adult (CMS/[facility])    Relevant Orders    LIPID PANEL WITH REFLEX    GLYCOHEMOGLOBIN    Primary osteoarthritis of both knees    Relevant Orders    SERVICE TO ORTHOPEDICS    Moderate persistent asthma without complication    Controlled type 2 diabetes mellitus  (CMS/[facility])    Relevant Orders    CBC WITH DIFFERENTIAL    LIPID PANEL WITH REFLEX    COMPREHENSIVE METABOLIC PANEL    GLYCOHEMOGLOBIN    VITAMIN D -25 HYDROXY    URINALYSIS & REFLEX MICROSCOPY    MICROALBUMIN URINE RANDOM    Chronic right hip pain    Relevant Orders    XR HIP 2 VIEWS RIGHT    SERVICE TO ORTHOPEDICS    Chronic respiratory failure with hypoxia (CMS/HCC)    Abscess of lower lobe of left lung with pneumonia (CMS/HCC)    Pulmonary emphysema (CMS/HCC)   Other Visit Diagnoses     Periorbital hyperpigmentation        Relevant Orders    SERVICE TO DERMATOLOGY    Pain of left thumb        Relevant Orders    SERVICE TO ORTHOPEDICS    Chronic pain of both shoulders        Relevant Orders    SERVICE TO ORTHOPEDICS    Benign hypertension with CKD (chronic kidney disease) stage III (CMS/HCC)        Relevant Orders    SERVICE TO NEPHROLOGY    US KIDNEY BILATERAL    Diabetes mellitus due to underlying condition with stage 3 chronic kidney disease, without long-term current use of insulin, unspecified whether stage 3a or 3b CKD (CMS/[facility])        Relevant Orders    SERVICE TO NEPHROLOGY    US KIDNEY BILATERAL    CBC WITH DIFFERENTIAL    LIPID PANEL WITH REFLEX    COMPREHENSIVE METABOLIC PANEL    GLYCOHEMOGLOBIN    VITAMIN D -25 HYDROXY    URINALYSIS & REFLEX MICROSCOPY    MICROALBUMIN URINE RANDOM          Orders Placed This Encounter   • XR HIP 2 VIEWS RIGHT   • US KIDNEY BILATERAL   • CBC with Automated Differential   • Lipid Panel With Reflex   • Comprehensive Metabolic Panel   • Glycohemoglobin   • Vitamin D -25 Hydroxy   • Urinalysis & Reflex Microscopy   • Microalbumin Urine Random   • SERVICE TO DERMATOLOGY   • SERVICE TO ORTHOPEDICS   • SERVICE TO NEPHROLOGY       PLAN:    CONCETTA on CPAP:  Stable.  Continue CPAP use nightly at least four hours per night.  Refer to the pulmonary consult.    Moderate persistent asthma without complication:  Continue Symbicort 160-4.5 mcg, and Albuterol HFA as directed for  therapy.  Refer to the Pulmonary Consult.    Pulmonary emphysema, unspecified emphysema type:  Stable.  Refer to the Pulmonary Consult for evaluation.  Continue Symbicort 160-4.5 mcg 2 puffs, 12 with Albuterol HFA 2 puff, 2 for 6 hours for Cough and shortness of breath.    Abscess of lower lobe of left lung with pneumonia/Chronic respiratory failure with hypoxia:  With a history of abscess of the lower lobe of the left lung with pneumonia.  Improved and resolved.  Refer to the pulmonary consult afebrile office.  Oxygen saturation was 98% on room air.    Morbid obesity with BMI of 60.0-69.9, adult:  Ordered lipid panel reflex, glycohemoglobin; Future.  Continue an exercise regimen or water aerobics for therapy at least 30 minutes, three times weekly.  The patient current weight is 222 pounds prior her weight on 9/22 was 230 pounds, she has lost 8 pounds that is definitely an improvement.     Controlled type 2 diabetes mellitus with hyperglycemia, without long-term current use of insulin:  Ordered CBC with differential, lipid panel with reflex, CMP, glycohemoglobin, vitamin D -25 hydroxy, and micro albumin urine random.  Ordered urinalysis & reflex microscopy.    Periorbital hyperpigmentation:  Discussed on discoloration and allergic reactions.  Referral service to dermatology consult for evaluation.  Negative evidence of any change in any soap or detergent recently.    Chronic pain of both shoulders, primary osteoarthritis of both knees, chronic right hip pain, and pain on left thumb:  Obtain XR hip 2 views right for evaluation; Future.  Use Tylenol extra strength over the counter as directed for pain.  The patient was counseled to avoid any NSAIDS for Renal disease.  Referral service to orthopedics surgery consult for evaluation.    Benign hypertension with CKD (chronic kidney disease) stage III:  Ordered US kidney bilateral; Future.    Referral service to nephrology consult.  Continue current  management.    Diabetes mellitus due to underlying condition with stage 3 chronic kidney disease, without long-term current use of insulin, unspecified whether stage 3a or 3b CKD:  Stable.  HbA1c was 6.1% on 2/6/2023.  Ordered US kidney bilateral; Future.  Ordered CBC with differential, lipid panel with reflex, CMP, glycohemoglobin, vitamin  D -25 hydroxy, micro albumin urine random, and urinalysis & reflex microscopy.  Continue current management.  Referral service to nephrology.    Health maintenance:  Reviewed and discussed previous labs.  The patient was encouraged to drink 48-64 ounces of water daily.   Recommended COVID-19 booster.  Recommended walking.  Educated on menopause.  Discussed on mammogram and breast cancer.    Patient should return to clinic in three months.    Refer to orders.  Medical compliance with plan discussed and risks of non-compliance reviewed.  Patient education completed on disease process, etiology & prognosis.  Proper usage and side effects of medications reviewed & discussed.  Return to clinic as clinically indicated as discussed with the patient who verbalized understanding of the plan and is in agreement with the plan.    I,  Dr. Kristel Brown, have created a visit summary document based on the audio recording between Dr. Sadie Alonzo MD and this patient for the physician to review, edit as needed, and authenticate.    Creation Date: 2/8/2023     verbal cues/supervision/1 person assist

## 2023-02-08 NOTE — DISCHARGE NOTE NURSING/CASE MANAGEMENT/SOCIAL WORK - PATIENT PORTAL LINK FT
You can access the FollowMyHealth Patient Portal offered by [facility] by registering at the following website: [email or web address]. By joining [employer]’s FollowMyHealth portal, you will also be able to view your health information using other applications (apps) compatible with our system.

## 2023-02-08 NOTE — DISCHARGE NOTE NURSING/CASE MANAGEMENT/SOCIAL WORK - NSDCVIVACCINE_GEN_ALL_CORE_FT
COVID-19, mRNA, LNP-S, PF, 100 mcg/ 0.5 mL dose (Moderna); 30-Apr-2022 16:48; Sailaja Godinez (RN); [employer] [id number] (Exp. Date: 28-May-2022); IntraMuscular; Deltoid Left.; 0.25 milliLiter(s);   influenza, injectable, quadrivalent, preservative free; 01-Feb-2018 15:10; Mey Hastings (RN); [employer]; [id number]; IntraMuscular; Deltoid Left.; 0.5 milliLiter(s); VIS (VIS Published: 07-Aug-2015, VIS Presented: 01-Feb-2018);

## 2023-02-08 NOTE — PROGRESS NOTE ADULT - NUTRITIONAL ASSESSMENT
This patient has been assessed with a concern for Malnutrition and has been determined to have a diagnosis/diagnoses of Morbid obesity (BMI > 40).    This patient is being managed with:   Diet DASH/TLC-  Sodium & Cholesterol Restricted  For patients receiving Renal Replacement - No Protein Restr No Conc K No Conc Phos Low Sodium (RENAL)  Entered: Jan 30 2023  5:40PM    The following pending diet order is being considered for treatment of Morbid obesity (BMI > 40):  Diet Renal Restrictions-  For patients receiving Renal Replacement - No Protein Restr No Conc K No Conc Phos Low Sodium  DASH/TLC {Sodium & Cholesterol Restricted}  Supplement Feeding Modality:  Oral  Nepro Cans or Servings Per Day:  1       Frequency:  Daily  Entered: Feb 4 2023 11:30AM  
This patient has been assessed with a concern for Malnutrition and has been determined to have a diagnosis/diagnoses of Morbid obesity (BMI > 40).    This patient is being managed with:   Diet Renal Restrictions-  For patients receiving Renal Replacement - No Protein Restr No Conc K No Conc Phos Low Sodium  DASH/TLC {Sodium & Cholesterol Restricted}  Supplement Feeding Modality:  Oral  Nepro Cans or Servings Per Day:  1       Frequency:  Daily  Entered: Feb 4 2023 11:30AM    

## 2023-02-08 NOTE — DISCHARGE NOTE NURSING/CASE MANAGEMENT/SOCIAL WORK - NSDCFUADDAPPT_GEN_ALL_CORE_FT
It is important to see your primary physician as well as other necessary consultants within the next week to perform a comprehensive medical review.  Call their offices for an appointment as soon as you leave the hospital.  If you do not have a primary physician or cant reach him/her, contact the [facility] Physician Referral Service at [phone].  Your medical issues appear to be stable at this time, but if your symptoms recur or worsen, contact your physicians and/or return to the hospital if necessary.  If you encounter any issues or questions with your medication, call your physicians before stopping the medication.

## 2023-02-08 NOTE — DISCHARGE NOTE NURSING/CASE MANAGEMENT/SOCIAL WORK - NSDCDMETYPESERV_GEN_ALL_CORE_FT
petting, snuggling, being kissed or licked, and sharing food. If you must care for your pet or be around animals while you are sick, wash your hands before and after you interact with pets and wear a facemask. Call ahead before visiting your doctor  If you have a medical appointment, call the healthcare provider and tell them that you have or may have COVID-19. This will help the healthcare providers office take steps to keep other people from getting infected or exposed. Wear a facemask  You should wear a facemask when you are around other people (e.g., sharing a room or vehicle) or pets and before you enter a healthcare providers office. If you are not able to wear a facemask (for example, because it causes trouble breathing), then people who live with you should not stay in the same room with you, or they should wear a facemask if they enter your room. Cover your coughs and sneezes  Cover your mouth and nose with a tissue when you cough or sneeze. Throw used tissues in a lined trash can. Immediately wash your hands with soap and water for at least 20 seconds or, if soap and water are not available, clean your hands with an alcohol-based hand  that contains at least 60% alcohol. Clean your hands often  Wash your hands often with soap and water for at least 20 seconds, especially after blowing your nose, coughing, or sneezing; going to the bathroom; and before eating or preparing food. If soap and water are not readily available, use an alcohol-based hand  with at least 60% alcohol, covering all surfaces of your hands and rubbing them together until they feel dry. Soap and water are the best option if hands are visibly dirty. Avoid touching your eyes, nose, and mouth with unwashed hands. Avoid sharing personal household items  You should not share dishes, drinking glasses, cups, eating utensils, towels, or bedding with other people or pets in your home.  After using these items, they should Rolling walker

## 2023-02-08 NOTE — PROGRESS NOTE ADULT - REASON FOR ADMISSION
Hypertensive urgency, OC on CKD

## 2023-02-08 NOTE — PROGRESS NOTE ADULT - PROVIDER SPECIALTY LIST ADULT
Hospitalist
Nephrology
Hospitalist

## 2023-02-08 NOTE — PROGRESS NOTE ADULT - ASSESSMENT
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    # Severe renal failure  # Hyperkalemia  - Dr Mora following  - s/p HyperK cocktail, will rpt BMP  - tele moniting  - Pt to start HD  - avoid nephrotoxins  - monitor I/Os    # Hypertensive Urgency  - due to noncompliance  - resume Metoprolol and Amlodipine hydralazine added improved   - tele monitoring  - cont to monitor BP    # Anemia of Chronic dx  - H/H stable  - c/w folic acid    # Hypocalcemia  - c/w Calcitriol and Vit d calcium glucanate supplementation     #Lung Mass  - seen in April, pt reports she never followed up because she was told she could not make an appt in [place] if she lived in [place], also same reason she never followed up w/ nephrology  -will do follow up non contrast ct   - would refer to another outpt Physician    # DVT ppx - HSQ  
 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    # Severe renal failure  # Hyperkalemia  - Dr Mora following  - s/p HyperK cocktail, will rpt BMP  - tele moniting  - Pt to start HD  - avoid nephrotoxins  - monitor I/Os    # Hypertensive Urgency  - due to noncompliance  - resume Metoprolol and Amlodipine hydralazine added improved   - tele monitoring  - cont to monitor BP    # Anemia of Chronic dx  - H/H stable  - c/w folic acid    # Hypocalcemia  - c/w Calcitriol and Vit d calcium glucanate supplementation     #Lung Mass  - seen in April, pt reports she never followed up because she was told she could not make an appt in [place] if she lived in [place], also same reason she never followed up w/ nephrology  -will do follow up non contrast ct   - would refer to another outpt Physician    # DVT ppx - HSQ  
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    ESRD  [facility]   -would benefit from rehab however would rather go home   -Discharge planning to [facility] [place]    # Hyperkalemia  - Dr Mora following  - s/p HyperK cocktail,   - HD  - avoid nephrotoxins  - monitor I/Os    # Hypertensive Urgency  - due to noncompliance  -  Metoprolol and Amlodipine hydralazine  - tele monitoring  - cont to monitor BP    # acute UTI- started on iv ceftx, follow ucx  Anemia of CKD  - c/w folic acid  -transfuse PRBC X 1, CBC after    # Hypocalcemia  - c/w Calcitriol and Vit d calcium gluconate supplementation    #Lung Mass  - seen in April, pt reports she never followed up because she was told she could not make an appt in [place] if she lived in [place], also same reason she never followed up w/ nephrology  -will do follow up non contrast ct   - would refer to another outpt Physician  -call pulm/heme onc after CT     Morbid obesity (BMI > 40).  This patient is being managed with:   Diet DASH/TLC-  Sodium & Cholesterol Restricted  For patients receiving Renal Replacement - No Protein Restr No Conc K No Conc Phos Low Sodium (RENAL)    # DVT ppx - HSQ  
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    ESRD  [facility]   -would benefit from rehab however would rather go home   -Discharge planning to [facility] [place]    # Hyperkalemia  - Dr Mora following  - s/p HyperK cocktail,   - HD  - avoid nephrotoxins  - monitor I/Os    # Hypertensive Urgency  - due to noncompliance  -  Metoprolol and Amlodipine hydralazine  - tele monitoring  - cont to monitor BP    # acute UTI- started on iv ceftx, follow ucx  Anemia of CKD  - c/w folic acid  -transfuse PRBC X 1, CBC after    # Hypocalcemia  - c/w Calcitriol and Vit d calcium gluconate supplementation    #Lung Mass  - seen in April, pt reports she never followed up because she was told she could not make an appt in [place] if she lived in [place], also same reason she never followed up w/ nephrology  -will do follow up non contrast ct   - would refer to another outpt Physician  -call pulm/heme onc after CT     Morbid obesity (BMI > 40).  This patient is being managed with:   Diet DASH/TLC-  Sodium & Cholesterol Restricted  For patients receiving Renal Replacement - No Protein Restr No Conc K No Conc Phos Low Sodium (RENAL)    # DVT ppx - HSQ  
 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    # Severe renal failure  # Hyperkalemia  - Dr Mora following  - s/p HyperK cocktail, will rpt BMP  - tele moniting  - Pt to start HD  - avoid nephrotoxins  - monitor I/Os    # Hypertensive Urgency  - due to noncompliance  - resume Metoprolol and Amlodipine hydralazine added improved   - tele monitoring  - cont to monitor BP    # Anemia of Chronic dx  - H/H stable  - c/w folic acid    # Hypocalcemia  - c/w Calcitriol and Vit d calcium glucanate supplementation     #Lung Mass  - seen in April, pt reports she never followed up because she was told she could not make an appt in [place] if she lived in [place], also same reason she never followed up w/ nephrology  -will do follow up non contrast ct   - would refer to another outpt Physician    # DVT ppx - HSQ  
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    ESRD  -now with dialysis will need dialysis center   -would benefit from rehab however would rather go home   -Discharge planning to [facility]    # Hyperkalemia  - Dr Mora following  - s/p HyperK cocktail,   - HD  - avoid nephrotoxins    # Hypertensive Urgency  - due to noncompliance  -  Metoprolol and Amlodipine hydralazine  - cont to monitor BP    # acute UTI- started on iv ceftx, follow ucx  Anemia of CKD  - c/w folic acid  -s/p PRBC X 1,     # Hypocalcemia  - c/w Calcitriol and Vit d calcium gluconate supplementation    #Lung Mass  -4/22 CT chest; 1. Enlarging semisolid opacity at the right lung apex, which may represent adenocarcinoma. Correlate with smoking history. 2. No evidence of pulmonary embolism.  3. No evidence of acute inflammatory or obstructive process in the abdomen and pelvis.  - seen in April, pt reports she never followed up because she was told she could not make an appt in [place] if she lived in [place], also same reason she never followed up w/ nephrology  - would refer to another outpt Physician for CT chest follow up    Morbid obesity (BMI > 40).  This patient is being managed with:   Diet DASH/TLC-  Sodium & Cholesterol Restricted  For patients receiving Renal Replacement - No Protein Restr No Conc K No Conc Phos Low Sodium (RENAL)  
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    ESRD  [facility]   -would benefit from rehab however would rather go home   -Discharge planning to [facility]    # Hyperkalemia  - Dr Mora following  - s/p HyperK cocktail,   - HD  - avoid nephrotoxins  - monitor I/Os    # Hypertensive Urgency  - due to noncompliance  -  Metoprolol and Amlodipine hydralazine  - tele monitoring  - cont to monitor BP    # Anemia of CKD  - c/w folic acid  -transfuse PRBC X 1, CBC after    # Hypocalcemia  - c/w Calcitriol and Vit d calcium gluconate supplementation    #Lung Mass  - seen in April, pt reports she never followed up because she was told she could not make an appt in [place] if she lived in [place], also same reason she never followed up w/ nephrology  -will do follow up non contrast ct   - would refer to another outpt Physician  -call pulm/heme onc after CT     Morbid obesity (BMI > 40).  This patient is being managed with:   Diet DASH/TLC-  Sodium & Cholesterol Restricted  For patients receiving Renal Replacement - No Protein Restr No Conc K No Conc Phos Low Sodium (RENAL)    # DVT ppx - HSQ  
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    [facility]  [facility]   -would benefit from rehab however would rather go home   -Discharge planning to [facility]    # Hyperkalemia  - Dr Mora following  - s/p HyperK cocktail,   - HD  - avoid nephrotoxins  - monitor I/Os    # Hypertensive Urgency  - due to noncompliance  -  Metoprolol and Amlodipine hydralazine  - tele monitoring  - cont to monitor BP    # Anemia of CKD  - c/w folic acid  -transfuse PRBC X 1, CBC after    # Hypocalcemia  - c/w Calcitriol and Vit d calcium gluconate supplementation    #Lung Mass  - seen in April, pt reports she never followed up because she was told she could not make an appt in [place] if she lived in [place], also same reason she never followed up w/ nephrology  -will do follow up non contrast ct   - would refer to another outpt Physician  -call pulm/heme onc after CT     Morbid obesity (BMI > 40).  This patient is being managed with:   Diet DASH/TLC-  Sodium & Cholesterol Restricted  For patients receiving Renal Replacement - No Protein Restr No Conc K No Conc Phos Low Sodium (RENAL)    # DVT ppx - HSQ  
 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    # Severe renal failure  -now with dialyis will neeed dialysis center   -would benefit from rehab however would rather go home     # Hyperkalemia  - Dr Mora following  - s/p HyperK cocktail,   - tele moniting  - HD  - avoid nephrotoxins  - monitor I/Os    # Hypertensive Urgency  - due to noncompliance  -  Metoprolol and Amlodipine hydralazine  - added improved   - tele monitoring  - cont to monitor BP    # Anemia of Chronic dx  - H/H stable  - c/w folic acid    # Hypocalcemia  - c/w Calcitriol and Vit d calcium gluconate supplementation  -ionized calcium in AM   -pth?    #Lung Mass  - seen in April, pt reports she never followed up because she was told she could not make an appt in [place] if she lived in [place], also same reason she never followed up w/ nephrology  -will do follow up non contrast ct   - would refer to another ou tpt Physician  -call pulm/heme onc after CT     # DVT ppx - HSQ

## 2023-02-09 VITALS
OXYGEN SATURATION: 94 % | HEART RATE: 86 BPM | RESPIRATION RATE: 19 BRPM | DIASTOLIC BLOOD PRESSURE: 72 MMHG | SYSTOLIC BLOOD PRESSURE: 130 MMHG

## 2023-02-09 PROCEDURE — 99239 HOSP IP/OBS DSCHRG MGMT >30: CPT

## 2023-02-09 RX ORDER — ALBUTEROL 90 UG/1
2 AEROSOL, METERED ORAL
Qty: 1 | Refills: 0
Start: 2023-02-09 | End: 2023-03-10

## 2023-02-09 RX ORDER — HYDRALAZINE HCL 50 MG
1 TABLET ORAL
Qty: 120 | Refills: 0
Start: 2023-02-09 | End: 2023-03-10

## 2023-02-09 RX ORDER — CALCIUM ACETATE 667 MG
1 TABLET ORAL
Qty: 90 | Refills: 0
Start: 2023-02-09 | End: 2023-03-10

## 2023-02-09 RX ADMIN — Medication 100 MILLIGRAM(S): at 05:59

## 2023-02-09 RX ADMIN — CALCITRIOL 0.5 MICROGRAM(S): 0.5 CAPSULE ORAL at 11:10

## 2023-02-09 RX ADMIN — Medication 1 MILLIGRAM(S): at 11:10

## 2023-02-09 RX ADMIN — Medication 667 MILLIGRAM(S): at 07:39

## 2023-02-09 RX ADMIN — Medication 650 MILLIGRAM(S): at 01:08

## 2023-02-09 RX ADMIN — CHLORHEXIDINE GLUCONATE 1 APPLICATION(S): 213 SOLUTION TOPICAL at 06:01

## 2023-02-09 RX ADMIN — Medication 1000 UNIT(S): at 11:10

## 2023-02-09 RX ADMIN — Medication 650 MILLIGRAM(S): at 00:08

## 2023-02-09 RX ADMIN — Medication 667 MILLIGRAM(S): at 12:13

## 2023-02-09 RX ADMIN — HEPARIN SODIUM 7500 UNIT(S): 5000 INJECTION INTRAVENOUS; SUBCUTANEOUS at 05:59

## 2023-02-14 DIAGNOSIS — E87.5 HYPERKALEMIA: ICD-10-CM

## 2023-02-14 DIAGNOSIS — N18.6 END STAGE RENAL DISEASE: ICD-10-CM

## 2023-02-14 DIAGNOSIS — G47.33 OBSTRUCTIVE SLEEP APNEA (ADULT) (PEDIATRIC): ICD-10-CM

## 2023-02-14 DIAGNOSIS — E83.51 HYPOCALCEMIA: ICD-10-CM

## 2023-02-14 DIAGNOSIS — Z86.711 PERSONAL HISTORY OF PULMONARY EMBOLISM: ICD-10-CM

## 2023-02-14 DIAGNOSIS — N39.0 URINARY TRACT INFECTION, SITE NOT SPECIFIED: ICD-10-CM

## 2023-02-14 DIAGNOSIS — N17.9 ACUTE KIDNEY FAILURE, UNSPECIFIED: ICD-10-CM

## 2023-02-14 DIAGNOSIS — R91.8 OTHER NONSPECIFIC ABNORMAL FINDING OF LUNG FIELD: ICD-10-CM

## 2023-02-14 DIAGNOSIS — Z20.822 CONTACT WITH AND (SUSPECTED) EXPOSURE TO COVID-19: ICD-10-CM

## 2023-02-14 DIAGNOSIS — E55.9 VITAMIN D DEFICIENCY, UNSPECIFIED: ICD-10-CM

## 2023-02-14 DIAGNOSIS — D63.1 ANEMIA IN CHRONIC KIDNEY DISEASE: ICD-10-CM

## 2023-02-14 DIAGNOSIS — I50.32 CHRONIC DIASTOLIC (CONGESTIVE) HEART FAILURE: ICD-10-CM

## 2023-02-14 DIAGNOSIS — I13.2 HYPERTENSIVE HEART AND CHRONIC KIDNEY DISEASE WITH HEART FAILURE AND WITH STAGE 5 CHRONIC KIDNEY DISEASE, OR END STAGE RENAL DISEASE: ICD-10-CM

## 2023-02-14 DIAGNOSIS — E66.01 MORBID (SEVERE) OBESITY DUE TO EXCESS CALORIES: ICD-10-CM

## 2023-02-14 DIAGNOSIS — I16.0 HYPERTENSIVE URGENCY: ICD-10-CM

## 2023-02-14 DIAGNOSIS — Z88.2 ALLERGY STATUS TO SULFONAMIDES: ICD-10-CM

## 2023-02-14 DIAGNOSIS — E83.39 OTHER DISORDERS OF PHOSPHORUS METABOLISM: ICD-10-CM

## 2023-02-14 DIAGNOSIS — Z86.718 PERSONAL HISTORY OF OTHER VENOUS THROMBOSIS AND EMBOLISM: ICD-10-CM

## 2023-02-14 DIAGNOSIS — Z99.2 DEPENDENCE ON RENAL DIALYSIS: ICD-10-CM

## 2023-04-24 NOTE — PHYSICAL THERAPY INITIAL EVALUATION ADULT - PHYSICAL ASSIST/NONPHYSICAL ASSIST: SUPINE/SIT, REHAB EVAL
I have reviewed discharge instructions with the patient and parent. The patient and parent verbalized understanding. Patient left ED via Discharge Method: ambulatory to Home with Kaz Noland.    Opportunity for questions and clarification provided. Patient given 2 scripts. To continue your aftercare when you leave the hospital, you may receive an automated call from our care team to check in on how you are doing. This is a free service and part of our promise to provide the best care and service to meet your aftercare needs.  If you have questions, or wish to unsubscribe from this service please call [phone]. Thank you for Choosing our [facility] Emergency Department.         Romaine Fritz RN  04/23/23 0884 head of bed elevated/verbal cues

## 2023-05-17 NOTE — PHARMACOTHERAPY INTERVENTION NOTE - NSPHARMCOMMASP
From: Tresa Rider  To: Magi Clayton  Sent: 5/16/2023 7:58 PM EDT  Subject: Sinus issues    Deryl Constable. I totally forgot to tell you about the problem I am having with my sinuses. Across my cheek bones. Nothing I do helps. I was told my cheek was swollen. Is there an antibiotic You can prescribe to help with this promlem? Thanks.  Jessenia Wright
ASP - Therapy recommended/ Alternative therapy

## 2023-06-27 NOTE — DISCHARGE NOTE NURSING/CASE MANAGEMENT/SOCIAL WORK - NSSCNAMETXT_GEN_ALL_CORE
Per Dr. Park  order for TPA Alterplase    Orders placed. Will call [facility] to see if they can help TPA port.       [facility] also ok with doing Silver Nitrate, meds sent to pharmacy.     ML  
[facility]

## 2023-07-12 NOTE — DISCHARGE NOTE PROVIDER - YES NO FOR MLM POSITIVE OR NEGATIVE COVID RESULT
Khoa Burden is a 27 year old male presenting for   Chief Complaint   Patient presents with   • Chest Pain     F/u, not feeling better     Denies Latex allergy or sensitivity.    Medication verified, no changes  Refills needed today: No    Health Maintenance Due   Topic Date Due   • COVID-19 Vaccine (1) Never done   • Pneumococcal Vaccine 0-64 (1 - PCV) Never done   • HPV Vaccine (1 - Male 2-dose series) Never done       Patient is due for topics as listed above but is not proceeding with Immunization(s) COVID-19, HPV and Pneumococcal at this time.     Depression Screening:  Review Flowsheet  More data exists       3/27/2023   Date   Adult PHQ 2 Score 0   Adult PHQ 2 Interpretation No further screening needed   Little interest or pleasure in activity? Not at all   Feeling down, depressed or hopeless? Not at all        Addressed the Advanced Care Planning (Advance Directives):  No   ,

## 2023-11-08 PROBLEM — N18.6 END STAGE RENAL DISEASE: Chronic | Status: ACTIVE | Noted: 2023-02-01

## 2023-11-14 ENCOUNTER — APPOINTMENT (OUTPATIENT)
Dept: CV DIAGNOSTICS | Facility: HOSPITAL | Age: 73
End: 2023-11-14

## 2023-11-14 ENCOUNTER — OUTPATIENT (OUTPATIENT)
Dept: OUTPATIENT SERVICES | Facility: HOSPITAL | Age: 73
LOS: 1 days | End: 2023-11-14
Payer: MEDICARE

## 2023-11-14 DIAGNOSIS — Z01.818 ENCOUNTER FOR OTHER PREPROCEDURAL EXAMINATION: ICD-10-CM

## 2023-11-14 PROCEDURE — 93018 CV STRESS TEST I&R ONLY: CPT | Mod: GC,MH

## 2023-11-14 PROCEDURE — 93016 CV STRESS TEST SUPVJ ONLY: CPT | Mod: GC,MH

## 2023-11-14 PROCEDURE — 78451 HT MUSCLE IMAGE SPECT SING: CPT | Mod: 26,MH

## 2023-12-13 ENCOUNTER — INPATIENT (INPATIENT)
Facility: HOSPITAL | Age: 73
LOS: 8 days | Discharge: HOME | End: 2023-12-22
Attending: INTERNAL MEDICINE | Admitting: INTERNAL MEDICINE
Payer: MEDICARE

## 2023-12-13 VITALS
SYSTOLIC BLOOD PRESSURE: 137 MMHG | DIASTOLIC BLOOD PRESSURE: 82 MMHG | OXYGEN SATURATION: 100 % | HEART RATE: 111 BPM | RESPIRATION RATE: 18 BRPM | TEMPERATURE: 98 F

## 2023-12-13 DIAGNOSIS — U07.1 COVID-19: ICD-10-CM

## 2023-12-13 DIAGNOSIS — R06.02 SHORTNESS OF BREATH: ICD-10-CM

## 2023-12-13 DIAGNOSIS — Z29.9 ENCOUNTER FOR PROPHYLACTIC MEASURES, UNSPECIFIED: ICD-10-CM

## 2023-12-13 DIAGNOSIS — N18.6 END STAGE RENAL DISEASE: ICD-10-CM

## 2023-12-13 DIAGNOSIS — Z79.899 OTHER LONG TERM (CURRENT) DRUG THERAPY: ICD-10-CM

## 2023-12-13 DIAGNOSIS — I10 ESSENTIAL (PRIMARY) HYPERTENSION: ICD-10-CM

## 2023-12-13 LAB
ALBUMIN SERPL ELPH-MCNC: 3.7 G/DL — SIGNIFICANT CHANGE UP (ref 3.3–5)
ALP SERPL-CCNC: 151 U/L — HIGH (ref 40–120)
ALT FLD-CCNC: 26 U/L — SIGNIFICANT CHANGE UP (ref 4–33)
ANION GAP SERPL CALC-SCNC: 13 MMOL/L — SIGNIFICANT CHANGE UP (ref 7–14)
APTT BLD: 81.2 SEC — HIGH (ref 24.5–35.6)
AST SERPL-CCNC: 37 U/L — HIGH (ref 4–32)
B PERT DNA SPEC QL NAA+PROBE: SIGNIFICANT CHANGE UP
B PERT+PARAPERT DNA PNL SPEC NAA+PROBE: SIGNIFICANT CHANGE UP
BASOPHILS # BLD AUTO: 0.01 K/UL — SIGNIFICANT CHANGE UP (ref 0–0.2)
BASOPHILS NFR BLD AUTO: 0.2 % — SIGNIFICANT CHANGE UP (ref 0–2)
BILIRUB SERPL-MCNC: 0.5 MG/DL — SIGNIFICANT CHANGE UP (ref 0.2–1.2)
BLOOD GAS VENOUS COMPREHENSIVE RESULT: SIGNIFICANT CHANGE UP
BORDETELLA PARAPERTUSSIS (RAPRVP): SIGNIFICANT CHANGE UP
BUN SERPL-MCNC: 20 MG/DL — SIGNIFICANT CHANGE UP (ref 7–23)
C PNEUM DNA SPEC QL NAA+PROBE: SIGNIFICANT CHANGE UP
CALCIUM SERPL-MCNC: 8.8 MG/DL — SIGNIFICANT CHANGE UP (ref 8.4–10.5)
CHLORIDE SERPL-SCNC: 94 MMOL/L — LOW (ref 98–107)
CO2 SERPL-SCNC: 30 MMOL/L — SIGNIFICANT CHANGE UP (ref 22–31)
CREAT SERPL-MCNC: 3.71 MG/DL — HIGH (ref 0.5–1.3)
DIALYSIS INSTRUMENT RESULT - HEPATITIS B SURFACE ANTIGEN: NEGATIVE — SIGNIFICANT CHANGE UP
DIALYSIS INSTRUMENT RESULT - HEPATITIS B SURFACE ANTIGEN: NEGATIVE — SIGNIFICANT CHANGE UP
EGFR: 12 ML/MIN/1.73M2 — LOW
EOSINOPHIL # BLD AUTO: 0.09 K/UL — SIGNIFICANT CHANGE UP (ref 0–0.5)
EOSINOPHIL NFR BLD AUTO: 1.9 % — SIGNIFICANT CHANGE UP (ref 0–6)
FLUAV SUBTYP SPEC NAA+PROBE: SIGNIFICANT CHANGE UP
FLUBV RNA SPEC QL NAA+PROBE: SIGNIFICANT CHANGE UP
GLUCOSE SERPL-MCNC: 90 MG/DL — SIGNIFICANT CHANGE UP (ref 70–99)
HADV DNA SPEC QL NAA+PROBE: SIGNIFICANT CHANGE UP
HCOV 229E RNA SPEC QL NAA+PROBE: SIGNIFICANT CHANGE UP
HCOV HKU1 RNA SPEC QL NAA+PROBE: SIGNIFICANT CHANGE UP
HCOV NL63 RNA SPEC QL NAA+PROBE: SIGNIFICANT CHANGE UP
HCOV OC43 RNA SPEC QL NAA+PROBE: SIGNIFICANT CHANGE UP
HCT VFR BLD CALC: 36.1 % — SIGNIFICANT CHANGE UP (ref 34.5–45)
HGB BLD-MCNC: 11.7 G/DL — SIGNIFICANT CHANGE UP (ref 11.5–15.5)
HMPV RNA SPEC QL NAA+PROBE: SIGNIFICANT CHANGE UP
HPIV1 RNA SPEC QL NAA+PROBE: SIGNIFICANT CHANGE UP
HPIV2 RNA SPEC QL NAA+PROBE: SIGNIFICANT CHANGE UP
HPIV3 RNA SPEC QL NAA+PROBE: SIGNIFICANT CHANGE UP
HPIV4 RNA SPEC QL NAA+PROBE: SIGNIFICANT CHANGE UP
IANC: 1.9 K/UL — SIGNIFICANT CHANGE UP (ref 1.8–7.4)
IMM GRANULOCYTES NFR BLD AUTO: 0.4 % — SIGNIFICANT CHANGE UP (ref 0–0.9)
INR BLD: 1.02 RATIO — SIGNIFICANT CHANGE UP (ref 0.85–1.18)
LYMPHOCYTES # BLD AUTO: 2.33 K/UL — SIGNIFICANT CHANGE UP (ref 1–3.3)
LYMPHOCYTES # BLD AUTO: 48.7 % — HIGH (ref 13–44)
M PNEUMO DNA SPEC QL NAA+PROBE: SIGNIFICANT CHANGE UP
MCHC RBC-ENTMCNC: 31.9 PG — SIGNIFICANT CHANGE UP (ref 27–34)
MCHC RBC-ENTMCNC: 32.4 GM/DL — SIGNIFICANT CHANGE UP (ref 32–36)
MCV RBC AUTO: 98.4 FL — SIGNIFICANT CHANGE UP (ref 80–100)
MONOCYTES # BLD AUTO: 0.43 K/UL — SIGNIFICANT CHANGE UP (ref 0–0.9)
MONOCYTES NFR BLD AUTO: 9 % — SIGNIFICANT CHANGE UP (ref 2–14)
NEUTROPHILS # BLD AUTO: 1.9 K/UL — SIGNIFICANT CHANGE UP (ref 1.8–7.4)
NEUTROPHILS NFR BLD AUTO: 39.8 % — LOW (ref 43–77)
NRBC # BLD: 0 /100 WBCS — SIGNIFICANT CHANGE UP (ref 0–0)
NRBC # FLD: 0 K/UL — SIGNIFICANT CHANGE UP (ref 0–0)
PLATELET # BLD AUTO: 208 K/UL — SIGNIFICANT CHANGE UP (ref 150–400)
POTASSIUM SERPL-MCNC: 4.7 MMOL/L — SIGNIFICANT CHANGE UP (ref 3.5–5.3)
POTASSIUM SERPL-SCNC: 4.7 MMOL/L — SIGNIFICANT CHANGE UP (ref 3.5–5.3)
PROT SERPL-MCNC: 8 G/DL — SIGNIFICANT CHANGE UP (ref 6–8.3)
PROTHROM AB SERPL-ACNC: 11.4 SEC — SIGNIFICANT CHANGE UP (ref 9.5–13)
RAPID RVP RESULT: DETECTED
RBC # BLD: 3.67 M/UL — LOW (ref 3.8–5.2)
RBC # FLD: 12.1 % — SIGNIFICANT CHANGE UP (ref 10.3–14.5)
RSV RNA SPEC QL NAA+PROBE: SIGNIFICANT CHANGE UP
RV+EV RNA SPEC QL NAA+PROBE: SIGNIFICANT CHANGE UP
SARS-COV-2 RNA SPEC QL NAA+PROBE: DETECTED
SARS-COV-2 RNA SPEC QL NAA+PROBE: DETECTED
SODIUM SERPL-SCNC: 137 MMOL/L — SIGNIFICANT CHANGE UP (ref 135–145)
TROPONIN T, HIGH SENSITIVITY RESULT: 27 NG/L — SIGNIFICANT CHANGE UP
WBC # BLD: 4.78 K/UL — SIGNIFICANT CHANGE UP (ref 3.8–10.5)
WBC # FLD AUTO: 4.78 K/UL — SIGNIFICANT CHANGE UP (ref 3.8–10.5)

## 2023-12-13 PROCEDURE — 71046 X-RAY EXAM CHEST 2 VIEWS: CPT | Mod: 26

## 2023-12-13 PROCEDURE — 99223 1ST HOSP IP/OBS HIGH 75: CPT

## 2023-12-13 PROCEDURE — 99284 EMERGENCY DEPT VISIT MOD MDM: CPT

## 2023-12-13 PROCEDURE — 99285 EMERGENCY DEPT VISIT HI MDM: CPT

## 2023-12-13 RX ORDER — ACETAMINOPHEN 500 MG
650 TABLET ORAL EVERY 6 HOURS
Refills: 0 | Status: DISCONTINUED | OUTPATIENT
Start: 2023-12-13 | End: 2023-12-22

## 2023-12-13 RX ORDER — SODIUM CHLORIDE 9 MG/ML
100 INJECTION INTRAMUSCULAR; INTRAVENOUS; SUBCUTANEOUS
Refills: 0 | Status: DISCONTINUED | OUTPATIENT
Start: 2023-12-13 | End: 2023-12-22

## 2023-12-13 RX ORDER — ONDANSETRON 8 MG/1
4 TABLET, FILM COATED ORAL EVERY 8 HOURS
Refills: 0 | Status: DISCONTINUED | OUTPATIENT
Start: 2023-12-13 | End: 2023-12-22

## 2023-12-13 RX ORDER — OXYCODONE HYDROCHLORIDE 5 MG/1
2.5 TABLET ORAL ONCE
Refills: 0 | Status: DISCONTINUED | OUTPATIENT
Start: 2023-12-13 | End: 2023-12-13

## 2023-12-13 RX ORDER — CHLORHEXIDINE GLUCONATE 213 G/1000ML
1 SOLUTION TOPICAL DAILY
Refills: 0 | Status: DISCONTINUED | OUTPATIENT
Start: 2023-12-13 | End: 2023-12-22

## 2023-12-13 RX ORDER — LANOLIN ALCOHOL/MO/W.PET/CERES
3 CREAM (GRAM) TOPICAL AT BEDTIME
Refills: 0 | Status: DISCONTINUED | OUTPATIENT
Start: 2023-12-13 | End: 2023-12-22

## 2023-12-13 RX ORDER — HEPARIN SODIUM 5000 [USP'U]/ML
5000 INJECTION INTRAVENOUS; SUBCUTANEOUS EVERY 12 HOURS
Refills: 0 | Status: DISCONTINUED | OUTPATIENT
Start: 2023-12-13 | End: 2023-12-18

## 2023-12-13 RX ADMIN — OXYCODONE HYDROCHLORIDE 2.5 MILLIGRAM(S): 5 TABLET ORAL at 19:26

## 2023-12-13 RX ADMIN — Medication 650 MILLIGRAM(S): at 19:24

## 2023-12-13 NOTE — ED ADULT NURSE NOTE - NSFALLHARMRISKINTERV_ED_ALL_ED
Assistance OOB with selected safe patient handling equipment if applicable/Assistance with ambulation/Communicate risk of Fall with Harm to all staff, patient, and family/Monitor gait and stability/Provide patient with walking aids/Provide visual cue: red socks, yellow wristband, yellow gown, etc/Reinforce activity limits and safety measures with patient and family/Bed in lowest position, wheels locked, appropriate side rails in place/Call bell, personal items and telephone in reach/Instruct patient to call for assistance before getting out of bed/chair/stretcher/Non-slip footwear applied when patient is off stretcher/[place] to call system/Physically safe environment - no spills, clutter or unnecessary equipment/Purposeful Proactive Rounding/Room/bathroom lighting operational, light cord in reach Assistance OOB with selected safe patient handling equipment if applicable/Assistance with ambulation/Communicate risk of Fall with Harm to all staff, patient, and family/Monitor gait and stability/Provide patient with walking aids/Provide visual cue: red socks, yellow wristband, yellow gown, etc/Reinforce activity limits and safety measures with patient and family/Bed in lowest position, wheels locked, appropriate side rails in place/Call bell, personal items and telephone in reach/Instruct patient to call for assistance before getting out of bed/chair/stretcher/Non-slip footwear applied when patient is off stretcher/[place] to call system/Physically safe environment - no spills, clutter or unnecessary equipment/Purposeful Proactive Rounding/Room/bathroom lighting operational, light cord in reach

## 2023-12-13 NOTE — ED PROVIDER NOTE - NSICDXPASTMEDICALHX_GEN_ALL_CORE_FT
PAST MEDICAL HISTORY:  Arthropathy Arthritis    Dysfunctional uterine bleeding     ESRD (end stage renal disease)     Essential hypertension Hypertension    History of pulmonary embolism 2012 s/p IVC filter    Joint infection     Migraine Migraines    Morbid obesity     Obesity     Obstructive sleep apnea syndrome Obstructive sleep apnea    Osteoarthritis     Pulmonary embolism

## 2023-12-13 NOTE — ED ADULT TRIAGE NOTE - INTERNATIONAL TRAVEL
Post-Care Instructions: I reviewed with the patient in detail post-care instructions. Patient is to keep the site dry overnight, and then apply bacitracin twice daily until healed. Patient may apply hydrogen peroxide soaks to remove any crusting. No

## 2023-12-13 NOTE — ED ADULT NURSE REASSESSMENT NOTE - NS ED NURSE REASSESS COMMENT FT1
Pt not in acute distress, pt weaned off NC. Pt on room air and sating 98%. Airway is patent, respirations are even and unlabored.
Pt resting comfortably in room1. Pt denies physical complaints at this time. Labs drawn and sent per provider orders. VS as noted in the flow sheet. Plan of care ongoing, safety maintained.

## 2023-12-13 NOTE — ED ADULT NURSE NOTE - OBJECTIVE STATEMENT
Pt arrives to room 1. Pt is A&Ox4, ambulatory with a walker at baseline. Hx: HTN, ESRD, osteoarthritis, pulmonary embolism, obstructive sleep apnea. Pt arrives to the ED due to shortness of breath. Pt was at dialysis and became short of breath. Pt is currently on 2 L NC and sating 100%. Pt states she has been feeling short of breath for about a month. Pt endorses that it has gotten worse in the past few days due to the development of a cough. Airway is patent, respirations are even and unlabored, no acute distress. Pt normal sinus on the cardiac monitor. Dialysis access on the right chest. Pt denies GI/ complaints at this time. Skin is clean dry and intact. Awaiting provider orders. Plan of care ongoing, safety maintained. Pt arrives to room 1. Pt is A&Ox4, ambulatory with a walker at baseline. Hx: HTN, ESRD, osteoarthritis, pulmonary embolism, obstructive sleep apnea. Pt arrives to the ED due to shortness of breath. Pt was at dialysis and became short of breath. Pt is currently on 2 L NC and sating 100%. Pt states she has been feeling short of breath for about a month. Pt endorses that it has gotten worse in the past few days due to the development of a cough. Airway is patent, respirations are even and unlabored, no acute distress. Pt states she has chest pain when coughing, pt placed on cardiac monitor. Pt normal sinus on the cardiac monitor. Dialysis access on the right chest. Pt denies GI/ complaints at this time. Skin is clean dry and intact. Awaiting provider orders. Plan of care ongoing, safety maintained.

## 2023-12-13 NOTE — ED PROVIDER NOTE - CLINICAL SUMMARY MEDICAL DECISION MAKING FREE TEXT BOX
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 concern for pulm fibrosis flare vs PE vs PNA. 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 ddx includes but is not limited to PNA vs CHF vs ACS. will check labs, ekg, cxr, RVP

## 2023-12-13 NOTE — CONSULT NOTE ADULT - SUBJECTIVE AND OBJECTIVE BOX
[facility] NEPHROLOGY- CONSULTATION NOTE    73y Female with history of below presents with SOB. Nephrology consulted for ESRD status.    Patient well known to our practice for h/o ESRD on HD MWF via RIJ TDC for which patient follows with me as an outpatient at [facility]. Last HD earlier today terminated 30 minutes prematurely due to tachycardia and complaints of dyspnea. Patient admits to dyspnea for one week with associated cough and fevers. Patient admits to sick contacts at home.    Patient with h/o dyspnea for months for which she was referred to cardiology with recent negative NST. Dyspnea however continues to increase. Patient also with h/o sleep apnea however does not wear CPAP.     TDC with poor blood flow for approximately one week (including with HD earlier today).    REVIEW OF SYSTEMS:  Gen: + fevers  HEENT: no rhinorrhea  Neck: no sore throat  Cards: no chest pain  Resp: + dyspnea, + cough  GI: no nausea or vomiting or diarrhea  : no dysuria or hematuria  Vascular: no LE edema  Derm: no rashes  Neuro: no numbness/tingling    trimethoprim (Other; Rash)  sulfa drugs (Other; Rash)  Sulfur (Unknown)      Home Medications Reviewed  Hospital Medications:   MEDICATIONS  (STANDING):      PAST MEDICAL & SURGICAL HISTORY:  Osteoarthritis      Pulmonary embolism      Joint infection      Dysfunctional uterine bleeding      Obesity      Essential hypertension  Hypertension      Obstructive sleep apnea syndrome  Obstructive sleep apnea      History of pulmonary embolism  2012 s/p IVC filter      Arthropathy  Arthritis      Migraine  Migraines      Morbid obesity      ESRD (end stage renal disease)      Total knee replacement status      Status post hysterectomy      Status post cholecystectomy      Other acquired absence of organ  History of cholecystectomy      Status post total hysterectomy  partial      History of total knee replacement  with revisions x 3          FAMILY HISTORY:  FH: type 2 diabetes mellitus    FHx: hypertension        SOCIAL HISTORY:  Denies toxic substance use     VITALS:  T(F): 98.1 (12-13-23 @ 13:38), Max: 98.1 (12-13-23 @ 13:38)  HR: 93 (12-13-23 @ 13:38)  BP: 151/93 (12-13-23 @ 13:38)  RR: 18 (12-13-23 @ 13:38)  SpO2: 100% (12-13-23 @ 13:38)  Wt(kg): --        PHYSICAL EXAM:  Gen: NAD, calm  HEENT: MMM  Neck: no JVD  Cards: RRR, +S1/S2, no M/G/R  Resp: CTA B/L, mild tachypnea  GI: soft, NT/ND, NABS  : no CVA tenderness  Vascular: no LE edema B/L, RIJ TDC intact  Derm: no rashes  Neuro: non-focal    LABS:        Creatinine Trend:     Urine Studies:        RADIOLOGY & ADDITIONAL STUDIES: [facility] NEPHROLOGY- CONSULTATION NOTE    73y Female with history of below presents with SOB. Nephrology consulted for ESRD status.    Patient well known to our practice for h/o ESRD on HD MWF via RIJ TDC for which patient follows with me as an outpatient at [facility]. Last HD earlier today terminated 30 minutes prematurely due to tachycardia and complaints of dyspnea. Patient admits to dyspnea for one week with associated cough and fevers. Patient admits to sick contacts at home.    Patient with h/o dyspnea for months for which she was referred to cardiology with recent negative NST. Dyspnea however continues to increase. Patient also with h/o sleep apnea however does not wear CPAP.     TDC with poor blood flow for approximately one week (including with HD earlier today).    REVIEW OF SYSTEMS:  Gen: + fevers  HEENT: no rhinorrhea  Neck: no sore throat  Cards: no chest pain  Resp: + dyspnea, + cough  GI: no nausea or vomiting or diarrhea  : no dysuria or hematuria  Vascular: no LE edema  Derm: no rashes  Neuro: no numbness/tingling    trimethoprim (Other; Rash)  sulfa drugs (Other; Rash)  Sulfur (Unknown)      Home Medications Reviewed  Hospital Medications:   MEDICATIONS  (STANDING):      PAST MEDICAL & SURGICAL HISTORY:  Osteoarthritis      Pulmonary embolism      Joint infection      Dysfunctional uterine bleeding      Obesity      Essential hypertension  Hypertension      Obstructive sleep apnea syndrome  Obstructive sleep apnea      History of pulmonary embolism  2012 s/p IVC filter      Arthropathy  Arthritis      Migraine  Migraines      Morbid obesity      ESRD (end stage renal disease)      Total knee replacement status      Status post hysterectomy      Status post cholecystectomy      Other acquired absence of organ  History of cholecystectomy      Status post total hysterectomy  partial      History of total knee replacement  with revisions x 3          FAMILY HISTORY:  FH: type 2 diabetes mellitus    FHx: hypertension        SOCIAL HISTORY:  Denies toxic substance use     VITALS:  T(F): 98.1 (12-13-23 @ 13:38), Max: 98.1 (12-13-23 @ 13:38)  HR: 93 (12-13-23 @ 13:38)  BP: 151/93 (12-13-23 @ 13:38)  RR: 18 (12-13-23 @ 13:38)  SpO2: 100% (12-13-23 @ 13:38)  Wt(kg): --        PHYSICAL EXAM:  Gen: NAD, calm  HEENT: MMM  Neck: no JVD  Cards: RRR, +S1/S2, no M/G/R  Resp: CTA B/L, mild tachypnea  GI: soft, NT/ND, NABS  : no CVA tenderness  Vascular: no LE edema B/L, RIJ TDC intact  Derm: no rashes  Neuro: non-focal    LABS:        Creatinine Trend:     Urine Studies:        RADIOLOGY & ADDITIONAL STUDIES:

## 2023-12-13 NOTE — H&P ADULT - NSHPPHYSICALEXAM_GEN_ALL_CORE
Vital Signs Last 24 Hrs  T(C): 36.7 (13 Dec 2023 13:38), Max: 36.7 (13 Dec 2023 13:38)  T(F): 98.1 (13 Dec 2023 13:38), Max: 98.1 (13 Dec 2023 13:38)  HR: 99 (13 Dec 2023 17:13) (93 - 111)  BP: 130/76 (13 Dec 2023 17:13) (130/76 - 151/93)  BP(mean): --  RR: 20 (13 Dec 2023 17:13) (18 - 20)  SpO2: 99% (13 Dec 2023 17:13) (99% - 100%)    Parameters below as of 13 Dec 2023 17:13  Patient On (Oxygen Delivery Method): room air    GENERAL: NAD, well-developed  HEENT:  Atraumatic, Normocephalic, Conjunctiva and sclera clear, oral mucosa moist, clear w/o any exudate   NECK: Supple, No JVD  CHEST/LUNG: Clear to auscultation bilaterally; No wheeze  HEART: Regular rate and rhythm; No murmurs, rubs, or gallops  ABDOMEN: Soft, Nontender, Nondistended; Bowel sounds present  EXTREMITIES:  2+ Peripheral Pulses, No clubbing, cyanosis, or edema  PSYCH: AAOx3, normal affect  NEUROLOGY: non-focal, moving all extremities   SKIN: No rashes or lesions

## 2023-12-13 NOTE — ED PROVIDER NOTE - PHYSICAL EXAMINATION
Marly Hicks MD  GENERAL: Patient awake alert   HEENT: NC/AT, Moist mucous membranes, EOMI.  LUNGS: +crackles throughout. increased WOB. satting 70% on 2L NC O2  CARDIAC: RRR, no m/r/g.    ABDOMEN: Soft, NT, ND, No rebound, guarding. No CVA tenderness.   EXT: No edema. No calf tenderness.   MSK: No pain with movement, no deformities.  NEURO: A&Ox3. Moving all extremities.  SKIN: Warm and dry. No rash.  PSYCH: Normal affect. Marly Hicks MD  GENERAL: Patient awake alert   HEENT: NC/AT, Moist mucous membranes, EOMI.  LUNGS: mildly increased WOB. satting 98 on RA  CARDIAC: RRR, no m/r/g.    ABDOMEN: Soft, NT, ND, No rebound, guarding. No CVA tenderness.   EXT: No edema. No calf tenderness.   MSK: No pain with movement, no deformities.  NEURO: A&Ox3. Moving all extremities.  SKIN: Warm and dry. No rash.  PSYCH: Normal affect.

## 2023-12-13 NOTE — H&P ADULT - PROBLEM SELECTOR PLAN 3
On HD MWF   -Unable to finish HD on 12/13 due to SOB   -Also had poor flow in the catheter. Will reattempt HD on 12/15 as per nephrology   -Will consider TDC exchange if catheter continues to have high arterial and venous pressures despite heparin boluses   -Trend Scr daily

## 2023-12-13 NOTE — ED PROVIDER NOTE - ATTENDING CONTRIBUTION TO CARE
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 Patient states that she has been feeling short of breath for about a month and has been having an outpatient workup including a stress test and a pending echo.  However for the past week she has been having flulike symptoms including cough, congestion, increased shortness of breath.  Today she was at dialysis and finished all but one half an hour of her dialysis and started feeling more short of breath so nephrology sent the patient into the ED to be admitted for cardiac workup.

## 2023-12-13 NOTE — H&P ADULT - HISTORY OF PRESENT ILLNESS
72 yo F with PMhx of HTN, PE (not on AC), OA, chronic migraine  and ESRD on HD (MWF) presents to the ED with cough and SOB. Pt reports that she has been feeling weak for the last couple of days. She has a productive cough. Her chest hurts whenever she coughs. She also has runny nose, myalgias, and SOB with exertion. Denies any associated fever, chills, chest pain or LE edema. She went to HD today and had to stop HD due to weakness/SOB. She was then sent to the ED.   In the ED, her vitals were notable tachycardia. Labs were notable for elevated BUN/Scr, elevated ALP, AST. RVP was positive for SARSCOV2. CXR showed clear lungs.  74 yo F with PMhx of HTN, PE (not on AC), OA, chronic migraine  and ESRD on HD (MWF) presents to the ED with cough and SOB. Pt reports that she has been feeling weak for the last couple of days. She has a productive cough. Her chest hurts whenever she coughs. She also has runny nose, myalgias, and SOB with exertion. Denies any associated fever, chills, chest pain or LE edema. She went to HD today and had to stop HD due to weakness/SOB. She was then sent to the ED.   In the ED, her vitals were notable tachycardia. Labs were notable for elevated BUN/Scr, elevated ALP, AST. RVP was positive for SARSCOV2. CXR showed clear lungs.

## 2023-12-13 NOTE — H&P ADULT - PROBLEM SELECTOR PLAN 1
Patient with cough, SOB, runny nose, myalgias, and weakness   -RVP positive for SARSCOV2   -CXR showed clear lungs   -Sating well on RA, no indication for dexamethasone   -Will hold remdesivir due to ESRD   -F/u with inflammatory markers

## 2023-12-13 NOTE — ED ADULT TRIAGE NOTE - CHIEF COMPLAINT QUOTE
Pt brought in by EMS from dialysis clinic for SOB and not feeling well since Saturday. Pt was unable to finish the last 30 mins of her treatment. pt also complaining of chest pain when coughing.

## 2023-12-13 NOTE — H&P ADULT - ASSESSMENT
Medication will be very expensive without insurance- please confirm w patient he wants to continue med. If so, okay to send increased dose   72 yo F with PMhx of HTN, PE (not on AC), OA, chronic migraine  and ESRD on HD (MWF) presents to the ED with cough and SOB, found to have COVID-19.

## 2023-12-13 NOTE — CONSULT NOTE ADULT - ASSESSMENT
73y Female with history of ESRD on HD presents with SOB. Nephrology consulted for ESRD status.    1) ESRD: Last HD earlier today terminated 30 minutes prematurely (after 3.5 hours) due to complaints of SOB with tachycardia (up to 120's) and poor catheter flow. Will attempt HD via catheter on 12/15 and consider TDC exchange if catheter continues to have high arterial and venous pressures despite heparin (on 2000 units bolus and 1000 units/hour maintenance). Will discuss with cardiology possible angiogram given complaints of increasing dyspnea even prior to infectious symptoms with negative stress test. Monitor electrolytes.    2) HTN with ESRD: BP controlled. Resume home medications.    3) Anemia of renal disease: On Micera 30 mcg monthly. Will give Epogen if Hb decreases. Monitor Hb.    4) Secondary HPT of renal origin: Resume calcitriol 1 mcg MWF and renal diet. Monitor serum calcium and phosphorus.      [facility] NEPHROLOGY  Derrick Castaneda M.D.  Raúl Diana D.O.  Grace Delgadillo M.D.  MD Cher Beckman, MSN, ANP-C    Telephone: [phone]  Facsimile: [phone] [place] [address], [address]  [place] [address]   73y Female with history of ESRD on HD presents with SOB. Nephrology consulted for ESRD status.    1) ESRD: Last HD earlier today terminated 30 minutes prematurely (after 3.5 hours) due to complaints of SOB with tachycardia (up to 120's) and poor catheter flow. Will attempt HD via catheter on 12/15 and consider TDC exchange if catheter continues to have high arterial and venous pressures despite heparin (on 2000 units bolus and 1000 units/hour maintenance). Will discuss with cardiology possible angiogram given complaints of increasing dyspnea even prior to infectious symptoms with negative stress test. Monitor electrolytes.    2) HTN with ESRD: BP controlled. Resume home medications.    3) Anemia of renal disease: On Micera 30 mcg monthly. Will give Epogen if Hb decreases. Monitor Hb.    4) Secondary HPT of renal origin: Resume calcitriol 1 mcg MWF and renal diet. Monitor serum calcium and phosphorus.      [facility] NEPHROLOGY  Derrick Castaneda M.D.  Raúl Diana D.O.  Grace Delgadillo M.D.  MD Cher Beckman, MSN, ANP-C    Telephone: [phone]  Facsimile: [phone] [place] [address], [address]  [place] [address]

## 2023-12-13 NOTE — ED PROVIDER NOTE - OBJECTIVE STATEMENT
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 Patient states that she has been feeling short of breath for about a month and has been having an outpatient workup including a stress test and a pending echo.  However for the past week she has been having flulike symptoms including cough, congestion, increased shortness of breath.  Today she was at dialysis and finished all but one half an hour of her dialysis and started feeling more short of breath so nephrology sent the patient into the ED to be admitted for cardiac workup.

## 2023-12-13 NOTE — H&P ADULT - NSHPLABSRESULTS_GEN_ALL_CORE
11.7   4.78  )-----------( 208      ( 13 Dec 2023 14:27 )             36.1     Hgb Trend: 11.7<--  12-13    137  |  94<L>  |  20  ----------------------------<  90  4.7   |  30  |  3.71<H>    Ca    8.8      13 Dec 2023 14:27    TPro  8.0  /  Alb  3.7  /  TBili  0.5  /  DBili  x   /  AST  37<H>  /  ALT  26  /  AlkPhos  151<H>  12-13    Creatinine Trend: 3.71<--  PT/INR - ( 13 Dec 2023 15:02 )   PT: 11.4 sec;   INR: 1.02 ratio         PTT - ( 13 Dec 2023 15:02 )  PTT:81.2 sec      Urinalysis Basic - ( 13 Dec 2023 14:27 )    Color: x / Appearance: x / SG: x / pH: x  Gluc: 90 mg/dL / Ketone: x  / Bili: x / Urobili: x   Blood: x / Protein: x / Nitrite: x   Leuk Esterase: x / RBC: x / WBC x   Sq Epi: x / Non Sq Epi: x / Bacteria: x        EKG is ordered     CXR as reviewed by the radiologist: clear lungs

## 2023-12-13 NOTE — H&P ADULT - NSHPREVIEWOFSYSTEMS_GEN_ALL_CORE
REVIEW OF SYSTEMS:    CONSTITUTIONAL: No weakness, fevers or chills  EYES/ENT: No visual changes;  No vertigo or throat pain   NECK: No pain or stiffness  RESPIRATORY: +SOB, +Cough  CARDIOVASCULAR: No chest pain or palpitations  GASTROINTESTINAL: No abdominal or epigastric pain. No nausea, vomiting, or hematemesis; No diarrhea or constipation. No melena or hematochezia.  GENITOURINARY: No dysuria, frequency or hematuria  NEUROLOGICAL: No numbness or weakness  MUSCULOSKELETAL: No joint pain, no muscle ache   SKIN: No itching, burning, rashes, or lesions   All other review of systems is negative unless indicated above.

## 2023-12-14 LAB
A1C WITH ESTIMATED AVERAGE GLUCOSE RESULT: 4.4 % — SIGNIFICANT CHANGE UP (ref 4–5.6)
A1C WITH ESTIMATED AVERAGE GLUCOSE RESULT: 4.4 % — SIGNIFICANT CHANGE UP (ref 4–5.6)
ALBUMIN SERPL ELPH-MCNC: 3.5 G/DL — SIGNIFICANT CHANGE UP (ref 3.3–5)
ALBUMIN SERPL ELPH-MCNC: 3.5 G/DL — SIGNIFICANT CHANGE UP (ref 3.3–5)
ALP SERPL-CCNC: 141 U/L — HIGH (ref 40–120)
ALP SERPL-CCNC: 141 U/L — HIGH (ref 40–120)
ALT FLD-CCNC: 33 U/L — SIGNIFICANT CHANGE UP (ref 4–33)
ALT FLD-CCNC: 33 U/L — SIGNIFICANT CHANGE UP (ref 4–33)
ANION GAP SERPL CALC-SCNC: 11 MMOL/L — SIGNIFICANT CHANGE UP (ref 7–14)
ANION GAP SERPL CALC-SCNC: 11 MMOL/L — SIGNIFICANT CHANGE UP (ref 7–14)
AST SERPL-CCNC: 35 U/L — HIGH (ref 4–32)
AST SERPL-CCNC: 35 U/L — HIGH (ref 4–32)
BASOPHILS # BLD AUTO: 0.01 K/UL — SIGNIFICANT CHANGE UP (ref 0–0.2)
BASOPHILS # BLD AUTO: 0.01 K/UL — SIGNIFICANT CHANGE UP (ref 0–0.2)
BASOPHILS NFR BLD AUTO: 0.3 % — SIGNIFICANT CHANGE UP (ref 0–2)
BASOPHILS NFR BLD AUTO: 0.3 % — SIGNIFICANT CHANGE UP (ref 0–2)
BILIRUB SERPL-MCNC: 0.6 MG/DL — SIGNIFICANT CHANGE UP (ref 0.2–1.2)
BILIRUB SERPL-MCNC: 0.6 MG/DL — SIGNIFICANT CHANGE UP (ref 0.2–1.2)
BUN SERPL-MCNC: 28 MG/DL — HIGH (ref 7–23)
BUN SERPL-MCNC: 28 MG/DL — HIGH (ref 7–23)
CALCIUM SERPL-MCNC: 8.5 MG/DL — SIGNIFICANT CHANGE UP (ref 8.4–10.5)
CALCIUM SERPL-MCNC: 8.5 MG/DL — SIGNIFICANT CHANGE UP (ref 8.4–10.5)
CHLORIDE SERPL-SCNC: 95 MMOL/L — LOW (ref 98–107)
CHLORIDE SERPL-SCNC: 95 MMOL/L — LOW (ref 98–107)
CHOLEST SERPL-MCNC: 139 MG/DL — SIGNIFICANT CHANGE UP
CHOLEST SERPL-MCNC: 139 MG/DL — SIGNIFICANT CHANGE UP
CO2 SERPL-SCNC: 32 MMOL/L — HIGH (ref 22–31)
CO2 SERPL-SCNC: 32 MMOL/L — HIGH (ref 22–31)
CREAT SERPL-MCNC: 4.94 MG/DL — HIGH (ref 0.5–1.3)
CREAT SERPL-MCNC: 4.94 MG/DL — HIGH (ref 0.5–1.3)
CRP SERPL-MCNC: 47.6 MG/L — HIGH
CRP SERPL-MCNC: 47.6 MG/L — HIGH
D DIMER BLD IA.RAPID-MCNC: 407 NG/ML DDU — HIGH
D DIMER BLD IA.RAPID-MCNC: 407 NG/ML DDU — HIGH
EGFR: 9 ML/MIN/1.73M2 — LOW
EGFR: 9 ML/MIN/1.73M2 — LOW
EOSINOPHIL # BLD AUTO: 0.08 K/UL — SIGNIFICANT CHANGE UP (ref 0–0.5)
EOSINOPHIL # BLD AUTO: 0.08 K/UL — SIGNIFICANT CHANGE UP (ref 0–0.5)
EOSINOPHIL NFR BLD AUTO: 2.3 % — SIGNIFICANT CHANGE UP (ref 0–6)
EOSINOPHIL NFR BLD AUTO: 2.3 % — SIGNIFICANT CHANGE UP (ref 0–6)
ESTIMATED AVERAGE GLUCOSE: 80 — SIGNIFICANT CHANGE UP
ESTIMATED AVERAGE GLUCOSE: 80 — SIGNIFICANT CHANGE UP
FERRITIN SERPL-MCNC: 2598 NG/ML — HIGH (ref 13–330)
FERRITIN SERPL-MCNC: 2598 NG/ML — HIGH (ref 13–330)
GLUCOSE SERPL-MCNC: 84 MG/DL — SIGNIFICANT CHANGE UP (ref 70–99)
GLUCOSE SERPL-MCNC: 84 MG/DL — SIGNIFICANT CHANGE UP (ref 70–99)
HCT VFR BLD CALC: 33.2 % — LOW (ref 34.5–45)
HCT VFR BLD CALC: 33.2 % — LOW (ref 34.5–45)
HDLC SERPL-MCNC: 49 MG/DL — LOW
HDLC SERPL-MCNC: 49 MG/DL — LOW
HGB BLD-MCNC: 10.5 G/DL — LOW (ref 11.5–15.5)
HGB BLD-MCNC: 10.5 G/DL — LOW (ref 11.5–15.5)
IANC: 1.75 K/UL — LOW (ref 1.8–7.4)
IANC: 1.75 K/UL — LOW (ref 1.8–7.4)
IMM GRANULOCYTES NFR BLD AUTO: 0.3 % — SIGNIFICANT CHANGE UP (ref 0–0.9)
IMM GRANULOCYTES NFR BLD AUTO: 0.3 % — SIGNIFICANT CHANGE UP (ref 0–0.9)
LDH SERPL L TO P-CCNC: 168 U/L — SIGNIFICANT CHANGE UP (ref 135–225)
LDH SERPL L TO P-CCNC: 168 U/L — SIGNIFICANT CHANGE UP (ref 135–225)
LIPID PNL WITH DIRECT LDL SERPL: 71 MG/DL — SIGNIFICANT CHANGE UP
LIPID PNL WITH DIRECT LDL SERPL: 71 MG/DL — SIGNIFICANT CHANGE UP
LYMPHOCYTES # BLD AUTO: 1.33 K/UL — SIGNIFICANT CHANGE UP (ref 1–3.3)
LYMPHOCYTES # BLD AUTO: 1.33 K/UL — SIGNIFICANT CHANGE UP (ref 1–3.3)
LYMPHOCYTES # BLD AUTO: 37.7 % — SIGNIFICANT CHANGE UP (ref 13–44)
LYMPHOCYTES # BLD AUTO: 37.7 % — SIGNIFICANT CHANGE UP (ref 13–44)
MCHC RBC-ENTMCNC: 31.6 GM/DL — LOW (ref 32–36)
MCHC RBC-ENTMCNC: 31.6 GM/DL — LOW (ref 32–36)
MCHC RBC-ENTMCNC: 31.9 PG — SIGNIFICANT CHANGE UP (ref 27–34)
MCHC RBC-ENTMCNC: 31.9 PG — SIGNIFICANT CHANGE UP (ref 27–34)
MCV RBC AUTO: 100.9 FL — HIGH (ref 80–100)
MCV RBC AUTO: 100.9 FL — HIGH (ref 80–100)
MONOCYTES # BLD AUTO: 0.35 K/UL — SIGNIFICANT CHANGE UP (ref 0–0.9)
MONOCYTES # BLD AUTO: 0.35 K/UL — SIGNIFICANT CHANGE UP (ref 0–0.9)
MONOCYTES NFR BLD AUTO: 9.9 % — SIGNIFICANT CHANGE UP (ref 2–14)
MONOCYTES NFR BLD AUTO: 9.9 % — SIGNIFICANT CHANGE UP (ref 2–14)
MRSA PCR RESULT.: SIGNIFICANT CHANGE UP
MRSA PCR RESULT.: SIGNIFICANT CHANGE UP
NEUTROPHILS # BLD AUTO: 1.75 K/UL — LOW (ref 1.8–7.4)
NEUTROPHILS # BLD AUTO: 1.75 K/UL — LOW (ref 1.8–7.4)
NEUTROPHILS NFR BLD AUTO: 49.5 % — SIGNIFICANT CHANGE UP (ref 43–77)
NEUTROPHILS NFR BLD AUTO: 49.5 % — SIGNIFICANT CHANGE UP (ref 43–77)
NON HDL CHOLESTEROL: 90 MG/DL — SIGNIFICANT CHANGE UP
NON HDL CHOLESTEROL: 90 MG/DL — SIGNIFICANT CHANGE UP
NRBC # BLD: 0 /100 WBCS — SIGNIFICANT CHANGE UP (ref 0–0)
NRBC # BLD: 0 /100 WBCS — SIGNIFICANT CHANGE UP (ref 0–0)
NRBC # FLD: 0 K/UL — SIGNIFICANT CHANGE UP (ref 0–0)
NRBC # FLD: 0 K/UL — SIGNIFICANT CHANGE UP (ref 0–0)
PLATELET # BLD AUTO: 175 K/UL — SIGNIFICANT CHANGE UP (ref 150–400)
PLATELET # BLD AUTO: 175 K/UL — SIGNIFICANT CHANGE UP (ref 150–400)
POTASSIUM SERPL-MCNC: 4.1 MMOL/L — SIGNIFICANT CHANGE UP (ref 3.5–5.3)
POTASSIUM SERPL-MCNC: 4.1 MMOL/L — SIGNIFICANT CHANGE UP (ref 3.5–5.3)
POTASSIUM SERPL-SCNC: 4.1 MMOL/L — SIGNIFICANT CHANGE UP (ref 3.5–5.3)
POTASSIUM SERPL-SCNC: 4.1 MMOL/L — SIGNIFICANT CHANGE UP (ref 3.5–5.3)
PROCALCITONIN SERPL-MCNC: 0.37 NG/ML — HIGH (ref 0.02–0.1)
PROCALCITONIN SERPL-MCNC: 0.37 NG/ML — HIGH (ref 0.02–0.1)
PROT SERPL-MCNC: 7.2 G/DL — SIGNIFICANT CHANGE UP (ref 6–8.3)
PROT SERPL-MCNC: 7.2 G/DL — SIGNIFICANT CHANGE UP (ref 6–8.3)
RBC # BLD: 3.29 M/UL — LOW (ref 3.8–5.2)
RBC # BLD: 3.29 M/UL — LOW (ref 3.8–5.2)
RBC # FLD: 12.2 % — SIGNIFICANT CHANGE UP (ref 10.3–14.5)
RBC # FLD: 12.2 % — SIGNIFICANT CHANGE UP (ref 10.3–14.5)
S AUREUS DNA NOSE QL NAA+PROBE: SIGNIFICANT CHANGE UP
S AUREUS DNA NOSE QL NAA+PROBE: SIGNIFICANT CHANGE UP
SODIUM SERPL-SCNC: 138 MMOL/L — SIGNIFICANT CHANGE UP (ref 135–145)
SODIUM SERPL-SCNC: 138 MMOL/L — SIGNIFICANT CHANGE UP (ref 135–145)
TRIGL SERPL-MCNC: 93 MG/DL — SIGNIFICANT CHANGE UP
TRIGL SERPL-MCNC: 93 MG/DL — SIGNIFICANT CHANGE UP
WBC # BLD: 3.53 K/UL — LOW (ref 3.8–10.5)
WBC # BLD: 3.53 K/UL — LOW (ref 3.8–10.5)
WBC # FLD AUTO: 3.53 K/UL — LOW (ref 3.8–10.5)
WBC # FLD AUTO: 3.53 K/UL — LOW (ref 3.8–10.5)

## 2023-12-14 RX ORDER — HEPARIN SODIUM 5000 [USP'U]/ML
2000 INJECTION INTRAVENOUS; SUBCUTANEOUS ONCE
Refills: 0 | Status: COMPLETED | OUTPATIENT
Start: 2023-12-15 | End: 2023-12-15

## 2023-12-14 RX ORDER — REMDESIVIR 5 MG/ML
100 INJECTION INTRAVENOUS EVERY 24 HOURS
Refills: 0 | Status: DISCONTINUED | OUTPATIENT
Start: 2023-12-15 | End: 2023-12-15

## 2023-12-14 RX ORDER — CALCITRIOL 0.5 UG/1
1 CAPSULE ORAL
Refills: 0 | Status: DISCONTINUED | OUTPATIENT
Start: 2023-12-14 | End: 2023-12-22

## 2023-12-14 RX ORDER — REMDESIVIR 5 MG/ML
INJECTION INTRAVENOUS
Refills: 0 | Status: DISCONTINUED | OUTPATIENT
Start: 2023-12-14 | End: 2023-12-15

## 2023-12-14 RX ORDER — REMDESIVIR 5 MG/ML
200 INJECTION INTRAVENOUS EVERY 24 HOURS
Refills: 0 | Status: COMPLETED | OUTPATIENT
Start: 2023-12-14 | End: 2023-12-14

## 2023-12-14 RX ORDER — HEPARIN SODIUM 5000 [USP'U]/ML
1000 INJECTION INTRAVENOUS; SUBCUTANEOUS
Refills: 0 | Status: COMPLETED | OUTPATIENT
Start: 2023-12-15 | End: 2023-12-15

## 2023-12-14 RX ORDER — ACETAMINOPHEN 500 MG
1000 TABLET ORAL ONCE
Refills: 0 | Status: COMPLETED | OUTPATIENT
Start: 2023-12-14 | End: 2023-12-14

## 2023-12-14 RX ADMIN — Medication 650 MILLIGRAM(S): at 22:00

## 2023-12-14 RX ADMIN — Medication 650 MILLIGRAM(S): at 05:11

## 2023-12-14 RX ADMIN — Medication 1000 MILLIGRAM(S): at 15:32

## 2023-12-14 RX ADMIN — Medication 100 MILLIGRAM(S): at 21:12

## 2023-12-14 RX ADMIN — REMDESIVIR 200 MILLIGRAM(S): 5 INJECTION INTRAVENOUS at 14:53

## 2023-12-14 RX ADMIN — ONDANSETRON 4 MILLIGRAM(S): 8 TABLET, FILM COATED ORAL at 05:41

## 2023-12-14 RX ADMIN — CHLORHEXIDINE GLUCONATE 1 APPLICATION(S): 213 SOLUTION TOPICAL at 17:36

## 2023-12-14 RX ADMIN — HEPARIN SODIUM 5000 UNIT(S): 5000 INJECTION INTRAVENOUS; SUBCUTANEOUS at 05:11

## 2023-12-14 RX ADMIN — HEPARIN SODIUM 5000 UNIT(S): 5000 INJECTION INTRAVENOUS; SUBCUTANEOUS at 17:35

## 2023-12-14 RX ADMIN — Medication 650 MILLIGRAM(S): at 21:12

## 2023-12-14 RX ADMIN — Medication 400 MILLIGRAM(S): at 14:32

## 2023-12-14 RX ADMIN — Medication 100 MILLIGRAM(S): at 05:11

## 2023-12-14 NOTE — CONSULT NOTE ADULT - ASSESSMENT
72 yo F with PMhx of HTN, PE (not on AC), OA, chronic migraine  and ESRD on HD (MWF) presents to the ED with cough and SOB.   74 yo F with PMhx of HTN, PE (not on AC), OA, chronic migraine  and ESRD on HD (MWF) presents to the ED with cough and SOB. ECHO 4/23/22 : nl lV  sys fx EF 55-60%  stress  11/14/23 normal   EF% during stress is 64 %    a/p   72 yo F with PMhx of HTN, PE (not on AC), OA, chronic migraine  and ESRD on HD (MW) presents to the ED with cough/ SOB/ chest pain, + covid     #Atypical chest pain   -msk- secondary to cough   -HS trop neg x 1, no ischemic changes to ecg   -recent stress  11/14/23 normal   EF% during stress is 64 %  -can check echo   -hx of PE- not on ac,, elevated d dimer- consider cta chest r/o pe ?     #htn   -resume outpt meds     #Covid 19  -managment per med   -on remdesivir    -ID fu    # sob   -secondary to covid  -no chf on exam     dvt ppx    ECHO 4/23/22 : nl lV  sys fx EF 55-60%  stress  11/14/23 normal   EF% during stress is 64 %    a/p   74 yo F with PMhx of HTN, PE (not on AC), OA, chronic migraine  and ESRD on HD (MW) presents to the ED with cough/ SOB/ chest pain, + covid     #Atypical chest pain   -msk- secondary to cough   -HS trop neg x 1, no ischemic changes to ecg   -recent stress  11/14/23 normal   EF% during stress is 64 %  -can check echo   -hx of PE- not on ac,, elevated d dimer- consider cta chest r/o pe ?     #htn   -resume outpt meds     #Covid 19  -managment per med   -on remdesivir    -ID fu    # sob   -secondary to covid  -no chf on exam     dvt ppx

## 2023-12-14 NOTE — PROGRESS NOTE ADULT - ASSESSMENT
74 yo F with PMhx of HTN, PE (not on AC), OA, chronic migraine  and ESRD on HD (MWF) presents to the ED with cough and SOB, found to have COVID-19.  72 yo F with PMhx of HTN, PE (not on AC), OA, chronic migraine  and ESRD on HD (MWF) presents to the ED with cough and SOB, found to have COVID-19.

## 2023-12-14 NOTE — PROGRESS NOTE ADULT - PROBLEM SELECTOR PLAN 1
Patient with cough, SOB, runny nose, myalgias, and weakness   -RVP positive for SARSCOV2   -CXR showed clear lungs   c/w remdesivir x 3 day course  monitor liver enzymes while on remdesivir  trend inflammatory markers  supportive care

## 2023-12-14 NOTE — CONSULT NOTE ADULT - TIME BILLING
Agree with above NP note.  a/p   72 yo F with PMhx of HTN, PE (not on AC), OA, chronic migraine  and ESRD on HD (MW) presents to the ED with cough/ SOB/ chest pain, + covid     #Atypical chest pain   -msk- secondary to cough   -no ACS  -recent stress  11/14/23 normal   EF% during stress is 64 %  -check echo   -hx of PE- not on ac  -pe w/u per med    #htn   -resume outpt meds     #Covid 19  -managment per med   -on remdesivir    -ID fu    # sob   -in setting of covid   -no decomp HF    dvt ppx

## 2023-12-14 NOTE — CONSULT NOTE ADULT - SUBJECTIVE AND OBJECTIVE BOX
Tony Bright M.D.  [facility], Division of Infectious Diseases  [phone]  After 5pm on weekdays and all day on weekends - please call [phone]  Tony Bright M.D.  [facility], Division of Infectious Diseases  [phone]  After 5pm on weekdays and all day on weekends - please call [phone]  [facility], Division of Infectious Diseases  JAME Ashley S. Shah, Y. Patel, G. Deangelo  [phone]  ([phone] - weekdays after 5pm and weekends)    MELISSA ARREOLA  73y, Female  [id number]    HPI--  HPI:  74 y/o F PMhx HTN, PE (not on AC), OA, chronic migraine and ESRD on HD (MWF) who presented w/ cough and SOB. She went to HD yesterday, however, this was cut short due to weakness/SOB. She was then sent to the ED. She reports having SOB for some time but states worsened this past weekend. Additionally this weekend began to have cough, rhinorrhea, myalgias. Has also been having headaches.   Denies fever, chills, abd pain, dysuria.     ROS: 1o point review of systems completed, pertinent positives and negatives as per HPI.    Allergies: trimethoprim (Other; Rash)  sulfa drugs (Other; Rash)  Sulfur (Unknown)    PMH -- Osteoarthritis    Pulmonary embolism    Joint infection    Dysfunctional uterine bleeding    Obesity    Essential hypertension    Obstructive sleep apnea syndrome    History of pulmonary embolism    Arthropathy    Migraine    Morbid obesity    ESRD (end stage renal disease)      PSH -- Total knee replacement status    Status post hysterectomy    Status post cholecystectomy    Other acquired absence of organ    Status post total hysterectomy    History of total knee replacement      FH -- No pertinent family history in first degree relatives    No pertinent family history    FH: type 2 diabetes mellitus    FHx: hypertension      Social History -- denies tobacco, alcohol or illicit drug use    Physical Exam--  Vital Signs Last 24 Hrs  T(F): 98.1 (14 Dec 2023 10:59), Max: 98.1 (13 Dec 2023 13:38)  HR: 95 (14 Dec 2023 10:59) (93 - 111)  BP: 139/68 (14 Dec 2023 10:59) (130/76 - 151/93)  RR: 16 (14 Dec 2023 10:59) (16 - 20)  SpO2: 97% (14 Dec 2023 10:59) (96% - 100%)  General: nontoxic-appearing, no acute distress  HEENT: anicteric  Lungs: mild rhonchi b/l  Heart: S1, S2, normal rate. R chest CVC  Abdomen: Soft. Nondistended. Nontender.   Neuro: AAOx3  Extremities: No LE edema.   Skin: Warm. Dry. No rash.  Psychiatric: Appropriate affect and mood for situation.     Laboratory & Imaging Data--  CBC:                       10.5   3.53  )-----------( 175      ( 14 Dec 2023 07:30 )             33.2     WBC Count: 3.53 K/uL (12-14-23 @ 07:30)  WBC Count: 4.78 K/uL (12-13-23 @ 14:27)    CMP: 12-14    138  |  95<L>  |  28<H>  ----------------------------<  84  4.1   |  32<H>  |  4.94<H>    Ca    8.5      14 Dec 2023 07:30    TPro  7.2  /  Alb  3.5  /  TBili  0.6  /  DBili  x   /  AST  35<H>  /  ALT  33  /  AlkPhos  141<H>  12-14    LIVER FUNCTIONS - ( 14 Dec 2023 07:30 )  Alb: 3.5 g/dL / Pro: 7.2 g/dL / ALK PHOS: 141 U/L / ALT: 33 U/L / AST: 35 U/L / GGT: x           Urinalysis Basic - ( 14 Dec 2023 07:30 )    Color: x / Appearance: x / SG: x / pH: x  Gluc: 84 mg/dL / Ketone: x  / Bili: x / Urobili: x   Blood: x / Protein: x / Nitrite: x   Leuk Esterase: x / RBC: x / WBC x   Sq Epi: x / Non Sq Epi: x / Bacteria: x      Microbiology:       Radiology--  ***  Active Medications--  acetaminophen     Tablet .. 650 milliGRAM(s) Oral every 6 hours PRN  aluminum hydroxide/magnesium hydroxide/simethicone Suspension 30 milliLiter(s) Oral every 4 hours PRN  benzonatate 100 milliGRAM(s) Oral every 8 hours PRN  calcitriol   Capsule 1 MICROGram(s) Oral <User Schedule>  chlorhexidine 2% Cloths 1 Application(s) Topical daily  heparin   Injectable 5000 Unit(s) SubCutaneous every 12 hours  melatonin 3 milliGRAM(s) Oral at bedtime PRN  ondansetron Injectable 4 milliGRAM(s) IV Push every 8 hours PRN  remdesivir  IVPB 200 milliGRAM(s) IV Intermittent every 24 hours  remdesivir  IVPB   IV Intermittent   sodium chloride 0.9% Bolus. 100 milliLiter(s) IV Bolus every 5 minutes PRN    Antimicrobials:   remdesivir  IVPB   IV Intermittent   remdesivir  IVPB 200 milliGRAM(s) IV Intermittent every 24 hours    Immunologic:    [facility], Division of Infectious Diseases  JAME Ashley S. Shah, Y. Patel, G. Deangelo  [phone]  ([phone] - weekdays after 5pm and weekends)    MELISSA ARREOLA  73y, Female  [id number]    HPI--  HPI:  74 y/o F PMhx HTN, PE (not on AC), OA, chronic migraine and ESRD on HD (MWF) who presented w/ cough and SOB. She went to HD yesterday, however, this was cut short due to weakness/SOB. She was then sent to the ED. She reports having SOB for some time but states worsened this past weekend. Additionally this weekend began to have cough, rhinorrhea, myalgias. Has also been having headaches.   Denies fever, chills, abd pain, dysuria.     ROS: 1o point review of systems completed, pertinent positives and negatives as per HPI.    Allergies: trimethoprim (Other; Rash)  sulfa drugs (Other; Rash)  Sulfur (Unknown)    PMH -- Osteoarthritis    Pulmonary embolism    Joint infection    Dysfunctional uterine bleeding    Obesity    Essential hypertension    Obstructive sleep apnea syndrome    History of pulmonary embolism    Arthropathy    Migraine    Morbid obesity    ESRD (end stage renal disease)      PSH -- Total knee replacement status    Status post hysterectomy    Status post cholecystectomy    Other acquired absence of organ    Status post total hysterectomy    History of total knee replacement      FH -- No pertinent family history in first degree relatives    No pertinent family history    FH: type 2 diabetes mellitus    FHx: hypertension      Social History -- denies tobacco, alcohol or illicit drug use    Physical Exam--  Vital Signs Last 24 Hrs  T(F): 98.1 (14 Dec 2023 10:59), Max: 98.1 (13 Dec 2023 13:38)  HR: 95 (14 Dec 2023 10:59) (93 - 111)  BP: 139/68 (14 Dec 2023 10:59) (130/76 - 151/93)  RR: 16 (14 Dec 2023 10:59) (16 - 20)  SpO2: 97% (14 Dec 2023 10:59) (96% - 100%)  General: nontoxic-appearing, no acute distress  HEENT: anicteric  Lungs: mild rhonchi b/l  Heart: S1, S2, normal rate. R chest CVC  Abdomen: Soft. Nondistended. Nontender.   Neuro: AAOx3  Extremities: No LE edema.   Skin: Warm. Dry. No rash.  Psychiatric: Appropriate affect and mood for situation.     Laboratory & Imaging Data--  CBC:                       10.5   3.53  )-----------( 175      ( 14 Dec 2023 07:30 )             33.2     WBC Count: 3.53 K/uL (12-14-23 @ 07:30)  WBC Count: 4.78 K/uL (12-13-23 @ 14:27)    CMP: 12-14    138  |  95<L>  |  28<H>  ----------------------------<  84  4.1   |  32<H>  |  4.94<H>    Ca    8.5      14 Dec 2023 07:30    TPro  7.2  /  Alb  3.5  /  TBili  0.6  /  DBili  x   /  AST  35<H>  /  ALT  33  /  AlkPhos  141<H>  12-14    LIVER FUNCTIONS - ( 14 Dec 2023 07:30 )  Alb: 3.5 g/dL / Pro: 7.2 g/dL / ALK PHOS: 141 U/L / ALT: 33 U/L / AST: 35 U/L / GGT: x           Urinalysis Basic - ( 14 Dec 2023 07:30 )    Color: x / Appearance: x / SG: x / pH: x  Gluc: 84 mg/dL / Ketone: x  / Bili: x / Urobili: x   Blood: x / Protein: x / Nitrite: x   Leuk Esterase: x / RBC: x / WBC x   Sq Epi: x / Non Sq Epi: x / Bacteria: x      Microbiology:       Radiology--  ***  Active Medications--  acetaminophen     Tablet .. 650 milliGRAM(s) Oral every 6 hours PRN  aluminum hydroxide/magnesium hydroxide/simethicone Suspension 30 milliLiter(s) Oral every 4 hours PRN  benzonatate 100 milliGRAM(s) Oral every 8 hours PRN  calcitriol   Capsule 1 MICROGram(s) Oral <User Schedule>  chlorhexidine 2% Cloths 1 Application(s) Topical daily  heparin   Injectable 5000 Unit(s) SubCutaneous every 12 hours  melatonin 3 milliGRAM(s) Oral at bedtime PRN  ondansetron Injectable 4 milliGRAM(s) IV Push every 8 hours PRN  remdesivir  IVPB 200 milliGRAM(s) IV Intermittent every 24 hours  remdesivir  IVPB   IV Intermittent   sodium chloride 0.9% Bolus. 100 milliLiter(s) IV Bolus every 5 minutes PRN    Antimicrobials:   remdesivir  IVPB   IV Intermittent   remdesivir  IVPB 200 milliGRAM(s) IV Intermittent every 24 hours    Immunologic:

## 2023-12-14 NOTE — PROGRESS NOTE ADULT - SUBJECTIVE AND OBJECTIVE BOX
[facility] NEPHROLOGY- PROGRESS NOTE    73y Female with history of ESRD on HD presents with SOB. Nephrology consulted for ESRD status.    REVIEW OF SYSTEMS:  Gen: no fevers  Cards: no chest pain  Resp: + dyspnea, + cough  GI: no nausea or vomiting or diarrhea  Vascular: no LE edema    trimethoprim (Other; Rash)  sulfa drugs (Other; Rash)  Sulfur (Unknown)      Hospital Medications: Medications reviewed    VITALS:  T(F): 97.5 (12-14-23 @ 05:10), Max: 98.1 (12-13-23 @ 13:38)  HR: 98 (12-14-23 @ 05:10)  BP: 148/92 (12-14-23 @ 05:10)  RR: 16 (12-14-23 @ 05:10)  SpO2: 96% (12-14-23 @ 05:10)  Wt(kg): --        PHYSICAL EXAM:    Gen: NAD, calm  Cards: RRR, +S1/S2, no M/G/R  Resp: CTA B/L  GI: soft, NT/ND, NABS  Vascular: no LE edema B/L, RIJ TDC intact    LABS:  12-14    138  |  95<L>  |  28<H>  ----------------------------<  84  4.1   |  32<H>  |  4.94<H>    Ca    8.5      14 Dec 2023 07:30    TPro  7.2  /  Alb  3.5  /  TBili  0.6  /  DBili      /  AST  35<H>  /  ALT  33  /  AlkPhos  141<H>  12-14    Creatinine Trend: 4.94 <--, 3.71 <--                        10.5   3.53  )-----------( 175      ( 14 Dec 2023 07:30 )             33.2     Urine Studies:  Urinalysis Basic - ( 14 Dec 2023 07:30 )    Color:  / Appearance:  / SG:  / pH:   Gluc: 84 mg/dL / Ketone:   / Bili:  / Urobili:    Blood:  / Protein:  / Nitrite:    Leuk Esterase:  / RBC:  / WBC    Sq Epi:  / Non Sq Epi:  / Bacteria:           RADIOLOGY & ADDITIONAL STUDIES:    < from: Xray Chest 2 Views PA/Lat (12.13.23 @ 16:33) >    IMPRESSION:  Clear lungs.    --- End of Report ---    < end of copied text >   [facility] NEPHROLOGY- PROGRESS NOTE    73y Female with history of ESRD on HD presents with SOB. Nephrology consulted for ESRD status.    REVIEW OF SYSTEMS:  Gen: no fevers  Cards: no chest pain  Resp: + dyspnea, + cough  GI: no nausea or vomiting or diarrhea  Vascular: no LE edema    trimethoprim (Other; Rash)  sulfa drugs (Other; Rash)  Sulfur (Unknown)      Hospital Medications: Medications reviewed    VITALS:  T(F): 97.5 (12-14-23 @ 05:10), Max: 98.1 (12-13-23 @ 13:38)  HR: 98 (12-14-23 @ 05:10)  BP: 148/92 (12-14-23 @ 05:10)  RR: 16 (12-14-23 @ 05:10)  SpO2: 96% (12-14-23 @ 05:10)  Wt(kg): --        PHYSICAL EXAM:    Gen: NAD, calm  Cards: RRR, +S1/S2, no M/G/R  Resp: CTA B/L  GI: soft, NT/ND, NABS  Vascular: no LE edema B/L, RIJ TDC intact    LABS:  12-14    138  |  95<L>  |  28<H>  ----------------------------<  84  4.1   |  32<H>  |  4.94<H>    Ca    8.5      14 Dec 2023 07:30    TPro  7.2  /  Alb  3.5  /  TBili  0.6  /  DBili      /  AST  35<H>  /  ALT  33  /  AlkPhos  141<H>  12-14    Creatinine Trend: 4.94 <--, 3.71 <--                        10.5   3.53  )-----------( 175      ( 14 Dec 2023 07:30 )             33.2     Urine Studies:  Urinalysis Basic - ( 14 Dec 2023 07:30 )    Color:  / Appearance:  / SG:  / pH:   Gluc: 84 mg/dL / Ketone:   / Bili:  / Urobili:    Blood:  / Protein:  / Nitrite:    Leuk Esterase:  / RBC:  / WBC    Sq Epi:  / Non Sq Epi:  / Bacteria:           RADIOLOGY & ADDITIONAL STUDIES:    < from: Xray Chest 2 Views PA/Lat (12.13.23 @ 16:33) >    IMPRESSION:  Clear lungs.    --- End of Report ---    < end of copied text >

## 2023-12-14 NOTE — PATIENT PROFILE ADULT - FALL HARM RISK - HARM RISK INTERVENTIONS
Assistance with ambulation/Assistance OOB with selected safe patient handling equipment/Communicate Risk of Fall with Harm to all staff/Discuss with provider need for PT consult/Monitor gait and stability/Provide patient with walking aids - walker, cane, crutches/Reinforce activity limits and safety measures with patient and family/Tailored Fall Risk Interventions/Visual Cue: Yellow wristband and red socks/Bed in lowest position, wheels locked, appropriate side rails in place/Call bell, personal items and telephone in reach/Instruct patient to call for assistance before getting out of bed or chair/Non-slip footwear when patient is out of bed/[place] to call system/Physically safe environment - no spills, clutter or unnecessary equipment/Purposeful Proactive Rounding/Room/bathroom lighting operational, light cord in reach Assistance with ambulation/Assistance OOB with selected safe patient handling equipment/Communicate Risk of Fall with Harm to all staff/Discuss with provider need for PT consult/Monitor gait and stability/Provide patient with walking aids - walker, cane, crutches/Reinforce activity limits and safety measures with patient and family/Tailored Fall Risk Interventions/Visual Cue: Yellow wristband and red socks/Bed in lowest position, wheels locked, appropriate side rails in place/Call bell, personal items and telephone in reach/Instruct patient to call for assistance before getting out of bed or chair/Non-slip footwear when patient is out of bed/[place] to call system/Physically safe environment - no spills, clutter or unnecessary equipment/Purposeful Proactive Rounding/Room/bathroom lighting operational, light cord in reach

## 2023-12-14 NOTE — CONSULT NOTE ADULT - ASSESSMENT
72 y/o F PMhx HTN, OA, chronic migraine and ESRD on HD (MWF) who presented w/ cough and SOB.    COVID  symptomatic   saturating well on RA    Recommendations  c/w remdesivir x 3 day course  monitor liver enzymes while on remdesivir  trend inflammatory markers  supportive care  isolation per infection control policy     Tony Bright M.D.  [facility], Division of Infectious Diseases  [phone]  After 5pm on weekdays and all day on weekends - please call [phone]  72 y/o F PMhx HTN, OA, chronic migraine and ESRD on HD (MWF) who presented w/ cough and SOB.    COVID  symptomatic   saturating well on RA    Recommendations  c/w remdesivir x 3 day course  monitor liver enzymes while on remdesivir  trend inflammatory markers  supportive care  isolation per infection control policy     Tony Bright M.D.  [facility], Division of Infectious Diseases  [phone]  After 5pm on weekdays and all day on weekends - please call [phone]

## 2023-12-14 NOTE — PROGRESS NOTE ADULT - SUBJECTIVE AND OBJECTIVE BOX
SUBJECTIVE / OVERNIGHT EVENTS:pt seen and examined  12-14-23     MEDICATIONS  (STANDING):  calcitriol   Capsule 1 MICROGram(s) Oral <User Schedule>  chlorhexidine 2% Cloths 1 Application(s) Topical daily  heparin   Injectable 5000 Unit(s) SubCutaneous every 12 hours  remdesivir  IVPB   IV Intermittent     MEDICATIONS  (PRN):  acetaminophen     Tablet .. 650 milliGRAM(s) Oral every 6 hours PRN Temp greater or equal to 38C (100.4F), Mild Pain (1 - 3)  aluminum hydroxide/magnesium hydroxide/simethicone Suspension 30 milliLiter(s) Oral every 4 hours PRN Dyspepsia  benzonatate 100 milliGRAM(s) Oral every 8 hours PRN Cough  melatonin 3 milliGRAM(s) Oral at bedtime PRN Insomnia  ondansetron Injectable 4 milliGRAM(s) IV Push every 8 hours PRN Nausea and/or Vomiting  sodium chloride 0.9% Bolus. 100 milliLiter(s) IV Bolus every 5 minutes PRN SBP LESS THAN or EQUAL to 90 mmHg    T(C): 36.7 (12-14-23 @ 21:23), Max: 36.7 (12-14-23 @ 10:41)  HR: 90 (12-14-23 @ 21:23) (90 - 98)  BP: 143/63 (12-14-23 @ 21:23) (132/69 - 148/92)  RR: 18 (12-14-23 @ 21:23) (16 - 18)  SpO2: 98% (12-14-23 @ 21:23) (96% - 100%)    CAPILLARY BLOOD GLUCOSE        I&O's Summary      Constitutional: No fever, fatigue  Skin: No rash.  Eyes: No recent vision problems or eye pain.  ENT: No congestion, ear pain, or sore throat.  Cardiovascular: No chest pain or palpation.  Respiratory: No cough, shortness of breath, congestion, or wheezing.  Gastrointestinal: No abdominal pain, nausea, vomiting, or diarrhea.  Genitourinary: No dysuria.  Musculoskeletal: No joint swelling.  Neurologic: No headache.    PHYSICAL EXAM:  GENERAL: NAD  EYES: EOMI, PERRLA  NECK: Supple, No JVD  CHEST/LUNG: dec breath sounds at bases  HEART:  S1 , S2 +  ABDOMEN: soft , bs+  EXTREMITIES:  no edema  NEUROLOGY:a;ert awake      LABS:                        10.5   3.53  )-----------( 175      ( 14 Dec 2023 07:30 )             33.2     12-14    138  |  95<L>  |  28<H>  ----------------------------<  84  4.1   |  32<H>  |  4.94<H>    Ca    8.5      14 Dec 2023 07:30    TPro  7.2  /  Alb  3.5  /  TBili  0.6  /  DBili  x   /  AST  35<H>  /  ALT  33  /  AlkPhos  141<H>  12-14    PT/INR - ( 13 Dec 2023 15:02 )   PT: 11.4 sec;   INR: 1.02 ratio         PTT - ( 13 Dec 2023 15:02 )  PTT:81.2 sec      Urinalysis Basic - ( 14 Dec 2023 07:30 )    Color: x / Appearance: x / SG: x / pH: x  Gluc: 84 mg/dL / Ketone: x  / Bili: x / Urobili: x   Blood: x / Protein: x / Nitrite: x   Leuk Esterase: x / RBC: x / WBC x   Sq Epi: x / Non Sq Epi: x / Bacteria: x        RADIOLOGY & ADDITIONAL TESTS:    Imaging Personally Reviewed:    Consultant(s) Notes Reviewed:      Care Discussed with Consultants/Other Providers:

## 2023-12-14 NOTE — PROGRESS NOTE ADULT - ASSESSMENT
73y Female with history of ESRD on HD presents with SOB. Nephrology consulted for ESRD status.    1) ESRD: Last HD on 12/13 as an outpatient terminated 30 minutes prematurely (after 3.5 hours) due to complaints of SOB with tachycardia (up to 120's) and poor catheter flow. Will attempt HD via catheter on 12/15 and consider TDC exchange if catheter continues to have high arterial and venous pressures despite heparin (on 2000 units bolus and 1000 units/hour maintenance). Monitor electrolytes.    2) HTN with ESRD: BP controlled. Monitor off medications.    3) Anemia of renal disease: Hb acceptable. On Micera 30 mcg monthly. Will give Epogen if Hb decreases. Monitor Hb.    4) Secondary HPT of renal origin: Resume calcitriol 1 mcg MWF and continue with renal diet. Monitor serum calcium and phosphorus.      [facility] NEPHROLOGY  Derrick Castaneda M.D.  Raúl Diana D.O.  Grace Delgadillo M.D.  MD Cher Beckman, MSN, ANP-C    Telephone: [phone]  Facsimile: [phone] [place] [address], [address]  [place] [address]   73y Female with history of ESRD on HD presents with SOB. Nephrology consulted for ESRD status.    1) ESRD: Last HD on 12/13 as an outpatient terminated 30 minutes prematurely (after 3.5 hours) due to complaints of SOB with tachycardia (up to 120's) and poor catheter flow. Will attempt HD via catheter on 12/15 and consider TDC exchange if catheter continues to have high arterial and venous pressures despite heparin (on 2000 units bolus and 1000 units/hour maintenance). Monitor electrolytes.    2) HTN with ESRD: BP controlled. Monitor off medications.    3) Anemia of renal disease: Hb acceptable. On Micera 30 mcg monthly. Will give Epogen if Hb decreases. Monitor Hb.    4) Secondary HPT of renal origin: Resume calcitriol 1 mcg MWF and continue with renal diet. Monitor serum calcium and phosphorus.      [facility] NEPHROLOGY  Derrick Castaneda M.D.  Raúl Diana D.O.  Grace Delgadillo M.D.  MD Cher Beckman, MSN, ANP-C    Telephone: [phone]  Facsimile: [phone] [place] [address], [address]  [place] [address]

## 2023-12-14 NOTE — CONSULT NOTE ADULT - SUBJECTIVE AND OBJECTIVE BOX
CARDIOLOGY CONSULT - Dr. Joe         HPI:  74 yo F with PMhx of HTN, PE (not on AC), OA, chronic migraine  and ESRD on HD (MWF) presents to the ED with cough and SOB. Pt reports that she has been feeling weak for the last couple of days. She has a productive cough. Her chest hurts whenever she coughs. She also has runny nose, myalgias, and SOB with exertion. Denies any associated fever, chills, chest pain or LE edema. She went to HD today and had to stop HD due to weakness/SOB. She was then sent to the ED.   In the ED, her vitals were notable tachycardia. Labs were notable for elevated BUN/Scr, elevated ALP, AST. RVP was positive for SARSCOV2. CXR showed clear lungs.      PAST MEDICAL & SURGICAL HISTORY:  Osteoarthritis      Pulmonary embolism      Joint infection      Dysfunctional uterine bleeding      Obesity      Essential hypertension  Hypertension      Obstructive sleep apnea syndrome  Obstructive sleep apnea      History of pulmonary embolism  2012 s/p IVC filter      Arthropathy  Arthritis      Migraine  Migraines      Morbid obesity      ESRD (end stage renal disease)      Total knee replacement status      Status post hysterectomy      Status post cholecystectomy      Other acquired absence of organ  History of cholecystectomy      Status post total hysterectomy  partial      History of total knee replacement  with revisions x 3                PREVIOUS DIAGNOSTIC TESTING:      < from: TTE Echo Complete w/o Contrast w/ Doppler (04.23.22 @ 13:17) >  Summary:   1. Left ventricular ejection fraction, by visual estimation, is 55 to   60%.   2. Normal global left ventricular systolic function.   3. Normal left ventricular internal cavity size.   4. Spectral Doppler shows impaired relaxation pattern of left   ventricular myocardial filling (Grade I diastolic dysfunction).   5. There is mild concentric left ventricular hypertrophy.   6. Normal right ventricular size and function.   7. Normal left atrial size.   8. Normal right atrial size.   9. There is no evidence of pericardial effusion.  10. Mild mitral annular calcification.  11. Mild mitral valve regurgitation.  12. Mild thickening and calcification of the anterior and posterior   mitral valve leaflets.  13. Mild tricuspid regurgitation.  14. Sclerotic aortic valve with decreased opening.  15. There is mild aortic root calcification.      < from: Nuclear Stress Test-Pharmacologic.. (11.14.23 @ 11:35) >  --------------------------------------------------------------------------------------------------------------------------------------------------------  Conclusions:   1. Normal myocardial perfusion scan, with no evidence of infarction or inducible ischemia.  2. Normal left ventricular regional wall motion.   3. The left ventricle is normal in function and normal in size. The left ventricular EF% during stress is 64 %. The stress end diastolic volume is 73 ml and systolic volume is 26 ml.   4. Normal right ventricular function.       	    MEDICATIONS:  Home Medications:  amLODIPine 10 mg oral tablet: 1 tab(s) orally once a day (13 Dec 2023 18:52)  calcitriol 0.5 mcg oral capsule: 1 cap(s) orally once a day (13 Dec 2023 18:52)  cholecalciferol oral tablet: 1000 unit(s) orally once a day (13 Dec 2023 18:52)  folic acid 1 mg oral tablet: 1 tab(s) orally once a day (13 Dec 2023 18:52)  metoprolol succinate 100 mg oral tablet, extended release: 1 tab(s) orally once a day (13 Dec 2023 18:52)      MEDICATIONS  (STANDING):  calcitriol   Capsule 1 MICROGram(s) Oral <User Schedule>  chlorhexidine 2% Cloths 1 Application(s) Topical daily  heparin   Injectable 5000 Unit(s) SubCutaneous every 12 hours  remdesivir  IVPB   IV Intermittent       FAMILY HISTORY:  FH: type 2 diabetes mellitus    FHx: hypertension        SOCIAL HISTORY:    [ ] Non-smoker  [ ] Smoker  [ ] Alcohol    Allergies    trimethoprim (Other; Rash)  sulfa drugs (Other; Rash)  Sulfur (Unknown)    Intolerances    	    REVIEW OF SYSTEMS:  CONSTITUTIONAL: No fever, weight loss, or fatigue  EYES: No eye pain, visual disturbances, or discharge  ENMT:  No difficulty hearing, tinnitus, vertigo; No sinus or throat pain  NECK: No pain or stiffness  RESPIRATORY: No cough, wheezing, chills or hemoptysis; No Shortness of Breath  CARDIOVASCULAR: No chest pain, palpitations, passing out, dizziness, or leg swelling  GASTROINTESTINAL: No abdominal or epigastric pain. No nausea, vomiting, or hematemesis; No diarrhea or constipation. No melena or hematochezia.  GENITOURINARY: No dysuria, frequency, hematuria, or incontinence  NEUROLOGICAL: No headaches, memory loss, loss of strength, numbness, or tremors  SKIN: No itching, burning, rashes, or lesions   	    [ ] All others negative	  [ ] Unable to obtain    PHYSICAL EXAM:  T(C): 36.4 (12-14-23 @ 05:10), Max: 36.7 (12-13-23 @ 13:38)  HR: 98 (12-14-23 @ 05:10) (93 - 111)  BP: 148/92 (12-14-23 @ 05:10) (130/76 - 151/93)  RR: 16 (12-14-23 @ 05:10) (16 - 20)  SpO2: 96% (12-14-23 @ 05:10) (96% - 100%)  Wt(kg): --  I&O's Summary      Appearance: Normal	  Psychiatry: A & O x 3, Mood & affect appropriate  HEENT:   Normal oral mucosa, PERRL, EOMI	  Lymphatic: No lymphadenopathy  Cardiovascular: Normal S1 S2,RRR, No JVD, No murmurs  Respiratory: Lungs clear to auscultation	  Gastrointestinal:  Soft, Non-tender, + BS	  Skin: No rashes, No ecchymoses, No cyanosis	  Neurologic: Non-focal  Extremities: Normal range of motion, No clubbing, cyanosis or edema  Vascular: Peripheral pulses palpable 2+ bilaterally    TELEMETRY: 	    ECG:  	  RADIOLOGY:  < from: Xray Chest 2 Views PA/Lat (12.13.23 @ 16:33) >  IMPRESSION:  Clear lungs.    --- End of Report ---    < end of copied text >    OTHER: 	  	  LABS:	 	    CARDIAC MARKERS:  Troponin T, High Sensitivity Result: 27 ng/L (12-13 @ 14:27)                                  10.5   3.53  )-----------( 175      ( 14 Dec 2023 07:30 )             33.2     12-14    138  |  95<L>  |  28<H>  ----------------------------<  84  4.1   |  32<H>  |  4.94<H>    Ca    8.5      14 Dec 2023 07:30    TPro  7.2  /  Alb  3.5  /  TBili  0.6  /  DBili  x   /  AST  35<H>  /  ALT  33  /  AlkPhos  141<H>  12-14    PT/INR - ( 13 Dec 2023 15:02 )   PT: 11.4 sec;   INR: 1.02 ratio         PTT - ( 13 Dec 2023 15:02 )  PTT:81.2 sec  proBNP:   Lipid Profile:   HgA1c:   TSH:        CARDIOLOGY CONSULT - Dr. Joe         HPI:  74 yo F with PMhx of HTN, PE (not on AC), OA, chronic migraine  and ESRD on HD (MWF) presents to the ED with cough and SOB. Pt reports that she has been feeling weak for the last couple of days. She has a productive cough. Her chest hurts whenever she coughs. She also has runny nose, myalgias, and SOB with exertion. Denies any associated fever, chills, chest pain or LE edema. She went to HD today and had to stop HD due to weakness/SOB. She was then sent to the ED.   In the ED, her vitals were notable tachycardia. Labs were notable for elevated BUN/Scr, elevated ALP, AST. RVP was positive for SARSCOV2. CXR showed clear lungs.  pt reports PINTO prior to being sick- recent ECHO 4/23/22 : nl lV  sys fx EF 55-60%  stress  11/14/23 normal   EF% during stress is 64 %  ros otherwise negative     PAST MEDICAL & SURGICAL HISTORY:  Osteoarthritis      Pulmonary embolism      Joint infection      Dysfunctional uterine bleeding      Obesity      Essential hypertension  Hypertension      Obstructive sleep apnea syndrome  Obstructive sleep apnea      History of pulmonary embolism  2012 s/p IVC filter      Arthropathy  Arthritis      Migraine  Migraines      Morbid obesity      ESRD (end stage renal disease)      Total knee replacement status      Status post hysterectomy      Status post cholecystectomy      Other acquired absence of organ  History of cholecystectomy      Status post total hysterectomy  partial      History of total knee replacement  with revisions x 3              PREVIOUS DIAGNOSTIC TESTING:      < from: TTE Echo Complete w/o Contrast w/ Doppler (04.23.22 @ 13:17) >  Summary:   1. Left ventricular ejection fraction, by visual estimation, is 55 to   60%.   2. Normal global left ventricular systolic function.   3. Normal left ventricular internal cavity size.   4. Spectral Doppler shows impaired relaxation pattern of left   ventricular myocardial filling (Grade I diastolic dysfunction).   5. There is mild concentric left ventricular hypertrophy.   6. Normal right ventricular size and function.   7. Normal left atrial size.   8. Normal right atrial size.   9. There is no evidence of pericardial effusion.  10. Mild mitral annular calcification.  11. Mild mitral valve regurgitation.  12. Mild thickening and calcification of the anterior and posterior   mitral valve leaflets.  13. Mild tricuspid regurgitation.  14. Sclerotic aortic valve with decreased opening.  15. There is mild aortic root calcification.      < from: Nuclear Stress Test-Pharmacologic.. (11.14.23 @ 11:35) >  --------------------------------------------------------------------------------------------------------------------------------------------------------  Conclusions:   1. Normal myocardial perfusion scan, with no evidence of infarction or inducible ischemia.  2. Normal left ventricular regional wall motion.   3. The left ventricle is normal in function and normal in size. The left ventricular EF% during stress is 64 %. The stress end diastolic volume is 73 ml and systolic volume is 26 ml.   4. Normal right ventricular function.       	    MEDICATIONS:  Home Medications:  amLODIPine 10 mg oral tablet: 1 tab(s) orally once a day (13 Dec 2023 18:52)  calcitriol 0.5 mcg oral capsule: 1 cap(s) orally once a day (13 Dec 2023 18:52)  cholecalciferol oral tablet: 1000 unit(s) orally once a day (13 Dec 2023 18:52)  folic acid 1 mg oral tablet: 1 tab(s) orally once a day (13 Dec 2023 18:52)  metoprolol succinate 100 mg oral tablet, extended release: 1 tab(s) orally once a day (13 Dec 2023 18:52)      MEDICATIONS  (STANDING):  calcitriol   Capsule 1 MICROGram(s) Oral <User Schedule>  chlorhexidine 2% Cloths 1 Application(s) Topical daily  heparin   Injectable 5000 Unit(s) SubCutaneous every 12 hours  remdesivir  IVPB   IV Intermittent       FAMILY HISTORY:  FH: type 2 diabetes mellitus    FHx: hypertension        SOCIAL HISTORY:    [ x] Non-smoker  [ ] Smoker  [ ] Alcohol    Allergies    trimethoprim (Other; Rash)  sulfa drugs (Other; Rash)  Sulfur (Unknown)    Intolerances    	    REVIEW OF SYSTEMS:  CONSTITUTIONAL: No fever, weight loss, or fatigue  EYES: No eye pain, visual disturbances, or discharge  ENMT:  No difficulty hearing, tinnitus, vertigo; No sinus or throat pain  NECK: No pain or stiffness  RESPIRATORY: No cough, wheezing, chills or hemoptysis; + Shortness of Breath  CARDIOVASCULAR: No chest pain, palpitations, passing out, dizziness, or leg swelling  GASTROINTESTINAL: No abdominal or epigastric pain. No nausea, vomiting, or hematemesis; No diarrhea or constipation. No melena or hematochezia.  GENITOURINARY: No dysuria, frequency, hematuria, or incontinence  NEUROLOGICAL: No headaches, memory loss, loss of strength, numbness, or tremors  SKIN: No itching, burning, rashes, or lesions   	    [x ] All others negative	  [ ] Unable to obtain    PHYSICAL EXAM:  T(C): 36.4 (12-14-23 @ 05:10), Max: 36.7 (12-13-23 @ 13:38)  HR: 98 (12-14-23 @ 05:10) (93 - 111)  BP: 148/92 (12-14-23 @ 05:10) (130/76 - 151/93)  RR: 16 (12-14-23 @ 05:10) (16 - 20)  SpO2: 96% (12-14-23 @ 05:10) (96% - 100%)  Wt(kg): --  I&O's Summary      Appearance: Normal	  Psychiatry: A & O x 3, Mood & affect appropriate  HEENT:   Normal oral mucosa, PERRL, EOMI	  Lymphatic: No lymphadenopathy  Cardiovascular: Normal S1 S2,RRR, No JVD, No murmurs  Respiratory diminished   Gastrointestinal:  Soft, Non-tender, + BS	  Skin: No rashes, No ecchymoses, No cyanosis	  Neurologic: Non-focal  Extremities: Normal range of motion, No clubbing, cyanosis or edema  Vascular: Peripheral pulses palpable 2+ bilaterally    TELEMETRY: 	 NSR 5 beats  wct   ECG:  	nsr   RADIOLOGY:  < from: Xray Chest 2 Views PA/Lat (12.13.23 @ 16:33) >  IMPRESSION:  Clear lungs.    --- End of Report ---    < end of copied text >    OTHER: 	  	  LABS:	 	    CARDIAC MARKERS:  Troponin T, High Sensitivity Result: 27 ng/L (12-13 @ 14:27)                                  10.5   3.53  )-----------( 175      ( 14 Dec 2023 07:30 )             33.2     12-14    138  |  95<L>  |  28<H>  ----------------------------<  84  4.1   |  32<H>  |  4.94<H>    Ca    8.5      14 Dec 2023 07:30    TPro  7.2  /  Alb  3.5  /  TBili  0.6  /  DBili  x   /  AST  35<H>  /  ALT  33  /  AlkPhos  141<H>  12-14    PT/INR - ( 13 Dec 2023 15:02 )   PT: 11.4 sec;   INR: 1.02 ratio         PTT - ( 13 Dec 2023 15:02 )  PTT:81.2 sec  proBNP:   Lipid Profile:   HgA1c:   TSH:        CARDIOLOGY CONSULT - Dr. Joe         HPI:  72 yo F with PMhx of HTN, PE (not on AC), OA, chronic migraine  and ESRD on HD (MWF) presents to the ED with cough and SOB. Pt reports that she has been feeling weak for the last couple of days. She has a productive cough. Her chest hurts whenever she coughs. She also has runny nose, myalgias, and SOB with exertion. Denies any associated fever, chills, chest pain or LE edema. She went to HD today and had to stop HD due to weakness/SOB. She was then sent to the ED.   In the ED, her vitals were notable tachycardia. Labs were notable for elevated BUN/Scr, elevated ALP, AST. RVP was positive for SARSCOV2. CXR showed clear lungs.  pt reports PINTO prior to being sick- recent ECHO 4/23/22 : nl lV  sys fx EF 55-60%  stress  11/14/23 normal   EF% during stress is 64 %  ros otherwise negative     PAST MEDICAL & SURGICAL HISTORY:  Osteoarthritis      Pulmonary embolism      Joint infection      Dysfunctional uterine bleeding      Obesity      Essential hypertension  Hypertension      Obstructive sleep apnea syndrome  Obstructive sleep apnea      History of pulmonary embolism  2012 s/p IVC filter      Arthropathy  Arthritis      Migraine  Migraines      Morbid obesity      ESRD (end stage renal disease)      Total knee replacement status      Status post hysterectomy      Status post cholecystectomy      Other acquired absence of organ  History of cholecystectomy      Status post total hysterectomy  partial      History of total knee replacement  with revisions x 3              PREVIOUS DIAGNOSTIC TESTING:      < from: TTE Echo Complete w/o Contrast w/ Doppler (04.23.22 @ 13:17) >  Summary:   1. Left ventricular ejection fraction, by visual estimation, is 55 to   60%.   2. Normal global left ventricular systolic function.   3. Normal left ventricular internal cavity size.   4. Spectral Doppler shows impaired relaxation pattern of left   ventricular myocardial filling (Grade I diastolic dysfunction).   5. There is mild concentric left ventricular hypertrophy.   6. Normal right ventricular size and function.   7. Normal left atrial size.   8. Normal right atrial size.   9. There is no evidence of pericardial effusion.  10. Mild mitral annular calcification.  11. Mild mitral valve regurgitation.  12. Mild thickening and calcification of the anterior and posterior   mitral valve leaflets.  13. Mild tricuspid regurgitation.  14. Sclerotic aortic valve with decreased opening.  15. There is mild aortic root calcification.      < from: Nuclear Stress Test-Pharmacologic.. (11.14.23 @ 11:35) >  --------------------------------------------------------------------------------------------------------------------------------------------------------  Conclusions:   1. Normal myocardial perfusion scan, with no evidence of infarction or inducible ischemia.  2. Normal left ventricular regional wall motion.   3. The left ventricle is normal in function and normal in size. The left ventricular EF% during stress is 64 %. The stress end diastolic volume is 73 ml and systolic volume is 26 ml.   4. Normal right ventricular function.       	    MEDICATIONS:  Home Medications:  amLODIPine 10 mg oral tablet: 1 tab(s) orally once a day (13 Dec 2023 18:52)  calcitriol 0.5 mcg oral capsule: 1 cap(s) orally once a day (13 Dec 2023 18:52)  cholecalciferol oral tablet: 1000 unit(s) orally once a day (13 Dec 2023 18:52)  folic acid 1 mg oral tablet: 1 tab(s) orally once a day (13 Dec 2023 18:52)  metoprolol succinate 100 mg oral tablet, extended release: 1 tab(s) orally once a day (13 Dec 2023 18:52)      MEDICATIONS  (STANDING):  calcitriol   Capsule 1 MICROGram(s) Oral <User Schedule>  chlorhexidine 2% Cloths 1 Application(s) Topical daily  heparin   Injectable 5000 Unit(s) SubCutaneous every 12 hours  remdesivir  IVPB   IV Intermittent       FAMILY HISTORY:  FH: type 2 diabetes mellitus    FHx: hypertension        SOCIAL HISTORY:    [ x] Non-smoker  [ ] Smoker  [ ] Alcohol    Allergies    trimethoprim (Other; Rash)  sulfa drugs (Other; Rash)  Sulfur (Unknown)    Intolerances    	    REVIEW OF SYSTEMS:  CONSTITUTIONAL: No fever, weight loss, or fatigue  EYES: No eye pain, visual disturbances, or discharge  ENMT:  No difficulty hearing, tinnitus, vertigo; No sinus or throat pain  NECK: No pain or stiffness  RESPIRATORY: No cough, wheezing, chills or hemoptysis; + Shortness of Breath  CARDIOVASCULAR: No chest pain, palpitations, passing out, dizziness, or leg swelling  GASTROINTESTINAL: No abdominal or epigastric pain. No nausea, vomiting, or hematemesis; No diarrhea or constipation. No melena or hematochezia.  GENITOURINARY: No dysuria, frequency, hematuria, or incontinence  NEUROLOGICAL: No headaches, memory loss, loss of strength, numbness, or tremors  SKIN: No itching, burning, rashes, or lesions   	    [x ] All others negative	  [ ] Unable to obtain    PHYSICAL EXAM:  T(C): 36.4 (12-14-23 @ 05:10), Max: 36.7 (12-13-23 @ 13:38)  HR: 98 (12-14-23 @ 05:10) (93 - 111)  BP: 148/92 (12-14-23 @ 05:10) (130/76 - 151/93)  RR: 16 (12-14-23 @ 05:10) (16 - 20)  SpO2: 96% (12-14-23 @ 05:10) (96% - 100%)  Wt(kg): --  I&O's Summary      Appearance: Normal	  Psychiatry: A & O x 3, Mood & affect appropriate  HEENT:   Normal oral mucosa, PERRL, EOMI	  Lymphatic: No lymphadenopathy  Cardiovascular: Normal S1 S2,RRR, No JVD, No murmurs  Respiratory diminished   Gastrointestinal:  Soft, Non-tender, + BS	  Skin: No rashes, No ecchymoses, No cyanosis	  Neurologic: Non-focal  Extremities: Normal range of motion, No clubbing, cyanosis or edema  Vascular: Peripheral pulses palpable 2+ bilaterally    TELEMETRY: 	 NSR 5 beats  wct   ECG:  	nsr   RADIOLOGY:  < from: Xray Chest 2 Views PA/Lat (12.13.23 @ 16:33) >  IMPRESSION:  Clear lungs.    --- End of Report ---    < end of copied text >    OTHER: 	  	  LABS:	 	    CARDIAC MARKERS:  Troponin T, High Sensitivity Result: 27 ng/L (12-13 @ 14:27)                                  10.5   3.53  )-----------( 175      ( 14 Dec 2023 07:30 )             33.2     12-14    138  |  95<L>  |  28<H>  ----------------------------<  84  4.1   |  32<H>  |  4.94<H>    Ca    8.5      14 Dec 2023 07:30    TPro  7.2  /  Alb  3.5  /  TBili  0.6  /  DBili  x   /  AST  35<H>  /  ALT  33  /  AlkPhos  141<H>  12-14    PT/INR - ( 13 Dec 2023 15:02 )   PT: 11.4 sec;   INR: 1.02 ratio         PTT - ( 13 Dec 2023 15:02 )  PTT:81.2 sec  proBNP:   Lipid Profile:   HgA1c:   TSH:

## 2023-12-14 NOTE — PATIENT PROFILE ADULT - PRO INTERPRETER NEED 2
Patient Specific Counseling (Will Not Stick From Patient To Patient): Likely related to his seborrheic dermatitis - suspect that this is manage to patient acceptability at this time.  he is overall fine with it - encouraged continued use of medicated shampoo's. Patient Specific Counseling (Will Not Stick From Patient To Patient): Today he is pleased overall - minimal involvement in the left ear- he also has some roughness he notes on the left antehelix- today no signs of skin cancer present - possibly friction. Encouraged nightly use of Vaseline to the ear- and sun protect- if features change return to clinic.  Recommended ketoconazole cream in the external ear- canal may help with seborrheic dermatitis however he believes he is fine with how it is. Detail Level: Simple Detail Level: Detailed English

## 2023-12-15 LAB
ANION GAP SERPL CALC-SCNC: 15 MMOL/L — HIGH (ref 7–14)
ANION GAP SERPL CALC-SCNC: 15 MMOL/L — HIGH (ref 7–14)
BUN SERPL-MCNC: 45 MG/DL — HIGH (ref 7–23)
BUN SERPL-MCNC: 45 MG/DL — HIGH (ref 7–23)
CALCIUM SERPL-MCNC: 9.1 MG/DL — SIGNIFICANT CHANGE UP (ref 8.4–10.5)
CALCIUM SERPL-MCNC: 9.1 MG/DL — SIGNIFICANT CHANGE UP (ref 8.4–10.5)
CHLORIDE SERPL-SCNC: 96 MMOL/L — LOW (ref 98–107)
CHLORIDE SERPL-SCNC: 96 MMOL/L — LOW (ref 98–107)
CO2 SERPL-SCNC: 28 MMOL/L — SIGNIFICANT CHANGE UP (ref 22–31)
CO2 SERPL-SCNC: 28 MMOL/L — SIGNIFICANT CHANGE UP (ref 22–31)
CREAT SERPL-MCNC: 6.23 MG/DL — HIGH (ref 0.5–1.3)
CREAT SERPL-MCNC: 6.23 MG/DL — HIGH (ref 0.5–1.3)
CULTURE RESULTS: SIGNIFICANT CHANGE UP
CULTURE RESULTS: SIGNIFICANT CHANGE UP
EGFR: 7 ML/MIN/1.73M2 — LOW
EGFR: 7 ML/MIN/1.73M2 — LOW
GLUCOSE SERPL-MCNC: 82 MG/DL — SIGNIFICANT CHANGE UP (ref 70–99)
GLUCOSE SERPL-MCNC: 82 MG/DL — SIGNIFICANT CHANGE UP (ref 70–99)
HCT VFR BLD CALC: 39 % — SIGNIFICANT CHANGE UP (ref 34.5–45)
HCT VFR BLD CALC: 39 % — SIGNIFICANT CHANGE UP (ref 34.5–45)
HGB BLD-MCNC: 12.1 G/DL — SIGNIFICANT CHANGE UP (ref 11.5–15.5)
HGB BLD-MCNC: 12.1 G/DL — SIGNIFICANT CHANGE UP (ref 11.5–15.5)
MAGNESIUM SERPL-MCNC: 2.1 MG/DL — SIGNIFICANT CHANGE UP (ref 1.6–2.6)
MAGNESIUM SERPL-MCNC: 2.1 MG/DL — SIGNIFICANT CHANGE UP (ref 1.6–2.6)
MCHC RBC-ENTMCNC: 31 GM/DL — LOW (ref 32–36)
MCHC RBC-ENTMCNC: 31 GM/DL — LOW (ref 32–36)
MCHC RBC-ENTMCNC: 31.4 PG — SIGNIFICANT CHANGE UP (ref 27–34)
MCHC RBC-ENTMCNC: 31.4 PG — SIGNIFICANT CHANGE UP (ref 27–34)
MCV RBC AUTO: 101.3 FL — HIGH (ref 80–100)
MCV RBC AUTO: 101.3 FL — HIGH (ref 80–100)
NRBC # BLD: 0 /100 WBCS — SIGNIFICANT CHANGE UP (ref 0–0)
NRBC # BLD: 0 /100 WBCS — SIGNIFICANT CHANGE UP (ref 0–0)
NRBC # FLD: 0 K/UL — SIGNIFICANT CHANGE UP (ref 0–0)
NRBC # FLD: 0 K/UL — SIGNIFICANT CHANGE UP (ref 0–0)
PHOSPHATE SERPL-MCNC: 3.8 MG/DL — SIGNIFICANT CHANGE UP (ref 2.5–4.5)
PHOSPHATE SERPL-MCNC: 3.8 MG/DL — SIGNIFICANT CHANGE UP (ref 2.5–4.5)
PLATELET # BLD AUTO: 173 K/UL — SIGNIFICANT CHANGE UP (ref 150–400)
PLATELET # BLD AUTO: 173 K/UL — SIGNIFICANT CHANGE UP (ref 150–400)
POTASSIUM SERPL-MCNC: 4.7 MMOL/L — SIGNIFICANT CHANGE UP (ref 3.5–5.3)
POTASSIUM SERPL-MCNC: 4.7 MMOL/L — SIGNIFICANT CHANGE UP (ref 3.5–5.3)
POTASSIUM SERPL-SCNC: 4.7 MMOL/L — SIGNIFICANT CHANGE UP (ref 3.5–5.3)
POTASSIUM SERPL-SCNC: 4.7 MMOL/L — SIGNIFICANT CHANGE UP (ref 3.5–5.3)
RBC # BLD: 3.85 M/UL — SIGNIFICANT CHANGE UP (ref 3.8–5.2)
RBC # BLD: 3.85 M/UL — SIGNIFICANT CHANGE UP (ref 3.8–5.2)
RBC # FLD: 12.2 % — SIGNIFICANT CHANGE UP (ref 10.3–14.5)
RBC # FLD: 12.2 % — SIGNIFICANT CHANGE UP (ref 10.3–14.5)
SODIUM SERPL-SCNC: 139 MMOL/L — SIGNIFICANT CHANGE UP (ref 135–145)
SODIUM SERPL-SCNC: 139 MMOL/L — SIGNIFICANT CHANGE UP (ref 135–145)
SPECIMEN SOURCE: SIGNIFICANT CHANGE UP
SPECIMEN SOURCE: SIGNIFICANT CHANGE UP
WBC # BLD: 3.71 K/UL — LOW (ref 3.8–10.5)
WBC # BLD: 3.71 K/UL — LOW (ref 3.8–10.5)
WBC # FLD AUTO: 3.71 K/UL — LOW (ref 3.8–10.5)
WBC # FLD AUTO: 3.71 K/UL — LOW (ref 3.8–10.5)

## 2023-12-15 PROCEDURE — 71275 CT ANGIOGRAPHY CHEST: CPT | Mod: 26

## 2023-12-15 PROCEDURE — 99233 SBSQ HOSP IP/OBS HIGH 50: CPT

## 2023-12-15 PROCEDURE — 93306 TTE W/DOPPLER COMPLETE: CPT | Mod: 26

## 2023-12-15 RX ORDER — REMDESIVIR 5 MG/ML
100 INJECTION INTRAVENOUS EVERY 24 HOURS
Refills: 0 | Status: COMPLETED | OUTPATIENT
Start: 2023-12-15 | End: 2023-12-16

## 2023-12-15 RX ORDER — BENZOCAINE AND MENTHOL 5; 1 G/100ML; G/100ML
1 LIQUID ORAL EVERY 4 HOURS
Refills: 0 | Status: DISCONTINUED | OUTPATIENT
Start: 2023-12-15 | End: 2023-12-22

## 2023-12-15 RX ORDER — GUAIFENESIN/DEXTROMETHORPHAN 600MG-30MG
10 TABLET, EXTENDED RELEASE 12 HR ORAL EVERY 4 HOURS
Refills: 0 | Status: DISCONTINUED | OUTPATIENT
Start: 2023-12-15 | End: 2023-12-22

## 2023-12-15 RX ORDER — FLUTICASONE PROPIONATE 50 MCG
1 SPRAY, SUSPENSION NASAL
Refills: 0 | Status: COMPLETED | OUTPATIENT
Start: 2023-12-15 | End: 2023-12-18

## 2023-12-15 RX ADMIN — HEPARIN SODIUM 5000 UNIT(S): 5000 INJECTION INTRAVENOUS; SUBCUTANEOUS at 05:54

## 2023-12-15 RX ADMIN — HEPARIN SODIUM 1000 UNIT(S): 5000 INJECTION INTRAVENOUS; SUBCUTANEOUS at 19:00

## 2023-12-15 RX ADMIN — HEPARIN SODIUM 1000 UNIT(S): 5000 INJECTION INTRAVENOUS; SUBCUTANEOUS at 20:00

## 2023-12-15 RX ADMIN — HEPARIN SODIUM 2000 UNIT(S): 5000 INJECTION INTRAVENOUS; SUBCUTANEOUS at 20:31

## 2023-12-15 RX ADMIN — CHLORHEXIDINE GLUCONATE 1 APPLICATION(S): 213 SOLUTION TOPICAL at 14:41

## 2023-12-15 RX ADMIN — Medication 650 MILLIGRAM(S): at 11:30

## 2023-12-15 RX ADMIN — Medication 100 MILLIGRAM(S): at 10:31

## 2023-12-15 RX ADMIN — Medication 650 MILLIGRAM(S): at 10:31

## 2023-12-15 RX ADMIN — REMDESIVIR 200 MILLIGRAM(S): 5 INJECTION INTRAVENOUS at 14:39

## 2023-12-15 RX ADMIN — Medication 10 MILLILITER(S): at 14:40

## 2023-12-15 RX ADMIN — HEPARIN SODIUM 1000 UNIT(S): 5000 INJECTION INTRAVENOUS; SUBCUTANEOUS at 22:00

## 2023-12-15 RX ADMIN — Medication 650 MILLIGRAM(S): at 23:45

## 2023-12-15 RX ADMIN — HEPARIN SODIUM 5000 UNIT(S): 5000 INJECTION INTRAVENOUS; SUBCUTANEOUS at 16:41

## 2023-12-15 RX ADMIN — Medication 1 SPRAY(S): at 16:41

## 2023-12-15 RX ADMIN — CALCITRIOL 1 MICROGRAM(S): 0.5 CAPSULE ORAL at 10:34

## 2023-12-15 NOTE — PROGRESS NOTE ADULT - SUBJECTIVE AND OBJECTIVE BOX
[facility] NEPHROLOGY- PROGRESS NOTE    73y Female with history of ESRD on HD presents with SOB. Nephrology consulted for ESRD status.    REVIEW OF SYSTEMS:  Gen: no fevers  Cards: no chest pain  Resp: + dyspnea, + cough  GI: no nausea or vomiting or diarrhea  Vascular: no LE edema    trimethoprim (Other; Rash)  sulfa drugs (Other; Rash)  Sulfur (Unknown)      Hospital Medications: Medications reviewed      VITALS:  T(F): 98.3 (12-15-23 @ 11:43), Max: 98.3 (12-15-23 @ 11:43)  HR: 107 (12-15-23 @ 11:43)  BP: 127/79 (12-15-23 @ 11:43)  RR: 18 (12-15-23 @ 11:43)  SpO2: 96% (12-15-23 @ 11:43)  Wt(kg): --    12-15 @ 07:01  -  12-15 @ 12:58  --------------------------------------------------------  IN: 400 mL / OUT: 0 mL / NET: 400 mL        PHYSICAL EXAM:    Gen: NAD, calm  Cards: RRR, +S1/S2, no M/G/R  Resp: CTA B/L  GI: soft, NT/ND, NABS  Vascular: no LE edema B/L, RIJ TDC intact        LABS:  12-15    139  |  96<L>  |  45<H>  ----------------------------<  82  4.7   |  28  |  6.23<H>    Ca    9.1      15 Dec 2023 06:00  Phos  3.8     12-15  Mg     2.10     12-15    TPro  7.2  /  Alb  3.5  /  TBili  0.6  /  DBili      /  AST  35<H>  /  ALT  33  /  AlkPhos  141<H>  12-14    Creatinine Trend: 6.23 <--, 4.94 <--, 3.71 <--                        12.1   3.71  )-----------( 173      ( 15 Dec 2023 06:00 )             39.0     Urine Studies:  Urinalysis Basic - ( 15 Dec 2023 06:00 )    Color:  / Appearance:  / SG:  / pH:   Gluc: 82 mg/dL / Ketone:   / Bili:  / Urobili:    Blood:  / Protein:  / Nitrite:    Leuk Esterase:  / RBC:  / WBC    Sq Epi:  / Non Sq Epi:  / Bacteria:          [facility] NEPHROLOGY- PROGRESS NOTE    73y Female with history of ESRD on HD presents with SOB. Nephrology consulted for ESRD status.    REVIEW OF SYSTEMS:  Gen: no fevers  Cards: no chest pain  Resp: + dyspnea, + cough  GI: no nausea or vomiting or diarrhea  Vascular: no LE edema    trimethoprim (Other; Rash)  sulfa drugs (Other; Rash)  Sulfur (Unknown)      Hospital Medications: Medications reviewed      VITALS:  T(F): 98.3 (12-15-23 @ 11:43), Max: 98.3 (12-15-23 @ 11:43)  HR: 107 (12-15-23 @ 11:43)  BP: 127/79 (12-15-23 @ 11:43)  RR: 18 (12-15-23 @ 11:43)  SpO2: 96% (12-15-23 @ 11:43)  Wt(kg): --    12-15 @ 07:01  -  12-15 @ 12:58  --------------------------------------------------------  IN: 400 mL / OUT: 0 mL / NET: 400 mL        PHYSICAL EXAM:    Gen: NAD, calm  Cards: RRR, +S1/S2, no M/G/R  Resp: CTA B/L  GI: soft, NT/ND, NABS  Vascular: no LE edema B/L, RIJ TDC intact        LABS:  12-15    139  |  96<L>  |  45<H>  ----------------------------<  82  4.7   |  28  |  6.23<H>    Ca    9.1      15 Dec 2023 06:00  Phos  3.8     12-15  Mg     2.10     12-15    TPro  7.2  /  Alb  3.5  /  TBili  0.6  /  DBili      /  AST  35<H>  /  ALT  33  /  AlkPhos  141<H>  12-14    Creatinine Trend: 6.23 <--, 4.94 <--, 3.71 <--                        12.1   3.71  )-----------( 173      ( 15 Dec 2023 06:00 )             39.0     Urine Studies:  Urinalysis Basic - ( 15 Dec 2023 06:00 )    Color:  / Appearance:  / SG:  / pH:   Gluc: 82 mg/dL / Ketone:   / Bili:  / Urobili:    Blood:  / Protein:  / Nitrite:    Leuk Esterase:  / RBC:  / WBC    Sq Epi:  / Non Sq Epi:  / Bacteria:

## 2023-12-15 NOTE — PROGRESS NOTE ADULT - SUBJECTIVE AND OBJECTIVE BOX
PULMONARY PROGRESS NOTE    MELISSA ARREOLA  MRN-[id number]    Patient is a 73y old  Female who presents with a chief complaint of Cough (15 Dec 2023 14:20)      HPI:  -  -    ROS:   -    ACTIVE MEDICATION LIST:  MEDICATIONS  (STANDING):  calcitriol   Capsule 1 MICROGram(s) Oral <User Schedule>  chlorhexidine 2% Cloths 1 Application(s) Topical daily  fluticasone propionate 50 MICROgram(s)/spray Nasal Spray 1 Spray(s) Both Nostrils two times a day  heparin   Injectable 5000 Unit(s) SubCutaneous every 12 hours  heparin   Injectable. 2000 Unit(s) Dialysis. once  heparin   Injectable. 1000 Unit(s) Dialysis. every 1 hour  remdesivir  IVPB 100 milliGRAM(s) IV Intermittent every 24 hours    MEDICATIONS  (PRN):  acetaminophen     Tablet .. 650 milliGRAM(s) Oral every 6 hours PRN Temp greater or equal to 38C (100.4F), Mild Pain (1 - 3)  aluminum hydroxide/magnesium hydroxide/simethicone Suspension 30 milliLiter(s) Oral every 4 hours PRN Dyspepsia  benzocaine/menthol Lozenge 1 Lozenge Oral every 4 hours PRN Sore Throat  benzonatate 100 milliGRAM(s) Oral every 8 hours PRN Cough  guaifenesin/dextromethorphan Oral Liquid 10 milliLiter(s) Oral every 4 hours PRN Cough  melatonin 3 milliGRAM(s) Oral at bedtime PRN Insomnia  ondansetron Injectable 4 milliGRAM(s) IV Push every 8 hours PRN Nausea and/or Vomiting  sodium chloride 0.9% Bolus. 100 milliLiter(s) IV Bolus every 5 minutes PRN SBP LESS THAN or EQUAL to 90 mmHg      EXAM:  Vital Signs Last 24 Hrs  T(C): 36.8 (15 Dec 2023 11:43), Max: 36.8 (15 Dec 2023 11:43)  T(F): 98.3 (15 Dec 2023 11:43), Max: 98.3 (15 Dec 2023 11:43)  HR: 107 (15 Dec 2023 11:43) (90 - 107)  BP: 127/79 (15 Dec 2023 11:43) (127/79 - 143/63)  BP(mean): --  RR: 18 (15 Dec 2023 11:43) (18 - 18)  SpO2: 96% (15 Dec 2023 11:43) (96% - 98%)    Parameters below as of 15 Dec 2023 11:43  Patient On (Oxygen Delivery Method): room air        GENERAL: The patient is awake and alert in no apparent distress.     LUNGS: Clear to auscultation without wheezing, rales or rhonchi; respirations unlabored    HEART: Regular rate and rhythm without murmur.                            12.1   3.71  )-----------( 173      ( 15 Dec 2023 06:00 )             39.0       12-15    139  |  96<L>  |  45<H>  ----------------------------<  82  4.7   |  28  |  6.23<H>    Ca    9.1      15 Dec 2023 06:00  Phos  3.8     12-15  Mg     2.10     12-15    TPro  7.2  /  Alb  3.5  /  TBili  0.6  /  DBili  x   /  AST  35<H>  /  ALT  33  /  AlkPhos  141<H>  12-14     < from: Xray Chest 2 Views PA/Lat (12.13.23 @ 16:33) >    ACC: [id number] EXAM:  XR CHEST PA LAT 2V   ORDERED BY: TIARA WILDER     PROCEDURE DATE:  12/13/2023          INTERPRETATION:  EXAMINATION: XR CHEST PA AND LATERAL    CLINICAL INDICATION: Chest Pain    TECHNIQUE: 2 views; Frontal and lateral views of the chest were obtained   from 12/13/2023 2:01 PM    COMPARISON: Chest x-ray 2/17/2023    FINDINGS:  Right IJ tunneled permacatheter with tip in the distal SVC.  The heart size is normal.  The lungs are clear without focal consolidations.  There isno pneumothorax or pleural effusion.    IMPRESSION:  Clear lungs.    --- End of Report ---          MONAE QURESHI MD; Resident Radiologist  This document has been electronically signed.  BELIA MYRICK MD; Attending Radiologist  This document has been electronically signed. Dec 13 2023  5:39PM    < end of copied text >  < from: CT Chest No Cont (02.08.23 @ 16:16) >    ACC: [id number] EXAM:  CT CHEST   ORDERED BY: ESME LOPEZ     ACC: [id number] EXAM:  IR PROCEDURE NON PICC   ORDERED BY: ESME LOPEZ     ACC: [id number] EXAM:  SP FLUOR GUID CV CATH PROC SI   ORDERED BY: ESME LOPEZ     PROCEDURE DATE:  02/03/2023          INTERPRETATION:  INDICATION AND FOCUSED HISTORY: Need for long-term   dialysis, conversion of temporary line into a tunneled catheter.   Interventional radiology consulted for permacath placement    PROCEDURE:  Fluoroscopic guided permacathplacement    OPERATING PERSONNEL: Mohammad Halaibeh, M.D., Attending Interventional   Radiologist    ANESTHESIA: Lidocaine local anesthesia and monitored anesthesia care    CONTRAST: None    OTHER MEDICATIONS: None    COMPLICATIONS: None immediate    IMAGING MODALITY AND ARCHIVING: Ultrasound and fluoroscopic images were   saved and sent to PACS immediately upon completion of the procedure    PROCEDURE DESCRIPTION: The patient's chart and imaging were reviewed and   a focused history and physicalwas performed. The nature, purposes,   risks, and alternatives of the procedure were explained to the patient    and informed consent was received and documented. The patient was brought   to the procedure suite and placed supine on the table. A preprocedure   team timeout was performed.      FINDINGS:    The patient's neck and chest was prepped and draped in standard sterile   fashion (maximum sterile barrier technique). 1% lidocaine local   anesthesia was administered.    Using the existing internal jugular vein  temporary catheter an 0.035   wire was advanced into the IVC under fluoroscopic guidance and a   permanent image was stored. Temporary catheter was removed a a peal-away   sheath was placed. A dermatotomy was then created in the chest wall using   sharp dissection. A large double-lumen hemodialysis catheter was then   tunneled from the dermatotomy to and through the venotomy site. The   catheter was advanced through the peel-away sheath, which was   subsequently removed. Aspiration revealed blood return. The catheter was   then flushed normal saline and locked with heparin (100 units per cc)   disease. The catheter was secured with Prolene suture.    Hemostasis was achieved with manual compression. A sterile dressing was   applied. The patient tolerated the procedure well without immediate   complication.      IMPRESSION:  Successful conversion of a temporary HD catheter to a tunneled HD cathter   via the Right  internal jugular vein  vein access with tip at cavoatrial   junction on final imaging      PLAN:  Catheter may be used immediately    Dr. Mohammad Halaibeh M.D., Attending Interventional Radiologist    --- End of Report ---            MOHAMMAD HALAIBEH MD; Attending Radiologist  This document has been electronically signed. Feb  3 2023 10:26PM    < end of copied text >      PROBLEM LIST:  73y Female with HEALTH ISSUES - PROBLEM Dx:  2019 novel coronavirus disease (COVID-19)    ESRD on dialysis    Medication management    Preventive measure    Essential hypertension              RECS:        Please call with any questions.    Lorna Downing DO  [facility] Pulmonary/Sleep Medicine  [phone]   PULMONARY PROGRESS NOTE    MELISSA ARREOLA  MRN-[id number]    Patient is a 73y old  Female who presents with a chief complaint of Cough (15 Dec 2023 14:20)      HPI:  -  -    ROS:   -    ACTIVE MEDICATION LIST:  MEDICATIONS  (STANDING):  calcitriol   Capsule 1 MICROGram(s) Oral <User Schedule>  chlorhexidine 2% Cloths 1 Application(s) Topical daily  fluticasone propionate 50 MICROgram(s)/spray Nasal Spray 1 Spray(s) Both Nostrils two times a day  heparin   Injectable 5000 Unit(s) SubCutaneous every 12 hours  heparin   Injectable. 2000 Unit(s) Dialysis. once  heparin   Injectable. 1000 Unit(s) Dialysis. every 1 hour  remdesivir  IVPB 100 milliGRAM(s) IV Intermittent every 24 hours    MEDICATIONS  (PRN):  acetaminophen     Tablet .. 650 milliGRAM(s) Oral every 6 hours PRN Temp greater or equal to 38C (100.4F), Mild Pain (1 - 3)  aluminum hydroxide/magnesium hydroxide/simethicone Suspension 30 milliLiter(s) Oral every 4 hours PRN Dyspepsia  benzocaine/menthol Lozenge 1 Lozenge Oral every 4 hours PRN Sore Throat  benzonatate 100 milliGRAM(s) Oral every 8 hours PRN Cough  guaifenesin/dextromethorphan Oral Liquid 10 milliLiter(s) Oral every 4 hours PRN Cough  melatonin 3 milliGRAM(s) Oral at bedtime PRN Insomnia  ondansetron Injectable 4 milliGRAM(s) IV Push every 8 hours PRN Nausea and/or Vomiting  sodium chloride 0.9% Bolus. 100 milliLiter(s) IV Bolus every 5 minutes PRN SBP LESS THAN or EQUAL to 90 mmHg      EXAM:  Vital Signs Last 24 Hrs  T(C): 36.8 (15 Dec 2023 11:43), Max: 36.8 (15 Dec 2023 11:43)  T(F): 98.3 (15 Dec 2023 11:43), Max: 98.3 (15 Dec 2023 11:43)  HR: 107 (15 Dec 2023 11:43) (90 - 107)  BP: 127/79 (15 Dec 2023 11:43) (127/79 - 143/63)  BP(mean): --  RR: 18 (15 Dec 2023 11:43) (18 - 18)  SpO2: 96% (15 Dec 2023 11:43) (96% - 98%)    Parameters below as of 15 Dec 2023 11:43  Patient On (Oxygen Delivery Method): room air        GENERAL: The patient is awake and alert in no apparent distress.     LUNGS: Clear to auscultation without wheezing, rales or rhonchi; respirations unlabored    HEART: Regular rate and rhythm without murmur.                            12.1   3.71  )-----------( 173      ( 15 Dec 2023 06:00 )             39.0       12-15    139  |  96<L>  |  45<H>  ----------------------------<  82  4.7   |  28  |  6.23<H>    Ca    9.1      15 Dec 2023 06:00  Phos  3.8     12-15  Mg     2.10     12-15    TPro  7.2  /  Alb  3.5  /  TBili  0.6  /  DBili  x   /  AST  35<H>  /  ALT  33  /  AlkPhos  141<H>  12-14     < from: Xray Chest 2 Views PA/Lat (12.13.23 @ 16:33) >    ACC: [id number] EXAM:  XR CHEST PA LAT 2V   ORDERED BY: TIARA WILDER     PROCEDURE DATE:  12/13/2023          INTERPRETATION:  EXAMINATION: XR CHEST PA AND LATERAL    CLINICAL INDICATION: Chest Pain    TECHNIQUE: 2 views; Frontal and lateral views of the chest were obtained   from 12/13/2023 2:01 PM    COMPARISON: Chest x-ray 2/17/2023    FINDINGS:  Right IJ tunneled permacatheter with tip in the distal SVC.  The heart size is normal.  The lungs are clear without focal consolidations.  There isno pneumothorax or pleural effusion.    IMPRESSION:  Clear lungs.    --- End of Report ---          MONAE QURESHI MD; Resident Radiologist  This document has been electronically signed.  BELIA MYRICK MD; Attending Radiologist  This document has been electronically signed. Dec 13 2023  5:39PM    < end of copied text >  < from: CT Chest No Cont (02.08.23 @ 16:16) >    ACC: [id number] EXAM:  CT CHEST   ORDERED BY: ESME LOPEZ     ACC: [id number] EXAM:  IR PROCEDURE NON PICC   ORDERED BY: ESME LOPEZ     ACC: [id number] EXAM:  SP FLUOR GUID CV CATH PROC SI   ORDERED BY: ESME LOPEZ     PROCEDURE DATE:  02/03/2023          INTERPRETATION:  INDICATION AND FOCUSED HISTORY: Need for long-term   dialysis, conversion of temporary line into a tunneled catheter.   Interventional radiology consulted for permacath placement    PROCEDURE:  Fluoroscopic guided permacathplacement    OPERATING PERSONNEL: Mohammad Halaibeh, M.D., Attending Interventional   Radiologist    ANESTHESIA: Lidocaine local anesthesia and monitored anesthesia care    CONTRAST: None    OTHER MEDICATIONS: None    COMPLICATIONS: None immediate    IMAGING MODALITY AND ARCHIVING: Ultrasound and fluoroscopic images were   saved and sent to PACS immediately upon completion of the procedure    PROCEDURE DESCRIPTION: The patient's chart and imaging were reviewed and   a focused history and physicalwas performed. The nature, purposes,   risks, and alternatives of the procedure were explained to the patient    and informed consent was received and documented. The patient was brought   to the procedure suite and placed supine on the table. A preprocedure   team timeout was performed.      FINDINGS:    The patient's neck and chest was prepped and draped in standard sterile   fashion (maximum sterile barrier technique). 1% lidocaine local   anesthesia was administered.    Using the existing internal jugular vein  temporary catheter an 0.035   wire was advanced into the IVC under fluoroscopic guidance and a   permanent image was stored. Temporary catheter was removed a a peal-away   sheath was placed. A dermatotomy was then created in the chest wall using   sharp dissection. A large double-lumen hemodialysis catheter was then   tunneled from the dermatotomy to and through the venotomy site. The   catheter was advanced through the peel-away sheath, which was   subsequently removed. Aspiration revealed blood return. The catheter was   then flushed normal saline and locked with heparin (100 units per cc)   disease. The catheter was secured with Prolene suture.    Hemostasis was achieved with manual compression. A sterile dressing was   applied. The patient tolerated the procedure well without immediate   complication.      IMPRESSION:  Successful conversion of a temporary HD catheter to a tunneled HD cathter   via the Right  internal jugular vein  vein access with tip at cavoatrial   junction on final imaging      PLAN:  Catheter may be used immediately    Dr. Mohammad Halaibeh M.D., Attending Interventional Radiologist    --- End of Report ---            MOHAMMAD HALAIBEH MD; Attending Radiologist  This document has been electronically signed. Feb  3 2023 10:26PM    < end of copied text >      PROBLEM LIST:  73y Female with HEALTH ISSUES - PROBLEM Dx:  2019 novel coronavirus disease (COVID-19)    ESRD on dialysis    Medication management    Preventive measure    Essential hypertension              RECS:        Please call with any questions.    Lorna Downing DO  [facility] Pulmonary/Sleep Medicine  [phone]   PULMONARY PROGRESS NOTE    MELISSA ARREOLA  MRN-[id number]    Patient is a 73y old  Female who presents with a chief complaint of Cough (15 Dec 2023 14:20)      HPI:  -called for high dimer  74 yo never smoker no outpatient pulm visits, no asthma with PMH: TN, PE (not on AC), OA, chronic migraine  and ESRD on HD (MWF)   in ER initially for cough/ dyspnea, found to be covid +  on remdesivir x 3 day course, day 2  seen [facility] ID and renal      seen today onr [facility]  dyspnea unchanged  some cough+   -    ROS:   -    ACTIVE MEDICATION LIST:  MEDICATIONS  (STANDING):  calcitriol   Capsule 1 MICROGram(s) Oral <User Schedule>  chlorhexidine 2% Cloths 1 Application(s) Topical daily  fluticasone propionate 50 MICROgram(s)/spray Nasal Spray 1 Spray(s) Both Nostrils two times a day  heparin   Injectable 5000 Unit(s) SubCutaneous every 12 hours  heparin   Injectable. 2000 Unit(s) Dialysis. once  heparin   Injectable. 1000 Unit(s) Dialysis. every 1 hour  remdesivir  IVPB 100 milliGRAM(s) IV Intermittent every 24 hours    MEDICATIONS  (PRN):  acetaminophen     Tablet .. 650 milliGRAM(s) Oral every 6 hours PRN Temp greater or equal to 38C (100.4F), Mild Pain (1 - 3)  aluminum hydroxide/magnesium hydroxide/simethicone Suspension 30 milliLiter(s) Oral every 4 hours PRN Dyspepsia  benzocaine/menthol Lozenge 1 Lozenge Oral every 4 hours PRN Sore Throat  benzonatate 100 milliGRAM(s) Oral every 8 hours PRN Cough  guaifenesin/dextromethorphan Oral Liquid 10 milliLiter(s) Oral every 4 hours PRN Cough  melatonin 3 milliGRAM(s) Oral at bedtime PRN Insomnia  ondansetron Injectable 4 milliGRAM(s) IV Push every 8 hours PRN Nausea and/or Vomiting  sodium chloride 0.9% Bolus. 100 milliLiter(s) IV Bolus every 5 minutes PRN SBP LESS THAN or EQUAL to 90 mmHg      EXAM:  Vital Signs Last 24 Hrs  T(C): 36.8 (15 Dec 2023 11:43), Max: 36.8 (15 Dec 2023 11:43)  T(F): 98.3 (15 Dec 2023 11:43), Max: 98.3 (15 Dec 2023 11:43)  HR: 107 (15 Dec 2023 11:43) (90 - 107)  BP: 127/79 (15 Dec 2023 11:43) (127/79 - 143/63)  BP(mean): --  RR: 18 (15 Dec 2023 11:43) (18 - 18)  SpO2: 96% (15 Dec 2023 11:43) (96% - 98%)    Parameters below as of 15 Dec 2023 11:43  Patient On (Oxygen Delivery Method): room air        GENERAL: The patient is awake and alert in no apparent distress.     LUNGS:  respirations unlabored                             12.1   3.71  )-----------( 173      ( 15 Dec 2023 06:00 )             39.0       12-15    139  |  96<L>  |  45<H>  ----------------------------<  82  4.7   |  28  |  6.23<H>    Ca    9.1      15 Dec 2023 06:00  Phos  3.8     12-15  Mg     2.10     12-15    TPro  7.2  /  Alb  3.5  /  TBili  0.6  /  DBili  x   /  AST  35<H>  /  ALT  33  /  AlkPhos  141<H>  12-14     < from: Xray Chest 2 Views PA/Lat (12.13.23 @ 16:33) >    ACC: [id number] EXAM:  XR CHEST PA LAT 2V   ORDERED BY: TIARA WILDER     PROCEDURE DATE:  12/13/2023          INTERPRETATION:  EXAMINATION: XR CHEST PA AND LATERAL    CLINICAL INDICATION: Chest Pain    TECHNIQUE: 2 views; Frontal and lateral views of the chest were obtained   from 12/13/2023 2:01 PM    COMPARISON: Chest x-ray 2/17/2023    FINDINGS:  Right IJ tunneled permacatheter with tip in the distal SVC.  The heart size is normal.  The lungs are clear without focal consolidations.  There isno pneumothorax or pleural effusion.    IMPRESSION:  Clear lungs.    --- End of Report ---          MONAE QURESHI MD; Resident Radiologist  This document has been electronically signed.  BELIA MYRICK MD; Attending Radiologist  This document has been electronically signed. Dec 13 2023  5:39PM    < end of copied text >  < from: CT Chest No Cont (02.08.23 @ 16:16) >    ACC: [id number] EXAM:  CT CHEST   ORDERED BY: ESMEDOMINICK LOPEZ     ACC: [id number] EXAM:  IR PROCEDURE NON PICC   ORDERED BY: ESME JOHN     ACC: [id number] EXAM:  SP FLUOR GUID CV CATH PROC SI   ORDERED BY: ESMEDOMINICK LOPZE     PROCEDURE DATE:  02/03/2023          INTERPRETATION:  INDICATION AND FOCUSED HISTORY: Need for long-term   dialysis, conversion of temporary line into a tunneled catheter.   Interventional radiology consulted for permacath placement    PROCEDURE:  Fluoroscopic guided permacathplacement    OPERATING PERSONNEL: Mohammad Halaibeh, M.D., Attending Interventional   Radiologist    ANESTHESIA: Lidocaine local anesthesia and monitored anesthesia care    CONTRAST: None    OTHER MEDICATIONS: None    COMPLICATIONS: None immediate    IMAGING MODALITY AND ARCHIVING: Ultrasound and fluoroscopic images were   saved and sent to PACS immediately upon completion of the procedure    PROCEDURE DESCRIPTION: The patient's chart and imaging were reviewed and   a focused history and physicalwas performed. The nature, purposes,   risks, and alternatives of the procedure were explained to the patient    and informed consent was received and documented. The patient was brought   to the procedure suite and placed supine on the table. A preprocedure   team timeout was performed.      FINDINGS:    The patient's neck and chest was prepped and draped in standard sterile   fashion (maximum sterile barrier technique). 1% lidocaine local   anesthesia was administered.    Using the existing internal jugular vein  temporary catheter an 0.035   wire was advanced into the IVC under fluoroscopic guidance and a   permanent image was stored. Temporary catheter was removed a a peal-away   sheath was placed. A dermatotomy was then created in the chest wall using   sharp dissection. A large double-lumen hemodialysis catheter was then   tunneled from the dermatotomy to and through the venotomy site. The   catheter was advanced through the peel-away sheath, which was   subsequently removed. Aspiration revealed blood return. The catheter was   then flushed normal saline and locked with heparin (100 units per cc)   disease. The catheter was secured with Prolene suture.    Hemostasis was achieved with manual compression. A sterile dressing was   applied. The patient tolerated the procedure well without immediate   complication.      IMPRESSION:  Successful conversion of a temporary HD catheter to a tunneled HD cathter   via the Right  internal jugular vein  vein access with tip at cavoatrial   junction on final imaging      PLAN:  Catheter may be used immediately    Dr. Mohammad Halaibeh M.D., Attending Interventional Radiologist    --- End of Report ---            MOHAMMAD HALAIBEH MD; Attending Radiologist  This document has been electronically signed. Feb  3 2023 10:26PM    < end of copied text >      PROBLEM LIST:  73y Female with HEALTH ISSUES - PROBLEM Dx:  2019 novel coronavirus disease (COVID-19)    ESRD on dialysis    Medication management    Preventive measure    Essential hypertension          RECS:  high dimer- probably due to infection but noted she has been ordered CTA  the 2022 scan shows a lung nodule- will need close f/u on the CT  needs pulm eval outpatient for htis nodule - if still present , for pfts, and for bimal eval  already on heparin ggt       Please call with any questions over the weekend    Lorna Downing DO  [facility] Pulmonary/Sleep Medicine  [phone]   PULMONARY PROGRESS NOTE    MELISSA ARREOLA  MRN-[id number]    Patient is a 73y old  Female who presents with a chief complaint of Cough (15 Dec 2023 14:20)      HPI:  -called for high dimer  72 yo never smoker no outpatient pulm visits, no asthma with PMH: TN, PE (not on AC), OA, chronic migraine  and ESRD on HD (MWF)   in ER initially for cough/ dyspnea, found to be covid +  on remdesivir x 3 day course, day 2  seen [facility] ID and renal      seen today on[facility]  dyspnea unchanged  some cough+   -    ROS:   -    ACTIVE MEDICATION LIST:  MEDICATIONS  (STANDING):  calcitriol   Capsule 1 MICROGram(s) Oral <User Schedule>  chlorhexidine 2% Cloths 1 Application(s) Topical daily  fluticasone propionate 50 MICROgram(s)/spray Nasal Spray 1 Spray(s) Both Nostrils two times a day  heparin   Injectable 5000 Unit(s) SubCutaneous every 12 hours  heparin   Injectable. 2000 Unit(s) Dialysis. once  heparin   Injectable. 1000 Unit(s) Dialysis. every 1 hour  remdesivir  IVPB 100 milliGRAM(s) IV Intermittent every 24 hours    MEDICATIONS  (PRN):  acetaminophen     Tablet .. 650 milliGRAM(s) Oral every 6 hours PRN Temp greater or equal to 38C (100.4F), Mild Pain (1 - 3)  aluminum hydroxide/magnesium hydroxide/simethicone Suspension 30 milliLiter(s) Oral every 4 hours PRN Dyspepsia  benzocaine/menthol Lozenge 1 Lozenge Oral every 4 hours PRN Sore Throat  benzonatate 100 milliGRAM(s) Oral every 8 hours PRN Cough  guaifenesin/dextromethorphan Oral Liquid 10 milliLiter(s) Oral every 4 hours PRN Cough  melatonin 3 milliGRAM(s) Oral at bedtime PRN Insomnia  ondansetron Injectable 4 milliGRAM(s) IV Push every 8 hours PRN Nausea and/or Vomiting  sodium chloride 0.9% Bolus. 100 milliLiter(s) IV Bolus every 5 minutes PRN SBP LESS THAN or EQUAL to 90 mmHg      EXAM:  Vital Signs Last 24 Hrs  T(C): 36.8 (15 Dec 2023 11:43), Max: 36.8 (15 Dec 2023 11:43)  T(F): 98.3 (15 Dec 2023 11:43), Max: 98.3 (15 Dec 2023 11:43)  HR: 107 (15 Dec 2023 11:43) (90 - 107)  BP: 127/79 (15 Dec 2023 11:43) (127/79 - 143/63)  BP(mean): --  RR: 18 (15 Dec 2023 11:43) (18 - 18)  SpO2: 96% (15 Dec 2023 11:43) (96% - 98%)    Parameters below as of 15 Dec 2023 11:43  Patient On (Oxygen Delivery Method): room air        GENERAL: The patient is awake and alert in no apparent distress.     LUNGS:  respirations unlabored                             12.1   3.71  )-----------( 173      ( 15 Dec 2023 06:00 )             39.0       12-15    139  |  96<L>  |  45<H>  ----------------------------<  82  4.7   |  28  |  6.23<H>    Ca    9.1      15 Dec 2023 06:00  Phos  3.8     12-15  Mg     2.10     12-15    TPro  7.2  /  Alb  3.5  /  TBili  0.6  /  DBili  x   /  AST  35<H>  /  ALT  33  /  AlkPhos  141<H>  12-14     < from: Xray Chest 2 Views PA/Lat (12.13.23 @ 16:33) >    ACC: [id number] EXAM:  XR CHEST PA LAT 2V   ORDERED BY: TIARA WILDER     PROCEDURE DATE:  12/13/2023          INTERPRETATION:  EXAMINATION: XR CHEST PA AND LATERAL    CLINICAL INDICATION: Chest Pain    TECHNIQUE: 2 views; Frontal and lateral views of the chest were obtained   from 12/13/2023 2:01 PM    COMPARISON: Chest x-ray 2/17/2023    FINDINGS:  Right IJ tunneled permacatheter with tip in the distal SVC.  The heart size is normal.  The lungs are clear without focal consolidations.  There isno pneumothorax or pleural effusion.    IMPRESSION:  Clear lungs.    --- End of Report ---          MONAE QURESHI MD; Resident Radiologist  This document has been electronically signed.  BELIA MYRICK MD; Attending Radiologist  This document has been electronically signed. Dec 13 2023  5:39PM    < end of copied text >  < from: CT Chest No Cont (02.08.23 @ 16:16) >    ACC: [id number] EXAM:  CT CHEST   ORDERED BY: ESMEDOMINICK LOPEZ     ACC: [id number] EXAM:  IR PROCEDURE NON PICC   ORDERED BY: ESME JOHN     ACC: [id number] EXAM:  SP FLUOR GUID CV CATH PROC SI   ORDERED BY: ESMEDOMINICK LOPEZ     PROCEDURE DATE:  02/03/2023          INTERPRETATION:  INDICATION AND FOCUSED HISTORY: Need for long-term   dialysis, conversion of temporary line into a tunneled catheter.   Interventional radiology consulted for permacath placement    PROCEDURE:  Fluoroscopic guided permacathplacement    OPERATING PERSONNEL: Mohammad Halaibeh, M.D., Attending Interventional   Radiologist    ANESTHESIA: Lidocaine local anesthesia and monitored anesthesia care    CONTRAST: None    OTHER MEDICATIONS: None    COMPLICATIONS: None immediate    IMAGING MODALITY AND ARCHIVING: Ultrasound and fluoroscopic images were   saved and sent to PACS immediately upon completion of the procedure    PROCEDURE DESCRIPTION: The patient's chart and imaging were reviewed and   a focused history and physicalwas performed. The nature, purposes,   risks, and alternatives of the procedure were explained to the patient    and informed consent was received and documented. The patient was brought   to the procedure suite and placed supine on the table. A preprocedure   team timeout was performed.      FINDINGS:    The patient's neck and chest was prepped and draped in standard sterile   fashion (maximum sterile barrier technique). 1% lidocaine local   anesthesia was administered.    Using the existing internal jugular vein  temporary catheter an 0.035   wire was advanced into the IVC under fluoroscopic guidance and a   permanent image was stored. Temporary catheter was removed a a peal-away   sheath was placed. A dermatotomy was then created in the chest wall using   sharp dissection. A large double-lumen hemodialysis catheter was then   tunneled from the dermatotomy to and through the venotomy site. The   catheter was advanced through the peel-away sheath, which was   subsequently removed. Aspiration revealed blood return. The catheter was   then flushed normal saline and locked with heparin (100 units per cc)   disease. The catheter was secured with Prolene suture.    Hemostasis was achieved with manual compression. A sterile dressing was   applied. The patient tolerated the procedure well without immediate   complication.      IMPRESSION:  Successful conversion of a temporary HD catheter to a tunneled HD cathter   via the Right  internal jugular vein  vein access with tip at cavoatrial   junction on final imaging      PLAN:  Catheter may be used immediately    Dr. Mohammad Halaibeh M.D., Attending Interventional Radiologist    --- End of Report ---            MOHAMMAD HALAIBEH MD; Attending Radiologist  This document has been electronically signed. Feb  3 2023 10:26PM    < end of copied text >      PROBLEM LIST:  73y Female with HEALTH ISSUES - PROBLEM Dx:  2019 novel coronavirus disease (COVID-19)    ESRD on dialysis    Medication management    Preventive measure    Essential hypertension          RECS:  high dimer- probably due to infection but noted she has been ordered CTA  the 2022 scan shows a lung nodule- will need close f/u on the CT  needs pulm eval outpatient for htis nodule - if still present , for pfts, and for bimal eval  already on heparin ggt       Please call with any questions over the weekend    Lorna Downing DO  [facility] Pulmonary/Sleep Medicine  [phone]

## 2023-12-15 NOTE — PROGRESS NOTE ADULT - SUBJECTIVE AND OBJECTIVE BOX
Anesthesia POST Procedure Evaluation    Patient: Deysi Jacobson   MRN:     [id number] Gender:   female   Age:    29 year old :      1989        Preoperative Diagnosis: Post Liver Transplant   Procedure(s):  Washout, Bile Anastamosis   Postop Comments: No value filed.       Anesthesia Type:  Not documented  No value filed.    Reportable Event: YES     PAIN: Uncomplicated   Sign Out status: Pain at preoperative BASELINE     Neuro/Psych: Uneventful perioperative course   Sign Out Status: Planned Postop Sedation     Airway/Resp.: Uneventful perioperative course   Sign Out Status: Airway Device present     Airway Device: ETT                 Reason: Planned (pre-op)     CV: Uneventful perioperative course   Sign Out status: CV Support within EXPECTED Parameters     Other Events:        Transfusion: requested per surgical team.     Disposition:   Sign Out in:  ICU  Disposition:  ICU  Recovery Course: Recovery in ICU  Follow-Up: Not required     Comments/Narrative:  Uneventful transfer to ICU           Last Anesthesia Record Vitals:  CRNA VITALS  2019 1644 - 2019 1743      2019             SpO2:  100 %          Last PACU Vitals:  Vitals Value Taken Time   BP     Temp     Pulse     Resp 15 2019  5:42 PM   SpO2 100 % 2019  5:42 PM   Temp src     NIBP     Pulse     SpO2     Resp     Temp     Ht Rate     Temp 2     Vitals shown include unvalidated device data.      Electronically Signed By: Pj Clifford DO, 2019, 5:43 PM   CARDIOLOGY FOLLOW UP NOTE - DR. CHAN    Patient Name: MELISSA ARREOLA    Date of Service: 12-15-23 @ 14:20      no new complaints  + sob, cough    Subjective:    cv: denies chest pain,, palpitations, dizziness  GI: denies abdominal pain, nausea, vomiting  vascular/legs: no edema   skin: no rash  ROS: otherwise negative   overnight events:      PHYSICAL EXAM:  T(C): 36.8 (12-15-23 @ 11:43), Max: 36.8 (12-15-23 @ 11:43)  HR: 107 (12-15-23 @ 11:43) (90 - 107)  BP: 127/79 (12-15-23 @ 11:43) (127/79 - 143/63)  RR: 18 (12-15-23 @ 11:43) (18 - 18)  SpO2: 96% (12-15-23 @ 11:43) (96% - 98%)  Wt(kg): --  I&O's Summary    15 Dec 2023 07:01  -  15 Dec 2023 14:20  --------------------------------------------------------  IN: 400 mL / OUT: 0 mL / NET: 400 mL      Daily     Daily     Appearance: Normal	  Cardiovascular: Normal S1 S2,RRR, No JVD, No murmurs  Respiratory: Lungs clear to auscultation	  Gastrointestinal:  Soft, Non-tender, + BS	  Extremities: Normal range of motion, No clubbing, cyanosis or edema      Home Medications:  amLODIPine 10 mg oral tablet: 1 tab(s) orally once a day (13 Dec 2023 18:52)  calcitriol 0.5 mcg oral capsule: 1 cap(s) orally once a day (13 Dec 2023 18:52)  cholecalciferol oral tablet: 1000 unit(s) orally once a day (13 Dec 2023 18:52)  folic acid 1 mg oral tablet: 1 tab(s) orally once a day (13 Dec 2023 18:52)  metoprolol succinate 100 mg oral tablet, extended release: 1 tab(s) orally once a day (13 Dec 2023 18:52)      MEDICATIONS  (STANDING):  calcitriol   Capsule 1 MICROGram(s) Oral <User Schedule>  chlorhexidine 2% Cloths 1 Application(s) Topical daily  fluticasone propionate 50 MICROgram(s)/spray Nasal Spray 1 Spray(s) Both Nostrils two times a day  heparin   Injectable 5000 Unit(s) SubCutaneous every 12 hours  heparin   Injectable. 2000 Unit(s) Dialysis. once  heparin   Injectable. 1000 Unit(s) Dialysis. every 1 hour  remdesivir  IVPB 100 milliGRAM(s) IV Intermittent every 24 hours      TELEMETRY: 	    ECG:  	  RADIOLOGY:   DIAGNOSTIC TESTING:  [ ] Echocardiogram:  [ ] Catheterization:  [ ] Stress Test:    OTHER: 	    LABS:	 	    CARDIAC MARKERS:        Troponin T, High Sensitivity Result: 27 ng/L (12-13 @ 14:27)                                12.1   3.71  )-----------( 173      ( 15 Dec 2023 06:00 )             39.0     12-15    139  |  96<L>  |  45<H>  ----------------------------<  82  4.7   |  28  |  6.23<H>    Ca    9.1      15 Dec 2023 06:00  Phos  3.8     12-15  Mg     2.10     12-15    TPro  7.2  /  Alb  3.5  /  TBili  0.6  /  DBili  x   /  AST  35<H>  /  ALT  33  /  AlkPhos  141<H>  12-14    proBNP:   PT/INR - ( 13 Dec 2023 15:02 )   PT: 11.4 sec;   INR: 1.02 ratio         PTT - ( 13 Dec 2023 15:02 )  PTT:81.2 sec  Lipid Profile:   HgA1c:     Creatinine: 6.23 mg/dL (12-15-23 @ 06:00)  Creatinine: 4.94 mg/dL (12-14-23 @ 07:30)  Creatinine: 3.71 mg/dL (12-13-23 @ 14:27)

## 2023-12-15 NOTE — PROGRESS NOTE ADULT - SUBJECTIVE AND OBJECTIVE BOX
SUBJECTIVE / OVERNIGHT EVENTS:pt seen and examined  12-15-23     MEDICATIONS  (STANDING):  calcitriol   Capsule 1 MICROGram(s) Oral <User Schedule>  chlorhexidine 2% Cloths 1 Application(s) Topical daily  fluticasone propionate 50 MICROgram(s)/spray Nasal Spray 1 Spray(s) Both Nostrils two times a day  heparin   Injectable 5000 Unit(s) SubCutaneous every 12 hours  heparin   Injectable. 2000 Unit(s) Dialysis. once  heparin   Injectable. 1000 Unit(s) Dialysis. every 1 hour  remdesivir  IVPB 100 milliGRAM(s) IV Intermittent every 24 hours    MEDICATIONS  (PRN):  acetaminophen     Tablet .. 650 milliGRAM(s) Oral every 6 hours PRN Temp greater or equal to 38C (100.4F), Mild Pain (1 - 3)  aluminum hydroxide/magnesium hydroxide/simethicone Suspension 30 milliLiter(s) Oral every 4 hours PRN Dyspepsia  benzocaine/menthol Lozenge 1 Lozenge Oral every 4 hours PRN Sore Throat  benzonatate 100 milliGRAM(s) Oral every 8 hours PRN Cough  guaifenesin/dextromethorphan Oral Liquid 10 milliLiter(s) Oral every 4 hours PRN Cough  melatonin 3 milliGRAM(s) Oral at bedtime PRN Insomnia  ondansetron Injectable 4 milliGRAM(s) IV Push every 8 hours PRN Nausea and/or Vomiting  sodium chloride 0.9% Bolus. 100 milliLiter(s) IV Bolus every 5 minutes PRN SBP LESS THAN or EQUAL to 90 mmHg    Vital Signs Last 24 Hrs  T(C): 36.8 (12-15-23 @ 11:43), Max: 36.8 (12-15-23 @ 11:43)  T(F): 98.3 (12-15-23 @ 11:43), Max: 98.3 (12-15-23 @ 11:43)  HR: 107 (12-15-23 @ 11:43) (90 - 107)  BP: 127/79 (12-15-23 @ 11:43) (127/79 - 143/63)  BP(mean): --  RR: 18 (12-15-23 @ 11:43) (18 - 18)  SpO2: 96% (12-15-23 @ 11:43) (96% - 98%)        Constitutional: No fever, fatigue  Skin: No rash.  Eyes: No recent vision problems or eye pain.  ENT: No congestion, ear pain, or sore throat.  Cardiovascular: No chest pain or palpation.  Respiratory: No cough, shortness of breath, congestion, or wheezing.  Gastrointestinal: No abdominal pain, nausea, vomiting, or diarrhea.  Genitourinary: No dysuria.  Musculoskeletal: No joint swelling.  Neurologic: No headache.    PHYSICAL EXAM:  GENERAL: NAD  EYES: EOMI, PERRLA  NECK: Supple, No JVD  CHEST/LUNG: dec breath sounds at bases  HEART:  S1 , S2 +  ABDOMEN: soft , bs+  EXTREMITIES:  no edema  NEUROLOGY:a;ert awake      LABS:                        10.5   3.53  )-----------( 175      ( 14 Dec 2023 07:30 )             33.2     12-14    138  |  95<L>  |  28<H>  ----------------------------<  84  4.1   |  32<H>  |  4.94<H>    Ca    8.5      14 Dec 2023 07:30    TPro  7.2  /  Alb  3.5  /  TBili  0.6  /  DBili  x   /  AST  35<H>  /  ALT  33  /  AlkPhos  141<H>  12-14    PT/INR - ( 13 Dec 2023 15:02 )   PT: 11.4 sec;   INR: 1.02 ratio         PTT - ( 13 Dec 2023 15:02 )  PTT:81.2 sec      Urinalysis Basic - ( 14 Dec 2023 07:30 )    Color: x / Appearance: x / SG: x / pH: x  Gluc: 84 mg/dL / Ketone: x  / Bili: x / Urobili: x   Blood: x / Protein: x / Nitrite: x   Leuk Esterase: x / RBC: x / WBC x   Sq Epi: x / Non Sq Epi: x / Bacteria: x        RADIOLOGY & ADDITIONAL TESTS:    Imaging Personally Reviewed:    Consultant(s) Notes Reviewed:      Care Discussed with Consultants/Other Providers:

## 2023-12-15 NOTE — PROGRESS NOTE ADULT - ASSESSMENT
74 y/o F PMhx HTN, OA, chronic migraine and ESRD on HD (MWF) who presented w/ cough and SOB.    COVID  symptomatic   saturating well on RA    Recommendations  c/w remdesivir x 3 day course, day 2  monitor liver enzymes while on remdesivir  trend inflammatory markers  isolation per infection control policy   supportive care- flonase, mucinex, anti-tussive    Tony Bright M.D.  [facility], Division of Infectious Diseases  [phone]  After 5pm on weekdays and all day on weekends - please call [phone]  72 y/o F PMhx HTN, OA, chronic migraine and ESRD on HD (MWF) who presented w/ cough and SOB.    COVID  symptomatic   saturating well on RA    Recommendations  c/w remdesivir x 3 day course, day 2  monitor liver enzymes while on remdesivir  trend inflammatory markers  isolation per infection control policy   supportive care- flonase, mucinex, anti-tussive    Tony Bright M.D.  [facility], Division of Infectious Diseases  [phone]  After 5pm on weekdays and all day on weekends - please call [phone]

## 2023-12-15 NOTE — PROGRESS NOTE ADULT - ASSESSMENT
73y Female with history of ESRD on HD presents with SOB. Nephrology consulted for ESRD status.    1) ESRD: Last HD on 12/13 as an outpatient terminated 30 minutes prematurely (after 3.5 hours) due to complaints of SOB with tachycardia (up to 120's) and poor catheter flow. Will attempt HD via catheter today and consider TDC exchange if catheter continues to have high arterial and venous pressures despite heparin (on 2000 units bolus and 1000 units/hour maintenance). Monitor electrolytes.    2) HTN with ESRD: BP controlled. Monitor off medications.    3) Anemia of renal disease: Hb acceptable. On Micera 30 mcg monthly. Will give Epogen if Hb decreases. Monitor Hb.    4) Secondary HPT of renal origin: Phosphorus acceptable. Continue with calcitriol 1 mcg MWF and renal diet. Monitor serum calcium and phosphorus.      [facility] NEPHROLOGY  Derrick Castaneda M.D.  Raúl Diana D.O.  Grace Delgadillo M.D.  MD Cher Beckman, MSN, ANP-C    Telephone: [phone]  Facsimile: [phone] [place] [address], [address]  [place] [address]   73y Female with history of ESRD on HD presents with SOB. Nephrology consulted for ESRD status.    1) ESRD: Last HD on 12/13 as an outpatient terminated 30 minutes prematurely (after 3.5 hours) due to complaints of SOB with tachycardia (up to 120's) and poor catheter flow. Will attempt HD via catheter today and consider TDC exchange if catheter continues to have high arterial and venous pressures despite heparin (on 2000 units bolus and 1000 units/hour maintenance). Monitor electrolytes.    2) HTN with ESRD: BP controlled. Monitor off medications.    3) Anemia of renal disease: Hb acceptable. On Micera 30 mcg monthly. Will give Epogen if Hb decreases. Monitor Hb.    4) Secondary HPT of renal origin: Phosphorus acceptable. Continue with calcitriol 1 mcg MWF and renal diet. Monitor serum calcium and phosphorus.      [facility] NEPHROLOGY  Derrick Castaneda M.D.  Raúl Diana D.O.  Grace Delgadillo M.D.  MD Cher Beckman, MSN, ANP-C    Telephone: [phone]  Facsimile: [phone] [place] [address], [address]  [place] [address]

## 2023-12-15 NOTE — PROGRESS NOTE ADULT - SUBJECTIVE AND OBJECTIVE BOX
[facility], Division of Infectious Diseases  JAME Ashley Y. Patel, S. Shah, G. [facility]  [phone]  ([phone] - weekdays after 5pm and weekends)    Name: MELISSA ARREOLA  Age/Gender: 73y Female  MRN: [id number]    Interval History:  Notes reviewed.   No concerning overnight events.  Afebrile.   continues to have myalgias, sore throat, cough, headache, nasal congestion    Allergies: trimethoprim (Other; Rash)  sulfa drugs (Other; Rash)  Sulfur (Unknown)      Objective:  Vitals:   T(F): 98 (12-14-23 @ 21:23), Max: 98.1 (12-14-23 @ 10:41)  HR: 90 (12-14-23 @ 21:23) (90 - 96)  BP: 143/63 (12-14-23 @ 21:23) (132/69 - 143/63)  RR: 18 (12-14-23 @ 21:23) (16 - 18)  SpO2: 98% (12-14-23 @ 21:23) (97% - 98%)  Physical Examination:  General: no acute distress  HEENT: anicteric  Cardio: S1, S2, normal rate  Resp: mild rhonchi  Abd: soft, NT, ND  Ext: no LE edema  Skin: warm, dry    Laboratory Studies:  CBC:                       12.1   3.71  )-----------( 173      ( 15 Dec 2023 06:00 )             39.0     WBC Trend:  3.71 12-15-23 @ 06:00  3.53 12-14-23 @ 07:30  4.78 12-13-23 @ 14:27    CMP: 12-15    139  |  96<L>  |  45<H>  ----------------------------<  82  4.7   |  28  |  6.23<H>    Ca    9.1      15 Dec 2023 06:00  Phos  3.8     12-15  Mg     2.10     12-15    TPro  7.2  /  Alb  3.5  /  TBili  0.6  /  DBili  x   /  AST  35<H>  /  ALT  33  /  AlkPhos  141<H>  12-14      LIVER FUNCTIONS - ( 14 Dec 2023 07:30 )  Alb: 3.5 g/dL / Pro: 7.2 g/dL / ALK PHOS: 141 U/L / ALT: 33 U/L / AST: 35 U/L / GGT: x             Urinalysis Basic - ( 15 Dec 2023 06:00 )    Color: x / Appearance: x / SG: x / pH: x  Gluc: 82 mg/dL / Ketone: x  / Bili: x / Urobili: x   Blood: x / Protein: x / Nitrite: x   Leuk Esterase: x / RBC: x / WBC x   Sq Epi: x / Non Sq Epi: x / Bacteria: x      Microbiology: reviewed     Culture - Nose (collected 12-13-23 @ 19:30)  Source: .Nose Nose  Final Report (12-15-23 @ 09:24):    No staphylococcus aureus isolated.    "PCR is more Sensitive for identifying MRSA/MSSA."        Radiology: reviewed     Medications:  acetaminophen     Tablet .. 650 milliGRAM(s) Oral every 6 hours PRN  aluminum hydroxide/magnesium hydroxide/simethicone Suspension 30 milliLiter(s) Oral every 4 hours PRN  benzonatate 100 milliGRAM(s) Oral every 8 hours PRN  calcitriol   Capsule 1 MICROGram(s) Oral <User Schedule>  chlorhexidine 2% Cloths 1 Application(s) Topical daily  heparin   Injectable 5000 Unit(s) SubCutaneous every 12 hours  heparin   Injectable. 2000 Unit(s) Dialysis. once  heparin   Injectable. 1000 Unit(s) Dialysis. every 1 hour  melatonin 3 milliGRAM(s) Oral at bedtime PRN  ondansetron Injectable 4 milliGRAM(s) IV Push every 8 hours PRN  remdesivir  IVPB 100 milliGRAM(s) IV Intermittent every 24 hours  sodium chloride 0.9% Bolus. 100 milliLiter(s) IV Bolus every 5 minutes PRN    Antimicrobials:  remdesivir  IVPB 100 milliGRAM(s) IV Intermittent every 24 hours   [facility], Division of Infectious Diseases  JAME Ashley Y. Patel, S. Shah, G. [facility]  [phone]  ([phone] - weekdays after 5pm and weekends)    Name: MELISSA ARREOLA  Age/Gender: 73y Female  MRN: [id number]    Interval History:  Notes reviewed.   No concerning overnight events.  Afebrile.   continues to have myalgias, sore throat, cough, headache, nasal congestion    Allergies: trimethoprim (Other; Rash)  sulfa drugs (Other; Rash)  Sulfur (Unknown)      Objective:  Vitals:   T(F): 98 (12-14-23 @ 21:23), Max: 98.1 (12-14-23 @ 10:41)  HR: 90 (12-14-23 @ 21:23) (90 - 96)  BP: 143/63 (12-14-23 @ 21:23) (132/69 - 143/63)  RR: 18 (12-14-23 @ 21:23) (16 - 18)  SpO2: 98% (12-14-23 @ 21:23) (97% - 98%)  Physical Examination:  General: no acute distress  HEENT: anicteric  Cardio: S1, S2, normal rate  Resp: mild rhonchi  Abd: soft, NT, ND  Ext: no LE edema  Skin: warm, dry    Laboratory Studies:  CBC:                       12.1   3.71  )-----------( 173      ( 15 Dec 2023 06:00 )             39.0     WBC Trend:  3.71 12-15-23 @ 06:00  3.53 12-14-23 @ 07:30  4.78 12-13-23 @ 14:27    CMP: 12-15    139  |  96<L>  |  45<H>  ----------------------------<  82  4.7   |  28  |  6.23<H>    Ca    9.1      15 Dec 2023 06:00  Phos  3.8     12-15  Mg     2.10     12-15    TPro  7.2  /  Alb  3.5  /  TBili  0.6  /  DBili  x   /  AST  35<H>  /  ALT  33  /  AlkPhos  141<H>  12-14      LIVER FUNCTIONS - ( 14 Dec 2023 07:30 )  Alb: 3.5 g/dL / Pro: 7.2 g/dL / ALK PHOS: 141 U/L / ALT: 33 U/L / AST: 35 U/L / GGT: x             Urinalysis Basic - ( 15 Dec 2023 06:00 )    Color: x / Appearance: x / SG: x / pH: x  Gluc: 82 mg/dL / Ketone: x  / Bili: x / Urobili: x   Blood: x / Protein: x / Nitrite: x   Leuk Esterase: x / RBC: x / WBC x   Sq Epi: x / Non Sq Epi: x / Bacteria: x      Microbiology: reviewed     Culture - Nose (collected 12-13-23 @ 19:30)  Source: .Nose Nose  Final Report (12-15-23 @ 09:24):    No staphylococcus aureus isolated.    "PCR is more Sensitive for identifying MRSA/MSSA."        Radiology: reviewed     Medications:  acetaminophen     Tablet .. 650 milliGRAM(s) Oral every 6 hours PRN  aluminum hydroxide/magnesium hydroxide/simethicone Suspension 30 milliLiter(s) Oral every 4 hours PRN  benzonatate 100 milliGRAM(s) Oral every 8 hours PRN  calcitriol   Capsule 1 MICROGram(s) Oral <User Schedule>  chlorhexidine 2% Cloths 1 Application(s) Topical daily  heparin   Injectable 5000 Unit(s) SubCutaneous every 12 hours  heparin   Injectable. 2000 Unit(s) Dialysis. once  heparin   Injectable. 1000 Unit(s) Dialysis. every 1 hour  melatonin 3 milliGRAM(s) Oral at bedtime PRN  ondansetron Injectable 4 milliGRAM(s) IV Push every 8 hours PRN  remdesivir  IVPB 100 milliGRAM(s) IV Intermittent every 24 hours  sodium chloride 0.9% Bolus. 100 milliLiter(s) IV Bolus every 5 minutes PRN    Antimicrobials:  remdesivir  IVPB 100 milliGRAM(s) IV Intermittent every 24 hours

## 2023-12-15 NOTE — PROGRESS NOTE ADULT - ASSESSMENT
ECHO 4/23/22 : nl lV  sys fx EF 55-60%  stress  11/14/23 normal   EF% during stress is 64 %    a/p   72 yo F with PMhx of HTN, PE (not on AC), OA, chronic migraine  and ESRD on HD (MW) presents to the ED with cough/ SOB/ chest pain, + covid     #Atypical chest pain   -msk- secondary to cough   -HS trop neg x 1, no ischemic changes to ecg   -recent stress  11/14/23 normal   EF% during stress is 64 %  -f/u echo   -hx of PE- not on ac, elevated d dimer  -pe w/u per med    #htn   -bp stable off amlo    #Covid 19  -managment per med   -on remdesivir    -ID fu    #sob   -secondary to covid  -no chf on exam   -vol removal with hd      dvt ppx       35 minutes spent on total encounter; more than 50% of the visit was spent counseling and/or coordinating care by the attending physician.

## 2023-12-16 LAB
ANION GAP SERPL CALC-SCNC: 17 MMOL/L — HIGH (ref 7–14)
ANION GAP SERPL CALC-SCNC: 17 MMOL/L — HIGH (ref 7–14)
BUN SERPL-MCNC: 28 MG/DL — HIGH (ref 7–23)
BUN SERPL-MCNC: 28 MG/DL — HIGH (ref 7–23)
CALCIUM SERPL-MCNC: 9.3 MG/DL — SIGNIFICANT CHANGE UP (ref 8.4–10.5)
CALCIUM SERPL-MCNC: 9.3 MG/DL — SIGNIFICANT CHANGE UP (ref 8.4–10.5)
CHLORIDE SERPL-SCNC: 97 MMOL/L — LOW (ref 98–107)
CHLORIDE SERPL-SCNC: 97 MMOL/L — LOW (ref 98–107)
CO2 SERPL-SCNC: 22 MMOL/L — SIGNIFICANT CHANGE UP (ref 22–31)
CO2 SERPL-SCNC: 22 MMOL/L — SIGNIFICANT CHANGE UP (ref 22–31)
CREAT SERPL-MCNC: 4.53 MG/DL — HIGH (ref 0.5–1.3)
CREAT SERPL-MCNC: 4.53 MG/DL — HIGH (ref 0.5–1.3)
EGFR: 10 ML/MIN/1.73M2 — LOW
EGFR: 10 ML/MIN/1.73M2 — LOW
GLUCOSE SERPL-MCNC: 86 MG/DL — SIGNIFICANT CHANGE UP (ref 70–99)
GLUCOSE SERPL-MCNC: 86 MG/DL — SIGNIFICANT CHANGE UP (ref 70–99)
HCT VFR BLD CALC: 37.4 % — SIGNIFICANT CHANGE UP (ref 34.5–45)
HCT VFR BLD CALC: 37.4 % — SIGNIFICANT CHANGE UP (ref 34.5–45)
HGB BLD-MCNC: 11.9 G/DL — SIGNIFICANT CHANGE UP (ref 11.5–15.5)
HGB BLD-MCNC: 11.9 G/DL — SIGNIFICANT CHANGE UP (ref 11.5–15.5)
MAGNESIUM SERPL-MCNC: 2 MG/DL — SIGNIFICANT CHANGE UP (ref 1.6–2.6)
MAGNESIUM SERPL-MCNC: 2 MG/DL — SIGNIFICANT CHANGE UP (ref 1.6–2.6)
MCHC RBC-ENTMCNC: 31.8 GM/DL — LOW (ref 32–36)
MCHC RBC-ENTMCNC: 31.8 GM/DL — LOW (ref 32–36)
MCHC RBC-ENTMCNC: 31.8 PG — SIGNIFICANT CHANGE UP (ref 27–34)
MCHC RBC-ENTMCNC: 31.8 PG — SIGNIFICANT CHANGE UP (ref 27–34)
MCV RBC AUTO: 100 FL — SIGNIFICANT CHANGE UP (ref 80–100)
MCV RBC AUTO: 100 FL — SIGNIFICANT CHANGE UP (ref 80–100)
NRBC # BLD: 0 /100 WBCS — SIGNIFICANT CHANGE UP (ref 0–0)
NRBC # BLD: 0 /100 WBCS — SIGNIFICANT CHANGE UP (ref 0–0)
NRBC # FLD: 0 K/UL — SIGNIFICANT CHANGE UP (ref 0–0)
NRBC # FLD: 0 K/UL — SIGNIFICANT CHANGE UP (ref 0–0)
PHOSPHATE SERPL-MCNC: 3.1 MG/DL — SIGNIFICANT CHANGE UP (ref 2.5–4.5)
PHOSPHATE SERPL-MCNC: 3.1 MG/DL — SIGNIFICANT CHANGE UP (ref 2.5–4.5)
PLATELET # BLD AUTO: 185 K/UL — SIGNIFICANT CHANGE UP (ref 150–400)
PLATELET # BLD AUTO: 185 K/UL — SIGNIFICANT CHANGE UP (ref 150–400)
POTASSIUM SERPL-MCNC: 3.9 MMOL/L — SIGNIFICANT CHANGE UP (ref 3.5–5.3)
POTASSIUM SERPL-MCNC: 3.9 MMOL/L — SIGNIFICANT CHANGE UP (ref 3.5–5.3)
POTASSIUM SERPL-SCNC: 3.9 MMOL/L — SIGNIFICANT CHANGE UP (ref 3.5–5.3)
POTASSIUM SERPL-SCNC: 3.9 MMOL/L — SIGNIFICANT CHANGE UP (ref 3.5–5.3)
RBC # BLD: 3.74 M/UL — LOW (ref 3.8–5.2)
RBC # BLD: 3.74 M/UL — LOW (ref 3.8–5.2)
RBC # FLD: 12.4 % — SIGNIFICANT CHANGE UP (ref 10.3–14.5)
RBC # FLD: 12.4 % — SIGNIFICANT CHANGE UP (ref 10.3–14.5)
SODIUM SERPL-SCNC: 136 MMOL/L — SIGNIFICANT CHANGE UP (ref 135–145)
SODIUM SERPL-SCNC: 136 MMOL/L — SIGNIFICANT CHANGE UP (ref 135–145)
WBC # BLD: 5.15 K/UL — SIGNIFICANT CHANGE UP (ref 3.8–10.5)
WBC # BLD: 5.15 K/UL — SIGNIFICANT CHANGE UP (ref 3.8–10.5)
WBC # FLD AUTO: 5.15 K/UL — SIGNIFICANT CHANGE UP (ref 3.8–10.5)
WBC # FLD AUTO: 5.15 K/UL — SIGNIFICANT CHANGE UP (ref 3.8–10.5)

## 2023-12-16 RX ADMIN — HEPARIN SODIUM 5000 UNIT(S): 5000 INJECTION INTRAVENOUS; SUBCUTANEOUS at 04:48

## 2023-12-16 RX ADMIN — CHLORHEXIDINE GLUCONATE 1 APPLICATION(S): 213 SOLUTION TOPICAL at 13:34

## 2023-12-16 RX ADMIN — BENZOCAINE AND MENTHOL 1 LOZENGE: 5; 1 LIQUID ORAL at 06:32

## 2023-12-16 RX ADMIN — HEPARIN SODIUM 5000 UNIT(S): 5000 INJECTION INTRAVENOUS; SUBCUTANEOUS at 18:00

## 2023-12-16 RX ADMIN — REMDESIVIR 200 MILLIGRAM(S): 5 INJECTION INTRAVENOUS at 14:32

## 2023-12-16 RX ADMIN — Medication 1 SPRAY(S): at 04:48

## 2023-12-16 RX ADMIN — Medication 10 MILLILITER(S): at 13:35

## 2023-12-16 RX ADMIN — Medication 1 SPRAY(S): at 18:00

## 2023-12-16 RX ADMIN — Medication 650 MILLIGRAM(S): at 00:04

## 2023-12-16 NOTE — PROGRESS NOTE ADULT - SUBJECTIVE AND OBJECTIVE BOX
PATIENT SEEN AND EXAMINED BY ELIZABETH MICHAEL M.D. ON :- 12/16/23  DATE OF SERVICE:      12/16/23       Interim events noted,Labs ,Radiological studies and Cardiology tests reviewed .  DISCUSSED WITH ACP/MEDICAL RESIDENT ON PLAN OF CARE.    MR#[id number]  PATIENT NAME:[facility] COURSE: HPI:  74 yo F with PMhx of HTN, PE (not on AC), OA, chronic migraine  and ESRD on HD (MWF) presents to the ED with cough and SOB. Pt reports that she has been feeling weak for the last couple of days. She has a productive cough. Her chest hurts whenever she coughs. She also has runny nose, myalgias, and SOB with exertion. Denies any associated fever, chills, chest pain or LE edema. She went to HD today and had to stop HD due to weakness/SOB. She was then sent to the ED.   In the ED, her vitals were notable tachycardia. Labs were notable for elevated BUN/Scr, elevated ALP, AST. RVP was positive for SARSCOV2. CXR showed clear lungs.  (13 Dec 2023 18:47)      INTERIM EVENTS:Patient seen at bedside ,interim events noted.      PMH -reviewed admission note, no change since admission  HEART FAILURE: Acute[ ]Chronic[ ] Systolic[ ] Diastolic[ ] Combined Systolic and Diastolic[ ]  CAD[ ] CABG[ ] PCI[ ]  DEVICES[ ] PPM[ ] ICD[ ] ILR[ ]  ATRIAL FIBRILLATION[ ] Paroxysmal[ ] Permanent[ ] CHADS2-[  ]  OC[ ] CKD1[ ] CKD2[ ] CKD3[ ] CKD4[ ] ESRD[ ]  COPD[ ] HTN[ ]   DM[ ] Type1[ ] Type 2[ ]   CVA[ ] Paresis[ ]    AMBULATION: Assisted[ ] Cane/walker[ ] Independent[ ]    MEDICATIONS  (STANDING):  calcitriol   Capsule 1 MICROGram(s) Oral <User Schedule>  chlorhexidine 2% Cloths 1 Application(s) Topical daily  fluticasone propionate 50 MICROgram(s)/spray Nasal Spray 1 Spray(s) Both Nostrils two times a day  heparin   Injectable 5000 Unit(s) SubCutaneous every 12 hours    MEDICATIONS  (PRN):  acetaminophen     Tablet .. 650 milliGRAM(s) Oral every 6 hours PRN Temp greater or equal to 38C (100.4F), Mild Pain (1 - 3)  aluminum hydroxide/magnesium hydroxide/simethicone Suspension 30 milliLiter(s) Oral every 4 hours PRN Dyspepsia  benzocaine/menthol Lozenge 1 Lozenge Oral every 4 hours PRN Sore Throat  benzonatate 100 milliGRAM(s) Oral every 8 hours PRN Cough  guaifenesin/dextromethorphan Oral Liquid 10 milliLiter(s) Oral every 4 hours PRN Cough  melatonin 3 milliGRAM(s) Oral at bedtime PRN Insomnia  ondansetron Injectable 4 milliGRAM(s) IV Push every 8 hours PRN Nausea and/or Vomiting  sodium chloride 0.9% Bolus. 100 milliLiter(s) IV Bolus every 5 minutes PRN SBP LESS THAN or EQUAL to 90 mmHg            REVIEW OF SYSTEMS:  Constitutional: [ ] fever, [ ]weight loss,  [ ]fatigue [ ]weight gain  Eyes: [ ] visual changes  Respiratory: [ ]shortness of breath;  [ ] cough, [ ]wheezing, [ ]chills, [ ]hemoptysis  Cardiovascular: [ ] chest pain, [ ]palpitations, [ ]dizziness,  [ ]leg swelling[ ]orthopnea[ ]PND  Gastrointestinal: [ ] abdominal pain, [ ]nausea, [ ]vomiting,  [ ]diarrhea [ ]Constipation [ ]Melena  Genitourinary: [ ] dysuria, [ ] hematuria [ ]Torres  Neurologic: [ ] headaches [ ] tremors[ ]weakness [ ]Paralysis Right[ ] Left[ ]  Skin: [ ] itching, [ ]burning, [ ] rashes  Endocrine: [ ] heat or cold intolerance  Musculoskeletal: [ ] joint pain or swelling; [ ] muscle, back, or extremity pain  Psychiatric: [ ] depression, [ ]anxiety, [ ]mood swings, or [ ]difficulty sleeping  Hematologic: [ ] easy bruising, [ ] bleeding gums    [ ] All remaining systems negative except as per above.   [ ]Unable to obtain.  [x] No change in ROS since admission      Vital Signs Last 24 Hrs  T(C): 36.3 (16 Dec 2023 19:19), Max: 37.1 (16 Dec 2023 12:30)  T(F): 97.4 (16 Dec 2023 19:19), Max: 98.7 (16 Dec 2023 12:30)  HR: 93 (16 Dec 2023 19:19) (93 - 111)  BP: 143/71 (16 Dec 2023 19:19) (138/61 - 150/95)  BP(mean): --  RR: 17 (16 Dec 2023 19:19) (16 - 18)  SpO2: 95% (16 Dec 2023 19:19) (95% - 99%)    Parameters below as of 16 Dec 2023 19:19  Patient On (Oxygen Delivery Method): room air      I&O's Summary    15 Dec 2023 07:01  -  16 Dec 2023 07:00  --------------------------------------------------------  IN: 1300 mL / OUT: 2600 mL / NET: -1300 mL    16 Dec 2023 07:01  -  16 Dec 2023 20:26  --------------------------------------------------------  IN: 900 mL / OUT: 0 mL / NET: 900 mL        PHYSICAL EXAM:  General: No acute distress BMI-  HEENT: EOMI, PERRL  Neck: Supple, [ ] JVD  Lungs: Equal air entry bilaterally; [ ] rales [ ] wheezing [ ] rhonchi  Heart: Regular rate and rhythm; [x ] murmur   2/6 [ x] systolic [ ] diastolic [ ] radiation[ ] rubs [ ]  gallops  Abdomen: Nontender, bowel sounds present  Extremities: No clubbing, cyanosis, [ ] edema [ ]Pulses  equal and intact  Nervous system:  Alert & Oriented X3, no focal deficits  Psychiatric: Normal affect  Skin: No rashes or lesions    LABS:  12-16    136  |  97<L>  |  28<H>  ----------------------------<  86  3.9   |  22  |  4.53<H>    Ca    9.3      16 Dec 2023 06:00  Phos  3.1     12-16  Mg     2.00     12-16      Creatinine Trend: 4.53<--, 6.23<--, 4.94<--, 3.71<--                        11.9   5.15  )-----------( 185      ( 16 Dec 2023 06:00 )             37.4                PATIENT SEEN AND EXAMINED BY ELIZABETH MICHAEL M.D. ON :- 12/16/23  DATE OF SERVICE:      12/16/23       Interim events noted,Labs ,Radiological studies and Cardiology tests reviewed .  DISCUSSED WITH ACP/MEDICAL RESIDENT ON PLAN OF CARE.    MR#[id number]  PATIENT NAME:[facility] COURSE: HPI:  72 yo F with PMhx of HTN, PE (not on AC), OA, chronic migraine  and ESRD on HD (MWF) presents to the ED with cough and SOB. Pt reports that she has been feeling weak for the last couple of days. She has a productive cough. Her chest hurts whenever she coughs. She also has runny nose, myalgias, and SOB with exertion. Denies any associated fever, chills, chest pain or LE edema. She went to HD today and had to stop HD due to weakness/SOB. She was then sent to the ED.   In the ED, her vitals were notable tachycardia. Labs were notable for elevated BUN/Scr, elevated ALP, AST. RVP was positive for SARSCOV2. CXR showed clear lungs.  (13 Dec 2023 18:47)      INTERIM EVENTS:Patient seen at bedside ,interim events noted.      PMH -reviewed admission note, no change since admission  HEART FAILURE: Acute[ ]Chronic[ ] Systolic[ ] Diastolic[ ] Combined Systolic and Diastolic[ ]  CAD[ ] CABG[ ] PCI[ ]  DEVICES[ ] PPM[ ] ICD[ ] ILR[ ]  ATRIAL FIBRILLATION[ ] Paroxysmal[ ] Permanent[ ] CHADS2-[  ]  OC[ ] CKD1[ ] CKD2[ ] CKD3[ ] CKD4[ ] ESRD[ ]  COPD[ ] HTN[ ]   DM[ ] Type1[ ] Type 2[ ]   CVA[ ] Paresis[ ]    AMBULATION: Assisted[ ] Cane/walker[ ] Independent[ ]    MEDICATIONS  (STANDING):  calcitriol   Capsule 1 MICROGram(s) Oral <User Schedule>  chlorhexidine 2% Cloths 1 Application(s) Topical daily  fluticasone propionate 50 MICROgram(s)/spray Nasal Spray 1 Spray(s) Both Nostrils two times a day  heparin   Injectable 5000 Unit(s) SubCutaneous every 12 hours    MEDICATIONS  (PRN):  acetaminophen     Tablet .. 650 milliGRAM(s) Oral every 6 hours PRN Temp greater or equal to 38C (100.4F), Mild Pain (1 - 3)  aluminum hydroxide/magnesium hydroxide/simethicone Suspension 30 milliLiter(s) Oral every 4 hours PRN Dyspepsia  benzocaine/menthol Lozenge 1 Lozenge Oral every 4 hours PRN Sore Throat  benzonatate 100 milliGRAM(s) Oral every 8 hours PRN Cough  guaifenesin/dextromethorphan Oral Liquid 10 milliLiter(s) Oral every 4 hours PRN Cough  melatonin 3 milliGRAM(s) Oral at bedtime PRN Insomnia  ondansetron Injectable 4 milliGRAM(s) IV Push every 8 hours PRN Nausea and/or Vomiting  sodium chloride 0.9% Bolus. 100 milliLiter(s) IV Bolus every 5 minutes PRN SBP LESS THAN or EQUAL to 90 mmHg            REVIEW OF SYSTEMS:  Constitutional: [ ] fever, [ ]weight loss,  [ ]fatigue [ ]weight gain  Eyes: [ ] visual changes  Respiratory: [ ]shortness of breath;  [ ] cough, [ ]wheezing, [ ]chills, [ ]hemoptysis  Cardiovascular: [ ] chest pain, [ ]palpitations, [ ]dizziness,  [ ]leg swelling[ ]orthopnea[ ]PND  Gastrointestinal: [ ] abdominal pain, [ ]nausea, [ ]vomiting,  [ ]diarrhea [ ]Constipation [ ]Melena  Genitourinary: [ ] dysuria, [ ] hematuria [ ]Torres  Neurologic: [ ] headaches [ ] tremors[ ]weakness [ ]Paralysis Right[ ] Left[ ]  Skin: [ ] itching, [ ]burning, [ ] rashes  Endocrine: [ ] heat or cold intolerance  Musculoskeletal: [ ] joint pain or swelling; [ ] muscle, back, or extremity pain  Psychiatric: [ ] depression, [ ]anxiety, [ ]mood swings, or [ ]difficulty sleeping  Hematologic: [ ] easy bruising, [ ] bleeding gums    [ ] All remaining systems negative except as per above.   [ ]Unable to obtain.  [x] No change in ROS since admission      Vital Signs Last 24 Hrs  T(C): 36.3 (16 Dec 2023 19:19), Max: 37.1 (16 Dec 2023 12:30)  T(F): 97.4 (16 Dec 2023 19:19), Max: 98.7 (16 Dec 2023 12:30)  HR: 93 (16 Dec 2023 19:19) (93 - 111)  BP: 143/71 (16 Dec 2023 19:19) (138/61 - 150/95)  BP(mean): --  RR: 17 (16 Dec 2023 19:19) (16 - 18)  SpO2: 95% (16 Dec 2023 19:19) (95% - 99%)    Parameters below as of 16 Dec 2023 19:19  Patient On (Oxygen Delivery Method): room air      I&O's Summary    15 Dec 2023 07:01  -  16 Dec 2023 07:00  --------------------------------------------------------  IN: 1300 mL / OUT: 2600 mL / NET: -1300 mL    16 Dec 2023 07:01  -  16 Dec 2023 20:26  --------------------------------------------------------  IN: 900 mL / OUT: 0 mL / NET: 900 mL        PHYSICAL EXAM:  General: No acute distress BMI-  HEENT: EOMI, PERRL  Neck: Supple, [ ] JVD  Lungs: Equal air entry bilaterally; [ ] rales [ ] wheezing [ ] rhonchi  Heart: Regular rate and rhythm; [x ] murmur   2/6 [ x] systolic [ ] diastolic [ ] radiation[ ] rubs [ ]  gallops  Abdomen: Nontender, bowel sounds present  Extremities: No clubbing, cyanosis, [ ] edema [ ]Pulses  equal and intact  Nervous system:  Alert & Oriented X3, no focal deficits  Psychiatric: Normal affect  Skin: No rashes or lesions    LABS:  12-16    136  |  97<L>  |  28<H>  ----------------------------<  86  3.9   |  22  |  4.53<H>    Ca    9.3      16 Dec 2023 06:00  Phos  3.1     12-16  Mg     2.00     12-16      Creatinine Trend: 4.53<--, 6.23<--, 4.94<--, 3.71<--                        11.9   5.15  )-----------( 185      ( 16 Dec 2023 06:00 )             37.4

## 2023-12-16 NOTE — PROGRESS NOTE ADULT - TIME BILLING
- Review of records, telemetry, vital signs and daily labs.   - General and cardiovascular physical examination.  - Generation of cardiovascular treatment plan.  - Coordination of care.      Patient was seen and examined by me on 12/16/23,interim events noted,labs and radiology studies reviewed.  Damir Blackman MD,[facility]  [address] [place], [place] [id number].  [phone] [id number]/23 - Review of records, telemetry, vital signs and daily labs.   - General and cardiovascular physical examination.  - Generation of cardiovascular treatment plan.  - Coordination of care.      Patient was seen and examined by me on 12/16/23,interim events noted,labs and radiology studies reviewed.  Damir Blackman MD,[facility]  [address] [place], [place] [id number].  [phone] [id number]/23

## 2023-12-16 NOTE — PROGRESS NOTE ADULT - ASSESSMENT
73y Female with history of ESRD on HD presents with SOB. Nephrology consulted for ESRD status.    1) ESRD: Last HD on 12/13 as an outpatient terminated 30 minutes prematurely (after 3.5 hours) due to complaints of SOB with tachycardia (up to 120's) and poor catheter flow. HD in the hospital on 12/15 tolerated without any complications with the catheter. Will plan for M/W?F HD through catheter, and will need to assess arterial and venous pressures. Monitor electrolytes, intake, output, and daily standing weights.    2) HTN with ESRD: BP controlled. Monitor off medications.    3) Anemia of renal disease: Hb acceptable. On Micera 30 mcg monthly. Will give Epogen if Hb decreases. Monitor Hb.    4) Secondary HPT of renal origin: Phosphorus acceptable. Continue with calcitriol 1 mcg MWF and renal diet. Monitor serum calcium and phosphorus.    [facility] NEPHROLOGY  Derrick Castaneda M.D.  Raúl Diana D.O.  Grace Delgadillo M.D.  MD Cher Beckman, MSN, ANP-C    Telephone: [phone]  Facsimile: [phone] [place] [address], [address]  [place] [id number]   73y Female with history of ESRD on HD presents with SOB. Nephrology consulted for ESRD status.    1) ESRD: Last HD on 12/13 as an outpatient terminated 30 minutes prematurely (after 3.5 hours) due to complaints of SOB with tachycardia (up to 120's) and poor catheter flow. HD in the hospital on 12/15 tolerated without any complications with the catheter. Will plan for M/W?F HD through catheter, and will need to assess arterial and venous pressures. Monitor electrolytes, intake, output, and daily standing weights.    2) HTN with ESRD: BP controlled. Monitor off medications.    3) Anemia of renal disease: Hb acceptable. On Micera 30 mcg monthly. Will give Epogen if Hb decreases. Monitor Hb.    4) Secondary HPT of renal origin: Phosphorus acceptable. Continue with calcitriol 1 mcg MWF and renal diet. Monitor serum calcium and phosphorus.    [facility] NEPHROLOGY  Derrick Castaneda M.D.  Raúl Diana D.O.  Grace Delgadillo M.D.  MD Cher Beckman, MSN, ANP-C    Telephone: [phone]  Facsimile: [phone] [place] [address], [address]  [place] [id number]

## 2023-12-16 NOTE — PROGRESS NOTE ADULT - SUBJECTIVE AND OBJECTIVE BOX
[facility] NEPHROLOGY- PROGRESS NOTE    73y Female with history of ESRD on HD presents with SOB. Nephrology consulted for ESRD status. Denies any acute complaints.    REVIEW OF SYSTEMS:  Gen: no fevers  Cards: no chest pain  Resp: + dyspnea, + cough  GI: no nausea or vomiting or diarrhea  Vascular: no LE edema    trimethoprim (Other; Rash)  sulfa drugs (Other; Rash)  Sulfur (Unknown)    Hospital Medications: Medications reviewed      VITALS:  T(F): 98.7 (12-16-23 @ 12:30), Max: 98.7 (12-16-23 @ 12:30)  HR: 101 (12-16-23 @ 12:30)  BP: 138/61 (12-16-23 @ 12:30)  RR: 17 (12-16-23 @ 12:30)  SpO2: 99% (12-16-23 @ 12:30)  Wt(kg): --    12-15 @ 07:01  -  12-16 @ 07:00  --------------------------------------------------------  IN: 1300 mL / OUT: 2600 mL / NET: -1300 mL      Height (cm): 167.6 (12-16 @ 12:00)  Weight (kg): 128 (12-16 @ 12:00)  BMI (kg/m2): 45.6 (12-16 @ 12:00)  BSA (m2): 2.31 (12-16 @ 12:00)      PHYSICAL EXAM:    Gen: NAD, calm  Cards: RRR, +S1/S2, no M/G/R  Resp: CTA B/L  GI: soft, NT/ND, NABS  Vascular: no LE edema B/L, RIJ TDC intact    LABS:  12-16    136  |  97<L>  |  28<H>  ----------------------------<  86  3.9   |  22  |  4.53<H>    Ca    9.3      16 Dec 2023 06:00  Phos  3.1     12-16  Mg     2.00     12-16      Creatinine Trend: 4.53 <--, 6.23 <--, 4.94 <--, 3.71 <--                        11.9   5.15  )-----------( 185      ( 16 Dec 2023 06:00 )             37.4     Urine Studies:  Urinalysis Basic - ( 16 Dec 2023 06:00 )    Color:  / Appearance:  / SG:  / pH:   Gluc: 86 mg/dL / Ketone:   / Bili:  / Urobili:    Blood:  / Protein:  / Nitrite:    Leuk Esterase:  / RBC:  / WBC    Sq Epi:  / Non Sq Epi:  / Bacteria:              [facility] NEPHROLOGY- PROGRESS NOTE    73y Female with history of ESRD on HD presents with SOB. Nephrology consulted for ESRD status. Denies any acute complaints.    REVIEW OF SYSTEMS:  Gen: no fevers  Cards: no chest pain  Resp: + dyspnea, + cough  GI: no nausea or vomiting or diarrhea  Vascular: no LE edema    trimethoprim (Other; Rash)  sulfa drugs (Other; Rash)  Sulfur (Unknown)    Hospital Medications: Medications reviewed      VITALS:  T(F): 98.7 (12-16-23 @ 12:30), Max: 98.7 (12-16-23 @ 12:30)  HR: 101 (12-16-23 @ 12:30)  BP: 138/61 (12-16-23 @ 12:30)  RR: 17 (12-16-23 @ 12:30)  SpO2: 99% (12-16-23 @ 12:30)  Wt(kg): --    12-15 @ 07:01  -  12-16 @ 07:00  --------------------------------------------------------  IN: 1300 mL / OUT: 2600 mL / NET: -1300 mL      Height (cm): 167.6 (12-16 @ 12:00)  Weight (kg): 128 (12-16 @ 12:00)  BMI (kg/m2): 45.6 (12-16 @ 12:00)  BSA (m2): 2.31 (12-16 @ 12:00)      PHYSICAL EXAM:    Gen: NAD, calm  Cards: RRR, +S1/S2, no M/G/R  Resp: CTA B/L  GI: soft, NT/ND, NABS  Vascular: no LE edema B/L, RIJ TDC intact    LABS:  12-16    136  |  97<L>  |  28<H>  ----------------------------<  86  3.9   |  22  |  4.53<H>    Ca    9.3      16 Dec 2023 06:00  Phos  3.1     12-16  Mg     2.00     12-16      Creatinine Trend: 4.53 <--, 6.23 <--, 4.94 <--, 3.71 <--                        11.9   5.15  )-----------( 185      ( 16 Dec 2023 06:00 )             37.4     Urine Studies:  Urinalysis Basic - ( 16 Dec 2023 06:00 )    Color:  / Appearance:  / SG:  / pH:   Gluc: 86 mg/dL / Ketone:   / Bili:  / Urobili:    Blood:  / Protein:  / Nitrite:    Leuk Esterase:  / RBC:  / WBC    Sq Epi:  / Non Sq Epi:  / Bacteria:

## 2023-12-16 NOTE — PROGRESS NOTE ADULT - ASSESSMENT
ECHO 4/23/22 : nl lV  sys fx EF 55-60%  stress  11/14/23 normal   EF% during stress is 64 %    72 yo F with PMhx of HTN, PE (not on AC), OA, chronic migraine  and ESRD on HD (MWF) presents to the ED with cough and SOB, found to have COVID-19.       Problem/Plan - 1:  ·  Problem: 2019 novel coronavirus disease (COVID-19).   ·  Plan: Patient with cough, SOB, runny nose, myalgias, and weakness   -RVP positive for SARSCOV2   -CXR showed clear lungs   c/w remdesivir x 3 day course  monitor liver enzymes while on remdesivir  trend inflammatory markers  supportive care.    #Atypical chest pain   -msk- secondary to cough   -HS trop neg x 1, no ischemic changes to ecg   -recent stress  11/14/23 normal   EF% during stress is 64 %  -f/u echo   -hx of PE- not on ac, elevated d dimer  -cta neg for PE      Problem/Plan - 2:  ·  Problem: Essential hypertension.   ·  Plan cont amlodipine     Problem/Plan - 3:  ·  Problem: ESRD on dialysis.   ·  Plan: hd as scheduled.    Problem/Plan - 4:  ·  Problem: Medication management.   ·  Plan: Please confirm home medication in the AM.    Problem/Plan - 5:  ·  Problem: Preventive measure.   ·  Plan: DVT ppx heparin subQ.     ECHO 4/23/22 : nl lV  sys fx EF 55-60%  stress  11/14/23 normal   EF% during stress is 64 %    74 yo F with PMhx of HTN, PE (not on AC), OA, chronic migraine  and ESRD on HD (MWF) presents to the ED with cough and SOB, found to have COVID-19.       Problem/Plan - 1:  ·  Problem: 2019 novel coronavirus disease (COVID-19).   ·  Plan: Patient with cough, SOB, runny nose, myalgias, and weakness   -RVP positive for SARSCOV2   -CXR showed clear lungs   c/w remdesivir x 3 day course  monitor liver enzymes while on remdesivir  trend inflammatory markers  supportive care.    #Atypical chest pain   -msk- secondary to cough   -HS trop neg x 1, no ischemic changes to ecg   -recent stress  11/14/23 normal   EF% during stress is 64 %  -f/u echo   -hx of PE- not on ac, elevated d dimer  -cta neg for PE      Problem/Plan - 2:  ·  Problem: Essential hypertension.   ·  Plan cont amlodipine     Problem/Plan - 3:  ·  Problem: ESRD on dialysis.   ·  Plan: hd as scheduled.    Problem/Plan - 4:  ·  Problem: Medication management.   ·  Plan: Please confirm home medication in the AM.    Problem/Plan - 5:  ·  Problem: Preventive measure.   ·  Plan: DVT ppx heparin subQ.

## 2023-12-16 NOTE — PROGRESS NOTE ADULT - ASSESSMENT
ECHO 4/23/22 : nl lV  sys fx EF 55-60%  stress  11/14/23 normal   EF% during stress is 64 %    a/p   72 yo F with PMhx of HTN, PE (not on AC), OA, chronic migraine  and ESRD on HD (MWF) presents to the ED with cough/ SOB/ chest pain, + covid     #Atypical chest pain   -msk- secondary to cough   -HS trop neg x 1, no ischemic changes to ecg   -recent stress  11/14/23 normal   EF% during stress is 64 %  -f/u echo   -hx of PE- not on ac, elevated d dimer  -cta neg for PE    #htn   -bp stable off amlo    #Covid 19  -managment per med   -on remdesivir    -ID fu    #sob   -secondary to covid  -no chf on exam   -vol removal with hd      dvt ppx       35 minutes spent on total encounter; more than 50% of the visit was spent counseling and/or coordinating care by the attending physician.

## 2023-12-17 LAB
ANION GAP SERPL CALC-SCNC: 23 MMOL/L — HIGH (ref 7–14)
ANION GAP SERPL CALC-SCNC: 23 MMOL/L — HIGH (ref 7–14)
BUN SERPL-MCNC: 51 MG/DL — HIGH (ref 7–23)
BUN SERPL-MCNC: 51 MG/DL — HIGH (ref 7–23)
CALCIUM SERPL-MCNC: 9.2 MG/DL — SIGNIFICANT CHANGE UP (ref 8.4–10.5)
CALCIUM SERPL-MCNC: 9.2 MG/DL — SIGNIFICANT CHANGE UP (ref 8.4–10.5)
CHLORIDE SERPL-SCNC: 98 MMOL/L — SIGNIFICANT CHANGE UP (ref 98–107)
CHLORIDE SERPL-SCNC: 98 MMOL/L — SIGNIFICANT CHANGE UP (ref 98–107)
CO2 SERPL-SCNC: 17 MMOL/L — LOW (ref 22–31)
CO2 SERPL-SCNC: 17 MMOL/L — LOW (ref 22–31)
CREAT SERPL-MCNC: 6.32 MG/DL — HIGH (ref 0.5–1.3)
CREAT SERPL-MCNC: 6.32 MG/DL — HIGH (ref 0.5–1.3)
CRP SERPL-MCNC: 41.2 MG/L — HIGH
CRP SERPL-MCNC: 41.2 MG/L — HIGH
D DIMER BLD IA.RAPID-MCNC: 727 NG/ML DDU — HIGH
D DIMER BLD IA.RAPID-MCNC: 727 NG/ML DDU — HIGH
EGFR: 7 ML/MIN/1.73M2 — LOW
EGFR: 7 ML/MIN/1.73M2 — LOW
GLUCOSE SERPL-MCNC: 78 MG/DL — SIGNIFICANT CHANGE UP (ref 70–99)
GLUCOSE SERPL-MCNC: 78 MG/DL — SIGNIFICANT CHANGE UP (ref 70–99)
HCT VFR BLD CALC: 38.5 % — SIGNIFICANT CHANGE UP (ref 34.5–45)
HCT VFR BLD CALC: 38.5 % — SIGNIFICANT CHANGE UP (ref 34.5–45)
HGB BLD-MCNC: 12.2 G/DL — SIGNIFICANT CHANGE UP (ref 11.5–15.5)
HGB BLD-MCNC: 12.2 G/DL — SIGNIFICANT CHANGE UP (ref 11.5–15.5)
LDH SERPL L TO P-CCNC: 361 U/L — HIGH (ref 135–225)
LDH SERPL L TO P-CCNC: 361 U/L — HIGH (ref 135–225)
MAGNESIUM SERPL-MCNC: 2 MG/DL — SIGNIFICANT CHANGE UP (ref 1.6–2.6)
MAGNESIUM SERPL-MCNC: 2 MG/DL — SIGNIFICANT CHANGE UP (ref 1.6–2.6)
MCHC RBC-ENTMCNC: 31 PG — SIGNIFICANT CHANGE UP (ref 27–34)
MCHC RBC-ENTMCNC: 31 PG — SIGNIFICANT CHANGE UP (ref 27–34)
MCHC RBC-ENTMCNC: 31.7 GM/DL — LOW (ref 32–36)
MCHC RBC-ENTMCNC: 31.7 GM/DL — LOW (ref 32–36)
MCV RBC AUTO: 98 FL — SIGNIFICANT CHANGE UP (ref 80–100)
MCV RBC AUTO: 98 FL — SIGNIFICANT CHANGE UP (ref 80–100)
NRBC # BLD: 0 /100 WBCS — SIGNIFICANT CHANGE UP (ref 0–0)
NRBC # BLD: 0 /100 WBCS — SIGNIFICANT CHANGE UP (ref 0–0)
NRBC # FLD: 0 K/UL — SIGNIFICANT CHANGE UP (ref 0–0)
NRBC # FLD: 0 K/UL — SIGNIFICANT CHANGE UP (ref 0–0)
PHOSPHATE SERPL-MCNC: 4.1 MG/DL — SIGNIFICANT CHANGE UP (ref 2.5–4.5)
PHOSPHATE SERPL-MCNC: 4.1 MG/DL — SIGNIFICANT CHANGE UP (ref 2.5–4.5)
PLATELET # BLD AUTO: 211 K/UL — SIGNIFICANT CHANGE UP (ref 150–400)
PLATELET # BLD AUTO: 211 K/UL — SIGNIFICANT CHANGE UP (ref 150–400)
POTASSIUM SERPL-MCNC: 4.7 MMOL/L — SIGNIFICANT CHANGE UP (ref 3.5–5.3)
POTASSIUM SERPL-MCNC: 4.7 MMOL/L — SIGNIFICANT CHANGE UP (ref 3.5–5.3)
POTASSIUM SERPL-SCNC: 4.7 MMOL/L — SIGNIFICANT CHANGE UP (ref 3.5–5.3)
POTASSIUM SERPL-SCNC: 4.7 MMOL/L — SIGNIFICANT CHANGE UP (ref 3.5–5.3)
PROCALCITONIN SERPL-MCNC: 0.42 NG/ML — HIGH (ref 0.02–0.1)
PROCALCITONIN SERPL-MCNC: 0.42 NG/ML — HIGH (ref 0.02–0.1)
RBC # BLD: 3.93 M/UL — SIGNIFICANT CHANGE UP (ref 3.8–5.2)
RBC # BLD: 3.93 M/UL — SIGNIFICANT CHANGE UP (ref 3.8–5.2)
RBC # FLD: 12.4 % — SIGNIFICANT CHANGE UP (ref 10.3–14.5)
RBC # FLD: 12.4 % — SIGNIFICANT CHANGE UP (ref 10.3–14.5)
SODIUM SERPL-SCNC: 138 MMOL/L — SIGNIFICANT CHANGE UP (ref 135–145)
SODIUM SERPL-SCNC: 138 MMOL/L — SIGNIFICANT CHANGE UP (ref 135–145)
WBC # BLD: 5.78 K/UL — SIGNIFICANT CHANGE UP (ref 3.8–10.5)
WBC # BLD: 5.78 K/UL — SIGNIFICANT CHANGE UP (ref 3.8–10.5)
WBC # FLD AUTO: 5.78 K/UL — SIGNIFICANT CHANGE UP (ref 3.8–10.5)
WBC # FLD AUTO: 5.78 K/UL — SIGNIFICANT CHANGE UP (ref 3.8–10.5)

## 2023-12-17 RX ADMIN — Medication 650 MILLIGRAM(S): at 09:00

## 2023-12-17 RX ADMIN — Medication 1 SPRAY(S): at 17:47

## 2023-12-17 RX ADMIN — Medication 650 MILLIGRAM(S): at 08:00

## 2023-12-17 RX ADMIN — HEPARIN SODIUM 5000 UNIT(S): 5000 INJECTION INTRAVENOUS; SUBCUTANEOUS at 17:49

## 2023-12-17 RX ADMIN — Medication 100 MILLIGRAM(S): at 17:48

## 2023-12-17 RX ADMIN — Medication 1 SPRAY(S): at 05:38

## 2023-12-17 RX ADMIN — HEPARIN SODIUM 5000 UNIT(S): 5000 INJECTION INTRAVENOUS; SUBCUTANEOUS at 05:38

## 2023-12-17 RX ADMIN — CHLORHEXIDINE GLUCONATE 1 APPLICATION(S): 213 SOLUTION TOPICAL at 18:24

## 2023-12-17 RX ADMIN — Medication 100 MILLIGRAM(S): at 08:00

## 2023-12-17 NOTE — PROGRESS NOTE ADULT - ASSESSMENT
ECHO 4/23/22 : nl lV  sys fx EF 55-60%  stress  11/14/23 normal   EF% during stress is 64 %    a/p   72 yo F with PMhx of HTN, PE (not on AC), OA, chronic migraine  and ESRD on HD (MWF) presents to the ED with cough/ SOB/ chest pain, + covid     #Atypical chest pain   -msk- secondary to cough   -HS trop neg x 1, no ischemic changes to ecg   -recent stress  11/14/23 normal   EF% during stress is 64 %  -echo ok  -hx of PE- not on ac, elevated d dimer  -cta neg for PE    #htn   -bp stable off amlo    #Covid 19  -managment per med   -on remdesivir    -ID fu    #sob   -secondary to covid  -no chf on exam   -vol removal with hd      dvt ppx       35 minutes spent on total encounter; more than 50% of the visit was spent counseling and/or coordinating care by the attending physician.

## 2023-12-17 NOTE — PROGRESS NOTE ADULT - ASSESSMENT
73y Female with history of ESRD on HD presents with SOB. Nephrology consulted for ESRD status.    1) ESRD: Last HD on 12/13 as an outpatient terminated 30 minutes prematurely (after 3.5 hours) due to complaints of SOB with tachycardia (up to 120's) and poor catheter flow. HD in the hospital on 12/15 tolerated without any complications with the catheter. Will plan for M/W/F HD through catheter, and will need to assess arterial and venous pressures. Monitor electrolytes, intake, output, and daily standing weights.    2) HTN with ESRD: BP controlled. Monitor off medications.    3) Anemia of renal disease: Hb acceptable. On Micera 30 mcg monthly. Will give Epogen if Hb decreases. Monitor Hb.    4) Secondary HPT of renal origin: Phosphorus acceptable. Continue with calcitriol 1 mcg MWF and renal diet. Monitor serum calcium and phosphorus.    [facility] NEPHROLOGY  Derrick Castaneda M.D.  Raúl Diana D.O.  Grace Delgadillo M.D.  MD Cher Beckman, MSN, ANP-C    Telephone: [phone]  Facsimile: [phone] [place] [address], [address]  [place] [id number]   73y Female with history of ESRD on HD presents with SOB. Nephrology consulted for ESRD status.    1) ESRD: Last HD on 12/13 as an outpatient terminated 30 minutes prematurely (after 3.5 hours) due to complaints of SOB with tachycardia (up to 120's) and poor catheter flow. HD in the hospital on 12/15 tolerated without any complications with the catheter. Will plan for M/W/F HD through catheter, and will need to assess arterial and venous pressures. Monitor electrolytes, intake, output, and daily standing weights.    2) HTN with ESRD: BP controlled. Monitor off medications.    3) Anemia of renal disease: Hb acceptable. On Micera 30 mcg monthly. Will give Epogen if Hb decreases. Monitor Hb.    4) Secondary HPT of renal origin: Phosphorus acceptable. Continue with calcitriol 1 mcg MWF and renal diet. Monitor serum calcium and phosphorus.    [facility] NEPHROLOGY  Derrick Castaneda M.D.  Raúl Diana D.O.  Grace Delgadillo M.D.  MD Cher Beckman, MSN, ANP-C    Telephone: [phone]  Facsimile: [phone] [place] [address], [address]  [place] [address]

## 2023-12-17 NOTE — PROGRESS NOTE ADULT - SUBJECTIVE AND OBJECTIVE BOX
[facility] DIVISION OF INFECTIOUS DISEASES  JAME Ashley Y. Patel, S. Shah, G. [facility]  [phone]  ([phone] - weekdays after 5pm and weekends)    Name: MELISSA ARREOLA  Age/Gender: 73y Female  MRN: [id number]    Interval History:  Patient seen and examined this morning.   Feels better, was nauseous yesterday, no vomiting.  No other new complaints noted.  Notes reviewed  No concerning overnight events  Afebrile     Allergies: trimethoprim (Other; Rash)  sulfa drugs (Other; Rash)  Sulfur (Unknown)      Objective:  Vitals:   T(F): 97.4 (12-16-23 @ 19:19), Max: 98.7 (12-16-23 @ 12:30)  HR: 99 (12-17-23 @ 05:38) (93 - 101)  BP: 128/83 (12-17-23 @ 05:38) (128/83 - 143/71)  RR: 18 (12-17-23 @ 05:38) (17 - 18)  SpO2: 98% (12-17-23 @ 05:38) (95% - 99%)  Physical Examination:  General: no acute distress, RA  HEENT: NC/AT, anicteric, neck supple  Respiratory: no acc muscle use, breathing comfortably  Cardiovascular: S1 and S2 present  Gastrointestinal: normal appearing, nondistended  Extremities: no cyanosis  Skin: no visible rash    Laboratory Studies:  CBC:                       12.2   5.78  )-----------( 211      ( 17 Dec 2023 06:25 )             38.5     WBC Trend:  5.78 12-17-23 @ 06:25  5.15 12-16-23 @ 06:00  3.71 12-15-23 @ 06:00  3.53 12-14-23 @ 07:30  4.78 12-13-23 @ 14:27    CMP: 12-17    138  |  98  |  51<H>  ----------------------------<  78  4.7   |  17<L>  |  6.32<H>    Ca    9.2      17 Dec 2023 06:25  Phos  4.1     12-17  Mg     2.00     12-17      Creatinine: 6.32 mg/dL (12-17-23 @ 06:25)  Creatinine: 4.53 mg/dL (12-16-23 @ 06:00)  Creatinine: 6.23 mg/dL (12-15-23 @ 06:00)  Creatinine: 4.94 mg/dL (12-14-23 @ 07:30)  Creatinine: 3.71 mg/dL (12-13-23 @ 14:27)    Microbiology: reviewed   Culture - Nose (collected 12-13-23 @ 19:30)  Source: .Nose Nose  Final Report (12-15-23 @ 09:24):    No staphylococcus aureus isolated.    "PCR is more Sensitive for identifying MRSA/MSSA."    Radiology: reviewed     Medications:  acetaminophen     Tablet .. 650 milliGRAM(s) Oral every 6 hours PRN  aluminum hydroxide/magnesium hydroxide/simethicone Suspension 30 milliLiter(s) Oral every 4 hours PRN  benzocaine/menthol Lozenge 1 Lozenge Oral every 4 hours PRN  benzonatate 100 milliGRAM(s) Oral every 8 hours PRN  calcitriol   Capsule 1 MICROGram(s) Oral <User Schedule>  chlorhexidine 2% Cloths 1 Application(s) Topical daily  fluticasone propionate 50 MICROgram(s)/spray Nasal Spray 1 Spray(s) Both Nostrils two times a day  guaifenesin/dextromethorphan Oral Liquid 10 milliLiter(s) Oral every 4 hours PRN  heparin   Injectable 5000 Unit(s) SubCutaneous every 12 hours  melatonin 3 milliGRAM(s) Oral at bedtime PRN  ondansetron Injectable 4 milliGRAM(s) IV Push every 8 hours PRN  sodium chloride 0.9% Bolus. 100 milliLiter(s) IV Bolus every 5 minutes PRN    Prior/Completed Antimicrobials:  remdesivir  IVPB  remdesivir  IVPB   [facility] DIV[facility] OF INFECTIOUS DISEASES  JAME Ashley Y. Patel, S. Shah, G. [facility]  [phone]  ([phone] - weekdays after 5pm and weekends)    Name: MELISSA ARREOLA  Age/Gender: 73y Female  MRN: [id number]    Interval History:  Patient seen and examined this morning.   Feels better, was nauseous yesterday, no vomiting.  No other new complaints noted.  Notes reviewed  No concerning overnight events  Afebrile     Allergies: trimethoprim (Other; Rash)  sulfa drugs (Other; Rash)  Sulfur (Unknown)      Objective:  Vitals:   T(F): 97.4 (12-16-23 @ 19:19), Max: 98.7 (12-16-23 @ 12:30)  HR: 99 (12-17-23 @ 05:38) (93 - 101)  BP: 128/83 (12-17-23 @ 05:38) (128/83 - 143/71)  RR: 18 (12-17-23 @ 05:38) (17 - 18)  SpO2: 98% (12-17-23 @ 05:38) (95% - 99%)  Physical Examination:  General: no acute distress, RA  HEENT: NC/AT, anicteric, neck supple  Respiratory: no acc muscle use, breathing comfortably  Cardiovascular: S1 and S2 present  Gastrointestinal: normal appearing, nondistended  Extremities: no cyanosis  Skin: no visible rash    Laboratory Studies:  CBC:                       12.2   5.78  )-----------( 211      ( 17 Dec 2023 06:25 )             38.5     WBC Trend:  5.78 12-17-23 @ 06:25  5.15 12-16-23 @ 06:00  3.71 12-15-23 @ 06:00  3.53 12-14-23 @ 07:30  4.78 12-13-23 @ 14:27    CMP: 12-17    138  |  98  |  51<H>  ----------------------------<  78  4.7   |  17<L>  |  6.32<H>    Ca    9.2      17 Dec 2023 06:25  Phos  4.1     12-17  Mg     2.00     12-17      Creatinine: 6.32 mg/dL (12-17-23 @ 06:25)  Creatinine: 4.53 mg/dL (12-16-23 @ 06:00)  Creatinine: 6.23 mg/dL (12-15-23 @ 06:00)  Creatinine: 4.94 mg/dL (12-14-23 @ 07:30)  Creatinine: 3.71 mg/dL (12-13-23 @ 14:27)    Microbiology: reviewed   Culture - Nose (collected 12-13-23 @ 19:30)  Source: .Nose Nose  Final Report (12-15-23 @ 09:24):    No staphylococcus aureus isolated.    "PCR is more Sensitive for identifying MRSA/MSSA."    Radiology: reviewed     Medications:  acetaminophen     Tablet .. 650 milliGRAM(s) Oral every 6 hours PRN  aluminum hydroxide/magnesium hydroxide/simethicone Suspension 30 milliLiter(s) Oral every 4 hours PRN  benzocaine/menthol Lozenge 1 Lozenge Oral every 4 hours PRN  benzonatate 100 milliGRAM(s) Oral every 8 hours PRN  calcitriol   Capsule 1 MICROGram(s) Oral <User Schedule>  chlorhexidine 2% Cloths 1 Application(s) Topical daily  fluticasone propionate 50 MICROgram(s)/spray Nasal Spray 1 Spray(s) Both Nostrils two times a day  guaifenesin/dextromethorphan Oral Liquid 10 milliLiter(s) Oral every 4 hours PRN  heparin   Injectable 5000 Unit(s) SubCutaneous every 12 hours  melatonin 3 milliGRAM(s) Oral at bedtime PRN  ondansetron Injectable 4 milliGRAM(s) IV Push every 8 hours PRN  sodium chloride 0.9% Bolus. 100 milliLiter(s) IV Bolus every 5 minutes PRN    Prior/Completed Antimicrobials:  remdesivir  IVPB  remdesivir  IVPB

## 2023-12-17 NOTE — PROGRESS NOTE ADULT - SUBJECTIVE AND OBJECTIVE BOX
[facility] NEPHROLOGY- PROGRESS NOTE    73y Female with history of ESRD on HD presents with SOB. Nephrology consulted for ESRD status. Denies any acute complaints.    REVIEW OF SYSTEMS:  Gen: no fevers  Cards: no chest pain  Resp: Decreasing shortness of breath  GI: no nausea or vomiting or diarrhea  Vascular: no LE edema    trimethoprim (Other; Rash)  sulfa drugs (Other; Rash)  Sulfur (Unknown)    Hospital Medications: Medications reviewed    VITALS:  T(F): 97.4 (12-16-23 @ 19:19), Max: 97.4 (12-16-23 @ 19:19)  HR: 99 (12-17-23 @ 05:38)  BP: 128/83 (12-17-23 @ 05:38)  RR: 18 (12-17-23 @ 05:38)  SpO2: 98% (12-17-23 @ 05:38)  Wt(kg): --    12-16 @ 07:01  -  12-17 @ 07:00  --------------------------------------------------------  IN: 900 mL / OUT: 400 mL / NET: 500 mL      PHYSICAL EXAM:    Gen: NAD, calm  Cards: RRR, +S1/S2, no M/G/R  Resp: CTA B/L  GI: soft, NT/ND, NABS  Vascular: no LE edema B/L, RIJ TDC intact    LABS:  12-17    138  |  98  |  51<H>  ----------------------------<  78  4.7   |  17<L>  |  6.32<H>    Ca    9.2      17 Dec 2023 06:25  Phos  4.1     12-17  Mg     2.00     12-17      Creatinine Trend: 6.32 <--, 4.53 <--, 6.23 <--, 4.94 <--, 3.71 <--                        12.2   5.78  )-----------( 211      ( 17 Dec 2023 06:25 )             38.5     Urine Studies:  Urinalysis Basic - ( 17 Dec 2023 06:25 )    Color:  / Appearance:  / SG:  / pH:   Gluc: 78 mg/dL / Ketone:   / Bili:  / Urobili:    Blood:  / Protein:  / Nitrite:    Leuk Esterase:  / RBC:  / WBC    Sq Epi:  / Non Sq Epi:  / Bacteria:              [facility] NEPHROLOGY- PROGRESS NOTE    73y Female with history of ESRD on HD presents with SOB. Nephrology consulted for ESRD status. Denies any acute complaints.    REVIEW OF SYSTEMS:  Gen: no fevers  Cards: no chest pain  Resp: Decreasing shortness of breath  GI: no nausea or vomiting or diarrhea  Vascular: no LE edema    trimethoprim (Other; Rash)  sulfa drugs (Other; Rash)  Sulfur (Unknown)    Hospital Medications: Medications reviewed    VITALS:  T(F): 97.4 (12-16-23 @ 19:19), Max: 97.4 (12-16-23 @ 19:19)  HR: 99 (12-17-23 @ 05:38)  BP: 128/83 (12-17-23 @ 05:38)  RR: 18 (12-17-23 @ 05:38)  SpO2: 98% (12-17-23 @ 05:38)  Wt(kg): --    12-16 @ 07:01  -  12-17 @ 07:00  --------------------------------------------------------  IN: 900 mL / OUT: 400 mL / NET: 500 mL      PHYSICAL EXAM:    Gen: NAD, calm  Cards: RRR, +S1/S2, no M/G/R  Resp: CTA B/L  GI: soft, NT/ND, NABS  Vascular: no LE edema B/L, RIJ TDC intact    LABS:  12-17    138  |  98  |  51<H>  ----------------------------<  78  4.7   |  17<L>  |  6.32<H>    Ca    9.2      17 Dec 2023 06:25  Phos  4.1     12-17  Mg     2.00     12-17      Creatinine Trend: 6.32 <--, 4.53 <--, 6.23 <--, 4.94 <--, 3.71 <--                        12.2   5.78  )-----------( 211      ( 17 Dec 2023 06:25 )             38.5     Urine Studies:  Urinalysis Basic - ( 17 Dec 2023 06:25 )    Color:  / Appearance:  / SG:  / pH:   Gluc: 78 mg/dL / Ketone:   / Bili:  / Urobili:    Blood:  / Protein:  / Nitrite:    Leuk Esterase:  / RBC:  / WBC    Sq Epi:  / Non Sq Epi:  / Bacteria:

## 2023-12-17 NOTE — PROGRESS NOTE ADULT - SUBJECTIVE AND OBJECTIVE BOX
PATIENT SEEN AND EXAMINED BY ELIZABETH MICHAEL M.D. ON :- 12/17/23  DATE OF SERVICE:    12/17/23        Interim events noted,Labs ,Radiological studies and Cardiology tests reviewed .  DISCUSSED WITH ACP/MEDICAL RESIDENT ON PLAN OF CARE.    MR#[id number]  PATIENT NAME:[facility] COURSE: HPI:  74 yo F with PMhx of HTN, PE (not on AC), OA, chronic migraine  and ESRD on HD (MWF) presents to the ED with cough and SOB. Pt reports that she has been feeling weak for the last couple of days. She has a productive cough. Her chest hurts whenever she coughs. She also has runny nose, myalgias, and SOB with exertion. Denies any associated fever, chills, chest pain or LE edema. She went to HD today and had to stop HD due to weakness/SOB. She was then sent to the ED.   In the ED, her vitals were notable tachycardia. Labs were notable for elevated BUN/Scr, elevated ALP, AST. RVP was positive for SARSCOV2. CXR showed clear lungs.  (13 Dec 2023 18:47)      INTERIM EVENTS:Patient seen at bedside ,interim events noted.      PMH -reviewed admission note, no change since admission  HEART FAILURE: Acute[ ]Chronic[ ] Systolic[ ] Diastolic[ ] Combined Systolic and Diastolic[ ]  CAD[ ] CABG[ ] PCI[ ]  DEVICES[ ] PPM[ ] ICD[ ] ILR[ ]  ATRIAL FIBRILLATION[ ] Paroxysmal[ ] Permanent[ ] CHADS2-[  ]  OC[ ] CKD1[ ] CKD2[ ] CKD3[ ] CKD4[ ] ESRD[ ]  COPD[ ] HTN[ ]   DM[ ] Type1[ ] Type 2[ ]   CVA[ ] Paresis[ ]    AMBULATION: Assisted[ ] Cane/walker[ ] Independent[ ]    MEDICATIONS  (STANDING):  calcitriol   Capsule 1 MICROGram(s) Oral <User Schedule>  chlorhexidine 2% Cloths 1 Application(s) Topical daily  fluticasone propionate 50 MICROgram(s)/spray Nasal Spray 1 Spray(s) Both Nostrils two times a day  heparin   Injectable 5000 Unit(s) SubCutaneous every 12 hours    MEDICATIONS  (PRN):  acetaminophen     Tablet .. 650 milliGRAM(s) Oral every 6 hours PRN Temp greater or equal to 38C (100.4F), Mild Pain (1 - 3)  aluminum hydroxide/magnesium hydroxide/simethicone Suspension 30 milliLiter(s) Oral every 4 hours PRN Dyspepsia  benzocaine/menthol Lozenge 1 Lozenge Oral every 4 hours PRN Sore Throat  benzonatate 100 milliGRAM(s) Oral every 8 hours PRN Cough  guaifenesin/dextromethorphan Oral Liquid 10 milliLiter(s) Oral every 4 hours PRN Cough  melatonin 3 milliGRAM(s) Oral at bedtime PRN Insomnia  ondansetron Injectable 4 milliGRAM(s) IV Push every 8 hours PRN Nausea and/or Vomiting  sodium chloride 0.9% Bolus. 100 milliLiter(s) IV Bolus every 5 minutes PRN SBP LESS THAN or EQUAL to 90 mmHg            REVIEW OF SYSTEMS:  Constitutional: [ ] fever, [ ]weight loss,  [ ]fatigue [ ]weight gain  Eyes: [ ] visual changes  Respiratory: [ ]shortness of breath;  [ ] cough, [ ]wheezing, [ ]chills, [ ]hemoptysis  Cardiovascular: [ ] chest pain, [ ]palpitations, [ ]dizziness,  [ ]leg swelling[ ]orthopnea[ ]PND  Gastrointestinal: [ ] abdominal pain, [ ]nausea, [ ]vomiting,  [ ]diarrhea [ ]Constipation [ ]Melena  Genitourinary: [ ] dysuria, [ ] hematuria [ ]Torres  Neurologic: [ ] headaches [ ] tremors[ ]weakness [ ]Paralysis Right[ ] Left[ ]  Skin: [ ] itching, [ ]burning, [ ] rashes  Endocrine: [ ] heat or cold intolerance  Musculoskeletal: [ ] joint pain or swelling; [ ] muscle, back, or extremity pain  Psychiatric: [ ] depression, [ ]anxiety, [ ]mood swings, or [ ]difficulty sleeping  Hematologic: [ ] easy bruising, [ ] bleeding gums    [ ] All remaining systems negative except as per above.   [ ]Unable to obtain.  [x] No change in ROS since admission      Vital Signs Last 24 Hrs  T(C): 36.3 (17 Dec 2023 19:38), Max: 36.6 (17 Dec 2023 13:33)  T(F): 97.3 (17 Dec 2023 19:38), Max: 97.8 (17 Dec 2023 13:33)  HR: 90 (17 Dec 2023 19:38) (78 - 99)  BP: 145/65 (17 Dec 2023 19:38) (128/83 - 148/62)  BP(mean): --  RR: 17 (17 Dec 2023 19:38) (16 - 18)  SpO2: 99% (17 Dec 2023 19:38) (98% - 100%)    Parameters below as of 17 Dec 2023 19:38  Patient On (Oxygen Delivery Method): room air      I&O's Summary    16 Dec 2023 07:01  -  17 Dec 2023 07:00  --------------------------------------------------------  IN: 900 mL / OUT: 400 mL / NET: 500 mL        PHYSICAL EXAM:  General: No acute distress BMI-  HEENT: EOMI, PERRL  Neck: Supple, [ ] JVD  Lungs: Equal air entry bilaterally; [ ] rales [ ] wheezing [ ] rhonchi  Heart: Regular rate and rhythm; [x ] murmur   2/6 [ x] systolic [ ] diastolic [ ] radiation[ ] rubs [ ]  gallops  Abdomen: Nontender, bowel sounds present  Extremities: No clubbing, cyanosis, [ ] edema [ ]Pulses  equal and intact  Nervous system:  Alert & Oriented X3, no focal deficits  Psychiatric: Normal affect  Skin: No rashes or lesions    LABS:  12-17    138  |  98  |  51<H>  ----------------------------<  78  4.7   |  17<L>  |  6.32<H>    Ca    9.2      17 Dec 2023 06:25  Phos  4.1     12-17  Mg     2.00     12-17      Creatinine Trend: 6.32<--, 4.53<--, 6.23<--, 4.94<--, 3.71<--                        12.2   5.78  )-----------( 211      ( 17 Dec 2023 06:25 )             38.5                PATIENT SEEN AND EXAMINED BY ELIZABETH MICHAEL M.D. ON :- 12/17/23  DATE OF SERVICE:    12/17/23        Interim events noted,Labs ,Radiological studies and Cardiology tests reviewed .  DISCUSSED WITH ACP/MEDICAL RESIDENT ON PLAN OF CARE.    MR#[id number]  PATIENT NAME:[facility] COURSE: HPI:  74 yo F with PMhx of HTN, PE (not on AC), OA, chronic migraine  and ESRD on HD (MWF) presents to the ED with cough and SOB. Pt reports that she has been feeling weak for the last couple of days. She has a productive cough. Her chest hurts whenever she coughs. She also has runny nose, myalgias, and SOB with exertion. Denies any associated fever, chills, chest pain or LE edema. She went to HD today and had to stop HD due to weakness/SOB. She was then sent to the ED.   In the ED, her vitals were notable tachycardia. Labs were notable for elevated BUN/Scr, elevated ALP, AST. RVP was positive for SARSCOV2. CXR showed clear lungs.  (13 Dec 2023 18:47)      INTERIM EVENTS:Patient seen at bedside ,interim events noted.      PMH -reviewed admission note, no change since admission  HEART FAILURE: Acute[ ]Chronic[ ] Systolic[ ] Diastolic[ ] Combined Systolic and Diastolic[ ]  CAD[ ] CABG[ ] PCI[ ]  DEVICES[ ] PPM[ ] ICD[ ] ILR[ ]  ATRIAL FIBRILLATION[ ] Paroxysmal[ ] Permanent[ ] CHADS2-[  ]  OC[ ] CKD1[ ] CKD2[ ] CKD3[ ] CKD4[ ] ESRD[ ]  COPD[ ] HTN[ ]   DM[ ] Type1[ ] Type 2[ ]   CVA[ ] Paresis[ ]    AMBULATION: Assisted[ ] Cane/walker[ ] Independent[ ]    MEDICATIONS  (STANDING):  calcitriol   Capsule 1 MICROGram(s) Oral <User Schedule>  chlorhexidine 2% Cloths 1 Application(s) Topical daily  fluticasone propionate 50 MICROgram(s)/spray Nasal Spray 1 Spray(s) Both Nostrils two times a day  heparin   Injectable 5000 Unit(s) SubCutaneous every 12 hours    MEDICATIONS  (PRN):  acetaminophen     Tablet .. 650 milliGRAM(s) Oral every 6 hours PRN Temp greater or equal to 38C (100.4F), Mild Pain (1 - 3)  aluminum hydroxide/magnesium hydroxide/simethicone Suspension 30 milliLiter(s) Oral every 4 hours PRN Dyspepsia  benzocaine/menthol Lozenge 1 Lozenge Oral every 4 hours PRN Sore Throat  benzonatate 100 milliGRAM(s) Oral every 8 hours PRN Cough  guaifenesin/dextromethorphan Oral Liquid 10 milliLiter(s) Oral every 4 hours PRN Cough  melatonin 3 milliGRAM(s) Oral at bedtime PRN Insomnia  ondansetron Injectable 4 milliGRAM(s) IV Push every 8 hours PRN Nausea and/or Vomiting  sodium chloride 0.9% Bolus. 100 milliLiter(s) IV Bolus every 5 minutes PRN SBP LESS THAN or EQUAL to 90 mmHg            REVIEW OF SYSTEMS:  Constitutional: [ ] fever, [ ]weight loss,  [ ]fatigue [ ]weight gain  Eyes: [ ] visual changes  Respiratory: [ ]shortness of breath;  [ ] cough, [ ]wheezing, [ ]chills, [ ]hemoptysis  Cardiovascular: [ ] chest pain, [ ]palpitations, [ ]dizziness,  [ ]leg swelling[ ]orthopnea[ ]PND  Gastrointestinal: [ ] abdominal pain, [ ]nausea, [ ]vomiting,  [ ]diarrhea [ ]Constipation [ ]Melena  Genitourinary: [ ] dysuria, [ ] hematuria [ ]Torres  Neurologic: [ ] headaches [ ] tremors[ ]weakness [ ]Paralysis Right[ ] Left[ ]  Skin: [ ] itching, [ ]burning, [ ] rashes  Endocrine: [ ] heat or cold intolerance  Musculoskeletal: [ ] joint pain or swelling; [ ] muscle, back, or extremity pain  Psychiatric: [ ] depression, [ ]anxiety, [ ]mood swings, or [ ]difficulty sleeping  Hematologic: [ ] easy bruising, [ ] bleeding gums    [ ] All remaining systems negative except as per above.   [ ]Unable to obtain.  [x] No change in ROS since admission      Vital Signs Last 24 Hrs  T(C): 36.3 (17 Dec 2023 19:38), Max: 36.6 (17 Dec 2023 13:33)  T(F): 97.3 (17 Dec 2023 19:38), Max: 97.8 (17 Dec 2023 13:33)  HR: 90 (17 Dec 2023 19:38) (78 - 99)  BP: 145/65 (17 Dec 2023 19:38) (128/83 - 148/62)  BP(mean): --  RR: 17 (17 Dec 2023 19:38) (16 - 18)  SpO2: 99% (17 Dec 2023 19:38) (98% - 100%)    Parameters below as of 17 Dec 2023 19:38  Patient On (Oxygen Delivery Method): room air      I&O's Summary    16 Dec 2023 07:01  -  17 Dec 2023 07:00  --------------------------------------------------------  IN: 900 mL / OUT: 400 mL / NET: 500 mL        PHYSICAL EXAM:  General: No acute distress BMI-  HEENT: EOMI, PERRL  Neck: Supple, [ ] JVD  Lungs: Equal air entry bilaterally; [ ] rales [ ] wheezing [ ] rhonchi  Heart: Regular rate and rhythm; [x ] murmur   2/6 [ x] systolic [ ] diastolic [ ] radiation[ ] rubs [ ]  gallops  Abdomen: Nontender, bowel sounds present  Extremities: No clubbing, cyanosis, [ ] edema [ ]Pulses  equal and intact  Nervous system:  Alert & Oriented X3, no focal deficits  Psychiatric: Normal affect  Skin: No rashes or lesions    LABS:  12-17    138  |  98  |  51<H>  ----------------------------<  78  4.7   |  17<L>  |  6.32<H>    Ca    9.2      17 Dec 2023 06:25  Phos  4.1     12-17  Mg     2.00     12-17      Creatinine Trend: 6.32<--, 4.53<--, 6.23<--, 4.94<--, 3.71<--                        12.2   5.78  )-----------( 211      ( 17 Dec 2023 06:25 )             38.5

## 2023-12-17 NOTE — PROGRESS NOTE ADULT - SUBJECTIVE AND OBJECTIVE BOX
CARDIOLOGY FOLLOW UP - Dr. Joe  Date of Service: 12/17/2023  CC: no events    Review of Systems:  Constitutional: No fever, weight loss, or fatigue  Respiratory: No cough, wheezing, or hemoptysis, no shortness of breath  Cardiovascular: No chest pain, palpitations, passing out, dizziness, or leg swelling  Gastrointestinal: No abd or epigastric pain. No nausea, vomiting, or hematemesis; no diarrhea or consiptaiton, no melena or hematochezia  Vascular: No edema     TELEMETRY:    PHYSICAL EXAM:  T(C): 36.3 (12-16-23 @ 19:19), Max: 36.3 (12-16-23 @ 19:19)  HR: 99 (12-17-23 @ 05:38) (93 - 99)  BP: 128/83 (12-17-23 @ 05:38) (128/83 - 143/71)  RR: 18 (12-17-23 @ 05:38) (17 - 18)  SpO2: 98% (12-17-23 @ 05:38) (95% - 98%)  Wt(kg): --  I&O's Summary    16 Dec 2023 07:01  -  17 Dec 2023 07:00  --------------------------------------------------------  IN: 900 mL / OUT: 400 mL / NET: 500 mL        Appearance: Normal	  Cardiovascular: Normal S1 S2,RRR, No JVD, No murmurs  Respiratory: Lungs clear to auscultation	  Gastrointestinal:  Soft, Non-tender, + BS	  Extremities: Normal range of motion, No clubbing, cyanosis or edema  Vascular: Peripheral pulses palpable 2+ bilaterally       Home Medications:  amLODIPine 10 mg oral tablet: 1 tab(s) orally once a day (13 Dec 2023 18:52)  calcitriol 0.5 mcg oral capsule: 1 cap(s) orally once a day (13 Dec 2023 18:52)  cholecalciferol oral tablet: 1000 unit(s) orally once a day (13 Dec 2023 18:52)  folic acid 1 mg oral tablet: 1 tab(s) orally once a day (13 Dec 2023 18:52)  metoprolol succinate 100 mg oral tablet, extended release: 1 tab(s) orally once a day (13 Dec 2023 18:52)        MEDICATIONS  (STANDING):  calcitriol   Capsule 1 MICROGram(s) Oral <User Schedule>  chlorhexidine 2% Cloths 1 Application(s) Topical daily  fluticasone propionate 50 MICROgram(s)/spray Nasal Spray 1 Spray(s) Both Nostrils two times a day  heparin   Injectable 5000 Unit(s) SubCutaneous every 12 hours        EKG:  RADIOLOGY:  DIAGNOSTIC TESTING:  [ ] Echocardiogram:  [ ] Catherterization:  [ ] Stress Test:  OTHER:     LABS:	 	                          12.2   5.78  )-----------( 211      ( 17 Dec 2023 06:25 )             38.5     12-17    138  |  98  |  51<H>  ----------------------------<  78  4.7   |  17<L>  |  6.32<H>    Ca    9.2      17 Dec 2023 06:25  Phos  4.1     12-17  Mg     2.00     12-17            CARDIAC MARKERS:

## 2023-12-17 NOTE — PROGRESS NOTE ADULT - TIME BILLING
- Review of records, telemetry, vital signs and daily labs.   - General and cardiovascular physical examination.  - Generation of cardiovascular treatment plan.  - Coordination of care.      Patient was seen and examined by me on 12/17/23,interim events noted,labs and radiology studies reviewed.  Damir Blackman MD,[facility]  [address] [place], [place] [id number].  [phone] [id number]/23 - Review of records, telemetry, vital signs and daily labs.   - General and cardiovascular physical examination.  - Generation of cardiovascular treatment plan.  - Coordination of care.      Patient was seen and examined by me on 12/17/23,interim events noted,labs and radiology studies reviewed.  Damir Blackman MD,[facility]  [address] [place], [place] [id number].  [phone]/16/23

## 2023-12-17 NOTE — PROGRESS NOTE ADULT - ASSESSMENT
74 y/o F PMhx HTN, OA, chronic migraine and ESRD on HD (MWF) who presented w/ cough and SOB.    COVID  saturating well on RA  feels better, afebrile     Recommendations  s/p remdesivir x 3 day course completed 12/16  isolation per infection control policy   supportive care- flonase, mucinex, anti-tussive  Stable from ID standpoint at this time.    Dr. Bright to resume care from tomorrow.  Alton Finch M.D.  [facility], Division of Infectious Diseases  [phone]  After 5pm on weekdays and all day on weekends - please call [phone] 74 y/o F PMhx HTN, OA, chronic migraine and ESRD on HD (MWF) who presented w/ cough and SOB.    COVID  saturating well on RA  feels better, afebrile     Recommendations  s/p remdesivir x 3 day course completed 12/16  isolation per infection control policy   supportive care- flonase, mucinex, anti-tussive  Stable from ID standpoint at this time.    Dr. Bright to resume care from tomorrow.  Alton Finch M.D.  [facility], Division of Infectious Diseases  [phone]  After 5pm on weekdays and all day on weekends - please call [phone]

## 2023-12-18 LAB
ANION GAP SERPL CALC-SCNC: 14 MMOL/L — SIGNIFICANT CHANGE UP (ref 7–14)
ANION GAP SERPL CALC-SCNC: 14 MMOL/L — SIGNIFICANT CHANGE UP (ref 7–14)
BUN SERPL-MCNC: 77 MG/DL — HIGH (ref 7–23)
BUN SERPL-MCNC: 77 MG/DL — HIGH (ref 7–23)
CALCIUM SERPL-MCNC: 8.9 MG/DL — SIGNIFICANT CHANGE UP (ref 8.4–10.5)
CALCIUM SERPL-MCNC: 8.9 MG/DL — SIGNIFICANT CHANGE UP (ref 8.4–10.5)
CHLORIDE SERPL-SCNC: 100 MMOL/L — SIGNIFICANT CHANGE UP (ref 98–107)
CHLORIDE SERPL-SCNC: 100 MMOL/L — SIGNIFICANT CHANGE UP (ref 98–107)
CO2 SERPL-SCNC: 26 MMOL/L — SIGNIFICANT CHANGE UP (ref 22–31)
CO2 SERPL-SCNC: 26 MMOL/L — SIGNIFICANT CHANGE UP (ref 22–31)
CREAT SERPL-MCNC: 7.41 MG/DL — HIGH (ref 0.5–1.3)
CREAT SERPL-MCNC: 7.41 MG/DL — HIGH (ref 0.5–1.3)
EGFR: 5 ML/MIN/1.73M2 — LOW
EGFR: 5 ML/MIN/1.73M2 — LOW
GLUCOSE SERPL-MCNC: 85 MG/DL — SIGNIFICANT CHANGE UP (ref 70–99)
GLUCOSE SERPL-MCNC: 85 MG/DL — SIGNIFICANT CHANGE UP (ref 70–99)
HCT VFR BLD CALC: 35.5 % — SIGNIFICANT CHANGE UP (ref 34.5–45)
HCT VFR BLD CALC: 35.5 % — SIGNIFICANT CHANGE UP (ref 34.5–45)
HGB BLD-MCNC: 11.3 G/DL — LOW (ref 11.5–15.5)
HGB BLD-MCNC: 11.3 G/DL — LOW (ref 11.5–15.5)
MAGNESIUM SERPL-MCNC: 1.9 MG/DL — SIGNIFICANT CHANGE UP (ref 1.6–2.6)
MAGNESIUM SERPL-MCNC: 1.9 MG/DL — SIGNIFICANT CHANGE UP (ref 1.6–2.6)
MCHC RBC-ENTMCNC: 31.5 PG — SIGNIFICANT CHANGE UP (ref 27–34)
MCHC RBC-ENTMCNC: 31.5 PG — SIGNIFICANT CHANGE UP (ref 27–34)
MCHC RBC-ENTMCNC: 31.8 GM/DL — LOW (ref 32–36)
MCHC RBC-ENTMCNC: 31.8 GM/DL — LOW (ref 32–36)
MCV RBC AUTO: 98.9 FL — SIGNIFICANT CHANGE UP (ref 80–100)
MCV RBC AUTO: 98.9 FL — SIGNIFICANT CHANGE UP (ref 80–100)
NRBC # BLD: 0 /100 WBCS — SIGNIFICANT CHANGE UP (ref 0–0)
NRBC # BLD: 0 /100 WBCS — SIGNIFICANT CHANGE UP (ref 0–0)
NRBC # FLD: 0 K/UL — SIGNIFICANT CHANGE UP (ref 0–0)
NRBC # FLD: 0 K/UL — SIGNIFICANT CHANGE UP (ref 0–0)
PHOSPHATE SERPL-MCNC: 4.3 MG/DL — SIGNIFICANT CHANGE UP (ref 2.5–4.5)
PHOSPHATE SERPL-MCNC: 4.3 MG/DL — SIGNIFICANT CHANGE UP (ref 2.5–4.5)
PLATELET # BLD AUTO: 245 K/UL — SIGNIFICANT CHANGE UP (ref 150–400)
PLATELET # BLD AUTO: 245 K/UL — SIGNIFICANT CHANGE UP (ref 150–400)
POTASSIUM SERPL-MCNC: 4.5 MMOL/L — SIGNIFICANT CHANGE UP (ref 3.5–5.3)
POTASSIUM SERPL-MCNC: 4.5 MMOL/L — SIGNIFICANT CHANGE UP (ref 3.5–5.3)
POTASSIUM SERPL-SCNC: 4.5 MMOL/L — SIGNIFICANT CHANGE UP (ref 3.5–5.3)
POTASSIUM SERPL-SCNC: 4.5 MMOL/L — SIGNIFICANT CHANGE UP (ref 3.5–5.3)
RBC # BLD: 3.59 M/UL — LOW (ref 3.8–5.2)
RBC # BLD: 3.59 M/UL — LOW (ref 3.8–5.2)
RBC # FLD: 12.5 % — SIGNIFICANT CHANGE UP (ref 10.3–14.5)
RBC # FLD: 12.5 % — SIGNIFICANT CHANGE UP (ref 10.3–14.5)
SODIUM SERPL-SCNC: 140 MMOL/L — SIGNIFICANT CHANGE UP (ref 135–145)
SODIUM SERPL-SCNC: 140 MMOL/L — SIGNIFICANT CHANGE UP (ref 135–145)
WBC # BLD: 7.02 K/UL — SIGNIFICANT CHANGE UP (ref 3.8–10.5)
WBC # BLD: 7.02 K/UL — SIGNIFICANT CHANGE UP (ref 3.8–10.5)
WBC # FLD AUTO: 7.02 K/UL — SIGNIFICANT CHANGE UP (ref 3.8–10.5)
WBC # FLD AUTO: 7.02 K/UL — SIGNIFICANT CHANGE UP (ref 3.8–10.5)

## 2023-12-18 PROCEDURE — 93970 EXTREMITY STUDY: CPT | Mod: 26

## 2023-12-18 RX ORDER — HEPARIN SODIUM 5000 [USP'U]/ML
5000 INJECTION INTRAVENOUS; SUBCUTANEOUS EVERY 8 HOURS
Refills: 0 | Status: DISCONTINUED | OUTPATIENT
Start: 2023-12-18 | End: 2023-12-22

## 2023-12-18 RX ADMIN — Medication 650 MILLIGRAM(S): at 12:21

## 2023-12-18 RX ADMIN — Medication 1 SPRAY(S): at 05:18

## 2023-12-18 RX ADMIN — CHLORHEXIDINE GLUCONATE 1 APPLICATION(S): 213 SOLUTION TOPICAL at 17:00

## 2023-12-18 RX ADMIN — BENZOCAINE AND MENTHOL 1 LOZENGE: 5; 1 LIQUID ORAL at 17:01

## 2023-12-18 RX ADMIN — CALCITRIOL 1 MICROGRAM(S): 0.5 CAPSULE ORAL at 07:27

## 2023-12-18 RX ADMIN — Medication 650 MILLIGRAM(S): at 13:21

## 2023-12-18 RX ADMIN — Medication 100 MILLIGRAM(S): at 05:19

## 2023-12-18 RX ADMIN — HEPARIN SODIUM 5000 UNIT(S): 5000 INJECTION INTRAVENOUS; SUBCUTANEOUS at 17:16

## 2023-12-18 RX ADMIN — HEPARIN SODIUM 5000 UNIT(S): 5000 INJECTION INTRAVENOUS; SUBCUTANEOUS at 05:18

## 2023-12-18 NOTE — PROGRESS NOTE ADULT - ASSESSMENT
ECHO 4/23/22 : nl lV  sys fx EF 55-60%  stress  11/14/23 normal   EF% during stress is 64 %    a/p   74 yo F with PMhx of HTN, PE (not on AC), OA, chronic migraine  and ESRD on HD (MWF) presents to the ED with cough/ SOB/ chest pain, + covid     #Atypical chest pain   -msk- secondary to cough   -HS trop neg x 1, no ischemic changes to ecg   -recent stress  11/14/23 normal   EF% during stress is 64 %  -echo ok  -hx of PE- not on ac, elevated d dimer  -cta neg for PE  -le doppler neg for dvt    #htn   -bp stable off amlo    #Covid 19  -managment per med   -sp remdesivir    -ID fu    #sob   -secondary to covid  -no chf on exam   -vol removal with hd  -cta neg for pe     dvt ppx

## 2023-12-18 NOTE — PROGRESS NOTE ADULT - ASSESSMENT
73y Female with history of ESRD on HD presents with SOB. Nephrology consulted for ESRD status.    1) ESRD: Last HD on 12/15 tolerated well with 2.1L removed. Plan for next maintenance HD today. Monitor electrolytes.    2) HTN with ESRD: BP controlled. Monitor off medications.    3) Anemia of renal disease: Hb acceptable. On Micera 30 mcg monthly. Will give Epogen if Hb decreases. Monitor Hb.    4) Secondary HPT of renal origin: Phosphorus acceptable. Continue with calcitriol 1 mcg MWF and renal diet. Monitor serum calcium and phosphorus.      [facility] NEPHROLOGY  Derrick Castaneda M.D.  Raúl Diana D.O.  Grace Delgadillo M.D.  MD Cher Beckman, MSN, ANP-C    Telephone: [phone]  Facsimile: [phone] [place] [address], [address]  [place] [id number]   73y Female with history of ESRD on HD presents with SOB. Nephrology consulted for ESRD status.    1) ESRD: Last HD on 12/15 tolerated well with 2.1L removed. Plan for next maintenance HD today. Monitor electrolytes.    2) HTN with ESRD: BP controlled. Monitor off medications.    3) Anemia of renal disease: Hb acceptable. On Micera 30 mcg monthly. Will give Epogen if Hb decreases. Monitor Hb.    4) Secondary HPT of renal origin: Phosphorus acceptable. Continue with calcitriol 1 mcg MWF and renal diet. Monitor serum calcium and phosphorus.      [facility] NEPHROLOGY  Derrick Castaneda M.D.  Raúl Diana D.O.  Grace Delgadillo M.D.  MD Cher Beckman, MSN, ANP-C    Telephone: [phone]  Facsimile: [phone] [place] [address], [address]  [place] [id number]   73y Female with history of ESRD on HD presents with SOB. Nephrology consulted for ESRD status.    1) ESRD: Last HD on 12/15 tolerated well with 2.1L removed however with poor blood flow. Patient has also been having poor arterial blood flow due to high venous and arterial pressures as an outpatient. Would contact IR for TDC exchange. Plan for next maintenance HD today. Monitor electrolytes.    2) HTN with ESRD: BP controlled. Monitor off medications.    3) Anemia of renal disease: Hb acceptable. On Micera 30 mcg monthly. Will give Epogen if Hb decreases. Monitor Hb.    4) Secondary HPT of renal origin: Phosphorus acceptable. Continue with calcitriol 1 mcg MWF and renal diet. Monitor serum calcium and phosphorus.      [facility] NEPHROLOGY  Derrick Castaneda M.D.  Raúl Diana D.O.  Grace Delgadillo M.D.  MD Cher Beckman, MSN, ANP-C    Telephone: [phone]  Facsimile: [phone]    [address] [place] [address], [address]  [place] [address]   73y Female with history of ESRD on HD presents with SOB. Nephrology consulted for ESRD status.    1) ESRD: Last HD on 12/15 tolerated well with 2.1L removed however with poor blood flow. Patient has also been having poor arterial blood flow due to high venous and arterial pressures as an outpatient. Would contact IR for TDC exchange. Plan for next maintenance HD today. Monitor electrolytes.    2) HTN with ESRD: BP controlled. Monitor off medications.    3) Anemia of renal disease: Hb acceptable. On Micera 30 mcg monthly. Will give Epogen if Hb decreases. Monitor Hb.    4) Secondary HPT of renal origin: Phosphorus acceptable. Continue with calcitriol 1 mcg MWF and renal diet. Monitor serum calcium and phosphorus.      [facility] NEPHROLOGY  Derrick Castaneda M.D.  Raúl Diana D.O.  Grace Delgadillo M.D.  MD Cher Beckman, MSN, ANP-C    Telephone: [phone]  Facsimile: [phone]    [address] [place] [address], [address]  [place] [address]

## 2023-12-18 NOTE — PROGRESS NOTE ADULT - SUBJECTIVE AND OBJECTIVE BOX
[facility], Division of Infectious Diseases  JAME Ashley Y. Patel, S. Shah, G. Deangelo  [phone]  ([phone] - weekdays after 5pm and weekends)    Name: MELISSA ARREOLA  Age/Gender: 73y Female  MRN: [id number]    Interval History:  Notes reviewed.   No concerning overnight events.  Afebrile.     Allergies: trimethoprim (Other; Rash)  sulfa drugs (Other; Rash)  Sulfur (Unknown)      Objective:  Vitals:   T(F): 97.7 (12-18-23 @ 11:05), Max: 98.7 (12-18-23 @ 05:17)  HR: 102 (12-18-23 @ 11:05) (78 - 102)  BP: 130/65 (12-18-23 @ 11:05) (130/65 - 155/67)  RR: 18 (12-18-23 @ 11:05) (16 - 18)  SpO2: 100% (12-18-23 @ 11:05) (98% - 100%)  Physical Examination:  General: no acute distress  HEENT: anicteric  Cardio: normal rate  Resp: breathing comfortably on RA   Abd: non-distended  Ext: no LE edema  Skin: warm, dry    Laboratory Studies:  CBC:                       12.2   5.78  )-----------( 211      ( 17 Dec 2023 06:25 )             38.5     WBC Trend:  5.78 12-17-23 @ 06:25  5.15 12-16-23 @ 06:00  3.71 12-15-23 @ 06:00  3.53 12-14-23 @ 07:30  4.78 12-13-23 @ 14:27    CMP: 12-17    138  |  98  |  51<H>  ----------------------------<  78  4.7   |  17<L>  |  6.32<H>    Ca    9.2      17 Dec 2023 06:25  Phos  4.1     12-17  Mg     2.00     12-17            Urinalysis Basic - ( 17 Dec 2023 06:25 )    Color: x / Appearance: x / SG: x / pH: x  Gluc: 78 mg/dL / Ketone: x  / Bili: x / Urobili: x   Blood: x / Protein: x / Nitrite: x   Leuk Esterase: x / RBC: x / WBC x   Sq Epi: x / Non Sq Epi: x / Bacteria: x      Microbiology: reviewed     Culture - Nose (collected 12-13-23 @ 19:30)  Source: .Nose Nose  Final Report (12-15-23 @ 09:24):    No staphylococcus aureus isolated.    "PCR is more Sensitive for identifying MRSA/MSSA."        Radiology: reviewed     Medications:  acetaminophen     Tablet .. 650 milliGRAM(s) Oral every 6 hours PRN  aluminum hydroxide/magnesium hydroxide/simethicone Suspension 30 milliLiter(s) Oral every 4 hours PRN  benzocaine/menthol Lozenge 1 Lozenge Oral every 4 hours PRN  benzonatate 100 milliGRAM(s) Oral every 8 hours PRN  calcitriol   Capsule 1 MICROGram(s) Oral <User Schedule>  chlorhexidine 2% Cloths 1 Application(s) Topical daily  guaifenesin/dextromethorphan Oral Liquid 10 milliLiter(s) Oral every 4 hours PRN  heparin   Injectable 5000 Unit(s) SubCutaneous every 12 hours  melatonin 3 milliGRAM(s) Oral at bedtime PRN  ondansetron Injectable 4 milliGRAM(s) IV Push every 8 hours PRN  sodium chloride 0.9% Bolus. 100 milliLiter(s) IV Bolus every 5 minutes PRN    Antimicrobials:   [facility], Division of Infectious Diseases  JAME Ashley Y. Patel, S. Shah, G. Deangelo  [phone]  ([phone] - weekdays after 5pm and weekends)    Name: MELISSA ARREOLA  Age/Gender: 73y Female  MRN: [id number]    Interval History:  Notes reviewed.   No concerning overnight events.  Afebrile.     Allergies: trimethoprim (Other; Rash)  sulfa drugs (Other; Rash)  Sulfur (Unknown)      Objective:  Vitals:   T(F): 97.7 (12-18-23 @ 11:05), Max: 98.7 (12-18-23 @ 05:17)  HR: 102 (12-18-23 @ 11:05) (78 - 102)  BP: 130/65 (12-18-23 @ 11:05) (130/65 - 155/67)  RR: 18 (12-18-23 @ 11:05) (16 - 18)  SpO2: 100% (12-18-23 @ 11:05) (98% - 100%)  Physical Examination:  General: no acute distress  HEENT: anicteric  Cardio: normal rate  Resp: breathing comfortably on RA   Abd: non-distended  Ext: no LE edema  Skin: warm, dry    Laboratory Studies:  CBC:                       12.2   5.78  )-----------( 211      ( 17 Dec 2023 06:25 )             38.5     WBC Trend:  5.78 12-17-23 @ 06:25  5.15 12-16-23 @ 06:00  3.71 12-15-23 @ 06:00  3.53 12-14-23 @ 07:30  4.78 12-13-23 @ 14:27    CMP: 12-17    138  |  98  |  51<H>  ----------------------------<  78  4.7   |  17<L>  |  6.32<H>    Ca    9.2      17 Dec 2023 06:25  Phos  4.1     12-17  Mg     2.00     12-17            Urinalysis Basic - ( 17 Dec 2023 06:25 )    Color: x / Appearance: x / SG: x / pH: x  Gluc: 78 mg/dL / Ketone: x  / Bili: x / Urobili: x   Blood: x / Protein: x / Nitrite: x   Leuk Esterase: x / RBC: x / WBC x   Sq Epi: x / Non Sq Epi: x / Bacteria: x      Microbiology: reviewed     Culture - Nose (collected 12-13-23 @ 19:30)  Source: .Nose Nose  Final Report (12-15-23 @ 09:24):    No staphylococcus aureus isolated.    "PCR is more Sensitive for identifying MRSA/MSSA."        Radiology: reviewed     Medications:  acetaminophen     Tablet .. 650 milliGRAM(s) Oral every 6 hours PRN  aluminum hydroxide/magnesium hydroxide/simethicone Suspension 30 milliLiter(s) Oral every 4 hours PRN  benzocaine/menthol Lozenge 1 Lozenge Oral every 4 hours PRN  benzonatate 100 milliGRAM(s) Oral every 8 hours PRN  calcitriol   Capsule 1 MICROGram(s) Oral <User Schedule>  chlorhexidine 2% Cloths 1 Application(s) Topical daily  guaifenesin/dextromethorphan Oral Liquid 10 milliLiter(s) Oral every 4 hours PRN  heparin   Injectable 5000 Unit(s) SubCutaneous every 12 hours  melatonin 3 milliGRAM(s) Oral at bedtime PRN  ondansetron Injectable 4 milliGRAM(s) IV Push every 8 hours PRN  sodium chloride 0.9% Bolus. 100 milliLiter(s) IV Bolus every 5 minutes PRN    Antimicrobials:

## 2023-12-18 NOTE — PROGRESS NOTE ADULT - ASSESSMENT
ECHO 4/23/22 : nl lV  sys fx EF 55-60%  stress  11/14/23 normal   EF% during stress is 64 %    72 yo F with PMhx of HTN, PE (not on AC), OA, chronic migraine  and ESRD on HD (MWF) presents to the ED with cough and SOB, found to have COVID-19.       Problem/Plan - 1:  ·  Problem: 2019 novel coronavirus disease (COVID-19).   ·  Plan: Patient with cough, SOB, runny nose, myalgias, and weakness   -RVP positive for SARSCOV2   -CXR showed clear lungs   c/w remdesivir x 3 day course  monitor liver enzymes while on remdesivir  trend inflammatory markers  supportive care.    #Atypical chest pain   -msk- secondary to cough   -HS trop neg x 1, no ischemic changes to ecg   -recent stress  11/14/23 normal   EF% during stress is 64 %  -f/u echo   -hx of PE- not on ac, elevated d dimer  -cta neg for PE      Problem/Plan - 2:  ·  Problem: Essential hypertension.   ·  Plan cont amlodipine     Problem/Plan - 3:  ·  Problem: ESRD on dialysis.   ·  Plan: hd as scheduled.    Problem/Plan - 4:  ·  Problem: Medication management.   ·  Plan: Please confirm home medication in the AM.    Problem/Plan - 5:  ·  Problem: Preventive measure.   ·  Plan: DVT ppx heparin subQ.

## 2023-12-18 NOTE — PROGRESS NOTE ADULT - TIME BILLING
- Review of records, telemetry, vital signs and daily labs.   - General and cardiovascular physical examination.  - Generation of cardiovascular treatment plan.  - Coordination of care.      Patient was seen and examined by me on 12/18/23,interim events noted,labs and radiology studies reviewed.  Damir Blackman MD,[facility]  [address] [place], [place] [id number].  [phone] [id number]/23 - Review of records, telemetry, vital signs and daily labs.   - General and cardiovascular physical examination.  - Generation of cardiovascular treatment plan.  - Coordination of care.      Patient was seen and examined by me on 12/18/23,interim events noted,labs and radiology studies reviewed.  Damir Blackman MD,[facility]  [address] [place], [place] [id number].  [phone] [id number]/16/23

## 2023-12-18 NOTE — PROGRESS NOTE ADULT - SUBJECTIVE AND OBJECTIVE BOX
PATIENT SEEN AND EXAMINED BY ELIZABETH MICHAEL M.D. ON :- 12/18/23  DATE OF SERVICE:    12/18/23         Interim events noted,Labs ,Radiological studies and Cardiology tests reviewed .  DISCUSSED WITH ACP/MEDICAL RESIDENT ON PLAN OF CARE.    MR#[id number]  PATIENT NAME:[facility] COURSE: HPI:  72 yo F with PMhx of HTN, PE (not on AC), OA, chronic migraine  and ESRD on HD (MWF) presents to the ED with cough and SOB. Pt reports that she has been feeling weak for the last couple of days. She has a productive cough. Her chest hurts whenever she coughs. She also has runny nose, myalgias, and SOB with exertion. Denies any associated fever, chills, chest pain or LE edema. She went to HD today and had to stop HD due to weakness/SOB. She was then sent to the ED.   In the ED, her vitals were notable tachycardia. Labs were notable for elevated BUN/Scr, elevated ALP, AST. RVP was positive for SARSCOV2. CXR showed clear lungs.  (13 Dec 2023 18:47)      INTERIM EVENTS:Patient seen at bedside ,interim events noted.      PMH -reviewed admission note, no change since admission  HEART FAILURE: Acute[ ]Chronic[ ] Systolic[ ] Diastolic[ ] Combined Systolic and Diastolic[ ]  CAD[ ] CABG[ ] PCI[ ]  DEVICES[ ] PPM[ ] ICD[ ] ILR[ ]  ATRIAL FIBRILLATION[ ] Paroxysmal[ ] Permanent[ ] CHADS2-[  ]  OC[ ] CKD1[ ] CKD2[ ] CKD3[ ] CKD4[ ] ESRD[ ]  COPD[ ] HTN[ ]   DM[ ] Type1[ ] Type 2[ ]   CVA[ ] Paresis[ ]    AMBULATION: Assisted[ ] Cane/walker[ ] Independent[ ]    MEDICATIONS  (STANDING):  calcitriol   Capsule 1 MICROGram(s) Oral <User Schedule>  chlorhexidine 2% Cloths 1 Application(s) Topical daily  heparin   Injectable 5000 Unit(s) SubCutaneous every 8 hours    MEDICATIONS  (PRN):  acetaminophen     Tablet .. 650 milliGRAM(s) Oral every 6 hours PRN Temp greater or equal to 38C (100.4F), Mild Pain (1 - 3)  benzocaine/menthol Lozenge 1 Lozenge Oral every 4 hours PRN Sore Throat  benzonatate 100 milliGRAM(s) Oral every 8 hours PRN Cough  guaifenesin/dextromethorphan Oral Liquid 10 milliLiter(s) Oral every 4 hours PRN Cough  melatonin 3 milliGRAM(s) Oral at bedtime PRN Insomnia  ondansetron Injectable 4 milliGRAM(s) IV Push every 8 hours PRN Nausea and/or Vomiting  sodium chloride 0.9% Bolus. 100 milliLiter(s) IV Bolus every 5 minutes PRN SBP LESS THAN or EQUAL to 90 mmHg            REVIEW OF SYSTEMS:  Constitutional: [ ] fever, [ ]weight loss,  [ ]fatigue [ ]weight gain  Eyes: [ ] visual changes  Respiratory: [ ]shortness of breath;  [ ] cough, [ ]wheezing, [ ]chills, [ ]hemoptysis  Cardiovascular: [ ] chest pain, [ ]palpitations, [ ]dizziness,  [ ]leg swelling[ ]orthopnea[ ]PND  Gastrointestinal: [ ] abdominal pain, [ ]nausea, [ ]vomiting,  [ ]diarrhea [ ]Constipation [ ]Melena  Genitourinary: [ ] dysuria, [ ] hematuria [ ]Torres  Neurologic: [ ] headaches [ ] tremors[ ]weakness [ ]Paralysis Right[ ] Left[ ]  Skin: [ ] itching, [ ]burning, [ ] rashes  Endocrine: [ ] heat or cold intolerance  Musculoskeletal: [ ] joint pain or swelling; [ ] muscle, back, or extremity pain  Psychiatric: [ ] depression, [ ]anxiety, [ ]mood swings, or [ ]difficulty sleeping  Hematologic: [ ] easy bruising, [ ] bleeding gums    [ ] All remaining systems negative except as per above.   [ ]Unable to obtain.  [x] No change in ROS since admission      Vital Signs Last 24 Hrs  T(C): 36.4 (18 Dec 2023 20:00), Max: 37.1 (18 Dec 2023 05:17)  T(F): 97.5 (18 Dec 2023 20:00), Max: 98.7 (18 Dec 2023 05:17)  HR: 99 (18 Dec 2023 20:00) (97 - 102)  BP: 127/70 (18 Dec 2023 20:00) (127/70 - 155/67)  BP(mean): --  RR: 18 (18 Dec 2023 20:00) (17 - 18)  SpO2: 100% (18 Dec 2023 20:00) (98% - 100%)    Parameters below as of 18 Dec 2023 20:00  Patient On (Oxygen Delivery Method): room air      I&O's Summary    17 Dec 2023 07:01  -  18 Dec 2023 07:00  --------------------------------------------------------  IN: 300 mL / OUT: 375 mL / NET: -75 mL    18 Dec 2023 07:01  -  18 Dec 2023 20:54  --------------------------------------------------------  IN: 0 mL / OUT: 400 mL / NET: -400 mL        PHYSICAL EXAM:  General: No acute distress BMI-  HEENT: EOMI, PERRL  Neck: Supple, [ ] JVD  Lungs: Equal air entry bilaterally; [ ] rales [ ] wheezing [ ] rhonchi  Heart: Regular rate and rhythm; [x ] murmur   2/6 [ x] systolic [ ] diastolic [ ] radiation[ ] rubs [ ]  gallops  Abdomen: Nontender, bowel sounds present  Extremities: No clubbing, cyanosis, [ ] edema [ ]Pulses  equal and intact  Nervous system:  Alert & Oriented X3, no focal deficits  Psychiatric: Normal affect  Skin: No rashes or lesions    LABS:  12-17    138  |  98  |  51<H>  ----------------------------<  78  4.7   |  17<L>  |  6.32<H>    Ca    9.2      17 Dec 2023 06:25  Phos  4.1     12-17  Mg     2.00     12-17      Creatinine Trend: 6.32<--, 4.53<--, 6.23<--, 4.94<--, 3.71<--                        12.2   5.78  )-----------( 211      ( 17 Dec 2023 06:25 )             38.5                PATIENT SEEN AND EXAMINED BY ELIZABETH MICHAEL M.D. ON :- 12/18/23  DATE OF SERVICE:    12/18/23         Interim events noted,Labs ,Radiological studies and Cardiology tests reviewed .  DISCUSSED WITH ACP/MEDICAL RESIDENT ON PLAN OF CARE.    MR#[id number]  PATIENT NAME:[facility] COURSE: HPI:  72 yo F with PMhx of HTN, PE (not on AC), OA, chronic migraine  and ESRD on HD (MWF) presents to the ED with cough and SOB. Pt reports that she has been feeling weak for the last couple of days. She has a productive cough. Her chest hurts whenever she coughs. She also has runny nose, myalgias, and SOB with exertion. Denies any associated fever, chills, chest pain or LE edema. She went to HD today and had to stop HD due to weakness/SOB. She was then sent to the ED.   In the ED, her vitals were notable tachycardia. Labs were notable for elevated BUN/Scr, elevated ALP, AST. RVP was positive for SARSCOV2. CXR showed clear lungs.  (13 Dec 2023 18:47)      INTERIM EVENTS:Patient seen at bedside ,interim events noted.      PMH -reviewed admission note, no change since admission  HEART FAILURE: Acute[ ]Chronic[ ] Systolic[ ] Diastolic[ ] Combined Systolic and Diastolic[ ]  CAD[ ] CABG[ ] PCI[ ]  DEVICES[ ] PPM[ ] ICD[ ] ILR[ ]  ATRIAL FIBRILLATION[ ] Paroxysmal[ ] Permanent[ ] CHADS2-[  ]  OC[ ] CKD1[ ] CKD2[ ] CKD3[ ] CKD4[ ] ESRD[ ]  COPD[ ] HTN[ ]   DM[ ] Type1[ ] Type 2[ ]   CVA[ ] Paresis[ ]    AMBULATION: Assisted[ ] Cane/walker[ ] Independent[ ]    MEDICATIONS  (STANDING):  calcitriol   Capsule 1 MICROGram(s) Oral <User Schedule>  chlorhexidine 2% Cloths 1 Application(s) Topical daily  heparin   Injectable 5000 Unit(s) SubCutaneous every 8 hours    MEDICATIONS  (PRN):  acetaminophen     Tablet .. 650 milliGRAM(s) Oral every 6 hours PRN Temp greater or equal to 38C (100.4F), Mild Pain (1 - 3)  benzocaine/menthol Lozenge 1 Lozenge Oral every 4 hours PRN Sore Throat  benzonatate 100 milliGRAM(s) Oral every 8 hours PRN Cough  guaifenesin/dextromethorphan Oral Liquid 10 milliLiter(s) Oral every 4 hours PRN Cough  melatonin 3 milliGRAM(s) Oral at bedtime PRN Insomnia  ondansetron Injectable 4 milliGRAM(s) IV Push every 8 hours PRN Nausea and/or Vomiting  sodium chloride 0.9% Bolus. 100 milliLiter(s) IV Bolus every 5 minutes PRN SBP LESS THAN or EQUAL to 90 mmHg            REVIEW OF SYSTEMS:  Constitutional: [ ] fever, [ ]weight loss,  [ ]fatigue [ ]weight gain  Eyes: [ ] visual changes  Respiratory: [ ]shortness of breath;  [ ] cough, [ ]wheezing, [ ]chills, [ ]hemoptysis  Cardiovascular: [ ] chest pain, [ ]palpitations, [ ]dizziness,  [ ]leg swelling[ ]orthopnea[ ]PND  Gastrointestinal: [ ] abdominal pain, [ ]nausea, [ ]vomiting,  [ ]diarrhea [ ]Constipation [ ]Melena  Genitourinary: [ ] dysuria, [ ] hematuria [ ]Torres  Neurologic: [ ] headaches [ ] tremors[ ]weakness [ ]Paralysis Right[ ] Left[ ]  Skin: [ ] itching, [ ]burning, [ ] rashes  Endocrine: [ ] heat or cold intolerance  Musculoskeletal: [ ] joint pain or swelling; [ ] muscle, back, or extremity pain  Psychiatric: [ ] depression, [ ]anxiety, [ ]mood swings, or [ ]difficulty sleeping  Hematologic: [ ] easy bruising, [ ] bleeding gums    [ ] All remaining systems negative except as per above.   [ ]Unable to obtain.  [x] No change in ROS since admission      Vital Signs Last 24 Hrs  T(C): 36.4 (18 Dec 2023 20:00), Max: 37.1 (18 Dec 2023 05:17)  T(F): 97.5 (18 Dec 2023 20:00), Max: 98.7 (18 Dec 2023 05:17)  HR: 99 (18 Dec 2023 20:00) (97 - 102)  BP: 127/70 (18 Dec 2023 20:00) (127/70 - 155/67)  BP(mean): --  RR: 18 (18 Dec 2023 20:00) (17 - 18)  SpO2: 100% (18 Dec 2023 20:00) (98% - 100%)    Parameters below as of 18 Dec 2023 20:00  Patient On (Oxygen Delivery Method): room air      I&O's Summary    17 Dec 2023 07:01  -  18 Dec 2023 07:00  --------------------------------------------------------  IN: 300 mL / OUT: 375 mL / NET: -75 mL    18 Dec 2023 07:01  -  18 Dec 2023 20:54  --------------------------------------------------------  IN: 0 mL / OUT: 400 mL / NET: -400 mL        PHYSICAL EXAM:  General: No acute distress BMI-  HEENT: EOMI, PERRL  Neck: Supple, [ ] JVD  Lungs: Equal air entry bilaterally; [ ] rales [ ] wheezing [ ] rhonchi  Heart: Regular rate and rhythm; [x ] murmur   2/6 [ x] systolic [ ] diastolic [ ] radiation[ ] rubs [ ]  gallops  Abdomen: Nontender, bowel sounds present  Extremities: No clubbing, cyanosis, [ ] edema [ ]Pulses  equal and intact  Nervous system:  Alert & Oriented X3, no focal deficits  Psychiatric: Normal affect  Skin: No rashes or lesions    LABS:  12-17    138  |  98  |  51<H>  ----------------------------<  78  4.7   |  17<L>  |  6.32<H>    Ca    9.2      17 Dec 2023 06:25  Phos  4.1     12-17  Mg     2.00     12-17      Creatinine Trend: 6.32<--, 4.53<--, 6.23<--, 4.94<--, 3.71<--                        12.2   5.78  )-----------( 211      ( 17 Dec 2023 06:25 )             38.5

## 2023-12-18 NOTE — PROGRESS NOTE ADULT - TIME BILLING
Agree with above NP note.  cv stable  CTA neg for PE  continued dyspnea  consider cardiac cath before d/c given continued symptoms

## 2023-12-18 NOTE — CONSULT NOTE ADULT - SUBJECTIVE AND OBJECTIVE BOX
Interventional Radiology    Evaluate for Procedure: replace tunneled dialysis catheter (TDC)    HPI: 73y Female with ESRD on HD with  TDC placed by IR on Feb 2023. Per nephrology, TDC not having good flow. Requesting exchange. Pt admitted for cough with COVID, but now doing better.    Allergies: trimethoprim (Other; Rash)  sulfa drugs (Other; Rash)  Sulfur (Unknown)    Medications (Abx/Cardiac/Anticoagulation/Blood Products)    heparin   Injectable: 5000 Unit(s) SubCutaneous (12-18 @ 05:18)    Data:    T(C): 36.5  HR: 102  BP: 130/65  RR: 18  SpO2: 100%    -WBC 5.78 / HgB 12.2 / Hct 38.5 / Plt 211  -Na 138 / Cl 98 / BUN 51 / Glucose 78  -K 4.7 / CO2 17 / Cr 6.32  -ALT -- / Alk Phos -- / T.Bili --  -INR 1.02 / PTT 81.2    Radiology: reviewed    Assessment/Plan:   73y Female with ESRD on HD with  TDC placed by IR on Feb 2023. Per nephrology, TDC not having good flow. Requesting exchange. Pt admitted for cough with COVID, but now doing better.    Plan:   -Plan for exchange tomorrow since pt getting dialysis today. Exchange should be done on day following dialysis.  -Please place order for IR Procedure, approving attending Dr. Goss  -Please place pre-procedure note  -NPO past midnight prior to procedure  -hold therpaeutic/prophylactic anticoagulants  -AM CBC, BMP, and coags  -discussed with primary team    Lien Alberts MD, PGY-3 Interventional Radiology  Available on Microsoft Teams    - Non-emergent consults: Place IR consult order in [place]  - Emergent issues (pager): [facility] [phone]; [facility] [phone]; [phone]  - Scheduling questions: [facility] [phone]; [facility] [phone]  - Clinic/outpatient booking: [facility] [phone]; [facility] [phone] Interventional Radiology    Evaluate for Procedure: replace tunneled dialysis catheter (TDC)    HPI: 73y Female with ESRD on HD with  TDC placed by IR on Feb 2023. Per nephrology, TDC not having good flow. Requesting exchange. Pt admitted for cough with COVID, but now doing better.    Allergies: trimethoprim (Other; Rash)  sulfa drugs (Other; Rash)  Sulfur (Unknown)    Medications (Abx/Cardiac/Anticoagulation/Blood Products)    heparin   Injectable: 5000 Unit(s) SubCutaneous (12-18 @ 05:18)    Data:    T(C): 36.5  HR: 102  BP: 130/65  RR: 18  SpO2: 100%    -WBC 5.78 / HgB 12.2 / Hct 38.5 / Plt 211  -Na 138 / Cl 98 / BUN 51 / Glucose 78  -K 4.7 / CO2 17 / Cr 6.32  -ALT -- / Alk Phos -- / T.Bili --  -INR 1.02 / PTT 81.2    Radiology: reviewed    Assessment/Plan:   73y Female with ESRD on HD with  TDC placed by IR on Feb 2023. Per nephrology, TDC not having good flow. Requesting exchange. Pt admitted for cough with COVID, but now doing better.    Plan:   -Plan for exchange tomorrow since pt getting dialysis today. Exchange should be done on day following dialysis.  -Please place order for IR Procedure, approving attending Dr. Goss  -Please place pre-procedure note  -NPO past midnight prior to procedure  -hold therpaeutic/prophylactic anticoagulants  -AM CBC, BMP, and coags  -discussed with primary team    Lien Alberts MD, PGY-3 Interventional Radiology  Available on Microsoft Teams    - Non-emergent consults: Place IR consult order in [place]  - Emergent issues (pager): [facility] [phone]; [facility] [phone]; [id number]  - Scheduling questions: [facility] [phone]; [facility] [phone]  - Clinic/outpatient booking: [facility] [phone]; [facility] [phone]

## 2023-12-18 NOTE — PROGRESS NOTE ADULT - SUBJECTIVE AND OBJECTIVE BOX
CARDIOLOGY FOLLOW UP - Dr. Joe  DATE OF SERVICE: 12/18/23     CC no acute cv events       REVIEW OF SYSTEMS:  CONSTITUTIONAL: No fever, weight loss, or fatigue  RESPIRATORY: No cough, wheezing, chills or hemoptysis; No Shortness of Breath  CARDIOVASCULAR: No chest pain, palpitations, passing out, dizziness, or leg swelling  GASTROINTESTINAL: No abdominal or epigastric pain. No nausea, vomiting, or hematemesis; No diarrhea or constipation. No melena or hematochezia.  VASCULAR: No edema     PHYSICAL EXAM:  T(C): 36.5 (12-18-23 @ 11:05), Max: 37.1 (12-18-23 @ 05:17)  HR: 102 (12-18-23 @ 11:05) (78 - 102)  BP: 130/65 (12-18-23 @ 11:05) (130/65 - 155/67)  RR: 18 (12-18-23 @ 11:05) (16 - 18)  SpO2: 100% (12-18-23 @ 11:05) (98% - 100%)  Wt(kg): --  I&O's Summary    17 Dec 2023 07:01  -  18 Dec 2023 07:00  --------------------------------------------------------  IN: 300 mL / OUT: 375 mL / NET: -75 mL        Appearance: Normal	  Cardiovascular: Normal S1 S2,RRR, No JVD, No murmurs  Respiratory: Lungs clear to auscultation	  Gastrointestinal:  Soft, Non-tender, + BS	  Extremities: Normal range of motion, No clubbing, cyanosis or edema      Home Medications:  amLODIPine 10 mg oral tablet: 1 tab(s) orally once a day (13 Dec 2023 18:52)  calcitriol 0.5 mcg oral capsule: 1 cap(s) orally once a day (13 Dec 2023 18:52)  cholecalciferol oral tablet: 1000 unit(s) orally once a day (13 Dec 2023 18:52)  folic acid 1 mg oral tablet: 1 tab(s) orally once a day (13 Dec 2023 18:52)  metoprolol succinate 100 mg oral tablet, extended release: 1 tab(s) orally once a day (13 Dec 2023 18:52)      MEDICATIONS  (STANDING):  calcitriol   Capsule 1 MICROGram(s) Oral <User Schedule>  chlorhexidine 2% Cloths 1 Application(s) Topical daily  heparin   Injectable 5000 Unit(s) SubCutaneous every 12 hours      TELEMETRY: sinus tach hr 101	    ECG:  	  RADIOLOGY:   DIAGNOSTIC TESTING:  [ ] Echocardiogram:  [ ]  Catheterization:  [ ] Stress Test:    OTHER: 	    LABS:	 	    Troponin T, High Sensitivity Result: 27 ng/L (12-13 @ 14:27)                          12.2   5.78  )-----------( 211      ( 17 Dec 2023 06:25 )             38.5     12-17    138  |  98  |  51<H>  ----------------------------<  78  4.7   |  17<L>  |  6.32<H>    Ca    9.2      17 Dec 2023 06:25  Phos  4.1     12-17  Mg     2.00     12-17

## 2023-12-18 NOTE — PROGRESS NOTE ADULT - ASSESSMENT
72 y/o F PMhx HTN, OA, chronic migraine and ESRD on HD (MWF) who presented w/ cough and SOB.    COVID  saturating well on RA  CTA negative for PE, LE US negative for DVT  s/p remdesivir x 3 day course completed 12/16  feels better, afebrile     Recommendations  isolation per infection control policy   supportive care- flonase, mucinex, anti-tussive    Tony Bright M.D.  [facility], Division of Infectious Diseases  [phone]  After 5pm on weekdays and all day on weekends - please call [phone]  74 y/o F PMhx HTN, OA, chronic migraine and ESRD on HD (MWF) who presented w/ cough and SOB.    COVID  saturating well on RA  CTA negative for PE, LE US negative for DVT  s/p remdesivir x 3 day course completed 12/16  feels better, afebrile     Recommendations  isolation per infection control policy   supportive care- flonase, mucinex, anti-tussive    Tony Bright M.D.  [facility], Division of Infectious Diseases  [phone]  After 5pm on weekdays and all day on weekends - please call [phone]

## 2023-12-18 NOTE — PROGRESS NOTE ADULT - SUBJECTIVE AND OBJECTIVE BOX
[facility] NEPHROLOGY- PROGRESS NOTE    73y Female with history of ESRD on HD presents with SOB. Nephrology consulted for ESRD status.    REVIEW OF SYSTEMS:  Gen: no fevers  Cards: no chest pain  Resp: + dyspnea, + cough  GI: no nausea or vomiting or diarrhea  Vascular: no LE edema    trimethoprim (Other; Rash)  sulfa drugs (Other; Rash)  Sulfur (Unknown)      Hospital Medications: Medications reviewed      VITALS:  T(F): 97.7 (12-18-23 @ 11:05), Max: 98.7 (12-18-23 @ 05:17)  HR: 102 (12-18-23 @ 11:05)  BP: 130/65 (12-18-23 @ 11:05)  RR: 18 (12-18-23 @ 11:05)  SpO2: 100% (12-18-23 @ 11:05)  Wt(kg): --    12-17 @ 07:01  -  12-18 @ 07:00  --------------------------------------------------------  IN: 300 mL / OUT: 375 mL / NET: -75 mL          PHYSICAL EXAM:    Gen: NAD, calm  Cards: RRR, +S1/S2, no M/G/R  Resp: CTA B/L  GI: soft, NT/ND, NABS  Vascular: no LE edema B/L, RIJ TDC intact        LABS:  12-17    138  |  98  |  51<H>  ----------------------------<  78  4.7   |  17<L>  |  6.32<H>    Ca    9.2      17 Dec 2023 06:25  Phos  4.1     12-17  Mg     2.00     12-17      Creatinine Trend: 6.32 <--, 4.53 <--, 6.23 <--, 4.94 <--, 3.71 <--                        12.2   5.78  )-----------( 211      ( 17 Dec 2023 06:25 )             38.5     Urine Studies:  Urinalysis Basic - ( 17 Dec 2023 06:25 )    Color:  / Appearance:  / SG:  / pH:   Gluc: 78 mg/dL / Ketone:   / Bili:  / Urobili:    Blood:  / Protein:  / Nitrite:    Leuk Esterase:  / RBC:  / WBC    Sq Epi:  / Non Sq Epi:  / Bacteria:          [facility] NEPHROLOGY- PROGRESS NOTE    73y Female with history of ESRD on HD presents with SOB. Nephrology consulted for ESRD status.    REVIEW OF SYSTEMS:  Gen: no fevers  Cards: no chest pain  Resp: + dyspnea, + cough  GI: no nausea or vomiting or diarrhea  Vascular: no LE edema    trimethoprim (Other; Rash)  sulfa drugs (Other; Rash)  Sulfur (Unknown)      Hospital Medications: Medications reviewed      VITALS:  T(F): 97.7 (12-18-23 @ 11:05), Max: 98.7 (12-18-23 @ 05:17)  HR: 102 (12-18-23 @ 11:05)  BP: 130/65 (12-18-23 @ 11:05)  RR: 18 (12-18-23 @ 11:05)  SpO2: 100% (12-18-23 @ 11:05)  Wt(kg): --    12-17 @ 07:01  -  12-18 @ 07:00  --------------------------------------------------------  IN: 300 mL / OUT: 375 mL / NET: -75 mL          PHYSICAL EXAM:    Gen: NAD, calm  Cards: RRR, +S1/S2, no M/G/R  Resp: CTA B/L  GI: soft, NT/ND, NABS  Vascular: no LE edema B/L, RIJ TDC intact        LABS:  12-17    138  |  98  |  51<H>  ----------------------------<  78  4.7   |  17<L>  |  6.32<H>    Ca    9.2      17 Dec 2023 06:25  Phos  4.1     12-17  Mg     2.00     12-17      Creatinine Trend: 6.32 <--, 4.53 <--, 6.23 <--, 4.94 <--, 3.71 <--                        12.2   5.78  )-----------( 211      ( 17 Dec 2023 06:25 )             38.5     Urine Studies:  Urinalysis Basic - ( 17 Dec 2023 06:25 )    Color:  / Appearance:  / SG:  / pH:   Gluc: 78 mg/dL / Ketone:   / Bili:  / Urobili:    Blood:  / Protein:  / Nitrite:    Leuk Esterase:  / RBC:  / WBC    Sq Epi:  / Non Sq Epi:  / Bacteria:

## 2023-12-19 LAB
ANION GAP SERPL CALC-SCNC: 19 MMOL/L — HIGH (ref 7–14)
ANION GAP SERPL CALC-SCNC: 19 MMOL/L — HIGH (ref 7–14)
APTT BLD: 31.6 SEC — SIGNIFICANT CHANGE UP (ref 24.5–35.6)
APTT BLD: 31.6 SEC — SIGNIFICANT CHANGE UP (ref 24.5–35.6)
BUN SERPL-MCNC: 46 MG/DL — HIGH (ref 7–23)
BUN SERPL-MCNC: 46 MG/DL — HIGH (ref 7–23)
CALCIUM SERPL-MCNC: 9.1 MG/DL — SIGNIFICANT CHANGE UP (ref 8.4–10.5)
CALCIUM SERPL-MCNC: 9.1 MG/DL — SIGNIFICANT CHANGE UP (ref 8.4–10.5)
CHLORIDE SERPL-SCNC: 97 MMOL/L — LOW (ref 98–107)
CHLORIDE SERPL-SCNC: 97 MMOL/L — LOW (ref 98–107)
CO2 SERPL-SCNC: 20 MMOL/L — LOW (ref 22–31)
CO2 SERPL-SCNC: 20 MMOL/L — LOW (ref 22–31)
CREAT SERPL-MCNC: 5.11 MG/DL — HIGH (ref 0.5–1.3)
CREAT SERPL-MCNC: 5.11 MG/DL — HIGH (ref 0.5–1.3)
CRP SERPL-MCNC: 25.2 MG/L — HIGH
CRP SERPL-MCNC: 25.2 MG/L — HIGH
D DIMER BLD IA.RAPID-MCNC: 1492 NG/ML DDU — HIGH
D DIMER BLD IA.RAPID-MCNC: 1492 NG/ML DDU — HIGH
EGFR: 8 ML/MIN/1.73M2 — LOW
EGFR: 8 ML/MIN/1.73M2 — LOW
GLUCOSE SERPL-MCNC: 83 MG/DL — SIGNIFICANT CHANGE UP (ref 70–99)
GLUCOSE SERPL-MCNC: 83 MG/DL — SIGNIFICANT CHANGE UP (ref 70–99)
HCT VFR BLD CALC: 41.3 % — SIGNIFICANT CHANGE UP (ref 34.5–45)
HCT VFR BLD CALC: 41.3 % — SIGNIFICANT CHANGE UP (ref 34.5–45)
HGB BLD-MCNC: 13.3 G/DL — SIGNIFICANT CHANGE UP (ref 11.5–15.5)
HGB BLD-MCNC: 13.3 G/DL — SIGNIFICANT CHANGE UP (ref 11.5–15.5)
LDH SERPL L TO P-CCNC: 242 U/L — HIGH (ref 135–225)
LDH SERPL L TO P-CCNC: 242 U/L — HIGH (ref 135–225)
MAGNESIUM SERPL-MCNC: 2 MG/DL — SIGNIFICANT CHANGE UP (ref 1.6–2.6)
MAGNESIUM SERPL-MCNC: 2 MG/DL — SIGNIFICANT CHANGE UP (ref 1.6–2.6)
MCHC RBC-ENTMCNC: 31.6 PG — SIGNIFICANT CHANGE UP (ref 27–34)
MCHC RBC-ENTMCNC: 31.6 PG — SIGNIFICANT CHANGE UP (ref 27–34)
MCHC RBC-ENTMCNC: 32.2 GM/DL — SIGNIFICANT CHANGE UP (ref 32–36)
MCHC RBC-ENTMCNC: 32.2 GM/DL — SIGNIFICANT CHANGE UP (ref 32–36)
MCV RBC AUTO: 98.1 FL — SIGNIFICANT CHANGE UP (ref 80–100)
MCV RBC AUTO: 98.1 FL — SIGNIFICANT CHANGE UP (ref 80–100)
NRBC # BLD: 0 /100 WBCS — SIGNIFICANT CHANGE UP (ref 0–0)
NRBC # BLD: 0 /100 WBCS — SIGNIFICANT CHANGE UP (ref 0–0)
NRBC # FLD: 0 K/UL — SIGNIFICANT CHANGE UP (ref 0–0)
NRBC # FLD: 0 K/UL — SIGNIFICANT CHANGE UP (ref 0–0)
PHOSPHATE SERPL-MCNC: 4.3 MG/DL — SIGNIFICANT CHANGE UP (ref 2.5–4.5)
PHOSPHATE SERPL-MCNC: 4.3 MG/DL — SIGNIFICANT CHANGE UP (ref 2.5–4.5)
PLATELET # BLD AUTO: 209 K/UL — SIGNIFICANT CHANGE UP (ref 150–400)
PLATELET # BLD AUTO: 209 K/UL — SIGNIFICANT CHANGE UP (ref 150–400)
POTASSIUM SERPL-MCNC: 4.5 MMOL/L — SIGNIFICANT CHANGE UP (ref 3.5–5.3)
POTASSIUM SERPL-MCNC: 4.5 MMOL/L — SIGNIFICANT CHANGE UP (ref 3.5–5.3)
POTASSIUM SERPL-SCNC: 4.5 MMOL/L — SIGNIFICANT CHANGE UP (ref 3.5–5.3)
POTASSIUM SERPL-SCNC: 4.5 MMOL/L — SIGNIFICANT CHANGE UP (ref 3.5–5.3)
PROCALCITONIN SERPL-MCNC: 0.38 NG/ML — HIGH (ref 0.02–0.1)
PROCALCITONIN SERPL-MCNC: 0.38 NG/ML — HIGH (ref 0.02–0.1)
RBC # BLD: 4.21 M/UL — SIGNIFICANT CHANGE UP (ref 3.8–5.2)
RBC # BLD: 4.21 M/UL — SIGNIFICANT CHANGE UP (ref 3.8–5.2)
RBC # FLD: 12.7 % — SIGNIFICANT CHANGE UP (ref 10.3–14.5)
RBC # FLD: 12.7 % — SIGNIFICANT CHANGE UP (ref 10.3–14.5)
SODIUM SERPL-SCNC: 136 MMOL/L — SIGNIFICANT CHANGE UP (ref 135–145)
SODIUM SERPL-SCNC: 136 MMOL/L — SIGNIFICANT CHANGE UP (ref 135–145)
WBC # BLD: 6.75 K/UL — SIGNIFICANT CHANGE UP (ref 3.8–10.5)
WBC # BLD: 6.75 K/UL — SIGNIFICANT CHANGE UP (ref 3.8–10.5)
WBC # FLD AUTO: 6.75 K/UL — SIGNIFICANT CHANGE UP (ref 3.8–10.5)
WBC # FLD AUTO: 6.75 K/UL — SIGNIFICANT CHANGE UP (ref 3.8–10.5)

## 2023-12-19 PROCEDURE — 99232 SBSQ HOSP IP/OBS MODERATE 35: CPT

## 2023-12-19 RX ORDER — HEPARIN SODIUM 5000 [USP'U]/ML
2000 INJECTION INTRAVENOUS; SUBCUTANEOUS ONCE
Refills: 0 | Status: COMPLETED | OUTPATIENT
Start: 2023-12-20 | End: 2023-12-20

## 2023-12-19 RX ORDER — HEPARIN SODIUM 5000 [USP'U]/ML
1000 INJECTION INTRAVENOUS; SUBCUTANEOUS
Refills: 0 | Status: DISCONTINUED | OUTPATIENT
Start: 2023-12-20 | End: 2023-12-21

## 2023-12-19 RX ADMIN — Medication 650 MILLIGRAM(S): at 01:37

## 2023-12-19 RX ADMIN — HEPARIN SODIUM 5000 UNIT(S): 5000 INJECTION INTRAVENOUS; SUBCUTANEOUS at 06:22

## 2023-12-19 RX ADMIN — CHLORHEXIDINE GLUCONATE 1 APPLICATION(S): 213 SOLUTION TOPICAL at 12:55

## 2023-12-19 RX ADMIN — Medication 100 MILLIGRAM(S): at 00:37

## 2023-12-19 RX ADMIN — Medication 100 MILLIGRAM(S): at 08:43

## 2023-12-19 RX ADMIN — Medication 650 MILLIGRAM(S): at 00:37

## 2023-12-19 NOTE — PROGRESS NOTE ADULT - ASSESSMENT
74 y/o F PMhx HTN, OA, chronic migraine and ESRD on HD (MWF) who presented w/ cough and SOB.    COVID  saturating well on RA  CTA negative for PE, LE US negative for DVT  s/p remdesivir x 3 days completed 12/16  symptoms improving, remains afebrile   planned for tunneled catheter exchange today    Recommendations  isolation per infection control policy   supportive care- flonase, mucinex, anti-tussive, lozenge  discharge planning    Tony Bright M.D.  [facility], Division of Infectious Diseases  [phone]  After 5pm on weekdays and all day on weekends - please call [phone]  72 y/o F PMhx HTN, OA, chronic migraine and ESRD on HD (MWF) who presented w/ cough and SOB.    COVID  saturating well on RA  CTA negative for PE, LE US negative for DVT  s/p remdesivir x 3 days completed 12/16  symptoms improving, remains afebrile   planned for tunneled catheter exchange today    Recommendations  isolation per infection control policy   supportive care- flonase, mucinex, anti-tussive, lozenge  discharge planning    Tony Bright M.D.  [facility], Division of Infectious Diseases  [phone]  After 5pm on weekdays and all day on weekends - please call [phone]

## 2023-12-19 NOTE — PROGRESS NOTE ADULT - ASSESSMENT
73y Female with history of ESRD on HD presents with SOB. Nephrology consulted for ESRD status.    1) ESRD: Last HD on 12/18 with intradialytic hypotension and 1.7 removed and with poor blood flow. Patient has also been having poor arterial blood flow due to high venous and arterial pressures as an outpatient. Plan for IR TDC exchange today. Plan for next maintenance HD on 12/20 prior to discharge. Monitor electrolytes.    2) HTN with ESRD: BP controlled. Monitor off medications.    3) Anemia of renal disease: Hb acceptable. On Micera 30 mcg monthly. Will give Epogen if Hb decreases. Monitor Hb.    4) Secondary HPT of renal origin: Phosphorus acceptable. Continue with calcitriol 1 mcg MWF and renal diet. Monitor serum calcium and phosphorus.      [facility] NEPHROLOGY  Derrick Castaneda M.D.  Raúl Diana D.O.  Grace Delgadillo M.D.  MD Cher Beckman, MSN, ANP-C    Telephone: [phone]  Facsimile: [phone] [place] [address], [address]  [place] [id number]   73y Female with history of ESRD on HD presents with SOB. Nephrology consulted for ESRD status.    1) ESRD: Last HD on 12/18 with intradialytic hypotension and 1.7 removed and with poor blood flow. Patient has also been having poor arterial blood flow due to high venous and arterial pressures as an outpatient. Plan for IR TDC exchange today. Plan for next maintenance HD on 12/20 prior to discharge. Monitor electrolytes.    2) HTN with ESRD: BP controlled. Monitor off medications.    3) Anemia of renal disease: Hb acceptable. On Micera 30 mcg monthly. Will give Epogen if Hb decreases. Monitor Hb.    4) Secondary HPT of renal origin: Phosphorus acceptable. Continue with calcitriol 1 mcg MWF and renal diet. Monitor serum calcium and phosphorus.      [facility] NEPHROLOGY  Derrick Castaneda M.D.  Raúl Diana D.O.  Grace Delgadillo M.D.  MD Cher Beckman, MSN, ANP-C    Telephone: [phone]  Facsimile: [phone] [place] [address], [address]  [place] [phone]

## 2023-12-19 NOTE — CHART NOTE - NSCHARTNOTEFT_GEN_A_CORE
PRE-INTERVENTIONAL RADIOLOGY PROCEDURE NOTE    Patient Age: 73  Patient Gender: female  Procedure (including site / side if known): permacath exchange  Diagnosis / Indication: HD permacath with poor flow  Interventional Radiology Attending Physician: Dr Goss  Ordering Attending Physician: Dr. Kramer  Pertinent medical history: renal dz, COvid   Pertinent labs:                       13.3   6.75  )-----------( 209      ( 19 Dec 2023 07:30 )             41.3

## 2023-12-19 NOTE — PHYSICAL THERAPY INITIAL EVALUATION ADULT - GAIT DEVIATIONS NOTED, PT EVAL
further mobility held secondary to tachycardia in the 140s with symptoms of shortness of breath, pt returned to bed with observed improvement in HR to low 110s and improvement in breathing./decreased jinny/increased time in double stance/decreased step length/decreased stride length

## 2023-12-19 NOTE — PROGRESS NOTE ADULT - SUBJECTIVE AND OBJECTIVE BOX
PULMONARY PROGRESS NOTE    MELISSA ARREOLA  MRN-[id number]    Patient is a 73y old  Female who presents with a chief complaint of Cough (19 Dec 2023 10:41)      HPI:  on room air  cough sometimes productive green sputum   -    ROS:   -    ACTIVE MEDICATION LIST:  MEDICATIONS  (STANDING):  calcitriol   Capsule 1 MICROGram(s) Oral <User Schedule>  chlorhexidine 2% Cloths 1 Application(s) Topical daily  heparin   Injectable 5000 Unit(s) SubCutaneous every 8 hours    MEDICATIONS  (PRN):  acetaminophen     Tablet .. 650 milliGRAM(s) Oral every 6 hours PRN Temp greater or equal to 38C (100.4F), Mild Pain (1 - 3)  benzocaine/menthol Lozenge 1 Lozenge Oral every 4 hours PRN Sore Throat  benzonatate 100 milliGRAM(s) Oral every 8 hours PRN Cough  guaifenesin/dextromethorphan Oral Liquid 10 milliLiter(s) Oral every 4 hours PRN Cough  melatonin 3 milliGRAM(s) Oral at bedtime PRN Insomnia  ondansetron Injectable 4 milliGRAM(s) IV Push every 8 hours PRN Nausea and/or Vomiting  sodium chloride 0.9% Bolus. 100 milliLiter(s) IV Bolus every 5 minutes PRN SBP LESS THAN or EQUAL to 90 mmHg      EXAM:  Vital Signs Last 24 Hrs  T(C): 36.6 (19 Dec 2023 08:25), Max: 36.7 (19 Dec 2023 00:10)  T(F): 97.8 (19 Dec 2023 08:25), Max: 98.1 (19 Dec 2023 00:10)  HR: 114 (19 Dec 2023 08:25) (91 - 117)  BP: 103/58 (19 Dec 2023 08:25) (96/66 - 146/98)  BP(mean): --  RR: 16 (19 Dec 2023 08:25) (16 - 18)  SpO2: 100% (18 Dec 2023 20:00) (100% - 100%)    Parameters below as of 19 Dec 2023 08:25  Patient On (Oxygen Delivery Method): room air        GENERAL: The patient is awake and alert in no apparent distress.     LUNGS:  respirations unlabored                         13.3   6.75  )-----------( 209      ( 19 Dec 2023 07:30 )             41.3       12-19    136  |  97<L>  |  46<H>  ----------------------------<  83  4.5   |  20<L>  |  5.11<H>    Ca    9.1      19 Dec 2023 07:30  Phos  4.3     12-19  Mg     2.00     12-19     < from: US Duplex Venous Lower Ext Complete, Bilateral (12.18.23 @ 10:42) >    ACC: [id number] EXAM:  US DPLX LWR EXT VEINS COMPL BI   ORDERED BY: SYLVESTER ORTIZ     PROCEDURE DATE:  12/18/2023          INTERPRETATION:  CLINICAL INFORMATION: Elevated d-dimer.    COMPARISON: 4/27/2022    TECHNIQUE: Duplex sonography of the BILATERAL LOWER extremity veins with   color and spectral Doppler, with and without compression.    FINDINGS:    RIGHT:  Normal compressibility of the RIGHT common femoral, femoral and popliteal   veins.  Doppler examination shows normal spontaneous andphasic flow.  No RIGHT calf vein thrombosis is detected. The right gastrocnemius vein   could not be visualized.    LEFT:  Normal compressibility of the LEFT common femoral, femoral and popliteal   veins.  Doppler examination shows normal spontaneousand phasic flow.  No LEFT calf vein thrombosis is detected.    IMPRESSION:  No evidence of deep venous thrombosis in either lower extremity in the   visualized venous segments.            --- End of Report ---            YANNA NINO MD; Attending Radiologist  This document has been electronically signed. Dec 18 2023 10:54AM    < end of copied text >  < from: CT Angio Chest PE Protocol w/ IV Cont (12.15.23 @ 18:17) >    ACC: [id number] EXAM:  CT ANGIO CHEST PULM ART WAWIC   ORDERED BY: SYLVESTER ORTIZ     PROCEDURE DATE:  12/15/2023          INTERPRETATION:  CLINICAL INFORMATION: Elevated d-dimer. Evaluate for PE.    COMPARISON: CT chest abdomen and pelvis 4 2/22/2022. CT chest 2/1/2023.    CONTRAST/COMPLICATIONS:  IV Contrast: Omnipaque 350  62 cc administered   38 cc discarded  Oral Contrast: NONE  Complications: None reported at time of study completion    PROCEDURE:  CT Angiography of the Chest.  Sagittal and coronal reformats were performed as well as 3D (MIP)   reconstructions.    FINDINGS:    LUNGS, AIRWAYS AND PLEURA: The central airways are patent. No pleural   effusion. The lungs are clear.    MEDIASTINUM AND SILVIA: No lymphadenopathy. Small calcified right hilar   nodes, likely sequela of old granulomatous disease.    HEART/VESSELS: There is good contrast bolus to the pulmonary arteries. No   pulmonary embolism. The great vessels are normal in size. The heart is   normal in size. No pericardial effusion.    CHEST WALL AND LOWER NECK: Stable thyroid goiter with retrosternal   extension. Similar-appearing rightward displacement of the trachea and   splaying of the bilateral brachiocephalic veins.    VISUALIZED UPPER ABDOMEN: Cholecystectomy. Mild pneumobilia, new from   previous exam.    BONES: No aggressive osseous lesion.    IMPRESSION:  No pulmonary embolism.    Mild left pneumobilia, new from February 2023; correlate with history of   interval procedure/intervention.    --- End of Report ---          RAMA CLEANING MD; Resident Radiologist  This document has been electronically signed.  LEONARDO CHU M.D., Attending Radiologist  This document has been electronically signed. Dec 16 2023  2:37PM    < end of copied text >      PROBLEM LIST:  73y Female with HEALTH ISSUES - PROBLEM Dx:  2019 novel coronavirus disease (COVID-19)    ESRD on dialysis    Medication management    Preventive measure    Essential hypertension              RECS:  suggest perles 100q 8hrs standing  flonase BID  antihistamine daily  PPI daily  outpatient pfts, and sleep study      Please call with any questions.    Lorna Downing DO  [facility] Pulmonary/Sleep Medicine  [phone]   PULMONARY PROGRESS NOTE    MELISSA ARREOLA  MRN-[id number]    Patient is a 73y old  Female who presents with a chief complaint of Cough (19 Dec 2023 10:41)      HPI:  on room air  cough sometimes productive green sputum   -    ROS:   -    ACTIVE MEDICATION LIST:  MEDICATIONS  (STANDING):  calcitriol   Capsule 1 MICROGram(s) Oral <User Schedule>  chlorhexidine 2% Cloths 1 Application(s) Topical daily  heparin   Injectable 5000 Unit(s) SubCutaneous every 8 hours    MEDICATIONS  (PRN):  acetaminophen     Tablet .. 650 milliGRAM(s) Oral every 6 hours PRN Temp greater or equal to 38C (100.4F), Mild Pain (1 - 3)  benzocaine/menthol Lozenge 1 Lozenge Oral every 4 hours PRN Sore Throat  benzonatate 100 milliGRAM(s) Oral every 8 hours PRN Cough  guaifenesin/dextromethorphan Oral Liquid 10 milliLiter(s) Oral every 4 hours PRN Cough  melatonin 3 milliGRAM(s) Oral at bedtime PRN Insomnia  ondansetron Injectable 4 milliGRAM(s) IV Push every 8 hours PRN Nausea and/or Vomiting  sodium chloride 0.9% Bolus. 100 milliLiter(s) IV Bolus every 5 minutes PRN SBP LESS THAN or EQUAL to 90 mmHg      EXAM:  Vital Signs Last 24 Hrs  T(C): 36.6 (19 Dec 2023 08:25), Max: 36.7 (19 Dec 2023 00:10)  T(F): 97.8 (19 Dec 2023 08:25), Max: 98.1 (19 Dec 2023 00:10)  HR: 114 (19 Dec 2023 08:25) (91 - 117)  BP: 103/58 (19 Dec 2023 08:25) (96/66 - 146/98)  BP(mean): --  RR: 16 (19 Dec 2023 08:25) (16 - 18)  SpO2: 100% (18 Dec 2023 20:00) (100% - 100%)    Parameters below as of 19 Dec 2023 08:25  Patient On (Oxygen Delivery Method): room air        GENERAL: The patient is awake and alert in no apparent distress.     LUNGS:  respirations unlabored                         13.3   6.75  )-----------( 209      ( 19 Dec 2023 07:30 )             41.3       12-19    136  |  97<L>  |  46<H>  ----------------------------<  83  4.5   |  20<L>  |  5.11<H>    Ca    9.1      19 Dec 2023 07:30  Phos  4.3     12-19  Mg     2.00     12-19     < from: US Duplex Venous Lower Ext Complete, Bilateral (12.18.23 @ 10:42) >    ACC: [id number] EXAM:  US DPLX LWR EXT VEINS COMPL BI   ORDERED BY: SYLVESTER ORTIZ     PROCEDURE DATE:  12/18/2023          INTERPRETATION:  CLINICAL INFORMATION: Elevated d-dimer.    COMPARISON: 4/27/2022    TECHNIQUE: Duplex sonography of the BILATERAL LOWER extremity veins with   color and spectral Doppler, with and without compression.    FINDINGS:    RIGHT:  Normal compressibility of the RIGHT common femoral, femoral and popliteal   veins.  Doppler examination shows normal spontaneous andphasic flow.  No RIGHT calf vein thrombosis is detected. The right gastrocnemius vein   could not be visualized.    LEFT:  Normal compressibility of the LEFT common femoral, femoral and popliteal   veins.  Doppler examination shows normal spontaneousand phasic flow.  No LEFT calf vein thrombosis is detected.    IMPRESSION:  No evidence of deep venous thrombosis in either lower extremity in the   visualized venous segments.            --- End of Report ---            YANNA NINO MD; Attending Radiologist  This document has been electronically signed. Dec 18 2023 10:54AM    < end of copied text >  < from: CT Angio Chest PE Protocol w/ IV Cont (12.15.23 @ 18:17) >    ACC: [id number] EXAM:  CT ANGIO CHEST PULM ART WAWIC   ORDERED BY: SYLVESTER ORTIZ     PROCEDURE DATE:  12/15/2023          INTERPRETATION:  CLINICAL INFORMATION: Elevated d-dimer. Evaluate for PE.    COMPARISON: CT chest abdomen and pelvis 4 2/22/2022. CT chest 2/1/2023.    CONTRAST/COMPLICATIONS:  IV Contrast: Omnipaque 350  62 cc administered   38 cc discarded  Oral Contrast: NONE  Complications: None reported at time of study completion    PROCEDURE:  CT Angiography of the Chest.  Sagittal and coronal reformats were performed as well as 3D (MIP)   reconstructions.    FINDINGS:    LUNGS, AIRWAYS AND PLEURA: The central airways are patent. No pleural   effusion. The lungs are clear.    MEDIASTINUM AND SILVIA: No lymphadenopathy. Small calcified right hilar   nodes, likely sequela of old granulomatous disease.    HEART/VESSELS: There is good contrast bolus to the pulmonary arteries. No   pulmonary embolism. The great vessels are normal in size. The heart is   normal in size. No pericardial effusion.    CHEST WALL AND LOWER NECK: Stable thyroid goiter with retrosternal   extension. Similar-appearing rightward displacement of the trachea and   splaying of the bilateral brachiocephalic veins.    VISUALIZED UPPER ABDOMEN: Cholecystectomy. Mild pneumobilia, new from   previous exam.    BONES: No aggressive osseous lesion.    IMPRESSION:  No pulmonary embolism.    Mild left pneumobilia, new from February 2023; correlate with history of   interval procedure/intervention.    --- End of Report ---          RAMA CLEANING MD; Resident Radiologist  This document has been electronically signed.  LEONARDO CHU M.D., Attending Radiologist  This document has been electronically signed. Dec 16 2023  2:37PM    < end of copied text >      PROBLEM LIST:  73y Female with HEALTH ISSUES - PROBLEM Dx:  2019 novel coronavirus disease (COVID-19)    ESRD on dialysis    Medication management    Preventive measure    Essential hypertension              RECS:  suggest perles 100q 8hrs standing  flonase BID  antihistamine daily  PPI daily  outpatient pfts, and sleep study      Please call with any questions.    Lorna Downing DO  [facility] Pulmonary/Sleep Medicine  [phone]

## 2023-12-19 NOTE — PROGRESS NOTE ADULT - ASSESSMENT
ECHO 4/23/22 : nl lV  sys fx EF 55-60%  stress  11/14/23 normal   EF% during stress is 64 %    a/p   74 yo F with PMhx of HTN, PE (not on AC), OA, chronic migraine  and ESRD on HD (MWF) presents to the ED with cough/ SOB/ chest pain, + covid     #Atypical chest pain   -msk- secondary to cough   -HS trop neg x 1, no ischemic changes to ecg   -recent stress  11/14/23 normal   EF% during stress is 64 %  -echo ok  -hx of PE- not on ac, elevated d dimer  -cta neg for PE  -le doppler neg for dvt    #htn   -bp stable off amlo    #Covid 19  -managment per med   -sp remdesivir    -ID fu    #sob   -secondary to covid  -no chf on exam   -vol removal with hd  -cta neg for pe   -consider cardiac cath before d/c given continued symptoms.    dvt ppx

## 2023-12-19 NOTE — PROGRESS NOTE ADULT - ASSESSMENT
ECHO 4/23/22 : nl lV  sys fx EF 55-60%  stress  11/14/23 normal   EF% during stress is 64 %    74 yo F with PMhx of HTN, PE (not on AC), OA, chronic migraine  and ESRD on HD (MWF) presents to the ED with cough and SOB, found to have COVID-19.       Problem/Plan - 1:  ·  Problem: 2019 novel coronavirus disease (COVID-19).   ·  Plan: Patient with cough, SOB, runny nose, myalgias, and weakness   -RVP positive for SARSCOV2   -CXR showed clear lungs   c/w remdesivir x 3 day course  monitor liver enzymes while on remdesivir  trend inflammatory markers  supportive care.    #Atypical chest pain   -msk- secondary to cough   -HS trop neg x 1, no ischemic changes to ecg   -recent stress  11/14/23 normal   EF% during stress is 64 %  -f/u echo   -hx of PE- not on ac, elevated d dimer  -cta neg for PE      Problem/Plan - 2:  ·  Problem: Essential hypertension.   ·  Plan cont amlodipine     Problem/Plan - 3:  ·  Problem: ESRD on dialysis.   ·  Plan: hd as scheduled.    Problem/Plan - 4:  ·  Problem: Medication management.   ·  Plan: Please confirm home medication in the AM.    Problem/Plan - 5:  ·  Problem: Preventive measure.   ·  Plan: DVT ppx heparin subQ.     ECHO 4/23/22 : nl lV  sys fx EF 55-60%  stress  11/14/23 normal   EF% during stress is 64 %    72 yo F with PMhx of HTN, PE (not on AC), OA, chronic migraine  and ESRD on HD (MWF) presents to the ED with cough and SOB, found to have COVID-19.       Problem/Plan - 1:  ·  Problem: 2019 novel coronavirus disease (COVID-19).   ·  Plan: Patient with cough, SOB, runny nose, myalgias, and weakness   -RVP positive for SARSCOV2   -CXR showed clear lungs   c/w remdesivir x 3 day course  monitor liver enzymes while on remdesivir  trend inflammatory markers  supportive care.    #Atypical chest pain   -msk- secondary to cough   -HS trop neg x 1, no ischemic changes to ecg   -recent stress  11/14/23 normal   EF% during stress is 64 %  -f/u echo   -hx of PE- not on ac, elevated d dimer  -cta neg for PE      Problem/Plan - 2:  ·  Problem: Essential hypertension.   ·  Plan cont amlodipine     Problem/Plan - 3:  ·  Problem: ESRD on dialysis.   ·  Plan: hd as scheduled.    Problem/Plan - 4:  ·  Problem: Medication management.   ·  Plan: Please confirm home medication in the AM.    Problem/Plan - 5:  ·  Problem: Preventive measure.   ·  Plan: DVT ppx heparin subQ.

## 2023-12-19 NOTE — PROGRESS NOTE ADULT - SUBJECTIVE AND OBJECTIVE BOX
PATIENT SEEN AND EXAMINED BY ELIZABETH MICHAEL M.D. ON :- 12/19/23  DATE OF SERVICE:  12/19/23           Interim events noted,Labs ,Radiological studies and Cardiology tests reviewed .  DISCUSSED WITH ACP/MEDICAL RESIDENT ON PLAN OF CARE.    MR#[id number]  PATIENT NAME:[facility] COURSE: HPI:  72 yo F with PMhx of HTN, PE (not on AC), OA, chronic migraine  and ESRD on HD (MWF) presents to the ED with cough and SOB. Pt reports that she has been feeling weak for the last couple of days. She has a productive cough. Her chest hurts whenever she coughs. She also has runny nose, myalgias, and SOB with exertion. Denies any associated fever, chills, chest pain or LE edema. She went to HD today and had to stop HD due to weakness/SOB. She was then sent to the ED.   In the ED, her vitals were notable tachycardia. Labs were notable for elevated BUN/Scr, elevated ALP, AST. RVP was positive for SARSCOV2. CXR showed clear lungs.  (13 Dec 2023 18:47)      INTERIM EVENTS:Patient seen at bedside ,interim events noted.      PMH -reviewed admission note, no change since admission  HEART FAILURE: Acute[ ]Chronic[ ] Systolic[ ] Diastolic[ ] Combined Systolic and Diastolic[ ]  CAD[ ] CABG[ ] PCI[ ]  DEVICES[ ] PPM[ ] ICD[ ] ILR[ ]  ATRIAL FIBRILLATION[ ] Paroxysmal[ ] Permanent[ ] CHADS2-[  ]  OC[ ] CKD1[ ] CKD2[ ] CKD3[ ] CKD4[ ] ESRD[ ]  COPD[ ] HTN[ ]   DM[ ] Type1[ ] Type 2[ ]   CVA[ ] Paresis[ ]    AMBULATION: Assisted[ ] Cane/walker[ ] Independent[ ]    MEDICATIONS  (STANDING):  calcitriol   Capsule 1 MICROGram(s) Oral <User Schedule>  chlorhexidine 2% Cloths 1 Application(s) Topical daily  heparin   Injectable 5000 Unit(s) SubCutaneous every 8 hours    MEDICATIONS  (PRN):  acetaminophen     Tablet .. 650 milliGRAM(s) Oral every 6 hours PRN Temp greater or equal to 38C (100.4F), Mild Pain (1 - 3)  benzocaine/menthol Lozenge 1 Lozenge Oral every 4 hours PRN Sore Throat  benzonatate 100 milliGRAM(s) Oral every 8 hours PRN Cough  guaifenesin/dextromethorphan Oral Liquid 10 milliLiter(s) Oral every 4 hours PRN Cough  melatonin 3 milliGRAM(s) Oral at bedtime PRN Insomnia  ondansetron Injectable 4 milliGRAM(s) IV Push every 8 hours PRN Nausea and/or Vomiting  sodium chloride 0.9% Bolus. 100 milliLiter(s) IV Bolus every 5 minutes PRN SBP LESS THAN or EQUAL to 90 mmHg            REVIEW OF SYSTEMS:  Constitutional: [ ] fever, [ ]weight loss,  [ ]fatigue [ ]weight gain  Eyes: [ ] visual changes  Respiratory: [ ]shortness of breath;  [ ] cough, [ ]wheezing, [ ]chills, [ ]hemoptysis  Cardiovascular: [ ] chest pain, [ ]palpitations, [ ]dizziness,  [ ]leg swelling[ ]orthopnea[ ]PND  Gastrointestinal: [ ] abdominal pain, [ ]nausea, [ ]vomiting,  [ ]diarrhea [ ]Constipation [ ]Melena  Genitourinary: [ ] dysuria, [ ] hematuria [ ]Torres  Neurologic: [ ] headaches [ ] tremors[ ]weakness [ ]Paralysis Right[ ] Left[ ]  Skin: [ ] itching, [ ]burning, [ ] rashes  Endocrine: [ ] heat or cold intolerance  Musculoskeletal: [ ] joint pain or swelling; [ ] muscle, back, or extremity pain  Psychiatric: [ ] depression, [ ]anxiety, [ ]mood swings, or [ ]difficulty sleeping  Hematologic: [ ] easy bruising, [ ] bleeding gums    [ ] All remaining systems negative except as per above.   [ ]Unable to obtain.  [x] No change in ROS since admission      Vital Signs Last 24 Hrs  T(C): 36.5 (19 Dec 2023 19:41), Max: 36.7 (19 Dec 2023 00:10)  T(F): 97.7 (19 Dec 2023 19:41), Max: 98.1 (19 Dec 2023 00:10)  HR: 97 (19 Dec 2023 19:41) (91 - 117)  BP: 131/68 (19 Dec 2023 19:41) (96/66 - 131/68)  BP(mean): --  RR: 18 (19 Dec 2023 19:41) (16 - 18)  SpO2: 100% (19 Dec 2023 19:41) (99% - 100%)    Parameters below as of 19 Dec 2023 19:41  Patient On (Oxygen Delivery Method): room air      I&O's Summary    18 Dec 2023 07:01  -  19 Dec 2023 07:00  --------------------------------------------------------  IN: 400 mL / OUT: 2500 mL / NET: -2100 mL        PHYSICAL EXAM:  General: No acute distress BMI-  HEENT: EOMI, PERRL  Neck: Supple, [ ] JVD  Lungs: Equal air entry bilaterally; [ ] rales [ ] wheezing [ ] rhonchi  Heart: Regular rate and rhythm; [x ] murmur   2/6 [ x] systolic [ ] diastolic [ ] radiation[ ] rubs [ ]  gallops  Abdomen: Nontender, bowel sounds present  Extremities: No clubbing, cyanosis, [ ] edema [ ]Pulses  equal and intact  Nervous system:  Alert & Oriented X3, no focal deficits  Psychiatric: Normal affect  Skin: No rashes or lesions    LABS:  12-19    136  |  97<L>  |  46<H>  ----------------------------<  83  4.5   |  20<L>  |  5.11<H>    Ca    9.1      19 Dec 2023 07:30  Phos  4.3     12-19  Mg     2.00     12-19      Creatinine Trend: 5.11<--, 7.41<--, 6.32<--, 4.53<--, 6.23<--, 4.94<--                        13.3   6.75  )-----------( 209      ( 19 Dec 2023 07:30 )             41.3     PTT - ( 19 Dec 2023 07:30 )  PTT:31.6 sec           PATIENT SEEN AND EXAMINED BY ELIZABETH MICHAEL M.D. ON :- 12/19/23  DATE OF SERVICE:  12/19/23           Interim events noted,Labs ,Radiological studies and Cardiology tests reviewed .  DISCUSSED WITH ACP/MEDICAL RESIDENT ON PLAN OF CARE.    MR#[id number]  PATIENT NAME:[facility] COURSE: HPI:  74 yo F with PMhx of HTN, PE (not on AC), OA, chronic migraine  and ESRD on HD (MWF) presents to the ED with cough and SOB. Pt reports that she has been feeling weak for the last couple of days. She has a productive cough. Her chest hurts whenever she coughs. She also has runny nose, myalgias, and SOB with exertion. Denies any associated fever, chills, chest pain or LE edema. She went to HD today and had to stop HD due to weakness/SOB. She was then sent to the ED.   In the ED, her vitals were notable tachycardia. Labs were notable for elevated BUN/Scr, elevated ALP, AST. RVP was positive for SARSCOV2. CXR showed clear lungs.  (13 Dec 2023 18:47)      INTERIM EVENTS:Patient seen at bedside ,interim events noted.      PMH -reviewed admission note, no change since admission  HEART FAILURE: Acute[ ]Chronic[ ] Systolic[ ] Diastolic[ ] Combined Systolic and Diastolic[ ]  CAD[ ] CABG[ ] PCI[ ]  DEVICES[ ] PPM[ ] ICD[ ] ILR[ ]  ATRIAL FIBRILLATION[ ] Paroxysmal[ ] Permanent[ ] CHADS2-[  ]  OC[ ] CKD1[ ] CKD2[ ] CKD3[ ] CKD4[ ] ESRD[ ]  COPD[ ] HTN[ ]   DM[ ] Type1[ ] Type 2[ ]   CVA[ ] Paresis[ ]    AMBULATION: Assisted[ ] Cane/walker[ ] Independent[ ]    MEDICATIONS  (STANDING):  calcitriol   Capsule 1 MICROGram(s) Oral <User Schedule>  chlorhexidine 2% Cloths 1 Application(s) Topical daily  heparin   Injectable 5000 Unit(s) SubCutaneous every 8 hours    MEDICATIONS  (PRN):  acetaminophen     Tablet .. 650 milliGRAM(s) Oral every 6 hours PRN Temp greater or equal to 38C (100.4F), Mild Pain (1 - 3)  benzocaine/menthol Lozenge 1 Lozenge Oral every 4 hours PRN Sore Throat  benzonatate 100 milliGRAM(s) Oral every 8 hours PRN Cough  guaifenesin/dextromethorphan Oral Liquid 10 milliLiter(s) Oral every 4 hours PRN Cough  melatonin 3 milliGRAM(s) Oral at bedtime PRN Insomnia  ondansetron Injectable 4 milliGRAM(s) IV Push every 8 hours PRN Nausea and/or Vomiting  sodium chloride 0.9% Bolus. 100 milliLiter(s) IV Bolus every 5 minutes PRN SBP LESS THAN or EQUAL to 90 mmHg            REVIEW OF SYSTEMS:  Constitutional: [ ] fever, [ ]weight loss,  [ ]fatigue [ ]weight gain  Eyes: [ ] visual changes  Respiratory: [ ]shortness of breath;  [ ] cough, [ ]wheezing, [ ]chills, [ ]hemoptysis  Cardiovascular: [ ] chest pain, [ ]palpitations, [ ]dizziness,  [ ]leg swelling[ ]orthopnea[ ]PND  Gastrointestinal: [ ] abdominal pain, [ ]nausea, [ ]vomiting,  [ ]diarrhea [ ]Constipation [ ]Melena  Genitourinary: [ ] dysuria, [ ] hematuria [ ]Torres  Neurologic: [ ] headaches [ ] tremors[ ]weakness [ ]Paralysis Right[ ] Left[ ]  Skin: [ ] itching, [ ]burning, [ ] rashes  Endocrine: [ ] heat or cold intolerance  Musculoskeletal: [ ] joint pain or swelling; [ ] muscle, back, or extremity pain  Psychiatric: [ ] depression, [ ]anxiety, [ ]mood swings, or [ ]difficulty sleeping  Hematologic: [ ] easy bruising, [ ] bleeding gums    [ ] All remaining systems negative except as per above.   [ ]Unable to obtain.  [x] No change in ROS since admission      Vital Signs Last 24 Hrs  T(C): 36.5 (19 Dec 2023 19:41), Max: 36.7 (19 Dec 2023 00:10)  T(F): 97.7 (19 Dec 2023 19:41), Max: 98.1 (19 Dec 2023 00:10)  HR: 97 (19 Dec 2023 19:41) (91 - 117)  BP: 131/68 (19 Dec 2023 19:41) (96/66 - 131/68)  BP(mean): --  RR: 18 (19 Dec 2023 19:41) (16 - 18)  SpO2: 100% (19 Dec 2023 19:41) (99% - 100%)    Parameters below as of 19 Dec 2023 19:41  Patient On (Oxygen Delivery Method): room air      I&O's Summary    18 Dec 2023 07:01  -  19 Dec 2023 07:00  --------------------------------------------------------  IN: 400 mL / OUT: 2500 mL / NET: -2100 mL        PHYSICAL EXAM:  General: No acute distress BMI-  HEENT: EOMI, PERRL  Neck: Supple, [ ] JVD  Lungs: Equal air entry bilaterally; [ ] rales [ ] wheezing [ ] rhonchi  Heart: Regular rate and rhythm; [x ] murmur   2/6 [ x] systolic [ ] diastolic [ ] radiation[ ] rubs [ ]  gallops  Abdomen: Nontender, bowel sounds present  Extremities: No clubbing, cyanosis, [ ] edema [ ]Pulses  equal and intact  Nervous system:  Alert & Oriented X3, no focal deficits  Psychiatric: Normal affect  Skin: No rashes or lesions    LABS:  12-19    136  |  97<L>  |  46<H>  ----------------------------<  83  4.5   |  20<L>  |  5.11<H>    Ca    9.1      19 Dec 2023 07:30  Phos  4.3     12-19  Mg     2.00     12-19      Creatinine Trend: 5.11<--, 7.41<--, 6.32<--, 4.53<--, 6.23<--, 4.94<--                        13.3   6.75  )-----------( 209      ( 19 Dec 2023 07:30 )             41.3     PTT - ( 19 Dec 2023 07:30 )  PTT:31.6 sec

## 2023-12-19 NOTE — PROGRESS NOTE ADULT - SUBJECTIVE AND OBJECTIVE BOX
CARDIOLOGY FOLLOW UP - Dr. Joe  DATE OF SERVICE: 12/19/23     CC no cp or sob        REVIEW OF SYSTEMS:  CONSTITUTIONAL: No fever, weight loss, or fatigue  RESPIRATORY: No cough, wheezing, chills or hemoptysis; No Shortness of Breath  CARDIOVASCULAR: No chest pain, palpitations, passing out, dizziness, or leg swelling  GASTROINTESTINAL: No abdominal or epigastric pain. No nausea, vomiting, or hematemesis; No diarrhea or constipation. No melena or hematochezia.  VASCULAR: No edema     PHYSICAL EXAM:  T(C): 36.6 (12-19-23 @ 08:25), Max: 36.7 (12-19-23 @ 00:10)  HR: 114 (12-19-23 @ 08:25) (91 - 117)  BP: 103/58 (12-19-23 @ 08:25) (96/66 - 146/98)  RR: 16 (12-19-23 @ 08:25) (16 - 18)  SpO2: 100% (12-18-23 @ 20:00) (100% - 100%)  Wt(kg): --  I&O's Summary    18 Dec 2023 07:01  -  19 Dec 2023 07:00  --------------------------------------------------------  IN: 400 mL / OUT: 2500 mL / NET: -2100 mL        Appearance: Normal	  Cardiovascular: Normal S1 S2,RRR, No JVD, No murmurs  Respiratory: Lungs clear to auscultation	  Gastrointestinal:  Soft, Non-tender, + BS	  Extremities: Normal range of motion, No clubbing, cyanosis or edema      Home Medications:  amLODIPine 10 mg oral tablet: 1 tab(s) orally once a day (13 Dec 2023 18:52)  calcitriol 0.5 mcg oral capsule: 1 cap(s) orally once a day (13 Dec 2023 18:52)  cholecalciferol oral tablet: 1000 unit(s) orally once a day (13 Dec 2023 18:52)  folic acid 1 mg oral tablet: 1 tab(s) orally once a day (13 Dec 2023 18:52)  metoprolol succinate 100 mg oral tablet, extended release: 1 tab(s) orally once a day (13 Dec 2023 18:52)      MEDICATIONS  (STANDING):  calcitriol   Capsule 1 MICROGram(s) Oral <User Schedule>  chlorhexidine 2% Cloths 1 Application(s) Topical daily  heparin   Injectable 5000 Unit(s) SubCutaneous every 8 hours      TELEMETRY: 	    ECG:  	  RADIOLOGY:   DIAGNOSTIC TESTING:  [ ] Echocardiogram:  [ ]  Catheterization:  [ ] Stress Test:    OTHER: 	    LABS:	 	    Troponin T, High Sensitivity Result: 27 ng/L (12-13 @ 14:27)                          13.3   6.75  )-----------( 209      ( 19 Dec 2023 07:30 )             41.3     12-19    136  |  97<L>  |  46<H>  ----------------------------<  83  4.5   |  20<L>  |  5.11<H>    Ca    9.1      19 Dec 2023 07:30  Phos  4.3     12-19  Mg     2.00     12-19      PTT - ( 19 Dec 2023 07:30 )  PTT:31.6 sec

## 2023-12-19 NOTE — PROGRESS NOTE ADULT - TIME BILLING
- Review of records, telemetry, vital signs and daily labs.   - General and cardiovascular physical examination.  - Generation of cardiovascular treatment plan.  - Coordination of care.      Patient was seen and examined by me on 12/19/23,interim events noted,labs and radiology studies reviewed.  Damir Blackman MD,[facility]  [address] [place], [place] [id number].  [phone]/16/23 - Review of records, telemetry, vital signs and daily labs.   - General and cardiovascular physical examination.  - Generation of cardiovascular treatment plan.  - Coordination of care.      Patient was seen and examined by me on 12/19/23,interim events noted,labs and radiology studies reviewed.  Damir Blackman MD,[facility]  [address] [place], [place] [id number].  [phone]/16/23

## 2023-12-19 NOTE — PHYSICAL THERAPY INITIAL EVALUATION ADULT - GENERAL OBSERVATIONS, REHAB EVAL
Pt received semi-supine in bed, +telemetry, +primafit, all lines intact, in NAD. Pt agreeable to participate in PT evaluation.

## 2023-12-19 NOTE — PROVIDER CONTACT NOTE (OTHER) - BACKGROUND
74 yo F admitted on 12/16 for SOB and cough - COVID-19. PMH of HTN, PE, OA, chronic migraine and ESRD on HD (MWF). 76 yo F admitted on 12/16 for SOB and cough - COVID-19. PMH of HTN, PE, OA, chronic migraine and ESRD on HD (MWF).

## 2023-12-19 NOTE — PROGRESS NOTE ADULT - SUBJECTIVE AND OBJECTIVE BOX
[facility], Division of Infectious Diseases  JAME Ashley Y. Patel, S. Shah, G. Deangelo  [phone]  ([phone] - weekdays after 5pm and weekends)    Name: MELISSA ARREOLA  Age/Gender: 73y Female  MRN: [id number]    Interval History:  Notes reviewed.   No concerning overnight events.  Afebrile.   reports sore throat, nasal congestion, rhinorrhea slowly improving  states throat lozenge and flonase has been helping    Allergies: trimethoprim (Other; Rash)  sulfa drugs (Other; Rash)  Sulfur (Unknown)      Objective:  Vitals:   T(F): 97.8 (12-19-23 @ 08:25), Max: 98.1 (12-19-23 @ 00:10)  HR: 114 (12-19-23 @ 08:25) (91 - 117)  BP: 103/58 (12-19-23 @ 08:25) (96/66 - 146/98)  RR: 16 (12-19-23 @ 08:25) (16 - 18)  SpO2: 100% (12-18-23 @ 20:00) (100% - 100%)  Physical Examination:  General: no acute distress  HEENT: anicteric  Cardio: S1, S2, normal rate, R chest tunneled HD catheter  Resp: mild rhonchi b/l  Abd: soft, NT, ND  Ext: no LE edema  Skin: warm, dry    Laboratory Studies:  CBC:                       13.3   6.75  )-----------( 209      ( 19 Dec 2023 07:30 )             41.3     WBC Trend:  6.75 12-19-23 @ 07:30  7.02 12-18-23 @ 21:40  5.78 12-17-23 @ 06:25  5.15 12-16-23 @ 06:00  3.71 12-15-23 @ 06:00  3.53 12-14-23 @ 07:30  4.78 12-13-23 @ 14:27    CMP: 12-19    136  |  97<L>  |  46<H>  ----------------------------<  83  4.5   |  20<L>  |  5.11<H>    Ca    9.1      19 Dec 2023 07:30  Phos  4.3     12-19  Mg     2.00     12-19            Urinalysis Basic - ( 19 Dec 2023 07:30 )    Color: x / Appearance: x / SG: x / pH: x  Gluc: 83 mg/dL / Ketone: x  / Bili: x / Urobili: x   Blood: x / Protein: x / Nitrite: x   Leuk Esterase: x / RBC: x / WBC x   Sq Epi: x / Non Sq Epi: x / Bacteria: x      Microbiology: reviewed     Culture - Nose (collected 12-13-23 @ 19:30)  Source: .Nose Nose  Final Report (12-15-23 @ 09:24):    No staphylococcus aureus isolated.    "PCR is more Sensitive for identifying MRSA/MSSA."        Radiology: reviewed     Medications:  acetaminophen     Tablet .. 650 milliGRAM(s) Oral every 6 hours PRN  benzocaine/menthol Lozenge 1 Lozenge Oral every 4 hours PRN  benzonatate 100 milliGRAM(s) Oral every 8 hours PRN  calcitriol   Capsule 1 MICROGram(s) Oral <User Schedule>  chlorhexidine 2% Cloths 1 Application(s) Topical daily  guaifenesin/dextromethorphan Oral Liquid 10 milliLiter(s) Oral every 4 hours PRN  heparin   Injectable 5000 Unit(s) SubCutaneous every 8 hours  melatonin 3 milliGRAM(s) Oral at bedtime PRN  ondansetron Injectable 4 milliGRAM(s) IV Push every 8 hours PRN  sodium chloride 0.9% Bolus. 100 milliLiter(s) IV Bolus every 5 minutes PRN    Antimicrobials:   [facility], Division of Infectious Diseases  JAME Ashley Y. Patel, S. Shah, G. Deangelo  [phone]  ([phone] - weekdays after 5pm and weekends)    Name: MELISSA ARREOLA  Age/Gender: 73y Female  MRN: [id number]    Interval History:  Notes reviewed.   No concerning overnight events.  Afebrile.   reports sore throat, nasal congestion, rhinorrhea slowly improving  states throat lozenge and flonase has been helping    Allergies: trimethoprim (Other; Rash)  sulfa drugs (Other; Rash)  Sulfur (Unknown)      Objective:  Vitals:   T(F): 97.8 (12-19-23 @ 08:25), Max: 98.1 (12-19-23 @ 00:10)  HR: 114 (12-19-23 @ 08:25) (91 - 117)  BP: 103/58 (12-19-23 @ 08:25) (96/66 - 146/98)  RR: 16 (12-19-23 @ 08:25) (16 - 18)  SpO2: 100% (12-18-23 @ 20:00) (100% - 100%)  Physical Examination:  General: no acute distress  HEENT: anicteric  Cardio: S1, S2, normal rate, R chest tunneled HD catheter  Resp: mild rhonchi b/l  Abd: soft, NT, ND  Ext: no LE edema  Skin: warm, dry    Laboratory Studies:  CBC:                       13.3   6.75  )-----------( 209      ( 19 Dec 2023 07:30 )             41.3     WBC Trend:  6.75 12-19-23 @ 07:30  7.02 12-18-23 @ 21:40  5.78 12-17-23 @ 06:25  5.15 12-16-23 @ 06:00  3.71 12-15-23 @ 06:00  3.53 12-14-23 @ 07:30  4.78 12-13-23 @ 14:27    CMP: 12-19    136  |  97<L>  |  46<H>  ----------------------------<  83  4.5   |  20<L>  |  5.11<H>    Ca    9.1      19 Dec 2023 07:30  Phos  4.3     12-19  Mg     2.00     12-19            Urinalysis Basic - ( 19 Dec 2023 07:30 )    Color: x / Appearance: x / SG: x / pH: x  Gluc: 83 mg/dL / Ketone: x  / Bili: x / Urobili: x   Blood: x / Protein: x / Nitrite: x   Leuk Esterase: x / RBC: x / WBC x   Sq Epi: x / Non Sq Epi: x / Bacteria: x      Microbiology: reviewed     Culture - Nose (collected 12-13-23 @ 19:30)  Source: .Nose Nose  Final Report (12-15-23 @ 09:24):    No staphylococcus aureus isolated.    "PCR is more Sensitive for identifying MRSA/MSSA."        Radiology: reviewed     Medications:  acetaminophen     Tablet .. 650 milliGRAM(s) Oral every 6 hours PRN  benzocaine/menthol Lozenge 1 Lozenge Oral every 4 hours PRN  benzonatate 100 milliGRAM(s) Oral every 8 hours PRN  calcitriol   Capsule 1 MICROGram(s) Oral <User Schedule>  chlorhexidine 2% Cloths 1 Application(s) Topical daily  guaifenesin/dextromethorphan Oral Liquid 10 milliLiter(s) Oral every 4 hours PRN  heparin   Injectable 5000 Unit(s) SubCutaneous every 8 hours  melatonin 3 milliGRAM(s) Oral at bedtime PRN  ondansetron Injectable 4 milliGRAM(s) IV Push every 8 hours PRN  sodium chloride 0.9% Bolus. 100 milliLiter(s) IV Bolus every 5 minutes PRN    Antimicrobials:

## 2023-12-19 NOTE — PHYSICAL THERAPY INITIAL EVALUATION ADULT - PERTINENT HX OF CURRENT PROBLEM, REHAB EVAL
73 year old Female with past medical history stated below, presents to the ED with cough and SOB, found to have COVID-19.

## 2023-12-19 NOTE — PHYSICAL THERAPY INITIAL EVALUATION ADULT - ADDITIONAL COMMENTS
Pt lives in an apartment with Daughter and Grandchildren, +6 steps to enter, requires assistance negotiating steps from grandchildren, once inside apartment, is able to ambulate independently using a rolling walker and performs ADLs independently.     Pt left semi-supine in bed, all lines intact, all needs in reach, bed alarm set, in NAD. SAMMI carmichael. Heart rate 123 beats per minute.

## 2023-12-19 NOTE — PROGRESS NOTE ADULT - SUBJECTIVE AND OBJECTIVE BOX
[facility] NEPHROLOGY- PROGRESS NOTE    73y Female with history of ESRD on HD presents with SOB. Nephrology consulted for ESRD status.    REVIEW OF SYSTEMS:  Gen: no fevers  Cards: no chest pain  Resp: + dyspnea, + cough  GI: no nausea or vomiting or diarrhea  Vascular: no LE edema    trimethoprim (Other; Rash)  sulfa drugs (Other; Rash)  Sulfur (Unknown)      Hospital Medications: Medications reviewed        VITALS:  T(F): 97.5 (12-19-23 @ 11:46), Max: 98.1 (12-19-23 @ 00:10)  HR: 97 (12-19-23 @ 11:46)  BP: 102/50 (12-19-23 @ 11:46)  RR: 17 (12-19-23 @ 11:46)  SpO2: 99% (12-19-23 @ 11:46)  Wt(kg): --    12-18 @ 07:01  -  12-19 @ 07:00  --------------------------------------------------------  IN: 400 mL / OUT: 2500 mL / NET: -2100 mL        PHYSICAL EXAM:    Gen: NAD, calm  Cards: RRR, +S1/S2, no M/G/R  Resp: CTA B/L  GI: soft, NT/ND, NABS  Vascular: no LE edema B/L, RIJ TDC intact        LABS:  12-19    136  |  97<L>  |  46<H>  ----------------------------<  83  4.5   |  20<L>  |  5.11<H>    Ca    9.1      19 Dec 2023 07:30  Phos  4.3     12-19  Mg     2.00     12-19      Creatinine Trend: 5.11 <--, 7.41 <--, 6.32 <--, 4.53 <--, 6.23 <--, 4.94 <--, 3.71 <--                        13.3   6.75  )-----------( 209      ( 19 Dec 2023 07:30 )             41.3     Urine Studies:  Urinalysis Basic - ( 19 Dec 2023 07:30 )    Color:  / Appearance:  / SG:  / pH:   Gluc: 83 mg/dL / Ketone:   / Bili:  / Urobili:    Blood:  / Protein:  / Nitrite:    Leuk Esterase:  / RBC:  / WBC    Sq Epi:  / Non Sq Epi:  / Bacteria:              [facility] NEPHROLOGY- PROGRESS NOTE    73y Female with history of ESRD on HD presents with SOB. Nephrology consulted for ESRD status.    REVIEW OF SYSTEMS:  Gen: no fevers  Cards: no chest pain  Resp: + dyspnea, + cough  GI: no nausea or vomiting or diarrhea  Vascular: no LE edema    trimethoprim (Other; Rash)  sulfa drugs (Other; Rash)  Sulfur (Unknown)      Hospital Medications: Medications reviewed        VITALS:  T(F): 97.5 (12-19-23 @ 11:46), Max: 98.1 (12-19-23 @ 00:10)  HR: 97 (12-19-23 @ 11:46)  BP: 102/50 (12-19-23 @ 11:46)  RR: 17 (12-19-23 @ 11:46)  SpO2: 99% (12-19-23 @ 11:46)  Wt(kg): --    12-18 @ 07:01  -  12-19 @ 07:00  --------------------------------------------------------  IN: 400 mL / OUT: 2500 mL / NET: -2100 mL        PHYSICAL EXAM:    Gen: NAD, calm  Cards: RRR, +S1/S2, no M/G/R  Resp: CTA B/L  GI: soft, NT/ND, NABS  Vascular: no LE edema B/L, RIJ TDC intact        LABS:  12-19    136  |  97<L>  |  46<H>  ----------------------------<  83  4.5   |  20<L>  |  5.11<H>    Ca    9.1      19 Dec 2023 07:30  Phos  4.3     12-19  Mg     2.00     12-19      Creatinine Trend: 5.11 <--, 7.41 <--, 6.32 <--, 4.53 <--, 6.23 <--, 4.94 <--, 3.71 <--                        13.3   6.75  )-----------( 209      ( 19 Dec 2023 07:30 )             41.3     Urine Studies:  Urinalysis Basic - ( 19 Dec 2023 07:30 )    Color:  / Appearance:  / SG:  / pH:   Gluc: 83 mg/dL / Ketone:   / Bili:  / Urobili:    Blood:  / Protein:  / Nitrite:    Leuk Esterase:  / RBC:  / WBC    Sq Epi:  / Non Sq Epi:  / Bacteria:

## 2023-12-20 LAB
ALBUMIN SERPL ELPH-MCNC: 3.4 G/DL — SIGNIFICANT CHANGE UP (ref 3.3–5)
ALBUMIN SERPL ELPH-MCNC: 3.4 G/DL — SIGNIFICANT CHANGE UP (ref 3.3–5)
ALP SERPL-CCNC: 116 U/L — SIGNIFICANT CHANGE UP (ref 40–120)
ALP SERPL-CCNC: 116 U/L — SIGNIFICANT CHANGE UP (ref 40–120)
ALT FLD-CCNC: 13 U/L — SIGNIFICANT CHANGE UP (ref 4–33)
ALT FLD-CCNC: 13 U/L — SIGNIFICANT CHANGE UP (ref 4–33)
ANION GAP SERPL CALC-SCNC: 13 MMOL/L — SIGNIFICANT CHANGE UP (ref 7–14)
ANION GAP SERPL CALC-SCNC: 13 MMOL/L — SIGNIFICANT CHANGE UP (ref 7–14)
AST SERPL-CCNC: 7 U/L — SIGNIFICANT CHANGE UP (ref 4–32)
AST SERPL-CCNC: 7 U/L — SIGNIFICANT CHANGE UP (ref 4–32)
BASOPHILS # BLD AUTO: 0.02 K/UL — SIGNIFICANT CHANGE UP (ref 0–0.2)
BASOPHILS # BLD AUTO: 0.02 K/UL — SIGNIFICANT CHANGE UP (ref 0–0.2)
BASOPHILS NFR BLD AUTO: 0.5 % — SIGNIFICANT CHANGE UP (ref 0–2)
BASOPHILS NFR BLD AUTO: 0.5 % — SIGNIFICANT CHANGE UP (ref 0–2)
BILIRUB SERPL-MCNC: 0.4 MG/DL — SIGNIFICANT CHANGE UP (ref 0.2–1.2)
BILIRUB SERPL-MCNC: 0.4 MG/DL — SIGNIFICANT CHANGE UP (ref 0.2–1.2)
BUN SERPL-MCNC: 66 MG/DL — HIGH (ref 7–23)
BUN SERPL-MCNC: 66 MG/DL — HIGH (ref 7–23)
CALCIUM SERPL-MCNC: 8.6 MG/DL — SIGNIFICANT CHANGE UP (ref 8.4–10.5)
CALCIUM SERPL-MCNC: 8.6 MG/DL — SIGNIFICANT CHANGE UP (ref 8.4–10.5)
CHLORIDE SERPL-SCNC: 101 MMOL/L — SIGNIFICANT CHANGE UP (ref 98–107)
CHLORIDE SERPL-SCNC: 101 MMOL/L — SIGNIFICANT CHANGE UP (ref 98–107)
CO2 SERPL-SCNC: 25 MMOL/L — SIGNIFICANT CHANGE UP (ref 22–31)
CO2 SERPL-SCNC: 25 MMOL/L — SIGNIFICANT CHANGE UP (ref 22–31)
CREAT SERPL-MCNC: 6.51 MG/DL — HIGH (ref 0.5–1.3)
CREAT SERPL-MCNC: 6.51 MG/DL — HIGH (ref 0.5–1.3)
CRP SERPL-MCNC: 29.1 MG/L — HIGH
CRP SERPL-MCNC: 29.1 MG/L — HIGH
D DIMER BLD IA.RAPID-MCNC: 944 NG/ML DDU — HIGH
D DIMER BLD IA.RAPID-MCNC: 944 NG/ML DDU — HIGH
EGFR: 6 ML/MIN/1.73M2 — LOW
EGFR: 6 ML/MIN/1.73M2 — LOW
EOSINOPHIL # BLD AUTO: 0.08 K/UL — SIGNIFICANT CHANGE UP (ref 0–0.5)
EOSINOPHIL # BLD AUTO: 0.08 K/UL — SIGNIFICANT CHANGE UP (ref 0–0.5)
EOSINOPHIL NFR BLD AUTO: 2.2 % — SIGNIFICANT CHANGE UP (ref 0–6)
EOSINOPHIL NFR BLD AUTO: 2.2 % — SIGNIFICANT CHANGE UP (ref 0–6)
FERRITIN SERPL-MCNC: 1619 NG/ML — HIGH (ref 13–330)
FERRITIN SERPL-MCNC: 1619 NG/ML — HIGH (ref 13–330)
GLUCOSE SERPL-MCNC: 106 MG/DL — HIGH (ref 70–99)
GLUCOSE SERPL-MCNC: 106 MG/DL — HIGH (ref 70–99)
HCT VFR BLD CALC: 35.1 % — SIGNIFICANT CHANGE UP (ref 34.5–45)
HCT VFR BLD CALC: 35.1 % — SIGNIFICANT CHANGE UP (ref 34.5–45)
HGB BLD-MCNC: 11.2 G/DL — LOW (ref 11.5–15.5)
HGB BLD-MCNC: 11.2 G/DL — LOW (ref 11.5–15.5)
IANC: 1.24 K/UL — LOW (ref 1.8–7.4)
IANC: 1.24 K/UL — LOW (ref 1.8–7.4)
IMM GRANULOCYTES NFR BLD AUTO: 0.5 % — SIGNIFICANT CHANGE UP (ref 0–0.9)
IMM GRANULOCYTES NFR BLD AUTO: 0.5 % — SIGNIFICANT CHANGE UP (ref 0–0.9)
LDH SERPL L TO P-CCNC: 144 U/L — SIGNIFICANT CHANGE UP (ref 135–225)
LDH SERPL L TO P-CCNC: 144 U/L — SIGNIFICANT CHANGE UP (ref 135–225)
LYMPHOCYTES # BLD AUTO: 2.06 K/UL — SIGNIFICANT CHANGE UP (ref 1–3.3)
LYMPHOCYTES # BLD AUTO: 2.06 K/UL — SIGNIFICANT CHANGE UP (ref 1–3.3)
LYMPHOCYTES # BLD AUTO: 56.6 % — HIGH (ref 13–44)
LYMPHOCYTES # BLD AUTO: 56.6 % — HIGH (ref 13–44)
MAGNESIUM SERPL-MCNC: 1.9 MG/DL — SIGNIFICANT CHANGE UP (ref 1.6–2.6)
MAGNESIUM SERPL-MCNC: 1.9 MG/DL — SIGNIFICANT CHANGE UP (ref 1.6–2.6)
MCHC RBC-ENTMCNC: 31.3 PG — SIGNIFICANT CHANGE UP (ref 27–34)
MCHC RBC-ENTMCNC: 31.3 PG — SIGNIFICANT CHANGE UP (ref 27–34)
MCHC RBC-ENTMCNC: 31.9 GM/DL — LOW (ref 32–36)
MCHC RBC-ENTMCNC: 31.9 GM/DL — LOW (ref 32–36)
MCV RBC AUTO: 98 FL — SIGNIFICANT CHANGE UP (ref 80–100)
MCV RBC AUTO: 98 FL — SIGNIFICANT CHANGE UP (ref 80–100)
MONOCYTES # BLD AUTO: 0.22 K/UL — SIGNIFICANT CHANGE UP (ref 0–0.9)
MONOCYTES # BLD AUTO: 0.22 K/UL — SIGNIFICANT CHANGE UP (ref 0–0.9)
MONOCYTES NFR BLD AUTO: 6 % — SIGNIFICANT CHANGE UP (ref 2–14)
MONOCYTES NFR BLD AUTO: 6 % — SIGNIFICANT CHANGE UP (ref 2–14)
NEUTROPHILS # BLD AUTO: 1.24 K/UL — LOW (ref 1.8–7.4)
NEUTROPHILS # BLD AUTO: 1.24 K/UL — LOW (ref 1.8–7.4)
NEUTROPHILS NFR BLD AUTO: 34.2 % — LOW (ref 43–77)
NEUTROPHILS NFR BLD AUTO: 34.2 % — LOW (ref 43–77)
NRBC # BLD: 0 /100 WBCS — SIGNIFICANT CHANGE UP (ref 0–0)
NRBC # BLD: 0 /100 WBCS — SIGNIFICANT CHANGE UP (ref 0–0)
NRBC # FLD: 0 K/UL — SIGNIFICANT CHANGE UP (ref 0–0)
NRBC # FLD: 0 K/UL — SIGNIFICANT CHANGE UP (ref 0–0)
PHOSPHATE SERPL-MCNC: 5.2 MG/DL — HIGH (ref 2.5–4.5)
PHOSPHATE SERPL-MCNC: 5.2 MG/DL — HIGH (ref 2.5–4.5)
PLATELET # BLD AUTO: 200 K/UL — SIGNIFICANT CHANGE UP (ref 150–400)
PLATELET # BLD AUTO: 200 K/UL — SIGNIFICANT CHANGE UP (ref 150–400)
POTASSIUM SERPL-MCNC: 4.4 MMOL/L — SIGNIFICANT CHANGE UP (ref 3.5–5.3)
POTASSIUM SERPL-MCNC: 4.4 MMOL/L — SIGNIFICANT CHANGE UP (ref 3.5–5.3)
POTASSIUM SERPL-SCNC: 4.4 MMOL/L — SIGNIFICANT CHANGE UP (ref 3.5–5.3)
POTASSIUM SERPL-SCNC: 4.4 MMOL/L — SIGNIFICANT CHANGE UP (ref 3.5–5.3)
PROCALCITONIN SERPL-MCNC: 0.31 NG/ML — HIGH (ref 0.02–0.1)
PROCALCITONIN SERPL-MCNC: 0.31 NG/ML — HIGH (ref 0.02–0.1)
PROT SERPL-MCNC: 6.8 G/DL — SIGNIFICANT CHANGE UP (ref 6–8.3)
PROT SERPL-MCNC: 6.8 G/DL — SIGNIFICANT CHANGE UP (ref 6–8.3)
RBC # BLD: 3.58 M/UL — LOW (ref 3.8–5.2)
RBC # BLD: 3.58 M/UL — LOW (ref 3.8–5.2)
RBC # FLD: 12.7 % — SIGNIFICANT CHANGE UP (ref 10.3–14.5)
RBC # FLD: 12.7 % — SIGNIFICANT CHANGE UP (ref 10.3–14.5)
SODIUM SERPL-SCNC: 139 MMOL/L — SIGNIFICANT CHANGE UP (ref 135–145)
SODIUM SERPL-SCNC: 139 MMOL/L — SIGNIFICANT CHANGE UP (ref 135–145)
WBC # BLD: 3.64 K/UL — LOW (ref 3.8–10.5)
WBC # BLD: 3.64 K/UL — LOW (ref 3.8–10.5)
WBC # FLD AUTO: 3.64 K/UL — LOW (ref 3.8–10.5)
WBC # FLD AUTO: 3.64 K/UL — LOW (ref 3.8–10.5)

## 2023-12-20 PROCEDURE — 77001 FLUOROGUIDE FOR VEIN DEVICE: CPT | Mod: 26,GC

## 2023-12-20 PROCEDURE — 36581 REPLACE TUNNELED CV CATH: CPT

## 2023-12-20 RX ORDER — METOPROLOL TARTRATE 50 MG
50 TABLET ORAL DAILY
Refills: 0 | Status: DISCONTINUED | OUTPATIENT
Start: 2023-12-20 | End: 2023-12-21

## 2023-12-20 RX ORDER — ACETAMINOPHEN 500 MG
1000 TABLET ORAL ONCE
Refills: 0 | Status: DISCONTINUED | OUTPATIENT
Start: 2023-12-20 | End: 2023-12-22

## 2023-12-20 RX ADMIN — HEPARIN SODIUM 5000 UNIT(S): 5000 INJECTION INTRAVENOUS; SUBCUTANEOUS at 23:12

## 2023-12-20 RX ADMIN — HEPARIN SODIUM 1000 UNIT(S): 5000 INJECTION INTRAVENOUS; SUBCUTANEOUS at 17:09

## 2023-12-20 RX ADMIN — BENZOCAINE AND MENTHOL 1 LOZENGE: 5; 1 LIQUID ORAL at 01:49

## 2023-12-20 RX ADMIN — Medication 100 MILLIGRAM(S): at 23:12

## 2023-12-20 RX ADMIN — CHLORHEXIDINE GLUCONATE 1 APPLICATION(S): 213 SOLUTION TOPICAL at 16:14

## 2023-12-20 RX ADMIN — HEPARIN SODIUM 5000 UNIT(S): 5000 INJECTION INTRAVENOUS; SUBCUTANEOUS at 16:13

## 2023-12-20 RX ADMIN — Medication 100 MILLIGRAM(S): at 08:57

## 2023-12-20 RX ADMIN — CALCITRIOL 1 MICROGRAM(S): 0.5 CAPSULE ORAL at 08:51

## 2023-12-20 RX ADMIN — HEPARIN SODIUM 2000 UNIT(S): 5000 INJECTION INTRAVENOUS; SUBCUTANEOUS at 17:09

## 2023-12-20 RX ADMIN — Medication 50 MILLIGRAM(S): at 19:27

## 2023-12-20 NOTE — PROGRESS NOTE ADULT - SUBJECTIVE AND OBJECTIVE BOX
PATIENT SEEN AND EXAMINED BY ELIZABETH MICHAEL M.D. ON :- 12/20/23  DATE OF SERVICE:    12/20/23         Interim events noted,Labs ,Radiological studies and Cardiology tests reviewed .  DISCUSSED WITH ACP/MEDICAL RESIDENT ON PLAN OF CARE.    MR#[id number]  PATIENT NAME:[facility] COURSE: HPI:  Interventional Radiology  Pre-Procedure Note    This is a 73y  Female  presenting for HD catheter evaluation and exchange    HPI:  72 yo F with PMhx of HTN, PE (not on AC), OA, chronic migraine  and ESRD on HD (MWF) presents to the ED with cough and SOB. Pt reports that she has been feeling weak for the last couple of days. She has a productive cough. Her chest hurts whenever she coughs. She also has runny nose, myalgias, and SOB with exertion. Denies any associated fever, chills, chest pain or LE edema. She went to HD today and had to stop HD due to weakness/SOB. She was then sent to the ED.   In the ED, her vitals were notable tachycardia. Labs were notable for elevated BUN/Scr, elevated ALP, AST. RVP was positive for SARSCOV2. CXR showed clear lungs.  (13 Dec 2023 18:47)    Pt reports poor flow from HD catheter for last coupe of weeks. Per patient catheter was placed at outside facility in July of this year.     PAST MEDICAL & SURGICAL HISTORY:  Osteoarthritis      Pulmonary embolism      Joint infection      Dysfunctional uterine bleeding      Obesity      Essential hypertension  Hypertension      Obstructive sleep apnea syndrome  Obstructive sleep apnea      History of pulmonary embolism  2012 s/p IVC filter      Arthropathy  Arthritis      Migraine  Migraines      Morbid obesity      ESRD (end stage renal disease)      Total knee replacement status      Status post hysterectomy      Status post cholecystectomy      Other acquired absence of organ  History of cholecystectomy      Status post total hysterectomy  partial      History of total knee replacement  with revisions x 3          Social History:     FAMILY HISTORY:  FH: type 2 diabetes mellitus    FHx: hypertension        Allergies: trimethoprim (Other; Rash)  sulfa drugs (Other; Rash)  Sulfur (Unknown)      Current Medications: acetaminophen     Tablet .. 650 milliGRAM(s) Oral every 6 hours PRN  benzocaine/menthol Lozenge 1 Lozenge Oral every 4 hours PRN  benzonatate 100 milliGRAM(s) Oral every 8 hours PRN  calcitriol   Capsule 1 MICROGram(s) Oral <User Schedule>  chlorhexidine 2% Cloths 1 Application(s) Topical daily  guaifenesin/dextromethorphan Oral Liquid 10 milliLiter(s) Oral every 4 hours PRN  heparin   Injectable 5000 Unit(s) SubCutaneous every 8 hours  heparin   Injectable. 2000 Unit(s) Dialysis. once  heparin   Injectable. 1000 Unit(s) Dialysis. every 1 hour  melatonin 3 milliGRAM(s) Oral at bedtime PRN  ondansetron Injectable 4 milliGRAM(s) IV Push every 8 hours PRN  sodium chloride 0.9% Bolus. 100 milliLiter(s) IV Bolus every 5 minutes PRN      Labs:                         13.3   6.75  )-----------( 209      ( 19 Dec 2023 07:30 )             41.3       12-19    136  |  97<L>  |  46<H>  ----------------------------<  83  4.5   |  20<L>  |  5.11<H>    Ca    9.1      19 Dec 2023 07:30  Phos  4.3     12-19  Mg     2.00     12-19        HCG:       Assessment/Plan:   This is a 73y Female  presents with poor flow from HD catheter.  Patient presents to IR for evaluation and exchange, possible venogram, possible angioplasty.  Procedure/ risks/ benefits/ goals/ alternatives were explained. All questions answered. Informed content obtained from patient. Consent placed in chart.      (20 Dec 2023 13:54)      INTERIM EVENTS:Patient seen at bedside ,interim events noted.      PMH -reviewed admission note, no change since admission  HEART FAILURE: Acute[ ]Chronic[ ] Systolic[ ] Diastolic[ ] Combined Systolic and Diastolic[ ]  CAD[ ] CABG[ ] PCI[ ]  DEVICES[ ] PPM[ ] ICD[ ] ILR[ ]  ATRIAL FIBRILLATION[ ] Paroxysmal[ ] Permanent[ ] CHADS2-[  ]  OC[ ] CKD1[ ] CKD2[ ] CKD3[ ] CKD4[ ] ESRD[ ]  COPD[ ] HTN[ ]   DM[ ] Type1[ ] Type 2[ ]   CVA[ ] Paresis[ ]    AMBULATION: Assisted[ ] Cane/walker[ ] Independent[ ]    MEDICATIONS  (STANDING):  calcitriol   Capsule 1 MICROGram(s) Oral <User Schedule>  chlorhexidine 2% Cloths 1 Application(s) Topical daily  heparin   Injectable 5000 Unit(s) SubCutaneous every 8 hours  heparin   Injectable. 1000 Unit(s) Dialysis. every 1 hour  metoprolol succinate ER 50 milliGRAM(s) Oral daily    MEDICATIONS  (PRN):  acetaminophen     Tablet .. 650 milliGRAM(s) Oral every 6 hours PRN Temp greater or equal to 38C (100.4F), Mild Pain (1 - 3)  benzocaine/menthol Lozenge 1 Lozenge Oral every 4 hours PRN Sore Throat  benzonatate 100 milliGRAM(s) Oral every 8 hours PRN Cough  guaifenesin/dextromethorphan Oral Liquid 10 milliLiter(s) Oral every 4 hours PRN Cough  melatonin 3 milliGRAM(s) Oral at bedtime PRN Insomnia  ondansetron Injectable 4 milliGRAM(s) IV Push every 8 hours PRN Nausea and/or Vomiting  sodium chloride 0.9% Bolus. 100 milliLiter(s) IV Bolus every 5 minutes PRN SBP LESS THAN or EQUAL to 90 mmHg            REVIEW OF SYSTEMS:  Constitutional: [ ] fever, [ ]weight loss,  [ ]fatigue [ ]weight gain  Eyes: [ ] visual changes  Respiratory: [ ]shortness of breath;  [ ] cough, [ ]wheezing, [ ]chills, [ ]hemoptysis  Cardiovascular: [ ] chest pain, [ ]palpitations, [ ]dizziness,  [ ]leg swelling[ ]orthopnea[ ]PND  Gastrointestinal: [ ] abdominal pain, [ ]nausea, [ ]vomiting,  [ ]diarrhea [ ]Constipation [ ]Melena  Genitourinary: [ ] dysuria, [ ] hematuria [ ]Torres  Neurologic: [ ] headaches [ ] tremors[ ]weakness [ ]Paralysis Right[ ] Left[ ]  Skin: [ ] itching, [ ]burning, [ ] rashes  Endocrine: [ ] heat or cold intolerance  Musculoskeletal: [ ] joint pain or swelling; [ ] muscle, back, or extremity pain  Psychiatric: [ ] depression, [ ]anxiety, [ ]mood swings, or [ ]difficulty sleeping  Hematologic: [ ] easy bruising, [ ] bleeding gums    [ ] All remaining systems negative except as per above.   [ ]Unable to obtain.  [x] No change in ROS since admission      Vital Signs Last 24 Hrs  T(C): 36.6 (20 Dec 2023 19:25), Max: 36.8 (20 Dec 2023 05:00)  T(F): 97.8 (20 Dec 2023 19:25), Max: 98.2 (20 Dec 2023 05:00)  HR: 122 (20 Dec 2023 19:25) (98 - 122)  BP: 162/88 (20 Dec 2023 19:25) (110/65 - 162/88)  BP(mean): --  RR: 18 (20 Dec 2023 19:25) (17 - 18)  SpO2: 100% (20 Dec 2023 11:15) (100% - 100%)    Parameters below as of 20 Dec 2023 19:25  Patient On (Oxygen Delivery Method): room air      I&O's Summary    19 Dec 2023 07:01  -  20 Dec 2023 07:00  --------------------------------------------------------  IN: 150 mL / OUT: 200 mL / NET: -50 mL    20 Dec 2023 07:01  -  20 Dec 2023 20:36  --------------------------------------------------------  IN: 400 mL / OUT: 1102 mL / NET: -702 mL        PHYSICAL EXAM:  General: No acute distress BMI-  HEENT: EOMI, PERRL  Neck: Supple, [ ] JVD  Lungs: Equal air entry bilaterally; [ ] rales [ ] wheezing [ ] rhonchi  Heart: Regular rate and rhythm; [x ] murmur   2/6 [ x] systolic [ ] diastolic [ ] radiation[ ] rubs [ ]  gallops  Abdomen: Nontender, bowel sounds present  Extremities: No clubbing, cyanosis, [ ] edema [ ]Pulses  equal and intact  Nervous system:  Alert & Oriented X3, no focal deficits  Psychiatric: Normal affect  Skin: No rashes or lesions    LABS:  12-20    139  |  101  |  66<H>  ----------------------------<  106<H>  4.4   |  25  |  6.51<H>    Ca    8.6      20 Dec 2023 17:09  Phos  5.2     12-20  Mg     1.90     12-20    TPro  6.8  /  Alb  3.4  /  TBili  0.4  /  DBili  x   /  AST  7   /  ALT  13  /  AlkPhos  116  12-20    Creatinine Trend: 6.51<--, 5.11<--, 7.41<--, 6.32<--, 4.53<--, 6.23<--                        11.2   3.64  )-----------( 200      ( 20 Dec 2023 17:09 )             35.1     PTT - ( 19 Dec 2023 07:30 )  PTT:31.6 sec           PATIENT SEEN AND EXAMINED BY ELIZABETH MICHAEL M.D. ON :- 12/20/23  DATE OF SERVICE:    12/20/23         Interim events noted,Labs ,Radiological studies and Cardiology tests reviewed .  DISCUSSED WITH ACP/MEDICAL RESIDENT ON PLAN OF CARE.    MR#[id number]  PATIENT NAME:[facility] COURSE: HPI:  Interventional Radiology  Pre-Procedure Note    This is a 73y  Female  presenting for HD catheter evaluation and exchange    HPI:  74 yo F with PMhx of HTN, PE (not on AC), OA, chronic migraine  and ESRD on HD (MWF) presents to the ED with cough and SOB. Pt reports that she has been feeling weak for the last couple of days. She has a productive cough. Her chest hurts whenever she coughs. She also has runny nose, myalgias, and SOB with exertion. Denies any associated fever, chills, chest pain or LE edema. She went to HD today and had to stop HD due to weakness/SOB. She was then sent to the ED.   In the ED, her vitals were notable tachycardia. Labs were notable for elevated BUN/Scr, elevated ALP, AST. RVP was positive for SARSCOV2. CXR showed clear lungs.  (13 Dec 2023 18:47)    Pt reports poor flow from HD catheter for last coupe of weeks. Per patient catheter was placed at outside facility in July of this year.     PAST MEDICAL & SURGICAL HISTORY:  Osteoarthritis      Pulmonary embolism      Joint infection      Dysfunctional uterine bleeding      Obesity      Essential hypertension  Hypertension      Obstructive sleep apnea syndrome  Obstructive sleep apnea      History of pulmonary embolism  2012 s/p IVC filter      Arthropathy  Arthritis      Migraine  Migraines      Morbid obesity      ESRD (end stage renal disease)      Total knee replacement status      Status post hysterectomy      Status post cholecystectomy      Other acquired absence of organ  History of cholecystectomy      Status post total hysterectomy  partial      History of total knee replacement  with revisions x 3          Social History:     FAMILY HISTORY:  FH: type 2 diabetes mellitus    FHx: hypertension        Allergies: trimethoprim (Other; Rash)  sulfa drugs (Other; Rash)  Sulfur (Unknown)      Current Medications: acetaminophen     Tablet .. 650 milliGRAM(s) Oral every 6 hours PRN  benzocaine/menthol Lozenge 1 Lozenge Oral every 4 hours PRN  benzonatate 100 milliGRAM(s) Oral every 8 hours PRN  calcitriol   Capsule 1 MICROGram(s) Oral <User Schedule>  chlorhexidine 2% Cloths 1 Application(s) Topical daily  guaifenesin/dextromethorphan Oral Liquid 10 milliLiter(s) Oral every 4 hours PRN  heparin   Injectable 5000 Unit(s) SubCutaneous every 8 hours  heparin   Injectable. 2000 Unit(s) Dialysis. once  heparin   Injectable. 1000 Unit(s) Dialysis. every 1 hour  melatonin 3 milliGRAM(s) Oral at bedtime PRN  ondansetron Injectable 4 milliGRAM(s) IV Push every 8 hours PRN  sodium chloride 0.9% Bolus. 100 milliLiter(s) IV Bolus every 5 minutes PRN      Labs:                         13.3   6.75  )-----------( 209      ( 19 Dec 2023 07:30 )             41.3       12-19    136  |  97<L>  |  46<H>  ----------------------------<  83  4.5   |  20<L>  |  5.11<H>    Ca    9.1      19 Dec 2023 07:30  Phos  4.3     12-19  Mg     2.00     12-19        HCG:       Assessment/Plan:   This is a 73y Female  presents with poor flow from HD catheter.  Patient presents to IR for evaluation and exchange, possible venogram, possible angioplasty.  Procedure/ risks/ benefits/ goals/ alternatives were explained. All questions answered. Informed content obtained from patient. Consent placed in chart.      (20 Dec 2023 13:54)      INTERIM EVENTS:Patient seen at bedside ,interim events noted.      PMH -reviewed admission note, no change since admission  HEART FAILURE: Acute[ ]Chronic[ ] Systolic[ ] Diastolic[ ] Combined Systolic and Diastolic[ ]  CAD[ ] CABG[ ] PCI[ ]  DEVICES[ ] PPM[ ] ICD[ ] ILR[ ]  ATRIAL FIBRILLATION[ ] Paroxysmal[ ] Permanent[ ] CHADS2-[  ]  OC[ ] CKD1[ ] CKD2[ ] CKD3[ ] CKD4[ ] ESRD[ ]  COPD[ ] HTN[ ]   DM[ ] Type1[ ] Type 2[ ]   CVA[ ] Paresis[ ]    AMBULATION: Assisted[ ] Cane/walker[ ] Independent[ ]    MEDICATIONS  (STANDING):  calcitriol   Capsule 1 MICROGram(s) Oral <User Schedule>  chlorhexidine 2% Cloths 1 Application(s) Topical daily  heparin   Injectable 5000 Unit(s) SubCutaneous every 8 hours  heparin   Injectable. 1000 Unit(s) Dialysis. every 1 hour  metoprolol succinate ER 50 milliGRAM(s) Oral daily    MEDICATIONS  (PRN):  acetaminophen     Tablet .. 650 milliGRAM(s) Oral every 6 hours PRN Temp greater or equal to 38C (100.4F), Mild Pain (1 - 3)  benzocaine/menthol Lozenge 1 Lozenge Oral every 4 hours PRN Sore Throat  benzonatate 100 milliGRAM(s) Oral every 8 hours PRN Cough  guaifenesin/dextromethorphan Oral Liquid 10 milliLiter(s) Oral every 4 hours PRN Cough  melatonin 3 milliGRAM(s) Oral at bedtime PRN Insomnia  ondansetron Injectable 4 milliGRAM(s) IV Push every 8 hours PRN Nausea and/or Vomiting  sodium chloride 0.9% Bolus. 100 milliLiter(s) IV Bolus every 5 minutes PRN SBP LESS THAN or EQUAL to 90 mmHg            REVIEW OF SYSTEMS:  Constitutional: [ ] fever, [ ]weight loss,  [ ]fatigue [ ]weight gain  Eyes: [ ] visual changes  Respiratory: [ ]shortness of breath;  [ ] cough, [ ]wheezing, [ ]chills, [ ]hemoptysis  Cardiovascular: [ ] chest pain, [ ]palpitations, [ ]dizziness,  [ ]leg swelling[ ]orthopnea[ ]PND  Gastrointestinal: [ ] abdominal pain, [ ]nausea, [ ]vomiting,  [ ]diarrhea [ ]Constipation [ ]Melena  Genitourinary: [ ] dysuria, [ ] hematuria [ ]Torres  Neurologic: [ ] headaches [ ] tremors[ ]weakness [ ]Paralysis Right[ ] Left[ ]  Skin: [ ] itching, [ ]burning, [ ] rashes  Endocrine: [ ] heat or cold intolerance  Musculoskeletal: [ ] joint pain or swelling; [ ] muscle, back, or extremity pain  Psychiatric: [ ] depression, [ ]anxiety, [ ]mood swings, or [ ]difficulty sleeping  Hematologic: [ ] easy bruising, [ ] bleeding gums    [ ] All remaining systems negative except as per above.   [ ]Unable to obtain.  [x] No change in ROS since admission      Vital Signs Last 24 Hrs  T(C): 36.6 (20 Dec 2023 19:25), Max: 36.8 (20 Dec 2023 05:00)  T(F): 97.8 (20 Dec 2023 19:25), Max: 98.2 (20 Dec 2023 05:00)  HR: 122 (20 Dec 2023 19:25) (98 - 122)  BP: 162/88 (20 Dec 2023 19:25) (110/65 - 162/88)  BP(mean): --  RR: 18 (20 Dec 2023 19:25) (17 - 18)  SpO2: 100% (20 Dec 2023 11:15) (100% - 100%)    Parameters below as of 20 Dec 2023 19:25  Patient On (Oxygen Delivery Method): room air      I&O's Summary    19 Dec 2023 07:01  -  20 Dec 2023 07:00  --------------------------------------------------------  IN: 150 mL / OUT: 200 mL / NET: -50 mL    20 Dec 2023 07:01  -  20 Dec 2023 20:36  --------------------------------------------------------  IN: 400 mL / OUT: 1102 mL / NET: -702 mL        PHYSICAL EXAM:  General: No acute distress BMI-  HEENT: EOMI, PERRL  Neck: Supple, [ ] JVD  Lungs: Equal air entry bilaterally; [ ] rales [ ] wheezing [ ] rhonchi  Heart: Regular rate and rhythm; [x ] murmur   2/6 [ x] systolic [ ] diastolic [ ] radiation[ ] rubs [ ]  gallops  Abdomen: Nontender, bowel sounds present  Extremities: No clubbing, cyanosis, [ ] edema [ ]Pulses  equal and intact  Nervous system:  Alert & Oriented X3, no focal deficits  Psychiatric: Normal affect  Skin: No rashes or lesions    LABS:  12-20    139  |  101  |  66<H>  ----------------------------<  106<H>  4.4   |  25  |  6.51<H>    Ca    8.6      20 Dec 2023 17:09  Phos  5.2     12-20  Mg     1.90     12-20    TPro  6.8  /  Alb  3.4  /  TBili  0.4  /  DBili  x   /  AST  7   /  ALT  13  /  AlkPhos  116  12-20    Creatinine Trend: 6.51<--, 5.11<--, 7.41<--, 6.32<--, 4.53<--, 6.23<--                        11.2   3.64  )-----------( 200      ( 20 Dec 2023 17:09 )             35.1     PTT - ( 19 Dec 2023 07:30 )  PTT:31.6 sec

## 2023-12-20 NOTE — DIETITIAN INITIAL EVALUATION ADULT - ORAL INTAKE PTA/DIET HISTORY
Patient reports usual body weight ~124kg, last week. Reports good appetite prior to hospitalization. Patient states that she tries to avoid food high in potassium at home, has no known food allergies.

## 2023-12-20 NOTE — PROVIDER CONTACT NOTE (OTHER) - SITUATION
patient is on hemodialysis. Post permacath placement.
Patient had 5 beats of V. Tach and is having 10/10 headache
Pt given am Heparin at 06:22. Scheduled for IR today for possible permacath replacement.
Pt in dialysis HR went up to 120s/130s, called dialysis stated patient is okay and asymptomatic.

## 2023-12-20 NOTE — PRE PROCEDURE NOTE - HISTORY OF PRESENT ILLNESS
Interventional Radiology  Pre-Procedure Note    This is a 73y  Female  presenting for HD catheter evaluation and exchange    HPI:  72 yo F with PMhx of HTN, PE (not on AC), OA, chronic migraine  and ESRD on HD (MWF) presents to the ED with cough and SOB. Pt reports that she has been feeling weak for the last couple of days. She has a productive cough. Her chest hurts whenever she coughs. She also has runny nose, myalgias, and SOB with exertion. Denies any associated fever, chills, chest pain or LE edema. She went to HD today and had to stop HD due to weakness/SOB. She was then sent to the ED.   In the ED, her vitals were notable tachycardia. Labs were notable for elevated BUN/Scr, elevated ALP, AST. RVP was positive for SARSCOV2. CXR showed clear lungs.  (13 Dec 2023 18:47)    Pt reports poor flow from HD catheter for last coupe of weeks. Per patient catheter was placed at outside facility in July of this year.     PAST MEDICAL & SURGICAL HISTORY:  Osteoarthritis      Pulmonary embolism      Joint infection      Dysfunctional uterine bleeding      Obesity      Essential hypertension  Hypertension      Obstructive sleep apnea syndrome  Obstructive sleep apnea      History of pulmonary embolism  2012 s/p IVC filter      Arthropathy  Arthritis      Migraine  Migraines      Morbid obesity      ESRD (end stage renal disease)      Total knee replacement status      Status post hysterectomy      Status post cholecystectomy      Other acquired absence of organ  History of cholecystectomy      Status post total hysterectomy  partial      History of total knee replacement  with revisions x 3          Social History:     FAMILY HISTORY:  FH: type 2 diabetes mellitus    FHx: hypertension        Allergies: trimethoprim (Other; Rash)  sulfa drugs (Other; Rash)  Sulfur (Unknown)      Current Medications: acetaminophen     Tablet .. 650 milliGRAM(s) Oral every 6 hours PRN  benzocaine/menthol Lozenge 1 Lozenge Oral every 4 hours PRN  benzonatate 100 milliGRAM(s) Oral every 8 hours PRN  calcitriol   Capsule 1 MICROGram(s) Oral <User Schedule>  chlorhexidine 2% Cloths 1 Application(s) Topical daily  guaifenesin/dextromethorphan Oral Liquid 10 milliLiter(s) Oral every 4 hours PRN  heparin   Injectable 5000 Unit(s) SubCutaneous every 8 hours  heparin   Injectable. 2000 Unit(s) Dialysis. once  heparin   Injectable. 1000 Unit(s) Dialysis. every 1 hour  melatonin 3 milliGRAM(s) Oral at bedtime PRN  ondansetron Injectable 4 milliGRAM(s) IV Push every 8 hours PRN  sodium chloride 0.9% Bolus. 100 milliLiter(s) IV Bolus every 5 minutes PRN      Labs:                         13.3   6.75  )-----------( 209      ( 19 Dec 2023 07:30 )             41.3       12-19    136  |  97<L>  |  46<H>  ----------------------------<  83  4.5   |  20<L>  |  5.11<H>    Ca    9.1      19 Dec 2023 07:30  Phos  4.3     12-19  Mg     2.00     12-19        HCG:       Assessment/Plan:   This is a 73y Female  presents with poor flow from HD catheter.  Patient presents to IR for evaluation and exchange, possible venogram, possible angioplasty.  Procedure/ risks/ benefits/ goals/ alternatives were explained. All questions answered. Informed content obtained from patient. Consent placed in chart.

## 2023-12-20 NOTE — PROVIDER CONTACT NOTE (OTHER) - RECOMMENDATIONS
HD treatment ended early and metoprolol 50mg given as ordered.
As per provider Clara Lofton ([employer]), will order alternative pain medication.

## 2023-12-20 NOTE — DIETITIAN INITIAL EVALUATION ADULT - OTHER INFO
74 yo F with PMhx of HTN, PE (not on AC), OA, chronic migraine  and ESRD on HD (MWF) presents to the ED with cough and SOB, found to have COVID-19, per chart.     Patient is NPO during visit. Patient states that she does not like the food provided in the hospital, has decreased po intake during hospitalization (prior to NPO). Food preferences obtained. Patient denies any difficulty chewing or swallowing, any nausea, vomiting during visit. Reports 3 episodes of soft stools yesterday. Last bowel movement 12/19. Continue to monitor BM. Current weight: 125.3kg/276.2lbs (12/19, per RN flow sheet). Compared the reported usual body weight 124kg, no significant weight changes noted at this time. Noted patient is on HD, fluid shift could cause weight changes, continue to monitor weight trend. RD provided extensive written and verbal nutrition education on renal diet, encouraged patient to avoid food high in potassium, sodium and phos. Protein-rich foods also discussed with patient. Patient is receptive to the information provided.  72 yo F with PMhx of HTN, PE (not on AC), OA, chronic migraine  and ESRD on HD (MWF) presents to the ED with cough and SOB, found to have COVID-19, per chart.     Patient is NPO during visit. Patient states that she does not like the food provided in the hospital, has decreased po intake during hospitalization (prior to NPO). Food preferences obtained. Patient denies any difficulty chewing or swallowing, any nausea, vomiting during visit. Reports 3 episodes of soft stools yesterday. Last bowel movement 12/19. Continue to monitor BM. Current weight: 125.3kg/276.2lbs (12/19, per RN flow sheet). Compared the reported usual body weight 124kg, no significant weight changes noted at this time. Noted patient is on HD, fluid shift could cause weight changes, continue to monitor weight trend. RD provided extensive written and verbal nutrition education on renal diet, encouraged patient to avoid food high in potassium, sodium and phos. Protein-rich foods also discussed with patient. Patient is receptive to the information provided.

## 2023-12-20 NOTE — PROGRESS NOTE ADULT - SUBJECTIVE AND OBJECTIVE BOX
CARDIOLOGY FOLLOW UP - Dr. Joe  Date of Service: 12/20/23  CC: no events    Review of Systems:  Constitutional: No fever, weight loss, or fatigue  Respiratory: No cough, wheezing, or hemoptysis, no shortness of breath  Cardiovascular: No chest pain, palpitations, passing out, dizziness, or leg swelling  Gastrointestinal: No abd or epigastric pain. No nausea, vomiting, or hematemesis; no diarrhea or consiptaiton, no melena or hematochezia  Vascular: No edema     TELEMETRY:    PHYSICAL EXAM:  T(C): 36.8 (12-20-23 @ 05:00), Max: 36.8 (12-20-23 @ 05:00)  HR: 98 (12-20-23 @ 05:00) (97 - 98)  BP: 120/70 (12-20-23 @ 05:00) (102/50 - 131/68)  RR: 18 (12-20-23 @ 05:00) (17 - 18)  SpO2: 100% (12-20-23 @ 05:00) (99% - 100%)  Wt(kg): --  I&O's Summary    19 Dec 2023 07:01  -  20 Dec 2023 07:00  --------------------------------------------------------  IN: 150 mL / OUT: 200 mL / NET: -50 mL        Appearance: Normal	  Cardiovascular: Normal S1 S2,RRR, No JVD, No murmurs  Respiratory: Lungs clear to auscultation	  Gastrointestinal:  Soft, Non-tender, + BS	  Extremities: Normal range of motion, No clubbing, cyanosis or edema  Vascular: Peripheral pulses palpable 2+ bilaterally       Home Medications:  amLODIPine 10 mg oral tablet: 1 tab(s) orally once a day (13 Dec 2023 18:52)  calcitriol 0.5 mcg oral capsule: 1 cap(s) orally once a day (13 Dec 2023 18:52)  cholecalciferol oral tablet: 1000 unit(s) orally once a day (13 Dec 2023 18:52)  folic acid 1 mg oral tablet: 1 tab(s) orally once a day (13 Dec 2023 18:52)  metoprolol succinate 100 mg oral tablet, extended release: 1 tab(s) orally once a day (13 Dec 2023 18:52)        MEDICATIONS  (STANDING):  calcitriol   Capsule 1 MICROGram(s) Oral <User Schedule>  chlorhexidine 2% Cloths 1 Application(s) Topical daily  heparin   Injectable 5000 Unit(s) SubCutaneous every 8 hours  heparin   Injectable. 2000 Unit(s) Dialysis. once  heparin   Injectable. 1000 Unit(s) Dialysis. every 1 hour        EKG:  RADIOLOGY:  DIAGNOSTIC TESTING:  [ ] Echocardiogram:  [ ] Catherterization:  [ ] Stress Test:  OTHER:     LABS:	 	                          13.3   6.75  )-----------( 209      ( 19 Dec 2023 07:30 )             41.3     12-19    136  |  97<L>  |  46<H>  ----------------------------<  83  4.5   |  20<L>  |  5.11<H>    Ca    9.1      19 Dec 2023 07:30  Phos  4.3     12-19  Mg     2.00     12-19        PTT - ( 19 Dec 2023 07:30 )  PTT:31.6 sec    CARDIAC MARKERS:

## 2023-12-20 NOTE — DIETITIAN INITIAL EVALUATION ADULT - NAME AND PHONE
Shae Stevens RD, CDN  Pager #[phone], also available on [employer] Teams.  Shae Stevens RD, CDN  Pager #[phone], also available on [employer] Teams.

## 2023-12-20 NOTE — PROGRESS NOTE ADULT - SUBJECTIVE AND OBJECTIVE BOX
Tessie Pate(Resident) [facility] NEPHROLOGY- PROGRESS NOTE    73y Female with history of ESRD on HD presents with SOB. Nephrology consulted for ESRD status.    REVIEW OF SYSTEMS:  Gen: no fevers  Cards: no chest pain  Resp: + dyspnea, + cough  GI: no nausea or vomiting or diarrhea  Vascular: no LE edema    trimethoprim (Other; Rash)  sulfa drugs (Other; Rash)  Sulfur (Unknown)      Hospital Medications: Medications reviewed        VITALS:  T(F): 98.2 (12-20-23 @ 05:00), Max: 98.2 (12-20-23 @ 05:00)  HR: 98 (12-20-23 @ 05:00)  BP: 120/70 (12-20-23 @ 05:00)  RR: 18 (12-20-23 @ 05:00)  SpO2: 100% (12-20-23 @ 05:00)  Wt(kg): --    12-19 @ 07:01  -  12-20 @ 07:00  --------------------------------------------------------  IN: 150 mL / OUT: 200 mL / NET: -50 mL        PHYSICAL EXAM:    Gen: NAD, calm  Cards: RRR, +S1/S2, no M/G/R  Resp: CTA B/L  GI: soft, NT/ND, NABS  Vascular: no LE edema B/L, RIJ TDC intact        LABS:  12-19    136  |  97<L>  |  46<H>  ----------------------------<  83  4.5   |  20<L>  |  5.11<H>    Ca    9.1      19 Dec 2023 07:30  Phos  4.3     12-19  Mg     2.00     12-19      Creatinine Trend: 5.11 <--, 7.41 <--, 6.32 <--, 4.53 <--, 6.23 <--, 4.94 <--, 3.71 <--                        13.3   6.75  )-----------( 209      ( 19 Dec 2023 07:30 )             41.3     Urine Studies:  Urinalysis Basic - ( 19 Dec 2023 07:30 )    Color:  / Appearance:  / SG:  / pH:   Gluc: 83 mg/dL / Ketone:   / Bili:  / Urobili:    Blood:  / Protein:  / Nitrite:    Leuk Esterase:  / RBC:  / WBC    Sq Epi:  / Non Sq Epi:  / Bacteria:              [facility] NEPHROLOGY- PROGRESS NOTE    73y Female with history of ESRD on HD presents with SOB. Nephrology consulted for ESRD status.    REVIEW OF SYSTEMS:  Gen: no fevers  Cards: no chest pain  Resp: + dyspnea, + cough  GI: no nausea or vomiting or diarrhea  Vascular: no LE edema    trimethoprim (Other; Rash)  sulfa drugs (Other; Rash)  Sulfur (Unknown)      Hospital Medications: Medications reviewed        VITALS:  T(F): 98.2 (12-20-23 @ 05:00), Max: 98.2 (12-20-23 @ 05:00)  HR: 98 (12-20-23 @ 05:00)  BP: 120/70 (12-20-23 @ 05:00)  RR: 18 (12-20-23 @ 05:00)  SpO2: 100% (12-20-23 @ 05:00)  Wt(kg): --    12-19 @ 07:01  -  12-20 @ 07:00  --------------------------------------------------------  IN: 150 mL / OUT: 200 mL / NET: -50 mL        PHYSICAL EXAM:    Gen: NAD, calm  Cards: RRR, +S1/S2, no M/G/R  Resp: CTA B/L  GI: soft, NT/ND, NABS  Vascular: no LE edema B/L, RIJ TDC intact        LABS:  12-19    136  |  97<L>  |  46<H>  ----------------------------<  83  4.5   |  20<L>  |  5.11<H>    Ca    9.1      19 Dec 2023 07:30  Phos  4.3     12-19  Mg     2.00     12-19      Creatinine Trend: 5.11 <--, 7.41 <--, 6.32 <--, 4.53 <--, 6.23 <--, 4.94 <--, 3.71 <--                        13.3   6.75  )-----------( 209      ( 19 Dec 2023 07:30 )             41.3     Urine Studies:  Urinalysis Basic - ( 19 Dec 2023 07:30 )    Color:  / Appearance:  / SG:  / pH:   Gluc: 83 mg/dL / Ketone:   / Bili:  / Urobili:    Blood:  / Protein:  / Nitrite:    Leuk Esterase:  / RBC:  / WBC    Sq Epi:  / Non Sq Epi:  / Bacteria:

## 2023-12-20 NOTE — PROVIDER CONTACT NOTE (OTHER) - REASON
Pt was given am Heparin
HR 120s/130s
patient is tachy 120s-130s on hemodialysis
Patient had 5 beats of V. Tach and is having 10/10 headache

## 2023-12-20 NOTE — DIETITIAN INITIAL EVALUATION ADULT - NS FNS DIET ORDER
Diet, NPO after Midnight:      NPO Start Date: 19-Dec-2023,   NPO Start Time: 23:59  Except Medications (12-19-23 @ 18:21)    Diet, Renal Restrictions:   For patients receiving Renal Replacement - No Protein Restr, No Conc K, No Conc Phos, Low Sodium (12-13-23 @ 18:37)

## 2023-12-20 NOTE — DIETITIAN INITIAL EVALUATION ADULT - ADD RECOMMEND
1. Resume po diet- renal, when medically feasible.   2. Monitor bowel movement.   3. Monitor weight, labs, po intake and tolerance, bowel movement, skin integrity.   4. Encourage PO intake and honor food preferences as able.

## 2023-12-20 NOTE — DIETITIAN INITIAL EVALUATION ADULT - PERTINENT MEDS FT
MEDICATIONS  (STANDING):  calcitriol   Capsule 1 MICROGram(s) Oral <User Schedule>  chlorhexidine 2% Cloths 1 Application(s) Topical daily  heparin   Injectable 5000 Unit(s) SubCutaneous every 8 hours  heparin   Injectable. 1000 Unit(s) Dialysis. every 1 hour  heparin   Injectable. 2000 Unit(s) Dialysis. once    MEDICATIONS  (PRN):  acetaminophen     Tablet .. 650 milliGRAM(s) Oral every 6 hours PRN Temp greater or equal to 38C (100.4F), Mild Pain (1 - 3)  benzocaine/menthol Lozenge 1 Lozenge Oral every 4 hours PRN Sore Throat  benzonatate 100 milliGRAM(s) Oral every 8 hours PRN Cough  guaifenesin/dextromethorphan Oral Liquid 10 milliLiter(s) Oral every 4 hours PRN Cough  melatonin 3 milliGRAM(s) Oral at bedtime PRN Insomnia  ondansetron Injectable 4 milliGRAM(s) IV Push every 8 hours PRN Nausea and/or Vomiting  sodium chloride 0.9% Bolus. 100 milliLiter(s) IV Bolus every 5 minutes PRN SBP LESS THAN or EQUAL to 90 mmHg

## 2023-12-20 NOTE — PROVIDER CONTACT NOTE (OTHER) - ASSESSMENT
Pt A&O x 4. NSR on tele - went for dialysis last night / reported to be tachycardic (120s-140s) during dialysis. Tachy (110s) this morning. Pt denies c/o chest pain at this time.
patient HR 120s-130s on hemodialysis. patient states she feels "fluttering" in her chest.
Patient is A&Ox4, patient complains of 10/10 headache. Patient denies chest pain, shortness of breath, nausea, vomiting or dizziness.

## 2023-12-20 NOTE — PROGRESS NOTE ADULT - ASSESSMENT
ECHO 4/23/22 : nl lV  sys fx EF 55-60%  stress  11/14/23 normal   EF% during stress is 64 %    a/p   74 yo F with PMhx of HTN, PE (not on AC), OA, chronic migraine  and ESRD on HD (MWF) presents to the ED with cough/ SOB/ chest pain, + covid     #Atypical chest pain   -msk- secondary to cough   -HS trop neg x 1, no ischemic changes to ecg   -recent stress  11/14/23 normal   EF% during stress is 64 %  -echo ok  -hx of PE- not on ac, elevated d dimer  -cta neg for PE  -le doppler neg for dvt    #htn   -bp stable off amlo    #Covid 19  -managment per med   -sp remdesivir    -ID fu    #sob   -secondary to covid  -no chf on exam   -vol removal with hd  -cta neg for pe   -consider cardiac cath before d/c given continued symptoms.    dvt ppx     35 minutes spent on total encounter; more than 50% of the visit was spent counseling and/or coordinating care by the attending physician.   ECHO 4/23/22 : nl lV  sys fx EF 55-60%  stress  11/14/23 normal   EF% during stress is 64 %    a/p   72 yo F with PMhx of HTN, PE (not on AC), OA, chronic migraine  and ESRD on HD (MWF) presents to the ED with cough/ SOB/ chest pain, + covid     #Atypical chest pain   -msk- secondary to cough   -HS trop neg x 1, no ischemic changes to ecg   -recent stress  11/14/23 normal   EF% during stress is 64 %  -echo ok  -hx of PE- not on ac, elevated d dimer  -cta neg for PE  -le doppler neg for dvt    #htn   -bp stable off amlo    #Covid 19  -managment per med   -sp remdesivir    -ID fu    #sob   -secondary to covid  -no chf on exam   -vol removal with hd  -cta neg for pe   -consider cardiac cath before d/c given continued symptoms.    dvt ppx     35 minutes spent on total encounter; more than 50% of the visit was spent counseling and/or coordinating care by the attending physician.   same

## 2023-12-20 NOTE — PROCEDURE NOTE - PROCEDURE FINDINGS AND DETAILS
Previously inserted HD catheter had poor return with thrombus identified intraluminally. Guidewire exchange performed with insertion of 14.5 Fr 19 cm dual lumen HD catheter. Tip in RA. Normal return from each lumen. Catheter flushed with saline and secured

## 2023-12-20 NOTE — PROGRESS NOTE ADULT - SUBJECTIVE AND OBJECTIVE BOX
[facility], Division of Infectious Diseases  JAME Ashley Y. Patel, S. Shah, G. Deangelo  [phone]  ([phone] - weekdays after 5pm and weekends)    Name: MELISSA ARREOLA  Age/Gender: 73y Female  MRN: [id number]    Interval History:  Notes reviewed.   No concerning overnight events.  Afebrile.   reports symptoms continue to improve  awaiting catheter exchange    Allergies: trimethoprim (Other; Rash)  sulfa drugs (Other; Rash)  Sulfur (Unknown)      Objective:  Vitals:   T(F): 98.1 (12-20-23 @ 11:15), Max: 98.2 (12-20-23 @ 05:00)  HR: 104 (12-20-23 @ 11:15) (97 - 104)  BP: 110/65 (12-20-23 @ 11:15) (102/50 - 131/68)  RR: 17 (12-20-23 @ 11:15) (17 - 18)  SpO2: 100% (12-20-23 @ 11:15) (99% - 100%)  Physical Examination:  General: no acute distress  HEENT: anicteric  Cardio: S1, S2, normal rate, R chest tunneled HD catheter  Resp: mild rhonchi b/l  Abd: soft, NT, ND  Ext: no LE edema  Skin: warm, dry    Laboratory Studies:  CBC:                       13.3   6.75  )-----------( 209      ( 19 Dec 2023 07:30 )             41.3     WBC Trend:  6.75 12-19-23 @ 07:30  7.02 12-18-23 @ 21:40  5.78 12-17-23 @ 06:25  5.15 12-16-23 @ 06:00  3.71 12-15-23 @ 06:00  3.53 12-14-23 @ 07:30  4.78 12-13-23 @ 14:27    CMP: 12-19    136  |  97<L>  |  46<H>  ----------------------------<  83  4.5   |  20<L>  |  5.11<H>    Ca    9.1      19 Dec 2023 07:30  Phos  4.3     12-19  Mg     2.00     12-19            Urinalysis Basic - ( 19 Dec 2023 07:30 )    Color: x / Appearance: x / SG: x / pH: x  Gluc: 83 mg/dL / Ketone: x  / Bili: x / Urobili: x   Blood: x / Protein: x / Nitrite: x   Leuk Esterase: x / RBC: x / WBC x   Sq Epi: x / Non Sq Epi: x / Bacteria: x      Microbiology: reviewed     Culture - Nose (collected 12-13-23 @ 19:30)  Source: .Nose Nose  Final Report (12-15-23 @ 09:24):    No staphylococcus aureus isolated.    "PCR is more Sensitive for identifying MRSA/MSSA."        Radiology: reviewed     Medications:  acetaminophen     Tablet .. 650 milliGRAM(s) Oral every 6 hours PRN  benzocaine/menthol Lozenge 1 Lozenge Oral every 4 hours PRN  benzonatate 100 milliGRAM(s) Oral every 8 hours PRN  calcitriol   Capsule 1 MICROGram(s) Oral <User Schedule>  chlorhexidine 2% Cloths 1 Application(s) Topical daily  guaifenesin/dextromethorphan Oral Liquid 10 milliLiter(s) Oral every 4 hours PRN  heparin   Injectable 5000 Unit(s) SubCutaneous every 8 hours  heparin   Injectable. 2000 Unit(s) Dialysis. once  heparin   Injectable. 1000 Unit(s) Dialysis. every 1 hour  melatonin 3 milliGRAM(s) Oral at bedtime PRN  ondansetron Injectable 4 milliGRAM(s) IV Push every 8 hours PRN  sodium chloride 0.9% Bolus. 100 milliLiter(s) IV Bolus every 5 minutes PRN    Antimicrobials:   [facility], Division of Infectious Diseases  JAME Ashley Y. Patel, S. Shah, G. Deangelo  [phone]  ([phone] - weekdays after 5pm and weekends)    Name: MELISSA ARREOLA  Age/Gender: 73y Female  MRN: [id number]    Interval History:  Notes reviewed.   No concerning overnight events.  Afebrile.   reports symptoms continue to improve  awaiting catheter exchange    Allergies: trimethoprim (Other; Rash)  sulfa drugs (Other; Rash)  Sulfur (Unknown)      Objective:  Vitals:   T(F): 98.1 (12-20-23 @ 11:15), Max: 98.2 (12-20-23 @ 05:00)  HR: 104 (12-20-23 @ 11:15) (97 - 104)  BP: 110/65 (12-20-23 @ 11:15) (102/50 - 131/68)  RR: 17 (12-20-23 @ 11:15) (17 - 18)  SpO2: 100% (12-20-23 @ 11:15) (99% - 100%)  Physical Examination:  General: no acute distress  HEENT: anicteric  Cardio: S1, S2, normal rate, R chest tunneled HD catheter  Resp: mild rhonchi b/l  Abd: soft, NT, ND  Ext: no LE edema  Skin: warm, dry    Laboratory Studies:  CBC:                       13.3   6.75  )-----------( 209      ( 19 Dec 2023 07:30 )             41.3     WBC Trend:  6.75 12-19-23 @ 07:30  7.02 12-18-23 @ 21:40  5.78 12-17-23 @ 06:25  5.15 12-16-23 @ 06:00  3.71 12-15-23 @ 06:00  3.53 12-14-23 @ 07:30  4.78 12-13-23 @ 14:27    CMP: 12-19    136  |  97<L>  |  46<H>  ----------------------------<  83  4.5   |  20<L>  |  5.11<H>    Ca    9.1      19 Dec 2023 07:30  Phos  4.3     12-19  Mg     2.00     12-19            Urinalysis Basic - ( 19 Dec 2023 07:30 )    Color: x / Appearance: x / SG: x / pH: x  Gluc: 83 mg/dL / Ketone: x  / Bili: x / Urobili: x   Blood: x / Protein: x / Nitrite: x   Leuk Esterase: x / RBC: x / WBC x   Sq Epi: x / Non Sq Epi: x / Bacteria: x      Microbiology: reviewed     Culture - Nose (collected 12-13-23 @ 19:30)  Source: .Nose Nose  Final Report (12-15-23 @ 09:24):    No staphylococcus aureus isolated.    "PCR is more Sensitive for identifying MRSA/MSSA."        Radiology: reviewed     Medications:  acetaminophen     Tablet .. 650 milliGRAM(s) Oral every 6 hours PRN  benzocaine/menthol Lozenge 1 Lozenge Oral every 4 hours PRN  benzonatate 100 milliGRAM(s) Oral every 8 hours PRN  calcitriol   Capsule 1 MICROGram(s) Oral <User Schedule>  chlorhexidine 2% Cloths 1 Application(s) Topical daily  guaifenesin/dextromethorphan Oral Liquid 10 milliLiter(s) Oral every 4 hours PRN  heparin   Injectable 5000 Unit(s) SubCutaneous every 8 hours  heparin   Injectable. 2000 Unit(s) Dialysis. once  heparin   Injectable. 1000 Unit(s) Dialysis. every 1 hour  melatonin 3 milliGRAM(s) Oral at bedtime PRN  ondansetron Injectable 4 milliGRAM(s) IV Push every 8 hours PRN  sodium chloride 0.9% Bolus. 100 milliLiter(s) IV Bolus every 5 minutes PRN    Antimicrobials:

## 2023-12-20 NOTE — PROGRESS NOTE ADULT - TIME BILLING
- Review of records, telemetry, vital signs and daily labs.   - General and cardiovascular physical examination.  - Generation of cardiovascular treatment plan.  - Coordination of care.      Patient was seen and examined by me on 12/20/23,interim events noted,labs and radiology studies reviewed.  Damir Blackman MD,[facility]  [address] [place], [place] [id number].  [phone]/16/23 - Review of records, telemetry, vital signs and daily labs.   - General and cardiovascular physical examination.  - Generation of cardiovascular treatment plan.  - Coordination of care.      Patient was seen and examined by me on 12/20/23,interim events noted,labs and radiology studies reviewed.  Damir Blackman MD,[facility]  [address] [place], [place] [id number].  [phone]/16/23

## 2023-12-20 NOTE — PROGRESS NOTE ADULT - ASSESSMENT
74 y/o F PMhx HTN, OA, chronic migraine and ESRD on HD (MWF) who presented w/ cough and SOB.    COVID  saturating well on RA  CTA negative for PE, LE US negative for DVT  s/p remdesivir x 3 days completed 12/16  symptoms improved  planned for tunneled catheter exchange today    Recommendations  isolation per infection control policy   supportive care- flonase, mucinex, anti-tussive, lozenge  discharge planning    Tony Bright M.D.  [facility], Division of Infectious Diseases  [phone]  After 5pm on weekdays and all day on weekends - please call [phone]  72 y/o F PMhx HTN, OA, chronic migraine and ESRD on HD (MWF) who presented w/ cough and SOB.    COVID  saturating well on RA  CTA negative for PE, LE US negative for DVT  s/p remdesivir x 3 days completed 12/16  symptoms improved  planned for tunneled catheter exchange today    Recommendations  isolation per infection control policy   supportive care- flonase, mucinex, anti-tussive, lozenge  discharge planning    Tony Bright M.D.  [facility], Division of Infectious Diseases  [phone]  After 5pm on weekdays and all day on weekends - please call [phone]  72 y/o F PMhx HTN, OA, chronic migraine and ESRD on HD (MWF) who presented w/ cough and SOB.    COVID  saturating well on RA  d-dimer elevated, CTA negative for PE, LE US negative for DVT  s/p remdesivir x 3 days completed 12/16  symptoms improved  planned for tunneled catheter exchange today    Recommendations  isolation per infection control policy   supportive care- flonase, mucinex, anti-tussive, lozenge  discharge planning    Tony Bright M.D.  [facility], Division of Infectious Diseases  [phone]  After 5pm on weekdays and all day on weekends - please call [phone]  74 y/o F PMhx HTN, OA, chronic migraine and ESRD on HD (MWF) who presented w/ cough and SOB.    COVID  saturating well on RA  d-dimer elevated, CTA negative for PE, LE US negative for DVT  s/p remdesivir x 3 days completed 12/16  symptoms improved  planned for tunneled catheter exchange today    Recommendations  isolation per infection control policy   supportive care- flonase, mucinex, anti-tussive, lozenge  discharge planning    Tony Bright M.D.  [facility], Division of Infectious Diseases  [phone]  After 5pm on weekdays and all day on weekends - please call [phone]

## 2023-12-20 NOTE — PROVIDER CONTACT NOTE (OTHER) - ACTION/TREATMENT ORDERED:
As per provider Clara Lofton (TEAMS), will order alternative pain medication.
Provider made aware will continue to monitor
Nephrology notified and aware. ACP made aware post HD /88  T97.8. ok to return patient back to unit with HR of 122. No new orders.
SUSIE Sosa made aware. IR notified.

## 2023-12-20 NOTE — PROGRESS NOTE ADULT - ASSESSMENT
73y Female with history of ESRD on HD presents with SOB. Nephrology consulted for ESRD status.    1) ESRD: Last HD on 12/18 with intradialytic hypotension and 1.7 removed and with poor blood flow. Patient has also been having poor arterial blood flow due to high venous and arterial pressures as an outpatient. Plan for IR TDC exchange today. Plan for next maintenance HD today after catheter exchange. Monitor electrolytes.    2) HTN with ESRD: BP controlled. Monitor off medications.    3) Anemia of renal disease: Hb acceptable. On Micera 30 mcg monthly. Will give Epogen if Hb decreases. Monitor Hb.    4) Secondary HPT of renal origin: Phosphorus acceptable. Continue with calcitriol 1 mcg MWF and renal diet. Monitor serum calcium and phosphorus.      [facility] NEPHROLOGY  Derrick Castaneda M.D.  Raúl Diana D.O.  Grace Delgadillo M.D.  MD Cher Beckman, MSN, ANP-C    Telephone: [phone]  Facsimile: [phone] [place] [address], [address]  [place] [address]   73y Female with history of ESRD on HD presents with SOB. Nephrology consulted for ESRD status.    1) ESRD: Last HD on 12/18 with intradialytic hypotension and 1.7 removed and with poor blood flow. Patient has also been having poor arterial blood flow due to high venous and arterial pressures as an outpatient. Plan for IR TDC exchange today. Plan for next maintenance HD today after catheter exchange. Monitor electrolytes.    2) HTN with ESRD: BP controlled. Monitor off medications.    3) Anemia of renal disease: Hb acceptable. On Micera 30 mcg monthly. Will give Epogen if Hb decreases. Monitor Hb.    4) Secondary HPT of renal origin: Phosphorus acceptable. Continue with calcitriol 1 mcg MWF and renal diet. Monitor serum calcium and phosphorus.      [facility] NEPHROLOGY  Derrick Castaneda M.D.  Raúl Diana D.O.  Grace Delgadillo M.D.  MD Cher Beckman, MSN, ANP-C    Telephone: [phone]  Facsimile: [phone] [place] [address], [address]  [place] [address]

## 2023-12-20 NOTE — DIETITIAN INITIAL EVALUATION ADULT - WEIGHT FOR BMI (LBS)
[address] [place]. [place], [address]  Tel.  [phone]  Fax. 100 [facility] 60  [address] [facility], [address]  Tel.  [phone]  Fax. [phone] [address]  Tel.  [phone]  Fax. [phone]     Sleep Apnea: After Your Visit  Your Care Instructions  Sleep apnea occurs when you frequently stop breathing for 10 seconds or longer during sleep. It can be mild to severe, based on the number of times per hour that you stop breathing or have slowed breathing. Blocked or narrowed airways in your nose, mouth, or throat can cause sleep apnea. Your airway can become blocked when your throat muscles and tongue relax during sleep. Sleep apnea is common, occurring in 1 out of 20 individuals. Individuals having any of the following characteristics should be evaluated and treated right away due to high risk and detrimental consequences from untreated sleep apnea:  1. Obesity  2. Congestive Heart failure  3. Atrial Fibrillation  4. Uncontrolled Hypertension  5. Type II Diabetes  6. Night-time Arrhythmias  7. Stroke  8. Pulmonary Hypertension  9. High-risk Driving Populations (pilots, truck drivers, etc.)  10. Patients Considering Weight-loss Surgery    How do you know you have sleep apnea? You probably have sleep apnea if you answer 'yes' to 3 or more of the following questions:  S - Have you been told that you Snore? T - Are you often Tired during the day? O - Has anyone Observed you stop breathing while sleeping? P- Do you have (or are being treated for) high blood Pressure? B - Are you obese (Body Mass Index > 35)? A - Is your Age 48years old or older? N - Is your Neck size greater than 16 inches? G - Are you male Gender? A sleep physician can prescribe a breathing device that prevents tissues in the throat from blocking your airway.  Or your doctor may recommend using a dental device (oral breathing device) to help keep your airway open. In some cases, surgery may be needed to remove enlarged tissues in the throat. Follow-up care is a key part of your treatment and safety. Be sure to make and go to all appointments, and call your doctor if you are having problems. It's also a good idea to know your test results and keep a list of the medicines you take. How can you care for yourself at home? · Lose weight, if needed. It may reduce the number of times you stop breathing or have slowed breathing. · Go to bed at the same time every night. · Sleep on your side. It may stop mild apnea. If you tend to roll onto your back, sew a pocket in the back of your pajama top. Put a tennis ball into the pocket, and stitch the pocket shut. This will help keep you from sleeping on your back. · Avoid alcohol and medicines such as sleeping pills and sedatives before bed. · Do not smoke. Smoking can make sleep apnea worse. If you need help quitting, talk to your doctor about stop-smoking programs and medicines. These can increase your chances of quitting for good. · Prop up the head of your bed 4 to 6 inches by putting bricks under the legs of the bed. · Treat breathing problems, such as a stuffy nose, caused by a cold or allergies. · Use a continuous positive airway pressure (CPAP) breathing machine if lifestyle changes do not help your apnea and your doctor recommends it. The machine keeps your airway from closing when you sleep. · If CPAP does not help you, ask your doctor whether you should try other breathing machines. A bilevel positive airway pressure machine has two types of air pressureâone for breathing in and one for breathing out. Another device raises or lowers air pressure as needed while you breathe. · If your nose feels dry or bleeds when using one of these machines, talk with your doctor about increasing moisture in the air. A humidifier may help.   · If your nose is runny or stuffy from using a breathing machine, talk with your doctor about using decongestants or a corticosteroid nasal spray. When should you call for help? Watch closely for changes in your health, and be sure to contact your doctor if:  · You still have sleep apnea even though you have made lifestyle changes. · You are thinking of trying a device such as CPAP. · You are having problems using a CPAP or similar machine. Where can you learn more? Go to [employer]. Enter G573 in the search box to learn more about \"Sleep Apnea: After Your Visit. \"   © 0369-7984 Healthwise, Incorporated. Care instructions adapted under license by [employer] (which disclaims liability or warranty for this information). This care instruction is for use with your licensed healthcare professional. If you have questions about a medical condition or this instruction, always ask your healthcare professional. [employer] M any warranty or liability for your use of this information. PROPER SLEEP HYGIENE    What to avoid  · Do not have drinks with caffeine, such as coffee or black tea, for 8 hours before bed. · Do not smoke or use other types of tobacco near bedtime. Nicotine is a stimulant and can keep you awake. · Avoid drinking alcohol late in the evening, because it can cause you to wake in the middle of the night. · Do not eat a big meal close to bedtime. If you are hungry, eat a light snack. · Do not drink a lot of water close to bedtime, because the need to urinate may wake you up during the night. · Do not read or watch TV in bed. Use the bed only for sleeping and sexual activity. What to try  · Go to bed at the same time every night, and wake up at the same time every morning. Do not take naps during the day. · Keep your bedroom quiet, dark, and cool. · Get regular exercise, but not within 3 to 4 hours of your bedtime. .  · Sleep on a comfortable pillow and mattress.   · If watching the clock makes you anxious, turn it facing away from you so you cannot see the time. · If you worry when you lie down, start a worry book. Well before bedtime, write down your worries, and then set the book and your concerns aside. · Try meditation or other relaxation techniques before you go to bed. · If you cannot fall asleep, get up and go to another room until you feel sleepy. Do something relaxing. Repeat your bedtime routine before you go to bed again. · Make your house quiet and calm about an hour before bedtime. Turn down the lights, turn off the TV, log off the computer, and turn down the volume on music. This can help you relax after a busy day. Drowsy Driving  The [address] [place] [address] Road Traffic Safety Administration cites drowsiness as a causing factor in more than 398,693 police reported crashes annually, resulting in 76,000 injuries and 1,500 deaths. Other surveys suggest 55% of people polled have driven while drowsy in the past year, 23% had fallen asleep but not crashed, 3% crashed, and 2% had and accident due to drowsy driving. Who is at risk? Young Drivers: One study of drowsy driving accidents states that 55% of the drivers were under 25 years. Of those, 75% were male. Shift Workers and Travelers: People who work overnight or travel across time zones frequently are at higher risk of experiencing Circadian Rhythm Disorders. They are trying to work and function when their body is programed to sleep. Sleep Deprived: Lack of sleep has a serious impact on your ability to pay attention or focus on a task. Consistently getting less than the average of 8 hours your body needs creates partial or cumulative sleep deprivation. Untreated Sleep Disorders: Sleep Apnea, Narcolepsy, R.L.S., and other sleep disorders (untreated) prevent a person from getting enough restful sleep. This leads to excessive daytime sleepiness and increases the risk for drowsy driving accidents by up to 7 times.   Medications / Alcohol: Even over the counter medications can cause drowsiness. Medications that impair a drivers attention should have a warning label. Alcohol naturally makes you sleepy and on its own can cause accidents. Combined with excessive drowsiness its effects are amplified. Signs of Drowsy Driving:   * You don't remember driving the last few miles   * You may drift out of your ignacio   * You are unable to focus and your thoughts wander   * You may yawn more often than normal   * You have difficulty keeping your eyes open / nodding off   * Missing traffic signs, speeding, or tailgating  Prevention-   Good sleep hygiene, lifestyle and behavioral choices have the most impact on drowsy driving. There is no substitute for sleep and the average person requires 8 hours nightly. If you find yourself driving drowsy, stop and sleep. Consider the sleep hygiene tips provided during your visit as well. Medication Refill Policy: Refills for all medications require 1 week advance notice. Please have your pharmacy fax a refill request. We are unable to fax, or call in \"controled substance\" medications and you will need to pick these prescriptions up from our office. [employer] Activation    Thank you for requesting access to [employer]. Please follow the instructions below to securely access and download your online medical record. [employer] allows you to send messages to your doctor, view your test results, renew your prescriptions, schedule appointments, and more. How Do I Sign Up? 1. In your internet browser, go to [email or web address]. 2. Click on the First Time User? Click Here link in the Sign In box. You will see the New Member Sign Up page. 3. Enter your [employer] Access Code exactly as it appears below. You will not need to use this code after youve completed the sign-up process. If you do not sign up before the expiration date, you must request a new code. [employer] Access Code:  [email or web address]  Expires: 5/10/2019  2:48 PM (This is the date your Plink Search access code will )    4. Enter the last four digits of your Social Security Number (xxxx) and Date of Birth (mm/dd/yyyy) as indicated and click Submit. You will be taken to the next sign-up page. 5. Create a A[employer] ID. This will be your Plink Search login ID and cannot be changed, so think of one that is secure and easy to remember. 6. Create a Plink Search password. You can change your password at any time. 7. Enter your Password Reset Question and Answer. This can be used at a later time if you forget your password. 8. Enter your e-mail address. You will receive e-mail notification when new information is available in [address] 9. Click Sign Up. You can now view and download portions of your medical record. 10. Click the Download Summary menu link to download a portable copy of your medical information. Additional Information    If you have questions, please call [phone]. Remember, Pl[employer] Search is NOT to be used for urgent needs. For medical emergencies, dial 911. [phone]

## 2023-12-20 NOTE — DIETITIAN INITIAL EVALUATION ADULT - PERTINENT LABORATORY DATA
12-19    136  |  97<L>  |  46<H>  ----------------------------<  83  4.5   |  20<L>  |  5.11<H>    Ca    9.1      19 Dec 2023 07:30  Phos  4.3     12-19  Mg     2.00     12-19    A1C with Estimated Average Glucose Result: 4.4 % (12-14-23 @ 07:30)

## 2023-12-21 ENCOUNTER — TRANSCRIPTION ENCOUNTER (OUTPATIENT)
Age: 73
End: 2023-12-21

## 2023-12-21 LAB
ANION GAP SERPL CALC-SCNC: 20 MMOL/L — HIGH (ref 7–14)
ANION GAP SERPL CALC-SCNC: 20 MMOL/L — HIGH (ref 7–14)
BUN SERPL-MCNC: 61 MG/DL — HIGH (ref 7–23)
BUN SERPL-MCNC: 61 MG/DL — HIGH (ref 7–23)
CALCIUM SERPL-MCNC: 8.8 MG/DL — SIGNIFICANT CHANGE UP (ref 8.4–10.5)
CALCIUM SERPL-MCNC: 8.8 MG/DL — SIGNIFICANT CHANGE UP (ref 8.4–10.5)
CHLORIDE SERPL-SCNC: 96 MMOL/L — LOW (ref 98–107)
CHLORIDE SERPL-SCNC: 96 MMOL/L — LOW (ref 98–107)
CO2 SERPL-SCNC: 20 MMOL/L — LOW (ref 22–31)
CO2 SERPL-SCNC: 20 MMOL/L — LOW (ref 22–31)
CREAT SERPL-MCNC: 6.35 MG/DL — HIGH (ref 0.5–1.3)
CREAT SERPL-MCNC: 6.35 MG/DL — HIGH (ref 0.5–1.3)
EGFR: 7 ML/MIN/1.73M2 — LOW
EGFR: 7 ML/MIN/1.73M2 — LOW
GLUCOSE SERPL-MCNC: 80 MG/DL — SIGNIFICANT CHANGE UP (ref 70–99)
GLUCOSE SERPL-MCNC: 80 MG/DL — SIGNIFICANT CHANGE UP (ref 70–99)
HCT VFR BLD CALC: 39.7 % — SIGNIFICANT CHANGE UP (ref 34.5–45)
HCT VFR BLD CALC: 39.7 % — SIGNIFICANT CHANGE UP (ref 34.5–45)
HGB BLD-MCNC: 12.5 G/DL — SIGNIFICANT CHANGE UP (ref 11.5–15.5)
HGB BLD-MCNC: 12.5 G/DL — SIGNIFICANT CHANGE UP (ref 11.5–15.5)
MAGNESIUM SERPL-MCNC: 2 MG/DL — SIGNIFICANT CHANGE UP (ref 1.6–2.6)
MAGNESIUM SERPL-MCNC: 2 MG/DL — SIGNIFICANT CHANGE UP (ref 1.6–2.6)
MCHC RBC-ENTMCNC: 31.4 PG — SIGNIFICANT CHANGE UP (ref 27–34)
MCHC RBC-ENTMCNC: 31.4 PG — SIGNIFICANT CHANGE UP (ref 27–34)
MCHC RBC-ENTMCNC: 31.5 GM/DL — LOW (ref 32–36)
MCHC RBC-ENTMCNC: 31.5 GM/DL — LOW (ref 32–36)
MCV RBC AUTO: 99.7 FL — SIGNIFICANT CHANGE UP (ref 80–100)
MCV RBC AUTO: 99.7 FL — SIGNIFICANT CHANGE UP (ref 80–100)
NRBC # BLD: 0 /100 WBCS — SIGNIFICANT CHANGE UP (ref 0–0)
NRBC # BLD: 0 /100 WBCS — SIGNIFICANT CHANGE UP (ref 0–0)
NRBC # FLD: 0 K/UL — SIGNIFICANT CHANGE UP (ref 0–0)
NRBC # FLD: 0 K/UL — SIGNIFICANT CHANGE UP (ref 0–0)
PHOSPHATE SERPL-MCNC: 5.7 MG/DL — HIGH (ref 2.5–4.5)
PHOSPHATE SERPL-MCNC: 5.7 MG/DL — HIGH (ref 2.5–4.5)
PLATELET # BLD AUTO: 218 K/UL — SIGNIFICANT CHANGE UP (ref 150–400)
PLATELET # BLD AUTO: 218 K/UL — SIGNIFICANT CHANGE UP (ref 150–400)
POTASSIUM SERPL-MCNC: 5.1 MMOL/L — SIGNIFICANT CHANGE UP (ref 3.5–5.3)
POTASSIUM SERPL-MCNC: 5.1 MMOL/L — SIGNIFICANT CHANGE UP (ref 3.5–5.3)
POTASSIUM SERPL-SCNC: 5.1 MMOL/L — SIGNIFICANT CHANGE UP (ref 3.5–5.3)
POTASSIUM SERPL-SCNC: 5.1 MMOL/L — SIGNIFICANT CHANGE UP (ref 3.5–5.3)
RBC # BLD: 3.98 M/UL — SIGNIFICANT CHANGE UP (ref 3.8–5.2)
RBC # BLD: 3.98 M/UL — SIGNIFICANT CHANGE UP (ref 3.8–5.2)
RBC # FLD: 12.6 % — SIGNIFICANT CHANGE UP (ref 10.3–14.5)
RBC # FLD: 12.6 % — SIGNIFICANT CHANGE UP (ref 10.3–14.5)
SODIUM SERPL-SCNC: 136 MMOL/L — SIGNIFICANT CHANGE UP (ref 135–145)
SODIUM SERPL-SCNC: 136 MMOL/L — SIGNIFICANT CHANGE UP (ref 135–145)
WBC # BLD: 6.3 K/UL — SIGNIFICANT CHANGE UP (ref 3.8–10.5)
WBC # BLD: 6.3 K/UL — SIGNIFICANT CHANGE UP (ref 3.8–10.5)
WBC # FLD AUTO: 6.3 K/UL — SIGNIFICANT CHANGE UP (ref 3.8–10.5)
WBC # FLD AUTO: 6.3 K/UL — SIGNIFICANT CHANGE UP (ref 3.8–10.5)

## 2023-12-21 PROCEDURE — 99232 SBSQ HOSP IP/OBS MODERATE 35: CPT

## 2023-12-21 RX ORDER — LORATADINE 10 MG/1
10 TABLET ORAL DAILY
Refills: 0 | Status: DISCONTINUED | OUTPATIENT
Start: 2023-12-21 | End: 2023-12-22

## 2023-12-21 RX ORDER — METOPROLOL TARTRATE 50 MG
100 TABLET ORAL DAILY
Refills: 0 | Status: DISCONTINUED | OUTPATIENT
Start: 2023-12-21 | End: 2023-12-22

## 2023-12-21 RX ORDER — FLUTICASONE PROPIONATE 50 MCG
1 SPRAY, SUSPENSION NASAL
Refills: 0 | Status: DISCONTINUED | OUTPATIENT
Start: 2023-12-21 | End: 2023-12-22

## 2023-12-21 RX ORDER — HEPARIN SODIUM 5000 [USP'U]/ML
2000 INJECTION INTRAVENOUS; SUBCUTANEOUS ONCE
Refills: 0 | Status: COMPLETED | OUTPATIENT
Start: 2023-12-22 | End: 2023-12-22

## 2023-12-21 RX ORDER — HEPARIN SODIUM 5000 [USP'U]/ML
1000 INJECTION INTRAVENOUS; SUBCUTANEOUS
Refills: 0 | Status: DISCONTINUED | OUTPATIENT
Start: 2023-12-22 | End: 2023-12-22

## 2023-12-21 RX ADMIN — Medication 3 MILLIGRAM(S): at 21:51

## 2023-12-21 RX ADMIN — Medication 1 SPRAY(S): at 17:33

## 2023-12-21 RX ADMIN — CHLORHEXIDINE GLUCONATE 1 APPLICATION(S): 213 SOLUTION TOPICAL at 11:16

## 2023-12-21 RX ADMIN — Medication 100 MILLIGRAM(S): at 11:16

## 2023-12-21 RX ADMIN — Medication 50 MILLIGRAM(S): at 06:34

## 2023-12-21 RX ADMIN — HEPARIN SODIUM 5000 UNIT(S): 5000 INJECTION INTRAVENOUS; SUBCUTANEOUS at 13:01

## 2023-12-21 RX ADMIN — Medication 100 MILLIGRAM(S): at 20:01

## 2023-12-21 RX ADMIN — HEPARIN SODIUM 5000 UNIT(S): 5000 INJECTION INTRAVENOUS; SUBCUTANEOUS at 06:36

## 2023-12-21 RX ADMIN — HEPARIN SODIUM 5000 UNIT(S): 5000 INJECTION INTRAVENOUS; SUBCUTANEOUS at 21:49

## 2023-12-21 NOTE — DISCHARGE NOTE PROVIDER - NSDCQMAMI_CARD_ALL_CORE
CHIEF COMPLAINT:  2 yo presents for urgent visit with fever  History provided by mother.    SUBJECTIVE:  Subjective high fever last PM.  Associated poor appetite and decreased activity level. Denies vomiting, diarrhea, cough, congestion, or rash. Sisters had LGT and sore throat a few days ago.    ALLERGIES: pcn    EXAM: Well nourished. Well developed.  Alert, in NAD. Playful  HEENT:  TM's clear. No nasal discharge. Tonsils red with 2+ pus. Neck supple with shotty anterior adenopathy.  LUNGS: clear with good air exchange; no rales or retracting  HEART: RRR without murmur  ABDOMEN: soft with active BS. No masses or organomegaly; non-tender.  SKIN:  no rash; warm and dry  NEURO:  intact    Throat screen negative    DIAGNOSIS:  1. Viral tonsillitis    PLAN:    ADVISED/CAUTIONED:  Rest/fluids/fever reducers.  Return if symptoms worsen or new symptoms develop.  
No

## 2023-12-21 NOTE — PROGRESS NOTE ADULT - ASSESSMENT
73y Female with history of ESRD on HD presents with SOB. Nephrology consulted for ESRD status.    1) ESRD: Last HD on 12/20 with tachycardia for which HD treatment terminated prematurely. Plan for next maintenance HD on 12/22. S/P TDC exchange by IR on 12/20. Monitor electrolytes.    2) HTN with ESRD: BP controlled with resolution of tachycardia. Continue with metoprolol. Monitor BP.    3) Anemia of renal disease: Hb acceptable. On Micera 30 mcg monthly. Will give Epogen if Hb decreases. Monitor Hb.    4) Secondary HPT of renal origin: Phosphorus borderline. Continue with calcitriol 1 mcg MWF and renal diet. Monitor serum calcium and phosphorus.      [facility] NEPHROLOGY  Derrick Castaneda M.D.  Raúl Diana D.O.  Grace Delgadillo M.D.  MD Cher Beckman, MSN, ANP-C    Telephone: [phone]  Facsimile: [phone]    [address] [place] [address], [address]  [place] [address]   73y Female with history of ESRD on HD presents with SOB. Nephrology consulted for ESRD status.    1) ESRD: Last HD on 12/20 with tachycardia for which HD treatment terminated prematurely. Plan for next maintenance HD on 12/22. S/P TDC exchange by IR on 12/20. Monitor electrolytes.    2) HTN with ESRD: BP controlled with resolution of tachycardia. Continue with metoprolol. Monitor BP.    3) Anemia of renal disease: Hb acceptable. On Micera 30 mcg monthly. Will give Epogen if Hb decreases. Monitor Hb.    4) Secondary HPT of renal origin: Phosphorus borderline. Continue with calcitriol 1 mcg MWF and renal diet. Monitor serum calcium and phosphorus.      [facility] NEPHROLOGY  Derrick Castaneda M.D.  Raúl Diana D.O.  Grace Delgadillo M.D.  MD Cher Beckman, MSN, ANP-C    Telephone: [phone]  Facsimile: [phone]    [address] [place] [address], [address]  [place] [address]

## 2023-12-21 NOTE — DISCHARGE NOTE PROVIDER - NSDCFUADDAPPT_GEN_ALL_CORE_FT
-Follow up with pulmonology for outpatient for nodule - if still present , for pfts, and for LARRY eval

## 2023-12-21 NOTE — PROGRESS NOTE ADULT - ASSESSMENT
ECHO 4/23/22 : nl lV  sys fx EF 55-60%  stress  11/14/23 normal   EF% during stress is 64 %    a/p   72 yo F with PMhx of HTN, PE (not on AC), OA, chronic migraine  and ESRD on HD (MW) presents to the ED with cough/ SOB/ chest pain, + covid     #Atypical chest pain   -msk- secondary to cough   -HS trop neg x 1, no ischemic changes to ecg   -recent stress  11/14/23 normal   EF% during stress is 64 %  -echo ok  -hx of PE- not on ac, elevated d dimer  -cta neg for PE  -le doppler neg for dvt    #htn   -bp stable but tachycardic with palps   -tele sinus tach up to 110  -pt was up to 200 mg of  lopressor as outpt   -sp 50 mg PO lopressor this am -- still with co palps  -increase lopressor to 100 mg daily for now      #Covid 19  -managment per med   -sp remdesivir    -ID fu    #sob   -secondary to covid  -no chf on exam   -vol removal with hd  -cta neg for pe   -consider cardiac cath before d/c given continued symptoms.    dvt ppx

## 2023-12-21 NOTE — PROGRESS NOTE ADULT - TIME BILLING
- Review of records, telemetry, vital signs and daily labs.   - General and cardiovascular physical examination.  - Generation of cardiovascular treatment plan.  - Coordination of care.      Patient was seen and examined by me on 12/21/23,interim events noted,labs and radiology studies reviewed.  Dam[facility] Blackman MD,[facility]  [address] [place], [place] [id number].  718 [id number]/16/23 - Review of records, telemetry, vital signs and daily labs.   - General and cardiovascular physical examination.  - Generation of cardiovascular treatment plan.  - Coordination of care.      Patient was seen and examined by me on 12/21/23,interim events noted,labs and radiology studies reviewed.  Damir Blackman MD,[facility]  [address] [place], [place] [id number].  718 [id number]/16/23

## 2023-12-21 NOTE — DISCHARGE NOTE PROVIDER - NSDCCAREPROVSEEN_GEN_ALL_CORE_FT
ACP Medicine  Dr. Blackman [facility] Medicine  Dr. Yehuda Ortiz, Jay I [facility] Medicine  Dr. Yeuhda Ortiz, Jay I

## 2023-12-21 NOTE — DISCHARGE NOTE PROVIDER - NPI NUMBER (FOR SYSADMIN USE ONLY) :
[[id number]] [[id number]] [[id number]],[[id number]] [[id number]],[[id number]],[[id number]]

## 2023-12-21 NOTE — PROGRESS NOTE ADULT - SUBJECTIVE AND OBJECTIVE BOX
CARDIOLOGY FOLLOW UP - Dr. Joe  DATE OF SERVICE: 12/21/23     CC co palps   SOB last night   otherwise no cp         REVIEW OF SYSTEMS:  CONSTITUTIONAL: No fever, weight loss, or fatigue  RESPIRATORY: No cough, wheezing, chills or hemoptysis; No Shortness of Breath  CARDIOVASCULAR: No chest pain, palpitations, passing out, dizziness, or leg swelling  GASTROINTESTINAL: No abdominal or epigastric pain. No nausea, vomiting, or hematemesis; No diarrhea or constipation. No melena or hematochezia.      PHYSICAL EXAM:  T(C): 36.5 (12-21-23 @ 06:30), Max: 37.2 (12-20-23 @ 21:00)  HR: 112 (12-20-23 @ 21:00) (104 - 122)  BP: 114/63 (12-21-23 @ 06:30) (110/65 - 162/88)  RR: 18 (12-20-23 @ 19:25) (17 - 18)  SpO2: 100% (12-21-23 @ 06:30) (100% - 100%)  Wt(kg): --  I&O's Summary    20 Dec 2023 07:01  -  21 Dec 2023 07:00  --------------------------------------------------------  IN: 400 mL / OUT: 1102 mL / NET: -702 mL        Appearance: Normal	  Cardiovascular: Normal S1 S2,RR  Respiratory: diminished   Gastrointestinal:  Soft, Non-tender, + BS	  Extremities: Normal range of motion, No clubbing, cyanosis or edema      Home Medications:  amLODIPine 10 mg oral tablet: 1 tab(s) orally once a day (13 Dec 2023 18:52)  calcitriol 0.5 mcg oral capsule: 1 cap(s) orally once a day (13 Dec 2023 18:52)  cholecalciferol oral tablet: 1000 unit(s) orally once a day (13 Dec 2023 18:52)  folic acid 1 mg oral tablet: 1 tab(s) orally once a day (13 Dec 2023 18:52)  metoprolol succinate 100 mg oral tablet, extended release: 1 tab(s) orally once a day (13 Dec 2023 18:52)      MEDICATIONS  (STANDING):  acetaminophen   IVPB .. 1000 milliGRAM(s) IV Intermittent once  calcitriol   Capsule 1 MICROGram(s) Oral <User Schedule>  chlorhexidine 2% Cloths 1 Application(s) Topical daily  heparin   Injectable 5000 Unit(s) SubCutaneous every 8 hours  heparin   Injectable. 1000 Unit(s) Dialysis. every 1 hour  metoprolol succinate ER 50 milliGRAM(s) Oral daily      TELEMETRY: 	nsr- sinus tach hr 110    ECG:  	  RADIOLOGY:   DIAGNOSTIC TESTING:  [ ] Echocardiogram:  [ ]  Catheterization:  [ ] Stress Test:    OTHER: 	    LABS:	 	                            12.5   6.30  )-----------( 218      ( 21 Dec 2023 06:00 )             39.7     12-21    136  |  96<L>  |  61<H>  ----------------------------<  80  5.1   |  20<L>  |  6.35<H>    Ca    8.8      21 Dec 2023 06:00  Phos  5.7     12-21  Mg     2.00     12-21    TPro  6.8  /  Alb  3.4  /  TBili  0.4  /  DBili  x   /  AST  7   /  ALT  13  /  AlkPhos  116  12-20

## 2023-12-21 NOTE — DISCHARGE NOTE PROVIDER - NSDCMRMEDTOKEN_GEN_ALL_CORE_FT
albuterol 90 mcg/inh inhalation aerosol: 2 puff(s) inhaled every 6 hours, As needed, Shortness of Breath and/or Wheezing  amLODIPine 10 mg oral tablet: 1 tab(s) orally once a day  calcitriol 0.5 mcg oral capsule: 1 cap(s) orally once a day  calcium acetate 667 mg oral tablet: 1 tab(s) orally 3 times a day   cholecalciferol oral tablet: 1000 unit(s) orally once a day  folic acid 1 mg oral tablet: 1 tab(s) orally once a day  hydrALAZINE 25 mg oral tablet: 1 tab(s) orally 4 times a day  metoprolol succinate 100 mg oral tablet, extended release: 1 tab(s) orally once a day   albuterol 90 mcg/inh inhalation aerosol: 2 puff(s) inhaled every 6 hours as needed for , Shortness of Breath and/or Wheezing  calcitriol 0.5 mcg oral capsule: 2 cap(s) orally 3 times a week Monday, Wednesday, Friday  cholecalciferol oral tablet: 1000 unit(s) orally once a day  fluticasone 50 mcg/inh nasal spray: 1 spray(s) nasal 2 times a day  folic acid 1 mg oral tablet: 1 tab(s) orally once a day  loratadine 10 mg oral tablet: 1 tab(s) orally once a day  metoprolol succinate 100 mg oral tablet, extended release: 1 tab(s) orally once a day  sevelamer carbonate 800 mg oral tablet: 1 tab(s) orally 3 times a day (with meals)

## 2023-12-21 NOTE — PROGRESS NOTE ADULT - ASSESSMENT
72 y/o F PMhx HTN, OA, chronic migraine and ESRD on HD (MWF) who presented w/ cough and SOB.    COVID  saturating well on RA  d-dimer elevated, CTA negative for PE, LE US negative for DVT  s/p remdesivir x 3 days completed 12/16  symptoms improved  s/p tunneled catheter exchange 12/20    Recommendations  isolation per infection control policy   supportive care- flonase, mucinex, anti-tussive, lozenge    Tony Bright M.D.  [facility], Division of Infectious Diseases  [phone]  After 5pm on weekdays and all day on weekends - please call [phone]  74 y/o F PMhx HTN, OA, chronic migraine and ESRD on HD (MWF) who presented w/ cough and SOB.    COVID  saturating well on RA  d-dimer elevated, CTA negative for PE, LE US negative for DVT  s/p remdesivir x 3 days completed 12/16  symptoms improved  s/p tunneled catheter exchange 12/20    Recommendations  isolation per infection control policy   supportive care- flonase, mucinex, anti-tussive, lozenge    Tony Bright M.D.  [facility], Division of Infectious Diseases  [phone]  After 5pm on weekdays and all day on weekends - please call [phone]

## 2023-12-21 NOTE — DISCHARGE NOTE PROVIDER - HOSPITAL COURSE
72 yo F with PMhx of HTN, PE (not on AC), OA, chronic migraine  and ESRD on HD (MW) presents to the ED with cough and SOB, found to have COVID-19.     ECHO 4/23/22 : nl lV  sys fx EF 55-60%  stress  11/14/23 normal   EF% during stress is 64 %    a/p   72 yo F with PMhx of HTN, PE (not on AC), OA, chronic migraine  and ESRD on HD (MW) presents to the ED with cough/ SOB/ chest pain, + covid     #Atypical chest pain   -msk- secondary to cough   -HS trop neg x 1, no ischemic changes to ecg   -recent stress  11/14/23 normal   EF% during stress is 64 %  -echo ok  -hx of PE- not on ac, elevated d dimer  -cta neg for PE  -le doppler neg for dvt    #htn   -bp stable but tachycardic with palps   -cont bb as ordered  #Covid 19  -managment per med   -sp remdesivir    -ID fu    #sob   -secondary to covid  -no chf on exam   -vol removal with hd  -cta neg for pe   -still on covid isolation, dcp poss for today   -consider cath as patient with cont dyspnea but will not be able to do until teusday given isolation  -can have as outpt if d/c before    COVD+ - no need for dex on RA - s/p rem   -hx of PE- not on ac, elevated d dimer, CTA chest neg for PE, LE US: neg     HD - poor cath flow outpt- HD in the hospital on 12/15 tolerated w/o any complications with the catheter. plan for M/W/F HD and will assess arterial and venous pressures  -12/20 s/p permacath exchange   - Tachycardia - increase bb -  - monitor HR while on HD       On ___ this case was reviewed with  ____, the patient is medically stable and optimized for discharge. All medications were reviewed and prescriptions were sent to mutually agreed upon pharmacy. The patient agrees to follow up with providers as recommended.      72 yo F with PMhx of HTN, PE (not on AC), OA, chronic migraine  and ESRD on HD (MWF) presents to the ED with cough and SOB, found to have COVID-19.     ECHO 4/23/22 : nl lV  sys fx EF 55-60%  stress  11/14/23 normal   EF% during stress is 64 %      #Atypical chest pain   -msk- secondary to cough   -HS trop neg x 1, no ischemic changes to ecg   -recent stress  11/14/23 normal   EF% during stress is 64 %  -echo ok  -hx of PE- not on ac, elevated d dimer  -cta neg for PE  -le doppler neg for dvt    #htn   -bp stable but tachycardic with palps   -cont bb as ordered  #Covid 19  -managment per med   -sp remdesivir    -ID fu    #sob   -secondary to covid  -no chf on exam   -vol removal with hd  -cta neg for pe   -still on covid isolation, dcp poss for today   -consider cath as patient with cont dyspnea but will not be able to do until teusday given isolation  -can have as outpt if d/c before    COVD+ - no need for dex on RA - s/p rem   -hx of PE- not on ac, elevated d dimer, CTA chest neg for PE, LE US: neg     HD - poor cath flow outpt- HD in the hospital on 12/15 tolerated w/o any complications with the catheter. plan for M/W/F HD and will assess arterial and venous pressures  -12/20 s/p permacath exchange   - Tachycardia - increase bb -  - monitor HR while on HD       On 12/22/23 this case was reviewed with Dr. Blackman, the patient is medically stable and optimized for discharge. All medications were reviewed and prescriptions were sent to mutually agreed upon pharmacy. The patient agrees to follow up with providers as recommended.

## 2023-12-21 NOTE — DISCHARGE NOTE PROVIDER - NSDCCPCAREPLAN_GEN_ALL_CORE_FT
PRINCIPAL DISCHARGE DIAGNOSIS  Diagnosis: SOB (shortness of breath)  Assessment and Plan of Treatment: Likely due to COVID-19. You were diagnosed with COVID-19 on 12/13/23. You were treated with remdesivir. Please follow your local COVID guidelines for isolation and masking. Follow up with your primary care provider in 1-2 weeks. Continue to take your medications as prescribed. If you have fevers you may take Tylenol. If you develop shortness of breath or difficulty breathing, please follow up at your nearest urgent care or emergency room.        SECONDARY DISCHARGE DIAGNOSES  Diagnosis: ESRD on dialysis  Assessment and Plan of Treatment: Please follow up with your nephrologist for management, and continue your schedule hemodialysis.      Diagnosis: 2019 novel coronavirus disease (COVID-19)  Assessment and Plan of Treatment:     Diagnosis: Essential hypertension  Assessment and Plan of Treatment: Continue blood pressure medication regimen as directed. Monitor for any visual changes, headaches or dizziness.  Monitor blood pressure regularly.  Follow up with your primary care provider for further management for high blood pressure.

## 2023-12-21 NOTE — PROGRESS NOTE ADULT - SUBJECTIVE AND OBJECTIVE BOX
I will be away from 12/22/2023 to 01/02/2023.  Dr Jay Guerra will be covering for me at [phone].    PATIENT SEEN AND EXAMINED BY ELIZABETH MICHAEL M.D. ON :-  12/21/23  DATE OF SERVICE:   12/21/23          Interim events noted,Labs ,Radiological studies and Cardiology tests reviewed .  DISCUSSED WITH ACP/MEDICAL RESIDENT ON PLAN OF CARE.      MR#[id number]  PATIENT NAME:[facility] COURSE: HPI:  Interventional Radiology  Pre-Procedure Note    This is a 73y  Female  presenting for HD catheter evaluation and exchange    HPI:  74 yo F with PMhx of HTN, PE (not on AC), OA, chronic migraine  and ESRD on HD (MWF) presents to the ED with cough and SOB. Pt reports that she has been feeling weak for the last couple of days. She has a productive cough. Her chest hurts whenever she coughs. She also has runny nose, myalgias, and SOB with exertion. Denies any associated fever, chills, chest pain or LE edema. She went to HD today and had to stop HD due to weakness/SOB. She was then sent to the ED.   In the ED, her vitals were notable tachycardia. Labs were notable for elevated BUN/Scr, elevated ALP, AST. RVP was positive for SARSCOV2. CXR showed clear lungs.  (13 Dec 2023 18:47)    Pt reports poor flow from HD catheter for last coupe of weeks. Per patient catheter was placed at outside facility in July of this year.     PAST MEDICAL & SURGICAL HISTORY:  Osteoarthritis      Pulmonary embolism      Joint infection      Dysfunctional uterine bleeding      Obesity      Essential hypertension  Hypertension      Obstructive sleep apnea syndrome  Obstructive sleep apnea      History of pulmonary embolism  2012 s/p IVC filter      Arthropathy  Arthritis      Migraine  Migraines      Morbid obesity      ESRD (end stage renal disease)      Total knee replacement status      Status post hysterectomy      Status post cholecystectomy      Other acquired absence of organ  History of cholecystectomy      Status post total hysterectomy  partial      History of total knee replacement  with revisions x 3          Social History:     FAMILY HISTORY:  FH: type 2 diabetes mellitus    FHx: hypertension        Allergies: trimethoprim (Other; Rash)  sulfa drugs (Other; Rash)  Sulfur (Unknown)      Current Medications: acetaminophen     Tablet .. 650 milliGRAM(s) Oral every 6 hours PRN  benzocaine/menthol Lozenge 1 Lozenge Oral every 4 hours PRN  benzonatate 100 milliGRAM(s) Oral every 8 hours PRN  calcitriol   Capsule 1 MICROGram(s) Oral <User Schedule>  chlorhexidine 2% Cloths 1 Application(s) Topical daily  guaifenesin/dextromethorphan Oral Liquid 10 milliLiter(s) Oral every 4 hours PRN  heparin   Injectable 5000 Unit(s) SubCutaneous every 8 hours  heparin   Injectable. 2000 Unit(s) Dialysis. once  heparin   Injectable. 1000 Unit(s) Dialysis. every 1 hour  melatonin 3 milliGRAM(s) Oral at bedtime PRN  ondansetron Injectable 4 milliGRAM(s) IV Push every 8 hours PRN  sodium chloride 0.9% Bolus. 100 milliLiter(s) IV Bolus every 5 minutes PRN      Labs:                         13.3   6.75  )-----------( 209      ( 19 Dec 2023 07:30 )             41.3       12-19    136  |  97<L>  |  46<H>  ----------------------------<  83  4.5   |  20<L>  |  5.11<H>    Ca    9.1      19 Dec 2023 07:30  Phos  4.3     12-19  Mg     2.00     12-19        HCG:       Assessment/Plan:   This is a 73y Female  presents with poor flow from HD catheter.  Patient presents to IR for evaluation and exchange, possible venogram, possible angioplasty.  Procedure/ risks/ benefits/ goals/ alternatives were explained. All questions answered. Informed content obtained from patient. Consent placed in chart.      (20 Dec 2023 13:54)      INTERIM EVENTS:Patient seen at bedside ,interim events noted.      PMH -reviewed admission note, no change since admission  HEART FAILURE: Acute[ ]Chronic[ ] Systolic[ ] Diastolic[ ] Combined Systolic and Diastolic[ ]  CAD[ ] CABG[ ] PCI[ ]  DEVICES[ ] PPM[ ] ICD[ ] ILR[ ]  ATRIAL FIBRILLATION[ ] Paroxysmal[ ] Permanent[ ] CHADS2-[  ]  OC[ ] CKD1[ ] CKD2[ ] CKD3[ ] CKD4[ ] ESRD[ ]  COPD[ ] HTN[ ]   DM[ ] Type1[ ] Type 2[ ]   CVA[ ] Paresis[ ]    AMBULATION: Assisted[ ] Cane/walker[ ] Independent[ ]    MEDICATIONS  (STANDING):  acetaminophen   IVPB .. 1000 milliGRAM(s) IV Intermittent once  calcitriol   Capsule 1 MICROGram(s) Oral <User Schedule>  chlorhexidine 2% Cloths 1 Application(s) Topical daily  fluticasone propionate 50 MICROgram(s)/spray Nasal Spray 1 Spray(s) Both Nostrils two times a day  heparin   Injectable 5000 Unit(s) SubCutaneous every 8 hours  loratadine 10 milliGRAM(s) Oral daily  metoprolol succinate  milliGRAM(s) Oral daily    MEDICATIONS  (PRN):  acetaminophen     Tablet .. 650 milliGRAM(s) Oral every 6 hours PRN Temp greater or equal to 38C (100.4F), Mild Pain (1 - 3)  benzocaine/menthol Lozenge 1 Lozenge Oral every 4 hours PRN Sore Throat  benzonatate 100 milliGRAM(s) Oral every 8 hours PRN Cough  guaifenesin/dextromethorphan Oral Liquid 10 milliLiter(s) Oral every 4 hours PRN Cough  melatonin 3 milliGRAM(s) Oral at bedtime PRN Insomnia  ondansetron Injectable 4 milliGRAM(s) IV Push every 8 hours PRN Nausea and/or Vomiting  sodium chloride 0.9% Bolus. 100 milliLiter(s) IV Bolus every 5 minutes PRN SBP LESS THAN or EQUAL to 90 mmHg            REVIEW OF SYSTEMS:  Constitutional: [ ] fever, [ ]weight loss,  [ ]fatigue [ ]weight gain  Eyes: [ ] visual changes  Respiratory: [ ]shortness of breath;  [ ] cough, [ ]wheezing, [ ]chills, [ ]hemoptysis  Cardiovascular: [ ] chest pain, [ ]palpitations, [ ]dizziness,  [ ]leg swelling[ ]orthopnea[ ]PND  Gastrointestinal: [ ] abdominal pain, [ ]nausea, [ ]vomiting,  [ ]diarrhea [ ]Constipation [ ]Melena  Genitourinary: [ ] dysuria, [ ] hematuria [ ]Torres  Neurologic: [ ] headaches [ ] tremors[ ]weakness [ ]Paralysis Right[ ] Left[ ]  Skin: [ ] itching, [ ]burning, [ ] rashes  Endocrine: [ ] heat or cold intolerance  Musculoskeletal: [ ] joint pain or swelling; [ ] muscle, back, or extremity pain  Psychiatric: [ ] depression, [ ]anxiety, [ ]mood swings, or [ ]difficulty sleeping  Hematologic: [ ] easy bruising, [ ] bleeding gums    [ ] All remaining systems negative except as per above.   [ ]Unable to obtain.  [x] No change in ROS since admission      Vital Signs Last 24 Hrs  T(C): 36.9 (21 Dec 2023 19:57), Max: 36.9 (21 Dec 2023 19:57)  T(F): 98.4 (21 Dec 2023 19:57), Max: 98.4 (21 Dec 2023 19:57)  HR: 96 (21 Dec 2023 19:57) (96 - 97)  BP: 102/56 (21 Dec 2023 19:57) (100/63 - 114/63)  BP(mean): --  RR: 18 (21 Dec 2023 19:57) (18 - 19)  SpO2: 100% (21 Dec 2023 19:57) (98% - 100%)    Parameters below as of 21 Dec 2023 19:57  Patient On (Oxygen Delivery Method): room air      I&O's Summary    20 Dec 2023 07:01  -  21 Dec 2023 07:00  --------------------------------------------------------  IN: 400 mL / OUT: 1102 mL / NET: -702 mL        PHYSICAL EXAM:  General: No acute distress BMI-  HEENT: EOMI, PERRL  Neck: Supple, [ ] JVD  Lungs: Equal air entry bilaterally; [ ] rales [ ] wheezing [ ] rhonchi  Heart: Regular rate and rhythm; [x ] murmur   2/6 [ x] systolic [ ] diastolic [ ] radiation[ ] rubs [ ]  gallops  Abdomen: Nontender, bowel sounds present  Extremities: No clubbing, cyanosis, [ ] edema [ ]Pulses  equal and intact  Nervous system:  Alert & Oriented X3, no focal deficits  Psychiatric: Normal affect  Skin: No rashes or lesions    LABS:  12-21    136  |  96<L>  |  61<H>  ----------------------------<  80  5.1   |  20<L>  |  6.35<H>    Ca    8.8      21 Dec 2023 06:00  Phos  5.7     12-21  Mg     2.00     12-21    TPro  6.8  /  Alb  3.4  /  TBili  0.4  /  DBili  x   /  AST  7   /  ALT  13  /  AlkPhos  116  12-20    Creatinine Trend: 6.35<--, 6.51<--, 5.11<--, 7.41<--, 6.32<--, 4.53<--                        12.5   6.30  )-----------( 218      ( 21 Dec 2023 06:00 )             39.7                  I will be away from 12/22/2023 to 01/02/2023.  Dr Jay Guerra will be covering for me at [phone].    PATIENT SEEN AND EXAMINED BY ELIZABETH MICHAEL M.D. ON :-  12/21/23  DATE OF SERVICE:   12/21/23          Interim events noted,Labs ,Radiological studies and Cardiology tests reviewed .  DISCUSSED WITH ACP/MEDICAL RESIDENT ON PLAN OF CARE.      MR#[id number]  PATIENT NAME:[facility] COURSE: HPI:  Interventional Radiology  Pre-Procedure Note    This is a 73y  Female  presenting for HD catheter evaluation and exchange    HPI:  72 yo F with PMhx of HTN, PE (not on AC), OA, chronic migraine  and ESRD on HD (MWF) presents to the ED with cough and SOB. Pt reports that she has been feeling weak for the last couple of days. She has a productive cough. Her chest hurts whenever she coughs. She also has runny nose, myalgias, and SOB with exertion. Denies any associated fever, chills, chest pain or LE edema. She went to HD today and had to stop HD due to weakness/SOB. She was then sent to the ED.   In the ED, her vitals were notable tachycardia. Labs were notable for elevated BUN/Scr, elevated ALP, AST. RVP was positive for SARSCOV2. CXR showed clear lungs.  (13 Dec 2023 18:47)    Pt reports poor flow from HD catheter for last coupe of weeks. Per patient catheter was placed at outside facility in July of this year.     PAST MEDICAL & SURGICAL HISTORY:  Osteoarthritis      Pulmonary embolism      Joint infection      Dysfunctional uterine bleeding      Obesity      Essential hypertension  Hypertension      Obstructive sleep apnea syndrome  Obstructive sleep apnea      History of pulmonary embolism  2012 s/p IVC filter      Arthropathy  Arthritis      Migraine  Migraines      Morbid obesity      ESRD (end stage renal disease)      Total knee replacement status      Status post hysterectomy      Status post cholecystectomy      Other acquired absence of organ  History of cholecystectomy      Status post total hysterectomy  partial      History of total knee replacement  with revisions x 3          Social History:     FAMILY HISTORY:  FH: type 2 diabetes mellitus    FHx: hypertension        Allergies: trimethoprim (Other; Rash)  sulfa drugs (Other; Rash)  Sulfur (Unknown)      Current Medications: acetaminophen     Tablet .. 650 milliGRAM(s) Oral every 6 hours PRN  benzocaine/menthol Lozenge 1 Lozenge Oral every 4 hours PRN  benzonatate 100 milliGRAM(s) Oral every 8 hours PRN  calcitriol   Capsule 1 MICROGram(s) Oral <User Schedule>  chlorhexidine 2% Cloths 1 Application(s) Topical daily  guaifenesin/dextromethorphan Oral Liquid 10 milliLiter(s) Oral every 4 hours PRN  heparin   Injectable 5000 Unit(s) SubCutaneous every 8 hours  heparin   Injectable. 2000 Unit(s) Dialysis. once  heparin   Injectable. 1000 Unit(s) Dialysis. every 1 hour  melatonin 3 milliGRAM(s) Oral at bedtime PRN  ondansetron Injectable 4 milliGRAM(s) IV Push every 8 hours PRN  sodium chloride 0.9% Bolus. 100 milliLiter(s) IV Bolus every 5 minutes PRN      Labs:                         13.3   6.75  )-----------( 209      ( 19 Dec 2023 07:30 )             41.3       12-19    136  |  97<L>  |  46<H>  ----------------------------<  83  4.5   |  20<L>  |  5.11<H>    Ca    9.1      19 Dec 2023 07:30  Phos  4.3     12-19  Mg     2.00     12-19        HCG:       Assessment/Plan:   This is a 73y Female  presents with poor flow from HD catheter.  Patient presents to IR for evaluation and exchange, possible venogram, possible angioplasty.  Procedure/ risks/ benefits/ goals/ alternatives were explained. All questions answered. Informed content obtained from patient. Consent placed in chart.      (20 Dec 2023 13:54)      INTERIM EVENTS:Patient seen at bedside ,interim events noted.      PMH -reviewed admission note, no change since admission  HEART FAILURE: Acute[ ]Chronic[ ] Systolic[ ] Diastolic[ ] Combined Systolic and Diastolic[ ]  CAD[ ] CABG[ ] PCI[ ]  DEVICES[ ] PPM[ ] ICD[ ] ILR[ ]  ATRIAL FIBRILLATION[ ] Paroxysmal[ ] Permanent[ ] CHADS2-[  ]  OC[ ] CKD1[ ] CKD2[ ] CKD3[ ] CKD4[ ] ESRD[ ]  COPD[ ] HTN[ ]   DM[ ] Type1[ ] Type 2[ ]   CVA[ ] Paresis[ ]    AMBULATION: Assisted[ ] Cane/walker[ ] Independent[ ]    MEDICATIONS  (STANDING):  acetaminophen   IVPB .. 1000 milliGRAM(s) IV Intermittent once  calcitriol   Capsule 1 MICROGram(s) Oral <User Schedule>  chlorhexidine 2% Cloths 1 Application(s) Topical daily  fluticasone propionate 50 MICROgram(s)/spray Nasal Spray 1 Spray(s) Both Nostrils two times a day  heparin   Injectable 5000 Unit(s) SubCutaneous every 8 hours  loratadine 10 milliGRAM(s) Oral daily  metoprolol succinate  milliGRAM(s) Oral daily    MEDICATIONS  (PRN):  acetaminophen     Tablet .. 650 milliGRAM(s) Oral every 6 hours PRN Temp greater or equal to 38C (100.4F), Mild Pain (1 - 3)  benzocaine/menthol Lozenge 1 Lozenge Oral every 4 hours PRN Sore Throat  benzonatate 100 milliGRAM(s) Oral every 8 hours PRN Cough  guaifenesin/dextromethorphan Oral Liquid 10 milliLiter(s) Oral every 4 hours PRN Cough  melatonin 3 milliGRAM(s) Oral at bedtime PRN Insomnia  ondansetron Injectable 4 milliGRAM(s) IV Push every 8 hours PRN Nausea and/or Vomiting  sodium chloride 0.9% Bolus. 100 milliLiter(s) IV Bolus every 5 minutes PRN SBP LESS THAN or EQUAL to 90 mmHg            REVIEW OF SYSTEMS:  Constitutional: [ ] fever, [ ]weight loss,  [ ]fatigue [ ]weight gain  Eyes: [ ] visual changes  Respiratory: [ ]shortness of breath;  [ ] cough, [ ]wheezing, [ ]chills, [ ]hemoptysis  Cardiovascular: [ ] chest pain, [ ]palpitations, [ ]dizziness,  [ ]leg swelling[ ]orthopnea[ ]PND  Gastrointestinal: [ ] abdominal pain, [ ]nausea, [ ]vomiting,  [ ]diarrhea [ ]Constipation [ ]Melena  Genitourinary: [ ] dysuria, [ ] hematuria [ ]Torres  Neurologic: [ ] headaches [ ] tremors[ ]weakness [ ]Paralysis Right[ ] Left[ ]  Skin: [ ] itching, [ ]burning, [ ] rashes  Endocrine: [ ] heat or cold intolerance  Musculoskeletal: [ ] joint pain or swelling; [ ] muscle, back, or extremity pain  Psychiatric: [ ] depression, [ ]anxiety, [ ]mood swings, or [ ]difficulty sleeping  Hematologic: [ ] easy bruising, [ ] bleeding gums    [ ] All remaining systems negative except as per above.   [ ]Unable to obtain.  [x] No change in ROS since admission      Vital Signs Last 24 Hrs  T(C): 36.9 (21 Dec 2023 19:57), Max: 36.9 (21 Dec 2023 19:57)  T(F): 98.4 (21 Dec 2023 19:57), Max: 98.4 (21 Dec 2023 19:57)  HR: 96 (21 Dec 2023 19:57) (96 - 97)  BP: 102/56 (21 Dec 2023 19:57) (100/63 - 114/63)  BP(mean): --  RR: 18 (21 Dec 2023 19:57) (18 - 19)  SpO2: 100% (21 Dec 2023 19:57) (98% - 100%)    Parameters below as of 21 Dec 2023 19:57  Patient On (Oxygen Delivery Method): room air      I&O's Summary    20 Dec 2023 07:01  -  21 Dec 2023 07:00  --------------------------------------------------------  IN: 400 mL / OUT: 1102 mL / NET: -702 mL        PHYSICAL EXAM:  General: No acute distress BMI-  HEENT: EOMI, PERRL  Neck: Supple, [ ] JVD  Lungs: Equal air entry bilaterally; [ ] rales [ ] wheezing [ ] rhonchi  Heart: Regular rate and rhythm; [x ] murmur   2/6 [ x] systolic [ ] diastolic [ ] radiation[ ] rubs [ ]  gallops  Abdomen: Nontender, bowel sounds present  Extremities: No clubbing, cyanosis, [ ] edema [ ]Pulses  equal and intact  Nervous system:  Alert & Oriented X3, no focal deficits  Psychiatric: Normal affect  Skin: No rashes or lesions    LABS:  12-21    136  |  96<L>  |  61<H>  ----------------------------<  80  5.1   |  20<L>  |  6.35<H>    Ca    8.8      21 Dec 2023 06:00  Phos  5.7     12-21  Mg     2.00     12-21    TPro  6.8  /  Alb  3.4  /  TBili  0.4  /  DBili  x   /  AST  7   /  ALT  13  /  AlkPhos  116  12-20    Creatinine Trend: 6.35<--, 6.51<--, 5.11<--, 7.41<--, 6.32<--, 4.53<--                        12.5   6.30  )-----------( 218      ( 21 Dec 2023 06:00 )             39.7

## 2023-12-21 NOTE — DISCHARGE NOTE PROVIDER - PROVIDER TOKENS
PROVIDER:[TOKEN:[[id number]]] PROVIDER:[TOKEN:[[id number]]] PROVIDER:[TOKEN:[[id number]]],PROVIDER:[TOKEN:[[id number]],FOLLOWUP:[2 weeks]],PROVIDER:[TOKEN:[[id number]],FOLLOWUP:[2 weeks]] PROVIDER:[TOKEN:[[id number]]],PROVIDER:[TOKEN:[[id number]],FOLLOWUP:[2 weeks]],PROVIDER:[TOKEN:[[id number]],FOLLOWUP:[2 weeks]]

## 2023-12-21 NOTE — PROGRESS NOTE ADULT - SUBJECTIVE AND OBJECTIVE BOX
PULMONARY PROGRESS NOTE    MELISSA ARREOLA  MRN-[id number]    Patient is a 73y old  Female who presents with a chief complaint of Shortness of breath     (20 Dec 2023 12:50)      HPI:  - eating breakfast  still feels palpitations   -    ROS:   -    ACTIVE MEDICATION LIST:  MEDICATIONS  (STANDING):  acetaminophen   IVPB .. 1000 milliGRAM(s) IV Intermittent once  calcitriol   Capsule 1 MICROGram(s) Oral <User Schedule>  chlorhexidine 2% Cloths 1 Application(s) Topical daily  heparin   Injectable 5000 Unit(s) SubCutaneous every 8 hours  heparin   Injectable. 1000 Unit(s) Dialysis. every 1 hour  metoprolol succinate ER 50 milliGRAM(s) Oral daily    MEDICATIONS  (PRN):  acetaminophen     Tablet .. 650 milliGRAM(s) Oral every 6 hours PRN Temp greater or equal to 38C (100.4F), Mild Pain (1 - 3)  benzocaine/menthol Lozenge 1 Lozenge Oral every 4 hours PRN Sore Throat  benzonatate 100 milliGRAM(s) Oral every 8 hours PRN Cough  guaifenesin/dextromethorphan Oral Liquid 10 milliLiter(s) Oral every 4 hours PRN Cough  melatonin 3 milliGRAM(s) Oral at bedtime PRN Insomnia  ondansetron Injectable 4 milliGRAM(s) IV Push every 8 hours PRN Nausea and/or Vomiting  sodium chloride 0.9% Bolus. 100 milliLiter(s) IV Bolus every 5 minutes PRN SBP LESS THAN or EQUAL to 90 mmHg      EXAM:  Vital Signs Last 24 Hrs  T(C): 36.5 (21 Dec 2023 06:30), Max: 37.2 (20 Dec 2023 21:00)  T(F): 97.7 (21 Dec 2023 06:30), Max: 99 (20 Dec 2023 21:00)  HR: 112 (20 Dec 2023 21:00) (104 - 122)  BP: 114/63 (21 Dec 2023 06:30) (110/65 - 162/88)  BP(mean): --  RR: 18 (20 Dec 2023 19:25) (17 - 18)  SpO2: 100% (21 Dec 2023 06:30) (100% - 100%)    Parameters below as of 21 Dec 2023 06:30  Patient On (Oxygen Delivery Method): room air        GENERAL: The patient is awake and alert in no apparent distress.     LUNGS:  respirations unlabored                             12.5   6.30  )-----------( 218      ( 21 Dec 2023 06:00 )             39.7       12-21    136  |  96<L>  |  61<H>  ----------------------------<  80  5.1   |  20<L>  |  6.35<H>    Ca    8.8      21 Dec 2023 06:00  Phos  5.7     12-21  Mg     2.00     12-21    TPro  6.8  /  Alb  3.4  /  TBili  0.4  /  DBili  x   /  AST  7   /  ALT  13  /  AlkPhos  116  12-20   < from: CT Angio Chest PE Protocol w/ IV Cont (12.15.23 @ 18:17) >    ACC: [id number] EXAM:  CT ANGIO CHEST PULM [facility]   ORDERED BY: SYLVESTER ORTIZ     PROCEDURE DATE:  12/15/2023          INTERPRETATION:  CLINICAL INFORMATION: Elevated d-dimer. Evaluate for PE.    COMPARISON: CT chest abdomen and pelvis 4 2/22/2022. CT chest 2/1/2023.    CONTRAST/COMPLICATIONS:  IV Contrast: Omnipaque 350  62 cc administered   38 cc discarded  Oral Contrast: NONE  Complications: None reported at time of study completion    PROCEDURE:  CT Angiography of the Chest.  Sagittal and coronal reformats were performed as well as 3D (MIP)   reconstructions.    FINDINGS:    LUNGS, AIRWAYS AND PLEURA: The central airways are patent. No pleural   effusion. The lungs are clear.    MEDIASTINUM AND SILVIA: No lymphadenopathy. Small calcified right hilar   nodes, likely sequela of old granulomatous disease.    HEART/VESSELS: There is good contrast bolus to the pulmonary arteries. No   pulmonary embolism. The great vessels are normal in size. The heart is   normal in size. No pericardial effusion.    CHEST WALL AND LOWER NECK: Stable thyroid goiter with retrosternal   extension. Similar-appearing rightward displacement of the trachea and   splaying of the bilateral brachiocephalic veins.    VISUALIZED UPPER ABDOMEN: Cholecystectomy. Mild pneumobilia, new from   previous exam.    BONES: No aggressive osseous lesion.    IMPRESSION:  No pulmonary embolism.    Mild left pneumobilia, new from February 2023; correlate with history of   interval procedure/intervention.    --- End of Report ---          RAMA CLEANING MD; Resident Radiologist  This document has been electronically signed.  LEONARDO CHU M.D., Attending Radiologist  This document has been electronically signed. Dec 16 2023  2:37PM    < end of copied text >      PROBLEM LIST:  73y Female with HEALTH ISSUES - PROBLEM Dx:  2019 novel coronavirus disease (COVID-19)    ESRD on dialysis    Medication management    Preventive measure    Essential hypertension              RECS:  suggest perles 100q 8hrs standing  flonase BID  antihistamine daily  PPI daily  outpatient pfts, and sleep study        Please call with any questions.    Lorna Downing DO  [facility] Pulmonary/Sleep Medicine  [phone]   PULMONARY PROGRESS NOTE    MELISSA ARREOLA  MRN-[id number]    Patient is a 73y old  Female who presents with a chief complaint of Shortness of breath     (20 Dec 2023 12:50)      HPI:  - eating breakfast  still feels palpitations   -    ROS:   -    ACTIVE MEDICATION LIST:  MEDICATIONS  (STANDING):  acetaminophen   IVPB .. 1000 milliGRAM(s) IV Intermittent once  calcitriol   Capsule 1 MICROGram(s) Oral <User Schedule>  chlorhexidine 2% Cloths 1 Application(s) Topical daily  heparin   Injectable 5000 Unit(s) SubCutaneous every 8 hours  heparin   Injectable. 1000 Unit(s) Dialysis. every 1 hour  metoprolol succinate ER 50 milliGRAM(s) Oral daily    MEDICATIONS  (PRN):  acetaminophen     Tablet .. 650 milliGRAM(s) Oral every 6 hours PRN Temp greater or equal to 38C (100.4F), Mild Pain (1 - 3)  benzocaine/menthol Lozenge 1 Lozenge Oral every 4 hours PRN Sore Throat  benzonatate 100 milliGRAM(s) Oral every 8 hours PRN Cough  guaifenesin/dextromethorphan Oral Liquid 10 milliLiter(s) Oral every 4 hours PRN Cough  melatonin 3 milliGRAM(s) Oral at bedtime PRN Insomnia  ondansetron Injectable 4 milliGRAM(s) IV Push every 8 hours PRN Nausea and/or Vomiting  sodium chloride 0.9% Bolus. 100 milliLiter(s) IV Bolus every 5 minutes PRN SBP LESS THAN or EQUAL to 90 mmHg      EXAM:  Vital Signs Last 24 Hrs  T(C): 36.5 (21 Dec 2023 06:30), Max: 37.2 (20 Dec 2023 21:00)  T(F): 97.7 (21 Dec 2023 06:30), Max: 99 (20 Dec 2023 21:00)  HR: 112 (20 Dec 2023 21:00) (104 - 122)  BP: 114/63 (21 Dec 2023 06:30) (110/65 - 162/88)  BP(mean): --  RR: 18 (20 Dec 2023 19:25) (17 - 18)  SpO2: 100% (21 Dec 2023 06:30) (100% - 100%)    Parameters below as of 21 Dec 2023 06:30  Patient On (Oxygen Delivery Method): room air        GENERAL: The patient is awake and alert in no apparent distress.     LUNGS:  respirations unlabored                             12.5   6.30  )-----------( 218      ( 21 Dec 2023 06:00 )             39.7       12-21    136  |  96<L>  |  61<H>  ----------------------------<  80  5.1   |  20<L>  |  6.35<H>    Ca    8.8      21 Dec 2023 06:00  Phos  5.7     12-21  Mg     2.00     12-21    TPro  6.8  /  Alb  3.4  /  TBili  0.4  /  DBili  x   /  AST  7   /  ALT  13  /  AlkPhos  116  12-20   < from: CT Angio Chest PE Protocol w/ IV Cont (12.15.23 @ 18:17) >    ACC: [id number] EXAM:  CT ANGIO CHEST PULM [facility]   ORDERED BY: SYLVESTER ORTIZ     PROCEDURE DATE:  12/15/2023          INTERPRETATION:  CLINICAL INFORMATION: Elevated d-dimer. Evaluate for PE.    COMPARISON: CT chest abdomen and pelvis 4 2/22/2022. CT chest 2/1/2023.    CONTRAST/COMPLICATIONS:  IV Contrast: Omnipaque 350  62 cc administered   38 cc discarded  Oral Contrast: NONE  Complications: None reported at time of study completion    PROCEDURE:  CT Angiography of the Chest.  Sagittal and coronal reformats were performed as well as 3D (MIP)   reconstructions.    FINDINGS:    LUNGS, AIRWAYS AND PLEURA: The central airways are patent. No pleural   effusion. The lungs are clear.    MEDIASTINUM AND SILVIA: No lymphadenopathy. Small calcified right hilar   nodes, likely sequela of old granulomatous disease.    HEART/VESSELS: There is good contrast bolus to the pulmonary arteries. No   pulmonary embolism. The great vessels are normal in size. The heart is   normal in size. No pericardial effusion.    CHEST WALL AND LOWER NECK: Stable thyroid goiter with retrosternal   extension. Similar-appearing rightward displacement of the trachea and   splaying of the bilateral brachiocephalic veins.    VISUALIZED UPPER ABDOMEN: Cholecystectomy. Mild pneumobilia, new from   previous exam.    BONES: No aggressive osseous lesion.    IMPRESSION:  No pulmonary embolism.    Mild left pneumobilia, new from February 2023; correlate with history of   interval procedure/intervention.    --- End of Report ---          RAMA CLEANING MD; Resident Radiologist  This document has been electronically signed.  LEONARDO CHU M.D., Attending Radiologist  This document has been electronically signed. Dec 16 2023  2:37PM    < end of copied text >      PROBLEM LIST:  73y Female with HEALTH ISSUES - PROBLEM Dx:  2019 novel coronavirus disease (COVID-19)    ESRD on dialysis    Medication management    Preventive measure    Essential hypertension              RECS:  suggest perles 100q 8hrs standing  flonase BID  antihistamine daily  PPI daily  outpatient pfts, and sleep study        Please call with any questions.    Lorna Downnig DO  [facility] Pulmonary/Sleep Medicine  [phone]

## 2023-12-21 NOTE — DISCHARGE NOTE PROVIDER - CARE PROVIDER_API CALL
Ankur Lee Mo  Internal Medicine  [address] [place] [address]  Phone: [phone]  Fax: [phone]  Follow Up Time:    Ankur Lee Mo  Internal Medicine  [address] [place] [address]  Phone: [phone]  Fax: [phone]  Follow Up Time:    Ankur Lee Mo  Internal Medicine  [address] [place] [address]  Phone: [phone]  Fax: [phone]  Follow Up Time:     Damir Blackman  Cardiology  [address] [place] [address]  Phone: [phone]  Fax: [phone]  Follow Up Time: 2 weeks    Pepito Castellanos  Pulmonary Disease  [address] [place] [address]  Phone: [phone]  Fax: [phone]  Follow Up Time: 2 weeks   Ankur Lee Mo  Internal Medicine  [address] [place] [address]  Phone: [phone]  Fax: [phone]  Follow Up Time:     Damir Blackman  Cardiology  [address] [place] [address]  Phone: [phone]  Fax: [phone]  Follow Up Time: 2 weeks    Pepito Castellanos  Pulmonary Disease  [address] [place] [address]  Phone: [phone]  Fax: [phone]  Follow Up Time: 2 weeks

## 2023-12-21 NOTE — PROGRESS NOTE ADULT - NUTRITIONAL ASSESSMENT
This patient has been assessed with a concern for Malnutrition and has been determined to have a diagnosis/diagnoses of Morbid obesity (BMI > 40).    This patient is being managed with:   Diet Renal Restrictions-  For patients receiving Renal Replacement - No Protein Restr No Conc K No Conc Phos Low Sodium  Entered: Dec 13 2023  6:37PM  

## 2023-12-21 NOTE — DISCHARGE NOTE PROVIDER - DETAILS OF MALNUTRITION DIAGNOSIS/DIAGNOSES
This patient has been assessed with a concern for Malnutrition and was treated during this hospitalization for the following Nutrition diagnosis/diagnoses:     -  12/20/2023: Morbid obesity (BMI > 40)

## 2023-12-21 NOTE — DISCHARGE NOTE PROVIDER - NSDCCPTREATMENT_GEN_ALL_CORE_FT
PRINCIPAL PROCEDURE  Procedure: Transthoracic echo  Findings and Treatment:   CONCLUSIONS:      1. Technically difficult study performed with the patient in the supine position.   2. Technically difficult image quality.   3. Left ventricular systolic function is normal with an ejection fraction of 68 % by Coronel's method of disks. There are no regional wall motion abnormalities seen.   4. The right ventricle is not well visualized.   5. Structurally normal mitral valve with normal leaflet excursion. There is calcification of the mitral valve annulus. There is trace mitral regurgitation.      SECONDARY PROCEDURE  Procedure: Nuclear medicine cardiopulmonary stress test  Findings and Treatment: Conclusions:   1. Normal myocardial perfusion scan, with no evidence of infarction or inducible ischemia.   2. Normal left ventricular regional wall motion.   3. The left ventricle is normal in function and normal in size. The left ventricular EF% during stress is 64 %. The stress end diastolic volume is 73 ml and systolic volume is 26 ml.   4. Normal right ventricular function.

## 2023-12-21 NOTE — PROGRESS NOTE ADULT - TIME BILLING
Agree with above NP note.  cv stable  cont current tx  no pe on cta  inc bb as above  will plan for cath r/l pre discharge given continue dyspnea

## 2023-12-21 NOTE — PROGRESS NOTE ADULT - SUBJECTIVE AND OBJECTIVE BOX
[facility], Division of Infectious Diseases  JAME Ashley Y. Patel, S. Shah, G. Deangelo  [phone]  ([phone] - weekdays after 5pm and weekends)    Name: MELISSA ARREOLA  Age/Gender: 73y Female  MRN: [id number]    Interval History:  Notes reviewed.   No concerning overnight events.  Afebrile.   reports URI symptoms slowing improving  had catheter exchange yesterday but states HD was cut short due to tachycardia    Allergies: trimethoprim (Other; Rash)  sulfa drugs (Other; Rash)  Sulfur (Unknown)      Objective:  Vitals:   T(F): 97.7 (12-21-23 @ 06:30), Max: 99 (12-20-23 @ 21:00)  HR: 112 (12-20-23 @ 21:00) (104 - 122)  BP: 114/63 (12-21-23 @ 06:30) (110/65 - 162/88)  RR: 18 (12-20-23 @ 19:25) (17 - 18)  SpO2: 100% (12-21-23 @ 06:30) (100% - 100%)  Physical Examination:  General: no acute distress  HEENT: anicteric  Cardio: S1, S2, normal rate, R chest tunneled HD catheter  Resp: clear to auscultation  Abd: soft, NT, ND  Ext: no LE edema  Skin: warm, dry    Laboratory Studies:  CBC:                       12.5   6.30  )-----------( 218      ( 21 Dec 2023 06:00 )             39.7     WBC Trend:  6.30 12-21-23 @ 06:00  3.64 12-20-23 @ 17:09  6.75 12-19-23 @ 07:30  7.02 12-18-23 @ 21:40  5.78 12-17-23 @ 06:25  5.15 12-16-23 @ 06:00  3.71 12-15-23 @ 06:00    CMP: 12-21    136  |  96<L>  |  61<H>  ----------------------------<  80  5.1   |  20<L>  |  6.35<H>    Ca    8.8      21 Dec 2023 06:00  Phos  5.7     12-21  Mg     2.00     12-21    TPro  6.8  /  Alb  3.4  /  TBili  0.4  /  DBili  x   /  AST  7   /  ALT  13  /  AlkPhos  116  12-20      LIVER FUNCTIONS - ( 20 Dec 2023 17:09 )  Alb: 3.4 g/dL / Pro: 6.8 g/dL / ALK PHOS: 116 U/L / ALT: 13 U/L / AST: 7 U/L / GGT: x             Urinalysis Basic - ( 21 Dec 2023 06:00 )    Color: x / Appearance: x / SG: x / pH: x  Gluc: 80 mg/dL / Ketone: x  / Bili: x / Urobili: x   Blood: x / Protein: x / Nitrite: x   Leuk Esterase: x / RBC: x / WBC x   Sq Epi: x / Non Sq Epi: x / Bacteria: x      Microbiology: reviewed     Culture - Nose (collected 12-13-23 @ 19:30)  Source: .Nose Nose  Final Report (12-15-23 @ 09:24):    No staphylococcus aureus isolated.    "PCR is more Sensitive for identifying MRSA/MSSA."        Radiology: reviewed     Medications:  acetaminophen     Tablet .. 650 milliGRAM(s) Oral every 6 hours PRN  acetaminophen   IVPB .. 1000 milliGRAM(s) IV Intermittent once  benzocaine/menthol Lozenge 1 Lozenge Oral every 4 hours PRN  benzonatate 100 milliGRAM(s) Oral every 8 hours PRN  calcitriol   Capsule 1 MICROGram(s) Oral <User Schedule>  chlorhexidine 2% Cloths 1 Application(s) Topical daily  fluticasone propionate 50 MICROgram(s)/spray Nasal Spray 1 Spray(s) Both Nostrils two times a day  guaifenesin/dextromethorphan Oral Liquid 10 milliLiter(s) Oral every 4 hours PRN  heparin   Injectable 5000 Unit(s) SubCutaneous every 8 hours  heparin   Injectable. 1000 Unit(s) Dialysis. every 1 hour  melatonin 3 milliGRAM(s) Oral at bedtime PRN  metoprolol succinate ER 50 milliGRAM(s) Oral daily  ondansetron Injectable 4 milliGRAM(s) IV Push every 8 hours PRN  sodium chloride 0.9% Bolus. 100 milliLiter(s) IV Bolus every 5 minutes PRN    Antimicrobials:   [facility], Division of Infectious Diseases  JAME Ashley Y. Patel, S. Shah, G. Deangelo  [phone]  ([phone] - weekdays after 5pm and weekends)    Name: MELISSA ARREOLA  Age/Gender: 73y Female  MRN: [id number]    Interval History:  Notes reviewed.   No concerning overnight events.  Afebrile.   reports URI symptoms slowing improving  had catheter exchange yesterday but states HD was cut short due to tachycardia    Allergies: trimethoprim (Other; Rash)  sulfa drugs (Other; Rash)  Sulfur (Unknown)      Objective:  Vitals:   T(F): 97.7 (12-21-23 @ 06:30), Max: 99 (12-20-23 @ 21:00)  HR: 112 (12-20-23 @ 21:00) (104 - 122)  BP: 114/63 (12-21-23 @ 06:30) (110/65 - 162/88)  RR: 18 (12-20-23 @ 19:25) (17 - 18)  SpO2: 100% (12-21-23 @ 06:30) (100% - 100%)  Physical Examination:  General: no acute distress  HEENT: anicteric  Cardio: S1, S2, normal rate, R chest tunneled HD catheter  Resp: clear to auscultation  Abd: soft, NT, ND  Ext: no LE edema  Skin: warm, dry    Laboratory Studies:  CBC:                       12.5   6.30  )-----------( 218      ( 21 Dec 2023 06:00 )             39.7     WBC Trend:  6.30 12-21-23 @ 06:00  3.64 12-20-23 @ 17:09  6.75 12-19-23 @ 07:30  7.02 12-18-23 @ 21:40  5.78 12-17-23 @ 06:25  5.15 12-16-23 @ 06:00  3.71 12-15-23 @ 06:00    CMP: 12-21    136  |  96<L>  |  61<H>  ----------------------------<  80  5.1   |  20<L>  |  6.35<H>    Ca    8.8      21 Dec 2023 06:00  Phos  5.7     12-21  Mg     2.00     12-21    TPro  6.8  /  Alb  3.4  /  TBili  0.4  /  DBili  x   /  AST  7   /  ALT  13  /  AlkPhos  116  12-20      LIVER FUNCTIONS - ( 20 Dec 2023 17:09 )  Alb: 3.4 g/dL / Pro: 6.8 g/dL / ALK PHOS: 116 U/L / ALT: 13 U/L / AST: 7 U/L / GGT: x             Urinalysis Basic - ( 21 Dec 2023 06:00 )    Color: x / Appearance: x / SG: x / pH: x  Gluc: 80 mg/dL / Ketone: x  / Bili: x / Urobili: x   Blood: x / Protein: x / Nitrite: x   Leuk Esterase: x / RBC: x / WBC x   Sq Epi: x / Non Sq Epi: x / Bacteria: x      Microbiology: reviewed     Culture - Nose (collected 12-13-23 @ 19:30)  Source: .Nose Nose  Final Report (12-15-23 @ 09:24):    No staphylococcus aureus isolated.    "PCR is more Sensitive for identifying MRSA/MSSA."        Radiology: reviewed     Medications:  acetaminophen     Tablet .. 650 milliGRAM(s) Oral every 6 hours PRN  acetaminophen   IVPB .. 1000 milliGRAM(s) IV Intermittent once  benzocaine/menthol Lozenge 1 Lozenge Oral every 4 hours PRN  benzonatate 100 milliGRAM(s) Oral every 8 hours PRN  calcitriol   Capsule 1 MICROGram(s) Oral <User Schedule>  chlorhexidine 2% Cloths 1 Application(s) Topical daily  fluticasone propionate 50 MICROgram(s)/spray Nasal Spray 1 Spray(s) Both Nostrils two times a day  guaifenesin/dextromethorphan Oral Liquid 10 milliLiter(s) Oral every 4 hours PRN  heparin   Injectable 5000 Unit(s) SubCutaneous every 8 hours  heparin   Injectable. 1000 Unit(s) Dialysis. every 1 hour  melatonin 3 milliGRAM(s) Oral at bedtime PRN  metoprolol succinate ER 50 milliGRAM(s) Oral daily  ondansetron Injectable 4 milliGRAM(s) IV Push every 8 hours PRN  sodium chloride 0.9% Bolus. 100 milliLiter(s) IV Bolus every 5 minutes PRN    Antimicrobials:

## 2023-12-21 NOTE — PROGRESS NOTE ADULT - SUBJECTIVE AND OBJECTIVE BOX
[facility] NEPHROLOGY- PROGRESS NOTE    73y Female with history of ESRD on HD presents with SOB. Nephrology consulted for ESRD status.    S/P TDC exchange by IR on 12/20.    REVIEW OF SYSTEMS:  Gen: no fevers  Cards: no chest pain  Resp: + dyspnea, + cough  GI: no nausea or vomiting or diarrhea  Vascular: no LE edema    trimethoprim (Other; Rash)  sulfa drugs (Other; Rash)  Sulfur (Unknown)      Hospital Medications: Medications reviewed        VITALS:  T(F): 98 (12-21-23 @ 12:55), Max: 99 (12-20-23 @ 21:00)  HR: 97 (12-21-23 @ 12:55)  BP: 100/63 (12-21-23 @ 12:55)  RR: 19 (12-21-23 @ 12:55)  SpO2: 98% (12-21-23 @ 12:55)  Wt(kg): --    12-20 @ 07:01  -  12-21 @ 07:00  --------------------------------------------------------  IN: 400 mL / OUT: 1102 mL / NET: -702 mL        PHYSICAL EXAM:    Gen: NAD, calm  Cards: RRR, +S1/S2, no M/G/R  Resp: CTA B/L  GI: soft, NT/ND, NABS  Vascular: no LE edema B/L, RIJ TDC intact        LABS:  12-21    136  |  96<L>  |  61<H>  ----------------------------<  80  5.1   |  20<L>  |  6.35<H>    Ca    8.8      21 Dec 2023 06:00  Phos  5.7     12-21  Mg     2.00     12-21    TPro  6.8  /  Alb  3.4  /  TBili  0.4  /  DBili      /  AST  7   /  ALT  13  /  AlkPhos  116  12-20    Creatinine Trend: 6.35 <--, 6.51 <--, 5.11 <--, 7.41 <--, 6.32 <--, 4.53 <--, 6.23 <--                        12.5   6.30  )-----------( 218      ( 21 Dec 2023 06:00 )             39.7     Urine Studies:  Urinalysis Basic - ( 21 Dec 2023 06:00 )    Color:  / Appearance:  / SG:  / pH:   Gluc: 80 mg/dL / Ketone:   / Bili:  / Urobili:    Blood:  / Protein:  / Nitrite:    Leuk Esterase:  / RBC:  / WBC    Sq Epi:  / Non Sq Epi:  / Bacteria:              [facility] NEPHROLOGY- PROGRESS NOTE    73y Female with history of ESRD on HD presents with SOB. Nephrology consulted for ESRD status.    S/P TDC exchange by IR on 12/20.    REVIEW OF SYSTEMS:  Gen: no fevers  Cards: no chest pain  Resp: + dyspnea, + cough  GI: no nausea or vomiting or diarrhea  Vascular: no LE edema    trimethoprim (Other; Rash)  sulfa drugs (Other; Rash)  Sulfur (Unknown)      Hospital Medications: Medications reviewed        VITALS:  T(F): 98 (12-21-23 @ 12:55), Max: 99 (12-20-23 @ 21:00)  HR: 97 (12-21-23 @ 12:55)  BP: 100/63 (12-21-23 @ 12:55)  RR: 19 (12-21-23 @ 12:55)  SpO2: 98% (12-21-23 @ 12:55)  Wt(kg): --    12-20 @ 07:01  -  12-21 @ 07:00  --------------------------------------------------------  IN: 400 mL / OUT: 1102 mL / NET: -702 mL        PHYSICAL EXAM:    Gen: NAD, calm  Cards: RRR, +S1/S2, no M/G/R  Resp: CTA B/L  GI: soft, NT/ND, NABS  Vascular: no LE edema B/L, RIJ TDC intact        LABS:  12-21    136  |  96<L>  |  61<H>  ----------------------------<  80  5.1   |  20<L>  |  6.35<H>    Ca    8.8      21 Dec 2023 06:00  Phos  5.7     12-21  Mg     2.00     12-21    TPro  6.8  /  Alb  3.4  /  TBili  0.4  /  DBili      /  AST  7   /  ALT  13  /  AlkPhos  116  12-20    Creatinine Trend: 6.35 <--, 6.51 <--, 5.11 <--, 7.41 <--, 6.32 <--, 4.53 <--, 6.23 <--                        12.5   6.30  )-----------( 218      ( 21 Dec 2023 06:00 )             39.7     Urine Studies:  Urinalysis Basic - ( 21 Dec 2023 06:00 )    Color:  / Appearance:  / SG:  / pH:   Gluc: 80 mg/dL / Ketone:   / Bili:  / Urobili:    Blood:  / Protein:  / Nitrite:    Leuk Esterase:  / RBC:  / WBC    Sq Epi:  / Non Sq Epi:  / Bacteria:

## 2023-12-21 NOTE — DISCHARGE NOTE PROVIDER - CARE PROVIDERS DIRECT ADDRESSES
,[email or web address],DirectAddress_Unknown,DirectAddress_Unknown ,[email or web address],DirectAddress_Unknown,[email or web address]

## 2023-12-22 ENCOUNTER — TRANSCRIPTION ENCOUNTER (OUTPATIENT)
Age: 73
End: 2023-12-22

## 2023-12-22 VITALS — HEART RATE: 98 BPM | DIASTOLIC BLOOD PRESSURE: 60 MMHG | SYSTOLIC BLOOD PRESSURE: 117 MMHG

## 2023-12-22 LAB
ANION GAP SERPL CALC-SCNC: 17 MMOL/L — HIGH (ref 7–14)
ANION GAP SERPL CALC-SCNC: 17 MMOL/L — HIGH (ref 7–14)
BUN SERPL-MCNC: 81 MG/DL — HIGH (ref 7–23)
BUN SERPL-MCNC: 81 MG/DL — HIGH (ref 7–23)
CALCIUM SERPL-MCNC: 8.5 MG/DL — SIGNIFICANT CHANGE UP (ref 8.4–10.5)
CALCIUM SERPL-MCNC: 8.5 MG/DL — SIGNIFICANT CHANGE UP (ref 8.4–10.5)
CHLORIDE SERPL-SCNC: 94 MMOL/L — LOW (ref 98–107)
CHLORIDE SERPL-SCNC: 94 MMOL/L — LOW (ref 98–107)
CO2 SERPL-SCNC: 23 MMOL/L — SIGNIFICANT CHANGE UP (ref 22–31)
CO2 SERPL-SCNC: 23 MMOL/L — SIGNIFICANT CHANGE UP (ref 22–31)
CREAT SERPL-MCNC: 7.94 MG/DL — HIGH (ref 0.5–1.3)
CREAT SERPL-MCNC: 7.94 MG/DL — HIGH (ref 0.5–1.3)
EGFR: 5 ML/MIN/1.73M2 — LOW
EGFR: 5 ML/MIN/1.73M2 — LOW
GLUCOSE SERPL-MCNC: 93 MG/DL — SIGNIFICANT CHANGE UP (ref 70–99)
GLUCOSE SERPL-MCNC: 93 MG/DL — SIGNIFICANT CHANGE UP (ref 70–99)
HCT VFR BLD CALC: 31 % — LOW (ref 34.5–45)
HCT VFR BLD CALC: 31 % — LOW (ref 34.5–45)
HGB BLD-MCNC: 10.2 G/DL — LOW (ref 11.5–15.5)
HGB BLD-MCNC: 10.2 G/DL — LOW (ref 11.5–15.5)
MAGNESIUM SERPL-MCNC: 1.9 MG/DL — SIGNIFICANT CHANGE UP (ref 1.6–2.6)
MAGNESIUM SERPL-MCNC: 1.9 MG/DL — SIGNIFICANT CHANGE UP (ref 1.6–2.6)
MCHC RBC-ENTMCNC: 31.9 PG — SIGNIFICANT CHANGE UP (ref 27–34)
MCHC RBC-ENTMCNC: 31.9 PG — SIGNIFICANT CHANGE UP (ref 27–34)
MCHC RBC-ENTMCNC: 32.9 GM/DL — SIGNIFICANT CHANGE UP (ref 32–36)
MCHC RBC-ENTMCNC: 32.9 GM/DL — SIGNIFICANT CHANGE UP (ref 32–36)
MCV RBC AUTO: 96.9 FL — SIGNIFICANT CHANGE UP (ref 80–100)
MCV RBC AUTO: 96.9 FL — SIGNIFICANT CHANGE UP (ref 80–100)
NRBC # BLD: 0 /100 WBCS — SIGNIFICANT CHANGE UP (ref 0–0)
NRBC # BLD: 0 /100 WBCS — SIGNIFICANT CHANGE UP (ref 0–0)
NRBC # FLD: 0 K/UL — SIGNIFICANT CHANGE UP (ref 0–0)
NRBC # FLD: 0 K/UL — SIGNIFICANT CHANGE UP (ref 0–0)
PHOSPHATE SERPL-MCNC: 5.4 MG/DL — HIGH (ref 2.5–4.5)
PHOSPHATE SERPL-MCNC: 5.4 MG/DL — HIGH (ref 2.5–4.5)
PLATELET # BLD AUTO: 201 K/UL — SIGNIFICANT CHANGE UP (ref 150–400)
PLATELET # BLD AUTO: 201 K/UL — SIGNIFICANT CHANGE UP (ref 150–400)
POTASSIUM SERPL-MCNC: 5 MMOL/L — SIGNIFICANT CHANGE UP (ref 3.5–5.3)
POTASSIUM SERPL-MCNC: 5 MMOL/L — SIGNIFICANT CHANGE UP (ref 3.5–5.3)
POTASSIUM SERPL-SCNC: 5 MMOL/L — SIGNIFICANT CHANGE UP (ref 3.5–5.3)
POTASSIUM SERPL-SCNC: 5 MMOL/L — SIGNIFICANT CHANGE UP (ref 3.5–5.3)
RBC # BLD: 3.2 M/UL — LOW (ref 3.8–5.2)
RBC # BLD: 3.2 M/UL — LOW (ref 3.8–5.2)
RBC # FLD: 12.3 % — SIGNIFICANT CHANGE UP (ref 10.3–14.5)
RBC # FLD: 12.3 % — SIGNIFICANT CHANGE UP (ref 10.3–14.5)
SODIUM SERPL-SCNC: 134 MMOL/L — LOW (ref 135–145)
SODIUM SERPL-SCNC: 134 MMOL/L — LOW (ref 135–145)
WBC # BLD: 6.84 K/UL — SIGNIFICANT CHANGE UP (ref 3.8–10.5)
WBC # BLD: 6.84 K/UL — SIGNIFICANT CHANGE UP (ref 3.8–10.5)
WBC # FLD AUTO: 6.84 K/UL — SIGNIFICANT CHANGE UP (ref 3.8–10.5)
WBC # FLD AUTO: 6.84 K/UL — SIGNIFICANT CHANGE UP (ref 3.8–10.5)

## 2023-12-22 RX ORDER — METOPROLOL TARTRATE 50 MG
100 TABLET ORAL DAILY
Refills: 0 | Status: DISCONTINUED | OUTPATIENT
Start: 2023-12-22 | End: 2023-12-22

## 2023-12-22 RX ORDER — SEVELAMER CARBONATE 2400 MG/1
800 POWDER, FOR SUSPENSION ORAL
Refills: 0 | Status: DISCONTINUED | OUTPATIENT
Start: 2023-12-22 | End: 2023-12-22

## 2023-12-22 RX ORDER — SEVELAMER CARBONATE 2400 MG/1
1 POWDER, FOR SUSPENSION ORAL
Qty: 90 | Refills: 0
Start: 2023-12-22 | End: 2024-01-20

## 2023-12-22 RX ORDER — LORATADINE 10 MG/1
1 TABLET ORAL
Qty: 30 | Refills: 0
Start: 2023-12-22 | End: 2024-01-20

## 2023-12-22 RX ORDER — FLUTICASONE PROPIONATE 50 MCG
1 SPRAY, SUSPENSION NASAL
Qty: 1 | Refills: 0
Start: 2023-12-22 | End: 2024-01-20

## 2023-12-22 RX ORDER — METOPROLOL TARTRATE 50 MG
1 TABLET ORAL
Qty: 30 | Refills: 0
Start: 2023-12-22 | End: 2024-01-20

## 2023-12-22 RX ORDER — METOPROLOL TARTRATE 50 MG
25 TABLET ORAL ONCE
Refills: 0 | Status: COMPLETED | OUTPATIENT
Start: 2023-12-22 | End: 2023-12-22

## 2023-12-22 RX ORDER — ALBUTEROL 90 UG/1
2 AEROSOL, METERED ORAL
Qty: 1 | Refills: 0
Start: 2023-12-22 | End: 2024-01-20

## 2023-12-22 RX ORDER — CALCITRIOL 0.5 UG/1
2 CAPSULE ORAL
Qty: 26 | Refills: 0
Start: 2023-12-22 | End: 2024-01-20

## 2023-12-22 RX ADMIN — LORATADINE 10 MILLIGRAM(S): 10 TABLET ORAL at 09:45

## 2023-12-22 RX ADMIN — Medication 1 SPRAY(S): at 16:45

## 2023-12-22 RX ADMIN — CHLORHEXIDINE GLUCONATE 1 APPLICATION(S): 213 SOLUTION TOPICAL at 11:07

## 2023-12-22 RX ADMIN — SEVELAMER CARBONATE 800 MILLIGRAM(S): 2400 POWDER, FOR SUSPENSION ORAL at 17:05

## 2023-12-22 RX ADMIN — HEPARIN SODIUM 5000 UNIT(S): 5000 INJECTION INTRAVENOUS; SUBCUTANEOUS at 06:06

## 2023-12-22 RX ADMIN — HEPARIN SODIUM 2000 UNIT(S): 5000 INJECTION INTRAVENOUS; SUBCUTANEOUS at 12:45

## 2023-12-22 RX ADMIN — HEPARIN SODIUM 5000 UNIT(S): 5000 INJECTION INTRAVENOUS; SUBCUTANEOUS at 16:41

## 2023-12-22 RX ADMIN — Medication 100 MILLIGRAM(S): at 17:05

## 2023-12-22 RX ADMIN — Medication 25 MILLIGRAM(S): at 10:04

## 2023-12-22 RX ADMIN — CALCITRIOL 1 MICROGRAM(S): 0.5 CAPSULE ORAL at 09:46

## 2023-12-22 RX ADMIN — Medication 1 SPRAY(S): at 06:07

## 2023-12-22 NOTE — PROGRESS NOTE ADULT - SUBJECTIVE AND OBJECTIVE BOX
[facility] NEPHROLOGY- PROGRESS NOTE    73y Female with history of ESRD on HD presents with SOB. Nephrology consulted for ESRD status.    S/P TDC exchange by IR on 12/20.    REVIEW OF SYSTEMS:  Gen: no fevers  Cards: no chest pain  Resp: + dyspnea, + cough  GI: no nausea or vomiting or diarrhea  Vascular: no LE edema    trimethoprim (Other; Rash)  sulfa drugs (Other; Rash)  Sulfur (Unknown)      Hospital Medications: Medications reviewed        VITALS:  T(F): 97.7 (12-22-23 @ 11:12), Max: 98.4 (12-21-23 @ 19:57)  HR: 78 (12-22-23 @ 11:12)  BP: 99/58 (12-22-23 @ 11:12)  RR: 17 (12-22-23 @ 11:12)  SpO2: 100% (12-22-23 @ 11:12)  Wt(kg): --    12-21 @ 07:01  -  12-22 @ 07:00  --------------------------------------------------------  IN: 200 mL / OUT: 150 mL / NET: 50 mL        PHYSICAL EXAM:    Gen: NAD, calm  Cards: RRR, +S1/S2, no M/G/R  Resp: CTA B/L  GI: soft, NT/ND, NABS  Vascular: no LE edema B/L, RIJ TDC intact        LABS:  12-21    136  |  96<L>  |  61<H>  ----------------------------<  80  5.1   |  20<L>  |  6.35<H>    Ca    8.8      21 Dec 2023 06:00  Phos  5.7     12-21  Mg     2.00     12-21    TPro  6.8  /  Alb  3.4  /  TBili  0.4  /  DBili      /  AST  7   /  ALT  13  /  AlkPhos  116  12-20    Creatinine Trend: 6.35 <--, 6.51 <--, 5.11 <--, 7.41 <--, 6.32 <--, 4.53 <--                        10.2   6.84  )-----------( 201      ( 22 Dec 2023 12:45 )             31.0     Urine Studies:  Urinalysis Basic - ( 21 Dec 2023 06:00 )    Color:  / Appearance:  / SG:  / pH:   Gluc: 80 mg/dL / Ketone:   / Bili:  / Urobili:    Blood:  / Protein:  / Nitrite:    Leuk Esterase:  / RBC:  / WBC    Sq Epi:  / Non Sq Epi:  / Bacteria:                    [facility] NEPHROLOGY- PROGRESS NOTE    73y Female with history of ESRD on HD presents with SOB. Nephrology consulted for ESRD status.    S/P TDC exchange by IR on 12/20.    REVIEW OF SYSTEMS:  Gen: no fevers  Cards: no chest pain  Resp: + dyspnea, + cough  GI: no nausea or vomiting or diarrhea  Vascular: no LE edema    trimethoprim (Other; Rash)  sulfa drugs (Other; Rash)  Sulfur (Unknown)      Hospital Medications: Medications reviewed        VITALS:  T(F): 97.7 (12-22-23 @ 11:12), Max: 98.4 (12-21-23 @ 19:57)  HR: 78 (12-22-23 @ 11:12)  BP: 99/58 (12-22-23 @ 11:12)  RR: 17 (12-22-23 @ 11:12)  SpO2: 100% (12-22-23 @ 11:12)  Wt(kg): --    12-21 @ 07:01  -  12-22 @ 07:00  --------------------------------------------------------  IN: 200 mL / OUT: 150 mL / NET: 50 mL        PHYSICAL EXAM:    Gen: NAD, calm  Cards: RRR, +S1/S2, no M/G/R  Resp: CTA B/L  GI: soft, NT/ND, NABS  Vascular: no LE edema B/L, RIJ TDC intact        LABS:  12-21    136  |  96<L>  |  61<H>  ----------------------------<  80  5.1   |  20<L>  |  6.35<H>    Ca    8.8      21 Dec 2023 06:00  Phos  5.7     12-21  Mg     2.00     12-21    TPro  6.8  /  Alb  3.4  /  TBili  0.4  /  DBili      /  AST  7   /  ALT  13  /  AlkPhos  116  12-20    Creatinine Trend: 6.35 <--, 6.51 <--, 5.11 <--, 7.41 <--, 6.32 <--, 4.53 <--                        10.2   6.84  )-----------( 201      ( 22 Dec 2023 12:45 )             31.0     Urine Studies:  Urinalysis Basic - ( 21 Dec 2023 06:00 )    Color:  / Appearance:  / SG:  / pH:   Gluc: 80 mg/dL / Ketone:   / Bili:  / Urobili:    Blood:  / Protein:  / Nitrite:    Leuk Esterase:  / RBC:  / WBC    Sq Epi:  / Non Sq Epi:  / Bacteria:

## 2023-12-22 NOTE — PROGRESS NOTE ADULT - SUBJECTIVE AND OBJECTIVE BOX
[facility], Division of Infectious Diseases  JAME Ashley Y. Patel, S. Shah, G. Deangelo  [phone]  ([phone] - weekdays after 5pm and weekends)    Name: MELISSA ARREOLA  Age/Gender: 73y Female  MRN: [id number]    Interval History:  Notes reviewed.   No concerning overnight events.  Afebrile.   feels even better today  less nasal congestion after restarting flonase  hopes to go home after HD today    Allergies: trimethoprim (Other; Rash)  sulfa drugs (Other; Rash)  Sulfur (Unknown)      Objective:  Vitals:   T(F): 97.8 (12-22-23 @ 04:55), Max: 98.4 (12-21-23 @ 19:57)  HR: 89 (12-22-23 @ 04:55) (89 - 97)  BP: 96/60 (12-22-23 @ 04:55) (96/60 - 102/56)  RR: 18 (12-22-23 @ 04:55) (18 - 19)  SpO2: 100% (12-22-23 @ 04:55) (98% - 100%)  Physical Examination:  General: no acute distress  HEENT: anicteric  Cardio: normal rate, R chest tunneled HD catheter  Resp: clear to auscultation  Abd: soft, NT, ND  Ext: no LE edema  Skin: warm, dry    Laboratory Studies:  CBC:                       12.5   6.30  )-----------( 218      ( 21 Dec 2023 06:00 )             39.7     WBC Trend:  6.30 12-21-23 @ 06:00  3.64 12-20-23 @ 17:09  6.75 12-19-23 @ 07:30  7.02 12-18-23 @ 21:40  5.78 12-17-23 @ 06:25  5.15 12-16-23 @ 06:00    CMP: 12-21    136  |  96<L>  |  61<H>  ----------------------------<  80  5.1   |  20<L>  |  6.35<H>    Ca    8.8      21 Dec 2023 06:00  Phos  5.7     12-21  Mg     2.00     12-21    TPro  6.8  /  Alb  3.4  /  TBili  0.4  /  DBili  x   /  AST  7   /  ALT  13  /  AlkPhos  116  12-20      LIVER FUNCTIONS - ( 20 Dec 2023 17:09 )  Alb: 3.4 g/dL / Pro: 6.8 g/dL / ALK PHOS: 116 U/L / ALT: 13 U/L / AST: 7 U/L / GGT: x             Urinalysis Basic - ( 21 Dec 2023 06:00 )    Color: x / Appearance: x / SG: x / pH: x  Gluc: 80 mg/dL / Ketone: x  / Bili: x / Urobili: x   Blood: x / Protein: x / Nitrite: x   Leuk Esterase: x / RBC: x / WBC x   Sq Epi: x / Non Sq Epi: x / Bacteria: x      Microbiology: reviewed     Culture - Nose (collected 12-13-23 @ 19:30)  Source: .Nose Nose  Final Report (12-15-23 @ 09:24):    No staphylococcus aureus isolated.    "PCR is more Sensitive for identifying MRSA/MSSA."        Radiology: reviewed     Medications:  acetaminophen     Tablet .. 650 milliGRAM(s) Oral every 6 hours PRN  acetaminophen   IVPB .. 1000 milliGRAM(s) IV Intermittent once  benzocaine/menthol Lozenge 1 Lozenge Oral every 4 hours PRN  benzonatate 100 milliGRAM(s) Oral every 8 hours PRN  calcitriol   Capsule 1 MICROGram(s) Oral <User Schedule>  chlorhexidine 2% Cloths 1 Application(s) Topical daily  fluticasone propionate 50 MICROgram(s)/spray Nasal Spray 1 Spray(s) Both Nostrils two times a day  guaifenesin/dextromethorphan Oral Liquid 10 milliLiter(s) Oral every 4 hours PRN  heparin   Injectable 5000 Unit(s) SubCutaneous every 8 hours  heparin   Injectable. 2000 Unit(s) Dialysis. once  heparin   Injectable. 1000 Unit(s) Dialysis. every 1 hour  loratadine 10 milliGRAM(s) Oral daily  melatonin 3 milliGRAM(s) Oral at bedtime PRN  metoprolol succinate  milliGRAM(s) Oral daily  ondansetron Injectable 4 milliGRAM(s) IV Push every 8 hours PRN  sodium chloride 0.9% Bolus. 100 milliLiter(s) IV Bolus every 5 minutes PRN    Antimicrobials:   [facility], Division of Infectious Diseases  JAME Ashley Y. Patel, S. Shah, G. Deangelo  [phone]  ([phone] - weekdays after 5pm and weekends)    Name: MELISSA ARREOLA  Age/Gender: 73y Female  MRN: [id number]    Interval History:  Notes reviewed.   No concerning overnight events.  Afebrile.   feels even better today  less nasal congestion after restarting flonase  hopes to go home after HD today    Allergies: trimethoprim (Other; Rash)  sulfa drugs (Other; Rash)  Sulfur (Unknown)      Objective:  Vitals:   T(F): 97.8 (12-22-23 @ 04:55), Max: 98.4 (12-21-23 @ 19:57)  HR: 89 (12-22-23 @ 04:55) (89 - 97)  BP: 96/60 (12-22-23 @ 04:55) (96/60 - 102/56)  RR: 18 (12-22-23 @ 04:55) (18 - 19)  SpO2: 100% (12-22-23 @ 04:55) (98% - 100%)  Physical Examination:  General: no acute distress  HEENT: anicteric  Cardio: normal rate, R chest tunneled HD catheter  Resp: clear to auscultation  Abd: soft, NT, ND  Ext: no LE edema  Skin: warm, dry    Laboratory Studies:  CBC:                       12.5   6.30  )-----------( 218      ( 21 Dec 2023 06:00 )             39.7     WBC Trend:  6.30 12-21-23 @ 06:00  3.64 12-20-23 @ 17:09  6.75 12-19-23 @ 07:30  7.02 12-18-23 @ 21:40  5.78 12-17-23 @ 06:25  5.15 12-16-23 @ 06:00    CMP: 12-21    136  |  96<L>  |  61<H>  ----------------------------<  80  5.1   |  20<L>  |  6.35<H>    Ca    8.8      21 Dec 2023 06:00  Phos  5.7     12-21  Mg     2.00     12-21    TPro  6.8  /  Alb  3.4  /  TBili  0.4  /  DBili  x   /  AST  7   /  ALT  13  /  AlkPhos  116  12-20      LIVER FUNCTIONS - ( 20 Dec 2023 17:09 )  Alb: 3.4 g/dL / Pro: 6.8 g/dL / ALK PHOS: 116 U/L / ALT: 13 U/L / AST: 7 U/L / GGT: x             Urinalysis Basic - ( 21 Dec 2023 06:00 )    Color: x / Appearance: x / SG: x / pH: x  Gluc: 80 mg/dL / Ketone: x  / Bili: x / Urobili: x   Blood: x / Protein: x / Nitrite: x   Leuk Esterase: x / RBC: x / WBC x   Sq Epi: x / Non Sq Epi: x / Bacteria: x      Microbiology: reviewed     Culture - Nose (collected 12-13-23 @ 19:30)  Source: .Nose Nose  Final Report (12-15-23 @ 09:24):    No staphylococcus aureus isolated.    "PCR is more Sensitive for identifying MRSA/MSSA."        Radiology: reviewed     Medications:  acetaminophen     Tablet .. 650 milliGRAM(s) Oral every 6 hours PRN  acetaminophen   IVPB .. 1000 milliGRAM(s) IV Intermittent once  benzocaine/menthol Lozenge 1 Lozenge Oral every 4 hours PRN  benzonatate 100 milliGRAM(s) Oral every 8 hours PRN  calcitriol   Capsule 1 MICROGram(s) Oral <User Schedule>  chlorhexidine 2% Cloths 1 Application(s) Topical daily  fluticasone propionate 50 MICROgram(s)/spray Nasal Spray 1 Spray(s) Both Nostrils two times a day  guaifenesin/dextromethorphan Oral Liquid 10 milliLiter(s) Oral every 4 hours PRN  heparin   Injectable 5000 Unit(s) SubCutaneous every 8 hours  heparin   Injectable. 2000 Unit(s) Dialysis. once  heparin   Injectable. 1000 Unit(s) Dialysis. every 1 hour  loratadine 10 milliGRAM(s) Oral daily  melatonin 3 milliGRAM(s) Oral at bedtime PRN  metoprolol succinate  milliGRAM(s) Oral daily  ondansetron Injectable 4 milliGRAM(s) IV Push every 8 hours PRN  sodium chloride 0.9% Bolus. 100 milliLiter(s) IV Bolus every 5 minutes PRN    Antimicrobials:

## 2023-12-22 NOTE — PROGRESS NOTE ADULT - REASON FOR ADMISSION
Cough

## 2023-12-22 NOTE — PROGRESS NOTE ADULT - ASSESSMENT
74 y/o F PMhx HTN, OA, chronic migraine and ESRD on HD (MWF) who presented w/ cough and SOB.    COVID  saturating well on RA  d-dimer elevated, CTA negative for PE, LE US negative for DVT  s/p remdesivir x 3 days completed 12/16  symptoms improved  s/p tunneled catheter exchange 12/20    Recommendations  isolation per infection control policy   supportive care- flonase, mucinex, anti-tussive, lozenge  discharge planning    Tony Bright M.D.  [facility], Division of Infectious Diseases  [phone]  After 5pm on weekdays and all day on weekends - please call [phone]  72 y/o F PMhx HTN, OA, chronic migraine and ESRD on HD (MWF) who presented w/ cough and SOB.    COVID  saturating well on RA  d-dimer elevated, CTA negative for PE, LE US negative for DVT  s/p remdesivir x 3 days completed 12/16  symptoms improved  s/p tunneled catheter exchange 12/20    Recommendations  isolation per infection control policy   supportive care- flonase, mucinex, anti-tussive, lozenge  discharge planning    Tony Bright M.D.  [facility], Division of Infectious Diseases  [phone]  After 5pm on weekdays and all day on weekends - please call [phone]  74 y/o F PMhx HTN, OA, chronic migraine and ESRD on HD (MWF) who presented w/ cough and SOB.    COVID  saturating well on RA  d-dimer elevated, CTA negative for PE, LE US negative for DVT  s/p remdesivir x 3 days completed 12/16  symptoms improved  s/p tunneled catheter exchange 12/20    Recommendations  isolation per infection control policy   supportive care- flonase, mucinex, anti-tussive, lozenge  discharge planning    We will sign off. Thank you for allowing us to participate in the care of Ms. Goldsmith. Please feel free to call with any questions or concerns.     Tony Bright M.D.  [facility], Division of Infectious Diseases  [phone]  After 5pm on weekdays and all day on weekends - please call [phone]  72 y/o F PMhx HTN, OA, chronic migraine and ESRD on HD (MWF) who presented w/ cough and SOB.    COVID  saturating well on RA  d-dimer elevated, CTA negative for PE, LE US negative for DVT  s/p remdesivir x 3 days completed 12/16  symptoms improved  s/p tunneled catheter exchange 12/20    Recommendations  isolation per infection control policy   supportive care- flonase, mucinex, anti-tussive, lozenge  discharge planning    We will sign off. Thank you for allowing us to participate in the care of Ms. Goldsmith. Please feel free to call with any questions or concerns.     Tony Bright M.D.  [facility], Division of Infectious Diseases  [phone]  After 5pm on weekdays and all day on weekends - please call [phone]

## 2023-12-22 NOTE — DISCHARGE NOTE NURSING/CASE MANAGEMENT/SOCIAL WORK - PATIENT PORTAL LINK FT
You can access the FollowMyHealth Patient Portal offered by [facility] by registering at the following website: [email or web address]. By joining [facility]’s FollowMyHealth portal, you will also be able to view your health information using other applications (apps) compatible with our system. You can access the FollowMyHealth Patient Portal offered by [facility] by registering at the following website: [email or web address]. By joining [facility]’s FollowMyHealth portal, you will also be able to view your health information using other applications (apps) compatible with our system.

## 2023-12-22 NOTE — DISCHARGE NOTE NURSING/CASE MANAGEMENT/SOCIAL WORK - NSDCVIVACCINE_GEN_ALL_CORE_FT
COVID-19, mRNA, LNP-S, PF, 100 mcg/ 0.5 mL dose (Moderna); 30-Apr-2022 16:48; Sailaja Godinez (RN); [employer] [id number] (Exp. Date: 28-May-2022); IntraMuscular; Deltoid Left.; 0.25 milliLiter(s);   influenza, injectable, quadrivalent, preservative free; 01-Feb-2018 15:10; Mey Hastings (RN); [employer]; [id number]; IntraMuscular; Deltoid Left.; 0.5 milliLiter(s); VIS (VIS Published: 07-Aug-2015, VIS Presented: 01-Feb-2018);    COVID-19, mRNA, LNP-S, PF, 100 mcg/ 0.5 mL dose (Moderna); 30-Apr-2022 16:48; Sailaja Godinez (RN); [employer] [id number] (Exp. Date: 28-May-2022); IntraMuscular; Deltoid Left.; 0.25 milliLiter(s);   influenza, injectable, quadrivalent, preservative free; 01-Feb-2018 15:10; Mey Hastings (RN); [employer]; [id number]; IntraMuscular; Deltoid Left.; 0.5 milliLiter(s); VIS (VIS Published: 07-Aug-2015, VIS Presented: 01-Feb-2018);

## 2023-12-22 NOTE — PROGRESS NOTE ADULT - ASSESSMENT
73y Female with history of ESRD on HD presents with SOB. Nephrology consulted for ESRD status.    1) ESRD: Last HD on 12/20 with tachycardia for which HD treatment terminated prematurely. Plan for next maintenance HD this morning. S/P TDC exchange by IR on 12/20. Monitor electrolytes.    2) HTN with ESRD: BP low normal. Change metoprolol to QHS dosing to avoid intradialytic hypotension. Monitor BP.    3) Anemia of renal disease: Hb acceptable. On Micera 30 mcg monthly. Will give Epogen if Hb decreases. Monitor Hb.    4) Secondary HPT of renal origin: Phosphorus borderline. Continue with calcitriol 1 mcg MWF and restart renvela 1 tab with meals (home medication). Monitor serum calcium and phosphorus.      [facility] NEPHROLOGY  Derrick Castaneda M.D.  Raúl Diana D.O.  Grace Delgadillo M.D.  MD Cher Beckman, MSN, ANP-C    Telephone: [phone]  Facsimile: [phone] [place] [address], [address]  [place] [id number]   73y Female with history of ESRD on HD presents with SOB. Nephrology consulted for ESRD status.    1) ESRD: Last HD on 12/20 with tachycardia for which HD treatment terminated prematurely. Plan for next maintenance HD this morning. S/P TDC exchange by IR on 12/20. Monitor electrolytes.    2) HTN with ESRD: BP low normal. Change metoprolol to QHS dosing to avoid intradialytic hypotension. Monitor BP.    3) Anemia of renal disease: Hb acceptable. On Micera 30 mcg monthly. Will give Epogen if Hb decreases. Monitor Hb.    4) Secondary HPT of renal origin: Phosphorus borderline. Continue with calcitriol 1 mcg MWF and restart renvela 1 tab with meals (home medication). Monitor serum calcium and phosphorus.      [facility] NEPHROLOGY  Derrick Castaneda M.D.  Raúl Diana D.O.  Grace Delgadillo M.D.  MD Cher Beckman, MSN, ANP-C    Telephone: [phone]  Facsimile: [phone] [place] [address], [address]  [place] [phone]

## 2023-12-22 NOTE — PROGRESS NOTE ADULT - ASSESSMENT
ECHO 4/23/22 : nl lV  sys fx EF 55-60%  stress  11/14/23 normal   EF% during stress is 64 %    a/p   72 yo F with PMhx of HTN, PE (not on AC), OA, chronic migraine  and ESRD on HD (MWF) presents to the ED with cough/ SOB/ chest pain, + covid     #Atypical chest pain   -msk- secondary to cough   -HS trop neg x 1, no ischemic changes to ecg   -recent stress  11/14/23 normal   EF% during stress is 64 %  -echo ok  -hx of PE- not on ac, elevated d dimer  -cta neg for PE  -le doppler neg for dvt    #htn   -bp stable but tachycardic with palps   -cont bb as ordered  #Covid 19  -managment per med   -sp remdesivir    -ID fu    #sob   -secondary to covid  -no chf on exam   -vol removal with hd  -cta neg for pe   -still on covid isolation, dcp poss for today   -consider cath as patient with cont dyspnea but will not be able to do until teusday given isolation  -can have as outpt if d/c before    dvt ppx     35 minutes spent on total encounter; more than 50% of the visit was spent counseling and/or coordinating care by the attending physician.

## 2023-12-22 NOTE — PHARMACOTHERAPY INTERVENTION NOTE - COMMENTS
Discharge medications reviewed with the patient. Current medication schedule was discussed in detail including: medication name, indication, dose, administration times, treatment duration, side effects, and special instructions. Questions and concerns were answered and addressed. Patient demonstrated understanding.     Alma Rosa Lindsay, PharmD, [facility]  Clinical Pharmacy Specialist  [id number]  Discharge medications reviewed with the patient. Current medication schedule was discussed in detail including: medication name, indication, dose, administration times, treatment duration, side effects, and special instructions. Questions and concerns were answered and addressed. Patient demonstrated understanding.     Alma Rosa Lindsay, PharmD, [facility]  Clinical Pharmacy Specialist  [id number]

## 2023-12-22 NOTE — DISCHARGE NOTE NURSING/CASE MANAGEMENT/SOCIAL WORK - NSDCCRNAME_GEN_ALL_CORE_FT
resume with outpatient dialysis center, [facility] [place] [phone] on Monday, December 25th at 3am. resume with outpatient dialysis center, [facility] [place] [phone] on Monday, December 25th at 3am.

## 2023-12-22 NOTE — PROGRESS NOTE ADULT - SUBJECTIVE AND OBJECTIVE BOX
CARDIOLOGY FOLLOW UP NOTE - DR. CHAN    Patient Name: MELISSA ARREOLA    Date of Service: 12-22-23 @ 11:37    no new events      Subjective:    cv: denies chest pain, dyspnea, palpitations, dizziness  pulmonary: denies cough  GI: denies abdominal pain, nausea, vomiting  vascular/legs: no edema   skin: no rash  ROS: otherwise negative   overnight events:      PHYSICAL EXAM:  T(C): 36.5 (12-22-23 @ 11:12), Max: 36.9 (12-21-23 @ 19:57)  HR: 78 (12-22-23 @ 11:12) (78 - 97)  BP: 99/58 (12-22-23 @ 11:12) (96/60 - 106/54)  RR: 17 (12-22-23 @ 11:12) (17 - 19)  SpO2: 100% (12-22-23 @ 11:12) (98% - 100%)  Wt(kg): --  I&O's Summary    21 Dec 2023 07:01  -  22 Dec 2023 07:00  --------------------------------------------------------  IN: 200 mL / OUT: 150 mL / NET: 50 mL      Daily     Daily     Appearance: Normal	  Cardiovascular: Normal S1 S2,RRR, No JVD, No murmurs  Respiratory: Lungs clear to auscultation	  Gastrointestinal:  Soft, Non-tender, + BS	  Extremities: Normal range of motion, No clubbing, cyanosis or edema      Home Medications:  amLODIPine 10 mg oral tablet: 1 tab(s) orally once a day (13 Dec 2023 18:52)  calcitriol 0.5 mcg oral capsule: 1 cap(s) orally once a day (13 Dec 2023 18:52)  cholecalciferol oral tablet: 1000 unit(s) orally once a day (13 Dec 2023 18:52)  folic acid 1 mg oral tablet: 1 tab(s) orally once a day (13 Dec 2023 18:52)  metoprolol succinate 100 mg oral tablet, extended release: 1 tab(s) orally once a day (13 Dec 2023 18:52)      MEDICATIONS  (STANDING):  acetaminophen   IVPB .. 1000 milliGRAM(s) IV Intermittent once  calcitriol   Capsule 1 MICROGram(s) Oral <User Schedule>  chlorhexidine 2% Cloths 1 Application(s) Topical daily  fluticasone propionate 50 MICROgram(s)/spray Nasal Spray 1 Spray(s) Both Nostrils two times a day  heparin   Injectable 5000 Unit(s) SubCutaneous every 8 hours  heparin   Injectable. 1000 Unit(s) Dialysis. every 1 hour  heparin   Injectable. 2000 Unit(s) Dialysis. once  loratadine 10 milliGRAM(s) Oral daily  metoprolol succinate  milliGRAM(s) Oral daily      TELEMETRY: 	    ECG:  	  RADIOLOGY:   DIAGNOSTIC TESTING:  [ ] Echocardiogram:  [ ] Catheterization:  [ ] Stress Test:    OTHER: 	    LABS:	 	    CARDIAC MARKERS:                                      12.5   6.30  )-----------( 218      ( 21 Dec 2023 06:00 )             39.7     12-21    136  |  96<L>  |  61<H>  ----------------------------<  80  5.1   |  20<L>  |  6.35<H>    Ca    8.8      21 Dec 2023 06:00  Phos  5.7     12-21  Mg     2.00     12-21    TPro  6.8  /  Alb  3.4  /  TBili  0.4  /  DBili  x   /  AST  7   /  ALT  13  /  AlkPhos  116  12-20    proBNP:     Lipid Profile:   HgA1c:     Creatinine: 6.35 mg/dL (12-21-23 @ 06:00)  Creatinine: 6.51 mg/dL (12-20-23 @ 17:09)

## 2023-12-22 NOTE — DISCHARGE NOTE NURSING/CASE MANAGEMENT/SOCIAL WORK - NSDCPEFALRISK_GEN_ALL_CORE
For information on Fall & Injury Prevention, visit: [email or web address] OR  [email or web address] OR  https://www.cdc.gov/steadi/patient.html For information on Fall & Injury Prevention, visit: [email or web address] OR  [email or web address] OR  https://www.cdc.gov/steadi/patient.html

## 2024-01-08 NOTE — PROGRESS NOTE ADULT - PROBLEM SELECTOR PROBLEM 4
Attempted to reach patient for:  Routine follow up call    Outcome:  [facility]    Next Follow Up: Two weeks due to [facility] planned PTO.
Elevated liver function tests

## 2024-01-12 NOTE — ED ADULT NURSE NOTE - NSSISCREENINGQ2_ED_A_ED
[GERD] : gerd [Blood Transfusion] : blood transfusion [Fever] : no fever [Chills] : no chills [Recent Wt Loss (___ Lbs)] : ~T no recent weight loss [Epistaxis] : no epistaxis [Sinus Problems] : no sinus problems [Cough] : no cough [Hemoptysis] : no hemoptysis [Sputum] : no sputum [Dyspnea] : no dyspnea No [Wheezing] : no wheezing [SOB on Exertion] : no sob on exertion [Chest Discomfort] : no chest discomfort [Palpitations] : no palpitations [Abdominal Pain] : no abdominal pain [Nausea] : no nausea [Dysphagia] : no dysphagia [Bleeding] : no bleeding [Rash] : no rash [Clotting Disorder/ Frequent bleeding] : no clotting disorder/ frequent bleeding [Diabetes] : no diabetes [Thyroid Problem] : no thyroid problem [Obesity] : no obesity

## 2024-02-05 NOTE — DISCHARGE NOTE PROVIDER - NSDCCAREPROVSEEN_GEN_ALL_CORE_FT
Go to the ER for further evaluation of abdominal bloating, bilateral back pain, feeling feverish, decreased urinary output.  The symptoms may be due to a more serious cause that could be disabling or life-threatening which is why it is recommended you go to the ER for further evaluation       Jenna Beltre

## 2024-02-20 NOTE — DIETITIAN INITIAL EVALUATION ADULT - WEIGHT (LBS)
Regimen: Feraheme    Dr. Duran is supervising provider today.    Reviewed and verified Advanced Directives: No: Patient declined to create/provide document at this time     Nursing Assessment: A focused nursing assessment  was performed and the patient reports the following: Fever: NO  Chills: NO  Other signs of infection: NO  Pain: NO    Pre-Treatment: - Patient has valid pre-authorization  - VS completed  - Height and weight verified  - Premed orders are verified prior to administration  - Treatment parameters verified in patient protocol  - Lab results checked - MD notified; OK to treat  - Patient is identified by first & last name, Date of birth that has been verified with the patient chairside.    Treatment: Refer to [facility] and MAR for line assessment and medication administration, Blood return confirmed before, during and after treatment administered, and Infusion pump used for non-vesicant drugs    Post Treatment: Treatment tolerated well; no adverse reaction    Education: No new instructions needed    Next appointment scheduled:   Future Appointments   Date Time Provider Department Center   2/27/2024  9:15 AM L[facility] VL LAB LSSON1 LSS   2/27/2024  9:45 AM [facility] VL TREATMENT CHAIR LSSON1 S   3/5/2024  9:15 AM [facility] VL LAB [facility]1 LSS   3/5/2024  9:45 AM L[facility] VL TREATMENT CHAIR LSSON1 S   3/8/2024 10:30 AM Megan Leon PA-C [facility]   3/12/2024  9:15 AM LSS VL LAB LSSON1 LSS   3/12/2024  9:45 AM LSS VL TREATMENT CHAIR LSSON1 S   3/19/2024  9:00 AM LSS VL LAB LSSON1 S   3/19/2024  9:30 AM Griffin Duran [address]   4/10/2024 11:00 AM [facility]   4/19/2024  9:40 AM D'Amico, Michael D, MD [facility]   6/13/2024 11:30 AM [facility] [id number] [address]   10/24/2024 10:20 AM [facility]       Patient instructed to call the office with any questions or concerns.    Patient Discharged: patient discharged to home per self,  ambulatory    Patient presents for Retacrit injection today. Pt refused Octreotide today, MD aware.    I am an RN. Does the patient have an active anti-cancer treatment plan? (oral, injection, or IV) No.    Reviewed and verified Advanced Directives: No: Patient declined to create/provide document at this time     Pre-Injection Information: Allergies reviewed as required for injection type., Pt states feeling well, no complaints., and Pt denies signs and symptoms of infection.    Refer to MAR (medication administration record) for type of injection and medication given.  Needle Size: 25 g. 5/8\"  Patient tolerated well: Stable and Follow up appointment scheduled       143

## 2024-03-21 ENCOUNTER — OUTPATIENT (OUTPATIENT)
Dept: OUTPATIENT SERVICES | Facility: HOSPITAL | Age: 74
LOS: 1 days | End: 2024-03-21
Payer: MEDICARE

## 2024-03-21 ENCOUNTER — TRANSCRIPTION ENCOUNTER (OUTPATIENT)
Age: 74
End: 2024-03-21

## 2024-03-21 VITALS
SYSTOLIC BLOOD PRESSURE: 124 MMHG | WEIGHT: 271.17 LBS | HEART RATE: 80 BPM | RESPIRATION RATE: 17 BRPM | OXYGEN SATURATION: 98 % | DIASTOLIC BLOOD PRESSURE: 59 MMHG | TEMPERATURE: 98 F | HEIGHT: 65 IN

## 2024-03-21 VITALS
OXYGEN SATURATION: 97 % | DIASTOLIC BLOOD PRESSURE: 88 MMHG | RESPIRATION RATE: 16 BRPM | SYSTOLIC BLOOD PRESSURE: 183 MMHG | HEART RATE: 78 BPM

## 2024-03-21 DIAGNOSIS — R06.02 SHORTNESS OF BREATH: ICD-10-CM

## 2024-03-21 LAB
ANION GAP SERPL CALC-SCNC: 17 MMOL/L — SIGNIFICANT CHANGE UP (ref 5–17)
BUN SERPL-MCNC: 37 MG/DL — HIGH (ref 7–23)
CALCIUM SERPL-MCNC: 9.5 MG/DL — SIGNIFICANT CHANGE UP (ref 8.4–10.5)
CHLORIDE SERPL-SCNC: 95 MMOL/L — LOW (ref 96–108)
CO2 SERPL-SCNC: 26 MMOL/L — SIGNIFICANT CHANGE UP (ref 22–31)
CREAT SERPL-MCNC: 4.72 MG/DL — HIGH (ref 0.5–1.3)
EGFR: 9 ML/MIN/1.73M2 — LOW
GLUCOSE SERPL-MCNC: 88 MG/DL — SIGNIFICANT CHANGE UP (ref 70–99)
HCT VFR BLD CALC: 35.4 % — SIGNIFICANT CHANGE UP (ref 34.5–45)
HGB BLD-MCNC: 11.4 G/DL — LOW (ref 11.5–15.5)
HGB BLDA-MCNC: 10.9 G/DL — LOW (ref 11.7–16.1)
HGB FLD-MCNC: 10.6 G/DL — LOW (ref 11.7–16.5)
MCHC RBC-ENTMCNC: 31.1 PG — SIGNIFICANT CHANGE UP (ref 27–34)
MCHC RBC-ENTMCNC: 32.2 GM/DL — SIGNIFICANT CHANGE UP (ref 32–36)
MCV RBC AUTO: 96.7 FL — SIGNIFICANT CHANGE UP (ref 80–100)
NRBC # BLD: 0 /100 WBCS — SIGNIFICANT CHANGE UP (ref 0–0)
OXYHGB MFR BLDA: 94.9 % — SIGNIFICANT CHANGE UP (ref 90–95)
OXYHGB MFR BLDMV: 67.4 % — LOW (ref 90–95)
PLATELET # BLD AUTO: 260 K/UL — SIGNIFICANT CHANGE UP (ref 150–400)
POTASSIUM SERPL-MCNC: 4.1 MMOL/L — SIGNIFICANT CHANGE UP (ref 3.5–5.3)
POTASSIUM SERPL-SCNC: 4.1 MMOL/L — SIGNIFICANT CHANGE UP (ref 3.5–5.3)
RBC # BLD: 3.66 M/UL — LOW (ref 3.8–5.2)
RBC # FLD: 12.5 % — SIGNIFICANT CHANGE UP (ref 10.3–14.5)
SAO2 % BLD: 69.1 % — SIGNIFICANT CHANGE UP (ref 60–90)
SAO2 % BLDA: 97.3 % — SIGNIFICANT CHANGE UP (ref 94–98)
SODIUM SERPL-SCNC: 138 MMOL/L — SIGNIFICANT CHANGE UP (ref 135–145)
WBC # BLD: 6.89 K/UL — SIGNIFICANT CHANGE UP (ref 3.8–10.5)
WBC # FLD AUTO: 6.89 K/UL — SIGNIFICANT CHANGE UP (ref 3.8–10.5)

## 2024-03-21 PROCEDURE — 82803 BLOOD GASES ANY COMBINATION: CPT

## 2024-03-21 PROCEDURE — 36415 COLL VENOUS BLD VENIPUNCTURE: CPT

## 2024-03-21 PROCEDURE — C1894: CPT

## 2024-03-21 PROCEDURE — C1887: CPT

## 2024-03-21 PROCEDURE — C1769: CPT

## 2024-03-21 PROCEDURE — 80048 BASIC METABOLIC PNL TOTAL CA: CPT

## 2024-03-21 PROCEDURE — 85027 COMPLETE CBC AUTOMATED: CPT

## 2024-03-21 PROCEDURE — C1889: CPT

## 2024-03-21 PROCEDURE — 93460 R&L HRT ART/VENTRICLE ANGIO: CPT

## 2024-03-21 PROCEDURE — 93005 ELECTROCARDIOGRAM TRACING: CPT

## 2024-03-21 PROCEDURE — 93010 ELECTROCARDIOGRAM REPORT: CPT

## 2024-03-21 NOTE — ASU DISCHARGE PLAN (ADULT/PEDIATRIC) - NS MD DC FALL RISK RISK
For information on Fall & Injury Prevention, visit: [email or web address] OR  [email or web address] OR  https://www.cdc.gov/steadi/patient.html

## 2024-03-21 NOTE — H&P CARDIOLOGY - HISTORY OF PRESENT ILLNESS
72 yo F with PMhx of HTN, PE (not on AC), OA, chronic migraine  and ESRD on HD (MWF) Dr. Diana at [facility], She had a  recent follow up with Dr Joe after recent admission to [facility] for chest pain and dyspnea 3 months ago. Given continued dyspnea and chest pain with multiple CAD risk factors and no other obvious etiology, she presents today for cardiac cath    Cardiology: Dr. Nawaf Joe 74 yo F with PMhx of HTN, PE (not on AC), OA, chronic migraine, obesity, LARRY on CPAP,  and ESRD on HD (MWF) Dr. Diana at [facility], She had a  recent follow up with Dr Joe after recent admission to [facility] for chest pain and dyspnea 3 months ago. Given continued dyspnea and chest pain with multiple CAD risk factors and no other obvious etiology, she presents today for cardiac cath    Cardiology: Dr. Nawaf Joe

## 2024-03-21 NOTE — ASU PATIENT PROFILE, ADULT - FALL HARM RISK - HARM RISK INTERVENTIONS

## 2024-03-21 NOTE — ASU DISCHARGE PLAN (ADULT/PEDIATRIC) - CARE PROVIDER_API CALL
Nawaf Joe  Cardiovascular Disease  [address], [address]  [place] [address]  Phone: [phone]  Fax: [phone]  Established Patient  Follow Up Time: 1 month

## 2024-03-21 NOTE — ASU DISCHARGE PLAN (ADULT/PEDIATRIC) - ASU DC SPECIAL INSTRUCTIONSFT
Wound Care:   the day AFTER your procedure remove bandage GENTLY, and clean using  mild soap and gentle warm, water stream, pat dry. leave OPEN to air. YOU MAY SHOWER   DO NOT apply lotions, creams, ointments, powder, perfumes to your incision site  DO NOT SOAK your site for 1 week ( no baths, no pools, no tubs, etc...)  Check  your groin and/or wrist daily. A small amount of bruising, and soreness are normal    ACTIVITY: for 24 hours   - DO NOT DRIVE  - DO NOT make any important decisions or sign legal documents   - DO NOT operate heavy machineries   - you may resume sexual activity in 48 hours, unless otherwise instructed by your cardiologist     If your procedure was done through the WRIST: for the NEXT 3 DAYS  - avoid pushing, pulling, with that affected wrist   - avoid repeated movement of that hand and wrist ( eg: typing, hammering)  - DO NOT LIFT anything more than 5 lbs     If your procedure was done through the GROIN: for the NEXT 5 DAYS  - Limit climbing stairs, DO NOT soak in bathtub or pool  - no strenuous activities, pushing, pulling, straining  - Do not lift anything 10lbs or heavier     MEDICATION:   take your medications as explained ( see discharge paperwork)   If you received a STENT, you will be taking antiplatelet medications to KEEP YOUR STENT OPEN ( eg: Aspirin, Plavix, Brilinta, Effient, etc).  Take as prescribed DO NOT STOP taking them without consulting with your cardiologist first.     Follow heart healthy diet recommended by your doctor, , if you smoke STOP SMOKING ( may call [phone] for center of tobacco control if you need assistance)     CALL your doctor to make appointment in 2 WEEKS     ***CALL YOUR DOCTOR***  if you experience: fever, chills, body aches, or severe pain, swelling, redness, heat or yellow discharge at incision site  If you experience Bleeding or excruciating pain at the procedural site, swelling (golf ball size) at your procedural site  If you experience CHEST PAIN  If you experience extremity numbness, tingling, temperature change (of your procedural site)   If you are unable to reach your doctor, you may contact:   -Cardiology Office at [facility] at [phone] or   - [facility] [phone]  - [facility] [phone]

## 2024-05-30 ENCOUNTER — INPATIENT (INPATIENT)
Facility: HOSPITAL | Age: 74
LOS: 6 days | Discharge: HOME | End: 2024-06-06
Attending: INTERNAL MEDICINE | Admitting: INTERNAL MEDICINE
Payer: MEDICARE

## 2024-05-30 VITALS
SYSTOLIC BLOOD PRESSURE: 134 MMHG | OXYGEN SATURATION: 93 % | HEIGHT: 65 IN | WEIGHT: 269.85 LBS | DIASTOLIC BLOOD PRESSURE: 73 MMHG | RESPIRATION RATE: 20 BRPM | TEMPERATURE: 98 F | HEART RATE: 112 BPM

## 2024-05-30 LAB
ALBUMIN SERPL ELPH-MCNC: 3.1 G/DL — LOW (ref 3.3–5)
ALP SERPL-CCNC: 407 U/L — HIGH (ref 40–120)
ALT FLD-CCNC: 282 U/L — HIGH (ref 12–78)
ANION GAP SERPL CALC-SCNC: 11 MMOL/L — SIGNIFICANT CHANGE UP (ref 5–17)
APPEARANCE UR: ABNORMAL
APTT BLD: 26.4 SEC — SIGNIFICANT CHANGE UP (ref 24.5–35.6)
AST SERPL-CCNC: 389 U/L — HIGH (ref 15–37)
BACTERIA # UR AUTO: ABNORMAL /HPF
BASOPHILS # BLD AUTO: 0.02 K/UL — SIGNIFICANT CHANGE UP (ref 0–0.2)
BASOPHILS NFR BLD AUTO: 0.2 % — SIGNIFICANT CHANGE UP (ref 0–2)
BILIRUB SERPL-MCNC: 1.5 MG/DL — HIGH (ref 0.2–1.2)
BILIRUB UR-MCNC: NEGATIVE — SIGNIFICANT CHANGE UP
BUN SERPL-MCNC: 51 MG/DL — HIGH (ref 7–23)
CALCIUM SERPL-MCNC: 9.6 MG/DL — SIGNIFICANT CHANGE UP (ref 8.5–10.1)
CHLORIDE SERPL-SCNC: 100 MMOL/L — SIGNIFICANT CHANGE UP (ref 96–108)
CO2 SERPL-SCNC: 28 MMOL/L — SIGNIFICANT CHANGE UP (ref 22–31)
COLOR SPEC: YELLOW — SIGNIFICANT CHANGE UP
COMMENT - URINE: SIGNIFICANT CHANGE UP
CREAT SERPL-MCNC: 5.5 MG/DL — HIGH (ref 0.5–1.3)
DIFF PNL FLD: ABNORMAL
EGFR: 8 ML/MIN/1.73M2 — LOW
EOSINOPHIL # BLD AUTO: 0.01 K/UL — SIGNIFICANT CHANGE UP (ref 0–0.5)
EOSINOPHIL NFR BLD AUTO: 0.1 % — SIGNIFICANT CHANGE UP (ref 0–6)
EPI CELLS # UR: PRESENT
GLUCOSE SERPL-MCNC: 108 MG/DL — HIGH (ref 70–99)
GLUCOSE UR QL: NEGATIVE MG/DL — SIGNIFICANT CHANGE UP
HCT VFR BLD CALC: 35.6 % — SIGNIFICANT CHANGE UP (ref 34.5–45)
HGB BLD-MCNC: 11.7 G/DL — SIGNIFICANT CHANGE UP (ref 11.5–15.5)
IMM GRANULOCYTES NFR BLD AUTO: 0.5 % — SIGNIFICANT CHANGE UP (ref 0–0.9)
INR BLD: 0.92 RATIO — SIGNIFICANT CHANGE UP (ref 0.85–1.18)
KETONES UR-MCNC: ABNORMAL MG/DL
LACTATE SERPL-SCNC: 1.9 MMOL/L — SIGNIFICANT CHANGE UP (ref 0.7–2)
LEUKOCYTE ESTERASE UR-ACNC: ABNORMAL
LIDOCAIN IGE QN: 53 U/L — SIGNIFICANT CHANGE UP (ref 13–75)
LYMPHOCYTES # BLD AUTO: 0.52 K/UL — LOW (ref 1–3.3)
LYMPHOCYTES # BLD AUTO: 5.2 % — LOW (ref 13–44)
MCHC RBC-ENTMCNC: 31.9 PG — SIGNIFICANT CHANGE UP (ref 27–34)
MCHC RBC-ENTMCNC: 32.9 G/DL — SIGNIFICANT CHANGE UP (ref 32–36)
MCV RBC AUTO: 97 FL — SIGNIFICANT CHANGE UP (ref 80–100)
MONOCYTES # BLD AUTO: 0.06 K/UL — SIGNIFICANT CHANGE UP (ref 0–0.9)
MONOCYTES NFR BLD AUTO: 0.6 % — LOW (ref 2–14)
NEUTROPHILS # BLD AUTO: 9.29 K/UL — HIGH (ref 1.8–7.4)
NEUTROPHILS NFR BLD AUTO: 93.4 % — HIGH (ref 43–77)
NITRITE UR-MCNC: NEGATIVE — SIGNIFICANT CHANGE UP
NRBC # BLD: 0 /100 WBCS — SIGNIFICANT CHANGE UP (ref 0–0)
PH UR: 7.5 — SIGNIFICANT CHANGE UP (ref 5–8)
PLATELET # BLD AUTO: 193 K/UL — SIGNIFICANT CHANGE UP (ref 150–400)
POTASSIUM SERPL-MCNC: 3.4 MMOL/L — LOW (ref 3.5–5.3)
POTASSIUM SERPL-SCNC: 3.4 MMOL/L — LOW (ref 3.5–5.3)
PROT SERPL-MCNC: 8.2 GM/DL — SIGNIFICANT CHANGE UP (ref 6–8.3)
PROT UR-MCNC: 300 MG/DL
PROTHROM AB SERPL-ACNC: 11 SEC — SIGNIFICANT CHANGE UP (ref 9.5–13)
RBC # BLD: 3.67 M/UL — LOW (ref 3.8–5.2)
RBC # FLD: 12.4 % — SIGNIFICANT CHANGE UP (ref 10.3–14.5)
RBC CASTS # UR COMP ASSIST: 1 /HPF — SIGNIFICANT CHANGE UP (ref 0–4)
SODIUM SERPL-SCNC: 139 MMOL/L — SIGNIFICANT CHANGE UP (ref 135–145)
SP GR SPEC: 1.02 — SIGNIFICANT CHANGE UP (ref 1–1.03)
UROBILINOGEN FLD QL: 2 MG/DL (ref 0.2–1)
WBC # BLD: 9.95 K/UL — SIGNIFICANT CHANGE UP (ref 3.8–10.5)
WBC # FLD AUTO: 9.95 K/UL — SIGNIFICANT CHANGE UP (ref 3.8–10.5)
WBC UR QL: 11 /HPF — HIGH (ref 0–5)

## 2024-05-30 PROCEDURE — 76705 ECHO EXAM OF ABDOMEN: CPT | Mod: 26

## 2024-05-30 PROCEDURE — 74176 CT ABD & PELVIS W/O CONTRAST: CPT | Mod: 26,MC

## 2024-05-30 PROCEDURE — 99223 1ST HOSP IP/OBS HIGH 75: CPT

## 2024-05-30 PROCEDURE — 71275 CT ANGIOGRAPHY CHEST: CPT | Mod: 26,MC

## 2024-05-30 PROCEDURE — 99285 EMERGENCY DEPT VISIT HI MDM: CPT

## 2024-05-30 PROCEDURE — 71045 X-RAY EXAM CHEST 1 VIEW: CPT | Mod: 26

## 2024-05-30 RX ORDER — SEVELAMER CARBONATE 2400 MG/1
800 POWDER, FOR SUSPENSION ORAL THREE TIMES A DAY
Refills: 0 | Status: DISCONTINUED | OUTPATIENT
Start: 2024-05-30 | End: 2024-06-06

## 2024-05-30 RX ORDER — CHOLECALCIFEROL (VITAMIN D3) 125 MCG
1000 CAPSULE ORAL DAILY
Refills: 0 | Status: DISCONTINUED | OUTPATIENT
Start: 2024-05-30 | End: 2024-06-06

## 2024-05-30 RX ORDER — PIPERACILLIN AND TAZOBACTAM 4; .5 G/20ML; G/20ML
3.38 INJECTION, POWDER, LYOPHILIZED, FOR SOLUTION INTRAVENOUS EVERY 12 HOURS
Refills: 0 | Status: DISCONTINUED | OUTPATIENT
Start: 2024-05-31 | End: 2024-06-01

## 2024-05-30 RX ORDER — ACETAMINOPHEN 500 MG
1000 TABLET ORAL ONCE
Refills: 0 | Status: COMPLETED | OUTPATIENT
Start: 2024-05-30 | End: 2024-05-30

## 2024-05-30 RX ORDER — METOPROLOL TARTRATE 50 MG
100 TABLET ORAL DAILY
Refills: 0 | Status: DISCONTINUED | OUTPATIENT
Start: 2024-05-30 | End: 2024-05-31

## 2024-05-30 RX ORDER — ALBUTEROL 90 UG/1
2 AEROSOL, METERED ORAL EVERY 6 HOURS
Refills: 0 | Status: DISCONTINUED | OUTPATIENT
Start: 2024-05-30 | End: 2024-06-06

## 2024-05-30 RX ORDER — CEFTRIAXONE 500 MG/1
1000 INJECTION, POWDER, FOR SOLUTION INTRAMUSCULAR; INTRAVENOUS ONCE
Refills: 0 | Status: COMPLETED | OUTPATIENT
Start: 2024-05-30 | End: 2024-05-30

## 2024-05-30 RX ORDER — ONDANSETRON 8 MG/1
4 TABLET, FILM COATED ORAL ONCE
Refills: 0 | Status: COMPLETED | OUTPATIENT
Start: 2024-05-30 | End: 2024-05-30

## 2024-05-30 RX ORDER — PIPERACILLIN AND TAZOBACTAM 4; .5 G/20ML; G/20ML
3.38 INJECTION, POWDER, LYOPHILIZED, FOR SOLUTION INTRAVENOUS EVERY 12 HOURS
Refills: 0 | Status: DISCONTINUED | OUTPATIENT
Start: 2024-05-30 | End: 2024-05-30

## 2024-05-30 RX ORDER — ACETAMINOPHEN 500 MG
650 TABLET ORAL ONCE
Refills: 0 | Status: COMPLETED | OUTPATIENT
Start: 2024-05-30 | End: 2024-05-30

## 2024-05-30 RX ORDER — MORPHINE SULFATE 50 MG/1
4 CAPSULE, EXTENDED RELEASE ORAL ONCE
Refills: 0 | Status: DISCONTINUED | OUTPATIENT
Start: 2024-05-30 | End: 2024-05-30

## 2024-05-30 RX ORDER — PIPERACILLIN AND TAZOBACTAM 4; .5 G/20ML; G/20ML
3.38 INJECTION, POWDER, LYOPHILIZED, FOR SOLUTION INTRAVENOUS ONCE
Refills: 0 | Status: COMPLETED | OUTPATIENT
Start: 2024-05-30 | End: 2024-05-30

## 2024-05-30 RX ORDER — ONDANSETRON 8 MG/1
4 TABLET, FILM COATED ORAL ONCE
Refills: 0 | Status: DISCONTINUED | OUTPATIENT
Start: 2024-05-30 | End: 2024-05-30

## 2024-05-30 RX ORDER — CALCITRIOL 0.5 UG/1
0.5 CAPSULE ORAL DAILY
Refills: 0 | Status: DISCONTINUED | OUTPATIENT
Start: 2024-05-30 | End: 2024-06-06

## 2024-05-30 RX ORDER — CEFTRIAXONE 500 MG/1
1000 INJECTION, POWDER, FOR SOLUTION INTRAMUSCULAR; INTRAVENOUS EVERY 24 HOURS
Refills: 0 | Status: DISCONTINUED | OUTPATIENT
Start: 2024-05-30 | End: 2024-05-30

## 2024-05-30 RX ORDER — SODIUM CHLORIDE 9 MG/ML
250 INJECTION INTRAMUSCULAR; INTRAVENOUS; SUBCUTANEOUS ONCE
Refills: 0 | Status: COMPLETED | OUTPATIENT
Start: 2024-05-30 | End: 2024-05-30

## 2024-05-30 RX ORDER — PIPERACILLIN AND TAZOBACTAM 4; .5 G/20ML; G/20ML
3.38 INJECTION, POWDER, LYOPHILIZED, FOR SOLUTION INTRAVENOUS ONCE
Refills: 0 | Status: COMPLETED | OUTPATIENT
Start: 2024-05-31 | End: 2024-05-31

## 2024-05-30 RX ORDER — FOLIC ACID 0.8 MG
1 TABLET ORAL DAILY
Refills: 0 | Status: DISCONTINUED | OUTPATIENT
Start: 2024-05-30 | End: 2024-06-06

## 2024-05-30 RX ADMIN — Medication 1000 MILLIGRAM(S): at 14:29

## 2024-05-30 RX ADMIN — Medication 400 MILLIGRAM(S): at 13:19

## 2024-05-30 RX ADMIN — SEVELAMER CARBONATE 800 MILLIGRAM(S): 2400 POWDER, FOR SUSPENSION ORAL at 21:05

## 2024-05-30 RX ADMIN — MORPHINE SULFATE 4 MILLIGRAM(S): 50 CAPSULE, EXTENDED RELEASE ORAL at 11:45

## 2024-05-30 RX ADMIN — ONDANSETRON 4 MILLIGRAM(S): 8 TABLET, FILM COATED ORAL at 11:26

## 2024-05-30 RX ADMIN — CEFTRIAXONE 1000 MILLIGRAM(S): 500 INJECTION, POWDER, FOR SOLUTION INTRAMUSCULAR; INTRAVENOUS at 19:30

## 2024-05-30 RX ADMIN — SODIUM CHLORIDE 250 MILLILITER(S): 9 INJECTION INTRAMUSCULAR; INTRAVENOUS; SUBCUTANEOUS at 20:13

## 2024-05-30 RX ADMIN — MORPHINE SULFATE 4 MILLIGRAM(S): 50 CAPSULE, EXTENDED RELEASE ORAL at 11:26

## 2024-05-30 RX ADMIN — PIPERACILLIN AND TAZOBACTAM 200 GRAM(S): 4; .5 INJECTION, POWDER, LYOPHILIZED, FOR SOLUTION INTRAVENOUS at 21:36

## 2024-05-30 RX ADMIN — MORPHINE SULFATE 4 MILLIGRAM(S): 50 CAPSULE, EXTENDED RELEASE ORAL at 19:03

## 2024-05-30 RX ADMIN — CEFTRIAXONE 100 MILLIGRAM(S): 500 INJECTION, POWDER, FOR SOLUTION INTRAMUSCULAR; INTRAVENOUS at 19:05

## 2024-05-30 RX ADMIN — Medication 650 MILLIGRAM(S): at 19:53

## 2024-05-30 RX ADMIN — Medication 650 MILLIGRAM(S): at 20:36

## 2024-05-30 RX ADMIN — MORPHINE SULFATE 4 MILLIGRAM(S): 50 CAPSULE, EXTENDED RELEASE ORAL at 17:44

## 2024-05-30 NOTE — H&P ADULT - HISTORY OF PRESENT ILLNESS
72 yo female PMH of HTN, ESRD on HD M/W/F, PE s/p IVC filter in 2012, LARRY presents via EMS with c/o sudden onset RLQ abdominal pain radiating to the back with nausea and vomiting x 2 episodes this morning while sitting on her bed. Patient also endorses chronic SOB, not on home oxygen. Patient denies fevers but endorses chills, denies HA, cough or congestion, chest pain, leg pain or swelling, urinary burning, urgency, frequency, hematuria.  72 yo female PMH of HTN, ESRD on HD for one year due to HTN nephropathy, M/W/F, PE s/p IVC filter in 2012, LARRY presents via EMS with c/o sudden onset RLQ abdominal pain radiating to the back with nausea and vomiting x 2 episodes this morning while sitting on her bed. Patient also endorses chronic SOB, not on home oxygen. Patient denies fevers but endorses chills, denies HA, cough or congestion, chest pain, leg pain or swelling, urinary burning, urgency, frequency, hematuria.  74 yo female PMH of HTN, ESRD on HD for one year due to HTN nephropathy, M/W/F, PE s/p IVC filter in 2012, LARRY presents via EMS with c/o sudden onset RLQ abdominal pain radiating to the back with nausea and vomiting x 2 episodes this morning while sitting on her bed. Patient also endorses chronic SOB, not on home oxygen. Patient denies fevers but endorses chills, denies HA, cough or congestion, chest pain, leg pain or swelling, urinary burning, urgency, frequency, hematuria.     Has permcath in place for HD.   72 yo female PMH of HTN, ESRD on HD for one year due to HTN nephropathy, M/W/F, PE s/p IVC filter in 2012, gallbladder removed, LARRY presents via EMS with c/o sudden onset RLQ abdominal pain radiating to the back with nausea and vomiting x 2 episodes this morning while sitting on her bed. Patient also endorses chronic SOB, not on home oxygen. Patient denies fevers but endorses chills, denies HA, cough or congestion, chest pain, leg pain or swelling, urinary burning, urgency, frequency, hematuria.     Has permcath in place for HD.

## 2024-05-30 NOTE — H&P ADULT - NSHPPHYSICALEXAM_GEN_ALL_CORE
PHYSICAL EXAMINATION:  Vital Signs Last 24 Hrs  T(C): 37.7 (30 May 2024 11:18), Max: 37.7 (30 May 2024 11:18)  T(F): 99.9 (30 May 2024 11:18), Max: 99.9 (30 May 2024 11:18)  HR: 137 (30 May 2024 11:18) (112 - 137)  BP: 131/60 (30 May 2024 11:18) (131/60 - 134/73)  BP(mean): --  RR: 20 (30 May 2024 10:01) (20 - 20)  SpO2: 96% (30 May 2024 11:18) (93% - 96%)    Parameters below as of 30 May 2024 11:18  Patient On (Oxygen Delivery Method): nasal cannula      CAPILLARY BLOOD GLUCOSE          GENERAL: NAD,   ENMT: mucous membranes moist  NECK: supple, No JVD  CHEST/LUNG: clear to auscultation bilaterally; no rales, rhonchi, or wheezing b/l  HEART: normal S1, S2  ABDOMEN: BS+, soft, ND, NT   EXTREMITIES:  pulses palpable; no clubbing, cyanosis, or edema b/l LEs  NEURO: awake, alert, interactive; moves all extremities PHYSICAL EXAMINATION:  Vital Signs Last 24 Hrs  T(C): 37.7 (30 May 2024 11:18), Max: 37.7 (30 May 2024 11:18)  T(F): 99.9 (30 May 2024 11:18), Max: 99.9 (30 May 2024 11:18)  HR: 137 (30 May 2024 11:18) (112 - 137)  BP: 131/60 (30 May 2024 11:18) (131/60 - 134/73)  BP(mean): --  RR: 20 (30 May 2024 10:01) (20 - 20)  SpO2: 96% (30 May 2024 11:18) (93% - 96%)    Parameters below as of 30 May 2024 11:18  Patient On (Oxygen Delivery Method): nasal cannula      CAPILLARY BLOOD GLUCOSE          GENERAL: NAD, seen in 1-C, comfortable, answers all questions, no abdominal pain.   ENMT: mucous membranes moist  NECK: supple, No JVD  CHEST/LUNG: clear to auscultation bilaterally; no rales, rhonchi, or wheezing b/l  HEART: normal S1, S2  ABDOMEN: BS+, soft, ND, NT   EXTREMITIES:  pulses palpable; no clubbing, cyanosis, or edema b/l LEs  NEURO: awake, alert, interactive; moves all extremities

## 2024-05-30 NOTE — ED PROVIDER NOTE - CLINICAL SUMMARY MEDICAL DECISION MAKING FREE TEXT BOX
Patient is 74yo F PMHx HTN, ESRD on HD M/W/F, PE s/p IVC filter in 2012, LARRY presents via EMS with c/o sudden onset RLQ abdominal pain radiating to the back with nausea and vomiting x2 this morning. Patient denies fevers but endorses chills, denies HA, dizziness, cough or congestion, chest pain, leg pain or swelling, urinary burning, urgency, frequency, hematuria. Patient uncomfortable appearing, tachypneic, groaning in pain, colicky appearing. Physical exam pertinent for RLQ abdominal pain and right CVA tenderness. No rebound tenderness. Lung sounds clear, heart slightly tachycardic to 110s. Differential diagnosis concerning for but not limited to nephrolithiasis vs acute appendicitis vs pyelonephritis vs other acute abdominal pathology. Will order EKG, labs including CBC, CMP, lipase, UA/UC, IV Zofran for nausea and IV morphine for pain, and non-con CT abdomen/pelvis r/o kidney stone and reassess. Disposition pending results of labs/CT and improvement in patient's symptoms. RAMO Lozano NP Fellow: Patient is 72yo F PMHx HTN, ESRD on HD M/W/F, PE s/p IVC filter in 2012, LARRY presents via EMS with c/o sudden onset RLQ abdominal pain radiating to the back with nausea and vomiting x2 this morning. Patient denies fevers but endorses chills, denies HA, dizziness, cough or congestion, chest pain, leg pain or swelling, urinary burning, urgency, frequency, hematuria. Patient uncomfortable appearing, tachypneic, groaning in pain, colicky appearing. Physical exam pertinent for RLQ abdominal pain and right CVA tenderness. No rebound tenderness. Lung sounds clear, heart slightly tachycardic to 110s. Differential diagnosis concerning for but not limited to nephrolithiasis vs acute appendicitis vs pyelonephritis vs other acute abdominal pathology. Will order EKG, labs including CBC, CMP, lipase, UA/UC, IV Zofran for nausea and IV morphine for pain, and non-con CT abdomen/pelvis r/o kidney stone and reassess. Disposition pending results of labs/CT and improvement in patient's symptoms.

## 2024-05-30 NOTE — ED PROVIDER NOTE - PROGRESS NOTE DETAILS
Tyler Loving, EM NP Fellow: CT results negative for acute abdominal pathology, in the setting of CBD dilation s/p cholecystectomy and elevated LFTs will order ultrasound abdomen and IV Ofirmev in the setting of the patient c/o returned pain. Tyler Loving, EM NP Fellow: US abdomen unremarkable. Patient remains tachycardic to 130s and hypoxic without supplemental oxygen via nasal cannula. Will order troponin, PT/PTT/INR and CTA chest r/o PE. Patient updated on plan of care, endorses understanding.

## 2024-05-30 NOTE — ED PROVIDER NOTE - PHYSICAL EXAMINATION
CONSTITUTIONAL: Alert and Oriented x3, groaning in pain  HEAD: Normocephalic, atraumatic. Scalp without lesions or tenderness.  NECK:  Airway patent. Neck Supple. FROM without pain.  No lymphadenopathy. Trachea midline.   CARDIAC: Heart tachycardiac to 110s, normal S1/2, no murmurs noted. No chest wall tenderness or deformity. Right CW HD catheter.   RESPIRATORY: Tachypnea to mid 20s, Lung sounds clear B/L, with diminished breath sounds posteriorly.   ABDOMEN: soft, non-distended; RLQ + TTP, no rebound tenderness, bowel sounds present x4 quadrants. RIGHT CVA TENDERNESS. + scar to RUQ abdomen from cholecystectomy.   MSK: Moving all extremities. Normal color and temperature.    SKIN: Warm, dry, color WNL, no turgor, erythema or rashes.  NEURO: alert and interactive, cooperative, oriented x3, no focal deficits.

## 2024-05-30 NOTE — H&P ADULT - NSHPLABSRESULTS_GEN_ALL_CORE
LABS:                        11.7   9.95  )-----------( 193      ( 30 May 2024 11:25 )             35.6     05    139  |  100  |  51<H>  ----------------------------<  108<H>  3.4<L>   |  28  |  5.50<H>    Ca    9.6      30 May 2024 11:25    TPro  8.2  /  Alb  3.1<L>  /  TBili  1.5<H>  /  DBili  x   /  AST  389<H>  /  ALT  282<H>  /  AlkPhos  407<H>      PT/INR - ( 30 May 2024 15:15 )   PT: 11.0 sec;   INR: 0.92 ratio         PTT - ( 30 May 2024 15:15 )  PTT:26.4 sec  Urinalysis Basic - ( 30 May 2024 11:55 )    Color: Yellow / Appearance: Cloudy / S.018 / pH: x  Gluc: x / Ketone: Trace mg/dL  / Bili: Negative / Urobili: 2.0 mg/dL   Blood: x / Protein: 300 mg/dL / Nitrite: Negative   Leuk Esterase: Large / RBC: 1 /HPF / WBC 11 /HPF   Sq Epi: x / Non Sq Epi: x / Bacteria: Moderate /HPF          RADIOLOGY & ADDITIONAL TESTS:

## 2024-05-30 NOTE — ED PROVIDER NOTE - OBJECTIVE STATEMENT
Patient is 74yo F PMHx HTN, ESRD on HD M/W/F, PE s/p IVC filter in 2012, LARRY presents via EMS with c/o sudden onset RLQ abdominal pain radiating to the back with nausea and vomiting x2 this morning while sitting on her bed. Patient also endorses chronic SOB, not on home o2, worse when the pain started. Patient denies fevers but endorses chills, denies HA, dizziness, cough or congestion, chest pain, leg pain or swelling, urinary burning, urgency, frequency, hematuria. Patient was placed on 6LNC by EMS 2/2 unable to obtain spo2.

## 2024-05-30 NOTE — H&P ADULT - ASSESSMENT
74 yo female PMH of HTN, ESRD on HD M/W/F, PE s/p IVC filter in 2012, LARRY presents via EMS with c/o sudden onset RLQ abdominal pain radiating to the back with nausea and vomiting x 2 episodes this morning while sitting on her bed. Patient also endorses chronic SOB, not on home oxygen. Patient denies fevers but endorses chills, denies HA, cough or congestion, chest pain, leg pain or swelling, urinary burning, urgency, frequency, hematuria.     Plan:   72 yo female PMH of HTN, ESRD on HD M/W/F, PE s/p IVC filter in 2012, LARRY presents via EMS with c/o sudden onset RLQ abdominal pain radiating to the back with nausea and vomiting x 2 episodes this morning while sitting on her bed. Patient also endorses chronic SOB, not on home oxygen. Patient denies fevers but endorses chills, denies HA, cough or congestion, chest pain, leg pain or swelling, urinary burning, urgency, frequency, hematuria.       Plan:  Admit to medicine for RLQ abdominal pain. Had sonogram, GB was removed, no dilated CBD. LFT are increased with alkphos. CT of     abdomen no acute findings. Will order MRCP ro r/o retained CBD stone or sludge. Had fever in ER, add empiric Zosyn until MRCP is     complete. UA only 11 WBC, doubt UTI, no urine production due to ESRD. Low BP in ER, will give one Normal Saline fluid bolus 250 ml     x 1 now.     Continue home meds: Albuteral prn, Rocaltrol, Vitamin D, Toprol and Renagel.      72 yo female PMH of HTN, ESRD on HD M/W/F, PE s/p IVC filter in 2012, LARRY presents via EMS with c/o sudden onset RLQ abdominal pain radiating to the back with nausea and vomiting x 2 episodes this morning while sitting on her bed. Patient also endorses chronic SOB, not on home oxygen. Patient denies fevers but endorses chills, denies HA, cough or congestion, chest pain, leg pain or swelling, urinary burning, urgency, frequency, hematuria.       Plan:  Admit to medicine for RLQ abdominal pain. Had sonogram, GB was removed, no dilated CBD. LFT are increased with alkphos elevated as well.     CT of  abdomen no acute findings. Will order MRCP ro r/o retained CBD stone or sludge. Had fever in ER, add empiric Zosyn until MRCP is     complete. UA only 11 WBC, doubt UTI, no urine production due to ESRD. Low BP in ER, will give one Normal Saline fluid bolus 250 ml  x 1 now.     Continue home meds: Albuteral prn, Rocaltrol, Vitamin D, Toprol and Renagel.      72 yo female PMH of HTN, ESRD on HD M/W/F, PE s/p IVC filter in 2012, LARRY presents via EMS with c/o sudden onset RLQ abdominal pain radiating to the back with nausea and vomiting x 2 episodes this morning while sitting on her bed. Patient also endorses chronic SOB, not on home oxygen. Patient denies fevers but endorses chills, denies HA, cough or congestion, chest pain, leg pain or swelling, urinary burning, urgency, frequency, hematuria.       Plan:  Admit to medicine for RLQ abdominal pain. Had sonogram, GB was removed, no dilated CBD. LFT are increased with alkphos elevated as well.     CT of  abdomen no acute findings. Will order MRCP ro r/o retained CBD stone or sludge. Had fever in ER, add empiric Zosyn until MRCP is  complete.     UA only 11 WBC, doubt UTI, no urine production due to ESRD. Low BP in ER, will give one Normal Saline fluid bolus 250 ml  x 1 now.     Continue home meds: Albuteral prn, Rocaltrol, Vitamin D, Toprol and Renagel.      72 yo female PMH of HTN, ESRD on HD M/W/F, PE s/p IVC filter in 2012, LARRY presents via EMS with c/o sudden onset RLQ abdominal pain radiating to the back with nausea and vomiting x 2 episodes this morning while sitting on her bed. Patient also endorses chronic SOB, not on home oxygen. Patient denies fevers but endorses chills, denies HA, cough or congestion, chest pain, leg pain or swelling, urinary burning, urgency, frequency, hematuria.       Plan:  Admit to medicine for RLQ abdominal pain. Had sonogram in ER, GB was removed, no dilated CBD. LFT are increased with alkphos elevated as well.     CT of  abdomen no acute findings. Will order MRCP ro r/o retained CBD stone or sludge. Had fever in ER, add empiric Zosyn until MRCP is  complete.     UA only 11 WBC, doubt UTI, no urine production due to ESRD. Low BP in ER, will give one Normal Saline fluid bolus 250 ml  x 1 now.  Ordered TTE     as pulmonary HTN seen on CTA, no new PE is seen.       Continue home meds: Albuteral prn, Rocaltrol, Vitamin D, Toprol and Renagel.      74 yo female PMH of HTN, ESRD on HD M/W/F, PE s/p IVC filter in 2012, LARRY presents via EMS with c/o sudden onset RLQ abdominal pain radiating to the back with nausea and vomiting x 2 episodes this morning while sitting on her bed. Patient also endorses chronic SOB, not on home oxygen. Patient denies fevers but endorses chills, denies HA, cough or congestion, chest pain, leg pain or swelling, urinary burning, urgency, frequency, hematuria.       Plan:  Admit to medicine for RLQ abdominal pain. Had sonogram in ER, GB was removed, no dilated CBD. LFT are increased with alkphos elevated as well.     CT of  abdomen no acute findings. Will order MRCP ro r/o retained CBD stone or sludge. Had fever in ER, add empiric Zosyn until MRCP is  complete.     UA only 11 WBC, doubt UTI, no urine production due to ESRD. Low BP in ER, will give one Normal Saline fluid bolus 250 ml  x 1 now.  Ordered TTE     as pulmonary HTN seen on CTA, no new PE is seen.  Follow up CT chest in three months is recommended, see report.      Continue home meds: Albuteral prn, Rocaltrol, Vitamin D, Toprol and Renagel.

## 2024-05-30 NOTE — ED PROVIDER NOTE - ATTENDING APP SHARED VISIT CONTRIBUTION OF CARE
74yo female with pmh  HTN, ESRD on HD M/W/F last full session yesterday, PE s/p IVC filter in 2012, LARRY presenting with R flank and lower abdominal pain.  Started acutely this morning.  Pain mainly RLQ radiating to side.  + Nausea, vomiting.  No diarrhea, constipation, urinary symptoms.  Pshx todd.  TTP RLQ/ R cvat.  Will eval for stone, appy, uti.  Gyn seems less likely.  Plan labs, imaging, reassess.

## 2024-05-30 NOTE — ED ADULT NURSE NOTE - NSFALLRISKINTERV_ED_ALL_ED
Assistance OOB with selected safe patient handling equipment if applicable/Assistance with ambulation/Communicate fall risk and risk factors to all staff, patient, and family/Monitor gait and stability/Provide visual cue: yellow wristband, yellow gown, etc/Reinforce activity limits and safety measures with patient and family/Call bell, personal items and telephone in reach/Instruct patient to call for assistance before getting out of bed/chair/stretcher/Non-slip footwear applied when patient is off stretcher/[place] to call system/Physically safe environment - no spills, clutter or unnecessary equipment/Purposeful Proactive Rounding/Room/bathroom lighting operational, light cord in reach

## 2024-05-31 LAB
ALBUMIN SERPL ELPH-MCNC: 2.4 G/DL — LOW (ref 3.3–5)
ALP SERPL-CCNC: 270 U/L — HIGH (ref 40–120)
ALT FLD-CCNC: 159 U/L — HIGH (ref 12–78)
ANION GAP SERPL CALC-SCNC: 12 MMOL/L — SIGNIFICANT CHANGE UP (ref 5–17)
AST SERPL-CCNC: 75 U/L — HIGH (ref 15–37)
BILIRUB SERPL-MCNC: 3.4 MG/DL — HIGH (ref 0.2–1.2)
BUN SERPL-MCNC: 62 MG/DL — HIGH (ref 7–23)
CALCIUM SERPL-MCNC: 8.3 MG/DL — LOW (ref 8.5–10.1)
CHLORIDE SERPL-SCNC: 98 MMOL/L — SIGNIFICANT CHANGE UP (ref 96–108)
CO2 SERPL-SCNC: 27 MMOL/L — SIGNIFICANT CHANGE UP (ref 22–31)
CREAT SERPL-MCNC: 7.23 MG/DL — HIGH (ref 0.5–1.3)
EGFR: 6 ML/MIN/1.73M2 — LOW
GLUCOSE SERPL-MCNC: 110 MG/DL — HIGH (ref 70–99)
HCT VFR BLD CALC: 29.6 % — LOW (ref 34.5–45)
HGB BLD-MCNC: 9.7 G/DL — LOW (ref 11.5–15.5)
MCHC RBC-ENTMCNC: 31.3 PG — SIGNIFICANT CHANGE UP (ref 27–34)
MCHC RBC-ENTMCNC: 32.8 G/DL — SIGNIFICANT CHANGE UP (ref 32–36)
MCV RBC AUTO: 95.5 FL — SIGNIFICANT CHANGE UP (ref 80–100)
NRBC # BLD: 0 /100 WBCS — SIGNIFICANT CHANGE UP (ref 0–0)
PLATELET # BLD AUTO: 153 K/UL — SIGNIFICANT CHANGE UP (ref 150–400)
POTASSIUM SERPL-MCNC: 4 MMOL/L — SIGNIFICANT CHANGE UP (ref 3.5–5.3)
POTASSIUM SERPL-SCNC: 4 MMOL/L — SIGNIFICANT CHANGE UP (ref 3.5–5.3)
PROT SERPL-MCNC: 6.6 GM/DL — SIGNIFICANT CHANGE UP (ref 6–8.3)
RBC # BLD: 3.1 M/UL — LOW (ref 3.8–5.2)
RBC # FLD: 12.7 % — SIGNIFICANT CHANGE UP (ref 10.3–14.5)
SODIUM SERPL-SCNC: 137 MMOL/L — SIGNIFICANT CHANGE UP (ref 135–145)
WBC # BLD: 13.86 K/UL — HIGH (ref 3.8–10.5)
WBC # FLD AUTO: 13.86 K/UL — HIGH (ref 3.8–10.5)

## 2024-05-31 PROCEDURE — 93010 ELECTROCARDIOGRAM REPORT: CPT

## 2024-05-31 PROCEDURE — 99232 SBSQ HOSP IP/OBS MODERATE 35: CPT

## 2024-05-31 RX ORDER — HEPARIN SODIUM 5000 [USP'U]/ML
5000 INJECTION INTRAVENOUS; SUBCUTANEOUS EVERY 12 HOURS
Refills: 0 | Status: DISCONTINUED | OUTPATIENT
Start: 2024-05-31 | End: 2024-06-06

## 2024-05-31 RX ORDER — ACETAMINOPHEN 500 MG
1000 TABLET ORAL ONCE
Refills: 0 | Status: COMPLETED | OUTPATIENT
Start: 2024-05-31 | End: 2024-05-31

## 2024-05-31 RX ORDER — SODIUM CHLORIDE 9 MG/ML
250 INJECTION INTRAMUSCULAR; INTRAVENOUS; SUBCUTANEOUS ONCE
Refills: 0 | Status: DISCONTINUED | OUTPATIENT
Start: 2024-05-31 | End: 2024-05-31

## 2024-05-31 RX ORDER — METOPROLOL TARTRATE 50 MG
2.5 TABLET ORAL ONCE
Refills: 0 | Status: COMPLETED | OUTPATIENT
Start: 2024-05-31 | End: 2024-05-31

## 2024-05-31 RX ORDER — METOPROLOL TARTRATE 50 MG
100 TABLET ORAL DAILY
Refills: 0 | Status: DISCONTINUED | OUTPATIENT
Start: 2024-05-31 | End: 2024-06-01

## 2024-05-31 RX ORDER — CHLORHEXIDINE GLUCONATE 213 G/1000ML
1 SOLUTION TOPICAL DAILY
Refills: 0 | Status: DISCONTINUED | OUTPATIENT
Start: 2024-05-31 | End: 2024-06-06

## 2024-05-31 RX ORDER — KETOROLAC TROMETHAMINE 30 MG/ML
30 SYRINGE (ML) INJECTION ONCE
Refills: 0 | Status: DISCONTINUED | OUTPATIENT
Start: 2024-05-31 | End: 2024-05-31

## 2024-05-31 RX ORDER — KETOROLAC TROMETHAMINE 30 MG/ML
15 SYRINGE (ML) INJECTION ONCE
Refills: 0 | Status: DISCONTINUED | OUTPATIENT
Start: 2024-05-31 | End: 2024-05-31

## 2024-05-31 RX ADMIN — Medication 1 MILLIGRAM(S): at 11:09

## 2024-05-31 RX ADMIN — Medication 30 MILLIGRAM(S): at 16:46

## 2024-05-31 RX ADMIN — Medication 400 MILLIGRAM(S): at 22:07

## 2024-05-31 RX ADMIN — Medication 15 MILLIGRAM(S): at 05:55

## 2024-05-31 RX ADMIN — Medication 30 MILLIGRAM(S): at 17:37

## 2024-05-31 RX ADMIN — CHLORHEXIDINE GLUCONATE 1 APPLICATION(S): 213 SOLUTION TOPICAL at 11:09

## 2024-05-31 RX ADMIN — SEVELAMER CARBONATE 800 MILLIGRAM(S): 2400 POWDER, FOR SUSPENSION ORAL at 08:53

## 2024-05-31 RX ADMIN — PIPERACILLIN AND TAZOBACTAM 25 GRAM(S): 4; .5 INJECTION, POWDER, LYOPHILIZED, FOR SOLUTION INTRAVENOUS at 05:00

## 2024-05-31 RX ADMIN — Medication 2.5 MILLIGRAM(S): at 18:33

## 2024-05-31 RX ADMIN — SEVELAMER CARBONATE 800 MILLIGRAM(S): 2400 POWDER, FOR SUSPENSION ORAL at 16:08

## 2024-05-31 RX ADMIN — HEPARIN SODIUM 5000 UNIT(S): 5000 INJECTION INTRAVENOUS; SUBCUTANEOUS at 17:15

## 2024-05-31 RX ADMIN — SEVELAMER CARBONATE 800 MILLIGRAM(S): 2400 POWDER, FOR SUSPENSION ORAL at 22:07

## 2024-05-31 RX ADMIN — Medication 1000 MILLIGRAM(S): at 23:16

## 2024-05-31 RX ADMIN — Medication 1000 MILLIGRAM(S): at 17:15

## 2024-05-31 RX ADMIN — CALCITRIOL 0.5 MICROGRAM(S): 0.5 CAPSULE ORAL at 11:09

## 2024-05-31 RX ADMIN — Medication 400 MILLIGRAM(S): at 16:08

## 2024-05-31 RX ADMIN — Medication 1000 UNIT(S): at 11:09

## 2024-05-31 RX ADMIN — PIPERACILLIN AND TAZOBACTAM 25 GRAM(S): 4; .5 INJECTION, POWDER, LYOPHILIZED, FOR SOLUTION INTRAVENOUS at 17:15

## 2024-05-31 NOTE — PROVIDER CONTACT NOTE (MEDICATION) - SITUATION
Pt's HR is elevated in 140's-150's. NP Delisa and NP Tequila came to bedside and stated will order metoprolol IVP to lower HR. Retook BP and was 87/50.

## 2024-05-31 NOTE — CONSULT NOTE ADULT - SUBJECTIVE AND OBJECTIVE BOX
Patient chart reviewed, full consult to follow.     Will arrange for HD today    Thank you for the courtesy of this consultation. Health system NEPHROLOGY SERVICES, [facility]  NEPHROLOGY AND HYPERTENSION  [address]  [address]  [place] [address]  [phone]    MD ARVIND VENCES MD YELENA ROSENBERG, MD BINNY KOSHY, MD CHRISTOPHER CAPUTO, MD EDWARD BOVER, MD      Information from chart:  "Patient is a 73y old  Female who presents with a chief complaint of Abdominal pain with increased LFT's (31 May 2024 12:09)    HPI:  74 yo female PMH of HTN, ESRD on HD for one year due to HTN nephropathy, M/W/F, PE s/p IVC filter in , gallbladder removed, LARRY presents via EMS with c/o sudden onset RLQ abdominal pain radiating to the back with nausea and vomiting x 2 episodes this morning while sitting on her bed. Patient also endorses chronic SOB, not on home oxygen. Patient denies fevers but endorses chills, denies HA, cough or congestion, chest pain, leg pain or swelling, urinary burning, urgency, frequency, hematuria.     Has permcath in place for HD.   (30 May 2024 19:15)   "    No distress    PAST MEDICAL & SURGICAL HISTORY:  Osteoarthritis      Pulmonary embolism      Joint infection      Dysfunctional uterine bleeding      Obesity      Essential hypertension  Hypertension      Obstructive sleep apnea syndrome  Obstructive sleep apnea      History of pulmonary embolism   s/p IVC filter      Arthropathy  Arthritis      Migraine  Migraines      Morbid obesity      ESRD (end stage renal disease)      Total knee replacement status      Status post hysterectomy      Status post cholecystectomy      Other acquired absence of organ  History of cholecystectomy      Status post total hysterectomy  partial      History of total knee replacement  with revisions x 3        FAMILY HISTORY:  FH: type 2 diabetes mellitus    FHx: hypertension      Allergies    Sulfur (Unknown)  trimethoprim (Other; Rash)  sulfa drugs (Other; Rash)    Intolerances      Home Medications:  cholecalciferol oral tablet: 1000 unit(s) orally once a day (21 Mar 2024 11:01)  folic acid 1 mg oral tablet: 1 tab(s) orally once a day (31 May 2024 08:05)    MEDICATIONS  (STANDING):  calcitriol   Capsule 0.5 MICROGram(s) Oral daily  chlorhexidine 2% Cloths 1 Application(s) Topical daily  cholecalciferol 1000 Unit(s) Oral daily  folic acid 1 milliGRAM(s) Oral daily  heparin   Injectable 5000 Unit(s) SubCutaneous every 12 hours  metoprolol succinate  milliGRAM(s) Oral daily  piperacillin/tazobactam IVPB.. 3.375 Gram(s) IV Intermittent every 12 hours  sevelamer carbonate 800 milliGRAM(s) Oral three times a day    MEDICATIONS  (PRN):  albuterol    90 MICROgram(s) HFA Inhaler 2 Puff(s) Inhalation every 6 hours PRN Shortness of Breath and/or Wheezing    Vital Signs Last 24 Hrs  T(C): 36.8 (31 May 2024 11:05), Max: 38.2 (30 May 2024 19:38)  T(F): 98.3 (31 May 2024 11:05), Max: 100.7 (30 May 2024 19:38)  HR: 105 (31 May 2024 11:05) (105 - 126)  BP: 97/54 (31 May 2024 11:05) (83/46 - 121/101)  BP(mean): 78 (30 May 2024 21:14) (67 - 78)  RR: 17 (31 May 2024 11:05) (17 - 100)  SpO2: 98% (31 May 2024 11:) (96% - 100%)    Parameters below as of 31 May 2024 11:05  Patient On (Oxygen Delivery Method): room air        Daily     Daily Weight in k (31 May 2024 11:05)    24 @ 07:01  -  24 @ 13:55  --------------------------------------------------------  IN: 220 mL / OUT: 0 mL / NET: 220 mL      CAPILLARY BLOOD GLUCOSE        PHYSICAL EXAM:      T(C): 36.8 (24 @ 11:05), Max: 38.2 (24 @ 19:38)  HR: 105 (24 @ 11:05) (105 - 126)  BP: 97/54 (24 @ 11:05) (83/46 - 121/101)  RR: 17 (24 @ 11:05) (17 - 100)  SpO2: 98% (24 @ 11:05) (96% - 100%)  Wt(kg): --  Lungs clear  Heart S1S2  Abd soft NT ND  Extremities:   tr edema  right perm cath                   137  |  98  |  62<H>  ----------------------------<  110<H>  4.0   |  27  |  7.23<H>    Ca    8.3<L>      31 May 2024 10:15    TPro  6.6  /  Alb  2.4<L>  /  TBili  3.4<H>  /  DBili  x   /  AST  75<H>  /  ALT  159<H>  /  AlkPhos  270<H>                            9.7    13.86 )-----------( 153      ( 31 May 2024 10:15 )             29.6     Creatinine Trend: 7.23<--, 5.50<--  Urinalysis Basic - ( 31 May 2024 10:15 )    Color: x / Appearance: x / SG: x / pH: x  Gluc: 110 mg/dL / Ketone: x  / Bili: x / Urobili: x   Blood: x / Protein: x / Nitrite: x   Leuk Esterase: x / RBC: x / WBC x   Sq Epi: x / Non Sq Epi: x / Bacteria: x            Assessment   ESRD, maintenance     Plan  HD for today  UF as tolerated   Will follow       Chay Mora MD

## 2024-05-31 NOTE — PROGRESS NOTE ADULT - ASSESSMENT
74 yo female PMH of HTN, ESRD on HD M/W/F, PE s/p IVC filter in 2012, LARRY presents via EMS with c/o sudden onset RLQ abdominal pain radiating to the back with nausea and vomiting x 2 episodes this morning while sitting on her bed. Patient also endorses chronic SOB, not on home oxygen. Patient denies fevers but endorses chills, denies HA, cough or congestion, chest pain, leg pain or swelling, urinary burning, urgency, frequency, hematuria.       Plan:  Admit to medicine for RLQ abdominal pain. Had sonogram in ER, GB was removed, no dilated CBD. LFT are increased with alkphos elevated as well.     CT of  abdomen no acute findings. Will order MRCP ro r/o retained CBD stone or sludge. Had fever in ER, add empiric Zosyn until MRCP is  complete.     UA only 11 WBC, doubt UTI, no urine production due to ESRD. Low BP in ER, will give one Normal Saline fluid bolus 250 ml  x 1 now.  Ordered TTE     as pulmonary HTN seen on CTA, no new PE is seen.  Follow up CT chest in three months is recommended, see report.      Continue home meds: Albuteral prn, Rocaltrol, Vitamin D, Toprol and Renagel.      74 yo female PMH of HTN, ESRD on HD M/W/F, PE s/p IVC filter in 2012, LARRY presents via EMS with c/o sudden onset RUPPERQ abdominal pain radiating to the back with nausea and vomiting x 2 episodes this morning while sitting on her bed. Patient also endorses chronic SOB, not on home oxygen. Patient denies fevers but endorses chills, denies HA, cough or congestion, chest pain, leg pain or swelling, urinary burning, urgency, frequency, hematuria.       Plan:  Admit to medicine for RUPPERQ abdominal pain. Had sonogram in ER, GB was removed, no dilated CBD. LFT are increased with alkphos elevated as well.     CT of  abdomen no acute findings. Will order MRCP ro r/o retained CBD stone or sludge. Had fever in ER, add empiric Zosyn until MRCP is  complete.     UA only 11 WBC, doubt UTI, no urine production due to ESRD. Low BP in ER, will give one Normal Saline fluid bolus 250 ml  x 1 now.  Ordered TTE     as pulmonary HTN seen on CTA, no new PE is seen.  Follow up CT chest in three months is recommended, see report.      Continue home meds: Albuteral prn, Rocaltrol, Vitamin D, Toprol and Renagel.

## 2024-06-01 ENCOUNTER — RESULT REVIEW (OUTPATIENT)
Age: 74
End: 2024-06-01

## 2024-06-01 LAB
ALBUMIN SERPL ELPH-MCNC: 2.4 G/DL — LOW (ref 3.3–5)
ALP SERPL-CCNC: 262 U/L — HIGH (ref 40–120)
ALT FLD-CCNC: 120 U/L — HIGH (ref 12–78)
ANION GAP SERPL CALC-SCNC: 9 MMOL/L — SIGNIFICANT CHANGE UP (ref 5–17)
AST SERPL-CCNC: 44 U/L — HIGH (ref 15–37)
BASOPHILS # BLD AUTO: 0.01 K/UL — SIGNIFICANT CHANGE UP (ref 0–0.2)
BASOPHILS NFR BLD AUTO: 0.2 % — SIGNIFICANT CHANGE UP (ref 0–2)
BILIRUB SERPL-MCNC: 3.7 MG/DL — HIGH (ref 0.2–1.2)
BUN SERPL-MCNC: 35 MG/DL — HIGH (ref 7–23)
CALCIUM SERPL-MCNC: 8.8 MG/DL — SIGNIFICANT CHANGE UP (ref 8.5–10.1)
CHLORIDE SERPL-SCNC: 101 MMOL/L — SIGNIFICANT CHANGE UP (ref 96–108)
CO2 SERPL-SCNC: 27 MMOL/L — SIGNIFICANT CHANGE UP (ref 22–31)
CREAT SERPL-MCNC: 5.29 MG/DL — HIGH (ref 0.5–1.3)
EGFR: 8 ML/MIN/1.73M2 — LOW
EOSINOPHIL # BLD AUTO: 0.13 K/UL — SIGNIFICANT CHANGE UP (ref 0–0.5)
EOSINOPHIL NFR BLD AUTO: 2 % — SIGNIFICANT CHANGE UP (ref 0–6)
GLUCOSE SERPL-MCNC: 93 MG/DL — SIGNIFICANT CHANGE UP (ref 70–99)
HCT VFR BLD CALC: 31.6 % — LOW (ref 34.5–45)
HGB BLD-MCNC: 10.3 G/DL — LOW (ref 11.5–15.5)
IMM GRANULOCYTES NFR BLD AUTO: 0.6 % — SIGNIFICANT CHANGE UP (ref 0–0.9)
LYMPHOCYTES # BLD AUTO: 0.4 K/UL — LOW (ref 1–3.3)
LYMPHOCYTES # BLD AUTO: 6.1 % — LOW (ref 13–44)
MACROCYTES BLD QL: SLIGHT — SIGNIFICANT CHANGE UP
MANUAL SMEAR VERIFICATION: SIGNIFICANT CHANGE UP
MCHC RBC-ENTMCNC: 31.7 PG — SIGNIFICANT CHANGE UP (ref 27–34)
MCHC RBC-ENTMCNC: 32.6 G/DL — SIGNIFICANT CHANGE UP (ref 32–36)
MCV RBC AUTO: 97.2 FL — SIGNIFICANT CHANGE UP (ref 80–100)
MONOCYTES # BLD AUTO: 0.44 K/UL — SIGNIFICANT CHANGE UP (ref 0–0.9)
MONOCYTES NFR BLD AUTO: 6.7 % — SIGNIFICANT CHANGE UP (ref 2–14)
MRSA PCR RESULT.: SIGNIFICANT CHANGE UP
NEUTROPHILS # BLD AUTO: 5.5 K/UL — SIGNIFICANT CHANGE UP (ref 1.8–7.4)
NEUTROPHILS NFR BLD AUTO: 84.4 % — HIGH (ref 43–77)
NRBC # BLD: 0 /100 WBCS — SIGNIFICANT CHANGE UP (ref 0–0)
OVALOCYTES BLD QL SMEAR: SLIGHT — SIGNIFICANT CHANGE UP
PLAT MORPH BLD: NORMAL — SIGNIFICANT CHANGE UP
PLATELET # BLD AUTO: 166 K/UL — SIGNIFICANT CHANGE UP (ref 150–400)
PLATELET CLUMP BLD QL SMEAR: ABNORMAL
POTASSIUM SERPL-MCNC: 4.6 MMOL/L — SIGNIFICANT CHANGE UP (ref 3.5–5.3)
POTASSIUM SERPL-SCNC: 4.6 MMOL/L — SIGNIFICANT CHANGE UP (ref 3.5–5.3)
PROT SERPL-MCNC: 7.4 GM/DL — SIGNIFICANT CHANGE UP (ref 6–8.3)
RBC # BLD: 3.31 M/UL — LOW (ref 3.8–5.2)
RBC # BLD: 3.31 M/UL — LOW (ref 3.8–5.2)
RBC # FLD: 13 % — SIGNIFICANT CHANGE UP (ref 10.3–14.5)
RBC BLD AUTO: ABNORMAL
RETICS #: 78.4 K/UL — SIGNIFICANT CHANGE UP (ref 25–125)
RETICS/RBC NFR: 2.4 % — SIGNIFICANT CHANGE UP (ref 0.5–2.5)
S AUREUS DNA NOSE QL NAA+PROBE: SIGNIFICANT CHANGE UP
SODIUM SERPL-SCNC: 137 MMOL/L — SIGNIFICANT CHANGE UP (ref 135–145)
WBC # BLD: 6.52 K/UL — SIGNIFICANT CHANGE UP (ref 3.8–10.5)
WBC # FLD AUTO: 6.52 K/UL — SIGNIFICANT CHANGE UP (ref 3.8–10.5)

## 2024-06-01 PROCEDURE — 74178 CT ABD&PLV WO CNTR FLWD CNTR: CPT | Mod: 26

## 2024-06-01 PROCEDURE — 99232 SBSQ HOSP IP/OBS MODERATE 35: CPT

## 2024-06-01 PROCEDURE — 93306 TTE W/DOPPLER COMPLETE: CPT | Mod: 26

## 2024-06-01 RX ORDER — IOHEXOL 300 MG/ML
30 INJECTION, SOLUTION INTRAVENOUS ONCE
Refills: 0 | Status: COMPLETED | OUTPATIENT
Start: 2024-06-01 | End: 2024-06-01

## 2024-06-01 RX ORDER — MEROPENEM 1 G/30ML
1000 INJECTION INTRAVENOUS EVERY 12 HOURS
Refills: 0 | Status: DISCONTINUED | OUTPATIENT
Start: 2024-06-01 | End: 2024-06-06

## 2024-06-01 RX ORDER — SODIUM CHLORIDE 9 MG/ML
500 INJECTION INTRAMUSCULAR; INTRAVENOUS; SUBCUTANEOUS ONCE
Refills: 0 | Status: COMPLETED | OUTPATIENT
Start: 2024-06-01 | End: 2024-06-01

## 2024-06-01 RX ORDER — KETOROLAC TROMETHAMINE 30 MG/ML
15 SYRINGE (ML) INJECTION ONCE
Refills: 0 | Status: DISCONTINUED | OUTPATIENT
Start: 2024-06-01 | End: 2024-06-01

## 2024-06-01 RX ORDER — LIDOCAINE 4 G/100G
1 CREAM TOPICAL DAILY
Refills: 0 | Status: DISCONTINUED | OUTPATIENT
Start: 2024-06-01 | End: 2024-06-06

## 2024-06-01 RX ORDER — HYDROMORPHONE HYDROCHLORIDE 2 MG/ML
1 INJECTION INTRAMUSCULAR; INTRAVENOUS; SUBCUTANEOUS EVERY 4 HOURS
Refills: 0 | Status: DISCONTINUED | OUTPATIENT
Start: 2024-06-01 | End: 2024-06-04

## 2024-06-01 RX ORDER — IOHEXOL 300 MG/ML
30 INJECTION, SOLUTION INTRAVENOUS ONCE
Refills: 0 | Status: DISCONTINUED | OUTPATIENT
Start: 2024-06-01 | End: 2024-06-01

## 2024-06-01 RX ORDER — ACETAMINOPHEN 500 MG
1000 TABLET ORAL EVERY 6 HOURS
Refills: 0 | Status: DISCONTINUED | OUTPATIENT
Start: 2024-06-01 | End: 2024-06-01

## 2024-06-01 RX ORDER — ACETAMINOPHEN 500 MG
650 TABLET ORAL ONCE
Refills: 0 | Status: COMPLETED | OUTPATIENT
Start: 2024-06-01 | End: 2024-06-01

## 2024-06-01 RX ORDER — KETOROLAC TROMETHAMINE 30 MG/ML
15 SYRINGE (ML) INJECTION EVERY 6 HOURS
Refills: 0 | Status: DISCONTINUED | OUTPATIENT
Start: 2024-06-01 | End: 2024-06-01

## 2024-06-01 RX ADMIN — Medication 1000 UNIT(S): at 11:01

## 2024-06-01 RX ADMIN — SEVELAMER CARBONATE 800 MILLIGRAM(S): 2400 POWDER, FOR SUSPENSION ORAL at 13:36

## 2024-06-01 RX ADMIN — HYDROMORPHONE HYDROCHLORIDE 1 MILLIGRAM(S): 2 INJECTION INTRAMUSCULAR; INTRAVENOUS; SUBCUTANEOUS at 18:47

## 2024-06-01 RX ADMIN — HYDROMORPHONE HYDROCHLORIDE 1 MILLIGRAM(S): 2 INJECTION INTRAMUSCULAR; INTRAVENOUS; SUBCUTANEOUS at 10:56

## 2024-06-01 RX ADMIN — Medication 100 MILLIGRAM(S): at 05:30

## 2024-06-01 RX ADMIN — CALCITRIOL 0.5 MICROGRAM(S): 0.5 CAPSULE ORAL at 11:00

## 2024-06-01 RX ADMIN — PIPERACILLIN AND TAZOBACTAM 25 GRAM(S): 4; .5 INJECTION, POWDER, LYOPHILIZED, FOR SOLUTION INTRAVENOUS at 05:28

## 2024-06-01 RX ADMIN — CHLORHEXIDINE GLUCONATE 1 APPLICATION(S): 213 SOLUTION TOPICAL at 11:00

## 2024-06-01 RX ADMIN — Medication 1 MILLIGRAM(S): at 11:00

## 2024-06-01 RX ADMIN — HEPARIN SODIUM 5000 UNIT(S): 5000 INJECTION INTRAVENOUS; SUBCUTANEOUS at 18:41

## 2024-06-01 RX ADMIN — IOHEXOL 30 MILLILITER(S): 300 INJECTION, SOLUTION INTRAVENOUS at 09:13

## 2024-06-01 RX ADMIN — LIDOCAINE 1 PATCH: 4 CREAM TOPICAL at 11:01

## 2024-06-01 RX ADMIN — SODIUM CHLORIDE 500 MILLILITER(S): 9 INJECTION INTRAMUSCULAR; INTRAVENOUS; SUBCUTANEOUS at 13:36

## 2024-06-01 RX ADMIN — MEROPENEM 100 MILLIGRAM(S): 1 INJECTION INTRAVENOUS at 22:18

## 2024-06-01 RX ADMIN — Medication 15 MILLIGRAM(S): at 05:26

## 2024-06-01 RX ADMIN — HEPARIN SODIUM 5000 UNIT(S): 5000 INJECTION INTRAVENOUS; SUBCUTANEOUS at 05:29

## 2024-06-01 RX ADMIN — SEVELAMER CARBONATE 800 MILLIGRAM(S): 2400 POWDER, FOR SUSPENSION ORAL at 05:31

## 2024-06-01 RX ADMIN — HYDROMORPHONE HYDROCHLORIDE 1 MILLIGRAM(S): 2 INJECTION INTRAMUSCULAR; INTRAVENOUS; SUBCUTANEOUS at 12:21

## 2024-06-01 RX ADMIN — LIDOCAINE 1 PATCH: 4 CREAM TOPICAL at 19:05

## 2024-06-01 RX ADMIN — MEROPENEM 100 MILLIGRAM(S): 1 INJECTION INTRAVENOUS at 09:13

## 2024-06-01 RX ADMIN — Medication 40 MILLIGRAM(S): at 13:36

## 2024-06-01 RX ADMIN — SEVELAMER CARBONATE 800 MILLIGRAM(S): 2400 POWDER, FOR SUSPENSION ORAL at 22:18

## 2024-06-01 RX ADMIN — Medication 15 MILLIGRAM(S): at 05:56

## 2024-06-01 NOTE — PROVIDER CONTACT NOTE (OTHER) - RECOMMENDATIONS
REGINA Bradford notified and made aware.
REGINA Bee notified and made aware
REGINA Hercules #[id number] notified and made aware.

## 2024-06-01 NOTE — PROVIDER CONTACT NOTE (OTHER) - REASON
reassess temp after acetaminophen IVPB and was 102F rectally
Pt is complaining of back pain, 10/10. Dilaudid IVP is ordered; however, BP is 99/66
pt came back from dialysis with severe chills, headache and back pain

## 2024-06-01 NOTE — PROGRESS NOTE ADULT - SUBJECTIVE AND OBJECTIVE BOX
Patient seen and examined  febrile 102  reports she couldnt  sleep last night due to pain  RUQ tenderness persists  changed zosyn to lisbeth  patient reports she cannot take nsaids and does not want tylenol.  provider consulted GI and Surgery (surgery recommends gi consult)  Review of Systems:  General:denies fever chills, headache, weakness  HEENT: denies blurry vision,diffculty swallowing, difficulty hearing, tinnitus  Cardiovascular: denies chest pain  ,palpitations  Pulmonary:denies shortness of breath, cough, wheezing, hemoptysis  Gastrointestinal:  _++ RUQ abdominal pain, constipation, diarrhea,nausea , vomiting, hematochezia  : denies hematuria, dysuria, or incontinence  Neurological: denies weakness, numbness , tingling, dizziness, tremors  MSK: denies muscle pain, difficulty ambulating, swelling, back pain  skin: denies skin rash, itching, burning, or  skin lesions  Psychiatrical: denies mood disturbances, anxierty, feeling depressed, depression , or difficulty sleeping    Objective:  Vitals  T(C): 37.1 (06-01-24 @ 05:06), Max: 38.9 (05-31-24 @ 17:15)  HR: 109 (06-01-24 @ 06:57) (88 - 142)  BP: 116/66 (06-01-24 @ 05:06) (87/50 - 140/87)  RR: 18 (06-01-24 @ 05:06) (17 - 18)  SpO2: 94% (06-01-24 @ 05:06) (94% - 99%)    Physical Exam:  General: comfortable, no acute distress  HEENT: Atraumatic, no LAD, trachea midline, PERRLA  Cardiovascular: normal s1s2, no murmurs, gallops or fricition rubs  Pulmonary: clear to ausculation Bilaterally, no wheezing , rhonchi  Gastrointestinal: soft, R UPPER QUAD tender non distended, no masses felt, no organomegally  Muscloskeletal: no lower extremity edema, intact bilateral lower extremity pulses  Neurological: CN II-12 intact. No focal weakness  Psychiatrical: normal mood, cooperative  SKIN: no rash, lesions or ulcers    Labs:                          10.3   6.52  )-----------( 166      ( 01 Jun 2024 08:30 )             31.6     06-01    137  |  101  |  35<H>  ----------------------------<  93  4.6   |  27  |  5.29<H>    Ca    8.8      01 Jun 2024 08:30    TPro  7.4  /  Alb  2.4<L>  /  TBili  3.7<H>  /  DBili  x   /  AST  44<H>  /  ALT  120<H>  /  AlkPhos  262<H>  06-01    LIVER FUNCTIONS - ( 01 Jun 2024 08:30 )  Alb: 2.4 g/dL / Pro: 7.4 gm/dL / ALK PHOS: 262 U/L / ALT: 120 U/L / AST: 44 U/L / GGT: x           PT/INR - ( 30 May 2024 15:15 )   PT: 11.0 sec;   INR: 0.92 ratio         PTT - ( 30 May 2024 15:15 )  PTT:26.4 sec      Active Medications  MEDICATIONS  (STANDING):  calcitriol   Capsule 0.5 MICROGram(s) Oral daily  chlorhexidine 2% Cloths 1 Application(s) Topical daily  cholecalciferol 1000 Unit(s) Oral daily  folic acid 1 milliGRAM(s) Oral daily  heparin   Injectable 5000 Unit(s) SubCutaneous every 12 hours  meropenem  IVPB 1000 milliGRAM(s) IV Intermittent every 12 hours  metoprolol succinate  milliGRAM(s) Oral daily  sevelamer carbonate 800 milliGRAM(s) Oral three times a day    MEDICATIONS  (PRN):  albuterol    90 MICROgram(s) HFA Inhaler 2 Puff(s) Inhalation every 6 hours PRN Shortness of Breath and/or Wheezing  HYDROmorphone  Injectable 1 milliGRAM(s) IV Push every 4 hours PRN Severe Pain (7 - 10)

## 2024-06-01 NOTE — PROVIDER CONTACT NOTE (OTHER) - ACTION/TREATMENT ORDERED:
NP Cristal stated to give Dilaudid 1mg PRN with noted BP. Lidocaine patch was ordered also and given
Cooling blanket ordered STAT
NP Suzette ordered IV Tylenol, blood cultures. Toradol was ordered for headache and back pain

## 2024-06-01 NOTE — PROVIDER CONTACT NOTE (OTHER) - SITUATION
Pt is complaining of back pain 10/10. Dilaudid IVP PRN is ordered; however. BP is 99/66
Pt came back from dialysis with severe chills, headache and back pain. Temp rectally taken was 101.9F. NP Suzette made aware. Acetaminophen IVPB and blood cultures ordered. Reassess temp rectally 102F.
pt came back from dialysis with severe chills, headache and back pain.

## 2024-06-01 NOTE — PROGRESS NOTE ADULT - SUBJECTIVE AND OBJECTIVE BOX
Events noted, s/p abd pain this am, improved, s/p CT w/IV dye today    Vital Signs Last 24 Hrs  T(C): 36.8 (06-01-24 @ 18:44), Max: 37.3 (05-31-24 @ 21:40)  T(F): 98.2 (06-01-24 @ 18:44), Max: 99.2 (06-01-24 @ 10:32)  HR: 92 (06-01-24 @ 18:44) (87 - 111)  BP: 120/78 (06-01-24 @ 18:44) (98/63 - 149/70)  RR: 18 (06-01-24 @ 18:44) (16 - 18)  SpO2: 97% (06-01-24 @ 18:44) (94% - 98%)    I&O's Detail    31 May 2024 07:01  -  01 Jun 2024 07:00  --------------------------------------------------------  IN:    Oral Fluid: 440 mL  Total IN: 440 mL    OUT:    Other (mL): 1500 mL  Total OUT: 1500 mL    01 Jun 2024 07:01  -  01 Jun 2024 19:36  --------------------------------------------------------  IN:    Oral Fluid: 940 mL  Total IN: 940 mL    OUT:    Other (mL): 1000 mL  Total OUT: 1000 mL    Lungs b/l air entry  Heart S1S2  Abd soft ND  Extremities: tr edema, R perm cath                         10.3   6.52  )-----------( 166      ( 01 Jun 2024 08:30 )             31.6     01 Jun 2024 08:30    137    |  101    |  35     ----------------------------<  93     4.6     |  27     |  5.29     Ca    8.8        01 Jun 2024 08:30    TPro  7.4    /  Alb  2.4    /  TBili  3.7    /  DBili  x      /  AST  44     /  ALT  120    /  AlkPhos  262    01 Jun 2024 08:30    LIVER FUNCTIONS - ( 01 Jun 2024 08:30 )  Alb: 2.4 g/dL / Pro: 7.4 gm/dL / ALK PHOS: 262 U/L / ALT: 120 U/L / AST: 44 U/L / GGT: x           Culture - Urine (collected 30 May 2024 11:55)  Source: Clean Catch Clean Catch (Midstream)  Preliminary Report (31 May 2024 18:25):    >100,000 CFU/ml Escherichia coli    albuterol    90 MICROgram(s) HFA Inhaler 2 Puff(s) Inhalation every 6 hours PRN  calcitriol   Capsule 0.5 MICROGram(s) Oral daily  chlorhexidine 2% Cloths 1 Application(s) Topical daily  cholecalciferol 1000 Unit(s) Oral daily  folic acid 1 milliGRAM(s) Oral daily  heparin   Injectable 5000 Unit(s) SubCutaneous every 12 hours  HYDROmorphone  Injectable 1 milliGRAM(s) IV Push every 4 hours PRN  lidocaine   4% Patch 1 Patch Transdermal daily  meropenem  IVPB 1000 milliGRAM(s) IV Intermittent every 12 hours  sevelamer carbonate 800 milliGRAM(s) Oral three times a day    Assessment/Plan:    Cholangitis, abd pain   GI eval  ESRD on HD  HD today s/p CT w/IV dye  TMP 1kg  Next HD Mon or Tue pending course    [phone]

## 2024-06-01 NOTE — PROGRESS NOTE ADULT - ASSESSMENT
74 yo female PMH of HTN, ESRD on HD M/W/F, PE s/p IVC filter in 2012, LARRY presents via EMS with c/o sudden onset RUPPERQ abdominal pain radiating to the back with nausea and vomiting x 2 episodes this morning while sitting on her bed. Patient also endorses chronic SOB, not on home oxygen. Patient denies fevers but endorses chills, denies HA, cough or congestion, chest pain, leg pain or swelling, urinary burning, urgency, frequency, hematuria.       R UPPER Q abdominal pain fever elevated LFTS concern for cholangitis  US abdomen  CT of  abdomen with 8mm  CBD, s/p todd.  GI and Surgery consulted  was on zosyn persistent fever pain --> changed to lisbeth  plan  followup gi  lisbeth  mrcp (was ordered 48 hours ago)  Ct IV con  HIDA    low urine production + ESRD ++ Uti  urine cx ecoli  ua reviewed  On lisbeth for coverage    pulmonary HTN seen on CTA, no new PE is seen.   Follow up CT chest in three months is recommended, see report.      Continue home meds: Albuteral prn, Rocaltrol, Vitamin D, Toprol and Renagel.

## 2024-06-02 LAB
-  AMOXICILLIN/CLAVULANIC ACID: SIGNIFICANT CHANGE UP
-  AMPICILLIN/SULBACTAM: SIGNIFICANT CHANGE UP
-  AMPICILLIN: SIGNIFICANT CHANGE UP
-  AZTREONAM: SIGNIFICANT CHANGE UP
-  CEFAZOLIN: SIGNIFICANT CHANGE UP
-  CEFEPIME: SIGNIFICANT CHANGE UP
-  CEFOXITIN: SIGNIFICANT CHANGE UP
-  CEFTRIAXONE: SIGNIFICANT CHANGE UP
-  CEFUROXIME: SIGNIFICANT CHANGE UP
-  CIPROFLOXACIN: SIGNIFICANT CHANGE UP
-  ERTAPENEM: SIGNIFICANT CHANGE UP
-  GENTAMICIN: SIGNIFICANT CHANGE UP
-  IMIPENEM: SIGNIFICANT CHANGE UP
-  LEVOFLOXACIN: SIGNIFICANT CHANGE UP
-  MEROPENEM: SIGNIFICANT CHANGE UP
-  NITROFURANTOIN: SIGNIFICANT CHANGE UP
-  PIPERACILLIN/TAZOBACTAM: SIGNIFICANT CHANGE UP
-  TOBRAMYCIN: SIGNIFICANT CHANGE UP
-  TRIMETHOPRIM/SULFAMETHOXAZOLE: SIGNIFICANT CHANGE UP
ALBUMIN SERPL ELPH-MCNC: 2.3 G/DL — LOW (ref 3.3–5)
ALP SERPL-CCNC: 269 U/L — HIGH (ref 40–120)
ALT FLD-CCNC: 87 U/L — HIGH (ref 12–78)
ANION GAP SERPL CALC-SCNC: 9 MMOL/L — SIGNIFICANT CHANGE UP (ref 5–17)
AST SERPL-CCNC: 32 U/L — SIGNIFICANT CHANGE UP (ref 15–37)
BILIRUB DIRECT SERPL-MCNC: 1.8 MG/DL — HIGH (ref 0–0.3)
BILIRUB INDIRECT FLD-MCNC: 0.4 MG/DL — SIGNIFICANT CHANGE UP (ref 0.2–1)
BILIRUB SERPL-MCNC: 2.2 MG/DL — HIGH (ref 0.2–1.2)
BUN SERPL-MCNC: 38 MG/DL — HIGH (ref 7–23)
CALCIUM SERPL-MCNC: 8.8 MG/DL — SIGNIFICANT CHANGE UP (ref 8.5–10.1)
CHLORIDE SERPL-SCNC: 100 MMOL/L — SIGNIFICANT CHANGE UP (ref 96–108)
CO2 SERPL-SCNC: 28 MMOL/L — SIGNIFICANT CHANGE UP (ref 22–31)
CREAT SERPL-MCNC: 5.79 MG/DL — HIGH (ref 0.5–1.3)
CULTURE RESULTS: ABNORMAL
EGFR: 7 ML/MIN/1.73M2 — LOW
GLUCOSE SERPL-MCNC: 133 MG/DL — HIGH (ref 70–99)
HCT VFR BLD CALC: 30.3 % — LOW (ref 34.5–45)
HGB BLD-MCNC: 9.6 G/DL — LOW (ref 11.5–15.5)
LDH SERPL L TO P-CCNC: 146 U/L — SIGNIFICANT CHANGE UP (ref 50–242)
MCHC RBC-ENTMCNC: 30.6 PG — SIGNIFICANT CHANGE UP (ref 27–34)
MCHC RBC-ENTMCNC: 31.7 G/DL — LOW (ref 32–36)
MCV RBC AUTO: 96.5 FL — SIGNIFICANT CHANGE UP (ref 80–100)
METHOD TYPE: SIGNIFICANT CHANGE UP
NRBC # BLD: 0 /100 WBCS — SIGNIFICANT CHANGE UP (ref 0–0)
ORGANISM # SPEC MICROSCOPIC CNT: ABNORMAL
ORGANISM # SPEC MICROSCOPIC CNT: SIGNIFICANT CHANGE UP
PLATELET # BLD AUTO: 179 K/UL — SIGNIFICANT CHANGE UP (ref 150–400)
POTASSIUM SERPL-MCNC: 4.1 MMOL/L — SIGNIFICANT CHANGE UP (ref 3.5–5.3)
POTASSIUM SERPL-SCNC: 4.1 MMOL/L — SIGNIFICANT CHANGE UP (ref 3.5–5.3)
PROT SERPL-MCNC: 7.3 GM/DL — SIGNIFICANT CHANGE UP (ref 6–8.3)
RBC # BLD: 3.14 M/UL — LOW (ref 3.8–5.2)
RBC # FLD: 12.8 % — SIGNIFICANT CHANGE UP (ref 10.3–14.5)
SODIUM SERPL-SCNC: 137 MMOL/L — SIGNIFICANT CHANGE UP (ref 135–145)
SPECIMEN SOURCE: SIGNIFICANT CHANGE UP
WBC # BLD: 9.27 K/UL — SIGNIFICANT CHANGE UP (ref 3.8–10.5)
WBC # FLD AUTO: 9.27 K/UL — SIGNIFICANT CHANGE UP (ref 3.8–10.5)

## 2024-06-02 PROCEDURE — 99232 SBSQ HOSP IP/OBS MODERATE 35: CPT

## 2024-06-02 RX ADMIN — CALCITRIOL 0.5 MICROGRAM(S): 0.5 CAPSULE ORAL at 11:23

## 2024-06-02 RX ADMIN — SEVELAMER CARBONATE 800 MILLIGRAM(S): 2400 POWDER, FOR SUSPENSION ORAL at 21:43

## 2024-06-02 RX ADMIN — HYDROMORPHONE HYDROCHLORIDE 1 MILLIGRAM(S): 2 INJECTION INTRAMUSCULAR; INTRAVENOUS; SUBCUTANEOUS at 16:50

## 2024-06-02 RX ADMIN — HEPARIN SODIUM 5000 UNIT(S): 5000 INJECTION INTRAVENOUS; SUBCUTANEOUS at 05:57

## 2024-06-02 RX ADMIN — HYDROMORPHONE HYDROCHLORIDE 1 MILLIGRAM(S): 2 INJECTION INTRAMUSCULAR; INTRAVENOUS; SUBCUTANEOUS at 07:38

## 2024-06-02 RX ADMIN — MEROPENEM 100 MILLIGRAM(S): 1 INJECTION INTRAVENOUS at 05:58

## 2024-06-02 RX ADMIN — Medication 1 MILLIGRAM(S): at 11:23

## 2024-06-02 RX ADMIN — LIDOCAINE 1 PATCH: 4 CREAM TOPICAL at 23:00

## 2024-06-02 RX ADMIN — LIDOCAINE 1 PATCH: 4 CREAM TOPICAL at 11:23

## 2024-06-02 RX ADMIN — CHLORHEXIDINE GLUCONATE 1 APPLICATION(S): 213 SOLUTION TOPICAL at 11:23

## 2024-06-02 RX ADMIN — LIDOCAINE 1 PATCH: 4 CREAM TOPICAL at 19:54

## 2024-06-02 RX ADMIN — MEROPENEM 100 MILLIGRAM(S): 1 INJECTION INTRAVENOUS at 17:20

## 2024-06-02 RX ADMIN — HEPARIN SODIUM 5000 UNIT(S): 5000 INJECTION INTRAVENOUS; SUBCUTANEOUS at 17:20

## 2024-06-02 RX ADMIN — SEVELAMER CARBONATE 800 MILLIGRAM(S): 2400 POWDER, FOR SUSPENSION ORAL at 05:58

## 2024-06-02 RX ADMIN — Medication 1000 UNIT(S): at 11:23

## 2024-06-02 RX ADMIN — HYDROMORPHONE HYDROCHLORIDE 1 MILLIGRAM(S): 2 INJECTION INTRAMUSCULAR; INTRAVENOUS; SUBCUTANEOUS at 15:50

## 2024-06-02 RX ADMIN — HYDROMORPHONE HYDROCHLORIDE 1 MILLIGRAM(S): 2 INJECTION INTRAMUSCULAR; INTRAVENOUS; SUBCUTANEOUS at 08:38

## 2024-06-02 RX ADMIN — SEVELAMER CARBONATE 800 MILLIGRAM(S): 2400 POWDER, FOR SUSPENSION ORAL at 13:36

## 2024-06-02 NOTE — PROGRESS NOTE ADULT - SUBJECTIVE AND OBJECTIVE BOX
HPI:  72 yo female PMH of HTN, ESRD on HD for one year due to HTN nephropathy, M/W/F, PE s/p IVC filter in 2012, gallbladder removed, LARRY presents via EMS with c/o sudden onset RLQ abdominal pain radiating to the back with nausea and vomiting x 2 episodes this morning while sitting on her bed. Patient also endorses chronic SOB, not on home oxygen. Patient denies fevers but endorses chills, denies HA, cough or congestion, chest pain, leg pain or swelling, urinary burning, urgency, frequency, hematuria.     Has permcath in place for HD.   (30 May 2024 19:15)    Pt awaiting MRCP. LFTs improving. Suspect focal cholangitis and MRCP indicated prior to intervention.   REVIEW OF SYSTEMS unremarkable      General:	    Skin/Breast:  	  Ophthalmologic:  	  ENMT:	    Respiratory and Thorax:  	  Cardiovascular:	    Gastrointestinal:	    Genitourinary:	    Musculoskeletal:	    Neurological:	    Psychiatric:	    Hematology/Lymphatics:	    Endocrine:	    Allergic/Immunologic:	    MEDICATIONS  (STANDING):  calcitriol   Capsule 0.5 MICROGram(s) Oral daily  chlorhexidine 2% Cloths 1 Application(s) Topical daily  cholecalciferol 1000 Unit(s) Oral daily  folic acid 1 milliGRAM(s) Oral daily  heparin   Injectable 5000 Unit(s) SubCutaneous every 12 hours  lidocaine   4% Patch 1 Patch Transdermal daily  meropenem  IVPB 1000 milliGRAM(s) IV Intermittent every 12 hours  sevelamer carbonate 800 milliGRAM(s) Oral three times a day    MEDICATIONS  (PRN):  albuterol    90 MICROgram(s) HFA Inhaler 2 Puff(s) Inhalation every 6 hours PRN Shortness of Breath and/or Wheezing  HYDROmorphone  Injectable 1 milliGRAM(s) IV Push every 4 hours PRN Severe Pain (7 - 10)      Vital Signs Last 24 Hrs  T(C): 36.4 (02 Jun 2024 11:06), Max: 36.9 (01 Jun 2024 15:41)  T(F): 97.6 (02 Jun 2024 11:06), Max: 98.4 (01 Jun 2024 15:41)  HR: 97 (02 Jun 2024 07:00) (87 - 104)  BP: 136/73 (02 Jun 2024 11:06) (105/57 - 156/87)  BP(mean): --  RR: 18 (02 Jun 2024 11:06) (17 - 18)  SpO2: 96% (02 Jun 2024 11:06) (94% - 97%)    Parameters below as of 01 Jun 2024 18:44  Patient On (Oxygen Delivery Method): room air        PHYSICAL EXAM:      Constitutional: in no distress    Eyes: no jaundice    ENMT:    Neck: supple    Breasts:    Back:    Respiratory: normal    Cardiovascular: normal    Gastrointestinal: normal    Genitourinary:    Rectal:    Extremities:    Vascular:    Neurological:    Skin:    Lymph Nodes:    Musculoskeletal:    Psychiatric:            HEALTH ISSUES - PROBLEM Dx:            Assesment & Plan: Probable focal cholangitis. Await MRCP

## 2024-06-02 NOTE — PROGRESS NOTE ADULT - SUBJECTIVE AND OBJECTIVE BOX
Patient seen and examined  afebrile  now  no white count  BILIS are trending down  LFts are trending down    patient now reporting ?lower quadrant pain ?and back pain?   Urine noted in primafet :very dark about brown  urine culture ++ ecoli pansensitive  MRCP pending for 3 days    d/w with GI  ; appreciate  recs    Review of Systems:  General:denies fever chills, headache, weakness  HEENT: denies blurry vision,diffculty swallowing, difficulty hearing, tinnitus  Cardiovascular: denies chest pain  ,palpitations  Pulmonary:denies shortness of breath, cough, wheezing, hemoptysis  Gastrointestinal: denies abdominal pain, constipation, diarrhea,nausea , vomiting, hematochezia  : denies hematuria, dysuria, or incontinence  Neurological: denies weakness, numbness , tingling, dizziness, tremors  MSK: denies muscle pain, difficulty ambulating, swelling, back pain  skin: denies skin rash, itching, burning, or  skin lesions  Psychiatrical: denies mood disturbances, anxierty, feeling depressed, depression , or difficulty sleeping    Objective:  Vitals  T(C): 36.4 (06-02-24 @ 11:06), Max: 36.9 (06-01-24 @ 15:41)  HR: 97 (06-02-24 @ 07:00) (87 - 111)  BP: 136/73 (06-02-24 @ 11:06) (105/57 - 156/87)  RR: 18 (06-02-24 @ 11:06) (17 - 18)  SpO2: 96% (06-02-24 @ 11:06) (94% - 97%)    Physical Exam:  General: comfortable, no acute distress  HEENT: Atraumatic, no LAD, trachea midline, PERRLA  Cardiovascular: normal s1s2, no murmurs, gallops or fricition rubs  Pulmonary: clear to ausculation Bilaterally, no wheezing , rhonchi  Gastrointestinal: soft non tender non distended, no masses felt, no organomegally  Muscloskeletal: no lower extremity edema, intact bilateral lower extremity pulses  Neurological: CN II-12 intact. No focal weakness  Psychiatrical: normal mood, cooperative  SKIN: no rash, lesions or ulcers    Labs:                          9.6    9.27  )-----------( 179      ( 02 Jun 2024 11:02 )             30.3     06-02    137  |  100  |  38<H>  ----------------------------<  133<H>  4.1   |  28  |  5.79<H>    Ca    8.8      02 Jun 2024 11:02    TPro  7.3  /  Alb  2.3<L>  /  TBili  2.2<H>  /  DBili  1.8<H>  /  AST  32  /  ALT  87<H>  /  AlkPhos  269<H>  06-02    LIVER FUNCTIONS - ( 02 Jun 2024 11:02 )  Alb: 2.3 g/dL / Pro: 7.3 gm/dL / ALK PHOS: 269 U/L / ALT: 87 U/L / AST: 32 U/L / GGT: x                 Active Medications  MEDICATIONS  (STANDING):  calcitriol   Capsule 0.5 MICROGram(s) Oral daily  chlorhexidine 2% Cloths 1 Application(s) Topical daily  cholecalciferol 1000 Unit(s) Oral daily  folic acid 1 milliGRAM(s) Oral daily  heparin   Injectable 5000 Unit(s) SubCutaneous every 12 hours  lidocaine   4% Patch 1 Patch Transdermal daily  meropenem  IVPB 1000 milliGRAM(s) IV Intermittent every 12 hours  sevelamer carbonate 800 milliGRAM(s) Oral three times a day    MEDICATIONS  (PRN):  albuterol    90 MICROgram(s) HFA Inhaler 2 Puff(s) Inhalation every 6 hours PRN Shortness of Breath and/or Wheezing  HYDROmorphone  Injectable 1 milliGRAM(s) IV Push every 4 hours PRN Severe Pain (7 - 10)

## 2024-06-02 NOTE — PROGRESS NOTE ADULT - ASSESSMENT
72 yo female PMH of HTN, ESRD on HD M/W/F, PE s/p IVC filter in 2012, LARRY presents via EMS with c/o sudden onset RUPPERQ abdominal pain radiating to the back with nausea and vomiting x 2 episodes this morning while sitting on her bed. Patient also endorses chronic SOB, not on home oxygen. Patient denies fevers but endorses chills, denies HA, cough or congestion, chest pain, leg pain or swelling, urinary burning, urgency, frequency, hematuria.       R UPPER Q abdominal pain fever elevated LFTS concern for cholangitis  US abdomen  CT of  abdomen with 8mm  CBD, s/p todd.  GI and Surgery consulted  was on zosyn persistent fever pain --> changed to lisbeth  CT abdomen IV appreciated  plan  followup gi  lisbeth  mrcp (was ordered almost 3 days ago  HIda    low urine production + ESRD ++ Uti  patient now experienced lower quadrant pain?  urine cx ecoli  ua reviewed  On lisbeth for coverage    pulmonary HTN seen on CTA, no new PE is seen.   Follow up CT chest in three months is recommended, see report.      Continue home meds: Albuteral prn, Rocaltrol, Vitamin D, Toprol and Renagel.

## 2024-06-03 LAB
ALBUMIN SERPL ELPH-MCNC: 2.3 G/DL — LOW (ref 3.3–5)
ALP SERPL-CCNC: 331 U/L — HIGH (ref 40–120)
ALT FLD-CCNC: 83 U/L — HIGH (ref 12–78)
ANION GAP SERPL CALC-SCNC: 10 MMOL/L — SIGNIFICANT CHANGE UP (ref 5–17)
AST SERPL-CCNC: 45 U/L — HIGH (ref 15–37)
BASOPHILS # BLD AUTO: 0.04 K/UL — SIGNIFICANT CHANGE UP (ref 0–0.2)
BASOPHILS NFR BLD AUTO: 0.6 % — SIGNIFICANT CHANGE UP (ref 0–2)
BILIRUB DIRECT SERPL-MCNC: 1.1 MG/DL — HIGH (ref 0–0.3)
BILIRUB INDIRECT FLD-MCNC: 0.6 MG/DL — SIGNIFICANT CHANGE UP (ref 0.2–1)
BILIRUB SERPL-MCNC: 1.7 MG/DL — HIGH (ref 0.2–1.2)
BUN SERPL-MCNC: 51 MG/DL — HIGH (ref 7–23)
CALCIUM SERPL-MCNC: 9.1 MG/DL — SIGNIFICANT CHANGE UP (ref 8.5–10.1)
CHLORIDE SERPL-SCNC: 102 MMOL/L — SIGNIFICANT CHANGE UP (ref 96–108)
CO2 SERPL-SCNC: 26 MMOL/L — SIGNIFICANT CHANGE UP (ref 22–31)
CREAT SERPL-MCNC: 6.81 MG/DL — HIGH (ref 0.5–1.3)
EGFR: 6 ML/MIN/1.73M2 — LOW
EOSINOPHIL # BLD AUTO: 0.11 K/UL — SIGNIFICANT CHANGE UP (ref 0–0.5)
EOSINOPHIL NFR BLD AUTO: 1.6 % — SIGNIFICANT CHANGE UP (ref 0–6)
GLUCOSE SERPL-MCNC: 81 MG/DL — SIGNIFICANT CHANGE UP (ref 70–99)
HCT VFR BLD CALC: 31.3 % — LOW (ref 34.5–45)
HGB BLD-MCNC: 10 G/DL — LOW (ref 11.5–15.5)
IMM GRANULOCYTES NFR BLD AUTO: 1.3 % — HIGH (ref 0–0.9)
LYMPHOCYTES # BLD AUTO: 1.13 K/UL — SIGNIFICANT CHANGE UP (ref 1–3.3)
LYMPHOCYTES # BLD AUTO: 16.1 % — SIGNIFICANT CHANGE UP (ref 13–44)
MAGNESIUM SERPL-MCNC: 2.2 MG/DL — SIGNIFICANT CHANGE UP (ref 1.6–2.6)
MCHC RBC-ENTMCNC: 31.3 PG — SIGNIFICANT CHANGE UP (ref 27–34)
MCHC RBC-ENTMCNC: 31.9 G/DL — LOW (ref 32–36)
MCV RBC AUTO: 98.1 FL — SIGNIFICANT CHANGE UP (ref 80–100)
MONOCYTES # BLD AUTO: 0.45 K/UL — SIGNIFICANT CHANGE UP (ref 0–0.9)
MONOCYTES NFR BLD AUTO: 6.4 % — SIGNIFICANT CHANGE UP (ref 2–14)
NEUTROPHILS # BLD AUTO: 5.18 K/UL — SIGNIFICANT CHANGE UP (ref 1.8–7.4)
NEUTROPHILS NFR BLD AUTO: 74 % — SIGNIFICANT CHANGE UP (ref 43–77)
NRBC # BLD: 0 /100 WBCS — SIGNIFICANT CHANGE UP (ref 0–0)
PHOSPHATE SERPL-MCNC: 4.3 MG/DL — SIGNIFICANT CHANGE UP (ref 2.5–4.5)
PLATELET # BLD AUTO: 184 K/UL — SIGNIFICANT CHANGE UP (ref 150–400)
POTASSIUM SERPL-MCNC: 3.9 MMOL/L — SIGNIFICANT CHANGE UP (ref 3.5–5.3)
POTASSIUM SERPL-SCNC: 3.9 MMOL/L — SIGNIFICANT CHANGE UP (ref 3.5–5.3)
PROT SERPL-MCNC: 7.2 GM/DL — SIGNIFICANT CHANGE UP (ref 6–8.3)
RBC # BLD: 3.19 M/UL — LOW (ref 3.8–5.2)
RBC # FLD: 12.8 % — SIGNIFICANT CHANGE UP (ref 10.3–14.5)
SODIUM SERPL-SCNC: 138 MMOL/L — SIGNIFICANT CHANGE UP (ref 135–145)
WBC # BLD: 7 K/UL — SIGNIFICANT CHANGE UP (ref 3.8–10.5)
WBC # FLD AUTO: 7 K/UL — SIGNIFICANT CHANGE UP (ref 3.8–10.5)

## 2024-06-03 PROCEDURE — 99232 SBSQ HOSP IP/OBS MODERATE 35: CPT

## 2024-06-03 PROCEDURE — 78226 HEPATOBILIARY SYSTEM IMAGING: CPT | Mod: 26

## 2024-06-03 RX ADMIN — SEVELAMER CARBONATE 800 MILLIGRAM(S): 2400 POWDER, FOR SUSPENSION ORAL at 05:08

## 2024-06-03 RX ADMIN — LIDOCAINE 1 PATCH: 4 CREAM TOPICAL at 19:29

## 2024-06-03 RX ADMIN — SEVELAMER CARBONATE 800 MILLIGRAM(S): 2400 POWDER, FOR SUSPENSION ORAL at 15:43

## 2024-06-03 RX ADMIN — HYDROMORPHONE HYDROCHLORIDE 1 MILLIGRAM(S): 2 INJECTION INTRAMUSCULAR; INTRAVENOUS; SUBCUTANEOUS at 18:53

## 2024-06-03 RX ADMIN — HEPARIN SODIUM 5000 UNIT(S): 5000 INJECTION INTRAVENOUS; SUBCUTANEOUS at 05:09

## 2024-06-03 RX ADMIN — HYDROMORPHONE HYDROCHLORIDE 1 MILLIGRAM(S): 2 INJECTION INTRAMUSCULAR; INTRAVENOUS; SUBCUTANEOUS at 19:30

## 2024-06-03 RX ADMIN — Medication 1000 UNIT(S): at 12:59

## 2024-06-03 RX ADMIN — HYDROMORPHONE HYDROCHLORIDE 1 MILLIGRAM(S): 2 INJECTION INTRAMUSCULAR; INTRAVENOUS; SUBCUTANEOUS at 06:08

## 2024-06-03 RX ADMIN — HYDROMORPHONE HYDROCHLORIDE 1 MILLIGRAM(S): 2 INJECTION INTRAMUSCULAR; INTRAVENOUS; SUBCUTANEOUS at 12:57

## 2024-06-03 RX ADMIN — CALCITRIOL 0.5 MICROGRAM(S): 0.5 CAPSULE ORAL at 12:59

## 2024-06-03 RX ADMIN — LIDOCAINE 1 PATCH: 4 CREAM TOPICAL at 13:00

## 2024-06-03 RX ADMIN — HEPARIN SODIUM 5000 UNIT(S): 5000 INJECTION INTRAVENOUS; SUBCUTANEOUS at 18:13

## 2024-06-03 RX ADMIN — CHLORHEXIDINE GLUCONATE 1 APPLICATION(S): 213 SOLUTION TOPICAL at 13:05

## 2024-06-03 RX ADMIN — SEVELAMER CARBONATE 800 MILLIGRAM(S): 2400 POWDER, FOR SUSPENSION ORAL at 21:08

## 2024-06-03 RX ADMIN — HYDROMORPHONE HYDROCHLORIDE 1 MILLIGRAM(S): 2 INJECTION INTRAMUSCULAR; INTRAVENOUS; SUBCUTANEOUS at 13:57

## 2024-06-03 RX ADMIN — Medication 1 MILLIGRAM(S): at 12:59

## 2024-06-03 RX ADMIN — MEROPENEM 100 MILLIGRAM(S): 1 INJECTION INTRAVENOUS at 05:08

## 2024-06-03 RX ADMIN — MEROPENEM 100 MILLIGRAM(S): 1 INJECTION INTRAVENOUS at 18:13

## 2024-06-03 RX ADMIN — HYDROMORPHONE HYDROCHLORIDE 1 MILLIGRAM(S): 2 INJECTION INTRAMUSCULAR; INTRAVENOUS; SUBCUTANEOUS at 05:08

## 2024-06-03 NOTE — PROGRESS NOTE ADULT - SUBJECTIVE AND OBJECTIVE BOX
HPI:  72 yo female PMH of HTN, ESRD on HD for one year due to HTN nephropathy, M/W/F, PE s/p IVC filter in 2012, gallbladder removed, LARRY presents via EMS with c/o sudden onset RLQ abdominal pain radiating to the back with nausea and vomiting x 2 episodes this morning while sitting on her bed. Patient also endorses chronic SOB, not on home oxygen. Patient denies fevers but endorses chills, denies HA, cough or congestion, chest pain, leg pain or swelling, urinary burning, urgency, frequency, hematuria.     Has permcath in place for HD.   (30 May 2024 19:15)  Pt still has RUQ pain. Denies fever, chills, nausea or vomiting. LFTs improving. Unable to have MRCP. HIDA normal    REVIEW OF SYSTEMS unremarkable      General:	    Skin/Breast:  	  Ophthalmologic:  	  ENMT:	    Respiratory and Thorax:  	  Cardiovascular:	    Gastrointestinal:	    Genitourinary:	    Musculoskeletal:	    Neurological:	    Psychiatric:	    Hematology/Lymphatics:	    Endocrine:	    Allergic/Immunologic:	    MEDICATIONS  (STANDING):  calcitriol   Capsule 0.5 MICROGram(s) Oral daily  chlorhexidine 2% Cloths 1 Application(s) Topical daily  cholecalciferol 1000 Unit(s) Oral daily  folic acid 1 milliGRAM(s) Oral daily  heparin   Injectable 5000 Unit(s) SubCutaneous every 12 hours  lidocaine   4% Patch 1 Patch Transdermal daily  meropenem  IVPB 1000 milliGRAM(s) IV Intermittent every 12 hours  sevelamer carbonate 800 milliGRAM(s) Oral three times a day    MEDICATIONS  (PRN):  albuterol    90 MICROgram(s) HFA Inhaler 2 Puff(s) Inhalation every 6 hours PRN Shortness of Breath and/or Wheezing  HYDROmorphone  Injectable 1 milliGRAM(s) IV Push every 4 hours PRN Severe Pain (7 - 10)      Vital Signs Last 24 Hrs  T(C): 36.7 (03 Jun 2024 16:03), Max: 36.7 (03 Jun 2024 09:02)  T(F): 98.1 (03 Jun 2024 16:03), Max: 98.1 (03 Jun 2024 16:03)  HR: 88 (03 Jun 2024 16:03) (75 - 88)  BP: 149/90 (03 Jun 2024 16:03) (112/62 - 154/71)  BP(mean): --  RR: 18 (03 Jun 2024 16:03) (16 - 18)  SpO2: 96% (03 Jun 2024 16:03) (94% - 98%)    Parameters below as of 03 Jun 2024 16:03  Patient On (Oxygen Delivery Method): room air        PHYSICAL EXAM:      Constitutional: in no distress    Eyes: no jaundice    ENMT:    Neck: supple    Breasts:    Back:    Respiratory: normal    Cardiovascular: normal    Gastrointestinal: normal    Genitourinary:    Rectal:    Extremities:    Vascular:    Neurological:    Skin:    Lymph Nodes:    Musculoskeletal:    Psychiatric:            HEALTH ISSUES - PROBLEM Dx:            Assesment & Plan: RUQ pain likely due to passed CBD stone. Monitor LFTs.

## 2024-06-03 NOTE — PROGRESS NOTE ADULT - SUBJECTIVE AND OBJECTIVE BOX
patient seen and examined  went for dialysis  d/w nursing during morning rounds.  patient unable to fit in mri?    Review of Systems:  General:denies fever chills, headache, weakness  HEENT: denies blurry vision,diffculty swallowing, difficulty hearing, tinnitus  Cardiovascular: denies chest pain  ,palpitations  Pulmonary:denies shortness of breath, cough, wheezing, hemoptysis  Gastrointestinal: denies abdominal pain, constipation, diarrhea,nausea , vomiting, hematochezia  : denies hematuria, dysuria, or incontinence  Neurological: denies weakness, numbness , tingling, dizziness, tremors  MSK: denies muscle pain, difficulty ambulating, swelling, back pain  skin: denies skin rash, itching, burning, or  skin lesions  Psychiatrical: denies mood disturbances, anxierty, feeling depressed, depression , or difficulty sleeping    Objective:  Vitals  T(C): 36.6 (06-03-24 @ 12:15), Max: 36.7 (06-03-24 @ 09:02)  HR: 75 (06-03-24 @ 12:15) (75 - 103)  BP: 112/62 (06-03-24 @ 12:15) (112/62 - 154/71)  RR: 17 (06-03-24 @ 12:15) (16 - 18)  SpO2: 98% (06-03-24 @ 12:15) (94% - 98%)    Physical Exam:  General: comfortable, no acute distress  HEENT: Atraumatic, no LAD, trachea midline, PERRLA  Cardiovascular: normal s1s2, no murmurs, gallops or fricition rubs  Pulmonary: clear to ausculation Bilaterally, no wheezing , rhonchi  Gastrointestinal: soft non tender non distended, no masses felt, no organomegally  Muscloskeletal: no lower extremity edema, intact bilateral lower extremity pulses  Neurological: CN II-12 intact. No focal weakness  Psychiatrical: normal mood, cooperative  SKIN: no rash, lesions or ulcers    Labs:                          10.0   7.00  )-----------( 184      ( 03 Jun 2024 07:15 )             31.3     06-03    138  |  102  |  51<H>  ----------------------------<  81  3.9   |  26  |  6.81<H>    Ca    9.1      03 Jun 2024 07:15  Phos  4.3     06-03  Mg     2.2     06-03    TPro  7.2  /  Alb  2.3<L>  /  TBili  1.7<H>  /  DBili  1.1<H>  /  AST  45<H>  /  ALT  83<H>  /  AlkPhos  331<H>  06-03    LIVER FUNCTIONS - ( 03 Jun 2024 07:15 )  Alb: 2.3 g/dL / Pro: 7.2 gm/dL / ALK PHOS: 331 U/L / ALT: 83 U/L / AST: 45 U/L / GGT: x                 Active Medications  MEDICATIONS  (STANDING):  calcitriol   Capsule 0.5 MICROGram(s) Oral daily  chlorhexidine 2% Cloths 1 Application(s) Topical daily  cholecalciferol 1000 Unit(s) Oral daily  folic acid 1 milliGRAM(s) Oral daily  heparin   Injectable 5000 Unit(s) SubCutaneous every 12 hours  lidocaine   4% Patch 1 Patch Transdermal daily  meropenem  IVPB 1000 milliGRAM(s) IV Intermittent every 12 hours  sevelamer carbonate 800 milliGRAM(s) Oral three times a day    MEDICATIONS  (PRN):  albuterol    90 MICROgram(s) HFA Inhaler 2 Puff(s) Inhalation every 6 hours PRN Shortness of Breath and/or Wheezing  HYDROmorphone  Injectable 1 milliGRAM(s) IV Push every 4 hours PRN Severe Pain (7 - 10)

## 2024-06-03 NOTE — PROGRESS NOTE ADULT - SUBJECTIVE AND OBJECTIVE BOX
[facility] NEPHROLOGY SERVICES, [facility]  NEPHROLOGY AND HYPERTENSION  [address]  [address]  [place] [address]  [phone]    MD ARVIND VENCES MD YELENA ROSENBERG, MD BINNY KOSHY, MD CHRISTOPHER CAPUTO, MD EDWARD BOVER, MD          Patient events noted  No distress    MEDICATIONS  (STANDING):  calcitriol   Capsule 0.5 MICROGram(s) Oral daily  chlorhexidine 2% Cloths 1 Application(s) Topical daily  cholecalciferol 1000 Unit(s) Oral daily  folic acid 1 milliGRAM(s) Oral daily  heparin   Injectable 5000 Unit(s) SubCutaneous every 12 hours  lidocaine   4% Patch 1 Patch Transdermal daily  meropenem  IVPB 1000 milliGRAM(s) IV Intermittent every 12 hours  sevelamer carbonate 800 milliGRAM(s) Oral three times a day    MEDICATIONS  (PRN):  albuterol    90 MICROgram(s) HFA Inhaler 2 Puff(s) Inhalation every 6 hours PRN Shortness of Breath and/or Wheezing  HYDROmorphone  Injectable 1 milliGRAM(s) IV Push every 4 hours PRN Severe Pain (7 - 10)      06-02-24 @ 07:01  -  06-03-24 @ 07:00  --------------------------------------------------------  IN: 270 mL / OUT: 500 mL / NET: -230 mL    06-03-24 @ 07:01  -  06-03-24 @ 21:41  --------------------------------------------------------  IN: 420 mL / OUT: 2000 mL / NET: -1580 mL      PHYSICAL EXAM:      T(C): 36.7 (06-03-24 @ 16:03), Max: 36.7 (06-03-24 @ 09:02)  HR: 88 (06-03-24 @ 16:03) (75 - 88)  BP: 149/90 (06-03-24 @ 16:03) (112/62 - 154/71)  RR: 18 (06-03-24 @ 16:03) (16 - 18)  SpO2: 96% (06-03-24 @ 16:03) (94% - 98%)  Wt(kg): --  Lungs clear  Heart S1S2  Abd soft NT ND  Extremities:   tr edema                                    10.0   7.00  )-----------( 184      ( 03 Jun 2024 07:15 )             31.3     06-03    138  |  102  |  51<H>  ----------------------------<  81  3.9   |  26  |  6.81<H>    Ca    9.1      03 Jun 2024 07:15  Phos  4.3     06-03  Mg     2.2     06-03    TPro  7.2  /  Alb  2.3<L>  /  TBili  1.7<H>  /  DBili  1.1<H>  /  AST  45<H>  /  ALT  83<H>  /  AlkPhos  331<H>  06-03      LIVER FUNCTIONS - ( 03 Jun 2024 07:15 )  Alb: 2.3 g/dL / Pro: 7.2 gm/dL / ALK PHOS: 331 U/L / ALT: 83 U/L / AST: 45 U/L / GGT: x           Creatinine Trend: 6.81<--, 5.79<--, 5.29<--, 7.23<--, 5.50<--    Assessment    Cholangitis, abd pain   GI eval  ESRD on HD    Plan  HD today   Uf as tolerated   Will follow course         Chay Mora MD

## 2024-06-03 NOTE — PROGRESS NOTE ADULT - ASSESSMENT
74 yo female PMH of HTN, ESRD on HD M/W/F, PE s/p IVC filter in 2012, LARRY presents via EMS with c/o sudden onset RUPPERQ abdominal pain radiating to the back with nausea and vomiting x 2 episodes this morning while sitting on her bed. Patient also endorses chronic SOB, not on home oxygen. Patient denies fevers but endorses chills, denies HA, cough or congestion, chest pain, leg pain or swelling, urinary burning, urgency, frequency, hematuria.       R UPPER Q abdominal pain fever elevated LFTS concern for cholangitis  US abdomen  CT of  abdomen with 8mm  CBD, s/p todd.  GI and Surgery consulted  was on zosyn persistent fever pain --> changed to lisbeth  CT abdomen IV appreciated  plan  followup gi  lisbeth  mrcp   HIda    low urine production + ESRD ++ Uti  patient now experienced lower quadrant pain?  urine cx ecoli  ua reviewed  On lisbeth for coverage    pulmonary HTN seen on CTA, no new PE is seen.   Follow up CT chest in three months is recommended, see report.      Continue home meds: Albuteral prn, Rocaltrol, Vitamin D, Toprol and Renagel.

## 2024-06-04 LAB
ALBUMIN SERPL ELPH-MCNC: 2.3 G/DL — LOW (ref 3.3–5)
ALP SERPL-CCNC: 341 U/L — HIGH (ref 40–120)
ALT FLD-CCNC: 81 U/L — HIGH (ref 12–78)
ANION GAP SERPL CALC-SCNC: 11 MMOL/L — SIGNIFICANT CHANGE UP (ref 5–17)
AST SERPL-CCNC: 47 U/L — HIGH (ref 15–37)
BILIRUB SERPL-MCNC: 1.3 MG/DL — HIGH (ref 0.2–1.2)
BUN SERPL-MCNC: 44 MG/DL — HIGH (ref 7–23)
CALCIUM SERPL-MCNC: 9 MG/DL — SIGNIFICANT CHANGE UP (ref 8.5–10.1)
CHLORIDE SERPL-SCNC: 101 MMOL/L — SIGNIFICANT CHANGE UP (ref 96–108)
CO2 SERPL-SCNC: 25 MMOL/L — SIGNIFICANT CHANGE UP (ref 22–31)
CREAT SERPL-MCNC: 5.59 MG/DL — HIGH (ref 0.5–1.3)
EGFR: 8 ML/MIN/1.73M2 — LOW
GLUCOSE SERPL-MCNC: 126 MG/DL — HIGH (ref 70–99)
HCT VFR BLD CALC: 34 % — LOW (ref 34.5–45)
HGB BLD-MCNC: 10.8 G/DL — LOW (ref 11.5–15.5)
MCHC RBC-ENTMCNC: 30.5 PG — SIGNIFICANT CHANGE UP (ref 27–34)
MCHC RBC-ENTMCNC: 31.8 G/DL — LOW (ref 32–36)
MCV RBC AUTO: 96 FL — SIGNIFICANT CHANGE UP (ref 80–100)
NRBC # BLD: 0 /100 WBCS — SIGNIFICANT CHANGE UP (ref 0–0)
PLATELET # BLD AUTO: 216 K/UL — SIGNIFICANT CHANGE UP (ref 150–400)
POTASSIUM SERPL-MCNC: 3.8 MMOL/L — SIGNIFICANT CHANGE UP (ref 3.5–5.3)
POTASSIUM SERPL-SCNC: 3.8 MMOL/L — SIGNIFICANT CHANGE UP (ref 3.5–5.3)
PROT SERPL-MCNC: 7.4 GM/DL — SIGNIFICANT CHANGE UP (ref 6–8.3)
RBC # BLD: 3.54 M/UL — LOW (ref 3.8–5.2)
RBC # FLD: 13 % — SIGNIFICANT CHANGE UP (ref 10.3–14.5)
SODIUM SERPL-SCNC: 137 MMOL/L — SIGNIFICANT CHANGE UP (ref 135–145)
WBC # BLD: 7.7 K/UL — SIGNIFICANT CHANGE UP (ref 3.8–10.5)
WBC # FLD AUTO: 7.7 K/UL — SIGNIFICANT CHANGE UP (ref 3.8–10.5)

## 2024-06-04 PROCEDURE — 99232 SBSQ HOSP IP/OBS MODERATE 35: CPT

## 2024-06-04 RX ORDER — GABAPENTIN 400 MG/1
300 CAPSULE ORAL ONCE
Refills: 0 | Status: COMPLETED | OUTPATIENT
Start: 2024-06-04 | End: 2024-06-04

## 2024-06-04 RX ORDER — METRONIDAZOLE 500 MG
1 TABLET ORAL
Qty: 3 | Refills: 0
Start: 2024-06-04 | End: 2024-06-06

## 2024-06-04 RX ORDER — AMITRIPTYLINE HCL 25 MG
25 TABLET ORAL ONCE
Refills: 0 | Status: COMPLETED | OUTPATIENT
Start: 2024-06-04 | End: 2024-06-04

## 2024-06-04 RX ORDER — CEFPODOXIME PROXETIL 100 MG
1 TABLET ORAL
Qty: 3 | Refills: 0
Start: 2024-06-04 | End: 2024-06-06

## 2024-06-04 RX ORDER — OXYCODONE HYDROCHLORIDE 5 MG/1
5 TABLET ORAL ONCE
Refills: 0 | Status: DISCONTINUED | OUTPATIENT
Start: 2024-06-04 | End: 2024-06-04

## 2024-06-04 RX ADMIN — OXYCODONE HYDROCHLORIDE 5 MILLIGRAM(S): 5 TABLET ORAL at 22:47

## 2024-06-04 RX ADMIN — HYDROMORPHONE HYDROCHLORIDE 1 MILLIGRAM(S): 2 INJECTION INTRAMUSCULAR; INTRAVENOUS; SUBCUTANEOUS at 11:31

## 2024-06-04 RX ADMIN — Medication 1000 UNIT(S): at 11:32

## 2024-06-04 RX ADMIN — CALCITRIOL 0.5 MICROGRAM(S): 0.5 CAPSULE ORAL at 11:32

## 2024-06-04 RX ADMIN — HYDROMORPHONE HYDROCHLORIDE 1 MILLIGRAM(S): 2 INJECTION INTRAMUSCULAR; INTRAVENOUS; SUBCUTANEOUS at 12:30

## 2024-06-04 RX ADMIN — OXYCODONE HYDROCHLORIDE 5 MILLIGRAM(S): 5 TABLET ORAL at 21:41

## 2024-06-04 RX ADMIN — LIDOCAINE 1 PATCH: 4 CREAM TOPICAL at 11:32

## 2024-06-04 RX ADMIN — MEROPENEM 100 MILLIGRAM(S): 1 INJECTION INTRAVENOUS at 17:13

## 2024-06-04 RX ADMIN — LIDOCAINE 1 PATCH: 4 CREAM TOPICAL at 01:58

## 2024-06-04 RX ADMIN — Medication 1 MILLIGRAM(S): at 11:32

## 2024-06-04 RX ADMIN — HYDROMORPHONE HYDROCHLORIDE 1 MILLIGRAM(S): 2 INJECTION INTRAMUSCULAR; INTRAVENOUS; SUBCUTANEOUS at 06:52

## 2024-06-04 RX ADMIN — SEVELAMER CARBONATE 800 MILLIGRAM(S): 2400 POWDER, FOR SUSPENSION ORAL at 13:59

## 2024-06-04 RX ADMIN — HEPARIN SODIUM 5000 UNIT(S): 5000 INJECTION INTRAVENOUS; SUBCUTANEOUS at 05:35

## 2024-06-04 RX ADMIN — LIDOCAINE 1 PATCH: 4 CREAM TOPICAL at 23:30

## 2024-06-04 RX ADMIN — HEPARIN SODIUM 5000 UNIT(S): 5000 INJECTION INTRAVENOUS; SUBCUTANEOUS at 17:13

## 2024-06-04 RX ADMIN — MEROPENEM 100 MILLIGRAM(S): 1 INJECTION INTRAVENOUS at 05:35

## 2024-06-04 RX ADMIN — GABAPENTIN 300 MILLIGRAM(S): 400 CAPSULE ORAL at 13:58

## 2024-06-04 RX ADMIN — LIDOCAINE 1 PATCH: 4 CREAM TOPICAL at 19:52

## 2024-06-04 RX ADMIN — SEVELAMER CARBONATE 800 MILLIGRAM(S): 2400 POWDER, FOR SUSPENSION ORAL at 05:36

## 2024-06-04 RX ADMIN — HYDROMORPHONE HYDROCHLORIDE 1 MILLIGRAM(S): 2 INJECTION INTRAMUSCULAR; INTRAVENOUS; SUBCUTANEOUS at 07:45

## 2024-06-04 RX ADMIN — SEVELAMER CARBONATE 800 MILLIGRAM(S): 2400 POWDER, FOR SUSPENSION ORAL at 21:41

## 2024-06-04 RX ADMIN — CHLORHEXIDINE GLUCONATE 1 APPLICATION(S): 213 SOLUTION TOPICAL at 11:38

## 2024-06-04 NOTE — PROGRESS NOTE ADULT - NSPROGADDITIONALINFOA_GEN_ALL_CORE
complicated UTI?  did patient pass stone?    MRCP and will determine plan
d/w GI challenges with MRCP  will monitor LFTS   although bilis are coming down  may need ERCP
Monitor LFTS  if continues to downtrend dc home

## 2024-06-04 NOTE — PROGRESS NOTE ADULT - SUBJECTIVE AND OBJECTIVE BOX
HPI:  74 yo female PMH of HTN, ESRD on HD for one year due to HTN nephropathy, M/W/F, PE s/p IVC filter in 2012, gallbladder removed, LARRY presents via EMS with c/o sudden onset RLQ abdominal pain radiating to the back with nausea and vomiting x 2 episodes this morning while sitting on her bed. Patient also endorses chronic SOB, not on home oxygen. Patient denies fevers but endorses chills, denies HA, cough or congestion, chest pain, leg pain or swelling, urinary burning, urgency, frequency, hematuria.     Has permcath in place for HD.   (30 May 2024 19:15)  Pt resting in bed. Still has pain in RUQ. LFTs improving.     REVIEW OF SYSTEMS unremarkable      General:	    Skin/Breast:  	  Ophthalmologic:  	  ENMT:	    Respiratory and Thorax:  	  Cardiovascular:	    Gastrointestinal:	    Genitourinary:	    Musculoskeletal:	    Neurological:	    Psychiatric:	    Hematology/Lymphatics:	    Endocrine:	    Allergic/Immunologic:	    MEDICATIONS  (STANDING):  calcitriol   Capsule 0.5 MICROGram(s) Oral daily  chlorhexidine 2% Cloths 1 Application(s) Topical daily  cholecalciferol 1000 Unit(s) Oral daily  folic acid 1 milliGRAM(s) Oral daily  heparin   Injectable 5000 Unit(s) SubCutaneous every 12 hours  lidocaine   4% Patch 1 Patch Transdermal daily  meropenem  IVPB 1000 milliGRAM(s) IV Intermittent every 12 hours  sevelamer carbonate 800 milliGRAM(s) Oral three times a day    MEDICATIONS  (PRN):  albuterol    90 MICROgram(s) HFA Inhaler 2 Puff(s) Inhalation every 6 hours PRN Shortness of Breath and/or Wheezing      Vital Signs Last 24 Hrs  T(C): 36.7 (04 Jun 2024 16:00), Max: 36.9 (04 Jun 2024 11:10)  T(F): 98.1 (04 Jun 2024 16:00), Max: 98.4 (04 Jun 2024 11:10)  HR: 98 (04 Jun 2024 16:00) (86 - 98)  BP: 127/76 (04 Jun 2024 16:00) (108/65 - 147/74)  BP(mean): --  RR: 18 (04 Jun 2024 16:00) (16 - 18)  SpO2: 97% (04 Jun 2024 16:00) (96% - 97%)    Parameters below as of 04 Jun 2024 16:00  Patient On (Oxygen Delivery Method): room air        PHYSICAL EXAM:      Constitutional: in no distress    Eyes: no jaundice    ENMT:    Neck: supple    Breasts:    Back:    Respiratory: normal    Cardiovascular: normal    Gastrointestinal: normal    Genitourinary:    Rectal:    Extremities:    Vascular:    Neurological:    Skin:    Lymph Nodes:    Musculoskeletal:    Psychiatric:            HEALTH ISSUES - PROBLEM Dx:            Assesment & Plan: Biliary obstruction with passed CBD. Monitor LFTs

## 2024-06-04 NOTE — PROGRESS NOTE ADULT - SUBJECTIVE AND OBJECTIVE BOX
Patient seen and examined  eating well  reports RUQ pain when she turns the wrong way  Labs pending  d/w GI   likely passed stone    Review of Systems:  General:denies fever chills, headache, weakness  HEENT: denies blurry vision,diffculty swallowing, difficulty hearing, tinnitus  Cardiovascular: denies chest pain  ,palpitations  Pulmonary:denies shortness of breath, cough, wheezing, hemoptysis  Gastrointestinal: denies abdominal pain, constipation, diarrhea,nausea , vomiting, hematochezia  : denies hematuria, dysuria, or incontinence  Neurological: denies weakness, numbness , tingling, dizziness, tremors  MSK: denies muscle pain, difficulty ambulating, swelling, back pain  skin: denies skin rash, itching, burning, or  skin lesions  Psychiatrical: denies mood disturbances, anxierty, feeling depressed, depression , or difficulty sleeping    Objective:  Vitals  T(C): 36.9 (06-04-24 @ 11:10), Max: 36.9 (06-04-24 @ 11:10)  HR: 89 (06-04-24 @ 11:10) (75 - 89)  BP: 147/74 (06-04-24 @ 11:10) (108/65 - 149/90)  RR: 18 (06-04-24 @ 11:10) (16 - 18)  SpO2: 97% (06-04-24 @ 11:10) (96% - 98%)    Physical Exam:  General: comfortable, no acute distress  HEENT: Atraumatic, no LAD, trachea midline, PERRLA  Cardiovascular: normal s1s2, no murmurs, gallops or fricition rubs  Pulmonary: clear to ausculation Bilaterally, no wheezing , rhonchi  Gastrointestinal: RUQ discomfort on deep palpation no masses felt, no organomegally  Muscloskeletal: no lower extremity edema, intact bilateral lower extremity pulses  Neurological: CN II-12 intact. No focal weakness  Psychiatrical: normal mood, cooperative  SKIN: no rash, lesions or ulcers    Labs:                          10.0   7.00  )-----------( 184      ( 03 Jun 2024 07:15 )             31.3     06-03    138  |  102  |  51<H>  ----------------------------<  81  3.9   |  26  |  6.81<H>    Ca    9.1      03 Jun 2024 07:15  Phos  4.3     06-03  Mg     2.2     06-03    TPro  7.2  /  Alb  2.3<L>  /  TBili  1.7<H>  /  DBili  1.1<H>  /  AST  45<H>  /  ALT  83<H>  /  AlkPhos  331<H>  06-03    LIVER FUNCTIONS - ( 03 Jun 2024 07:15 )  Alb: 2.3 g/dL / Pro: 7.2 gm/dL / ALK PHOS: 331 U/L / ALT: 83 U/L / AST: 45 U/L / GGT: x                 Active Medications  MEDICATIONS  (STANDING):  calcitriol   Capsule 0.5 MICROGram(s) Oral daily  chlorhexidine 2% Cloths 1 Application(s) Topical daily  cholecalciferol 1000 Unit(s) Oral daily  folic acid 1 milliGRAM(s) Oral daily  heparin   Injectable 5000 Unit(s) SubCutaneous every 12 hours  lidocaine   4% Patch 1 Patch Transdermal daily  meropenem  IVPB 1000 milliGRAM(s) IV Intermittent every 12 hours  sevelamer carbonate 800 milliGRAM(s) Oral three times a day    MEDICATIONS  (PRN):  albuterol    90 MICROgram(s) HFA Inhaler 2 Puff(s) Inhalation every 6 hours PRN Shortness of Breath and/or Wheezing  HYDROmorphone  Injectable 1 milliGRAM(s) IV Push every 4 hours PRN Severe Pain (7 - 10)

## 2024-06-04 NOTE — PROGRESS NOTE ADULT - SUBJECTIVE AND OBJECTIVE BOX
[facility] NEPHROLOGY SERVICES, [facility]  NEPHROLOGY AND HYPERTENSION  [address]  [address]  [place] [address]  [phone]    MD ARVIND VENCES MD YELENA ROSENBERG, MD BINNY KOSHY, MD CHRISTOPHER CAPUTO, MD EDWARD BOVER, MD          Patient events noted  No distress      MEDICATIONS  (STANDING):  calcitriol   Capsule 0.5 MICROGram(s) Oral daily  chlorhexidine 2% Cloths 1 Application(s) Topical daily  cholecalciferol 1000 Unit(s) Oral daily  folic acid 1 milliGRAM(s) Oral daily  heparin   Injectable 5000 Unit(s) SubCutaneous every 12 hours  lidocaine   4% Patch 1 Patch Transdermal daily  meropenem  IVPB 1000 milliGRAM(s) IV Intermittent every 12 hours  oxyCODONE    IR 5 milliGRAM(s) Oral once  sevelamer carbonate 800 milliGRAM(s) Oral three times a day    MEDICATIONS  (PRN):  albuterol    90 MICROgram(s) HFA Inhaler 2 Puff(s) Inhalation every 6 hours PRN Shortness of Breath and/or Wheezing      06-03-24 @ 07:01  -  06-04-24 @ 07:00  --------------------------------------------------------  IN: 470 mL / OUT: 2250 mL / NET: -1780 mL    06-04-24 @ 07:01  -  06-04-24 @ 21:17  --------------------------------------------------------  IN: 506 mL / OUT: 0 mL / NET: 506 mL      PHYSICAL EXAM:      T(C): 36.7 (06-04-24 @ 16:00), Max: 36.9 (06-04-24 @ 11:10)  HR: 87 (06-04-24 @ 21:13) (86 - 98)  BP: 123/74 (06-04-24 @ 21:13) (108/65 - 147/74)  RR: 18 (06-04-24 @ 21:13) (16 - 18)  SpO2: 98% (06-04-24 @ 21:13) (96% - 98%)  Wt(kg): --  Lungs clear  Heart S1S2  Abd soft NT ND  Extremities:   tr edema                                    10.8   7.70  )-----------( 216      ( 04 Jun 2024 12:05 )             34.0     06-04    137  |  101  |  44<H>  ----------------------------<  126<H>  3.8   |  25  |  5.59<H>    Ca    9.0      04 Jun 2024 12:05  Phos  4.3     06-03  Mg     2.2     06-03    TPro  7.4  /  Alb  2.3<L>  /  TBili  1.3<H>  /  DBili  x   /  AST  47<H>  /  ALT  81<H>  /  AlkPhos  341<H>  06-04        LIVER FUNCTIONS - ( 04 Jun 2024 12:05 )  Alb: 2.3 g/dL / Pro: 7.4 gm/dL / ALK PHOS: 341 U/L / ALT: 81 U/L / AST: 47 U/L / GGT: x           Creatinine Trend: 5.59<--, 6.81<--, 5.79<--, 5.29<--, 7.23<--, 5.50<--    Assessment    Cholangitis, abd pain   ESRD on HD  Passed GB stone     Plan  HD tomorrow  Uf as tolerated   Will follow course       Chay Mora MD

## 2024-06-04 NOTE — PROGRESS NOTE ADULT - ASSESSMENT
72 yo female PMH of HTN, ESRD on HD M/W/F, PE s/p IVC filter in 2012, LARRY presents via EMS with c/o sudden onset RUPPERQ abdominal pain radiating to the back with nausea and vomiting x 2 episodes this morning while sitting on her bed. Patient also endorses chronic SOB, not on home oxygen. Patient denies fevers but endorses chills, denies HA, cough or congestion, chest pain, leg pain or swelling, urinary burning, urgency, frequency, hematuria.       R UPPER Q abdominal pain fever elevated LFTS concern for cholangitis  US abdomen  CT of  abdomen with 8mm  CBD, s/p todd.  GI and Surgery consulted  was on zosyn persistent fever pain --> changed to lisbeth  CT abdomen IV appreciated  plan  followup gi  lisbeth  mrcp   HIda    low urine production + ESRD ++ Uti  patient now experienced lower quadrant pain?  urine cx ecoli  ua reviewed  On lisbeth for coverage    pulmonary HTN seen on CTA, no new PE is seen.   Follow up CT chest in three months is recommended, see report.      Continue home meds: Albuteral prn, Rocaltrol, Vitamin D, Toprol and Renagel.

## 2024-06-05 LAB
ALBUMIN SERPL ELPH-MCNC: 2.3 G/DL — LOW (ref 3.3–5)
ALP SERPL-CCNC: 320 U/L — HIGH (ref 40–120)
ALT FLD-CCNC: 75 U/L — SIGNIFICANT CHANGE UP (ref 12–78)
ANION GAP SERPL CALC-SCNC: 9 MMOL/L — SIGNIFICANT CHANGE UP (ref 5–17)
ANISOCYTOSIS BLD QL: SLIGHT — SIGNIFICANT CHANGE UP
AST SERPL-CCNC: 47 U/L — HIGH (ref 15–37)
BASOPHILS # BLD AUTO: 0 K/UL — SIGNIFICANT CHANGE UP (ref 0–0.2)
BASOPHILS NFR BLD AUTO: 0 % — SIGNIFICANT CHANGE UP (ref 0–2)
BILIRUB SERPL-MCNC: 1.1 MG/DL — SIGNIFICANT CHANGE UP (ref 0.2–1.2)
BUN SERPL-MCNC: 54 MG/DL — HIGH (ref 7–23)
CALCIUM SERPL-MCNC: 8.9 MG/DL — SIGNIFICANT CHANGE UP (ref 8.5–10.1)
CHLORIDE SERPL-SCNC: 102 MMOL/L — SIGNIFICANT CHANGE UP (ref 96–108)
CO2 SERPL-SCNC: 27 MMOL/L — SIGNIFICANT CHANGE UP (ref 22–31)
CREAT SERPL-MCNC: 6.54 MG/DL — HIGH (ref 0.5–1.3)
EGFR: 6 ML/MIN/1.73M2 — LOW
EOSINOPHIL # BLD AUTO: 0 K/UL — SIGNIFICANT CHANGE UP (ref 0–0.5)
EOSINOPHIL NFR BLD AUTO: 0 % — SIGNIFICANT CHANGE UP (ref 0–6)
GLUCOSE SERPL-MCNC: 81 MG/DL — SIGNIFICANT CHANGE UP (ref 70–99)
HCT VFR BLD CALC: 33 % — LOW (ref 34.5–45)
HGB BLD-MCNC: 10.6 G/DL — LOW (ref 11.5–15.5)
LYMPHOCYTES # BLD AUTO: 2.16 K/UL — SIGNIFICANT CHANGE UP (ref 1–3.3)
LYMPHOCYTES # BLD AUTO: 24 % — SIGNIFICANT CHANGE UP (ref 13–44)
MANUAL SMEAR VERIFICATION: SIGNIFICANT CHANGE UP
MCHC RBC-ENTMCNC: 31 PG — SIGNIFICANT CHANGE UP (ref 27–34)
MCHC RBC-ENTMCNC: 32.1 G/DL — SIGNIFICANT CHANGE UP (ref 32–36)
MCV RBC AUTO: 96.5 FL — SIGNIFICANT CHANGE UP (ref 80–100)
MONOCYTES # BLD AUTO: 0.27 K/UL — SIGNIFICANT CHANGE UP (ref 0–0.9)
MONOCYTES NFR BLD AUTO: 3 % — SIGNIFICANT CHANGE UP (ref 2–14)
MYELOCYTES NFR BLD: 2 % — HIGH (ref 0–0)
NEUTROPHILS # BLD AUTO: 6.04 K/UL — SIGNIFICANT CHANGE UP (ref 1.8–7.4)
NEUTROPHILS NFR BLD AUTO: 67 % — SIGNIFICANT CHANGE UP (ref 43–77)
NRBC # BLD: 2 /100 WBCS — HIGH (ref 0–0)
NRBC # BLD: SIGNIFICANT CHANGE UP /100 WBCS (ref 0–0)
PLAT MORPH BLD: NORMAL — SIGNIFICANT CHANGE UP
PLATELET # BLD AUTO: 209 K/UL — SIGNIFICANT CHANGE UP (ref 150–400)
POTASSIUM SERPL-MCNC: 4.1 MMOL/L — SIGNIFICANT CHANGE UP (ref 3.5–5.3)
POTASSIUM SERPL-SCNC: 4.1 MMOL/L — SIGNIFICANT CHANGE UP (ref 3.5–5.3)
PROT SERPL-MCNC: 7.1 GM/DL — SIGNIFICANT CHANGE UP (ref 6–8.3)
RBC # BLD: 3.42 M/UL — LOW (ref 3.8–5.2)
RBC # FLD: 13.1 % — SIGNIFICANT CHANGE UP (ref 10.3–14.5)
RBC BLD AUTO: ABNORMAL
SODIUM SERPL-SCNC: 138 MMOL/L — SIGNIFICANT CHANGE UP (ref 135–145)
STOMATOCYTES BLD QL SMEAR: SLIGHT — SIGNIFICANT CHANGE UP
VARIANT LYMPHS # BLD: 4 % — SIGNIFICANT CHANGE UP (ref 0–6)
WBC # BLD: 9.02 K/UL — SIGNIFICANT CHANGE UP (ref 3.8–10.5)
WBC # FLD AUTO: 9.02 K/UL — SIGNIFICANT CHANGE UP (ref 3.8–10.5)

## 2024-06-05 PROCEDURE — 99232 SBSQ HOSP IP/OBS MODERATE 35: CPT

## 2024-06-05 RX ORDER — DOCOSANOL 100 MG/G
1 CREAM TOPICAL EVERY 6 HOURS
Refills: 0 | Status: DISCONTINUED | OUTPATIENT
Start: 2024-06-05 | End: 2024-06-06

## 2024-06-05 RX ORDER — MORPHINE SULFATE 50 MG/1
2 CAPSULE, EXTENDED RELEASE ORAL EVERY 6 HOURS
Refills: 0 | Status: DISCONTINUED | OUTPATIENT
Start: 2024-06-05 | End: 2024-06-06

## 2024-06-05 RX ORDER — KETOROLAC TROMETHAMINE 30 MG/ML
15 SYRINGE (ML) INJECTION EVERY 6 HOURS
Refills: 0 | Status: DISCONTINUED | OUTPATIENT
Start: 2024-06-05 | End: 2024-06-05

## 2024-06-05 RX ADMIN — MEROPENEM 100 MILLIGRAM(S): 1 INJECTION INTRAVENOUS at 17:09

## 2024-06-05 RX ADMIN — SEVELAMER CARBONATE 800 MILLIGRAM(S): 2400 POWDER, FOR SUSPENSION ORAL at 22:15

## 2024-06-05 RX ADMIN — SEVELAMER CARBONATE 800 MILLIGRAM(S): 2400 POWDER, FOR SUSPENSION ORAL at 05:21

## 2024-06-05 RX ADMIN — HEPARIN SODIUM 5000 UNIT(S): 5000 INJECTION INTRAVENOUS; SUBCUTANEOUS at 17:09

## 2024-06-05 RX ADMIN — Medication 1000 UNIT(S): at 13:58

## 2024-06-05 RX ADMIN — SEVELAMER CARBONATE 800 MILLIGRAM(S): 2400 POWDER, FOR SUSPENSION ORAL at 13:59

## 2024-06-05 RX ADMIN — CHLORHEXIDINE GLUCONATE 1 APPLICATION(S): 213 SOLUTION TOPICAL at 17:15

## 2024-06-05 RX ADMIN — MEROPENEM 100 MILLIGRAM(S): 1 INJECTION INTRAVENOUS at 05:21

## 2024-06-05 RX ADMIN — MORPHINE SULFATE 2 MILLIGRAM(S): 50 CAPSULE, EXTENDED RELEASE ORAL at 14:10

## 2024-06-05 RX ADMIN — DOCOSANOL 1 APPLICATION(S): 100 CREAM TOPICAL at 19:44

## 2024-06-05 RX ADMIN — CALCITRIOL 0.5 MICROGRAM(S): 0.5 CAPSULE ORAL at 13:59

## 2024-06-05 RX ADMIN — LIDOCAINE 1 PATCH: 4 CREAM TOPICAL at 19:27

## 2024-06-05 RX ADMIN — HEPARIN SODIUM 5000 UNIT(S): 5000 INJECTION INTRAVENOUS; SUBCUTANEOUS at 05:20

## 2024-06-05 RX ADMIN — LIDOCAINE 1 PATCH: 4 CREAM TOPICAL at 14:00

## 2024-06-05 RX ADMIN — Medication 1 MILLIGRAM(S): at 13:59

## 2024-06-05 RX ADMIN — MORPHINE SULFATE 2 MILLIGRAM(S): 50 CAPSULE, EXTENDED RELEASE ORAL at 15:21

## 2024-06-05 RX ADMIN — MORPHINE SULFATE 2 MILLIGRAM(S): 50 CAPSULE, EXTENDED RELEASE ORAL at 22:14

## 2024-06-05 RX ADMIN — MORPHINE SULFATE 2 MILLIGRAM(S): 50 CAPSULE, EXTENDED RELEASE ORAL at 23:26

## 2024-06-05 NOTE — PROGRESS NOTE ADULT - ASSESSMENT
74 yo female PMH of HTN, ESRD on HD M/W/F, PE s/p IVC filter in 2012, morbidly obese w/ BMI 45, status post hysterectomy & cholecystectomy, LARRY presents via EMS with c/o sudden onset RUPPERQ abdominal pain radiating to the back with nausea and vomiting x 2 episodes and was admitted w/ cholangitis.    Cholangitis  - CT abd showed no acute inflammatory or obstructive pathology  - US abdomen showed no significant biliary dilatation w/ pneumobilia   - GI suspects patient passed CBD stone  - Zosyn changed to lisbeth  - HIDA showed a normal hepatobiliary scan status post cholecystectomy and no evidence of a biliary obstruction  - LFTs improving   - NGTD on blood cxs    UTI  - urine cx ecoli  - c/w Lisbeth      pulmonary HTN seen on CTA   - CTA showed  a dilated main pulmonary artery, a finding often representing pulmonary hypertension and a new 25 mm nodular opacity in the right apex which could represent infection or an inflammatory process - will need a repeat chest CT in 2-3 months  - c/w Albuterol PRN     Lip soars  - start Docosanol cream    Left lobe thyroid enlargement on CXR & CTA  - check TSH    ESRD  - c/w Renvela     Prophylaxis:  DVT: Heparin  GI: PO diet    Dispo: Home w/ home PT

## 2024-06-05 NOTE — PHYSICAL THERAPY INITIAL EVALUATION ADULT - GAIT TRAINING, PT EVAL
Pt will improve ambulation to ~ 200 feet Independently using rolling walker within 2 weeks. Pt will negotiate ~6 steps c rail c CGA within 2 weeks.

## 2024-06-05 NOTE — PHYSICAL THERAPY INITIAL EVALUATION ADULT - ADDITIONAL COMMENTS
Patient lives c daughter and grandchildren in an apt c 6 entry steps c B/L rails(far apart), 1 level inside apt. Independent c basic ADL's and household ambulation with standard walker. Pt required assistance(family) c stair negotiation and limited community ambulation.

## 2024-06-05 NOTE — PROGRESS NOTE ADULT - REASON FOR ADMISSION
Abdominal pain with increased LFT's

## 2024-06-05 NOTE — PROGRESS NOTE ADULT - SUBJECTIVE AND OBJECTIVE BOX
72 yo female PMH of HTN, ESRD on HD M/W/F, PE s/p IVC filter in 2012, morbidly obese w/ BMI 45, status post hysterectomy & cholecystectomy, LARRY presents via EMS with c/o sudden onset RUPPERQ abdominal pain radiating to the back with nausea and vomiting x 2 episodes and was admitted w/ cholangitis. She is lying in bed in NAD.    MEDICATIONS  (STANDING):  calcitriol   Capsule 0.5 MICROGram(s) Oral daily  chlorhexidine 2% Cloths 1 Application(s) Topical daily  cholecalciferol 1000 Unit(s) Oral daily  docosanol 10% Cream 1 Application(s) Topical every 6 hours  folic acid 1 milliGRAM(s) Oral daily  heparin   Injectable 5000 Unit(s) SubCutaneous every 12 hours  lidocaine   4% Patch 1 Patch Transdermal daily  meropenem  IVPB 1000 milliGRAM(s) IV Intermittent every 12 hours  sevelamer carbonate 800 milliGRAM(s) Oral three times a day    MEDICATIONS  (PRN):  albuterol    90 MICROgram(s) HFA Inhaler 2 Puff(s) Inhalation every 6 hours PRN Shortness of Breath and/or Wheezing  morphine  - Injectable 2 milliGRAM(s) IV Push every 6 hours PRN Abd pain      Allergies    Sulfur (Unknown)  trimethoprim (Other; Rash)  sulfa drugs (Other; Rash)    Intolerances        Vital Signs Last 24 Hrs  T(C): 36.8 (05 Jun 2024 16:00), Max: 37 (05 Jun 2024 09:40)  T(F): 98.2 (05 Jun 2024 16:00), Max: 98.6 (05 Jun 2024 09:40)  HR: 100 (05 Jun 2024 16:00) (76 - 100)  BP: 110/60 (05 Jun 2024 16:00) (110/60 - 156/79)   RR: 18 (05 Jun 2024 16:00) (18 - 18)  SpO2: 97% (05 Jun 2024 16:00) (96% - 100%)    Parameters below as of 05 Jun 2024 16:00  Patient On (Oxygen Delivery Method): room air        PHYSICAL EXAM:  GENERAL: NAD, obese  HEAD:  Atraumatic, Normocephalic  EYES: EOMI, PERRLA   NECK: Supple   NERVOUS SYSTEM:  Alert    CHEST/LUNG: Clear to auscultation bilaterally; No rales, rhonchi, wheezing, or rubs  HEART: Regular rate and rhythm; No murmurs, rubs, or gallops  ABDOMEN: Soft, Nontender, Nondistended; Bowel sounds present  EXTREMITIES: No clubbing, cyanosis, or edema       LABS:                        10.6   9.02  )-----------( 209      ( 05 Jun 2024 06:49 )             33.0     06-05    138  |  102  |  54<H>  ----------------------------<  81  4.1   |  27  |  6.54<H>    Ca    8.9      05 Jun 2024 06:49    TPro  7.1  /  Alb  2.3<L>  /  TBili  1.1  /  DBili  x   /  AST  47<H>  /  ALT  75  /  AlkPhos  320<H>  06-05      Urinalysis Basic - ( 05 Jun 2024 06:49 )    Color: x / Appearance: x / SG: x / pH: x  Gluc: 81 mg/dL / Ketone: x  / Bili: x / Urobili: x   Blood: x / Protein: x / Nitrite: x   Leuk Esterase: x / RBC: x / WBC x   Sq Epi: x / Non Sq Epi: x / Bacteria: x         RADIOLOGY & ADDITIONAL TESTS:    06-04-24 @ 07:01  -  06-05-24 @ 07:00  --------------------------------------------------------  IN:    Oral Fluid: 506 mL  Total IN: 506 mL    OUT:    Voided (mL): 250 mL  Total OUT: 250 mL    Total NET: 256 mL      06-05-24 @ 07:01  -  06-05-24 @ 20:32  --------------------------------------------------------  IN:  Total IN: 0 mL    OUT:    Other (mL): 2000 mL  Total OUT: 2000 mL    Total NET: -2000 mL

## 2024-06-05 NOTE — PHYSICAL THERAPY INITIAL EVALUATION ADULT - PERTINENT HX OF CURRENT PROBLEM, REHAB EVAL
74 yo female PMH of HTN, ESRD on HD for one year due to HTN nephropathy, M/W/F, PE s/p IVC filter in 2012, gallbladder removed, LARRY presents via EMS with c/o sudden onset RLQ abdominal pain radiating to the back with nausea and vomiting x 2 episodes this morning while sitting on her bed.

## 2024-06-05 NOTE — PHYSICAL THERAPY INITIAL EVALUATION ADULT - GENERAL OBSERVATIONS, REHAB EVAL
Chart (EMR) reviewed. Received supine c HOB elevated, NAD. +heplock, +primafit. Alert. Ox4. Able to follow multistep commands/directions.

## 2024-06-05 NOTE — PROGRESS NOTE ADULT - SUBJECTIVE AND OBJECTIVE BOX
[facility] NEPHROLOGY SERVICES, [facility]  NEPHROLOGY AND HYPERTENSION  [address]  [address]  [place] [address]  [phone]    MD ARVIND VENCES MD YELENA ROSENBERG, MD BINNY KOSHY, MD CHRISTOPHER CAPUTO, MD EDWARD BOVER, MD          Patient events noted  No distress    MEDICATIONS  (STANDING):  calcitriol   Capsule 0.5 MICROGram(s) Oral daily  chlorhexidine 2% Cloths 1 Application(s) Topical daily  cholecalciferol 1000 Unit(s) Oral daily  docosanol 10% Cream 1 Application(s) Topical every 6 hours  folic acid 1 milliGRAM(s) Oral daily  heparin   Injectable 5000 Unit(s) SubCutaneous every 12 hours  lidocaine   4% Patch 1 Patch Transdermal daily  meropenem  IVPB 1000 milliGRAM(s) IV Intermittent every 12 hours  sevelamer carbonate 800 milliGRAM(s) Oral three times a day    MEDICATIONS  (PRN):  albuterol    90 MICROgram(s) HFA Inhaler 2 Puff(s) Inhalation every 6 hours PRN Shortness of Breath and/or Wheezing  morphine  - Injectable 2 milliGRAM(s) IV Push every 6 hours PRN Abd pain      06-04-24 @ 07:01  -  06-05-24 @ 07:00  --------------------------------------------------------  IN: 506 mL / OUT: 250 mL / NET: 256 mL    06-05-24 @ 07:01  -  06-05-24 @ 22:35  --------------------------------------------------------  IN: 0 mL / OUT: 2000 mL / NET: -2000 mL      PHYSICAL EXAM:      T(C): 36.8 (06-05-24 @ 16:00), Max: 37 (06-05-24 @ 09:40)  HR: 100 (06-05-24 @ 16:00) (76 - 100)  BP: 110/60 (06-05-24 @ 16:00) (110/60 - 156/79)  RR: 18 (06-05-24 @ 16:00) (18 - 18)  SpO2: 97% (06-05-24 @ 16:00) (96% - 100%)  Wt(kg): --  Lungs clear  Heart S1S2  Abd soft NT ND  Extremities:   tr edema                                    10.6   9.02  )-----------( 209      ( 05 Jun 2024 06:49 )             33.0     06-05    138  |  102  |  54<H>  ----------------------------<  81  4.1   |  27  |  6.54<H>    Ca    8.9      05 Jun 2024 06:49    TPro  7.1  /  Alb  2.3<L>  /  TBili  1.1  /  DBili  x   /  AST  47<H>  /  ALT  75  /  AlkPhos  320<H>  06-05      LIVER FUNCTIONS - ( 05 Jun 2024 06:49 )  Alb: 2.3 g/dL / Pro: 7.1 gm/dL / ALK PHOS: 320 U/L / ALT: 75 U/L / AST: 47 U/L / GGT: x           Creatinine Trend: 6.54<--, 5.59<--, 6.81<--, 5.79<--, 5.29<--, 7.23<--      Assessment   ESRD, maintenance     Plan:  HD for today   UF as tolerated     Chay Mora MD

## 2024-06-05 NOTE — PROGRESS NOTE ADULT - PROVIDER SPECIALTY LIST ADULT
Gastroenterology
Nephrology
Internal Medicine
Internal Medicine
Nephrology
Gastroenterology
Hospitalist
Internal Medicine
Internal Medicine
Nephrology
Nephrology
Internal Medicine

## 2024-06-05 NOTE — PROGRESS NOTE ADULT - SUBJECTIVE AND OBJECTIVE BOX
HPI:  72 yo female PMH of HTN, ESRD on HD for one year due to HTN nephropathy, M/W/F, PE s/p IVC filter in 2012, gallbladder removed, LARRY presents via EMS with c/o sudden onset RLQ abdominal pain radiating to the back with nausea and vomiting x 2 episodes this morning while sitting on her bed. Patient also endorses chronic SOB, not on home oxygen. Patient denies fevers but endorses chills, denies HA, cough or congestion, chest pain, leg pain or swelling, urinary burning, urgency, frequency, hematuria.   RUQ pain and LFTs improving. No fever or chills.   Has permcath in place for HD.   (30 May 2024 19:15)      REVIEW OF SYSTEMS unremarkable      General:	    Skin/Breast:  	  Ophthalmologic:  	  ENMT:	    Respiratory and Thorax:  	  Cardiovascular:	    Gastrointestinal:	    Genitourinary:	    Musculoskeletal:	    Neurological:	    Psychiatric:	    Hematology/Lymphatics:	    Endocrine:	    Allergic/Immunologic:	    MEDICATIONS  (STANDING):  calcitriol   Capsule 0.5 MICROGram(s) Oral daily  chlorhexidine 2% Cloths 1 Application(s) Topical daily  cholecalciferol 1000 Unit(s) Oral daily  docosanol 10% Cream 1 Application(s) Topical every 6 hours  folic acid 1 milliGRAM(s) Oral daily  heparin   Injectable 5000 Unit(s) SubCutaneous every 12 hours  lidocaine   4% Patch 1 Patch Transdermal daily  meropenem  IVPB 1000 milliGRAM(s) IV Intermittent every 12 hours  sevelamer carbonate 800 milliGRAM(s) Oral three times a day    MEDICATIONS  (PRN):  albuterol    90 MICROgram(s) HFA Inhaler 2 Puff(s) Inhalation every 6 hours PRN Shortness of Breath and/or Wheezing  morphine  - Injectable 2 milliGRAM(s) IV Push every 6 hours PRN Abd pain      Vital Signs Last 24 Hrs  T(C): 36.8 (05 Jun 2024 16:00), Max: 37 (05 Jun 2024 09:40)  T(F): 98.2 (05 Jun 2024 16:00), Max: 98.6 (05 Jun 2024 09:40)  HR: 100 (05 Jun 2024 16:00) (76 - 100)  BP: 110/60 (05 Jun 2024 16:00) (110/60 - 156/79)  BP(mean): --  RR: 18 (05 Jun 2024 16:00) (18 - 18)  SpO2: 97% (05 Jun 2024 16:00) (96% - 100%)    Parameters below as of 05 Jun 2024 16:00  Patient On (Oxygen Delivery Method): room air        PHYSICAL EXAM:      Constitutional: in no distress    Eyes: no jaundice    ENMT:    Neck: supple    Breasts:    Back:    Respiratory: normal    Cardiovascular: normal    Gastrointestinal: normal    Genitourinary:    Rectal:    Extremities:    Vascular:    Neurological:    Skin:    Lymph Nodes:    Musculoskeletal:    Psychiatric:            HEALTH ISSUES - PROBLEM Dx:            Assesment & Plan: Passed CBD stone. Monitor LFTs

## 2024-06-06 ENCOUNTER — TRANSCRIPTION ENCOUNTER (OUTPATIENT)
Age: 74
End: 2024-06-06

## 2024-06-06 VITALS
DIASTOLIC BLOOD PRESSURE: 81 MMHG | SYSTOLIC BLOOD PRESSURE: 138 MMHG | RESPIRATION RATE: 19 BRPM | TEMPERATURE: 98 F | OXYGEN SATURATION: 97 % | HEART RATE: 97 BPM

## 2024-06-06 LAB
ALBUMIN SERPL ELPH-MCNC: 2.2 G/DL — LOW (ref 3.3–5)
ALP SERPL-CCNC: 317 U/L — HIGH (ref 40–120)
ALT FLD-CCNC: 63 U/L — SIGNIFICANT CHANGE UP (ref 12–78)
ANION GAP SERPL CALC-SCNC: 7 MMOL/L — SIGNIFICANT CHANGE UP (ref 5–17)
AST SERPL-CCNC: 39 U/L — HIGH (ref 15–37)
BILIRUB SERPL-MCNC: 0.8 MG/DL — SIGNIFICANT CHANGE UP (ref 0.2–1.2)
BUN SERPL-MCNC: 38 MG/DL — HIGH (ref 7–23)
CALCIUM SERPL-MCNC: 8.8 MG/DL — SIGNIFICANT CHANGE UP (ref 8.5–10.1)
CHLORIDE SERPL-SCNC: 104 MMOL/L — SIGNIFICANT CHANGE UP (ref 96–108)
CO2 SERPL-SCNC: 26 MMOL/L — SIGNIFICANT CHANGE UP (ref 22–31)
CREAT SERPL-MCNC: 5.2 MG/DL — HIGH (ref 0.5–1.3)
CULTURE RESULTS: SIGNIFICANT CHANGE UP
CULTURE RESULTS: SIGNIFICANT CHANGE UP
EGFR: 8 ML/MIN/1.73M2 — LOW
GLUCOSE SERPL-MCNC: 81 MG/DL — SIGNIFICANT CHANGE UP (ref 70–99)
HBV CORE AB SER-ACNC: SIGNIFICANT CHANGE UP
HBV CORE IGM SER-ACNC: SIGNIFICANT CHANGE UP
HBV SURFACE AB SER-ACNC: <3 MIU/ML — LOW
HBV SURFACE AG SER-ACNC: SIGNIFICANT CHANGE UP
HCV AB S/CO SERPL IA: 0.2 S/CO — SIGNIFICANT CHANGE UP (ref 0–0.99)
HCV AB SERPL-IMP: SIGNIFICANT CHANGE UP
MAGNESIUM SERPL-MCNC: 2 MG/DL — SIGNIFICANT CHANGE UP (ref 1.6–2.6)
PHOSPHATE SERPL-MCNC: 4.3 MG/DL — SIGNIFICANT CHANGE UP (ref 2.5–4.5)
POTASSIUM SERPL-MCNC: 4.2 MMOL/L — SIGNIFICANT CHANGE UP (ref 3.5–5.3)
POTASSIUM SERPL-SCNC: 4.2 MMOL/L — SIGNIFICANT CHANGE UP (ref 3.5–5.3)
PROT SERPL-MCNC: 7 GM/DL — SIGNIFICANT CHANGE UP (ref 6–8.3)
SODIUM SERPL-SCNC: 137 MMOL/L — SIGNIFICANT CHANGE UP (ref 135–145)
SPECIMEN SOURCE: SIGNIFICANT CHANGE UP
SPECIMEN SOURCE: SIGNIFICANT CHANGE UP
T4 FREE SERPL-MCNC: 1.6 NG/DL — SIGNIFICANT CHANGE UP (ref 0.9–1.8)
TSH SERPL-MCNC: <0.005 UIU/ML — LOW (ref 0.36–3.74)

## 2024-06-06 PROCEDURE — 99239 HOSP IP/OBS DSCHRG MGMT >30: CPT

## 2024-06-06 RX ORDER — SEVELAMER CARBONATE 2400 MG/1
1 POWDER, FOR SUSPENSION ORAL
Qty: 0 | Refills: 0 | DISCHARGE
Start: 2024-06-06

## 2024-06-06 RX ORDER — LIDOCAINE 4 G/100G
1 CREAM TOPICAL
Qty: 14 | Refills: 0
Start: 2024-06-06 | End: 2024-06-19

## 2024-06-06 RX ORDER — ACETAMINOPHEN 500 MG
650 TABLET ORAL EVERY 6 HOURS
Refills: 0 | Status: DISCONTINUED | OUTPATIENT
Start: 2024-06-06 | End: 2024-06-06

## 2024-06-06 RX ORDER — TRAMADOL HYDROCHLORIDE 50 MG/1
50 TABLET ORAL ONCE
Refills: 0 | Status: DISCONTINUED | OUTPATIENT
Start: 2024-06-06 | End: 2024-06-06

## 2024-06-06 RX ORDER — TRAMADOL HYDROCHLORIDE 50 MG/1
1 TABLET ORAL
Qty: 10 | Refills: 0
Start: 2024-06-06 | End: 2024-06-10

## 2024-06-06 RX ADMIN — MEROPENEM 100 MILLIGRAM(S): 1 INJECTION INTRAVENOUS at 05:41

## 2024-06-06 RX ADMIN — SEVELAMER CARBONATE 800 MILLIGRAM(S): 2400 POWDER, FOR SUSPENSION ORAL at 13:00

## 2024-06-06 RX ADMIN — DOCOSANOL 1 APPLICATION(S): 100 CREAM TOPICAL at 06:17

## 2024-06-06 RX ADMIN — TRAMADOL HYDROCHLORIDE 50 MILLIGRAM(S): 50 TABLET ORAL at 15:53

## 2024-06-06 RX ADMIN — Medication 1000 UNIT(S): at 11:42

## 2024-06-06 RX ADMIN — CALCITRIOL 0.5 MICROGRAM(S): 0.5 CAPSULE ORAL at 11:42

## 2024-06-06 RX ADMIN — DOCOSANOL 1 APPLICATION(S): 100 CREAM TOPICAL at 11:48

## 2024-06-06 RX ADMIN — LIDOCAINE 1 PATCH: 4 CREAM TOPICAL at 02:18

## 2024-06-06 RX ADMIN — TRAMADOL HYDROCHLORIDE 50 MILLIGRAM(S): 50 TABLET ORAL at 16:53

## 2024-06-06 RX ADMIN — LIDOCAINE 1 PATCH: 4 CREAM TOPICAL at 11:44

## 2024-06-06 RX ADMIN — DOCOSANOL 1 APPLICATION(S): 100 CREAM TOPICAL at 00:26

## 2024-06-06 RX ADMIN — MORPHINE SULFATE 2 MILLIGRAM(S): 50 CAPSULE, EXTENDED RELEASE ORAL at 09:03

## 2024-06-06 RX ADMIN — CHLORHEXIDINE GLUCONATE 1 APPLICATION(S): 213 SOLUTION TOPICAL at 11:48

## 2024-06-06 RX ADMIN — SEVELAMER CARBONATE 800 MILLIGRAM(S): 2400 POWDER, FOR SUSPENSION ORAL at 05:41

## 2024-06-06 RX ADMIN — MORPHINE SULFATE 2 MILLIGRAM(S): 50 CAPSULE, EXTENDED RELEASE ORAL at 08:03

## 2024-06-06 RX ADMIN — Medication 1 MILLIGRAM(S): at 11:42

## 2024-06-06 RX ADMIN — HEPARIN SODIUM 5000 UNIT(S): 5000 INJECTION INTRAVENOUS; SUBCUTANEOUS at 05:39

## 2024-06-06 NOTE — DISCHARGE NOTE NURSING/CASE MANAGEMENT/SOCIAL WORK - NSDCPEFALRISK_GEN_ALL_CORE
For information on Fall & Injury Prevention, visit: [email or web address] OR  [email or web address] OR  https://www.cdc.gov/steadi/patient.html

## 2024-06-06 NOTE — CHART NOTE - NSCHARTNOTEFT_GEN_A_CORE
Patient seen for length of stay nutrition risk rescreening.   Patient has been screened for and presents negative for    -Pressure ulcers stage 2 or greater  -Enteral or Parenteral Nutrition  -BMI <18  -[id number] >8  -Chewing/Swallowing Difficulty  -Decreased appetite  -Unintentional Weight Loss  -Inadequate diet (i.e. NPO/Clear Liquids)    Pt reports good appetite, her daughter did the shopping/cooking.  Diet PTA: dialysis diet, limits fluid to 32 ounces daily, she/daughter has bee instructed on diet, aware of what to do.  Pt adm c diagnosis of generalized abdominal pain.  Pt passed CBD stone, d/c plans noted.  PMH include ESRD on HD, PE, LARRY syndrome, morbid obesity.    Diet, Renal Restrictions:   For patients receiving Renal Replacement - No Protein Restr, No Conc K, No Conc Phos, Low Sodium (05-30-24 @ 19:14) [Active]    Height: 5'5"  Weight (kg): 124 (05-30)  BMI=45.5 kg(morbidly obese)  06/06, 124.1 kg(daily wt.)  Usual wt. 122kg post HD.    06/05, voided 400 ml noted.    Labs:  06-06 Na137 mmol/L Glu 81 mg/dL K+ 4.2 mmol/L Cr  5.20 mg/dL<H> BUN 38 mg/dL<H> 06-06 Phos 4.3 mg/dL 06-06 Alb 2.2 g/dL<L>06-06 ALT 63 U/L AST 39 U/L<H> Alkaline Phosphatase 317 U/L<H>    MEDICATIONS  (STANDING):  calcitriol   Capsule 0.5 MICROGram(s) Oral daily  chlorhexidine 2% Cloths 1 Application(s) Topical daily  cholecalciferol 1000 Unit(s) Oral daily  docosanol 10% Cream 1 Application(s) Topical every 6 hours  folic acid 1 milliGRAM(s) Oral daily  heparin   Injectable 5000 Unit(s) SubCutaneous every 12 hours  lidocaine   4% Patch 1 Patch Transdermal daily  meropenem  IVPB 1000 milliGRAM(s) IV Intermittent every 12 hours  sevelamer carbonate 800 milliGRAM(s) Oral three times a day    Nutrition therapy for dialysis diet reviewed, encouraged, low fat diet, calorie controlled diet.  Pt declined written education.    Recommend change diet to renal for renal replacement, low fat + 1200 ml fluid restriction    No further nutrition intervention required at this time. RD remains available.

## 2024-06-06 NOTE — DIETITIAN NUTRITION RISK NOTIFICATION - TREATMENT: THE FOLLOWING DIET HAS BEEN RECOMMENDED
Diet, Renal Restrictions:   For patients receiving Renal Replacement - No Protein Restr, No Conc K, No Conc Phos, Low Sodium  Low Fat (LOWFAT)  1200mL Fluid Restriction ([id number]) (06-06-24 @ 11:29) [Pending Verification By Attending]  Diet, Renal Restrictions:   For patients receiving Renal Replacement - No Protein Restr, No Conc K, No Conc Phos, Low Sodium (05-30-24 @ 19:14) [Active]

## 2024-06-06 NOTE — DISCHARGE NOTE PROVIDER - NSDCFUADDINST_GEN_ALL_CORE_FT
Your chest Xray and CT showed a left lobe thyroid enlargement & your TSH was <0.005. Please follow up with an endocrinologist for a repeat of your TSH and further work up.     Your chest CT showed a dilated main pulmonary artery, a finding often representing pulmonary hypertension and a new 25 mm nodular opacity in the right apex which could represent infection or an inflammatory process. pleas get a repeat chest CT in 2-3 months to ensure that it's not growing

## 2024-06-06 NOTE — DISCHARGE NOTE PROVIDER - HOSPITAL COURSE
72 yo female PMH of HTN, ESRD on HD M/W/F, PE s/p IVC filter in 2012, morbidly obese w/ BMI 45, status post hysterectomy & cholecystectomy, LARRY presents via EMS with c/o sudden onset RUPPERQ abdominal pain radiating to the back with nausea and vomiting x 2 episodes and was admitted w/ cholangitis. Pt was put on Zosyn, which was changed to meropenem. CT abd showed no acute inflammatory or obstructive pathology. US abdomen showed no significant biliary dilatation w/ pneumobilia. GI suspects patient passed CBD stone as HIDA showed a normal hepatobiliary scan status post cholecystectomy and no evidence of a biliary obstruction. Her LFTs have been improving and, as per my conversation w/ Dr. Woodruff-Onu today, he recommended that the  pt's antibiotics be stopped and cleared the patient for discharge. She was also treated for a E. coli UTI.     She was noted to have left lobe thyroid enlargement on CXR & CTA and her TSH was <0.005. Pt was informed of this and will follow up with endo as outpatient for further work up.     Her CTA showed a dilated main pulmonary artery, a finding often representing pulmonary hypertension and a new 25 mm nodular opacity in the right apex which could represent infection or an inflammatory process. She was told that she'll need a repeat chest CT in 2-3 months.     Discharge time: 43 minutes       Vital Signs Last 24 Hrs  T(C): 37.5 (06 Jun 2024 10:43), Max: 37.5 (06 Jun 2024 10:43)  T(F): 99.5 (06 Jun 2024 10:43), Max: 99.5 (06 Jun 2024 10:43)  HR: 111 (06 Jun 2024 10:43) (94 - 111)  BP: 132/72 (06 Jun 2024 10:43) (110/60 - 152/82)   RR: 18 (06 Jun 2024 10:43) (18 - 18)  SpO2: 97% (06 Jun 2024 10:43) (96% - 97%)    Parameters below as of 05 Jun 2024 23:53  Patient On (Oxygen Delivery Method): room air     PHYSICAL EXAM:  GENERAL: NAD, obese  HEAD:  Atraumatic, Normocephalic  EYES: EOMI, PERRLA   NECK: Supple   NERVOUS SYSTEM:  Alert    CHEST/LUNG: Clear to auscultation bilaterally; No rales, rhonchi, wheezing, or rubs  HEART: Regular rate and rhythm; No murmurs, rubs, or gallops  ABDOMEN: Soft, Nontender, Nondistended; Bowel sounds present  EXTREMITIES: No clubbing, cyanosis, or edema      72 yo female PMH of HTN, ESRD on HD M/W/F, PE s/p IVC filter in 2012, morbidly obese w/ BMI 45, status post hysterectomy & cholecystectomy, LARRY presents via EMS with c/o sudden onset RUPPERQ abdominal pain radiating to the back with nausea and vomiting x 2 episodes and was admitted w/ cholangitis. Pt was put on Zosyn, which was changed to meropenem. CT abd showed no acute inflammatory or obstructive pathology. US abdomen showed no significant biliary dilatation w/ pneumobilia. GI suspects patient passed CBD stone as HIDA showed a normal hepatobiliary scan status post cholecystectomy and no evidence of a biliary obstruction. Her LFTs have been improving and, as per my conversation w/ Dr. Woodruff-Onu today, he recommended that the  pt's antibiotics be stopped and cleared the patient for discharge. She was also treated for a E. coli UTI.     She was noted to have left lobe thyroid enlargement on CXR & CTA and her TSH was <0.005. Pt was informed of this and will follow up with endo as outpatient for further work up.     Her CTA showed a dilated main pulmonary artery, a finding often representing pulmonary hypertension and a new 25 mm nodular opacity in the right apex which could represent infection or an inflammatory process. She was told that she'll need a repeat chest CT in 2-3 months.     PT recommended home PT, but the patient and her family refused.     Discharge time: 43 minutes       Vital Signs Last 24 Hrs  T(C): 37.5 (06 Jun 2024 10:43), Max: 37.5 (06 Jun 2024 10:43)  T(F): 99.5 (06 Jun 2024 10:43), Max: 99.5 (06 Jun 2024 10:43)  HR: 111 (06 Jun 2024 10:43) (94 - 111)  BP: 132/72 (06 Jun 2024 10:43) (110/60 - 152/82)   RR: 18 (06 Jun 2024 10:43) (18 - 18)  SpO2: 97% (06 Jun 2024 10:43) (96% - 97%)    Parameters below as of 05 Jun 2024 23:53  Patient On (Oxygen Delivery Method): room air     PHYSICAL EXAM:  GENERAL: NAD, obese  HEAD:  Atraumatic, Normocephalic  EYES: EOMI, PERRLA   NECK: Supple   NERVOUS SYSTEM:  Alert    CHEST/LUNG: Clear to auscultation bilaterally; No rales, rhonchi, wheezing, or rubs  HEART: Regular rate and rhythm; No murmurs, rubs, or gallops  ABDOMEN: Soft, Nontender, Nondistended; Bowel sounds present  EXTREMITIES: No clubbing, cyanosis, or edema

## 2024-06-06 NOTE — DISCHARGE NOTE NURSING/CASE MANAGEMENT/SOCIAL WORK - PATIENT PORTAL LINK FT
You can access the FollowMyHealth Patient Portal offered by [facility] by registering at the following website: [email or web address]. By joining [facility]’s FollowMyHealth portal, you will also be able to view your health information using other applications (apps) compatible with our system.

## 2024-06-06 NOTE — DISCHARGE NOTE NURSING/CASE MANAGEMENT/SOCIAL WORK - NSDCVIVACCINE_GEN_ALL_CORE_FT
COVID-19, mRNA, LNP-S, PF, 100 mcg/ 0.5 mL dose (Moderna); 30-Apr-2022 16:48; Sailaja Godinez (RN); [employer] [id number] (Exp. Date: 28-May-2022); IntraMuscular; Deltoid Left.; 0.25 milliLiter(s);   influenza, injectable, quadrivalent, preservative free; 01-Feb-2018 15:10; Mey Hastings (RN); [employer]; [id number]; IntraMuscular; Deltoid Left.; 0.5 milliLiter(s); VIS (VIS Published: 07-Aug-2015, VIS Presented: 01-Feb-2018);

## 2024-06-06 NOTE — DISCHARGE NOTE PROVIDER - NSDCCPCAREPLAN_GEN_ALL_CORE_FT
PRINCIPAL DISCHARGE DIAGNOSIS  Diagnosis: Generalized abdominal pain  Assessment and Plan of Treatment:

## 2024-06-06 NOTE — DISCHARGE NOTE PROVIDER - PROVIDER TOKENS
PROVIDER:[TOKEN:[[id number]],FOLLOWUP:[1 week]],FREE:[LAST:[Your PCP],PHONE:[(   )    -],FAX:[(   )    -],FOLLOWUP:[1 week]]

## 2024-06-06 NOTE — DISCHARGE NOTE PROVIDER - DETAILS OF MALNUTRITION DIAGNOSIS/DIAGNOSES
This patient has been assessed with a concern for Malnutrition and was treated during this hospitalization for the following Nutrition diagnosis/diagnoses:     -  06/06/2024: Morbid obesity (BMI > 40)   (3) walks occasionally

## 2024-06-06 NOTE — DISCHARGE NOTE PROVIDER - NSDCMRMEDTOKEN_GEN_ALL_CORE_FT
albuterol 90 mcg/inh inhalation aerosol: 2 puff(s) inhaled every 6 hours as needed for , Shortness of Breath and/or Wheezing  calcitriol 0.5 mcg oral capsule: 2 cap(s) orally 3 times a week Monday, Wednesday, Friday  cholecalciferol oral tablet: 1000 unit(s) orally once a day  folic acid 1 mg oral tablet: 1 tab(s) orally once a day  metoprolol succinate 100 mg oral tablet, extended release: 1 tab(s) orally once a day  sevelamer carbonate 800 mg oral tablet: 1 tab(s) orally 3 times a day   albuterol 90 mcg/inh inhalation aerosol: 2 puff(s) inhaled every 6 hours as needed for , Shortness of Breath and/or Wheezing  calcitriol 0.5 mcg oral capsule: 2 cap(s) orally 3 times a week Monday, Wednesday, Friday  cholecalciferol oral tablet: 1000 unit(s) orally once a day  folic acid 1 mg oral tablet: 1 tab(s) orally once a day  lidocaine 4% topical film: Apply topically to affected area once a day Patch may remain in place for up to 12 hours in any 24-hour period  metoprolol succinate 100 mg oral tablet, extended release: 1 tab(s) orally once a day  sevelamer carbonate 800 mg oral tablet: 1 tab(s) orally 3 times a day  Ultram 50 mg oral tablet: 1 tab(s) orally every 12 hours as needed for  severe pain MDD: 2 tabs/ day

## 2024-06-06 NOTE — DISCHARGE NOTE PROVIDER - CARE PROVIDER_API CALL
Deanne Singh  Gastroenterology  [address] [place] [address]  Phone: [phone]  Fax: [phone]  Follow Up Time: 1 week    Your PCP,   Phone: (   )    -  Fax: (   )    -  Follow Up Time: 1 week

## 2024-06-12 DIAGNOSIS — I27.20 PULMONARY HYPERTENSION, UNSPECIFIED: ICD-10-CM

## 2024-06-12 DIAGNOSIS — G47.33 OBSTRUCTIVE SLEEP APNEA (ADULT) (PEDIATRIC): ICD-10-CM

## 2024-06-12 DIAGNOSIS — N18.6 END STAGE RENAL DISEASE: ICD-10-CM

## 2024-06-12 DIAGNOSIS — Z88.2 ALLERGY STATUS TO SULFONAMIDES: ICD-10-CM

## 2024-06-12 DIAGNOSIS — N39.0 URINARY TRACT INFECTION, SITE NOT SPECIFIED: ICD-10-CM

## 2024-06-12 DIAGNOSIS — Z79.51 LONG TERM (CURRENT) USE OF INHALED STEROIDS: ICD-10-CM

## 2024-06-12 DIAGNOSIS — Z99.2 DEPENDENCE ON RENAL DIALYSIS: ICD-10-CM

## 2024-06-12 DIAGNOSIS — B96.20 UNSPECIFIED ESCHERICHIA COLI [E. COLI] AS THE CAUSE OF DISEASES CLASSIFIED ELSEWHERE: ICD-10-CM

## 2024-06-12 DIAGNOSIS — K83.09 OTHER CHOLANGITIS: ICD-10-CM

## 2024-06-12 DIAGNOSIS — K80.20 CALCULUS OF GALLBLADDER WITHOUT CHOLECYSTITIS WITHOUT OBSTRUCTION: ICD-10-CM

## 2024-06-12 DIAGNOSIS — I12.0 HYPERTENSIVE CHRONIC KIDNEY DISEASE WITH STAGE 5 CHRONIC KIDNEY DISEASE OR END STAGE RENAL DISEASE: ICD-10-CM

## 2024-06-12 DIAGNOSIS — Z86.711 PERSONAL HISTORY OF PULMONARY EMBOLISM: ICD-10-CM

## 2024-06-12 DIAGNOSIS — R10.9 UNSPECIFIED ABDOMINAL PAIN: ICD-10-CM

## 2024-06-12 DIAGNOSIS — E66.01 MORBID (SEVERE) OBESITY DUE TO EXCESS CALORIES: ICD-10-CM

## 2024-06-12 DIAGNOSIS — G43.909 MIGRAINE, UNSPECIFIED, NOT INTRACTABLE, WITHOUT STATUS MIGRAINOSUS: ICD-10-CM

## 2024-06-13 NOTE — PATIENT PROFILE ADULT - FALL HARM RISK - PATIENT NEEDS ASSISTANCE
Progress Note  Christi Tavarez Patient Status:  Inpatient    1959 MRN [id number]   Location [facility] 3W/SW Attending Antony Zavala MD   Hosp Day # 10 PCP ÁNGEL Delgado     Subjective:  Feels slightly sob due to cough  New COVID diagnosis    Objective:  BP 98/54 (BP Location: Right arm)   Pulse 78   Temp 98.3 °F (36.8 °C) (Oral)   Resp 18   Ht 4' 9\" (1.448 m)   Wt 125 lb 9.6 oz (57 kg)   SpO2 94%   BMI 27.18 kg/m²     Telemetry: SR 90s-100s    Intake/Output:    Intake/Output Summary (Last 24 hours) at 2024 1152  Last data filed at 2024 0909  Gross per 24 hour   Intake 440 ml   Output 1700 ml   Net -1260 ml     Last 3 Weights   24 0517 125 lb 9.6 oz (57 kg)   24 0420 130 lb 6.4 oz (59.1 kg)   24 0412 134 lb 8 oz (61 kg)   06/10/24 0300 133 lb 8 oz (60.6 kg)   24 0544 136 lb 12.8 oz (62.1 kg)   24 0433 143 lb (64.9 kg)   24 0942 146 lb 3.2 oz (66.3 kg)   24 0538 139 lb 11.2 oz (63.4 kg)   24 0555 155 lb 6.4 oz (70.5 kg)   24 0500 155 lb 4.8 oz (70.4 kg)   24 1810 162 lb 14.4 oz (73.9 kg)   24 1250 160 lb (72.6 kg)   24 0537 153 lb 11.2 oz (69.7 kg)   24 0507 157 lb 12.8 oz (71.6 kg)   24 0416 158 lb (71.7 kg)   24 0800 161 lb 13.1 oz (73.4 kg)   24 1700 154 lb 15.7 oz (70.3 kg)   24 0400 142 lb 3.2 oz (64.5 kg)   24 0523 139 lb 12.4 oz (63.4 kg)   24 130 lb (59 kg)   24 0533 139 lb 12.4 oz (63.4 kg)     Labs:  Recent Labs   Lab 06/10/24  1529 24  0741 24  1916 24  0406 24  0557   * 107*  --  94  --    BUN 8* 7*  --  6*  --    CREATSERUM 0.73 0.68  --  0.69  --    EGFRCR 92 98  --  97  --    CA 8.5* 8.4*  --  8.5*  --     139  --  138  --    K 3.5  3.5 3.5 3.1* 3.8  3.8 3.0*    105  --  104  --    CO2 29.0 28.0  --  27.0  --      Recent Labs   Lab 24  0813 24  0406 24  0557   RBC 2.98* 2.82*  2.70*   HGB 11.0* 10.1* 10.0*   HCT 31.6* 29.2* 28.4*   .0* 103.5* 105.2*   MCH 36.9* 35.8* 37.0*   MCHC 34.8 34.6 35.2   RDW 16.4* 16.6* 16.7*   NEPRELIM 4.22 3.82 2.72   WBC 7.6 6.9 6.3   .0* 136.0* 129.0*     No results for input(s): \"TROP\", \"TROPHS\", \"CK\" in the last 168 hours.    Lab Results   Component Value Date/Time    HDL 11 (L) 06/04/2024 09:17 AM     (H) 06/04/2024 09:17 AM    TRIG 87 06/04/2024 09:17 AM     No results found for: \"DDIMER\"  Lab Results   Component Value Date    TSH 8.140 (H) 04/03/2024     Review of Systems:   Constitutional: No fevers, chills, fatigue or night sweats.  ENT: No mouth pain, neck pain, running nose, headaches or swollen glands.  Skin: No rashes, pruritus or skin changes,  Respiratory: Denies cough, wheezing or shortness of breath.  CV: Denies chest pain, palpitations, orthopnea, PND or dizziness.  Musculoskeletal: No joint pain, stiffness or swelling.  GI: No nausea, vomiting or diarrhea. No blood in stools.  Neurologic: No seizures, tremors, weakness or numbness.     Physical Exam:  General: Alert, cooperative, no distress, appears stated age.  Neck: Supple, symmetrical, trachea midline, no adenopathy, thyroid: no enlargment/tenderness/nodules, no carotid bruit and no JVD.  Lungs:  clear to auscultation bilaterally  Chest wall: No tenderness or deformity.  Heart: Regular rate and rhythm, S1, S2 normal, no murmur, click, rub or gallop.  Abdomen: Soft, non-tender. Bowel sounds normal. No masses,  No organomegaly.  Extremities: Extremities normal, atraumatic, no cyanosis, mild BLE  Pulses: 2+ and symmetric all extremities.  Neurologic: slight right sided facial droop, right arm held against her body, not moving freely, follows commands, upper extremity strength equal     Diagnostics:  Echo: 06/04/24  Conclusions:     1. Left ventricle: The cavity size was normal. Wall thickness was normal.      Systolic function was normal. The estimated ejection fraction  was 60-65%,      by visual assessment. No diagnostic evidence for regional wall motion      abnormalities. Left ventricular diastolic function parameters were      normal.   2. Left atrium: The left atrial volume was normal.   3. Pulmonary arteries: Systolic pressure was within the normal range,      estimated to be 24mm Hg.   4. Systemic veins: Central venous respirophasic diameter changes are blunted      (< 50%).   5. Inferior vena cava: The IVC was normal-sized.   Impressions:  No previous study was available for comparison.   *   Medications:   potassium chloride  40 mEq Oral Q4H    spironolactone  50 mg Oral Daily    bumetanide  2 mg Oral BID (Diuretic)    midodrine  2.5 mg Oral TID    rifAXIMin  550 mg Oral BID    insulin aspart  1-5 Units Subcutaneous TID CC    traZODone  25 mg Oral Nightly    escitalopram  10 mg Oral Nightly    folic acid  1 mg Oral Daily    lactulose  20 g Oral BID    lamoTRIgine  25 mg Oral BID    levothyroxine  50 mcg Oral Before breakfast    pantoprazole  20 mg Oral QAM AC    potassium chloride  10 mEq Oral Daily     Assessment:    Volume overload  Admitted with LE edema and shortness of breath  -2/2 liver disease/congestion, portal htn  -echo normal with LVEF 60-65%, no RWMA, normal diastolic function, no valvular dsyfuctions  -was taking lasix BID at home along with aldactone  -now diuresing with bumex BID, stable labs, good UOP  -I/Os net neg 12.5L, 1.5L UOP past 24 hours  -weight down 35# from admission weight, family states dry weight 160#, today 125  -BP stable 90s-100s, BMP pending  -albumin given for third spacing this admission     Hx HTN  BP controlled in 100-120s  -aldactone, bumex, midodrine    Hypokalemia  K+ 3.0  -cardiac electrolyte replacement protocol   -aldactone, daily KCl    DMII-A1c 4.4    Alcoholic liver disease  AST elevated, albumin low, bilirubin elevated  -s/p paracentesis 6/5 with 4L off  -normal ammonia levels after getting lactulose, total protein  low  -portal htn on US, hypoalbuminemia-albumin given  -third spacing component to her swelling, down ~35# from admission  -GI signed off    Hx CVA  Recent cerebellar stroke vs mass vs intracranial bleed in April of this year    COVID-19  Admits to cough, otherwise asymptomatic  -ID following      Plan:  -Continue PO bumex BID  -Continue aldactone, midodrine  -Replace potassium per protocol as needed, continue additional daily dose  -monitor strict I/Os, daily weights, daily BMP  -further recs per GI/primary  -Cardiology will sign off, please notify us with additional questions or concerns    Plan of care discussed with patient, RN.      Shona Tello, APRN  06/13/24  11:55 AM  [phone] [place]  [phone] [facility]         Standing/Walking/Toileting

## 2024-08-04 NOTE — PHYSICAL THERAPY INITIAL EVALUATION ADULT - REHAB POTENTIAL, PT EVAL
ICC PEDIATRIC NOTE    CHIEF COMPLAINT:    Chief Complaint   Patient presents with   • Sore Throat     Started yesterday, started with headache, has sinus congestion, sore throat, top of mouth was burning, fever to 99.7, vomited 1 time yesterday       HPI:    This is a 8 year old male who presented to the immediate care with the history of sore throat, headache, borderline temperature 1 episode vomiting yesterday.  Patient presents with mother.  He is well-appearing nontoxic.  Yesterday was complaining of a headache, then mention to mom that the roof of his mouth hurt, today during my evaluation he denies abdominal pain, states he has a mild headache, endorses mild discomfort with swallowing.  He has had 1 episode nonbilious nonbloody emesis, he has had normal bowel and bladder output, no coughing, states the irritation on the roof of his mouth has resolved, denies any ear pain.  No known sick contacts but did just complete a summer camp where he was with many other children.  Patient has no past medical history is up-to-date on immunizations.      REVIEW OF SYSTEMS:    Review of Systems   Constitutional:  Negative for activity change, fatigue and fever.   HENT:  Positive for sore throat. Negative for congestion.    Eyes:  Negative for visual disturbance.   Respiratory:  Negative for cough, chest tightness and shortness of breath.    Cardiovascular:  Negative for chest pain.   Gastrointestinal:  Negative for abdominal pain and vomiting.   Endocrine: Negative for polydipsia, polyphagia and polyuria.   Genitourinary:  Negative for difficulty urinating and dysuria.   Musculoskeletal:  Negative for arthralgias and myalgias.   Skin:  Negative for rash.   Neurological:  Positive for headaches. Negative for dizziness and weakness.   Psychiatric/Behavioral:  Negative for behavioral problems.       10 systems reviewed and negative, other than what is noted    ALLERGIES:    ALLERGIES:  No Known Allergies    CURRENT  MEDICATIONS:    Current Outpatient Medications   Medication Sig Dispense Refill   • VITAMIN D PO Vitamin D     • Ascorbic Acid (VITAMIN C PO)      • IBUPROFEN PO      • amoxicillin (AMOXIL) 400 MG/5ML suspension Take 6.5 mLs by mouth in the morning and 6.5 mLs in the evening. Do all this for 10 days. 130 mL 0   • fluocinonide (LIDEX) 0.05 % ointment Add to the tightest part of the foreskin 3 times daily and hold on stretch for 10 seconds each time. (Patient not taking: Reported on 2/28/2024) 60 g 1     No current facility-administered medications for this visit.       PAST MEDICAL HISTORY:    Past Medical History:   Diagnosis Date   • No known problems        SURGICAL HISTORY:    Past Surgical History:   Procedure Laterality Date   • No past surgeries         SOCIAL HISTORY:    Social History     Tobacco Use   • Smoking status: Never   • Smokeless tobacco: Never       FAMILY HISTORY:    Family History   Problem Relation Age of Onset   • Patient is unaware of any medical problems Mother        PHYSICAL EXAM:   Pulse 87, temperature 98.1 °F (36.7 °C), temperature source Temporal, resp. rate 20, weight 23.8 kg (52 lb 7.5 oz), SpO2 98%.      Physical Exam  Constitutional:       General: He is not in acute distress.  HENT:      Head: Normocephalic and atraumatic.      Right Ear: Tympanic membrane, ear canal and external ear normal.      Left Ear: Tympanic membrane, ear canal and external ear normal.      Nose: Nose normal. No congestion or rhinorrhea.      Mouth/Throat:      Mouth: Mucous membranes are moist.      Pharynx: Oropharynx is clear. No oropharyngeal exudate or posterior oropharyngeal erythema.      Comments: Unremarkable oropharynx, uvula midline.  No sores to soft or hard palate.     Neck: Normal range of motion and neck supple. Tenderness present. No rigidity.   Eyes:      Extraocular Movements: Extraocular movements intact.      Conjunctiva/sclera: Conjunctivae normal.      Pupils: Pupils are equal, round,  and reactive to light.   Neck:      Comments: Full flexion extension without issue, no meningismus.  Mild cervical and submandibular adenopathy with mild tenderness.  Cardiovascular:      Rate and Rhythm: Normal rate and regular rhythm.      Pulses: Normal pulses.      Heart sounds: Normal heart sounds.   Pulmonary:      Effort: Pulmonary effort is normal. No retractions.      Breath sounds: Normal breath sounds. No stridor. No wheezing.   Abdominal:      General: Bowel sounds are normal. There is no distension.      Tenderness: There is no abdominal tenderness. There is no guarding or rebound.   Musculoskeletal:         General: Normal range of motion.   Lymphadenopathy:      Cervical: No cervical adenopathy.   Skin:     General: Skin is warm and dry.      Findings: No rash.   Neurological:      General: No focal deficit present.      Mental Status: He is alert and oriented for age.   Psychiatric:         Mood and Affect: Mood normal.         Behavior: Behavior normal.          RADIOLOGY AND LAB RESULTS:    Results for orders placed or performed in visit on 24   POCT Streptococcus Group A PCR   Result Value    STREPTOCOCCUS GROUP A PCR Detected (A)    TEST LOT NUMBER [id number]    TEST LOT EXPIRATION DATE 2025       No orders to display        MEDICAL DECISION MAKIN-year-old male presenting with mother for 1 day symptoms, headache, sore throat.  Borderline fevers, 1 episode of emesis but benign abdominal exam, tolerating p.o. otherwise.  Strep pharyngitis swab is positive, child will be treated with 10 days twice daily amoxicillin he has previously tolerated this antibiotic.  Return precautions discussed, discharged with follow-up to PCP.      DIAGNOSIS:  1. Sore throat    2. Acute nonintractable headache, unspecified headache type    3. Nausea    4. Acute streptococcal pharyngitis        Juan Mayers MD  2024               good, to achieve stated therapy goals

## 2024-09-25 NOTE — ED ADULT NURSE NOTE - HOW PATIENT ADDRESSED, PROFILE
ACUTE OCCUPATIONAL THERAPY GOALS:   (Developed with and agreed upon by patient and/or caregiver.)    1. Patient will complete lower body bathing and dressing with CONTACT GUARD ASSIST and adaptive equipment as needed.   2. Patient will complete upper body bathing and dressing with SUPERVISION and adaptive equipment as needed.  3. Patient will complete grooming ADL standing edge of sink with CONTACT GUARD ASSIST.   4. Patient will complete functional transfers with STAND BY ASSIST and adaptive equipment as needed.   5. Patient will complete functional mobility with STAND BY ASSIST using least restrictive ambulatory device.   6. Patient will tolerate 20 minutes BUE exercises to increase strength for safe, functional transfers.    7. Patient will perform visual scanning task during functional mobility with minimal verbal cues.   8. Patient will attend to L visual field during functional task with minimal verbal cues.     Timeframe: 7 visits  OCCUPATIONAL THERAPY: Daily Note PM   OT Visit Days: 4   Time In/Out  OT Charge Capture  Rehab Caseload Tracker  OT Orders    Brendan Saleh is a 50 y.o. male   PRIMARY DIAGNOSIS: Infection of  (ventriculoperitoneal) shunt (HCC)  Acute hypernatremia [E87.0]  Volume depletion [E86.9]  Acute kidney injury (HCC) [N17.9]  Acute encephalopathy [G93.40]  Septic shock (HCC) [A41.9, R65.21]  Procedure(s) (LRB):  LAPAROTOMY EXPLORATORY, PRIMARY CLOSURE SMALL BOWEL PERFORATION (N/A)  116 Days Post-Op  Inpatient: Payor: [employer] / Plan: [employer]TER / Product Type: *No Product type* /     ASSESSMENT:     REHAB RECOMMENDATIONS:   Recommendation to date pending progress:  Setting:  Rehab    Equipment:    To Be Determined     ASSESSMENT:  Mr. Saleh presents upright in chair and agreeable to therapy. Therapist targeted executive functioning skills with sorting card game to various metronome tempos. Pt was able to sort all cards by color x1 trial and suits x1 trial with min-mod difficulties  rita

## 2024-12-01 NOTE — PHYSICAL THERAPY INITIAL EVALUATION ADULT - WEIGHT-BEARING RESTRICTIONS: GAIT, REHAB EVAL
"Reason for Disposition  • [1] Age > 1 year  AND [2] continuous (cannot stop) coughing AND [3] interferes with normal activities and sleeping    Answer Assessment - Initial Assessment Questions  1. ONSET: \"When did the cough start?\"       Wednesday     2. SEVERITY: \"How bad is the cough today?\"       Moist/wet sounding cough     3. COUGHING SPELLS: \"Does he go into coughing spells where he can't stop?\" If so, ask: \"How long do they last?\"       Yes having coughing spells throughout the day- happens for about 5 minutes at time     4. CROUP: \"Is it a barky, croupy cough?\"       Denies     5. RESPIRATORY STATUS: \"Describe your child's breathing when he's not coughing. What does it sound like?\" (eg wheezing, stridor, grunting, weak cry, unable to speak, retractions, rapid rate, cyanosis)      Denies       Just congestion- breathing through mouth more     6. CHILD'S APPEARANCE: \"How sick is your child acting?\" \" What is he doing right now?\" If asleep, ask: \"How was he acting before he went to sleep?\"       Not eating well- has been drinking water       Slight decrease in urine output- but still urinating  more than every 8-12 hours    7. FEVER: \"Does your child have a fever?\" If so, ask: \"What is it, how was it measured, and when did it start?\"       Denies     8. CAUSE: \"What do you think is causing the cough?\" Age 6 months to 4 years, ask:  \"Could he have choked on something?\"      Had walking pneumonia the beginning of November- cough is sounding similar     9.OTHER- Nasal congestion    Protocols used: Cough-Pediatric-    "
"Regarding: appt request / wet cough 1/2 ( siblings)  ----- Message from Laly BERNSTEIN sent at 12/1/2024 12:42 PM EST -----  \" I am looking to schedule a next day appt. My kids have a wet cough\"    "
Mom calling in with concerns due to the patient having a moist/wet sounding cough with coughing spells and congestion. Mom stated the patient was recently treated for walking pneumonia the first week of November and is concerned that this cough sounds similar. Mom denies any fevers or signs of respiratory distress. Requesting an appointment with the office for tomorrow. An appointment was scheduled for tomorrow 12/2 at 1100. Mom verbalized understanding, no further assistance needed at this time.   
full weight-bearing

## 2024-12-05 ENCOUNTER — INPATIENT (INPATIENT)
Facility: HOSPITAL | Age: 74
LOS: 7 days | Discharge: HOME | End: 2024-12-13
Attending: INTERNAL MEDICINE | Admitting: INTERNAL MEDICINE
Payer: MEDICARE

## 2024-12-05 ENCOUNTER — EMERGENCY (EMERGENCY)
Facility: HOSPITAL | Age: 74
LOS: 0 days | Discharge: ANOTHER HOSPITAL | End: 2024-12-05
Attending: STUDENT IN AN ORGANIZED HEALTH CARE EDUCATION/TRAINING PROGRAM
Payer: MEDICARE

## 2024-12-05 VITALS
TEMPERATURE: 98 F | RESPIRATION RATE: 18 BRPM | HEART RATE: 86 BPM | HEIGHT: 64 IN | DIASTOLIC BLOOD PRESSURE: 53 MMHG | OXYGEN SATURATION: 94 % | SYSTOLIC BLOOD PRESSURE: 133 MMHG

## 2024-12-05 VITALS
OXYGEN SATURATION: 95 % | TEMPERATURE: 99 F | SYSTOLIC BLOOD PRESSURE: 162 MMHG | DIASTOLIC BLOOD PRESSURE: 82 MMHG | RESPIRATION RATE: 18 BRPM | WEIGHT: 259.93 LBS | HEART RATE: 84 BPM | HEIGHT: 64 IN

## 2024-12-05 VITALS
HEART RATE: 90 BPM | RESPIRATION RATE: 20 BRPM | DIASTOLIC BLOOD PRESSURE: 62 MMHG | SYSTOLIC BLOOD PRESSURE: 134 MMHG | OXYGEN SATURATION: 96 % | TEMPERATURE: 98 F

## 2024-12-05 DIAGNOSIS — R06.02 SHORTNESS OF BREATH: ICD-10-CM

## 2024-12-05 DIAGNOSIS — H54.7 UNSPECIFIED VISUAL LOSS: ICD-10-CM

## 2024-12-05 DIAGNOSIS — N18.6 END STAGE RENAL DISEASE: ICD-10-CM

## 2024-12-05 DIAGNOSIS — Z86.711 PERSONAL HISTORY OF PULMONARY EMBOLISM: ICD-10-CM

## 2024-12-05 DIAGNOSIS — Z88.1 ALLERGY STATUS TO OTHER ANTIBIOTIC AGENTS: ICD-10-CM

## 2024-12-05 DIAGNOSIS — H57.10 OCULAR PAIN, UNSPECIFIED EYE: ICD-10-CM

## 2024-12-05 DIAGNOSIS — G47.33 OBSTRUCTIVE SLEEP APNEA (ADULT) (PEDIATRIC): ICD-10-CM

## 2024-12-05 DIAGNOSIS — H57.12 OCULAR PAIN, LEFT EYE: ICD-10-CM

## 2024-12-05 DIAGNOSIS — Z88.2 ALLERGY STATUS TO SULFONAMIDES: ICD-10-CM

## 2024-12-05 DIAGNOSIS — Z99.2 DEPENDENCE ON RENAL DIALYSIS: ICD-10-CM

## 2024-12-05 DIAGNOSIS — E66.01 MORBID (SEVERE) OBESITY DUE TO EXCESS CALORIES: ICD-10-CM

## 2024-12-05 LAB
ALBUMIN SERPL ELPH-MCNC: 3.1 G/DL — LOW (ref 3.3–5)
ALP SERPL-CCNC: 167 U/L — HIGH (ref 40–120)
ALT FLD-CCNC: 42 U/L — SIGNIFICANT CHANGE UP (ref 12–78)
ANION GAP SERPL CALC-SCNC: 6 MMOL/L — SIGNIFICANT CHANGE UP (ref 5–17)
APTT BLD: 29.4 SEC — SIGNIFICANT CHANGE UP (ref 24.5–35.6)
AST SERPL-CCNC: 30 U/L — SIGNIFICANT CHANGE UP (ref 15–37)
BASOPHILS # BLD AUTO: 0.01 K/UL — SIGNIFICANT CHANGE UP (ref 0–0.2)
BASOPHILS NFR BLD AUTO: 0.1 % — SIGNIFICANT CHANGE UP (ref 0–2)
BILIRUB SERPL-MCNC: 0.3 MG/DL — SIGNIFICANT CHANGE UP (ref 0.2–1.2)
BUN SERPL-MCNC: 42 MG/DL — HIGH (ref 7–23)
CALCIUM SERPL-MCNC: 9 MG/DL — SIGNIFICANT CHANGE UP (ref 8.5–10.1)
CHLORIDE SERPL-SCNC: 103 MMOL/L — SIGNIFICANT CHANGE UP (ref 96–108)
CO2 SERPL-SCNC: 31 MMOL/L — SIGNIFICANT CHANGE UP (ref 22–31)
CREAT SERPL-MCNC: 5.54 MG/DL — HIGH (ref 0.5–1.3)
EGFR: 8 ML/MIN/1.73M2 — LOW
EOSINOPHIL # BLD AUTO: 0.16 K/UL — SIGNIFICANT CHANGE UP (ref 0–0.5)
EOSINOPHIL NFR BLD AUTO: 2.1 % — SIGNIFICANT CHANGE UP (ref 0–6)
GLUCOSE SERPL-MCNC: 95 MG/DL — SIGNIFICANT CHANGE UP (ref 70–99)
HCT VFR BLD CALC: 36 % — SIGNIFICANT CHANGE UP (ref 34.5–45)
HGB BLD-MCNC: 11.4 G/DL — LOW (ref 11.5–15.5)
IMM GRANULOCYTES NFR BLD AUTO: 0.4 % — SIGNIFICANT CHANGE UP (ref 0–0.9)
INR BLD: 0.99 RATIO — SIGNIFICANT CHANGE UP (ref 0.85–1.16)
LYMPHOCYTES # BLD AUTO: 2.34 K/UL — SIGNIFICANT CHANGE UP (ref 1–3.3)
LYMPHOCYTES # BLD AUTO: 30.7 % — SIGNIFICANT CHANGE UP (ref 13–44)
MCHC RBC-ENTMCNC: 31.4 PG — SIGNIFICANT CHANGE UP (ref 27–34)
MCHC RBC-ENTMCNC: 31.7 G/DL — LOW (ref 32–36)
MCV RBC AUTO: 99.2 FL — SIGNIFICANT CHANGE UP (ref 80–100)
MONOCYTES # BLD AUTO: 0.54 K/UL — SIGNIFICANT CHANGE UP (ref 0–0.9)
MONOCYTES NFR BLD AUTO: 7.1 % — SIGNIFICANT CHANGE UP (ref 2–14)
NEUTROPHILS # BLD AUTO: 4.53 K/UL — SIGNIFICANT CHANGE UP (ref 1.8–7.4)
NEUTROPHILS NFR BLD AUTO: 59.6 % — SIGNIFICANT CHANGE UP (ref 43–77)
NRBC # BLD: 0 /100 WBCS — SIGNIFICANT CHANGE UP (ref 0–0)
NT-PROBNP SERPL-SCNC: 1156 PG/ML — HIGH (ref 0–125)
PLATELET # BLD AUTO: 220 K/UL — SIGNIFICANT CHANGE UP (ref 150–400)
POTASSIUM SERPL-MCNC: 5 MMOL/L — SIGNIFICANT CHANGE UP (ref 3.5–5.3)
POTASSIUM SERPL-SCNC: 5 MMOL/L — SIGNIFICANT CHANGE UP (ref 3.5–5.3)
PROT SERPL-MCNC: 8 GM/DL — SIGNIFICANT CHANGE UP (ref 6–8.3)
PROTHROM AB SERPL-ACNC: 11.5 SEC — SIGNIFICANT CHANGE UP (ref 9.9–13.4)
RBC # BLD: 3.63 M/UL — LOW (ref 3.8–5.2)
RBC # FLD: 13.1 % — SIGNIFICANT CHANGE UP (ref 10.3–14.5)
SODIUM SERPL-SCNC: 140 MMOL/L — SIGNIFICANT CHANGE UP (ref 135–145)
TROPONIN I, HIGH SENSITIVITY RESULT: 8.7 NG/L — SIGNIFICANT CHANGE UP
WBC # BLD: 7.61 K/UL — SIGNIFICANT CHANGE UP (ref 3.8–10.5)
WBC # FLD AUTO: 7.61 K/UL — SIGNIFICANT CHANGE UP (ref 3.8–10.5)

## 2024-12-05 PROCEDURE — 99285 EMERGENCY DEPT VISIT HI MDM: CPT

## 2024-12-05 PROCEDURE — 71045 X-RAY EXAM CHEST 1 VIEW: CPT | Mod: 26

## 2024-12-05 RX ORDER — TIMOLOL 0.5 %
1 DROPS OPHTHALMIC (EYE) ONCE
Refills: 0 | Status: COMPLETED | OUTPATIENT
Start: 2024-12-05 | End: 2024-12-05

## 2024-12-05 RX ORDER — DORZOLAMIDE HYDROCHLORIDE AND TIMOLOL MALEATE 20; 5 MG/ML; MG/ML
1 SOLUTION OPHTHALMIC ONCE
Refills: 0 | Status: COMPLETED | OUTPATIENT
Start: 2024-12-05 | End: 2024-12-05

## 2024-12-05 RX ORDER — BRIMONIDINE TARTRATE 1.5 MG/ML
1 SOLUTION/ DROPS OPHTHALMIC ONCE
Refills: 0 | Status: COMPLETED | OUTPATIENT
Start: 2024-12-05 | End: 2024-12-05

## 2024-12-05 RX ORDER — ACETAMINOPHEN 500MG 500 MG/1
975 TABLET, COATED ORAL ONCE
Refills: 0 | Status: COMPLETED | OUTPATIENT
Start: 2024-12-05 | End: 2024-12-05

## 2024-12-05 RX ORDER — DORZOLAMIDE HYDROCHLORIDE 20 MG/ML
1 SOLUTION OPHTHALMIC ONCE
Refills: 0 | Status: COMPLETED | OUTPATIENT
Start: 2024-12-05 | End: 2024-12-05

## 2024-12-05 RX ORDER — LATANOPROST 50 UG/ML
1 SOLUTION/ DROPS OPHTHALMIC AT BEDTIME
Refills: 0 | Status: DISCONTINUED | OUTPATIENT
Start: 2024-12-05 | End: 2024-12-13

## 2024-12-05 RX ORDER — LATANOPROST 50 UG/ML
1 SOLUTION/ DROPS OPHTHALMIC ONCE
Refills: 0 | Status: COMPLETED | OUTPATIENT
Start: 2024-12-05 | End: 2024-12-05

## 2024-12-05 RX ADMIN — BRIMONIDINE TARTRATE 1 DROP(S): 1.5 SOLUTION/ DROPS OPHTHALMIC at 18:26

## 2024-12-05 RX ADMIN — LATANOPROST 1 DROP(S): 50 SOLUTION/ DROPS OPHTHALMIC at 20:00

## 2024-12-05 RX ADMIN — Medication 1 DROP(S): at 18:26

## 2024-12-05 RX ADMIN — DORZOLAMIDE HYDROCHLORIDE AND TIMOLOL MALEATE 1 DROP(S): 20; 5 SOLUTION OPHTHALMIC at 18:26

## 2024-12-05 RX ADMIN — LATANOPROST 1 DROP(S): 50 SOLUTION/ DROPS OPHTHALMIC at 18:26

## 2024-12-05 RX ADMIN — Medication 1 DROP(S): at 20:00

## 2024-12-05 RX ADMIN — BRIMONIDINE TARTRATE 1 DROP(S): 1.5 SOLUTION/ DROPS OPHTHALMIC at 20:00

## 2024-12-05 RX ADMIN — DORZOLAMIDE HYDROCHLORIDE 1 DROP(S): 20 SOLUTION OPHTHALMIC at 20:00

## 2024-12-05 NOTE — ED ADULT TRIAGE NOTE - CHIEF COMPLAINT QUOTE
pt c/o left eye pain with worsening vision started tuesday, mild difficulty breathing x 3 days. hx: ESRD on HD m-w-f, HTN

## 2024-12-05 NOTE — ED ADULT NURSE REASSESSMENT NOTE - NS ED NURSE REASSESS COMMENT FT1
report received from SAMMI Berry. pt remains at baseline mental status A&OX4, RR even unlabored completing full sentences. pt resting in stretcher comfortably at this time, no new complaints offered. pt presents as tansfer from [facility] for optho consultation due to L eye vision blurriness x2wks with acute onset of L eye lost vision x2days. pt to be given eye drops ordered q3min per providers orders. float SAMMI Bello at bedside assisting in care of eye drops. stretcher lowest position siderails up safety measures in place.

## 2024-12-05 NOTE — CONSULT NOTE ADULT - ASSESSMENT
Assessment and Recommendations:  74y female with a past medical history/ocular history of HTN, kidney disease on dialysis, consulted for vision loss and elevated IOP OS, found to have cataracts OS >OD.     #Cataract OS >OD  - Patient describes new onset vision loss on Tuesday however very dense cataract would likely explain HM vision OS. However would not explain black out of vision OS for several hours  - IOP 15 on current exam.   - Per outside ED IOP was in 40s. Possible that patient has phacomorphic component or intermittent angle closure, however angle open on current exam.  - Ant exam with Deep AC (open to CB inferiorly and TM nasally and temporally OS), no cell or flare, no KP  - B-scan with no acute findings OS - no VH, RD or mass  - Please continue latanoprost nightly left eye  - Will schedule outpatient followup for cataract eval     #History of Migraines  #Transient vision loss OS  - patient endorses history of migraines with visual aura - may explain current headache and could also be associated with vision loss  - Recommend neuro evaluation for migraine management as well as complete stroke work up given episode of transient vision loss  - Unable to perform dilated fundus exam OS due to dense cataract as above. Therefore unknown if patient had retinal vascular event.     Seen and discussed with Dr. Qureshi, PGY-2    Outpatient Follow-up: Patient should follow-up with his/her ophthalmologist or with [facility] Department of Ophthalmology within 1 week of after discharge at:    [address]  [place] [address]  [phone]    Angela Montiel MD, PGY-4  Contact: [employer]     Assessment and Recommendations:  74y female with a past medical history/ocular history of HTN, kidney disease on dialysis, consulted for vision loss and elevated IOP OS, found to have cataracts OS >OD.     #Cataract OS >OD  - Patient describes new onset vision loss on Tuesday however very dense cataract would likely explain HM vision OS. However would not explain black out of vision OS for several hours  - IOP 15 on current exam.   - Per outside ED IOP was in 40s. Possible that patient has phacomorphic component or intermittent angle closure, however angle open on current exam.  - Ant exam with Deep AC (open to CB inferiorly and TM nasally and temporally OS), no cell or flare, no KP  - B-scan with no acute findings OS - no VH, RD or mass  - Please continue latanoprost nightly left eye  - Will schedule outpatient followup for cataract eval     #History of Migraines  #Transient vision loss OS  - patient endorses history of migraines with visual aura - may explain current headache and could also be associated with vision loss  - Recommend neuro evaluation for migraine management as well as complete stroke work up given episode of transient vision loss  - Unable to perform dilated fundus exam OS due to dense cataract as above. Therefore unknown if patient had retinal vascular event.   - Cardiovascular risk factors, including HTN, kidney disease     Seen and discussed with Dr. Qureshi, PGY-2    Outpatient Follow-up: Patient should follow-up with his/her ophthalmologist or with [facility] Department of Ophthalmology within 1 week of after discharge at:    [address]  [place] [address]  [phone]    Angela Montiel MD, PGY-4  Contact: [employer]

## 2024-12-05 NOTE — ED ADULT NURSE REASSESSMENT NOTE - NS ED NURSE REASSESS COMMENT FT1
per optho recs pt to be given eye drops ordered q30min. pt given eye drops to L eye at 2200. pt tolerated well. pt pending CTscan at this time per optho recs pt to be given latanoprost eye drops nightly. pt given eye drops to L eye at 2200. pt tolerated well. pt pending CTscan at this time. safety measures maintained throughout pt care

## 2024-12-05 NOTE — CONSULT NOTE ADULT - SUBJECTIVE AND OBJECTIVE BOX
[facility] DEPARTMENT OF OPHTHALMOLOGY - INITIAL ADULT CONSULT  -----------------------------------------------------------------------------  Angela Montiel MD, PGY-3  Contact: TEAMS  -----------------------------------------------------------------------------    HPI and interval history: Patient notes blurry vision for several weeks, and says when she woke up on Tuesday am she checked the vision in her left eye and it was worse than before. Endroses pain at the same time. Says vision OS was completely blacked out for several hours on Tuesday am and then started to return. CUrrently has blurry vision, improved from Tuesday, but worse than baseline. Also notes history of migraines with visual aura.     PMH: HTN, CKD on dialysis  POcHx: denies surg/laser  FH: denies glc/amd  Social History: denies etoh/tobacco  Ophthalmic Medications: none  Allergies: NKDA    Review of Systems:  Constitutional: No fever, chills  Eyes: No blurry vision, flashes, floaters, FBS, erythema, discharge, double vision, OU  Neuro: No tremors  Cardiovascular: No chest pain, palpitations  Respiratory: No SOB, no cough  GI: No nausea, vomiting, abdominal pain  : No dysuria  Skin: no rash  Psych: no depression  Endocrine: no polyuria, polydipsia  Heme/lymph: no swelling    VITALS: T(C): 36.9 (12-05-24 @ 19:26)  T(F): 98.4 (12-05-24 @ 19:26), Max: 98.7 (12-05-24 @ 13:58)  HR: 86 (12-05-24 @ 19:26) (84 - 90)  BP: 133/53 (12-05-24 @ 19:26) (133/53 - 162/82)  RR:  (18 - 20)  SpO2:  (94% - 96%)  Wt(kg): --  General: AAO x 3, appropriate mood and affect    Ophthalmology Exam:  Visual acuity (sc): 20/70 PHI 20/50 OD, HM OS  Pupils: PERRL OU, no RAPD  Ttono: 19 OD 15 OS  Extraocular movements (EOMs): Full OU, no pain, no diplopia  Confrontational Visual Field (CVF): Full OD, unable OS  Color Plates: 6/12 OD, unable OS    Pen Light Exam (PLE)  External: Flat OU  Lids/Lashes/Lacrimal Ducts: Flat OU    Sclera/Conjunctiva: W+Q OU  Cornea: irregular surface OU, no KP  Anterior Chamber: D+F OU. No cell or flare OU.    Iris: Flat OU  Lens: OD with 2+ cortical, 2+ NS, 2+ PSC, OS white cataract    Fundus Exam: dilated with 1% tropicamide and 2.5% phenylephrine  Approval obtained from primary team for dilation  Patient aware that pupils can remained dilated for at least 4-6 hours  Exam performed with 20D lens    Vitreous: wnl OU  Disc, cup/disc: sharp and pink, 0.3 OD. OS no view  Macula: Flat OU  Vessels: Normal caliber OU  Periphery: flat OU    B scan: vit debris OU, no VH, no RD, no Mass.

## 2024-12-05 NOTE — ED PROVIDER NOTE - ATTENDING CONTRIBUTION TO CARE
I have personally performed a face to face medical and diagnostic evaluation of the patient. I have discussed with and reviewed the Resident's note and agree with the History, ROS, Physical Exam and MDM unless otherwise indicated. A brief summary of my personal evaluation and impression can be found below.     74-year-old female with a past medical history of hypertension, ESRD on dialysis (M,W,F), PE status post IVC filter, obesity, LARRY presenting with blurry vision. Patient states she has had 2 weeks of blurry vision in her left eye but the last 2 days acutely worsened. Patient also complaining of pain to her left eye. Patient states she is only been able to see colors out of her left eye for the last 2 days. Patient has associated shortness of breath with exertion that is currently being worked up by her cardiologist without an unknown etiology. Patient states she had full dialysis session yesterday. Denies fevers, chest pain, abdominal pain, nausea, vomiting, pain with eye movement.    General: Appears well and nontoxic  Mentation: AAO x 3  psych: mood appropriate  HEENT: airway patent, conjunctivae clear bilaterally, EOM intact, VANE, left pupil appears to have cataract, Right eye 20/40, Left eye no visual acuity with IOP of 40.   Resp: symmetric chest rise, no resp distress, breath sounds CTA bilaterally  Cardio: RRR, no m/r/g  GI: soft/nondistended/nontender  Neuro: sensation and motor function grossly intact  Skin: no cyanosis, no jaundice  MSK: normal movement of all extremities    Concern for acute angle closure glaucoma. Will obtain ophthalmology consult and probable transfer. Will begin SOB work up in [facility] ED but given chronicity of SOB symptoms and acuity of vision loss, will prioritize lowering IOP prior to transfer and have [facility] further evaluate SOB symptoms after transfer.

## 2024-12-05 NOTE — ED PROVIDER NOTE - PHYSICAL EXAMINATION
General: WN/WD NAD  Head: Normocephalic Atraumatic  Eyes: left eye IOP 30s-40s, visual acuity right eye 20/40, left eye: pt unable to see. EOMI, PERRLA  ENT: moist mucous membranes, neck supple   CV: Extremities warm and well perfused  Abdominal: Soft, non-distended  Respiratory: normal respiratory effort, CTAB  Neuro: A&Ox3  Extremities: moves all extremities without difficulty, no visible deformities  Skin: No visible rashes

## 2024-12-05 NOTE — ED ADULT NURSE NOTE - NSFALLRISKINTERV_ED_ALL_ED

## 2024-12-05 NOTE — ED ADULT NURSE NOTE - NSFALLRISKINTERV_ED_ALL_ED

## 2024-12-05 NOTE — ED PROVIDER NOTE - ATTENDING CONTRIBUTION TO CARE
74-year-old female ESRD on HD Monday Wednesday Friday, PE s/p IVC filter in 2012, LARRY, presents for evaluation 2 weeks of blurry vision with vision loss for last 2 days in the left eye.  Patient complains of pain to the left eye.  Denies fevers chills trauma. Originally presented to outside hospital and transferred to [facility] for optho evaluation for concern for acute closed angle glaucoma. IOP 40 at [facility], now 13 after receiving drops. Only sees white light from the left eye at this time.

## 2024-12-05 NOTE — ED ADULT TRIAGE NOTE - CHIEF COMPLAINT QUOTE
Pt transfer from [facility] for opthalmology consult for elevated pressures in the L eye. Pt c/o blurry vision x 2 weeks with acute vision loss 2 days ago. Also c/o eye pain. Hx ESRD last dialysis yesterday Pt transfer from [facility] for opthalmology consult for elevated pressures in the L eye. Pt c/o blurry vision x 2 weeks with acute vision loss 2 days ago. Also c/o eye pain. Receiving eye drops q 3 minutes. Last drop 1929. Hx ESRD last dialysis yesterday

## 2024-12-05 NOTE — ED ADULT NURSE NOTE - CHIEF COMPLAINT QUOTE
Pt transfer from [facility] for opthalmology consult for elevated pressures in the L eye. Pt c/o blurry vision x 2 weeks with acute vision loss 2 days ago. Also c/o eye pain. Receiving eye drops q 3 minutes. Last drop 1929. Hx ESRD last dialysis yesterday

## 2024-12-05 NOTE — ED ADULT NURSE NOTE - OBJECTIVE STATEMENT
as per patient " for the past 2 days headache, left eye blurred vision. Denies head injury. Noted right anterior chest wall dialysis port, (M/W/F) completed on Wednesday ]

## 2024-12-05 NOTE — ED ADULT NURSE NOTE - NSFALLRISKASMTTYPE_ED_ALL_ED
Plan: Recommend SPF 30+ broad spectrum, reapply every 2 hours or after sweating/swimming
Detail Level: Zone
Initial (On Arrival)

## 2024-12-05 NOTE — ED PROVIDER NOTE - CLINICAL SUMMARY MEDICAL DECISION MAKING FREE TEXT BOX
74-year-old female ESRD on HD Monday Wednesday Friday, PE s/p IVC filter in 2012, LARRY, presents for evaluation 2 weeks of blurry vision with vision loss for last 2 days in the left eye.  Patient complains of pain to the left eye.  Denies fevers chills trauma. Originally presented to outside hospital and transferred to [facility] for optho evaluation for concern for acute closed angle glaucoma. IOP 40 at [facility], now 13 after receiving drops. Only sees white light from the left eye at this time.    Cannot participate in visual acuity testing with L eye.    Differential includes acute angle closed glaucoma vs cataract, will continue drops, obtain opthalmology consult for recs, likely admit as patient has vision loss and not a safe discharge

## 2024-12-05 NOTE — ED ADULT NURSE NOTE - OBJECTIVE STATEMENT
Elver RN: Received pt to room 18. Pt is a 73 y/o Female, A&Ox4, ambulatory with a walker, with a PHx of ESRD on dialysis (M/W/F) with right chest wall port. Pt presents to ED as transfer from [facility] for opthalmology consult for elevated pressures in left eye. Pt c/o blurry vision in left eye x 2weeks and acute vision loss 2 days ago. Pt also endorses severe pain in left eye and left side of face. Pt receiving q3 minute eye drops. Eye drops to continue to be administered as per Dr. Naidu. Dr. Naidu at bedside for eval. Pt states she can only see white color only out of left eye. Neuro/sensory otherwise intact. Respirations even and unlabored, chest rise equal b/l. Pt denies chest pain, SOB, fever, cough, chills, abdominal pain, N/V/D, dizziness, numbness/tingling or any urinary symptoms at this time. No acute distress noted. Safety maintained throughout.

## 2024-12-05 NOTE — ED PROVIDER NOTE - CLINICAL SUMMARY MEDICAL DECISION MAKING FREE TEXT BOX
74-year-old female ESRD on HD Monday Wednesday Friday, PE s/p IVC filter in 2012, morbidly obese with BMI 45, LARRY, presents for evaluation 2 weeks of blurry vision with vision loss for last 2 days in the left eye.  Patient complains of pain to the left eye.  Denies fevers chills trauma.  Also notes shortness of breath with exertion, states she has been worked up by her cardiologist for this in the past and does not know the underlying etiology.  Does get some fluid off with dialysis but states that shortness of breath does not change after dialysis.  Denies chest pain.    Discussed with ophthalmology, accepts patient for transfer.  Current visual acuity nonexistent in left eye, right eye 20/40.  Pupils are equal and reactive bilaterally and extraocular movements are intact and nonpainful.  Obtaining IOP's very difficult given patient inability to tolerate IOP even with tetracaine, current IOP range in the 30s to 40s with couple of readings in the teens.  Given this ophthalmology recommends pressure lowering drops at this time, will order now.  Transfer arranged to be evaluated by ophthalmology at [facility] given concerns for angle-closure glaucoma versus retinal detachment versus severe cataract causing vision loss.      Will obtain preoperative labs and troponin BNP chest x-ray to evaluate for evidence of fluid overload.  patient's dyspnea on exertion is and acute on chronic problem, has been ongoing for months with prior workups.

## 2024-12-05 NOTE — ED PROVIDER NOTE - PROGRESS NOTE DETAILS
Ernesto PGY3: CT scan negative for infarct or hemorrhage. Neurology consulted and will come evaluate patient for TIA work up

## 2024-12-05 NOTE — ED ADULT NURSE REASSESSMENT NOTE - NS ED NURSE REASSESS COMMENT FT1
BENITA CHAPA. Report received from SAMMI Richards. Patient A&Ox4, resting in stretcher, respirations even and unlabored. Patient receiving eye drops every 3 minutes as per orders by MD Naidu until optho team assesses patient. Patient at this time endorsing headache however refused Tylenol. IV line intact and patent. Patient pending optho.

## 2024-12-06 DIAGNOSIS — I10 ESSENTIAL (PRIMARY) HYPERTENSION: ICD-10-CM

## 2024-12-06 DIAGNOSIS — G43.909 MIGRAINE, UNSPECIFIED, NOT INTRACTABLE, WITHOUT STATUS MIGRAINOSUS: ICD-10-CM

## 2024-12-06 DIAGNOSIS — H54.7 UNSPECIFIED VISUAL LOSS: ICD-10-CM

## 2024-12-06 DIAGNOSIS — N18.6 END STAGE RENAL DISEASE: ICD-10-CM

## 2024-12-06 DIAGNOSIS — H54.62 UNQUALIFIED VISUAL LOSS, LEFT EYE, NORMAL VISION RIGHT EYE: ICD-10-CM

## 2024-12-06 LAB
ALT FLD-CCNC: 25 U/L — SIGNIFICANT CHANGE UP (ref 4–33)
DIALYSIS INSTRUMENT RESULT - HEPATITIS B SURFACE ANTIGEN: NEGATIVE — SIGNIFICANT CHANGE UP

## 2024-12-06 PROCEDURE — 99223 1ST HOSP IP/OBS HIGH 75: CPT

## 2024-12-06 PROCEDURE — 70498 CT ANGIOGRAPHY NECK: CPT | Mod: 26,MC

## 2024-12-06 PROCEDURE — 71045 X-RAY EXAM CHEST 1 VIEW: CPT | Mod: 26

## 2024-12-06 PROCEDURE — 70496 CT ANGIOGRAPHY HEAD: CPT | Mod: 26,MC

## 2024-12-06 RX ORDER — SEVELAMER CARBONATE 800 MG/1
800 TABLET, FILM COATED ORAL THREE TIMES A DAY
Refills: 0 | Status: DISCONTINUED | OUTPATIENT
Start: 2024-12-06 | End: 2024-12-06

## 2024-12-06 RX ORDER — HEPARIN SODIUM,PORCINE 1000/ML
1500 VIAL (ML) INJECTION ONCE
Refills: 0 | Status: COMPLETED | OUTPATIENT
Start: 2024-12-06 | End: 2024-12-06

## 2024-12-06 RX ORDER — CALCITRIOL 0.5 UG/1
0.5 CAPSULE, LIQUID FILLED ORAL DAILY
Refills: 0 | Status: DISCONTINUED | OUTPATIENT
Start: 2024-12-06 | End: 2024-12-06

## 2024-12-06 RX ORDER — CALCITRIOL 0.5 UG/1
0.25 CAPSULE, LIQUID FILLED ORAL
Refills: 0 | Status: DISCONTINUED | OUTPATIENT
Start: 2024-12-06 | End: 2024-12-13

## 2024-12-06 RX ORDER — METOPROLOL TARTRATE 100 MG/1
1 TABLET, FILM COATED ORAL
Refills: 0 | DISCHARGE

## 2024-12-06 RX ORDER — ALBUTEROL 90 MCG
2 AEROSOL (GRAM) INHALATION EVERY 6 HOURS
Refills: 0 | Status: DISCONTINUED | OUTPATIENT
Start: 2024-12-06 | End: 2024-12-06

## 2024-12-06 RX ORDER — FLUTICASONE PROPIONATE AND SALMETEROL XINAFOATE 45; 21 UG/1; UG/1
1 AEROSOL, METERED RESPIRATORY (INHALATION)
Refills: 0 | Status: DISCONTINUED | OUTPATIENT
Start: 2024-12-06 | End: 2024-12-13

## 2024-12-06 RX ORDER — HEPARIN SODIUM,PORCINE 1000/ML
1000 VIAL (ML) INJECTION
Refills: 0 | Status: COMPLETED | OUTPATIENT
Start: 2024-12-06 | End: 2024-12-06

## 2024-12-06 RX ORDER — IPRATROPIUM BROMIDE AND ALBUTEROL SULFATE 2.5; .5 MG/3ML; MG/3ML
3 SOLUTION RESPIRATORY (INHALATION) EVERY 6 HOURS
Refills: 0 | Status: DISCONTINUED | OUTPATIENT
Start: 2024-12-06 | End: 2024-12-13

## 2024-12-06 RX ORDER — KETOROLAC TROMETHAMINE 30 MG/ML
30 INJECTION INTRAMUSCULAR; INTRAVENOUS EVERY 8 HOURS
Refills: 0 | Status: DISCONTINUED | OUTPATIENT
Start: 2024-12-06 | End: 2024-12-06

## 2024-12-06 RX ORDER — METHYLPREDNISOLONE SOD SUCC 125 MG
20 VIAL (EA) INJECTION EVERY 8 HOURS
Refills: 0 | Status: COMPLETED | OUTPATIENT
Start: 2024-12-06 | End: 2024-12-08

## 2024-12-06 RX ORDER — HEPARIN SODIUM,PORCINE 1000/ML
7500 VIAL (ML) INJECTION THREE TIMES A DAY
Refills: 0 | Status: DISCONTINUED | OUTPATIENT
Start: 2024-12-06 | End: 2024-12-13

## 2024-12-06 RX ORDER — GLUCOSAMINE SULFATE DIPOT CHLR 500 MG
1 CAPSULE ORAL DAILY
Refills: 0 | Status: DISCONTINUED | OUTPATIENT
Start: 2024-12-06 | End: 2024-12-13

## 2024-12-06 RX ORDER — SODIUM CHLORIDE 9 MG/ML
100 INJECTION, SOLUTION INTRAMUSCULAR; INTRAVENOUS; SUBCUTANEOUS
Refills: 0 | Status: DISCONTINUED | OUTPATIENT
Start: 2024-12-06 | End: 2024-12-13

## 2024-12-06 RX ORDER — METOPROLOL TARTRATE 100 MG/1
50 TABLET, FILM COATED ORAL DAILY
Refills: 0 | Status: DISCONTINUED | OUTPATIENT
Start: 2024-12-06 | End: 2024-12-13

## 2024-12-06 RX ORDER — CHOLECALCIFEROL (VITAMIN D3) 10MCG/0.25
1000 DROPS ORAL DAILY
Refills: 0 | Status: DISCONTINUED | OUTPATIENT
Start: 2024-12-06 | End: 2024-12-13

## 2024-12-06 RX ORDER — METOCLOPRAMIDE HYDROCHLORIDE 10 MG/1
10 TABLET ORAL EVERY 8 HOURS
Refills: 0 | Status: DISCONTINUED | OUTPATIENT
Start: 2024-12-06 | End: 2024-12-13

## 2024-12-06 RX ORDER — SEVELAMER CARBONATE 800 MG/1
800 TABLET, FILM COATED ORAL
Refills: 0 | Status: DISCONTINUED | OUTPATIENT
Start: 2024-12-06 | End: 2024-12-13

## 2024-12-06 RX ORDER — METOCLOPRAMIDE HYDROCHLORIDE 10 MG/1
10 TABLET ORAL ONCE
Refills: 0 | Status: DISCONTINUED | OUTPATIENT
Start: 2024-12-06 | End: 2024-12-13

## 2024-12-06 RX ORDER — ACETAMINOPHEN 500MG 500 MG/1
1000 TABLET, COATED ORAL ONCE
Refills: 0 | Status: DISCONTINUED | OUTPATIENT
Start: 2024-12-06 | End: 2024-12-13

## 2024-12-06 RX ORDER — CHLORHEXIDINE GLUCONATE 1.2 MG/ML
1 RINSE ORAL DAILY
Refills: 0 | Status: DISCONTINUED | OUTPATIENT
Start: 2024-12-06 | End: 2024-12-13

## 2024-12-06 RX ORDER — TRAMADOL HYDROCHLORIDE 300 MG/1
50 CAPSULE ORAL EVERY 12 HOURS
Refills: 0 | Status: DISCONTINUED | OUTPATIENT
Start: 2024-12-06 | End: 2024-12-12

## 2024-12-06 RX ORDER — DIPHENHYDRAMINE HCL 25 MG
25 CAPSULE ORAL EVERY 8 HOURS
Refills: 0 | Status: DISCONTINUED | OUTPATIENT
Start: 2024-12-06 | End: 2024-12-13

## 2024-12-06 RX ADMIN — SEVELAMER CARBONATE 800 MILLIGRAM(S): 800 TABLET, FILM COATED ORAL at 14:24

## 2024-12-06 RX ADMIN — Medication 1000 UNIT(S): at 14:24

## 2024-12-06 RX ADMIN — Medication 20 MILLIGRAM(S): at 14:24

## 2024-12-06 RX ADMIN — Medication 1000 UNIT(S): at 17:26

## 2024-12-06 RX ADMIN — TRAMADOL HYDROCHLORIDE 50 MILLIGRAM(S): 300 CAPSULE ORAL at 21:48

## 2024-12-06 RX ADMIN — FLUTICASONE PROPIONATE AND SALMETEROL XINAFOATE 1 DOSE(S): 45; 21 AEROSOL, METERED RESPIRATORY (INHALATION) at 21:53

## 2024-12-06 RX ADMIN — Medication 20 MILLIGRAM(S): at 21:07

## 2024-12-06 RX ADMIN — TRAMADOL HYDROCHLORIDE 50 MILLIGRAM(S): 300 CAPSULE ORAL at 08:37

## 2024-12-06 RX ADMIN — Medication 1 MILLIGRAM(S): at 14:24

## 2024-12-06 RX ADMIN — TRAMADOL HYDROCHLORIDE 50 MILLIGRAM(S): 300 CAPSULE ORAL at 21:05

## 2024-12-06 RX ADMIN — LATANOPROST 1 DROP(S): 50 SOLUTION/ DROPS OPHTHALMIC at 21:53

## 2024-12-06 RX ADMIN — Medication 1500 UNIT(S): at 16:10

## 2024-12-06 RX ADMIN — Medication 81 MILLIGRAM(S): at 14:24

## 2024-12-06 RX ADMIN — Medication 7500 UNIT(S): at 21:08

## 2024-12-06 RX ADMIN — CHLORHEXIDINE GLUCONATE 1 APPLICATION(S): 1.2 RINSE ORAL at 14:47

## 2024-12-06 RX ADMIN — IPRATROPIUM BROMIDE AND ALBUTEROL SULFATE 3 MILLILITER(S): 2.5; .5 SOLUTION RESPIRATORY (INHALATION) at 20:29

## 2024-12-06 RX ADMIN — Medication 4 MILLIGRAM(S): at 01:19

## 2024-12-06 RX ADMIN — TRAMADOL HYDROCHLORIDE 50 MILLIGRAM(S): 300 CAPSULE ORAL at 09:30

## 2024-12-06 RX ADMIN — Medication 1000 UNIT(S): at 18:28

## 2024-12-06 RX ADMIN — Medication 1000 UNIT(S): at 19:20

## 2024-12-06 RX ADMIN — Medication 7500 UNIT(S): at 14:24

## 2024-12-06 NOTE — H&P ADULT - PROBLEM SELECTOR PLAN 2
- reviewed neurology consult   - will order prn Reglan, Benadryl, Toradol as per neuro recs   - repeat CT head in 24 hours  - will need open MRI as outpt  - starting ASA 81

## 2024-12-06 NOTE — CONSULT NOTE ADULT - SUBJECTIVE AND OBJECTIVE BOX
Neurology - Consult Note    -  Spectra: [phone] ([facility]), [phone] ([facility])  -    HPI: Patient MELISSA ARREOLA is a 74y (1950) woman with a PMHx significant for Morbid obesity, hypertension, migraines, CKD on dialysis who presented to the ED for worsening left eye vision loss.  Patient stated that she woke up on Tuesday with a severe headache behind the left eye and unable to see out of her left eye.  At that time she was not able to perceive light and felt that her left eye was completely black for a few hours and then started to return.   Patient since then has had persistent severe blurry vision in the left eye alongside headache.  Patient unable to describe headache aside from severe pain.   In addition to her headache patient also described nausea, sensitivity to light and sound. In addition, patient stated in the last 2 weeks she suffered from electric shocks down her left arm intermittently alongside neck discomfort.  I per outpatient ED IOP was in the 40s which transferred to ophthalmology service for further evaluation during which exam noted normal IOP after medication administered, severe large cataract in the left greater than right eye.  As per ophthalmology unable to visualize  back of the eye.    Patient otherwise denies new weakness, speech changes, facial droop.   denies drug or alcohol use and is a non-smoker.  Is not on aspirin or anticoagulation.      At baseline she uses a walker however is considering wheelchair.     She currently rates her headache as a 9 out of 10.    Regards to prior headaches patient stated that she used to have severe headaches many years ago requiring Botox and multiple medications which she could not name at this time.  She used to follow with a neurologist in [place] but has not seen for many years.  Patient said she used to have similar headaches behind her eye however has never suffered from vision loss with headaches.    Patient stated that she is unable to get inpatient MRI due to body habitus as well as severe claustrophobia.    NIHSS: 1  Baseline mRS: 2  Not a tenecteplase candidate due to presentation outside the window.   Not a candidate for thrombectomy due to no LVO on imaging.     Review of Systems:     CONSTITUTIONAL: No fevers or chills  EYES AND ENT: No visual changes or no throat pain   NECK: No pain or stiffness  RESPIRATORY: No hemoptysis or shortness of breath  CARDIOVASCULAR: No chest pain or palpitations  GASTROINTESTINAL: No melena or hematochezia  GENITOURINARY: No dysuria or hematuria  NEUROLOGICAL: +As stated in HPI above  SKIN: No itching, burning, rashes, or lesions   All other review of systems is negative unless indicated above.    Allergies:  Sulfur (Unknown)  trimethoprim (Other; Rash)  sulfa drugs (Other; Rash)      PMHx/PSHx/Family Hx: As above, otherwise see below   Osteoarthritis    Pulmonary embolism    Joint infection    Dysfunctional uterine bleeding    Obesity    Essential hypertension    Obstructive sleep apnea syndrome    History of pulmonary embolism    Arthropathy    Migraine    Morbid obesity    ESRD (end stage renal disease)        Social Hx:  No reported use of tobacco, alcohol, or illicit drugs    Medications:  MEDICATIONS  (STANDING):  acetaminophen   IVPB .. 1000 milliGRAM(s) IV Intermittent once  aspirin  chewable 81 milliGRAM(s) Oral daily  latanoprost 0.005% Ophthalmic Solution 1 Drop(s) Left EYE at bedtime  metoclopramide Injectable 10 milliGRAM(s) IV Push once    MEDICATIONS  (PRN):        Home Medications:  cholecalciferol oral tablet: 1000 unit(s) orally once a day (05 Dec 2024 15:07)  folic acid 1 mg oral tablet: 1 tab(s) orally once a day (05 Dec 2024 15:07)  sevelamer carbonate 800 mg oral tablet: 1 tab(s) orally 3 times a day (06 Jun 2024 14:49)      Vitals:  T(C): 36.6 (12-06-24 @ 03:20), Max: 37.1 (12-05-24 @ 13:58)  HR: 93 (12-06-24 @ 03:20) (77 - 93)  BP: 143/79 (12-06-24 @ 03:20) (102/57 - 162/82)  RR: 15 (12-06-24 @ 03:20) (15 - 20)  SpO2: 100% (12-06-24 @ 03:20) (94% - 100%)    Physical Examination:    General - NAD  Cardiovascular - Peripheral pulses palpable, no edema    Neurologic Exam:  Mental status - Awake, Alert, Oriented to person, place, and time. Speech fluent, repetition and naming intact. Follows simple and complex commands. attention, concentration and fund of knowledge intact.     Cranial nerves:  CN II: Visual fields are full to confrontation. Pupils are 3 mm and briskly reactive to light.   CN III, IV, VI: EOMI, no nystagmus, no ptosis  CN V: Facial sensation with Left side decreased sensation compared to right  CN VII: Face is symmetric with normal eye closure and smile.  CN VII: Hearing is normal to rubbing fingers  CN IX, X: Palate elevates symmetrically. Phonation is normal.  CN XI: Head turning and shoulder shrug are intact  CN XII: Tongue is midline with normal movements and no atrophy.    Motor - Normal bulk and tone throughout. No pronator drift.  Strength testing  limited due to habitus             Deltoid      Biceps      Triceps     Wrist Extension    Wrist Flexion       R            5                 5               5                     5                              5                        5  L             5                 5               5                     5                              5                       5              Hip Flexion    Hip Extension    Knee Flexion    Knee Extension    Dorsiflexion    Plantar Flexion  R              5                           5                       5                           5                            5                          5  L              5                           5                        5                           5                            5                          5    neg hoffmans b/l    Sensation - Light touch/temperature intact throughout    DTR's - 1+ throughout  no clonus   flexor planter bl    Coordination - Finger to Nose. heal to shin unable to perform d/t habitus. No tremors appreciated    Gait and station - not assessed     Labs:                        11.4   7.61  )-----------( 220      ( 05 Dec 2024 17:39 )             36.0     12-05    140  |  103  |  42[H]  ----------------------------<  95  5.0   |  31  |  5.54[H]    Ca    9.0      05 Dec 2024 17:39    TPro  8.0  /  Alb  3.1[L]  /  TBili  0.3  /  DBili  x   /  AST  30  /  ALT  42  /  AlkPhos  167[H]  12-05    CAPILLARY BLOOD GLUCOSE        LIVER FUNCTIONS - ( 05 Dec 2024 17:39 )  Alb: 3.1 g/dL / Pro: 8.0 gm/dL / ALK PHOS: 167 U/L / ALT: 42 U/L / AST: 30 U/L / GGT: x             PT/INR - ( 05 Dec 2024 17:39 )   PT: 11.5 sec;   INR: 0.99 ratio         PTT - ( 05 Dec 2024 17:39 )  PTT:29.4 sec          Radiology:    < from: CT Angio Neck w/ IV Cont (12.06.24 @ 02:15) >      IMPRESSION:    CT HEAD:  No acute intracranial hemorrhage, mass effect, or midline shift.    CTA NECK:  No evidence of significant stenosis or occlusion.    CTA HEAD:  No large vessel occlusion, significant stenosis or vascular abnormality   identified.    < end of copied text >

## 2024-12-06 NOTE — H&P ADULT - HISTORY OF PRESENT ILLNESS
73 y/o  F with pmh of ESRD on HD (MWF), PE (in 2013 after TKR s/p Xarelto), LARRY (not on CPAP), migraines, HTN p/w L eye vision loss and headache.  Pt reports headache behind L eye (typical of prior migraines) starting Tuesday morning.  She also had total loss of vision in L eye described as vision going black.  Since then, she has been able to perceive light in L eye, but only able to see white without being able to make out objects.  She has had nausea without vomiting, and has had photophobia.  No weakness, numbness, slurred speech.  No fever, chills, chest pain, or palpitations.  Pt's headache is similar to prior migraines, but she has never had vision loss.  She also reports intermittent "electric shock" sensation in L arm along with some neck pain for the past 2 weeks.      Pt initially presented to [place] ED yesterday AM.  She was evaluated by ophthalmology who found elevated IOP, recommended drops, and transferred pt to [facility].  In [facility] ED,  IOP was normal on opthalmology eval.  She was given morphine, Tylenol, with improvement in headache, and dorzolamide, Xalatan, and timolol drops.

## 2024-12-06 NOTE — H&P ADULT - ASSESSMENT
73 y/o  F with pmh of ESRD on HD (MWF), PE (in 2013 after TKR s/p Xarelto), LARRY (not on CPAP), migraines, HTN p/w L eye vision loss and headache.

## 2024-12-06 NOTE — H&P ADULT - NSHPLABSRESULTS_GEN_ALL_CORE
11.4   7.61  )-----------( 220      ( 05 Dec 2024 17:39 )             36.0     140  |  103  |  42[H]  ----------------------------<  95     12-05  5.0   |  31  |  5.54[H]    Ca    9.0      05 Dec 2024 17:39    TPro  8.0  /  Alb  3.1[L]  /  TBili  0.3  /  DBili  x   /  AST  30  /  ALT  42  /  AlkPhos  167[H]  12-05    PT/INR: 11.5/0.99 (12-05-24 @ 17:39)  PTT: 29.4 (12-05-24 @ 17:39)                  Pro-BNP: 1156 (12-05-24 @ 17:39)    < from: CT Angio Head w/ IV Cont (12.06.24 @ 02:17) >    CT HEAD:  No acute intracranial hemorrhage, mass effect, or midline shift.    CTA NECK:  No evidence of significant stenosis or occlusion.    CTA HEAD:  No large vessel occlusion, significant stenosis or vascular abnormality   identified.    < end of copied text >

## 2024-12-06 NOTE — CONSULT NOTE ADULT - SUBJECTIVE AND OBJECTIVE BOX
CARDIOLOGY CONSULT NOTE - DR. CHAN        Date of Service: 12-06-24 @ 14:05      HPI:  73 y/o  F with pmh of ESRD on HD (MWF), PE (in 2013 after TKR s/p Xarelto), LARRY (not on CPAP), migraines, HTN p/w L eye vision loss and headache.  Pt reports headache behind L eye (typical of prior migraines) starting Tuesday morning.  She also had total loss of vision in L eye described as vision going black.  Since then, she has been able to perceive light in L eye, but only able to see white without being able to make out objects.  She has had nausea without vomiting, and has had photophobia.  No weakness, numbness, slurred speech.  No fever, chills, chest pain, or palpitations.  Pt's headache is similar to prior migraines, but she has never had vision loss.  She also reports intermittent "electric shock" sensation in L arm along with some neck pain for the past 2 weeks.      Pt initially presented to [place] ED yesterday AM.  She was evaluated by ophthalmology who found elevated IOP, recommended drops, and transferred pt to [facility].  In [facility] ED,  IOP was normal on opthalmology eval.  She was given morphine, Tylenol, with improvement in headache, and dorzolamide, Xalatan, and timolol drops.    (06 Dec 2024 07:04)    c/o left eye pain, dec vision  no cp, sob     PAST MEDICAL & SURGICAL HISTORY:  Osteoarthritis      Pulmonary embolism      Joint infection      Dysfunctional uterine bleeding      Obesity      Essential hypertension  Hypertension      Obstructive sleep apnea syndrome  Obstructive sleep apnea      History of pulmonary embolism  2012 s/p IVC filter      Arthropathy  Arthritis      Migraine  Migraines      Morbid obesity      ESRD (end stage renal disease)      Total knee replacement status      Status post hysterectomy      Status post cholecystectomy      Other acquired absence of organ  History of cholecystectomy      Status post total hysterectomy  partial      History of total knee replacement  with revisions x 3            PREVIOUS DIAGNOSTIC TESTING:    [ ] Echocardiogram:  [ ]  Catheterization:  [ ] Stress Test:  	    MEDICATIONS:    Home Medications:  cholecalciferol oral tablet: 1000 unit(s) orally once a day (06 Dec 2024 07:55)  folic acid 1 mg oral tablet: 1 tab(s) orally once a day (06 Dec 2024 07:55)  Metoprolol Succinate ER 50 mg oral tablet, extended release: 1 tab(s) orally once a day (06 Dec 2024 07:55)  sevelamer carbonate 800 mg oral tablet: 1 tab(s) orally 3 times a day (06 Dec 2024 07:55)      MEDICATIONS  (STANDING):  acetaminophen   IVPB .. 1000 milliGRAM(s) IV Intermittent once  albuterol/ipratropium for Nebulization 3 milliLiter(s) Nebulizer every 6 hours  aspirin  chewable 81 milliGRAM(s) Oral daily  calcitriol   Capsule 0.25 MICROGram(s) Oral <User Schedule>  chlorhexidine 2% Cloths 1 Application(s) Topical daily  cholecalciferol 1000 Unit(s) Oral daily  fluticasone propionate/ salmeterol 250-50 MICROgram(s) Diskus 1 Dose(s) Inhalation two times a day  folic acid 1 milliGRAM(s) Oral daily  heparin   Injectable 7500 Unit(s) SubCutaneous three times a day  heparin   Injectable. 1500 Unit(s) Dialysis. once  heparin   Injectable. 1000 Unit(s) Dialysis. every 1 hour  latanoprost 0.005% Ophthalmic Solution 1 Drop(s) Left EYE at bedtime  methylPREDNISolone sodium succinate Injectable 20 milliGRAM(s) IV Push every 8 hours  metoclopramide Injectable 10 milliGRAM(s) IV Push once  metoprolol succinate ER 50 milliGRAM(s) Oral daily  sevelamer carbonate 800 milliGRAM(s) Oral three times a day with meals      FAMILY HISTORY:  FH: type 2 diabetes mellitus    FHx: hypertension        SOCIAL HISTORY:    [x ] Non-smoker  [ ] Smoker  [ ] Alcohol    Allergies    Sulfur (Unknown)  trimethoprim (Other; Rash)  sulfa drugs (Other; Rash)    Intolerances    	    REVIEW OF SYSTEMS:  CONSTITUTIONAL: No fever, weight loss, or fatigue  EYES: No eye pain, visual disturbances, or discharge  ENMT:  No difficulty hearing, tinnitus, vertigo; No sinus or throat pain  NECK: No pain or stiffness  RESPIRATORY: No cough, wheezing, chills or hemoptysis; No Shortness of Breath  CARDIOVASCULAR: as HPI  GASTROINTESTINAL: No abdominal or epigastric pain. No nausea, vomiting, or hematemesis; No diarrhea or constipation. No melena or hematochezia.  GENITOURINARY: No dysuria, frequency, hematuria, or incontinence  NEUROLOGICAL: No headaches, memory loss, loss of strength, numbness, or tremors  SKIN: No itching, burning, rashes, or lesions   	  [ ] All others negative	  [ ] Unable to obtain    PHYSICAL EXAM:    T(C): 36.6 (12-06-24 @ 12:13), Max: 36.9 (12-05-24 @ 19:26)  HR: 85 (12-06-24 @ 12:13) (77 - 99)  BP: 160/89 (12-06-24 @ 12:13) (102/57 - 160/89)  RR: 18 (12-06-24 @ 12:13) (15 - 20)  SpO2: 95% (12-06-24 @ 12:13) (94% - 100%)  Wt(kg): --  I&O's Summary    Daily Height in cm: 162.56 (05 Dec 2024 19:26)    Daily     Appearance: Normal	  Psychiatry: A & O x 3, Mood & affect appropriate  HEENT:   Normal oral mucosa, PERRL, EOMI	  Lymphatic: No lymphadenopathy  Cardiovascular: Normal S1 S2,RRR, No JVD, No murmurs  Respiratory: Lungs clear to auscultation	  Gastrointestinal:  Soft, Non-tender, + BS	  Skin: No rashes, No ecchymoses, No cyanosis	  Neurologic: Non-focal  Extremities: Normal range of motion, No clubbing, cyanosis or edema  Vascular: Peripheral pulses palpable 2+ bilaterally    TELEMETRY: 	    ECG:  	sr no acute ischemic abnl   RADIOLOGY:  OTHER: 	  	  LABS:	 	    CARDIAC MARKERS:        proBNP:     Lipid Profile:   HgA1c:   TSH:                           11.4   7.61  )-----------( 220      ( 05 Dec 2024 17:39 )             36.0     12-05    140  |  103  |  42[H]  ----------------------------<  95  5.0   |  31  |  5.54[H]    Ca    9.0      05 Dec 2024 17:39    TPro  8.0  /  Alb  3.1[L]  /  TBili  0.3  /  DBili  x   /  AST  30  /  ALT  42  /  AlkPhos  167[H]  12-05    PT/INR - ( 05 Dec 2024 17:39 )   PT: 11.5 sec;   INR: 0.99 ratio         PTT - ( 05 Dec 2024 17:39 )  PTT:29.4 sec    Creatinine: 5.54 mg/dL (12-05-24 @ 17:39)        ASSESSMENT/PLAN:

## 2024-12-06 NOTE — PATIENT PROFILE ADULT - FALL HARM RISK - HARM RISK INTERVENTIONS

## 2024-12-06 NOTE — CONSULT NOTE ADULT - ASSESSMENT
75 y/o  F with pmh of ESRD on HD (MWF), PE (in 2013 after TKR s/p Xarelto), LARRY (not on CPAP), migraines, HTN p/w L eye vision loss and headache.  Pt reports headache behind L eye (typical of prior migraines) starting Tuesday morning.  She also had total loss of vision in L eye described as vision going black.  Since then, she has been able to perceive light in L eye, but only able to see white without being able to make out objects.  She has had nausea without vomiting, and has had photophobia.  No weakness, numbness, slurred speech.  No fever, chills, chest pain, or palpitations.  Pt's headache is similar to prior migraines, but she has never had vision loss.  She also reports intermittent "electric shock" sensation in L arm along with some neck pain for the past 2 weeks.    Pt initially presented to [place] ED yesterday AM.  She was evaluated by ophthalmology who found elevated IOP, recommended drops, and transferred pt to [facility].  In [facility] ED,  IOP was normal on opthalmology eval.  She was given morphine, Tylenol, with improvement in headache, and dorzolamide, Xalatan, and timolol drops.    (06 Dec 2024 07:04):  sheis feeling SOB:   she has no underlying lung disease:  she never smoked but her  exposed:  exposed to passive smoking:   she has seen pulm as an outpt and was given albuterol only to use:   to me she says she wheezes a lot especially when she walks; :  has no prior history of asthma   but have been wheezing for quiet some time       SOB/PINTO/WHEEZING  ESRD  HX OF PE  LARRY  HTN  LEFT EYE VISION LOSS    SOB/PINTO/WHEEZING  -She is obese:  she wheezes a lot:  she may have underlying asthma :  -she will need outpt PFT and will start rx for sob  : ? underlying copd:   -she has hx of passive smoking:   -she would need oupt PFT :   -will start her steroids  : jesus neurology  : no objection   ESRD  -n HD   HX OF PE  -she has hx of pe before after the knee surgery  :  she was treated fr it:   but she also has IVCF:   LARRY  -not using cpap  HTN  -controlled  LEFT EYE VISION LOSS  -opthalmology and neuro is following:     dw team

## 2024-12-06 NOTE — CONSULT NOTE ADULT - ATTENDING COMMENTS
74y (1950) woman with a PMHx significant for Morbid obesity, hypertension, migraines, CKD on dialysis who presented to the ED for worsening left eye vision loss. O/e left eye vision loss  CTA no LVO    Impression: left eye visual loss. while patient has some migranous features. It would not explain extent of pt's left monocular visual loss    continue with meds PRN HA  TTE   Aspirin 81 for now  outpt open MRI (can no tolerate here)

## 2024-12-06 NOTE — CONSULT NOTE ADULT - SUBJECTIVE AND OBJECTIVE BOX
[facility] NEPHROLOGY- CONSULTATION NOTE    74y Female with history of below presents with L eye loss of vision. Nephrology consulted for ESRD status.     Patient well known to our practice for h/o ESRD on HD MWF for which she follows with me at [facility]. Last HD on 12/4 via TDC. Patient with chronic dyspnea on exertion with negative cardiac work up. Patient awaiting pulmonary clearance prior to AVF creation.    REVIEW OF SYSTEMS:  Gen: no fevers  HEENT: no rhinorrhea, L eye vision loss  Neck: no sore throat  Cards: no chest pain  Resp: + dyspnea on exertion  GI: no nausea or vomiting or diarrhea  : no dysuria or hematuria  Vascular: no LE edema  Derm: no rashes  Neuro: no numbness/tingling    Sulfur (Unknown)  trimethoprim (Other; Rash)  sulfa drugs (Other; Rash)      Home Medications Reviewed  Hospital Medications:   MEDICATIONS  (STANDING):  acetaminophen   IVPB .. 1000 milliGRAM(s) IV Intermittent once  aspirin  chewable 81 milliGRAM(s) Oral daily  calcitriol   Capsule 0.5 MICROGram(s) Oral daily  cholecalciferol 1000 Unit(s) Oral daily  folic acid 1 milliGRAM(s) Oral daily  heparin   Injectable 7500 Unit(s) SubCutaneous three times a day  latanoprost 0.005% Ophthalmic Solution 1 Drop(s) Left EYE at bedtime  metoclopramide Injectable 10 milliGRAM(s) IV Push once  metoprolol succinate ER 50 milliGRAM(s) Oral daily  sevelamer carbonate 800 milliGRAM(s) Oral three times a day      PAST MEDICAL & SURGICAL HISTORY:  Osteoarthritis      Pulmonary embolism      Joint infection      Dysfunctional uterine bleeding      Obesity      Essential hypertension  Hypertension      Obstructive sleep apnea syndrome  Obstructive sleep apnea      History of pulmonary embolism  2012 s/p IVC filter      Arthropathy  Arthritis      Migraine  Migraines      Morbid obesity      ESRD (end stage renal disease)      Total knee replacement status      Status post hysterectomy      Status post cholecystectomy      Other acquired absence of organ  History of cholecystectomy      Status post total hysterectomy  partial      History of total knee replacement  with revisions x 3          FAMILY HISTORY:  FH: type 2 diabetes mellitus    FHx: hypertension        SOCIAL HISTORY:  Denies toxic substance use     VITALS:  T(F): 97.3 (12-06-24 @ 06:02), Max: 98.7 (12-05-24 @ 13:58)  HR: 99 (12-06-24 @ 06:02)  BP: 147/86 (12-06-24 @ 06:02)  RR: 16 (12-06-24 @ 06:02)  SpO2: 97% (12-06-24 @ 06:02)  Wt(kg): --    Height (cm): 162.6 (12-05 @ 19:26), 162.6 (12-05 @ 13:58)  Weight (kg): 117.9 (12-05 @ 13:58)  BMI (kg/m2): 44.6 (12-05 @ 19:26), 44.6 (12-05 @ 13:58)  BSA (m2): 2.19 (12-05 @ 19:26), 2.19 (12-05 @ 13:58)    PHYSICAL EXAM:  Gen: NAD, calm  HEENT: MMM  Neck: no JVD  Cards: RRR, +S1/S2, no M/G/R  Resp: CTA B/L  GI: soft, NT/ND, NABS  : no CVA tenderness  Vascular: no LE edema B/L, RIJ TDC  Derm: no rashes  Neuro: non-focal    LABS:  12-05    140  |  103  |  42[H]  ----------------------------<  95  5.0   |  31  |  5.54[H]    Ca    9.0      05 Dec 2024 17:39    TPro  8.0  /  Alb  3.1[L]  /  TBili  0.3  /  DBili      /  AST  30  /  ALT  42  /  AlkPhos  167[H]  12-05    Creatinine Trend: 5.54 <--                        11.4   7.61  )-----------( 220      ( 05 Dec 2024 17:39 )             36.0     Urine Studies:  Urinalysis Basic - ( 05 Dec 2024 17:39 )    Color:  / Appearance:  / SG:  / pH:   Gluc: 95 mg/dL / Ketone:   / Bili:  / Urobili:    Blood:  / Protein:  / Nitrite:    Leuk Esterase:  / RBC:  / WBC    Sq Epi:  / Non Sq Epi:  / Bacteria:           RADIOLOGY & ADDITIONAL STUDIES:    < from: CT Angio Head w/ IV Cont (12.06.24 @ 02:17) >  IMPRESSION:    CT HEAD:  No acute intracranial hemorrhage, mass effect, or midline shift.    CTA NECK:  No evidence of significant stenosis or occlusion.    CTA HEAD:  No large vessel occlusion, significant stenosis or vascular abnormality   identified.        --- End of Report ---    < end of copied text >

## 2024-12-06 NOTE — ED ADULT NURSE REASSESSMENT NOTE - PAIN RATING/NUMBER SCALE (0-10): ACTIVITY
0 (no pain/absence of nonverbal indicators of pain)
5 (moderate pain)
0 (no pain/absence of nonverbal indicators of pain)

## 2024-12-06 NOTE — ED ADULT NURSE REASSESSMENT NOTE - NS ED NURSE REASSESS COMMENT FT1
Break coverage RN. Patient A&Ox4, resting in stretcher, respirations even and unlabored. Vitals stable. Patient states improvement in condition. Offers no complaints at this time. Awaiting CT scan results. Stretcher in lowest position, wheels locked, appropriate side rails in place, call bell in reach.

## 2024-12-06 NOTE — PATIENT PROFILE ADULT - ...
Sustained a fall in 2/2019 when she was brushing her teeth in the bathroom and fell, hitting her head against the wall. EKG at that time with QTc of 470msec. She reports today another syncopal event while standing in her kitchen. She had received a steroid injection in her knee and her flu vaccine earlier that same day. We have reviewed the risk of Torsades in [place] with advancing age. 24 hour holter monitor results above (see atrial fibrillation)  Labs have remained stable  Lexiscan 11/2/21 negative for ischemia. S/P PPM insertion 2/22/22 for sick sinus syndrome. Off amiodarone since that time. No recurrent syncopal events. 06-Dec-2024 06:22:55

## 2024-12-06 NOTE — H&P ADULT - NSHPPHYSICALEXAM_GEN_ALL_CORE
Vital Signs Last 24 Hrs  T(C): 36.3 (06 Dec 2024 06:02), Max: 37.1 (05 Dec 2024 13:58)  T(F): 97.3 (06 Dec 2024 06:02), Max: 98.7 (05 Dec 2024 13:58)  HR: 99 (06 Dec 2024 06:02) (77 - 99)  BP: 147/86 (06 Dec 2024 06:02) (102/57 - 162/82)  BP(mean): --  RR: 16 (06 Dec 2024 06:02) (15 - 20)  SpO2: 97% (06 Dec 2024 06:02) (94% - 100%)    Parameters below as of 06 Dec 2024 06:02  Patient On (Oxygen Delivery Method): room air        PHYSICAL EXAM:  GENERAL: No Acute Distress  EYES: conjunctiva and sclera clear  ENMT: Moist mucous membranes   NECK: Supple  PULMONARY: Clear to auscultation bilaterally  CARDIAC: Regular rate and rhythm; No murmurs, rubs, or gallops  GASTROINTESTINAL: Abdomen soft, Nontender, Nondistended; Bowel sounds normal  EXTREMITIES:   No clubbing, cyanosis, or pedal edema  PSYCH: Normal Affect, Normal Behavior  NEUROLOGY:   - Mental status A&O x 3,  - Cranial Nerves: EOMI, JEWEL, face symmetrical  SKIN: No rashes or lesions  MUSCULOSKELETAL: No joint swelling

## 2024-12-06 NOTE — CONSULT NOTE ADULT - ASSESSMENT
74y Female with history of ESRD on HD presents with L eye loss of vision. Nephrology consulted for ESRD status.     1) ESRD: Last HD on 12/4 as an outpatient. Plan for next maintenance HD today. Patient awaiting pulmonary clearance prior to AVF creation. Pulmonary consulted. Monitor electrolytes.    2) HTN with ESRD: BP controlled. Continue with current medications.    3) Anemia of renal disease: Hb acceptable. Patient on low dose Mircera as an outpatient. Will start Epogen if Hb decreases. Monitor Hb.    4) Secondary HPT of renal origin: Resume calcitriol 0.25 mcg MWF, renvela with meals and renal diet. Monitor serum calcium and phosphorus.      [facility] NEPHROLOGY  Derrick Castaneda M.D.  Raúl Diana D.O.  Grace Delgadillo M.D.  MD Cher Beckman, MSN, ANP-C    Telephone: [phone]  Facsimile: [phone]    [facility] [place] [address], [address]  [place] [phone]

## 2024-12-06 NOTE — PATIENT PROFILE ADULT - DO YOU EVER NEED HELP READING HOSPITAL MATERIALS?
After Visit Summary   2017    Lorena Brooks    MRN: [id number]           Patient Information     Date Of Birth          1975        Visit Information        Provider Department      2017 3:30 PM Amy Denise MD [facility]        Today's Diagnoses     Breast tenderness    -  1      Care Instructions    Avoid unprotected intercourse for the next two weeks and return for repeat pregnancy test to ensure not pregnant              Follow-ups after your visit        Who to contact     Please call your clinic at [phone] to:    Ask questions about your health    Make or cancel appointments    Discuss your medicines    Learn about your test results    Speak to your doctor   If you have compliments or concerns about an experience at your clinic, or if you wish to file a complaint, please contact [facility] Patient Relations at [phone] or email us at [email or web address]         Additional Information About Your Visit        MyChart Information     [employer] is an electronic gateway that provides easy, online access to your medical records. With [employer], you can request a clinic appointment, read your test results, renew a prescription or communicate with your care team.     To sign up for [employer] visit the website at [email or web address]   You will be asked to enter the access code listed below, as well as some personal information. Please follow the directions to create your username and password.     Your access code is: [email or web address]  Expires: 2017  4:48 PM     Your access code will  in 90 days. If you need help or a new code, please contact your [facility] Clinic or call [phone] for assistance.        Care EveryWhere ID     This is your Care EveryWhere ID. This could be used by other organizations to access your [facility] medical records  [id number]        Your Vitals Were     Pulse Temperature  Last Period Pulse Oximetry BMI (Body Mass Index)       66 98.8  F (37.1  C) (Oral) 06/04/2017 (Approximate) 97% 31.4 kg/m2        Blood Pressure from Last 3 Encounters:   06/19/17 118/79   06/29/16 (!) 139/91   04/07/16 121/84    Weight from Last 3 Encounters:   06/19/17 196 lb (88.9 kg)   06/29/16 200 lb 9.6 oz (91 kg)   04/07/16 201 lb (91.2 kg)              We Performed the Following     HCG Qualitative Urine (UPT)  ([facility])        Primary Care Provider Office Phone # Fax #    Cristy Rebolledo [phone]       [facility] [address] [place] [address]        Thank you!     Thank you for choosing [facility]  for your care. Our goal is always to provide you with excellent care. Hearing back from our patients is one way we can continue to improve our services. Please take a few minutes to complete the written survey that you may receive in the mail after your visit with us. Thank you!             Your Updated Medication List - Protect others around you: Learn how to safely use, store and throw away your medicines at www.disposemymeds.org.          This list is accurate as of: 6/19/17  4:52 PM.  Always use your most recent med list.                   Brand Name Dispense Instructions for use    butalbital-acetaminophen-caffeine -40 MG per tablet    FIORICET/ESGIC     Take 1 tablet by mouth. Take 1 tablet 3 times daily as needed for headache       ciprofloxacin 250 MG tablet    CIPRO    6 tablet    Take 1 tablet (250 mg) by mouth 2 times daily       HYDROcodone-acetaminophen 5-325 MG per tablet    NORCO    30 tablet    Take 1 tablet by mouth every 8 hours as needed for moderate to severe pain       levETIRAcetam 500 MG tablet    KEPPRA    60 tablet    Take 1 tablet by mouth 2 times daily.       levonorgestrel 0.75 MG tablet    PLAN B    2 tablet    Take 2 tablets (1.5 mg) by mouth once for 1 dose       meclizine 25 MG tablet    ANTIVERT     Take 25 mg by mouth 3 times daily as needed.        medroxyPROGESTERone 150 MG/ML injection    DEPO-PROVERA    0.9 mL    Inject 1 mL (150 mg) into the muscle every 3 months       sulfamethoxazole-trimethoprim 800-160 MG per tablet    BACTRIM DS/SEPTRA DS    14 tablet    Take 1 tablet by mouth 2 times daily          no

## 2024-12-06 NOTE — CONSULT NOTE ADULT - ASSESSMENT
A/P    73 y/o  F with pmh of ESRD on HD (MWF), PE (in 2013 after TKR s/p Xarelto), LARRY (not on CPAP), migraines, HTN p/w L eye vision loss and headache.     #Vision loss, left eye.   -ophthamology and neurology w/u in progress    #migraine.   -tx per neuro    #ESRD on hemodialysis.   -hd per renal     #Essential hypertension.   -cont metoprolol.    #chronic dyspnea  -recent cath with no severe obstructive cad, normal flling pressures, no sig PHTN  -cont vol removal with hd   dvt ppx    79 minutes spent on total encounter; more than 50% of the visit was spent counseling and/or coordinating care by the attending physician.

## 2024-12-06 NOTE — ED ADULT NURSE REASSESSMENT NOTE - AS PAIN REST
5 (moderate pain)
0 (no pain/absence of nonverbal indicators of pain)
0 (no pain/absence of nonverbal indicators of pain)

## 2024-12-06 NOTE — CONSULT NOTE ADULT - SUBJECTIVE AND OBJECTIVE BOX
12-06-24 @ 11:15    Patient is a 74y old  Female who presents with a chief complaint of L eye vision loss (06 Dec 2024 07:04)      HPI:  75 y/o  F with pmh of ESRD on HD (MWF), PE (in 2013 after TKR s/p Xarelto), LARRY (not on CPAP), migraines, HTN p/w L eye vision loss and headache.  Pt reports headache behind L eye (typical of prior migraines) starting Tuesday morning.  She also had total loss of vision in L eye described as vision going black.  Since then, she has been able to perceive light in L eye, but only able to see white without being able to make out objects.  She has had nausea without vomiting, and has had photophobia.  No weakness, numbness, slurred speech.  No fever, chills, chest pain, or palpitations.  Pt's headache is similar to prior migraines, but she has never had vision loss.  She also reports intermittent "electric shock" sensation in L arm along with some neck pain for the past 2 weeks.      Pt initially presented to [facility] ED yesterday AM.  She was evaluated by ophthalmology who found elevated IOP, recommended drops, and transferred pt to [facility].  In [facility] ED,  IOP was normal on opthalmology eval.  She was given morphine, Tylenol, with improvement in headache, and dorzolamide, Xalatan, and timolol drops.    (06 Dec 2024 07:04):  sheis feeling SOB:   she has no underlying lung disease:  she never smoked but her  exposed:  exposed to passive smoking:   she has seen pulm as an outpt and was given albuterol only to use:   to me she says she wheezes a lot especially when she walks; :  has no prior history of asthma   but have been wheezing for quiet some time     ?FOLLOWING PRESENT  [x] Hx of PE/DVT, [x ] Hx COPD, [x ] Hx of Asthma, [ y] Hx of Hospitalization, [ z]  Hx of BiPAP/CPAP use, [z ] Hx of LARRY    Allergies    Sulfur (Unknown)  trimethoprim (Other; Rash)  sulfa drugs (Other; Rash)    Intolerances        PAST MEDICAL & SURGICAL HISTORY:  Osteoarthritis      Pulmonary embolism      Joint infection      Dysfunctional uterine bleeding      Obesity      Essential hypertension  Hypertension      Obstructive sleep apnea syndrome  Obstructive sleep apnea      History of pulmonary embolism  2012 s/p IVC filter      Arthropathy  Arthritis      Migraine  Migraines      Morbid obesity      ESRD (end stage renal disease)      Total knee replacement status      Status post hysterectomy      Status post cholecystectomy      Other acquired absence of organ  History of cholecystectomy      Status post total hysterectomy  partial      History of total knee replacement  with revisions x 3          FAMILY HISTORY:  FH: type 2 diabetes mellitus    FHx: hypertension        Social History: [ passive smoking ] TOBACCO                  [ z ] ETOH                                 [  z] IVDA/DRUGS    REVIEW OF SYSTEMS      General:	z    Skin/Breast:z  	  Ophthalmologic:x  	x  ENMT:	x    Respiratory and Thorax:  nguyen   	  Cardiovascular:	wheezing    Gastrointestinal:	x    Genitourinary:	x    Musculoskeletal:	x    Neurological:	 left eye vision loss    Psychiatric:	x    Hematology/Lymphatics:	x    Endocrine:	x    Allergic/Immunologic:	x    MEDICATIONS  (STANDING):  acetaminophen   IVPB .. 1000 milliGRAM(s) IV Intermittent once  aspirin  chewable 81 milliGRAM(s) Oral daily  calcitriol   Capsule 0.25 MICROGram(s) Oral <User Schedule>  chlorhexidine 2% Cloths 1 Application(s) Topical daily  cholecalciferol 1000 Unit(s) Oral daily  folic acid 1 milliGRAM(s) Oral daily  heparin   Injectable 7500 Unit(s) SubCutaneous three times a day  latanoprost 0.005% Ophthalmic Solution 1 Drop(s) Left EYE at bedtime  metoclopramide Injectable 10 milliGRAM(s) IV Push once  metoprolol succinate ER 50 milliGRAM(s) Oral daily  sevelamer carbonate 800 milliGRAM(s) Oral three times a day    MEDICATIONS  (PRN):  albuterol    90 MICROgram(s) HFA Inhaler 2 Puff(s) Inhalation every 6 hours PRN , Shortness of Breath and/or Wheezing  diphenhydrAMINE Injectable 25 milliGRAM(s) IV Push every 8 hours PRN migraine  metoclopramide Injectable 10 milliGRAM(s) IV Push every 8 hours PRN migraine  sodium chloride 0.9% Bolus. 100 milliLiter(s) IV Bolus every 5 minutes PRN SBP LESS THAN or EQUAL to 90 mmHg  traMADol 50 milliGRAM(s) Oral every 12 hours PRN for severe pain       Vital Signs Last 24 Hrs  T(C): 36.3 (06 Dec 2024 06:02), Max: 37.1 (05 Dec 2024 13:58)  T(F): 97.3 (06 Dec 2024 06:02), Max: 98.7 (05 Dec 2024 13:58)  HR: 99 (06 Dec 2024 06:02) (77 - 99)  BP: 147/86 (06 Dec 2024 06:02) (102/57 - 162/82)  BP(mean): --  RR: 16 (06 Dec 2024 06:02) (15 - 20)  SpO2: 97% (06 Dec 2024 06:02) (94% - 100%)    Parameters below as of 06 Dec 2024 06:02  Patient On (Oxygen Delivery Method): room air    Orthostatic VS          I&O's Summary      Physical Exam:   GENERAL: NAD, well-groomed, well-developed  HEENT: AIRAM/   Atraumatic, Normocephalic  ENMT: No tonsillar erythema, exudates, or enlargement; Moist mucous membranes, Good dentition, No lesions  NECK: Supple, No JVD, Normal thyroid  CHEST/LUNG: mild exp wheezing  CVS: Regular rate and rhythm; No murmurs, rubs, or gallops  GI: : Soft, Nontender, Nondistended; Bowel sounds present  NERVOUS SYSTEM:  Alert & Oriented X3,  EXTREMITIES:- edema  LYMPH: No lymphadenopathy noted  SKIN: No rashes or lesions  ENDOCRINOLOGY: No Thyromegaly  PSYCH: Appropriate    Labs:                              11.4   7.61  )-----------( 220      ( 05 Dec 2024 17:39 )             36.0     12-05    140  |  103  |  42[H]  ----------------------------<  95  5.0   |  31  |  5.54[H]    Ca    9.0      05 Dec 2024 17:39    TPro  8.0  /  Alb  3.1[L]  /  TBili  0.3  /  DBili  x   /  AST  30  /  ALT  42  /  AlkPhos  167[H]  12-05    CAPILLARY BLOOD GLUCOSE        LIVER FUNCTIONS - ( 05 Dec 2024 17:39 )  Alb: 3.1 g/dL / Pro: 8.0 gm/dL / ALK PHOS: 167 U/L / ALT: 42 U/L / AST: 30 U/L / GGT: x           PT/INR - ( 05 Dec 2024 17:39 )   PT: 11.5 sec;   INR: 0.99 ratio         PTT - ( 05 Dec 2024 17:39 )  PTT:29.4 sec  Urinalysis Basic - ( 05 Dec 2024 17:39 )    Color: x / Appearance: x / SG: x / pH: x  Gluc: 95 mg/dL / Ketone: x  / Bili: x / Urobili: x   Blood: x / Protein: x / Nitrite: x   Leuk Esterase: x / RBC: x / WBC x   Sq Epi: x / Non Sq Epi: x / Bacteria: x      D DImer      Studies  Chest X-RAY  CT SCAN Chest   CT Abdomen  Venous Dopplers: LE:   Others      rad< from: [facility] Hepatobiliary Imaging (06.03.24 @ 14:52) >  FINDINGS: There is prompt, homogeneous uptake of radiopharmaceutical by   the hepatocytes. Activity is seen in the bowel at approximately 30   minutes. There is good clearance of activity from the liver by the end of   the study.    IMPRESSION: Normal hepatobiliary scan status post cholecystectomy.    No evidence of a biliary obstruction.    --- End of Report ---    < end of copied text >

## 2024-12-06 NOTE — CONSULT NOTE ADULT - ASSESSMENT
74y (1950) woman with a PMHx significant for Morbid obesity, hypertension, migraines, CKD on dialysis who presented to the ED for worsening left eye vision loss.    Vision loss and headache are accompanied by nausea, photophobia, phonophobia.   found to have very large cataract in the left greater than right eye.  IOP previously in the 40s and now within normal limits with treatment. Also episode of left arm electric sensation intermittently over the past 2 weeks.  CT head, CTA h/n negative for acute pathology.    Impression: Headache and vision loss of unclear etiology.  Differential diagnosis includes severe migraine ( Status migrainosus), ocular disease such as cataracts and increased ocular pressure.  Also given significant vascular risk factors unable to rule out BRAO.  Left arm electrical shocks/numbness concern for potential radiculopathy.    Recommendations:  Acute headache management: 1st line (can be given together, Q8HR PRN for HA): Metoclopramide 10mg IV, Benadryl 25mg IV, Toradol 30mg IVP  2nd line: Solumedrol 250mg IV x1.   Given patient is unable to obtain MRI brain inpatient would recommend outpatient MRI without contrast   repeat CT head within 24 hours for stability   would start aspirin in the interim prior to MRI for stroke prevention.   appreciate ophthalmology recommendations.    Case to be seen by attending.  Note incomplete until finalized by attending signature/attestation.  74y (1950) woman with a PMHx significant for Morbid obesity, hypertension, migraines, CKD on dialysis who presented to the ED for worsening left eye vision loss.    Vision loss and headache are accompanied by nausea, photophobia, phonophobia.   found to have very large cataract in the left greater than right eye.  IOP previously in the 40s and now within normal limits with treatment. Also episode of left arm electric sensation intermittently over the past 2 weeks.  CT head, CTA h/n negative for acute pathology.    Impression: Headache and vision loss of unclear etiology.  Differential diagnosis includes severe migraine ( Status migrainosus), ocular disease such as cataracts and increased ocular pressure.  Also given significant vascular risk factors unable to rule out BRAO.  Left arm electrical shocks/numbness concern for potential cervical radiculopathy.    Recommendations:  Acute headache management: 1st line (can be given together, Q8HR PRN for HA): Metoclopramide 10mg IV, Benadryl 25mg IV, Toradol 30mg IVP  2nd line: Solumedrol 250mg IV x1.   Given patient is unable to obtain MRI brain and c spine w/o inpatient would recommend outpatient MRI without contrast   repeat CT head within 24 hours for stability   would start aspirin in the interim prior to MRI for stroke prevention.   appreciate ophthalmology recommendations.    Case to be seen by attending.  Note incomplete until finalized by attending signature/attestation.

## 2024-12-06 NOTE — H&P ADULT - PROBLEM SELECTOR PLAN 1
- reviewed ophthamology and neurology consults.  Possibly complex migraine.   - continue latanoprost

## 2024-12-07 LAB
ANION GAP SERPL CALC-SCNC: 14 MMOL/L — SIGNIFICANT CHANGE UP (ref 7–14)
BUN SERPL-MCNC: 36 MG/DL — HIGH (ref 7–23)
CALCIUM SERPL-MCNC: 9.4 MG/DL — SIGNIFICANT CHANGE UP (ref 8.4–10.5)
CHLORIDE SERPL-SCNC: 98 MMOL/L — SIGNIFICANT CHANGE UP (ref 98–107)
CO2 SERPL-SCNC: 25 MMOL/L — SIGNIFICANT CHANGE UP (ref 22–31)
CREAT SERPL-MCNC: 4.41 MG/DL — HIGH (ref 0.5–1.3)
EGFR: 10 ML/MIN/1.73M2 — LOW
GLUCOSE SERPL-MCNC: 126 MG/DL — HIGH (ref 70–99)
HAV IGM SER-ACNC: SIGNIFICANT CHANGE UP
HBV CORE AB SER-ACNC: SIGNIFICANT CHANGE UP
HBV CORE IGM SER-ACNC: SIGNIFICANT CHANGE UP
HBV SURFACE AB SER-ACNC: 10.4 MIU/ML — LOW
HBV SURFACE AG SER-ACNC: SIGNIFICANT CHANGE UP
HCT VFR BLD CALC: 36.4 % — SIGNIFICANT CHANGE UP (ref 34.5–45)
HCV AB S/CO SERPL IA: 0.19 S/CO — SIGNIFICANT CHANGE UP (ref 0–0.99)
HCV AB SERPL-IMP: SIGNIFICANT CHANGE UP
HGB BLD-MCNC: 11.6 G/DL — SIGNIFICANT CHANGE UP (ref 11.5–15.5)
MAGNESIUM SERPL-MCNC: 1.9 MG/DL — SIGNIFICANT CHANGE UP (ref 1.6–2.6)
MCHC RBC-ENTMCNC: 31.2 PG — SIGNIFICANT CHANGE UP (ref 27–34)
MCHC RBC-ENTMCNC: 31.9 G/DL — LOW (ref 32–36)
MCV RBC AUTO: 97.8 FL — SIGNIFICANT CHANGE UP (ref 80–100)
MRSA PCR RESULT.: SIGNIFICANT CHANGE UP
NRBC # BLD: 0 /100 WBCS — SIGNIFICANT CHANGE UP (ref 0–0)
NRBC # FLD: 0 K/UL — SIGNIFICANT CHANGE UP (ref 0–0)
PHOSPHATE SERPL-MCNC: 2.4 MG/DL — LOW (ref 2.5–4.5)
PLATELET # BLD AUTO: 255 K/UL — SIGNIFICANT CHANGE UP (ref 150–400)
POTASSIUM SERPL-MCNC: 5.7 MMOL/L — HIGH (ref 3.5–5.3)
POTASSIUM SERPL-SCNC: 5.7 MMOL/L — HIGH (ref 3.5–5.3)
RBC # BLD: 3.72 M/UL — LOW (ref 3.8–5.2)
RBC # FLD: 12.9 % — SIGNIFICANT CHANGE UP (ref 10.3–14.5)
S AUREUS DNA NOSE QL NAA+PROBE: SIGNIFICANT CHANGE UP
SODIUM SERPL-SCNC: 137 MMOL/L — SIGNIFICANT CHANGE UP (ref 135–145)
WBC # BLD: 9.27 K/UL — SIGNIFICANT CHANGE UP (ref 3.8–10.5)
WBC # FLD AUTO: 9.27 K/UL — SIGNIFICANT CHANGE UP (ref 3.8–10.5)

## 2024-12-07 PROCEDURE — 93010 ELECTROCARDIOGRAM REPORT: CPT

## 2024-12-07 PROCEDURE — 70450 CT HEAD/BRAIN W/O DYE: CPT | Mod: 26

## 2024-12-07 RX ORDER — SODIUM ZIRCONIUM CYCLOSILICATE 5 G/5G
10 POWDER, FOR SUSPENSION ORAL ONCE
Refills: 0 | Status: DISCONTINUED | OUTPATIENT
Start: 2024-12-07 | End: 2024-12-07

## 2024-12-07 RX ORDER — SODIUM ZIRCONIUM CYCLOSILICATE 5 G/5G
10 POWDER, FOR SUSPENSION ORAL THREE TIMES A DAY
Refills: 0 | Status: COMPLETED | OUTPATIENT
Start: 2024-12-07 | End: 2024-12-07

## 2024-12-07 RX ADMIN — SEVELAMER CARBONATE 800 MILLIGRAM(S): 800 TABLET, FILM COATED ORAL at 17:18

## 2024-12-07 RX ADMIN — LATANOPROST 1 DROP(S): 50 SOLUTION/ DROPS OPHTHALMIC at 21:20

## 2024-12-07 RX ADMIN — FLUTICASONE PROPIONATE AND SALMETEROL XINAFOATE 1 DOSE(S): 45; 21 AEROSOL, METERED RESPIRATORY (INHALATION) at 21:20

## 2024-12-07 RX ADMIN — IPRATROPIUM BROMIDE AND ALBUTEROL SULFATE 3 MILLILITER(S): 2.5; .5 SOLUTION RESPIRATORY (INHALATION) at 20:10

## 2024-12-07 RX ADMIN — SEVELAMER CARBONATE 800 MILLIGRAM(S): 800 TABLET, FILM COATED ORAL at 12:23

## 2024-12-07 RX ADMIN — Medication 1 MILLIGRAM(S): at 12:22

## 2024-12-07 RX ADMIN — TRAMADOL HYDROCHLORIDE 50 MILLIGRAM(S): 300 CAPSULE ORAL at 12:18

## 2024-12-07 RX ADMIN — Medication 20 MILLIGRAM(S): at 12:21

## 2024-12-07 RX ADMIN — IPRATROPIUM BROMIDE AND ALBUTEROL SULFATE 3 MILLILITER(S): 2.5; .5 SOLUTION RESPIRATORY (INHALATION) at 14:45

## 2024-12-07 RX ADMIN — Medication 1000 UNIT(S): at 12:23

## 2024-12-07 RX ADMIN — SEVELAMER CARBONATE 800 MILLIGRAM(S): 800 TABLET, FILM COATED ORAL at 09:48

## 2024-12-07 RX ADMIN — IPRATROPIUM BROMIDE AND ALBUTEROL SULFATE 3 MILLILITER(S): 2.5; .5 SOLUTION RESPIRATORY (INHALATION) at 03:58

## 2024-12-07 RX ADMIN — SODIUM ZIRCONIUM CYCLOSILICATE 10 GRAM(S): 5 POWDER, FOR SUSPENSION ORAL at 21:21

## 2024-12-07 RX ADMIN — TRAMADOL HYDROCHLORIDE 50 MILLIGRAM(S): 300 CAPSULE ORAL at 13:15

## 2024-12-07 RX ADMIN — Medication 20 MILLIGRAM(S): at 05:04

## 2024-12-07 RX ADMIN — Medication 7500 UNIT(S): at 21:21

## 2024-12-07 RX ADMIN — Medication 81 MILLIGRAM(S): at 12:23

## 2024-12-07 RX ADMIN — Medication 7500 UNIT(S): at 05:04

## 2024-12-07 RX ADMIN — Medication 20 MILLIGRAM(S): at 21:21

## 2024-12-07 RX ADMIN — SODIUM ZIRCONIUM CYCLOSILICATE 10 GRAM(S): 5 POWDER, FOR SUSPENSION ORAL at 17:17

## 2024-12-07 RX ADMIN — SODIUM ZIRCONIUM CYCLOSILICATE 10 GRAM(S): 5 POWDER, FOR SUSPENSION ORAL at 15:06

## 2024-12-07 RX ADMIN — METOPROLOL TARTRATE 50 MILLIGRAM(S): 100 TABLET, FILM COATED ORAL at 05:03

## 2024-12-07 RX ADMIN — FLUTICASONE PROPIONATE AND SALMETEROL XINAFOATE 1 DOSE(S): 45; 21 AEROSOL, METERED RESPIRATORY (INHALATION) at 12:21

## 2024-12-07 NOTE — PROGRESS NOTE ADULT - SUBJECTIVE AND OBJECTIVE BOX
CARDIOLOGY FOLLOW UP NOTE - DR. CHAN    Patient Name: MELISSA ARREOLA    Date of Service: 24 @ 11:56    Patient seen and examined    Subjective:    cv: denies chest pain, dyspnea, palpitations, dizziness  pulmonary: denies cough  GI: denies abdominal pain, nausea, vomiting  vascular/legs: no edema   skin: no rash  ROS: otherwise negative   overnight events:      PHYSICAL EXAM:  T(C): 36.9 (24 @ 11:38), Max: 36.9 (24 @ 11:38)  HR: 100 (24 @ 11:38) (84 - 100)  BP: 130/60 (24 @ 11:38) (116/53 - 160/89)  RR: 17 (24 @ 11:38) (17 - 18)  SpO2: 100% (24 @ 11:38) (95% - 100%)  Wt(kg): --  I&O's Summary    06 Dec 2024 07:01  -  07 Dec 2024 07:00  --------------------------------------------------------  IN: 400 mL / OUT: 2400 mL / NET: -2000 mL      Daily     Daily Weight in k (06 Dec 2024 20:00)    Appearance: Normal	  Cardiovascular: Normal S1 S2,RRR, No JVD, No murmurs  Respiratory: Lungs clear to auscultation	  Gastrointestinal:  Soft, Non-tender, + BS	  Extremities: Normal range of motion, No clubbing, cyanosis or edema      Home Medications:  cholecalciferol oral tablet: 1000 unit(s) orally once a day (06 Dec 2024 07:55)  folic acid 1 mg oral tablet: 1 tab(s) orally once a day (06 Dec 2024 07:55)  Metoprolol Succinate ER 50 mg oral tablet, extended release: 1 tab(s) orally once a day (06 Dec 2024 07:55)  sevelamer carbonate 800 mg oral tablet: 1 tab(s) orally 3 times a day (06 Dec 2024 07:55)      MEDICATIONS  (STANDING):  acetaminophen   IVPB .. 1000 milliGRAM(s) IV Intermittent once  albuterol/ipratropium for Nebulization 3 milliLiter(s) Nebulizer every 6 hours  aspirin  chewable 81 milliGRAM(s) Oral daily  calcitriol   Capsule 0.25 MICROGram(s) Oral <User Schedule>  chlorhexidine 2% Cloths 1 Application(s) Topical daily  cholecalciferol 1000 Unit(s) Oral daily  fluticasone propionate/ salmeterol 250-50 MICROgram(s) Diskus 1 Dose(s) Inhalation two times a day  folic acid 1 milliGRAM(s) Oral daily  heparin   Injectable 7500 Unit(s) SubCutaneous three times a day  latanoprost 0.005% Ophthalmic Solution 1 Drop(s) Left EYE at bedtime  methylPREDNISolone sodium succinate Injectable 20 milliGRAM(s) IV Push every 8 hours  metoclopramide Injectable 10 milliGRAM(s) IV Push once  metoprolol succinate ER 50 milliGRAM(s) Oral daily  sevelamer carbonate 800 milliGRAM(s) Oral three times a day with meals  sodium zirconium cyclosilicate 10 Gram(s) Oral three times a day      TELEMETRY: 	    ECG:  	  RADIOLOGY:   DIAGNOSTIC TESTING:  [ ] Echocardiogram:  [ ] Catheterization:  [ ] Stress Test:    OTHER: 	    LABS:	 	    CARDIAC MARKERS:                                      11.6   9.27  )-----------( 255      ( 07 Dec 2024 07:18 )             36.4         137  |  98  |  36[H]  ----------------------------<  126[H]  5.7[H]   |  25  |  4.41[H]    Ca    9.4      07 Dec 2024 07:18  Phos  2.4       Mg     1.90         TPro  x   /  Alb  x   /  TBili  x   /  DBili  x   /  AST  x   /  ALT  25  /  AlkPhos  x       proBNP:   PT/INR - ( 05 Dec 2024 17:39 )   PT: 11.5 sec;   INR: 0.99 ratio         PTT - ( 05 Dec 2024 17:39 )  PTT:29.4 sec  Lipid Profile:   HgA1c:     Creatinine: 4.41 mg/dL (24 @ 07:18)  Creatinine: 5.54 mg/dL (24 @ 17:39)

## 2024-12-07 NOTE — PROGRESS NOTE ADULT - SUBJECTIVE AND OBJECTIVE BOX
[facility] NEPHROLOGY- PROGRESS NOTE    74y Female with history of below presents with L eye loss of vision. Nephrology consulted for ESRD status.     Patient well known to our practice for h/o ESRD on HD MWF for which she follows with Dr. Diana at [facility] [place]. Patient with chronic dyspnea on exertion with negative cardiac work up. Patient awaiting pulmonary clearance prior to AVF creation.    REVIEW OF SYSTEMS:  Gen: no fevers  HEENT: no rhinorrhea, L eye vision loss  Neck: no sore throat  Cards: no chest pain  Resp: + dyspnea on exertion  GI: no nausea or vomiting or diarrhea  : no dysuria or hematuria  Vascular: no LE edema    Allergies:  Sulfur (Unknown)  trimethoprim (Other; Rash)  sulfa drugs (Other; Rash)      Vital Signs Last 24 Hrs  T(C): 36.9 (07 Dec 2024 11:38), Max: 36.9 (07 Dec 2024 11:38)  T(F): 98.4 (07 Dec 2024 11:38), Max: 98.4 (07 Dec 2024 11:38)  HR: 100 (07 Dec 2024 11:38) (84 - 100)  BP: 130/60 (07 Dec 2024 11:38) (116/53 - 142/79)  RR: 17 (07 Dec 2024 11:38) (17 - 17)  SpO2: 100% (07 Dec 2024 11:38) (95% - 100%)    Parameters below as of 07 Dec 2024 05:01  Patient On (Oxygen Delivery Method): room air        PHYSICAL EXAM:  Gen: NAD, calm  HEENT: MMM  Neck: no JVD  Cards: RRR, +S1/S2, no M/G/R  Resp: CTA B/L  GI: soft, NT/ND, NABS  : no CVA tenderness  Vascular: no LE edema B/L, RIJ TDC    LABS:  12-07    137  |  98  |  36[H]  ----------------------------<  126[H]  5.7[H]   |  25  |  4.41[H]    Ca    9.4      07 Dec 2024 07:18  Phos  2.4     12-07  Mg     1.90     12-07    TPro      /  Alb      /  TBili      /  DBili      /  AST      /  ALT  25  /  AlkPhos      12-06    Creatinine Trend: 4.41 <--, 5.54 <--                        11.6   9.27  )-----------( 255      ( 07 Dec 2024 07:18 )             36.4     Urine Studies:  Urinalysis Basic - ( 07 Dec 2024 07:18 )    Color:  / Appearance:  / SG:  / pH:   Gluc: 126 mg/dL / Ketone:   / Bili:  / Urobili:    Blood:  / Protein:  / Nitrite:    Leuk Esterase:  / RBC:  / WBC    Sq Epi:  / Non Sq Epi:  / Bacteria:

## 2024-12-07 NOTE — PROGRESS NOTE ADULT - SUBJECTIVE AND OBJECTIVE BOX
Date of Service: 12-07-24 @ 10:16    Patient is a 74y old  Female who presents with a chief complaint of L eye vision loss (07 Dec 2024 09:19)      Any change in ROS: seems to be doing  ok :   she feels better     MEDICATIONS  (STANDING):  acetaminophen   IVPB .. 1000 milliGRAM(s) IV Intermittent once  albuterol/ipratropium for Nebulization 3 milliLiter(s) Nebulizer every 6 hours  aspirin  chewable 81 milliGRAM(s) Oral daily  calcitriol   Capsule 0.25 MICROGram(s) Oral <User Schedule>  chlorhexidine 2% Cloths 1 Application(s) Topical daily  cholecalciferol 1000 Unit(s) Oral daily  fluticasone propionate/ salmeterol 250-50 MICROgram(s) Diskus 1 Dose(s) Inhalation two times a day  folic acid 1 milliGRAM(s) Oral daily  heparin   Injectable 7500 Unit(s) SubCutaneous three times a day  latanoprost 0.005% Ophthalmic Solution 1 Drop(s) Left EYE at bedtime  methylPREDNISolone sodium succinate Injectable 20 milliGRAM(s) IV Push every 8 hours  metoclopramide Injectable 10 milliGRAM(s) IV Push once  metoprolol succinate ER 50 milliGRAM(s) Oral daily  sevelamer carbonate 800 milliGRAM(s) Oral three times a day with meals    MEDICATIONS  (PRN):  diphenhydrAMINE Injectable 25 milliGRAM(s) IV Push every 8 hours PRN migraine  metoclopramide Injectable 10 milliGRAM(s) IV Push every 8 hours PRN migraine  sodium chloride 0.9% Bolus. 100 milliLiter(s) IV Bolus every 5 minutes PRN SBP LESS THAN or EQUAL to 90 mmHg  traMADol 50 milliGRAM(s) Oral every 12 hours PRN for severe pain    Vital Signs Last 24 Hrs  T(C): 36.8 (07 Dec 2024 05:01), Max: 36.8 (07 Dec 2024 05:01)  T(F): 98.3 (07 Dec 2024 05:01), Max: 98.3 (07 Dec 2024 05:01)  HR: 97 (07 Dec 2024 05:01) (84 - 100)  BP: 142/75 (07 Dec 2024 05:01) (116/53 - 160/89)  BP(mean): --  RR: 17 (07 Dec 2024 05:01) (17 - 18)  SpO2: 95% (07 Dec 2024 05:01) (95% - 97%)    Parameters below as of 07 Dec 2024 05:01  Patient On (Oxygen Delivery Method): room air        I&O's Summary    06 Dec 2024 07:01  -  07 Dec 2024 07:00  --------------------------------------------------------  IN: 400 mL / OUT: 2400 mL / NET: -2000 mL          Physical Exam:   GENERAL:Obese+  HEENT: AIRAM/   Atraumatic, Normocephalic  ENMT: No tonsillar erythema, exudates, or enlargement; Moist mucous membranes, Good dentition, No lesions  NECK: Supple, No JVD, Normal thyroid  CHEST/LUNG: Clear to auscultaion  CVS: Regular rate and rhythm; No murmurs, rubs, or gallops  GI: : Soft, Nontender, Nondistended; Bowel sounds present  NERVOUS SYSTEM:  Alert & Oriented X3  EXTREMITIES: - edema  LYMPH: No lymphadenopathy noted  SKIN: No rashes or lesions  ENDOCRINOLOGY: No Thyromegaly  PSYCH: Appropriate    Labs:                              11.6   9.27  )-----------( 255      ( 07 Dec 2024 07:18 )             36.4                         11.4   7.61  )-----------( 220      ( 05 Dec 2024 17:39 )             36.0     12-07    137  |  98  |  36[H]  ----------------------------<  126[H]  5.7[H]   |  25  |  4.41[H]  12-05    140  |  103  |  42[H]  ----------------------------<  95  5.0   |  31  |  5.54[H]    Ca    9.4      07 Dec 2024 07:18  Ca    9.0      05 Dec 2024 17:39  Phos  2.4     12-07  Mg     1.90     12-07    TPro  x   /  Alb  x   /  TBili  x   /  DBili  x   /  AST  x   /  ALT  25  /  AlkPhos  x   12-06  TPro  8.0  /  Alb  3.1[L]  /  TBili  0.3  /  DBili  x   /  AST  30  /  ALT  42  /  AlkPhos  167[H]  12-05    CAPILLARY BLOOD GLUCOSE          LIVER FUNCTIONS - ( 06 Dec 2024 16:20 )  Alb: x     / Pro: x     / ALK PHOS: x     / ALT: 25 U/L / AST: x     / GGT: x           PT/INR - ( 05 Dec 2024 17:39 )   PT: 11.5 sec;   INR: 0.99 ratio         PTT - ( 05 Dec 2024 17:39 )  PTT:29.4 sec  Urinalysis Basic - ( 07 Dec 2024 07:18 )    Color: x / Appearance: x / SG: x / pH: x  Gluc: 126 mg/dL / Ketone: x  / Bili: x / Urobili: x   Blood: x / Protein: x / Nitrite: x   Leuk Esterase: x / RBC: x / WBC x   Sq Epi: x / Non Sq Epi: x / Bacteria: x        rad< from: Xray Chest 1 View- PORTABLE-Urgent (Xray Chest 1 View- PORTABLE-Urgent .) (12.06.24 @ 13:57) >  MEHRISHI     PROCEDURE DATE:  12/06/2024          INTERPRETATION:  EXAMINATION: XR CHEST URGENT    CLINICAL INDICATION: sob    TECHNIQUE: Single frontal, portable view of the chest was obtained.    COMPARISON: Chest x-ray None.    FINDINGS:    Right-sided hemodialysis catheter is present unchanged.  The heart size is not enlarged.  The lungs are clear.  There is no pneumothorax or pleural effusion.  There are no acute osseous abnormalities.    IMPRESSION:  Clear lungs.    --- End of Report ---          RADHA WHEELER MD; Resident Radiologist  This document has been electronically signed.  BELIA MYRICK MD; Attending Radiologist  This document has been electronically signed. Dec  6 2024  2:55PM    < end of copied text >      RECENT CULTURES:        RESPIRATORY CULTURES:          Studies  Chest X-RAY  CT SCAN Chest   Venous Dopplers: LE:   CT Abdomen  Others

## 2024-12-07 NOTE — PHYSICAL THERAPY INITIAL EVALUATION ADULT - STANDING BALANCE: DYNAMIC, REHAB EVAL
Lianne Morataya  YOB: 1945 October 2, 2023  Katerine Shoemaker MD    Primary Care Provider:Glenn Negron MD      CHIEF COMPLAINT:  This is a routine nursing home visit following up on chronic medical illnesses of coronary artery status post recent stent placement , cerebellar CVA, right-sided Bell's palsy, acquired hypothyroidism, chronic back pain, ceruminosis, hyperlipidemia, insomnia, depression anxiety, environmental allergies.    HISTORY OF PRESENT ILLNESS:  This is a 78 year old female resident of    [place] [address], who is being seen on a routine visit. The patient has been a resident here since  9/ 20 08/2023.  The patient's health status was discussed with nursing staff and patient's records reviewed.     Patient was hospitalized at [facility] between 09/25/2023 and 09/28/2023.  On 09/20/2023 patient underwent cardiac catheterization for an abnormal stress test  With the following results.    Atherosclerotic heart disease manifested by luminal disease of the LMCA   99% mid LAD stenosis with ZHAO grade 2 flow; status post successful Percutaneous coronary intervention of the mid LAD with placement of a 2.25 x 12 mm drug-eluting stent and restoration of ZHAO grade 3 flow   Diffuse mild disease of the circumflex coronary artery with 30% lesions   Diffuse mild disease of the dominant right coronary artery with multiple 30% lesions  Left ventricular ejection fraction of 60%    Discussion:  Status post successful Percutaneous coronary intervention of the mid LAD with placement of a drug-eluting stent, restoration of ZHAO grade 3 flow.  Several days latershe noticed that she was getting sharp intermittent right-sided temporal headaches that she thought might be an ear infection so she went to the urgent care.  At that time they thought there might have been impacted cerumen, and they cleaned her ears, but that has not affected the  pain that comes and goes.  Last night she developed a fever of 101.4 and when the daughter saw her today she notded a left-sided facial droop.      Emergency department patient was afebrile initial tachycardic with heart rate of 100 initial blood pressure 154/80 saturating 97% on room air.  Given patient's facial droop code stroke was called.  Level 1 head CT and CT angio head and neck was obtained patient also seen by [facility] neurology.  NIH stroke scale was 2.  Level 1 head CT showed no acute intracranial finding.  CT angio head and neck also showed no acute finding.  MRI of the brain was obtained in emergency department that showed small focus of left cerebellar ischemia.     [facility] neurology recommended aspirin, statin.  Patient was seen emergency department by physical therapy given the patient was already on aspirin as well as Brilinta due to recent stent placement.  Physical therapy found the patient was quite ataxic requiring assistance.  Patient was subsequently admitted to the hospitalist service with difficulty ambulating and acute cerebellar stroke.  Tele neurology evaluated the patient due to her right facial weakness seeming more consistent for Bell's palsy.               - MRI IAC with and without to ensure which confirmed involvement of the right 7th cranial nerve.    Patient was discharged to [facility] for further rehabilitation.  She was discharged on prednisone and Valtrex for her Bell's palsy.  She was also discharged on aspirin 81 mg daily, Brilinta 90 mg twice a day , rosuvastatin 40 mg daily, and  Metoprolol succinate 25 mg daily.    Right ear pain was treated with neomycin polymyxin hydrocortisone ear drops.  Patient continues to have some dizziness and imbalance.    Patient has a history of acquired hypothyroidism on NP Thyroid 60 mg daily,       Patient has a history of depression and anxiety and is followed by behavior health.  Patient is on sertraline 100  mg 1 and half tabs daily, clonazepam 1 mg 3 times a day as needed. Patient is on trazodone  mg nightly for insomnia.    Patient has a history of environmental allergies on cetirizine 5 mg daily.    Patient has a history of chronic low back pain on gabapentin 300 mg twice a day.        PAST MEDICAL HISTORY    Disorder of bone and cartilage, unspecified     03/30/2011    Anxiety and depression                                        Unspecified hypothyroidism                                    Hyperlipidemia                                                Allergic rhinitis                                             IBS (irritable bowel syndrome)                                Disorder of eye movements                                     Colon polyp                                                   PAST SURGICAL HISTORY  Past Surgical History:   Procedure Laterality Date   • Cataract extraction, bilateral     • Colonoscopy  09/20/2005   • Colonoscopy  03/12/2014    done at [facility]   • Colonoscopy w/ polypectomy  09/18/2020   • Dexa bone density axial skeleton  04/08/2011   • Occult blood test tube  10/10/2012   • Pap smear,routine  10/10/2012   • Rotator cuff repair Right 02/17/2022    supra, infra acromioplasty, biceps tenotomy   • Strabismus surgery     • Thyroid lobectomy         CURRENT MEDICATIONS  Current Outpatient Medications   Medication Sig Dispense Refill   • gabapentin (NEURONTIN) 100 MG capsule Take 3 capsules by mouth in the morning and 3 capsules in the evening. 180 capsule 0   • rosuvastatin (CRESTOR) 40 MG tablet Take 1 tablet by mouth nightly. 30 tablet 0   • traZODone (DESYREL) 50 MG tablet Take 1 tablet by mouth nightly. 30 tablet 0   • acetaminophen (TYLENOL) 325 MG tablet Take 2 tablets by mouth every 4 hours as needed for Pain. 30 tablet 0   • CARBOXYMethylcellulose (REFRESH TEARS) 0.5 % ophthalmic solution Place 1 drop into right eye in the morning and 1 drop at noon and 1 drop in the evening.  15 mL 2   • polyethylene glycol (MIRALAX) 17 g packet Take 17 g by mouth daily as needed (constipation). Stir and dissolve powder in any 4 to 8 ounces of beverage, then drink. 30 each 0   • predniSONE (DELTASONE) 20 MG tablet Take 3 tablets by mouth daily (with breakfast) for 4 days. Do not start before September 29, 2023. 12 tablet 0   • valACYclovir 1000 MG Tab Take 1 tablet by mouth every 12 hours for 16 doses. 16 tablet 0   • neomycin-polymyxin-hydroCORTisone (CORTISPORIN) 3.5-05801-9 otic suspension Place 3 drops into right ear every 8 hours. 10 mL 0   • clonazePAM (KlonoPIN) 1 MG tablet Take 1 tablet by mouth 3 times daily as needed for Anxiety. 30 tablet 1   • oxyCODONE, IMM REL, (ROXICODONE) 5 MG immediate release tablet Take 1 tablet by mouth every 8 hours as needed for Pain. 30 tablet 0   • ticagrelor (BRILINTA) 90 MG Tab Take 1 tablet by mouth every 12 hours. 60 tablet 3   • metoPROLOL succinate (TOPROL-XL) 25 MG 24 hr tablet Take 1 tablet by mouth daily. 90 tablet 3   • aspirin 81 MG chewable tablet Chew 81 mg by mouth daily.     • thyroid (NP Thyroid) 60 MG tablet Take 1 tablet by mouth daily. 90 tablet 0   • sertraline (ZOLOFT) 100 MG tablet Take 1.5 tablets by mouth daily. 135 tablet 1     No current facility-administered medications for this visit.         ALLERGIES:  No Known Allergies    CRITICAL CARE PLAN/ADVANCE DIRECTIVES   Full code    KARNOFSKY PERFORMANCE SCALE  50%    PALLIATIVE PERFORMANCE SCALE  50%    FUNCTIONAL ASSESSMENT SCALE (FAST)  Not applicable               COVID 19  x2                                 COVID Booster  x3                                   FLU  pending      FAMILY/SOCIAL HISTORY:  Born and Raised :  Patient was born and raised in [place]  Power Of  designee/healthcare agent/Guardian - Not activated:  Name:  Donny  Relationship: son  Phone number:   [phone]  Educational level:  High school  Occupation:   at a doctor's office for 23  years  Marital Status:   for the last 4 years  Children:  1 son and 1 daughter   status/Rank  Not in the   Current Living Situation  Two-story home but patient lives on the main floor.  Washer and drier are in the basement  Last Bowel Movement  yesterday  Smoking  Does not smoke                                             Alcohol  Does not drink  Ability to Drive  Able to drive  Goals of Care  Return home independently.    FUNCTIONAL SUMMARY:  Previous Functional Status :   Independen  Activities of daily living: Assist of 1  Mobility: walker and ambulatory assist of 1  Transfers: assist of 1  Continence: Continent bladder and bowel   Adaptive equipment:   walker  Diet: GENERAL  Liquids:  thin  Appetite: good  Eating habits: Feeds self  Safety: No alarms/restraints  Activities: In room  Vision:    No glasses  Dental:   Own teeth  Hearing:   No hearing aids  Skin:   intact    DEPRESSION SCREEN PHQ 2  Over the last 2 weeks, how often have you been bothered by the following problems?  0. Not at all  1. Several days  2. More than half the days        3.  Nearly every day  1.Little interest or pleasure in doing things   0  2.Feeling down, depressed or hopeless  0    Hobbies  Likes television and read news on her phone.  She often dog sits her daughter's dog 1 husky and 1 Balderas lab puppy  Food Preferences  No preferences likes to cook  Brief Interview for Mental Status (BIMS)  15/15  CAGE Questionnaire  Not applicable      REVIEW OF SYSTEMS  GENERAL:  The patient denies weight gain, weight loss, loss of appetite, fevers, chills, fatigue or night sweats.  SKIN:  The patient denies any rashes or skin discoloration.  HEAD:  The patient denies headaches, dizziness, masses or seizures.  EENT:  The patient denies visual changes, eye pain, tinnitus, vertigo, hearing loss, nosebleeds or sinus disease.  RESPIRATORY:  The patient denies cough, shortness of breath or sputum production.  CARDIOVASCULAR:  The  patient denies chest pain, dyspnea on exertion, claudication, edema or valvular disease.  GASTROINTESTINAL:  The patient denies dysphagia, abdominal pain, nausea, vomiting, diarrhea, constipation, hematemesis, melena or hematochezia.  GENITOURINARY:  The patient denies dysuria, frequency, hesitancy or hematuria.  GYNECOLOGIC:  No vaginal discharge, itching or burning.  ENDOCRINE:  The patient denies polyuria, polydipsia, skin or hair changes or heat intolerance.  MUSCULOSKELETAL:  The patient denies joint pain, swelling, arthritis or myalgias.  NEUROLOGIC AND PSYCHIATRIC:  The patient denies weakness, seizures, memory changes or depression.      PHYSICAL EXAM  GENERAL:   This is an alert and oriented female in no acute distress.    Blood pressure 105/76, temperature 97.8°, pulse 59, respiration 18,  HEENT:   Right 7th nerve cranial palsy noted.  Right eye was covered to protect the cornea.  Right side of the mouth was downgoing. Extraocular movements intact.  Conjunctivae are normal.  Sclerae are anicteric.  Ears:  Tympanic membranes are normal.  External auditory canals normal.  External ears and nose are normal.  Nasal mucosa, septum and turbinates are normal.  Mouth revealed a benign posterior pharynx, normal dentition.  Normal lips and gums.  NECK:  Supple, no thyroid masses.  No cervical or supraclavicular lymphadenopathy.  No jugular venous distention.  No carotid bruit.  LUNGS:  Clear to auscultation and percussion.  Good respiratory effort.  HEART:  Regular rate and rhythm, normal S1, S2 without a murmur, rub or heave.  Point of maximal impulse was normal.  BREASTS:  Without masses or nipple discharge.  No axillary lymph nodes noted.  ABDOMEN:  Soft, nontender, without hepatosplenomegaly.  No masses noted.  Aorta was normal.  Bowel sounds are present.  No abdominal bruits heard.  No hernias noted.  EXTREMITIES:  Without cyanosis, clubbing or edema.    NEUROLOGIC:  Deep tendon reflexes are 2+ in the upper and  lower extremities.  No gross motor or sensory deficits noted.  Gait and station were normal.  Digits and nails were normal.  The patient demonstrated good judgment, insight, memory, mood and affect.        Labs: The Laboratory values listed below have been reviewed and pertinent findings discussed in the Assessment and Plan.  Radiology: Imaging studies have been reviewed and pertinent findings discussed in the Assessment and Plan.  Component  Ref Range & Units 5 d ago 6 d ago 7 d ago 11 d ago 2 wk ago 3 mo ago 9 mo ago   WBC  4.2 - 11.0 K/mcL 6.4  5.6  8.6  6.8  6.6  5.0  3.3 Low     RBC  4.00 - 5.20 mil/mcL 3.68 Low   3.51 Low   3.89 Low   3.73 Low   4.13  3.74 Low   3.96 Low     HGB  12.0 - 15.5 g/dL 11.4 Low   10.7 Low   12.0  11.4 Low   12.6  11.5 Low   12.3    HCT  36.0 - 46.5 % 33.9 Low   33.2 Low   36.2  35.7 Low   39.6  36.1  37.2    MCV  78.0 - 100.0 fl 92.1  94.6  93.1  95.7  95.9  96.5  93.9    MCH  26.0 - 34.0 pg 31.0  30.5  30.8  30.6  30.5  30.7  31.1    MCHC  32.0 - 36.5 g/dL 33.6  32.2  33.1  31.9 Low   31.8 Low   31.9 Low   33.1    PLT  140 - 450 K/mcL 162  137 Low   154  163  196  199  183    RDW-CV  11.0 - 15.0 % 11.7  11.9  11.9  12.2  11.9  12.1  11.6    RDW-SD  39.0 - 50.0 fL 39.8  41.3  40.2  42.6  41.5  42.8  40.1    NRBC  <=0 /100 WBC 0  0  0  0  0  0  0    [facility]              Specimen Collected: 09/27/23 06:14 Last Resulted: 09/27/23 06:33              Component  Ref Range & Units 5 d ago 6 d ago 7 d ago 11 d ago 2 wk ago 1 mo ago 9 mo ago   Fasting Status          13 R      Sodium  135 - 145 mmol/L 135  135  135  139  140  141  137    Potassium  3.4 - 5.1 mmol/L 4.4  4.0  4.1  4.0  4.5  4.4  4.2    Chloride  97 - 110 mmol/L 102  102  101  108  103  105  104    Carbon Dioxide  21 - 32 mmol/L 23  23  23  23  29  27  25    Anion Gap  7 - 19 mmol/L 14  14  15  12  13  13  12    Glucose  70 - 99  mg/dL 139 High   91  116 High   94  93  108 High   104 High     BUN  6 - 20 mg/dL 18  20  19  14  24 High   21 High   19    Creatinine  0.51 - 0.95 mg/dL 0.79  0.97 High   1.00 High   0.88  1.02 High   1.01 High   0.81    Glomerular Filtration Rate  >=60 77  60 CM  58 Low  CM  68 CM  57 Low  CM  57 Low  CM  75 CM    Comment: eGFR results = or >60 mL/min/1.73m2 = Normal kidney function. Estimated GFR calculated using the CKD-EPI-R (2021) equation that does not include race in the creatinine calculation.   BUN/Cr  7 - 25 23  21  19  16  24  21  23    Calcium  8.4 - 10.2 mg/dL 9.3  8.9  9.3  8.9  9.3  9.3  9.2    [facility]     MARY Screen With Antibody And IFA Reflex  Negative Negative      RPR  Nonreactive Nonreactive      Angiotensin Converting Enzyme  16 - 85 U/L 36      Lyme Disease Antibody Screen, IgG And IgM  Negative Negative            IMPRESSION  1. Coronary artery disease involving native coronary artery of native heart without angina pectoris  Status post stent placement    2. Acquired hypothyroidism  Currently on supplementsyion    3. Irritation of right external auditory canal   using ear drops    4. Bell's palsy   on prednisone and Valtrex    5. Cerebellar stroke (CMD)   on Brilinta, aspirin, rosuvastatin    6. Generalized anxiety disorder   on sertraline and clonazepam    PLAN:  Discussed right eye protection to prevent corneal damage.  Follow-up with behavior Health.  Continue with physical therapy occupational therapy and speech therapy     90 minutes face to face, counseling and coordination more than 50% of this visit regarding  Stroke, Bell's palsy, anxiety and depression, hypothyroidism, chronic low back pain.  This includes pre-charting, chart review, documenting and referring/communicating with other health care professionals and family.     Future Appointments   Date Time Provider Department Center    10/3/2023  2:15 PM Johnny, Juju FARHEEN, APNP [id number]   10/23/2023  1:30 PM Marichuy Pereira, OT [facility]   10/30/2023  2:00 PM Ashleigh Sinclair, OT [facility]   12/11/2023  3:00 PM Madiha Hernandez,  [facility]   12/22/2023 11:00 AM Mckenna Cotton PA-C [facility]   2/23/2024 12:00 PM Shadi Boyd MD [facility]       Please note that Fluency Direct dictation software was used to transcribe this note for convenience of timely charting.  Attempts are made to proof read but transcription errors can and do occur.   fair plus

## 2024-12-07 NOTE — PHYSICAL THERAPY INITIAL EVALUATION ADULT - ACTIVE RANGE OF MOTION EXAMINATION, REHAB EVAL
jorge. upper extremity Active ROM was WNL (within normal limits)/bilateral lower extremity Active ROM was WNL (within normal limits)

## 2024-12-07 NOTE — PROGRESS NOTE ADULT - SUBJECTIVE AND OBJECTIVE BOX
SUBJECTIVE / OVERNIGHT EVENTS:pt seen and examined  12-07-24    MEDICATIONS  (STANDING):  acetaminophen   IVPB .. 1000 milliGRAM(s) IV Intermittent once  albuterol/ipratropium for Nebulization 3 milliLiter(s) Nebulizer every 6 hours  aspirin  chewable 81 milliGRAM(s) Oral daily  calcitriol   Capsule 0.25 MICROGram(s) Oral <User Schedule>  chlorhexidine 2% Cloths 1 Application(s) Topical daily  cholecalciferol 1000 Unit(s) Oral daily  fluticasone propionate/ salmeterol 250-50 MICROgram(s) Diskus 1 Dose(s) Inhalation two times a day  folic acid 1 milliGRAM(s) Oral daily  heparin   Injectable 7500 Unit(s) SubCutaneous three times a day  latanoprost 0.005% Ophthalmic Solution 1 Drop(s) Left EYE at bedtime  methylPREDNISolone sodium succinate Injectable 20 milliGRAM(s) IV Push every 8 hours  metoclopramide Injectable 10 milliGRAM(s) IV Push once  metoprolol succinate ER 50 milliGRAM(s) Oral daily  sevelamer carbonate 800 milliGRAM(s) Oral three times a day with meals    MEDICATIONS  (PRN):  diphenhydrAMINE Injectable 25 milliGRAM(s) IV Push every 8 hours PRN migraine  metoclopramide Injectable 10 milliGRAM(s) IV Push every 8 hours PRN migraine  sodium chloride 0.9% Bolus. 100 milliLiter(s) IV Bolus every 5 minutes PRN SBP LESS THAN or EQUAL to 90 mmHg  traMADol 50 milliGRAM(s) Oral every 12 hours PRN for severe pain    T(C): 36.8 (12-07-24 @ 05:01), Max: 36.8 (12-07-24 @ 05:01)  HR: 97 (12-07-24 @ 05:01) (84 - 100)  BP: 142/75 (12-07-24 @ 05:01) (116/53 - 160/89)  RR: 17 (12-07-24 @ 05:01) (17 - 18)  SpO2: 95% (12-07-24 @ 05:01) (95% - 97%)    CAPILLARY BLOOD GLUCOSE        I&O's Summary    06 Dec 2024 07:01  -  07 Dec 2024 07:00  --------------------------------------------------------  IN: 400 mL / OUT: 2400 mL / NET: -2000 mL        Constitutional: No fever, fatigue  Skin: No rash.  Eyes: No recent vision problems or eye pain.  ENT: No congestion, ear pain, or sore throat.  Cardiovascular: No chest pain or palpation.  Respiratory: No cough, shortness of breath, congestion, or wheezing.  Gastrointestinal: No abdominal pain, nausea, vomiting, or diarrhea.  Genitourinary: No dysuria.  Musculoskeletal: No joint swelling.  Neurologic: No headache.    PHYSICAL EXAM:  GENERAL: NAD  EYES: EOMI, PERRLA  NECK: Supple, No JVD  CHEST/LUNG: crackles at bases, no wheezing  HEART:  S1 , S2 +  ABDOMEN: soft , bs+  EXTREMITIES:  edema  NEUROLOGY:alert awake      LABS:                        11.6   9.27  )-----------( 255      ( 07 Dec 2024 07:18 )             36.4     12-07    137  |  98  |  36[H]  ----------------------------<  126[H]  5.7[H]   |  25  |  4.41[H]    Ca    9.4      07 Dec 2024 07:18  Phos  2.4     12-07  Mg     1.90     12-07    TPro  x   /  Alb  x   /  TBili  x   /  DBili  x   /  AST  x   /  ALT  25  /  AlkPhos  x   12-06    PT/INR - ( 05 Dec 2024 17:39 )   PT: 11.5 sec;   INR: 0.99 ratio         PTT - ( 05 Dec 2024 17:39 )  PTT:29.4 sec      Urinalysis Basic - ( 07 Dec 2024 07:18 )    Color: x / Appearance: x / SG: x / pH: x  Gluc: 126 mg/dL / Ketone: x  / Bili: x / Urobili: x   Blood: x / Protein: x / Nitrite: x   Leuk Esterase: x / RBC: x / WBC x   Sq Epi: x / Non Sq Epi: x / Bacteria: x        RADIOLOGY & ADDITIONAL TESTS:    Imaging Personally Reviewed:    Consultant(s) Notes Reviewed:      Care Discussed with Consultants/Other Providers:

## 2024-12-08 LAB
ANION GAP SERPL CALC-SCNC: 18 MMOL/L — HIGH (ref 7–14)
BUN SERPL-MCNC: 61 MG/DL — HIGH (ref 7–23)
CALCIUM SERPL-MCNC: 8.7 MG/DL — SIGNIFICANT CHANGE UP (ref 8.4–10.5)
CHLORIDE SERPL-SCNC: 99 MMOL/L — SIGNIFICANT CHANGE UP (ref 98–107)
CO2 SERPL-SCNC: 22 MMOL/L — SIGNIFICANT CHANGE UP (ref 22–31)
CREAT SERPL-MCNC: 6.03 MG/DL — HIGH (ref 0.5–1.3)
CULTURE RESULTS: SIGNIFICANT CHANGE UP
EGFR: 7 ML/MIN/1.73M2 — LOW
GLUCOSE SERPL-MCNC: 121 MG/DL — HIGH (ref 70–99)
HCT VFR BLD CALC: 33.4 % — LOW (ref 34.5–45)
HGB BLD-MCNC: 10.6 G/DL — LOW (ref 11.5–15.5)
MAGNESIUM SERPL-MCNC: 2.1 MG/DL — SIGNIFICANT CHANGE UP (ref 1.6–2.6)
MCHC RBC-ENTMCNC: 31.3 PG — SIGNIFICANT CHANGE UP (ref 27–34)
MCHC RBC-ENTMCNC: 31.7 G/DL — LOW (ref 32–36)
MCV RBC AUTO: 98.5 FL — SIGNIFICANT CHANGE UP (ref 80–100)
NRBC # BLD: 0 /100 WBCS — SIGNIFICANT CHANGE UP (ref 0–0)
NRBC # FLD: 0 K/UL — SIGNIFICANT CHANGE UP (ref 0–0)
PHOSPHATE SERPL-MCNC: 4 MG/DL — SIGNIFICANT CHANGE UP (ref 2.5–4.5)
PLATELET # BLD AUTO: 236 K/UL — SIGNIFICANT CHANGE UP (ref 150–400)
POTASSIUM SERPL-MCNC: 5.1 MMOL/L — SIGNIFICANT CHANGE UP (ref 3.5–5.3)
POTASSIUM SERPL-SCNC: 5.1 MMOL/L — SIGNIFICANT CHANGE UP (ref 3.5–5.3)
RBC # BLD: 3.39 M/UL — LOW (ref 3.8–5.2)
RBC # FLD: 13.1 % — SIGNIFICANT CHANGE UP (ref 10.3–14.5)
SODIUM SERPL-SCNC: 139 MMOL/L — SIGNIFICANT CHANGE UP (ref 135–145)
SPECIMEN SOURCE: SIGNIFICANT CHANGE UP
WBC # BLD: 11.47 K/UL — HIGH (ref 3.8–10.5)
WBC # FLD AUTO: 11.47 K/UL — HIGH (ref 3.8–10.5)

## 2024-12-08 RX ORDER — PREDNISONE 20 MG/1
40 TABLET ORAL DAILY
Refills: 0 | Status: COMPLETED | OUTPATIENT
Start: 2024-12-08 | End: 2024-12-11

## 2024-12-08 RX ADMIN — SEVELAMER CARBONATE 800 MILLIGRAM(S): 800 TABLET, FILM COATED ORAL at 08:55

## 2024-12-08 RX ADMIN — FLUTICASONE PROPIONATE AND SALMETEROL XINAFOATE 1 DOSE(S): 45; 21 AEROSOL, METERED RESPIRATORY (INHALATION) at 21:17

## 2024-12-08 RX ADMIN — IPRATROPIUM BROMIDE AND ALBUTEROL SULFATE 3 MILLILITER(S): 2.5; .5 SOLUTION RESPIRATORY (INHALATION) at 12:30

## 2024-12-08 RX ADMIN — Medication 1000 UNIT(S): at 11:44

## 2024-12-08 RX ADMIN — Medication 7500 UNIT(S): at 21:18

## 2024-12-08 RX ADMIN — IPRATROPIUM BROMIDE AND ALBUTEROL SULFATE 3 MILLILITER(S): 2.5; .5 SOLUTION RESPIRATORY (INHALATION) at 03:09

## 2024-12-08 RX ADMIN — Medication 7500 UNIT(S): at 11:42

## 2024-12-08 RX ADMIN — Medication 1 MILLIGRAM(S): at 11:43

## 2024-12-08 RX ADMIN — LATANOPROST 1 DROP(S): 50 SOLUTION/ DROPS OPHTHALMIC at 21:18

## 2024-12-08 RX ADMIN — TRAMADOL HYDROCHLORIDE 50 MILLIGRAM(S): 300 CAPSULE ORAL at 23:58

## 2024-12-08 RX ADMIN — SEVELAMER CARBONATE 800 MILLIGRAM(S): 800 TABLET, FILM COATED ORAL at 11:43

## 2024-12-08 RX ADMIN — IPRATROPIUM BROMIDE AND ALBUTEROL SULFATE 3 MILLILITER(S): 2.5; .5 SOLUTION RESPIRATORY (INHALATION) at 21:50

## 2024-12-08 RX ADMIN — Medication 7500 UNIT(S): at 05:10

## 2024-12-08 RX ADMIN — TRAMADOL HYDROCHLORIDE 50 MILLIGRAM(S): 300 CAPSULE ORAL at 09:38

## 2024-12-08 RX ADMIN — SEVELAMER CARBONATE 800 MILLIGRAM(S): 800 TABLET, FILM COATED ORAL at 17:21

## 2024-12-08 RX ADMIN — Medication 20 MILLIGRAM(S): at 05:10

## 2024-12-08 RX ADMIN — METOPROLOL TARTRATE 50 MILLIGRAM(S): 100 TABLET, FILM COATED ORAL at 05:10

## 2024-12-08 RX ADMIN — CHLORHEXIDINE GLUCONATE 1 APPLICATION(S): 1.2 RINSE ORAL at 11:32

## 2024-12-08 RX ADMIN — Medication 81 MILLIGRAM(S): at 11:44

## 2024-12-08 RX ADMIN — TRAMADOL HYDROCHLORIDE 50 MILLIGRAM(S): 300 CAPSULE ORAL at 08:54

## 2024-12-08 NOTE — PROGRESS NOTE ADULT - SUBJECTIVE AND OBJECTIVE BOX
CARDIOLOGY FOLLOW UP - Dr. Joe  DATE OF SERVICE: 12/8/24    CC no cp or sob       REVIEW OF SYSTEMS:  CONSTITUTIONAL: No fever, weight loss, or fatigue  RESPIRATORY: No cough, wheezing, chills or hemoptysis; No Shortness of Breath  CARDIOVASCULAR: No chest pain, palpitations, passing out, dizziness  GASTROINTESTINAL: No abdominal or epigastric pain. No nausea, vomiting, or hematemesis; No diarrhea or constipation. No melena or hematochezia.      PHYSICAL EXAM:  T(C): 36.6 (12-08-24 @ 11:48), Max: 36.6 (12-08-24 @ 05:07)  HR: 91 (12-08-24 @ 11:48) (82 - 103)  BP: 154/86 (12-08-24 @ 11:48) (148/70 - 158/81)  RR: 18 (12-08-24 @ 11:48) (18 - 18)  SpO2: 98% (12-08-24 @ 11:48) (97% - 99%)  Wt(kg): --  I&O's Summary      Appearance: Normal	  Cardiovascular: Normal S1 S2,RRR, No JVD, No murmurs  Respiratory: Lungs clear to auscultation	  Gastrointestinal:  Soft, Non-tender, + BS	  Extremities: Normal range of motion, No clubbing, cyanosis or edema      Home Medications:  cholecalciferol oral tablet: 1000 unit(s) orally once a day (06 Dec 2024 07:55)  folic acid 1 mg oral tablet: 1 tab(s) orally once a day (06 Dec 2024 07:55)  Metoprolol Succinate ER 50 mg oral tablet, extended release: 1 tab(s) orally once a day (06 Dec 2024 07:55)  sevelamer carbonate 800 mg oral tablet: 1 tab(s) orally 3 times a day (06 Dec 2024 07:55)      MEDICATIONS  (STANDING):  acetaminophen   IVPB .. 1000 milliGRAM(s) IV Intermittent once  albuterol/ipratropium for Nebulization 3 milliLiter(s) Nebulizer every 6 hours  aspirin  chewable 81 milliGRAM(s) Oral daily  calcitriol   Capsule 0.25 MICROGram(s) Oral <User Schedule>  chlorhexidine 2% Cloths 1 Application(s) Topical daily  cholecalciferol 1000 Unit(s) Oral daily  fluticasone propionate/ salmeterol 250-50 MICROgram(s) Diskus 1 Dose(s) Inhalation two times a day  folic acid 1 milliGRAM(s) Oral daily  heparin   Injectable 7500 Unit(s) SubCutaneous three times a day  latanoprost 0.005% Ophthalmic Solution 1 Drop(s) Left EYE at bedtime  metoclopramide Injectable 10 milliGRAM(s) IV Push once  metoprolol succinate ER 50 milliGRAM(s) Oral daily  predniSONE   Tablet 40 milliGRAM(s) Oral daily  sevelamer carbonate 800 milliGRAM(s) Oral three times a day with meals      TELEMETRY: nsr	    ECG:  	  RADIOLOGY:   DIAGNOSTIC TESTING:  [ ] Echocardiogram:  [ ]  Catheterization:  [ ] Stress Test:    OTHER: 	    LABS:	 	                            10.6   11.47 )-----------( 236      ( 08 Dec 2024 06:40 )             33.4     12-08    139  |  99  |  61[H]  ----------------------------<  121[H]  5.1   |  22  |  6.03[H]    Ca    8.7      08 Dec 2024 06:40  Phos  4.0     12-08  Mg     2.10     12-08    TPro  x   /  Alb  x   /  TBili  x   /  DBili  x   /  AST  x   /  ALT  25  /  AlkPhos  x   12-06

## 2024-12-08 NOTE — PROGRESS NOTE ADULT - SUBJECTIVE AND OBJECTIVE BOX
Full note to follow. [facility] NEPHROLOGY- PROGRESS NOTE    74y Female with history of below presents with L eye loss of vision. Nephrology consulted for ESRD status.     Patient well known to our practice for h/o ESRD on HD MWF for which she follows with Dr. Diana at [facility] [place]. Patient with chronic dyspnea on exertion with negative cardiac work up. Patient awaiting pulmonary clearance prior to AVF creation.    REVIEW OF SYSTEMS:  Gen: no fevers  HEENT: no rhinorrhea, L eye vision loss  Neck: no sore throat  Cards: no chest pain  Resp: + dyspnea on exertion persists  GI: no nausea or vomiting or diarrhea  : no dysuria or hematuria  Vascular: no LE edema    Allergies:  Sulfur (Unknown)  trimethoprim (Other; Rash)  sulfa drugs (Other; Rash)      VITALS:  T(F): 97.8 (12-08-24 @ 11:48), Max: 97.8 (12-08-24 @ 05:07)  HR: 101 (12-08-24 @ 12:31)  BP: 154/86 (12-08-24 @ 11:48)  RR: 18 (12-08-24 @ 11:48)  SpO2: 99% (12-08-24 @ 12:31)      PHYSICAL EXAM:  Gen: NAD, calm  HEENT: MMM  Neck: no JVD  Cards: RRR, +S1/S2, no M/G/R  Resp: CTA B/L  GI: soft, NT/ND, NABS  : no CVA tenderness  Vascular: no LE edema B/L, RIJ TDC      LABS:  12-08    139  |  99  |  61[H]  ----------------------------<  121[H]  5.1   |  22  |  6.03[H]    Ca    8.7      08 Dec 2024 06:40  Phos  4.0     12-08  Mg     2.10     12-08      Creatinine Trend: 6.03 <--, 4.41 <--, 5.54 <--                        10.6   11.47 )-----------( 236      ( 08 Dec 2024 06:40 )             33.4     Urine Studies:  Urinalysis Basic - ( 08 Dec 2024 06:40 )    Color:  / Appearance:  / SG:  / pH:   Gluc: 121 mg/dL / Ketone:   / Bili:  / Urobili:    Blood:  / Protein:  / Nitrite:    Leuk Esterase:  / RBC:  / WBC    Sq Epi:  / Non Sq Epi:  / Bacteria:

## 2024-12-08 NOTE — PROGRESS NOTE ADULT - SUBJECTIVE AND OBJECTIVE BOX
Date of Service: 12-08-24 @ 12:03    Patient is a 74y old  Female who presents with a chief complaint of L eye vision loss (08 Dec 2024 10:18)      Any change in ROS: she seems OK;   still with mild sob:  is obese       MEDICATIONS  (STANDING):  acetaminophen   IVPB .. 1000 milliGRAM(s) IV Intermittent once  albuterol/ipratropium for Nebulization 3 milliLiter(s) Nebulizer every 6 hours  aspirin  chewable 81 milliGRAM(s) Oral daily  calcitriol   Capsule 0.25 MICROGram(s) Oral <User Schedule>  chlorhexidine 2% Cloths 1 Application(s) Topical daily  cholecalciferol 1000 Unit(s) Oral daily  fluticasone propionate/ salmeterol 250-50 MICROgram(s) Diskus 1 Dose(s) Inhalation two times a day  folic acid 1 milliGRAM(s) Oral daily  heparin   Injectable 7500 Unit(s) SubCutaneous three times a day  latanoprost 0.005% Ophthalmic Solution 1 Drop(s) Left EYE at bedtime  metoclopramide Injectable 10 milliGRAM(s) IV Push once  metoprolol succinate ER 50 milliGRAM(s) Oral daily  sevelamer carbonate 800 milliGRAM(s) Oral three times a day with meals    MEDICATIONS  (PRN):  diphenhydrAMINE Injectable 25 milliGRAM(s) IV Push every 8 hours PRN migraine  metoclopramide Injectable 10 milliGRAM(s) IV Push every 8 hours PRN migraine  sodium chloride 0.9% Bolus. 100 milliLiter(s) IV Bolus every 5 minutes PRN SBP LESS THAN or EQUAL to 90 mmHg  traMADol 50 milliGRAM(s) Oral every 12 hours PRN for severe pain    Vital Signs Last 24 Hrs  T(C): 36.6 (08 Dec 2024 11:48), Max: 36.6 (08 Dec 2024 05:07)  T(F): 97.8 (08 Dec 2024 11:48), Max: 97.8 (08 Dec 2024 05:07)  HR: 91 (08 Dec 2024 11:48) (82 - 103)  BP: 154/86 (08 Dec 2024 11:48) (148/70 - 158/81)  BP(mean): --  RR: 18 (08 Dec 2024 11:48) (18 - 18)  SpO2: 98% (08 Dec 2024 11:48) (97% - 99%)    Parameters below as of 08 Dec 2024 05:07  Patient On (Oxygen Delivery Method): room air        I&O's Summary        Physical Exam:   GENERAL: Obese  HEENT: AIRAM/   Atraumatic, Normocephalic  ENMT: No tonsillar erythema, exudates, or enlargement; Moist mucous membranes, Good dentition, No lesions  NECK: Supple, No JVD, Normal thyroid  CHEST/LUNG: Clear to auscultaion- no wheezing  CVS: Regular rate and rhythm; No murmurs, rubs, or gallops  GI: : Soft, Nontender, Nondistended; Bowel sounds present  NERVOUS SYSTEM:  Alert & Oriented X3  EXTREMITIES:  - edema  LYMPH: No lymphadenopathy noted  SKIN: No rashes or lesions  ENDOCRINOLOGY: No Thyromegaly  PSYCH: Appropriate    Labs:                              10.6   11.47 )-----------( 236      ( 08 Dec 2024 06:40 )             33.4                         11.6   9.27  )-----------( 255      ( 07 Dec 2024 07:18 )             36.4                         11.4   7.61  )-----------( 220      ( 05 Dec 2024 17:39 )             36.0     12-08    139  |  99  |  61[H]  ----------------------------<  121[H]  5.1   |  22  |  6.03[H]  12-07    137  |  98  |  36[H]  ----------------------------<  126[H]  5.7[H]   |  25  |  4.41[H]  12-05    140  |  103  |  42[H]  ----------------------------<  95  5.0   |  31  |  5.54[H]    Ca    8.7      08 Dec 2024 06:40  Ca    9.4      07 Dec 2024 07:18  Phos  4.0     12-08  Phos  2.4     12-07  Mg     2.10     12-08  Mg     1.90     12-07    TPro  x   /  Alb  x   /  TBili  x   /  DBili  x   /  AST  x   /  ALT  25  /  AlkPhos  x   12-06  TPro  8.0  /  Alb  3.1[L]  /  TBili  0.3  /  DBili  x   /  AST  30  /  ALT  42  /  AlkPhos  167[H]  12-05    CAPILLARY BLOOD GLUCOSE          LIVER FUNCTIONS - ( 06 Dec 2024 16:20 )  Alb: x     / Pro: x     / ALK PHOS: x     / ALT: 25 U/L / AST: x     / GGT: x             Urinalysis Basic - ( 08 Dec 2024 06:40 )    Color: x / Appearance: x / SG: x / pH: x  Gluc: 121 mg/dL / Ketone: x  / Bili: x / Urobili: x   Blood: x / Protein: x / Nitrite: x   Leuk Esterase: x / RBC: x / WBC x   Sq Epi: x / Non Sq Epi: x / Bacteria: x    rad< from: Xray Chest 1 View- PORTABLE-Urgent (Xray Chest 1 View- PORTABLE-Urgent .) (12.06.24 @ 13:57) >  Right-sided hemodialysis catheter is present unchanged.  The heart size is not enlarged.  The lungs are clear.  There is no pneumothorax or pleural effusion.  There are no acute osseous abnormalities.    IMPRESSION:  Clear lungs.    --- End of Report ---          RADHA WHEELER MD; Resident Radiologist  This document has been electronically signed.  BELIA MYRICK MD; Attending Radiologist  This document has been electronically signed. Dec  6 2024  2:55PM    < end of copied text >  < from: CT Head No Cont (12.07.24 @ 09:16) >  s no CT evidence of large vessel acute infarct. No mass effect is   found in the brain.  No evidence of midline shift or herniation pattern.    The ventricles, sulci and basal cisterns appear unremarkable.    Small amount of mucosal inflammation within the posterior right maxillary   sinus.        IMPRESSION:    No acute intracranial findings.    --- End of Report ---            BAYLEE MON MD; Attending Radiologist  This document has been electronically signed. Dec  7 2024  9:22AM    < end of copied text >          RECENT CULTURES:        RESPIRATORY CULTURES:          Studies  Chest X-RAY  CT SCAN Chest   Venous Dopplers: LE:   CT Abdomen  Others

## 2024-12-08 NOTE — PROGRESS NOTE ADULT - SUBJECTIVE AND OBJECTIVE BOX
SUBJECTIVE / OVERNIGHT EVENTS:pt seen and examined  12-08-24    MEDICATIONS  (STANDING):  acetaminophen   IVPB .. 1000 milliGRAM(s) IV Intermittent once  albuterol/ipratropium for Nebulization 3 milliLiter(s) Nebulizer every 6 hours  aspirin  chewable 81 milliGRAM(s) Oral daily  calcitriol   Capsule 0.25 MICROGram(s) Oral <User Schedule>  chlorhexidine 2% Cloths 1 Application(s) Topical daily  cholecalciferol 1000 Unit(s) Oral daily  fluticasone propionate/ salmeterol 250-50 MICROgram(s) Diskus 1 Dose(s) Inhalation two times a day  folic acid 1 milliGRAM(s) Oral daily  heparin   Injectable 7500 Unit(s) SubCutaneous three times a day  latanoprost 0.005% Ophthalmic Solution 1 Drop(s) Left EYE at bedtime  metoclopramide Injectable 10 milliGRAM(s) IV Push once  metoprolol succinate ER 50 milliGRAM(s) Oral daily  sevelamer carbonate 800 milliGRAM(s) Oral three times a day with meals    MEDICATIONS  (PRN):  diphenhydrAMINE Injectable 25 milliGRAM(s) IV Push every 8 hours PRN migraine  metoclopramide Injectable 10 milliGRAM(s) IV Push every 8 hours PRN migraine  sodium chloride 0.9% Bolus. 100 milliLiter(s) IV Bolus every 5 minutes PRN SBP LESS THAN or EQUAL to 90 mmHg  traMADol 50 milliGRAM(s) Oral every 12 hours PRN for severe pain    Vital Signs Last 24 Hrs  T(C): 36.6 (12-08-24 @ 05:07), Max: 36.9 (12-07-24 @ 11:38)  T(F): 97.8 (12-08-24 @ 05:07), Max: 98.4 (12-07-24 @ 11:38)  HR: 94 (12-08-24 @ 05:07) (82 - 103)  BP: 158/81 (12-08-24 @ 05:07) (130/60 - 158/81)  BP(mean): --  RR: 18 (12-08-24 @ 05:07) (17 - 18)  SpO2: 97% (12-08-24 @ 05:07) (97% - 100%)      Constitutional: No fever, fatigue  Skin: No rash.  Eyes: No recent vision problems or eye pain.  ENT: No congestion, ear pain, or sore throat.  Cardiovascular: No chest pain or palpation.  Respiratory: No cough, shortness of breath, congestion, or wheezing.  Gastrointestinal: No abdominal pain, nausea, vomiting, or diarrhea.  Genitourinary: No dysuria.  Musculoskeletal: No joint swelling.  Neurologic: No headache.    PHYSICAL EXAM:  GENERAL: NAD  EYES: EOMI, PERRLA  NECK: Supple, No JVD  CHEST/LUNG: crackles at bases, no wheezing  HEART:  S1 , S2 +  ABDOMEN: soft , bs+  EXTREMITIES:  edema  NEUROLOGY:alert awake    LABS:  12-08    139  |  99  |  61[H]  ----------------------------<  121[H]  5.1   |  22  |  6.03[H]    Ca    8.7      08 Dec 2024 06:40  Phos  4.0     12-08  Mg     2.10     12-08    TPro      /  Alb      /  TBili      /  DBili      /  AST      /  ALT  25  /  AlkPhos      12-06    Creatinine Trend: 6.03 <--, 4.41 <--, 5.54 <--                        10.6   11.47 )-----------( 236      ( 08 Dec 2024 06:40 )             33.4     Urine Studies:  Urinalysis Basic - ( 08 Dec 2024 06:40 )    Color:  / Appearance:  / SG:  / pH:   Gluc: 121 mg/dL / Ketone:   / Bili:  / Urobili:    Blood:  / Protein:  / Nitrite:    Leuk Esterase:  / RBC:  / WBC    Sq Epi:  / Non Sq Epi:  / Bacteria:               LIVER FUNCTIONS - ( 06 Dec 2024 16:20 )  Alb: x     / Pro: x     / ALK PHOS: x     / ALT: 25 U/L / AST: x     / GGT: x                 RADIOLOGY & ADDITIONAL TESTS:    Imaging Personally Reviewed:    Consultant(s) Notes Reviewed:      Care Discussed with Consultants/Other Providers:

## 2024-12-09 LAB
ANION GAP SERPL CALC-SCNC: 18 MMOL/L — HIGH (ref 7–14)
BUN SERPL-MCNC: 87 MG/DL — HIGH (ref 7–23)
CALCIUM SERPL-MCNC: 8.2 MG/DL — LOW (ref 8.4–10.5)
CHLORIDE SERPL-SCNC: 100 MMOL/L — SIGNIFICANT CHANGE UP (ref 98–107)
CO2 SERPL-SCNC: 21 MMOL/L — LOW (ref 22–31)
CREAT SERPL-MCNC: 7.38 MG/DL — HIGH (ref 0.5–1.3)
EGFR: 5 ML/MIN/1.73M2 — LOW
GAS PNL BLDV: SIGNIFICANT CHANGE UP
GLUCOSE SERPL-MCNC: 79 MG/DL — SIGNIFICANT CHANGE UP (ref 70–99)
HCT VFR BLD CALC: 31 % — LOW (ref 34.5–45)
HGB BLD-MCNC: 9.9 G/DL — LOW (ref 11.5–15.5)
MAGNESIUM SERPL-MCNC: 2.1 MG/DL — SIGNIFICANT CHANGE UP (ref 1.6–2.6)
MCHC RBC-ENTMCNC: 31.1 PG — SIGNIFICANT CHANGE UP (ref 27–34)
MCHC RBC-ENTMCNC: 31.9 G/DL — LOW (ref 32–36)
MCV RBC AUTO: 97.5 FL — SIGNIFICANT CHANGE UP (ref 80–100)
NRBC # BLD: 0 /100 WBCS — SIGNIFICANT CHANGE UP (ref 0–0)
NRBC # FLD: 0 K/UL — SIGNIFICANT CHANGE UP (ref 0–0)
PHOSPHATE SERPL-MCNC: 4.7 MG/DL — HIGH (ref 2.5–4.5)
PLATELET # BLD AUTO: 228 K/UL — SIGNIFICANT CHANGE UP (ref 150–400)
POTASSIUM SERPL-MCNC: 5 MMOL/L — SIGNIFICANT CHANGE UP (ref 3.5–5.3)
POTASSIUM SERPL-SCNC: 5 MMOL/L — SIGNIFICANT CHANGE UP (ref 3.5–5.3)
RBC # BLD: 3.18 M/UL — LOW (ref 3.8–5.2)
RBC # FLD: 13.2 % — SIGNIFICANT CHANGE UP (ref 10.3–14.5)
SODIUM SERPL-SCNC: 139 MMOL/L — SIGNIFICANT CHANGE UP (ref 135–145)
WBC # BLD: 9.32 K/UL — SIGNIFICANT CHANGE UP (ref 3.8–10.5)
WBC # FLD AUTO: 9.32 K/UL — SIGNIFICANT CHANGE UP (ref 3.8–10.5)

## 2024-12-09 RX ADMIN — TRAMADOL HYDROCHLORIDE 50 MILLIGRAM(S): 300 CAPSULE ORAL at 14:57

## 2024-12-09 RX ADMIN — IPRATROPIUM BROMIDE AND ALBUTEROL SULFATE 3 MILLILITER(S): 2.5; .5 SOLUTION RESPIRATORY (INHALATION) at 15:04

## 2024-12-09 RX ADMIN — Medication 7500 UNIT(S): at 05:23

## 2024-12-09 RX ADMIN — FLUTICASONE PROPIONATE AND SALMETEROL XINAFOATE 1 DOSE(S): 45; 21 AEROSOL, METERED RESPIRATORY (INHALATION) at 21:40

## 2024-12-09 RX ADMIN — Medication 1000 UNIT(S): at 17:23

## 2024-12-09 RX ADMIN — FLUTICASONE PROPIONATE AND SALMETEROL XINAFOATE 1 DOSE(S): 45; 21 AEROSOL, METERED RESPIRATORY (INHALATION) at 14:57

## 2024-12-09 RX ADMIN — PREDNISONE 40 MILLIGRAM(S): 20 TABLET ORAL at 05:24

## 2024-12-09 RX ADMIN — IPRATROPIUM BROMIDE AND ALBUTEROL SULFATE 3 MILLILITER(S): 2.5; .5 SOLUTION RESPIRATORY (INHALATION) at 21:31

## 2024-12-09 RX ADMIN — TRAMADOL HYDROCHLORIDE 50 MILLIGRAM(S): 300 CAPSULE ORAL at 00:58

## 2024-12-09 RX ADMIN — SEVELAMER CARBONATE 800 MILLIGRAM(S): 800 TABLET, FILM COATED ORAL at 17:24

## 2024-12-09 RX ADMIN — Medication 1 MILLIGRAM(S): at 17:22

## 2024-12-09 RX ADMIN — CALCITRIOL 0.25 MICROGRAM(S): 0.5 CAPSULE, LIQUID FILLED ORAL at 08:28

## 2024-12-09 RX ADMIN — LATANOPROST 1 DROP(S): 50 SOLUTION/ DROPS OPHTHALMIC at 21:40

## 2024-12-09 RX ADMIN — CHLORHEXIDINE GLUCONATE 1 APPLICATION(S): 1.2 RINSE ORAL at 09:41

## 2024-12-09 RX ADMIN — SEVELAMER CARBONATE 800 MILLIGRAM(S): 800 TABLET, FILM COATED ORAL at 08:31

## 2024-12-09 RX ADMIN — Medication 7500 UNIT(S): at 21:39

## 2024-12-09 RX ADMIN — Medication 81 MILLIGRAM(S): at 17:23

## 2024-12-09 RX ADMIN — METOPROLOL TARTRATE 50 MILLIGRAM(S): 100 TABLET, FILM COATED ORAL at 05:24

## 2024-12-09 NOTE — PROGRESS NOTE ADULT - SUBJECTIVE AND OBJECTIVE BOX
Date of Service: 12-09-24 @ 10:05    Patient is a 74y old  Female who presents with a chief complaint of L eye vision loss (08 Dec 2024 12:07)      Any change in ROS: seems OK;   no sob:  still feels slightly sob;       MEDICATIONS  (STANDING):  acetaminophen   IVPB .. 1000 milliGRAM(s) IV Intermittent once  albuterol/ipratropium for Nebulization 3 milliLiter(s) Nebulizer every 6 hours  aspirin  chewable 81 milliGRAM(s) Oral daily  calcitriol   Capsule 0.25 MICROGram(s) Oral <User Schedule>  chlorhexidine 2% Cloths 1 Application(s) Topical daily  cholecalciferol 1000 Unit(s) Oral daily  fluticasone propionate/ salmeterol 250-50 MICROgram(s) Diskus 1 Dose(s) Inhalation two times a day  folic acid 1 milliGRAM(s) Oral daily  heparin   Injectable 7500 Unit(s) SubCutaneous three times a day  latanoprost 0.005% Ophthalmic Solution 1 Drop(s) Left EYE at bedtime  metoclopramide Injectable 10 milliGRAM(s) IV Push once  metoprolol succinate ER 50 milliGRAM(s) Oral daily  predniSONE   Tablet 40 milliGRAM(s) Oral daily  sevelamer carbonate 800 milliGRAM(s) Oral three times a day with meals    MEDICATIONS  (PRN):  diphenhydrAMINE Injectable 25 milliGRAM(s) IV Push every 8 hours PRN migraine  metoclopramide Injectable 10 milliGRAM(s) IV Push every 8 hours PRN migraine  sodium chloride 0.9% Bolus. 100 milliLiter(s) IV Bolus every 5 minutes PRN SBP LESS THAN or EQUAL to 90 mmHg  traMADol 50 milliGRAM(s) Oral every 12 hours PRN for severe pain    Vital Signs Last 24 Hrs  T(C): 36.6 (09 Dec 2024 09:37), Max: 36.6 (08 Dec 2024 11:48)  T(F): 97.8 (09 Dec 2024 09:37), Max: 97.8 (08 Dec 2024 11:48)  HR: 84 (09 Dec 2024 09:37) (81 - 102)  BP: 170/51 (09 Dec 2024 09:37) (121/55 - 170/51)  BP(mean): --  RR: 18 (09 Dec 2024 09:37) (18 - 18)  SpO2: 98% (09 Dec 2024 09:37) (98% - 99%)    Parameters below as of 09 Dec 2024 09:37  Patient On (Oxygen Delivery Method): room air        I&O's Summary        Physical Exam:   GENERAL: Obese+  HEENT: AIRAM/   Atraumatic, Normocephalic  ENMT: No tonsillar erythema, exudates, or enlargement; Moist mucous membranes, Good dentition, No lesions  NECK: Supple, No JVD, Normal thyroid  CHEST/LUNG: bibasilar cracekls+  CVS: Regular rate and rhythm; No murmurs, rubs, or gallops  GI: : Soft, Nontender, Nondistended; Bowel sounds present  NERVOUS SYSTEM:  Alert & Oriented X3  EXTREMITIES: - edema  LYMPH: No lymphadenopathy noted  SKIN: No rashes or lesions  ENDOCRINOLOGY: No Thyromegaly  PSYCH: Appropriate    Labs:                              9.9    9.32  )-----------( 228      ( 09 Dec 2024 06:45 )             31.0                         10.6   11.47 )-----------( 236      ( 08 Dec 2024 06:40 )             33.4                         11.6   9.27  )-----------( 255      ( 07 Dec 2024 07:18 )             36.4                         11.4   7.61  )-----------( 220      ( 05 Dec 2024 17:39 )             36.0     12-09    139  |  100  |  87[H]  ----------------------------<  79  5.0   |  21[L]  |  7.38[H]  12-08    139  |  99  |  61[H]  ----------------------------<  121[H]  5.1   |  22  |  6.03[H]  12-07    137  |  98  |  36[H]  ----------------------------<  126[H]  5.7[H]   |  25  |  4.41[H]  12-05    140  |  103  |  42[H]  ----------------------------<  95  5.0   |  31  |  5.54[H]    Ca    8.2[L]      09 Dec 2024 06:45  Ca    8.7      08 Dec 2024 06:40  Phos  4.7     12-09  Phos  4.0     12-08  Mg     2.10     12-09  Mg     2.10     12-08    TPro  x   /  Alb  x   /  TBili  x   /  DBili  x   /  AST  x   /  ALT  25  /  AlkPhos  x   12-06  TPro  8.0  /  Alb  3.1[L]  /  TBili  0.3  /  DBili  x   /  AST  30  /  ALT  42  /  AlkPhos  167[H]  12-05    CAPILLARY BLOOD GLUCOSE              Urinalysis Basic - ( 09 Dec 2024 06:45 )    Color: x / Appearance: x / SG: x / pH: x  Gluc: 79 mg/dL / Ketone: x  / Bili: x / Urobili: x   Blood: x / Protein: x / Nitrite: x   Leuk Esterase: x / RBC: x / WBC x   Sq Epi: x / Non Sq Epi: x / Bacteria: x    rad< from: Xray Chest 1 View- PORTABLE-Urgent (Xray Chest 1 View- PORTABLE-Urgent .) (12.06.24 @ 13:57) >  INTERPRETATION:  EXAMINATION: XR CHEST URGENT    CLINICAL INDICATION: sob    TECHNIQUE: Single frontal, portable view of the chest was obtained.    COMPARISON: Chest x-ray None.    FINDINGS:    Right-sided hemodialysis catheter is present unchanged.  The heart size is not enlarged.  The lungs are clear.  There is no pneumothorax or pleural effusion.  There are no acute osseous abnormalities.    IMPRESSION:  Clear lungs.    --- End of Report ---          RADHA WHEELER MD; Resident Radiologist  This document has been electronically signed.  BELIA MYRICK MD; Attending Radiologist  This document has been electronically signed. Dec  6 2024  2:55PM    < end of copied text >          RECENT CULTURES:  12-06 @ 13:58 .Nose Nose                No staphylococcus aureus isolated.  "PCR is more Sensitive for identifying MRSA/MSSA."          RESPIRATORY CULTURES:          Studies  Chest X-RAY  CT SCAN Chest   Venous Dopplers: LE:   CT Abdomen  Others

## 2024-12-09 NOTE — PROGRESS NOTE ADULT - SUBJECTIVE AND OBJECTIVE BOX
[facility] NEPHROLOGY- PROGRESS NOTE    74y Female with history of ESRD on HD presents with L eye loss of vision. Nephrology consulted for ESRD status.     REVIEW OF SYSTEMS:  Gen: no fevers  Cards: no chest pain  Resp: + dyspnea on exertion improving  GI: no nausea or vomiting or diarrhea  Vascular: no LE edema    Sulfur (Unknown)  trimethoprim (Other; Rash)  sulfa drugs (Other; Rash)      Hospital Medications: Medications reviewed    VITALS:  T(F): 98.1 (12-09-24 @ 10:15), Max: 98.1 (12-09-24 @ 10:15)  HR: 80 (12-09-24 @ 10:15)  BP: 136/67 (12-09-24 @ 10:15)  RR: 17 (12-09-24 @ 10:15)  SpO2: 98% (12-09-24 @ 09:37)  Wt(kg): --  Height (cm): 162.6 (12-05 @ 19:26), 162.6 (12-05 @ 13:58)  Weight (kg): 117.9 (12-05 @ 13:58)  BMI (kg/m2): 44.6 (12-05 @ 19:26), 44.6 (12-05 @ 13:58)  BSA (m2): 2.19 (12-05 @ 19:26), 2.19 (12-05 @ 13:58)      PHYSICAL EXAM:    Gen: NAD, calm  Cards: RRR, +S1/S2, no M/G/R  Resp: CTA B/L  GI: soft, NT/ND, NABS  Vascular: no LE edema B/L, RIJ TDC intact    LABS:  12-09    139  |  100  |  87[H]  ----------------------------<  79  5.0   |  21[L]  |  7.38[H]    Ca    8.2[L]      09 Dec 2024 06:45  Phos  4.7     12-09  Mg     2.10     12-09      Creatinine Trend: 7.38 <--, 6.03 <--, 4.41 <--, 5.54 <--                        9.9    9.32  )-----------( 228      ( 09 Dec 2024 06:45 )             31.0     Urine Studies:  Urinalysis Basic - ( 09 Dec 2024 06:45 )    Color:  / Appearance:  / SG:  / pH:   Gluc: 79 mg/dL / Ketone:   / Bili:  / Urobili:    Blood:  / Protein:  / Nitrite:    Leuk Esterase:  / RBC:  / WBC    Sq Epi:  / Non Sq Epi:  / Bacteria:           RADIOLOGY & ADDITIONAL STUDIES:

## 2024-12-09 NOTE — PROGRESS NOTE ADULT - SUBJECTIVE AND OBJECTIVE BOX
SUBJECTIVE / OVERNIGHT EVENTS:pt seen and examined  12-09-24    MEDICATIONS  (STANDING):  acetaminophen   IVPB .. 1000 milliGRAM(s) IV Intermittent once  albuterol/ipratropium for Nebulization 3 milliLiter(s) Nebulizer every 6 hours  aspirin  chewable 81 milliGRAM(s) Oral daily  calcitriol   Capsule 0.25 MICROGram(s) Oral <User Schedule>  chlorhexidine 2% Cloths 1 Application(s) Topical daily  cholecalciferol 1000 Unit(s) Oral daily  fluticasone propionate/ salmeterol 250-50 MICROgram(s) Diskus 1 Dose(s) Inhalation two times a day  folic acid 1 milliGRAM(s) Oral daily  heparin   Injectable 7500 Unit(s) SubCutaneous three times a day  latanoprost 0.005% Ophthalmic Solution 1 Drop(s) Left EYE at bedtime  metoclopramide Injectable 10 milliGRAM(s) IV Push once  metoprolol succinate ER 50 milliGRAM(s) Oral daily  predniSONE   Tablet 40 milliGRAM(s) Oral daily  sevelamer carbonate 800 milliGRAM(s) Oral three times a day with meals    MEDICATIONS  (PRN):  diphenhydrAMINE Injectable 25 milliGRAM(s) IV Push every 8 hours PRN migraine  metoclopramide Injectable 10 milliGRAM(s) IV Push every 8 hours PRN migraine  sodium chloride 0.9% Bolus. 100 milliLiter(s) IV Bolus every 5 minutes PRN SBP LESS THAN or EQUAL to 90 mmHg  traMADol 50 milliGRAM(s) Oral every 12 hours PRN for severe pain    Vital Signs Last 24 Hrs  T(C): 36.6 (12-09-24 @ 18:22), Max: 36.7 (12-09-24 @ 10:15)  T(F): 97.9 (12-09-24 @ 18:22), Max: 98.1 (12-09-24 @ 10:15)  HR: 80 (12-09-24 @ 21:31) (80 - 98)  BP: 156/65 (12-09-24 @ 18:22) (121/55 - 170/51)  BP(mean): --  RR: 18 (12-09-24 @ 18:22) (17 - 21)  SpO2: 100% (12-09-24 @ 21:31) (75% - 100%)      Constitutional: No fever, fatigue  Skin: No rash.  Eyes: No recent vision problems or eye pain.  ENT: No congestion, ear pain, or sore throat.  Cardiovascular: No chest pain or palpation.  Respiratory: No cough, shortness of breath, congestion, or wheezing.  Gastrointestinal: No abdominal pain, nausea, vomiting, or diarrhea.  Genitourinary: No dysuria.  Musculoskeletal: No joint swelling.  Neurologic: No headache.    PHYSICAL EXAM:  GENERAL: NAD  EYES: EOMI, PERRLA  NECK: Supple, No JVD  CHEST/LUNG: crackles at bases, no wheezing  HEART:  S1 , S2 +  ABDOMEN: soft , bs+  EXTREMITIES:  edema  NEUROLOGY:alert awake    LABS:  12-09    139  |  100  |  87[H]  ----------------------------<  79  5.0   |  21[L]  |  7.38[H]    Ca    8.2[L]      09 Dec 2024 06:45  Phos  4.7     12-09  Mg     2.10     12-09      Creatinine Trend: 7.38 <--, 6.03 <--, 4.41 <--, 5.54 <--                        9.9    9.32  )-----------( 228      ( 09 Dec 2024 06:45 )             31.0     Urine Studies:  Urinalysis Basic - ( 09 Dec 2024 06:45 )    Color:  / Appearance:  / SG:  / pH:   Gluc: 79 mg/dL / Ketone:   / Bili:  / Urobili:    Blood:  / Protein:  / Nitrite:    Leuk Esterase:  / RBC:  / WBC    Sq Epi:  / Non Sq Epi:  / Bacteria:                     Consultant(s) Notes Reviewed:      Care Discussed with Consultants/Other Providers:

## 2024-12-09 NOTE — PROGRESS NOTE ADULT - SUBJECTIVE AND OBJECTIVE BOX
CARDIOLOGY FOLLOW UP NOTE - DR. CHAN    Patient Name: MELISSA ARREOLA    Date of Service: 24 @ 14:00    no new events      Subjective:    cv: denies chest pain, dyspnea, palpitations, dizziness  pulmonary: denies cough  GI: denies abdominal pain, nausea, vomiting  vascular/legs: no edema   skin: no rash  ROS: otherwise negative   overnight events:      PHYSICAL EXAM:  T(C): 36.7 (24 @ 10:15), Max: 36.7 (24 @ 10:15)  HR: 80 (24 @ 10:15) (80 - 96)  BP: 136/67 (24 @ 10:15) (121/55 - 170/51)  RR: 17 (24 @ 10:15) (17 - 18)  SpO2: 98% (24 @ 09:37) (98% - 99%)  Wt(kg): --  I&O's Summary    Daily     Daily Weight in k (09 Dec 2024 10:15)    Appearance: Normal	  Cardiovascular: Normal S1 S2,RRR, No JVD, No murmurs  Respiratory: Lungs clear to auscultation	  Gastrointestinal:  Soft, Non-tender, + BS	  Extremities: Normal range of motion, No clubbing, cyanosis or edema      Home Medications:  cholecalciferol oral tablet: 1000 unit(s) orally once a day (06 Dec 2024 07:55)  folic acid 1 mg oral tablet: 1 tab(s) orally once a day (06 Dec 2024 07:55)  Metoprolol Succinate ER 50 mg oral tablet, extended release: 1 tab(s) orally once a day (06 Dec 2024 07:55)  sevelamer carbonate 800 mg oral tablet: 1 tab(s) orally 3 times a day (06 Dec 2024 07:55)      MEDICATIONS  (STANDING):  acetaminophen   IVPB .. 1000 milliGRAM(s) IV Intermittent once  albuterol/ipratropium for Nebulization 3 milliLiter(s) Nebulizer every 6 hours  aspirin  chewable 81 milliGRAM(s) Oral daily  calcitriol   Capsule 0.25 MICROGram(s) Oral <User Schedule>  chlorhexidine 2% Cloths 1 Application(s) Topical daily  cholecalciferol 1000 Unit(s) Oral daily  fluticasone propionate/ salmeterol 250-50 MICROgram(s) Diskus 1 Dose(s) Inhalation two times a day  folic acid 1 milliGRAM(s) Oral daily  heparin   Injectable 7500 Unit(s) SubCutaneous three times a day  latanoprost 0.005% Ophthalmic Solution 1 Drop(s) Left EYE at bedtime  metoclopramide Injectable 10 milliGRAM(s) IV Push once  metoprolol succinate ER 50 milliGRAM(s) Oral daily  predniSONE   Tablet 40 milliGRAM(s) Oral daily  sevelamer carbonate 800 milliGRAM(s) Oral three times a day with meals      TELEMETRY: 	    ECG:  	  RADIOLOGY:   DIAGNOSTIC TESTING:  [ ] Echocardiogram:  [ ] Catheterization:  [ ] Stress Test:    OTHER: 	    LABS:	 	    CARDIAC MARKERS:                                      9.9    9.32  )-----------( 228      ( 09 Dec 2024 06:45 )             31.0         139  |  100  |  87[H]  ----------------------------<  79  5.0   |  21[L]  |  7.38[H]    Ca    8.2[L]      09 Dec 2024 06:45  Phos  4.7       Mg     2.10           proBNP:     Lipid Profile:   HgA1c:     Creatinine: 7.38 mg/dL (24 @ 06:45)  Creatinine: 6.03 mg/dL (24 @ 06:40)  Creatinine: 4.41 mg/dL (24 @ 07:18)

## 2024-12-10 ENCOUNTER — TRANSCRIPTION ENCOUNTER (OUTPATIENT)
Age: 74
End: 2024-12-10

## 2024-12-10 DIAGNOSIS — J96.21 ACUTE AND CHRONIC RESPIRATORY FAILURE WITH HYPOXIA: ICD-10-CM

## 2024-12-10 LAB
ANION GAP SERPL CALC-SCNC: 17 MMOL/L — HIGH (ref 7–14)
BASE EXCESS BLDA CALC-SCNC: 1 MMOL/L — SIGNIFICANT CHANGE UP (ref -2–3)
BASE EXCESS BLDV CALC-SCNC: -1.4 MMOL/L — SIGNIFICANT CHANGE UP (ref -2–3)
BUN SERPL-MCNC: 56 MG/DL — HIGH (ref 7–23)
CALCIUM SERPL-MCNC: 8.3 MG/DL — LOW (ref 8.4–10.5)
CHLORIDE SERPL-SCNC: 99 MMOL/L — SIGNIFICANT CHANGE UP (ref 98–107)
CO2 BLDA-SCNC: 28 MMOL/L — HIGH (ref 19–24)
CO2 BLDV-SCNC: 28 MMOL/L — HIGH (ref 22–26)
CO2 SERPL-SCNC: 23 MMOL/L — SIGNIFICANT CHANGE UP (ref 22–31)
CREAT SERPL-MCNC: 5.41 MG/DL — HIGH (ref 0.5–1.3)
EGFR: 8 ML/MIN/1.73M2 — LOW
FLUAV AG NPH QL: SIGNIFICANT CHANGE UP
FLUBV AG NPH QL: SIGNIFICANT CHANGE UP
GLUCOSE SERPL-MCNC: 134 MG/DL — HIGH (ref 70–99)
HCO3 BLDA-SCNC: 27 MMOL/L — SIGNIFICANT CHANGE UP (ref 21–28)
HCO3 BLDV-SCNC: 26 MMOL/L — SIGNIFICANT CHANGE UP (ref 22–29)
HCT VFR BLD CALC: 35.7 % — SIGNIFICANT CHANGE UP (ref 34.5–45)
HGB BLD-MCNC: 11.2 G/DL — LOW (ref 11.5–15.5)
HOROWITZ INDEX BLDA+IHG-RTO: 21 — SIGNIFICANT CHANGE UP
MAGNESIUM SERPL-MCNC: 1.8 MG/DL — SIGNIFICANT CHANGE UP (ref 1.6–2.6)
MCHC RBC-ENTMCNC: 31 PG — SIGNIFICANT CHANGE UP (ref 27–34)
MCHC RBC-ENTMCNC: 31.4 G/DL — LOW (ref 32–36)
MCV RBC AUTO: 98.9 FL — SIGNIFICANT CHANGE UP (ref 80–100)
NRBC # BLD: 0 /100 WBCS — SIGNIFICANT CHANGE UP (ref 0–0)
NRBC # FLD: 0 K/UL — SIGNIFICANT CHANGE UP (ref 0–0)
PCO2 BLDA: 45 MMHG — SIGNIFICANT CHANGE UP (ref 32–45)
PCO2 BLDV: 56 MMHG — HIGH (ref 39–52)
PH BLDA: 7.38 — SIGNIFICANT CHANGE UP (ref 7.35–7.45)
PH BLDV: 7.28 — LOW (ref 7.32–7.43)
PHOSPHATE SERPL-MCNC: 3.3 MG/DL — SIGNIFICANT CHANGE UP (ref 2.5–4.5)
PLATELET # BLD AUTO: 227 K/UL — SIGNIFICANT CHANGE UP (ref 150–400)
PO2 BLDA: 95 MMHG — SIGNIFICANT CHANGE UP (ref 83–108)
PO2 BLDV: 64 MMHG — HIGH (ref 25–45)
POTASSIUM SERPL-MCNC: 4.5 MMOL/L — SIGNIFICANT CHANGE UP (ref 3.5–5.3)
POTASSIUM SERPL-SCNC: 4.5 MMOL/L — SIGNIFICANT CHANGE UP (ref 3.5–5.3)
RBC # BLD: 3.61 M/UL — LOW (ref 3.8–5.2)
RBC # FLD: 13.8 % — SIGNIFICANT CHANGE UP (ref 10.3–14.5)
RSV RNA NPH QL NAA+NON-PROBE: SIGNIFICANT CHANGE UP
SAO2 % BLDA: 97.6 % — SIGNIFICANT CHANGE UP (ref 94–98)
SAO2 % BLDV: 89 % — HIGH (ref 67–88)
SARS-COV-2 RNA SPEC QL NAA+PROBE: SIGNIFICANT CHANGE UP
SODIUM SERPL-SCNC: 139 MMOL/L — SIGNIFICANT CHANGE UP (ref 135–145)
WBC # BLD: 8.64 K/UL — SIGNIFICANT CHANGE UP (ref 3.8–10.5)
WBC # FLD AUTO: 8.64 K/UL — SIGNIFICANT CHANGE UP (ref 3.8–10.5)

## 2024-12-10 PROCEDURE — 71275 CT ANGIOGRAPHY CHEST: CPT | Mod: 26

## 2024-12-10 RX ORDER — HEPARIN SODIUM,PORCINE 1000/ML
1500 VIAL (ML) INJECTION ONCE
Refills: 0 | Status: DISCONTINUED | OUTPATIENT
Start: 2024-12-11 | End: 2024-12-12

## 2024-12-10 RX ORDER — HEPARIN SODIUM,PORCINE 1000/ML
1000 VIAL (ML) INJECTION
Refills: 0 | Status: COMPLETED | OUTPATIENT
Start: 2024-12-11 | End: 2024-12-11

## 2024-12-10 RX ORDER — BENZONATATE 100 MG/1
100 CAPSULE ORAL EVERY 8 HOURS
Refills: 0 | Status: COMPLETED | OUTPATIENT
Start: 2024-12-10 | End: 2024-12-13

## 2024-12-10 RX ADMIN — Medication 7500 UNIT(S): at 21:14

## 2024-12-10 RX ADMIN — METOPROLOL TARTRATE 50 MILLIGRAM(S): 100 TABLET, FILM COATED ORAL at 06:41

## 2024-12-10 RX ADMIN — FLUTICASONE PROPIONATE AND SALMETEROL XINAFOATE 1 DOSE(S): 45; 21 AEROSOL, METERED RESPIRATORY (INHALATION) at 21:13

## 2024-12-10 RX ADMIN — BENZONATATE 100 MILLIGRAM(S): 100 CAPSULE ORAL at 19:56

## 2024-12-10 RX ADMIN — SEVELAMER CARBONATE 800 MILLIGRAM(S): 800 TABLET, FILM COATED ORAL at 08:52

## 2024-12-10 RX ADMIN — Medication 7500 UNIT(S): at 14:32

## 2024-12-10 RX ADMIN — Medication 1000 UNIT(S): at 11:25

## 2024-12-10 RX ADMIN — IPRATROPIUM BROMIDE AND ALBUTEROL SULFATE 3 MILLILITER(S): 2.5; .5 SOLUTION RESPIRATORY (INHALATION) at 21:41

## 2024-12-10 RX ADMIN — Medication 7500 UNIT(S): at 06:42

## 2024-12-10 RX ADMIN — IPRATROPIUM BROMIDE AND ALBUTEROL SULFATE 3 MILLILITER(S): 2.5; .5 SOLUTION RESPIRATORY (INHALATION) at 09:25

## 2024-12-10 RX ADMIN — TRAMADOL HYDROCHLORIDE 50 MILLIGRAM(S): 300 CAPSULE ORAL at 14:31

## 2024-12-10 RX ADMIN — BENZONATATE 100 MILLIGRAM(S): 100 CAPSULE ORAL at 11:55

## 2024-12-10 RX ADMIN — PREDNISONE 40 MILLIGRAM(S): 20 TABLET ORAL at 06:41

## 2024-12-10 RX ADMIN — LATANOPROST 1 DROP(S): 50 SOLUTION/ DROPS OPHTHALMIC at 21:14

## 2024-12-10 RX ADMIN — FLUTICASONE PROPIONATE AND SALMETEROL XINAFOATE 1 DOSE(S): 45; 21 AEROSOL, METERED RESPIRATORY (INHALATION) at 08:53

## 2024-12-10 RX ADMIN — Medication 81 MILLIGRAM(S): at 11:24

## 2024-12-10 RX ADMIN — IPRATROPIUM BROMIDE AND ALBUTEROL SULFATE 3 MILLILITER(S): 2.5; .5 SOLUTION RESPIRATORY (INHALATION) at 13:46

## 2024-12-10 RX ADMIN — SEVELAMER CARBONATE 800 MILLIGRAM(S): 800 TABLET, FILM COATED ORAL at 11:25

## 2024-12-10 RX ADMIN — Medication 1 MILLIGRAM(S): at 11:24

## 2024-12-10 RX ADMIN — CHLORHEXIDINE GLUCONATE 1 APPLICATION(S): 1.2 RINSE ORAL at 11:26

## 2024-12-10 NOTE — PROGRESS NOTE ADULT - SUBJECTIVE AND OBJECTIVE BOX
[facility] NEPHROLOGY- PROGRESS NOTE    74y Female with history of ESRD on HD presents with L eye loss of vision. Nephrology consulted for ESRD status.     REVIEW OF SYSTEMS:  Gen: no fevers  Cards: no chest pain  Resp: + dyspnea on exertion post-HD, + cough  GI: no nausea or vomiting or diarrhea  Vascular: no LE edema    Sulfur (Unknown)  trimethoprim (Other; Rash)  sulfa drugs (Other; Rash)      Hospital Medications: Medications reviewed      VITALS:  T(F): 98.1 (12-10-24 @ 06:40), Max: 98.1 (12-10-24 @ 06:40)  HR: 84 (12-10-24 @ 06:40)  BP: 141/68 (12-10-24 @ 06:40)  RR: 18 (12-10-24 @ 06:40)  SpO2: 98% (12-10-24 @ 06:40)  Wt(kg): --    12-09 @ 07:01  -  12-10 @ 07:00  --------------------------------------------------------  IN: 400 mL / OUT: 2500 mL / NET: -2100 mL        Weight (kg): 117.934 (12-10 @ 08:08)      PHYSICAL EXAM:    Gen: NAD, calm  Cards: RRR, +S1/S2, no M/G/R  Resp: CTA B/L  GI: soft, NT/ND, NABS  Vascular: no LE edema B/L, RIJ TDC intact        LABS:  12-10    139  |  99  |  56[H]  ----------------------------<  134[H]  4.5   |  23  |  5.41[H]    Ca    8.3[L]      10 Dec 2024 10:07  Phos  4.7     12-09  Mg     1.80     12-10      Creatinine Trend: 5.41 <--, 7.38 <--, 6.03 <--, 4.41 <--, 5.54 <--                        11.2   8.64  )-----------( 227      ( 10 Dec 2024 10:07 )             35.7     Urine Studies:  Urinalysis Basic - ( 10 Dec 2024 10:07 )    Color:  / Appearance:  / SG:  / pH:   Gluc: 134 mg/dL / Ketone:   / Bili:  / Urobili:    Blood:  / Protein:  / Nitrite:    Leuk Esterase:  / RBC:  / WBC    Sq Epi:  / Non Sq Epi:  / Bacteria:

## 2024-12-10 NOTE — DISCHARGE NOTE PROVIDER - CARE PROVIDER_API CALL
A Pulmonologist,   Phone: (   )    -  Fax: (   )    -  Follow Up Time: 1 month   Khurram Velásquez)  Neurology  [address], [address]  [place] [address]  Phone: [phone]  Fax: [phone]  Follow Up Time: 2 weeks    A Pulmonologist,   Phone: (   )    -  Fax: (   )    -  Follow Up Time: 1 month    Your, PCP  Phone: (   )    -  Fax: (   )    -  Follow Up Time: 2 weeks    Glenn Joe  Cardiovascular Disease  [address], [address]  [place] [address]  Phone: [phone]  Fax: [phone]  Follow Up Time: 2 weeks   Khurram Velásquez)  Neurology  [address] [facility] [address], [address]  [place] [address]  Phone: [phone]  Fax: [phone]  Follow Up Time: 2 weeks    Glenn Joe  Cardiovascular Disease  [address], [facility] [facility] [address]  [place] [address]  Phone: [phone]  Fax: [phone]  Follow Up Time: 2 weeks    Your, PCP  Phone: (   )    -  Fax: (   )    -  Follow Up Time: 2 weeks    Pepito Castellanos  Pulmonary Disease  [address] [place] [address]  Phone: [phone]  Fax: [phone]  Follow Up Time: 2 weeks

## 2024-12-10 NOTE — PROGRESS NOTE ADULT - SUBJECTIVE AND OBJECTIVE BOX
Date of Service: 12-10-24 @ 11:48    Patient is a 74y old  Female who presents with a chief complaint of L eye vision loss (10 Dec 2024 10:48)      Any change in ROS: seems to be doing  ok  : seems to be sob:  nguyen:      MEDICATIONS  (STANDING):  acetaminophen   IVPB .. 1000 milliGRAM(s) IV Intermittent once  albuterol/ipratropium for Nebulization 3 milliLiter(s) Nebulizer every 6 hours  aspirin  chewable 81 milliGRAM(s) Oral daily  calcitriol   Capsule 0.25 MICROGram(s) Oral <User Schedule>  chlorhexidine 2% Cloths 1 Application(s) Topical daily  cholecalciferol 1000 Unit(s) Oral daily  fluticasone propionate/ salmeterol 250-50 MICROgram(s) Diskus 1 Dose(s) Inhalation two times a day  folic acid 1 milliGRAM(s) Oral daily  heparin   Injectable 7500 Unit(s) SubCutaneous three times a day  latanoprost 0.005% Ophthalmic Solution 1 Drop(s) Left EYE at bedtime  metoclopramide Injectable 10 milliGRAM(s) IV Push once  metoprolol succinate ER 50 milliGRAM(s) Oral daily  predniSONE   Tablet 40 milliGRAM(s) Oral daily  sevelamer carbonate 800 milliGRAM(s) Oral three times a day with meals    MEDICATIONS  (PRN):  benzonatate 100 milliGRAM(s) Oral every 8 hours PRN Cough  diphenhydrAMINE Injectable 25 milliGRAM(s) IV Push every 8 hours PRN migraine  metoclopramide Injectable 10 milliGRAM(s) IV Push every 8 hours PRN migraine  sodium chloride 0.9% Bolus. 100 milliLiter(s) IV Bolus every 5 minutes PRN SBP LESS THAN or EQUAL to 90 mmHg  traMADol 50 milliGRAM(s) Oral every 12 hours PRN for severe pain    Vital Signs Last 24 Hrs  T(C): 36.7 (10 Dec 2024 06:40), Max: 36.7 (10 Dec 2024 06:40)  T(F): 98.1 (10 Dec 2024 06:40), Max: 98.1 (10 Dec 2024 06:40)  HR: 84 (10 Dec 2024 06:40) (80 - 98)  BP: 141/68 (10 Dec 2024 06:40) (141/68 - 156/65)  BP(mean): --  RR: 18 (10 Dec 2024 06:40) (17 - 21)  SpO2: 98% (10 Dec 2024 06:40) (75% - 100%)    Parameters below as of 10 Dec 2024 06:40  Patient On (Oxygen Delivery Method): nasal cannula  O2 Flow (L/min): 1      I&O's Summary    09 Dec 2024 07:01  -  10 Dec 2024 07:00  --------------------------------------------------------  IN: 400 mL / OUT: 2500 mL / NET: -2100 mL          Physical Exam:   GENERAL: obese+  HEENT: AIRAM/   Atraumatic, Normocephalic  ENMT: No tonsillar erythema, exudates, or enlargement; Moist mucous membranes, Good dentition, No lesions  NECK: Supple, No JVD, Normal thyroid  CHEST/LUNG: Clear to auscultaion  CVS: Regular rate and rhythm; No murmurs, rubs, or gallops  GI: : Soft, Nontender, Nondistended; Bowel sounds present  NERVOUS SYSTEM:  Alert & Oriented X3  EXTREMITIES: -edema  LYMPH: No lymphadenopathy noted  SKIN: No rashes or lesions  ENDOCRINOLOGY: No Thyromegaly  PSYCH: Appropriate    Labs:  26, 23                            11.2   8.64  )-----------( 227      ( 10 Dec 2024 10:07 )             35.7                         9.9    9.32  )-----------( 228      ( 09 Dec 2024 06:45 )             31.0                         10.6   11.47 )-----------( 236      ( 08 Dec 2024 06:40 )             33.4                         11.6   9.27  )-----------( 255      ( 07 Dec 2024 07:18 )             36.4     12-10    139  |  99  |  56[H]  ----------------------------<  134[H]  4.5   |  23  |  5.41[H]  12-09    139  |  100  |  87[H]  ----------------------------<  79  5.0   |  21[L]  |  7.38[H]  12-08    139  |  99  |  61[H]  ----------------------------<  121[H]  5.1   |  22  |  6.03[H]  12-07    137  |  98  |  36[H]  ----------------------------<  126[H]  5.7[H]   |  25  |  4.41[H]    Ca    8.3[L]      10 Dec 2024 10:07  Ca    8.2[L]      09 Dec 2024 06:45  Phos  4.7     12-09  Mg     1.80     12-10  Mg     2.10     12-09    TPro  x   /  Alb  x   /  TBili  x   /  DBili  x   /  AST  x   /  ALT  25  /  AlkPhos  x   12-06    CAPILLARY BLOOD GLUCOSE          rad< from: Xray Chest 1 View- PORTABLE-Urgent (Xray Chest 1 View- PORTABLE-Urgent .) (12.06.24 @ 13:57) >    TECHNIQUE: Single frontal, portable view of the chest was obtained.    COMPARISON: Chest x-ray None.    FINDINGS:    Right-sided hemodialysis catheter is present unchanged.  The heart size is not enlarged.  The lungs are clear.  There is no pneumothorax or pleural effusion.  There are no acute osseous abnormalities.    IMPRESSION:  Clear lungs.    --- End of Report ---          RADHA WHEELER MD; Resident Radiologist  This document has been electronically signed.  BELIA MYRICK MD; Attending Radiologist  This document has been electronically signed. Dec  6 2024  2:55PM    < end of copied text >      Urinalysis Basic - ( 10 Dec 2024 10:07 )    Color: x / Appearance: x / SG: x / pH: x  Gluc: 134 mg/dL / Ketone: x  / Bili: x / Urobili: x   Blood: x / Protein: x / Nitrite: x   Leuk Esterase: x / RBC: x / WBC x   Sq Epi: x / Non Sq Epi: x / Bacteria: x            RECENT CULTURES:  12-06 @ 13:58 .Nose Nose                No staphylococcus aureus isolated.  "PCR is more Sensitive for identifying MRSA/MSSA."          RESPIRATORY CULTURES:          Studies  Chest X-RAY  CT SCAN Chest   Venous Dopplers: LE:   CT Abdomen  Others

## 2024-12-10 NOTE — PROGRESS NOTE ADULT - SUBJECTIVE AND OBJECTIVE BOX
CARDIOLOGY FOLLOW UP - Dr. Joe  DATE OF SERVICE: 12/10/24    CC no cp or sob      REVIEW OF SYSTEMS:  CONSTITUTIONAL: No fever, weight loss, or fatigue  RESPIRATORY: No cough, wheezing, chills or hemoptysis; No Shortness of Breath  CARDIOVASCULAR: No chest pain, palpitations, passing out, dizziness  GASTROINTESTINAL: No abdominal or epigastric pain. No nausea, vomiting, or hematemesis; No diarrhea or constipation. No melena or hematochezia.      PHYSICAL EXAM:  T(C): 36.7 (12-10-24 @ 06:40), Max: 36.7 (12-10-24 @ 06:40)  HR: 84 (12-10-24 @ 06:40) (80 - 98)  BP: 141/68 (12-10-24 @ 06:40) (141/68 - 156/65)  RR: 18 (12-10-24 @ 06:40) (17 - 21)  SpO2: 98% (12-10-24 @ 06:40) (75% - 100%)  Wt(kg): --  I&O's Summary    09 Dec 2024 07:01  -  10 Dec 2024 07:00  --------------------------------------------------------  IN: 400 mL / OUT: 2500 mL / NET: -2100 mL        Appearance: Normal	  Cardiovascular: Normal S1 S2,RRR, No JVD, No murmurs  Respiratory: Lungs clear to auscultation	  Gastrointestinal:  Soft, Non-tender, + BS	  Extremities: Normal range of motion, No clubbing, cyanosis or edema      Home Medications:  cholecalciferol oral tablet: 1000 unit(s) orally once a day (06 Dec 2024 07:55)  folic acid 1 mg oral tablet: 1 tab(s) orally once a day (06 Dec 2024 07:55)  Metoprolol Succinate ER 50 mg oral tablet, extended release: 1 tab(s) orally once a day (06 Dec 2024 07:55)  sevelamer carbonate 800 mg oral tablet: 1 tab(s) orally 3 times a day (06 Dec 2024 07:55)      MEDICATIONS  (STANDING):  acetaminophen   IVPB .. 1000 milliGRAM(s) IV Intermittent once  albuterol/ipratropium for Nebulization 3 milliLiter(s) Nebulizer every 6 hours  aspirin  chewable 81 milliGRAM(s) Oral daily  calcitriol   Capsule 0.25 MICROGram(s) Oral <User Schedule>  chlorhexidine 2% Cloths 1 Application(s) Topical daily  cholecalciferol 1000 Unit(s) Oral daily  fluticasone propionate/ salmeterol 250-50 MICROgram(s) Diskus 1 Dose(s) Inhalation two times a day  folic acid 1 milliGRAM(s) Oral daily  heparin   Injectable 7500 Unit(s) SubCutaneous three times a day  latanoprost 0.005% Ophthalmic Solution 1 Drop(s) Left EYE at bedtime  metoclopramide Injectable 10 milliGRAM(s) IV Push once  metoprolol succinate ER 50 milliGRAM(s) Oral daily  predniSONE   Tablet 40 milliGRAM(s) Oral daily  sevelamer carbonate 800 milliGRAM(s) Oral three times a day with meals      TELEMETRY: nsr    ECG:  	  RADIOLOGY:   DIAGNOSTIC TESTING:  [ ] Echocardiogram:  [ ]  Catheterization:  [ ] Stress Test:    OTHER: 	    LABS:	 	                            9.9    9.32  )-----------( 228      ( 09 Dec 2024 06:45 )             31.0     12-09    139  |  100  |  87[H]  ----------------------------<  79  5.0   |  21[L]  |  7.38[H]    Ca    8.2[L]      09 Dec 2024 06:45  Phos  4.7     12-09  Mg     2.10     12-09

## 2024-12-10 NOTE — DISCHARGE NOTE PROVIDER - NPI NUMBER (FOR SYSADMIN USE ONLY) :
[UNKNOWN] [[id number]],[UNKNOWN],[UNKNOWN],[[id number]] [[id number]],[[id number]],[UNKNOWN],[[id number]]

## 2024-12-10 NOTE — DISCHARGE NOTE PROVIDER - CARE PROVIDERS DIRECT ADDRESSES
,DirectAddress_Unknown ,DirectAddress_Unknown,DirectAddress_Unknown,DirectAddress_Unknown,[email or web address] ,DirectAddress_Unknown,[email or web address],DirectAddress_Unknown,DirectAddress_Unknown

## 2024-12-10 NOTE — DISCHARGE NOTE PROVIDER - NSDCMRMEDTOKEN_GEN_ALL_CORE_FT
albuterol 90 mcg/inh inhalation aerosol: 2 puff(s) inhaled every 6 hours as needed for , Shortness of Breath and/or Wheezing  calcitriol 0.5 mcg oral capsule: 2 cap(s) orally 3 times a week Monday, Wednesday, Friday  cholecalciferol oral tablet: 1000 unit(s) orally once a day  folic acid 1 mg oral tablet: 1 tab(s) orally once a day  Metoprolol Succinate ER 50 mg oral tablet, extended release: 1 tab(s) orally once a day  sevelamer carbonate 800 mg oral tablet: 1 tab(s) orally 3 times a day  Ultram 50 mg oral tablet: 1 tab(s) orally every 12 hours as needed for  severe pain MDD: 2 tabs/ day   albuterol 90 mcg/inh inhalation aerosol: 2 puff(s) inhaled every 6 hours as needed for , Shortness of Breath and/or Wheezing  aspirin 81 mg oral tablet, chewable: 1 tab(s) orally once a day  calcitriol 0.25 mcg oral capsule: 1 cap(s) orally 3 times a week on Monday, Wednesday, Friday  calcitriol 0.5 mcg oral capsule: 2 cap(s) orally 3 times a week Monday, Wednesday, Friday  calcium acetate 667 mg oral tablet: 1 tab(s) orally 3 times a day  cholecalciferol oral tablet: 1000 unit(s) orally once a day  fluticasone-salmeterol 250 mcg-50 mcg inhalation powder: 1 dose(s) inhaled 2 times a day  folic acid 1 mg oral tablet: 1 tab(s) orally once a day  latanoprost 0.005% ophthalmic solution: 1 drop(s) to each affected eye once a day (at bedtime)  losartan 50 mg oral tablet: 1 tab(s) orally once a day (at bedtime)  Metoprolol Succinate ER 50 mg oral tablet, extended release: 1 tab(s) orally once a day  oseltamivir 30 mg oral capsule: 1 cap(s) orally once a day after dialysis on Monday 12/16  Ultram 50 mg oral tablet: 1 tab(s) orally every 12 hours as needed for  severe pain MDD: 2 tabs/ day   albuterol 90 mcg/inh inhalation aerosol: 2 puff(s) inhaled every 6 hours as needed for , Shortness of Breath and/or Wheezing  aspirin 81 mg oral tablet, chewable: 1 tab(s) orally once a day  calcitriol 0.25 mcg oral capsule: 1 cap(s) orally 3 times a week on Monday, Wednesday, Friday  calcium acetate 667 mg oral tablet: 1 tab(s) orally 3 times a day  cholecalciferol oral tablet: 1000 unit(s) orally once a day  fluticasone-salmeterol 250 mcg-50 mcg inhalation powder: 1 dose(s) inhaled 2 times a day  folic acid 1 mg oral tablet: 1 tab(s) orally once a day  latanoprost 0.005% ophthalmic solution: 1 drop(s) to each affected eye once a day (at bedtime)  losartan 50 mg oral tablet: 1 tab(s) orally once a day (at bedtime)  Medrol Dosepak 4 mg oral tablet: 6 tab(s) orally once a day FOLLOW THE PACKAGE INSTRUCTIONS  Metoprolol Succinate ER 50 mg oral tablet, extended release: 1 tab(s) orally once a day  oseltamivir 30 mg oral capsule: 1 cap(s) orally once a day after dialysis on Monday 12/16  Ultram 50 mg oral tablet: 1 tab(s) orally every 12 hours as needed for  severe pain MDD: 2 tabs/ day

## 2024-12-10 NOTE — DISCHARGE NOTE PROVIDER - HOSPITAL COURSE
73 y/o  F with pmh of ESRD on HD (MWF), PE (in 2013 after TKR s/p Xarelto), LARRY (not on CPAP), migraines, HTN p/w L eye vision loss and headache.     Acute on chronic respiratory failure with hypoxia.   - pt c/o SOB, she has seen pulm as an outpt and was given albuterol only to use, outpt PFT  - seen by pulm inpt, rec PO steroids x3 days  - ABG w/o acidosis  - CTA neg for PE  - Covid/flu swab neg    Vision loss, left eye.   - as per ophthal, transient vision loss OS  - patient endorses history of migraines with visual aura - may explain current headache and could also be associated with vision loss  - Unable to perform dilated fundus exam OS due to dense cataract as above. Therefore unknown if patient had retinal vascular event.  - rec continue latanoprost nightly left eye and schedule outpatient followup for cataract eval   - Recommend neuro evaluation for migraine management as well as complete stroke work up given episode of transient vision loss  - CT Head No Cont 12/7: No acute intracranial findings  - Neuro cs appreciated-->left eye visual loss. while patient has some migranous features. It would not explain extent of pt's left monocular visual loss. rec ASA 81mg and outpt open MRI (can not tolerate here)    Migraine  - Neuro recs appreciated, continue with meds PRN, outpatient MRI without contrast    ESRD on hemodialysis  -HD as scheduled.    Essential hypertension  - continue metoprolol.      On _________ , discussed with Dr. Babb, patient is medically cleared and optimized for discharge today to _____________ . All medications were reviewed with attending, and any new/required prescriptions were sent to mutually agreed upon pharmacy.   73 y/o  F with pmh of ESRD on HD (MWF), PE (in 2013 after TKR s/p Xarelto), LARRY (not on CPAP), migraines, HTN p/w L eye vision loss and headache.     Acute on chronic respiratory failure with hypoxia.   - pt c/o SOB, she has seen pulm as an outpt and was given albuterol only to use, outpt PFT  - seen by pulm inpt, rec PO steroids x3 days  - ABG w/o acidosis  - CTA neg for PE  - Covid/flu swab neg    Vision loss, left eye.   - as per ophthal, transient vision loss OS  - patient endorses history of migraines with visual aura - may explain current headache and could also be associated with vision loss  - Unable to perform dilated fundus exam OS due to dense cataract as above. Therefore unknown if patient had retinal vascular event.  - rec continue latanoprost nightly left eye and schedule outpatient followup for cataract eval   - Recommend neuro evaluation for migraine management as well as complete stroke work up given episode of transient vision loss  - CT Head No Cont 12/7: No acute intracranial findings  - Neuro cs appreciated-->left eye visual loss. while patient has some migranous features. It would not explain extent of pt's left monocular visual loss. rec ASA 81mg and outpt open MRI (can not tolerate here)    Migraine  - Neuro recs appreciated, continue with meds PRN, outpatient MRI without contrast    ESRD on hemodialysis  -HD as scheduled.    Essential hypertension  - continue metoprolol.      On 12/13/2024 , discussed with Dr. Babb, patient is medically cleared and optimized for discharge today to home . All medications were reviewed with attending, and any new/required prescriptions were sent to mutually agreed upon pharmacy.   75 y/o  F with pmh of ESRD on HD (MWF), PE (in 2013 after TKR s/p Xarelto), LARRY (not on CPAP), migraines, HTN p/w L eye vision loss and headache.     Acute on chronic respiratory failure with hypoxia.   - pt c/o SOB, she has seen pulm as an outpt and was given albuterol only to use, outpt PFT  - seen by pulm inpt, rec PO steroids x3 days  - ABG w/o acidosis  - CTA neg for PE  - Covid/flu swab neg    Vision loss, left eye.   - as per ophthal, transient vision loss OS  - patient endorses history of migraines with visual aura - may explain current headache and could also be associated with vision loss  - Unable to perform dilated fundus exam OS due to dense cataract as above. Therefore unknown if patient had retinal vascular event.  - rec continue latanoprost nightly left eye and schedule outpatient followup for cataract eval   - Recommend neuro evaluation for migraine management as well as complete stroke work up given episode of transient vision loss  - CT Head No Cont 12/7: No acute intracranial findings  - Neuro cs appreciated-->left eye visual loss. while patient has some migranous features. It would not explain extent of pt's left monocular visual loss. rec ASA 81mg and outpt open MRI (can not tolerate here)    Migraine  - Neuro recs appreciated, continue with meds PRN, outpatient MRI without contrast. Medrol nader on discharge per Dr. Velásquez     ESRD on hemodialysis  -HD as scheduled.    Essential hypertension  - continue metoprolol.      On 12/13/2024 , discussed with Dr. Babb, patient is medically cleared and optimized for discharge today to home . All medications were reviewed with attending, and any new/required prescriptions were sent to mutually agreed upon pharmacy.

## 2024-12-10 NOTE — DISCHARGE NOTE PROVIDER - NSDCCPCAREPLAN_GEN_ALL_CORE_FT
PRINCIPAL DISCHARGE DIAGNOSIS  Diagnosis: Vision loss  Assessment and Plan of Treatment: You were seen by Ophthalmology ---------------------      SECONDARY DISCHARGE DIAGNOSES  Diagnosis: Acute on chronic respiratory failure with hypoxia  Assessment and Plan of Treatment: Please follow up outpatient with PCP/pulmonologist for pulmonary function tests.    Diagnosis: Migraine  Assessment and Plan of Treatment: You were seen by neurology ---------------    Diagnosis: ESRD on hemodialysis  Assessment and Plan of Treatment: Please continue to follow your dialysis schedule and refer to your primary provider/nephrologist for further care/recommendations. Continue your medications and supplementation as directed.       PRINCIPAL DISCHARGE DIAGNOSIS  Diagnosis: Vision loss  Assessment and Plan of Treatment: You were seen by Ophthalmology Who recommends that you get an open MRI outpatient and follow up with them in their office. managing your migraines can help with your vision problems but further assessment can be done with tools in their office.      SECONDARY DISCHARGE DIAGNOSES  Diagnosis: Migraine  Assessment and Plan of Treatment: You were seen by neurology who recommends continuing the migraine medications as prescribed and follow up outpatient. You should get an open MRI of your brain and C Spine.    Diagnosis: ESRD on hemodialysis  Assessment and Plan of Treatment: Please continue to follow your dialysis schedule and refer to your primary provider/nephrologist for further care/recommendations. Continue your medications and supplementation as directed.      Diagnosis: Acute on chronic respiratory failure with hypoxia  Assessment and Plan of Treatment: Please follow up outpatient with PCP/pulmonologist for pulmonary function tests.    Diagnosis: Influenza A  Assessment and Plan of Treatment: You tested positive on 12/12. You should take your tamiflu as prescribed.    Diagnosis: Shortness of breath  Assessment and Plan of Treatment: Follow up with the cardiologist for outpatient cardiac work up.     PRINCIPAL DISCHARGE DIAGNOSIS  Diagnosis: Vision loss  Assessment and Plan of Treatment: You were seen by Ophthalmology Who recommends that you get an open MRI outpatient and follow up with them in their office. managing your migraines can help with your vision problems but further assessment can be done with tools in their office.      SECONDARY DISCHARGE DIAGNOSES  Diagnosis: Migraine  Assessment and Plan of Treatment: You were seen by neurology who recommended a short course of steroids for your headaches. follow up outpatient. You should get an open MRI of your brain and C Spine.    Diagnosis: ESRD on hemodialysis  Assessment and Plan of Treatment: Please continue to follow your dialysis schedule and refer to your primary provider/nephrologist for further care/recommendations. Continue your medications and supplementation as directed.      Diagnosis: Acute on chronic respiratory failure with hypoxia  Assessment and Plan of Treatment: Please follow up outpatient with PCP/pulmonologist for pulmonary function tests.    Diagnosis: Influenza A  Assessment and Plan of Treatment: You tested positive on 12/12. You should take your tamiflu as prescribed.    Diagnosis: Shortness of breath  Assessment and Plan of Treatment: Follow up with the cardiologist for outpatient cardiac work up.     PRINCIPAL DISCHARGE DIAGNOSIS  Diagnosis: Vision loss  Assessment and Plan of Treatment: You were seen by Ophthalmology Who recommends that you get an open MRI outpatient and follow up with them in their office. managing your migraines can help with your vision problems but further assessment can be done with tools in their office.      SECONDARY DISCHARGE DIAGNOSES  Diagnosis: Migraine  Assessment and Plan of Treatment: You were seen by neurology who recommended a short course of steroids for your headaches. follow up outpatient. You should get an open MRI of your brain and C Spine.    Diagnosis: Cataract  Assessment and Plan of Treatment: Follow up with Ophthalmology and use your eye drops.    Diagnosis: ESRD on hemodialysis  Assessment and Plan of Treatment: Please continue to follow your dialysis schedule and refer to your primary provider/nephrologist for further care/recommendations. Continue your medications and supplementation as directed.      Diagnosis: Acute on chronic respiratory failure with hypoxia  Assessment and Plan of Treatment: Please follow up outpatient with PCP/pulmonologist for pulmonary function tests.    Diagnosis: Influenza A  Assessment and Plan of Treatment: You tested positive on 12/12. You should take your tamiflu as prescribed.    Diagnosis: Shortness of breath  Assessment and Plan of Treatment: Follow up with the cardiologist for outpatient cardiac work up.

## 2024-12-10 NOTE — PROGRESS NOTE ADULT - SUBJECTIVE AND OBJECTIVE BOX
SUBJECTIVE / OVERNIGHT EVENTS:pt seen and examined, pt still osb   12-10-24    MEDICATIONS  (STANDING):  acetaminophen   IVPB .. 1000 milliGRAM(s) IV Intermittent once  albuterol/ipratropium for Nebulization 3 milliLiter(s) Nebulizer every 6 hours  aspirin  chewable 81 milliGRAM(s) Oral daily  calcitriol   Capsule 0.25 MICROGram(s) Oral <User Schedule>  chlorhexidine 2% Cloths 1 Application(s) Topical daily  cholecalciferol 1000 Unit(s) Oral daily  fluticasone propionate/ salmeterol 250-50 MICROgram(s) Diskus 1 Dose(s) Inhalation two times a day  folic acid 1 milliGRAM(s) Oral daily  heparin   Injectable 7500 Unit(s) SubCutaneous three times a day  latanoprost 0.005% Ophthalmic Solution 1 Drop(s) Left EYE at bedtime  metoclopramide Injectable 10 milliGRAM(s) IV Push once  metoprolol succinate ER 50 milliGRAM(s) Oral daily  predniSONE   Tablet 40 milliGRAM(s) Oral daily  sevelamer carbonate 800 milliGRAM(s) Oral three times a day with meals    MEDICATIONS  (PRN):  benzonatate 100 milliGRAM(s) Oral every 8 hours PRN Cough  diphenhydrAMINE Injectable 25 milliGRAM(s) IV Push every 8 hours PRN migraine  metoclopramide Injectable 10 milliGRAM(s) IV Push every 8 hours PRN migraine  sodium chloride 0.9% Bolus. 100 milliLiter(s) IV Bolus every 5 minutes PRN SBP LESS THAN or EQUAL to 90 mmHg  traMADol 50 milliGRAM(s) Oral every 12 hours PRN for severe pain    Vital Signs Last 24 Hrs  T(C): 36.3 (12-10-24 @ 13:13), Max: 36.7 (12-10-24 @ 06:40)  T(F): 97.4 (12-10-24 @ 13:13), Max: 98.1 (12-10-24 @ 06:40)  HR: 88 (12-10-24 @ 13:13) (80 - 88)  BP: 136/74 (12-10-24 @ 13:13) (136/74 - 156/65)  BP(mean): --  RR: 18 (12-10-24 @ 13:13) (18 - 18)  SpO2: 98% (12-10-24 @ 13:13) (98% - 100%)      Constitutional: No fever, fatigue  Skin: No rash.  Eyes: No recent vision problems or eye pain.  ENT: No congestion, ear pain, or sore throat.  Cardiovascular: No chest pain or palpation.  Respiratory: No cough, shortness of breath, congestion, or wheezing.  Gastrointestinal: No abdominal pain, nausea, vomiting, or diarrhea.  Genitourinary: No dysuria.  Musculoskeletal: No joint swelling.  Neurologic: No headache.    PHYSICAL EXAM:  GENERAL: NAD  EYES: EOMI, PERRLA  NECK: Supple, No JVD  CHEST/LUNG: crackles at bases, no wheezing  HEART:  S1 , S2 +  ABDOMEN: soft , bs+  EXTREMITIES:  edema  NEUROLOGY:alert awake      LABS:  12-10    139  |  99  |  56[H]  ----------------------------<  134[H]  4.5   |  23  |  5.41[H]    Ca    8.3[L]      10 Dec 2024 10:07  Phos  3.3     12-10  Mg     1.80     12-10      Creatinine Trend: 5.41 <--, 7.38 <--, 6.03 <--, 4.41 <--, 5.54 <--                        11.2   8.64  )-----------( 227      ( 10 Dec 2024 10:07 )             35.7     Urine Studies:  Urinalysis Basic - ( 10 Dec 2024 10:07 )    Color:  / Appearance:  / SG:  / pH:   Gluc: 134 mg/dL / Ketone:   / Bili:  / Urobili:    Blood:  / Protein:  / Nitrite:    Leuk Esterase:  / RBC:  / WBC    Sq Epi:  / Non Sq Epi:  / Bacteria:             ABG - ( 10 Dec 2024 13:05 )  pH, Arterial: 7.38  pH, Blood: x     /  pCO2: 45    /  pO2: 95    / HCO3: 27    / Base Excess: 1.0   /  SaO2: 97.6                  ia:                     Consultant(s) Notes Reviewed:      Care Discussed with Consultants/Other Providers:

## 2024-12-10 NOTE — DISCHARGE NOTE PROVIDER - NSFOLLOWUPCLINICS_GEN_ALL_ED_FT
[facility] Ophthalmology  Ophthalmology  [address] [place] [address], [facility] [address]  [place] [address]  Phone: [phone]  Fax:

## 2024-12-10 NOTE — DISCHARGE NOTE PROVIDER - NSDCFUADDAPPT_GEN_ALL_CORE_FT
Outpatient Follow-up: Patient should follow-up with his/her ophthalmologist or with [facility] Department of Ophthalmology within 1 week of after discharge at:  [address]  [place], [place] [id number] [phone]   Follow-up with his/her ophthalmologist or with [facility] Department of Ophthalmology within 1 week of after discharge at:  [address]  [place] [address]  [phone]

## 2024-12-10 NOTE — DISCHARGE NOTE PROVIDER - PROVIDER TOKENS
FREE:[LAST:[A Pulmonologist],PHONE:[(   )    -],FAX:[(   )    -],FOLLOWUP:[1 month]] PROVIDER:[TOKEN:[[id number]],FOLLOWUP:[2 weeks]],FREE:[LAST:[A Pulmonologist],PHONE:[(   )    -],FAX:[(   )    -],FOLLOWUP:[1 month]],FREE:[LAST:[Your],FIRST:[PCP],PHONE:[(   )    -],FAX:[(   )    -],FOLLOWUP:[2 weeks]],PROVIDER:[TOKEN:[[id number]],FOLLOWUP:[2 weeks]] PROVIDER:[TOKEN:[[id number]],FOLLOWUP:[2 weeks]],PROVIDER:[TOKEN:[[id number]],FOLLOWUP:[2 weeks]],FREE:[LAST:[Your],FIRST:[PCP],PHONE:[(   )    -],FAX:[(   )    -],FOLLOWUP:[2 weeks]],PROVIDER:[TOKEN:[[id number]],FOLLOWUP:[2 weeks]]

## 2024-12-10 NOTE — DISCHARGE NOTE PROVIDER - DETAILS OF MALNUTRITION DIAGNOSIS/DIAGNOSES
This patient has been assessed with a concern for Malnutrition and was treated during this hospitalization for the following Nutrition diagnosis/diagnoses:     -  12/13/2024: Morbid obesity (BMI > 40)

## 2024-12-11 LAB
ANION GAP SERPL CALC-SCNC: 17 MMOL/L — HIGH (ref 7–14)
BUN SERPL-MCNC: 69 MG/DL — HIGH (ref 7–23)
CALCIUM SERPL-MCNC: 8.2 MG/DL — LOW (ref 8.4–10.5)
CHLORIDE SERPL-SCNC: 102 MMOL/L — SIGNIFICANT CHANGE UP (ref 98–107)
CO2 SERPL-SCNC: 20 MMOL/L — LOW (ref 22–31)
CREAT SERPL-MCNC: 6.26 MG/DL — HIGH (ref 0.5–1.3)
EGFR: 7 ML/MIN/1.73M2 — LOW
GLUCOSE BLDC GLUCOMTR-MCNC: 122 MG/DL — HIGH (ref 70–99)
GLUCOSE SERPL-MCNC: 85 MG/DL — SIGNIFICANT CHANGE UP (ref 70–99)
HCT VFR BLD CALC: 34.4 % — LOW (ref 34.5–45)
HGB BLD-MCNC: 10.4 G/DL — LOW (ref 11.5–15.5)
MAGNESIUM SERPL-MCNC: 2 MG/DL — SIGNIFICANT CHANGE UP (ref 1.6–2.6)
MCHC RBC-ENTMCNC: 30.1 PG — SIGNIFICANT CHANGE UP (ref 27–34)
MCHC RBC-ENTMCNC: 30.2 G/DL — LOW (ref 32–36)
MCV RBC AUTO: 99.4 FL — SIGNIFICANT CHANGE UP (ref 80–100)
NRBC # BLD: 0 /100 WBCS — SIGNIFICANT CHANGE UP (ref 0–0)
NRBC # FLD: 0 K/UL — SIGNIFICANT CHANGE UP (ref 0–0)
PHOSPHATE SERPL-MCNC: 4 MG/DL — SIGNIFICANT CHANGE UP (ref 2.5–4.5)
PLATELET # BLD AUTO: 214 K/UL — SIGNIFICANT CHANGE UP (ref 150–400)
POTASSIUM SERPL-MCNC: 5.2 MMOL/L — SIGNIFICANT CHANGE UP (ref 3.5–5.3)
POTASSIUM SERPL-SCNC: 5.2 MMOL/L — SIGNIFICANT CHANGE UP (ref 3.5–5.3)
RBC # BLD: 3.46 M/UL — LOW (ref 3.8–5.2)
RBC # FLD: 14.2 % — SIGNIFICANT CHANGE UP (ref 10.3–14.5)
SODIUM SERPL-SCNC: 139 MMOL/L — SIGNIFICANT CHANGE UP (ref 135–145)
WBC # BLD: 8.21 K/UL — SIGNIFICANT CHANGE UP (ref 3.8–10.5)
WBC # FLD AUTO: 8.21 K/UL — SIGNIFICANT CHANGE UP (ref 3.8–10.5)

## 2024-12-11 RX ADMIN — Medication 25 MILLIGRAM(S): at 01:40

## 2024-12-11 RX ADMIN — TRAMADOL HYDROCHLORIDE 50 MILLIGRAM(S): 300 CAPSULE ORAL at 10:49

## 2024-12-11 RX ADMIN — Medication 1 MILLIGRAM(S): at 10:50

## 2024-12-11 RX ADMIN — FLUTICASONE PROPIONATE AND SALMETEROL XINAFOATE 1 DOSE(S): 45; 21 AEROSOL, METERED RESPIRATORY (INHALATION) at 08:39

## 2024-12-11 RX ADMIN — IPRATROPIUM BROMIDE AND ALBUTEROL SULFATE 3 MILLILITER(S): 2.5; .5 SOLUTION RESPIRATORY (INHALATION) at 14:36

## 2024-12-11 RX ADMIN — TRAMADOL HYDROCHLORIDE 50 MILLIGRAM(S): 300 CAPSULE ORAL at 11:49

## 2024-12-11 RX ADMIN — FLUTICASONE PROPIONATE AND SALMETEROL XINAFOATE 1 DOSE(S): 45; 21 AEROSOL, METERED RESPIRATORY (INHALATION) at 20:34

## 2024-12-11 RX ADMIN — SEVELAMER CARBONATE 800 MILLIGRAM(S): 800 TABLET, FILM COATED ORAL at 12:29

## 2024-12-11 RX ADMIN — LATANOPROST 1 DROP(S): 50 SOLUTION/ DROPS OPHTHALMIC at 20:41

## 2024-12-11 RX ADMIN — METOPROLOL TARTRATE 50 MILLIGRAM(S): 100 TABLET, FILM COATED ORAL at 05:09

## 2024-12-11 RX ADMIN — PREDNISONE 40 MILLIGRAM(S): 20 TABLET ORAL at 05:09

## 2024-12-11 RX ADMIN — METOCLOPRAMIDE HYDROCHLORIDE 10 MILLIGRAM(S): 10 TABLET ORAL at 20:51

## 2024-12-11 RX ADMIN — SEVELAMER CARBONATE 800 MILLIGRAM(S): 800 TABLET, FILM COATED ORAL at 08:39

## 2024-12-11 RX ADMIN — Medication 7500 UNIT(S): at 12:28

## 2024-12-11 RX ADMIN — Medication 81 MILLIGRAM(S): at 10:49

## 2024-12-11 RX ADMIN — Medication 7500 UNIT(S): at 20:42

## 2024-12-11 RX ADMIN — IPRATROPIUM BROMIDE AND ALBUTEROL SULFATE 3 MILLILITER(S): 2.5; .5 SOLUTION RESPIRATORY (INHALATION) at 21:41

## 2024-12-11 RX ADMIN — IPRATROPIUM BROMIDE AND ALBUTEROL SULFATE 3 MILLILITER(S): 2.5; .5 SOLUTION RESPIRATORY (INHALATION) at 09:56

## 2024-12-11 RX ADMIN — Medication 25 MILLIGRAM(S): at 20:49

## 2024-12-11 RX ADMIN — BENZONATATE 100 MILLIGRAM(S): 100 CAPSULE ORAL at 20:40

## 2024-12-11 RX ADMIN — Medication 7500 UNIT(S): at 05:06

## 2024-12-11 RX ADMIN — BENZONATATE 100 MILLIGRAM(S): 100 CAPSULE ORAL at 10:50

## 2024-12-11 RX ADMIN — Medication 1000 UNIT(S): at 10:49

## 2024-12-11 RX ADMIN — CHLORHEXIDINE GLUCONATE 1 APPLICATION(S): 1.2 RINSE ORAL at 11:06

## 2024-12-11 RX ADMIN — CALCITRIOL 0.25 MICROGRAM(S): 0.5 CAPSULE, LIQUID FILLED ORAL at 08:39

## 2024-12-11 NOTE — PROGRESS NOTE ADULT - SUBJECTIVE AND OBJECTIVE BOX
[facility] NEPHROLOGY- PROGRESS NOTE    74y Female with history of ESRD on HD presents with L eye loss of vision. Nephrology consulted for ESRD status.     REVIEW OF SYSTEMS:  Gen: no fevers  Cards: no chest pain  Resp: + dyspnea on exertion, + cough  GI: no nausea or vomiting or diarrhea  Vascular: no LE edema    Sulfur (Unknown)  trimethoprim (Other; Rash)  sulfa drugs (Other; Rash)      Hospital Medications: Medications reviewed        VITALS:  T(F): 98.3 (12-11-24 @ 11:11), Max: 98.3 (12-11-24 @ 11:11)  HR: 105 (12-11-24 @ 11:11)  BP: 136/73 (12-11-24 @ 11:11)  RR: 20 (12-11-24 @ 11:11)  SpO2: 99% (12-11-24 @ 11:11)  Wt(kg): --        PHYSICAL EXAM:    Gen: NAD, calm  Cards: RRR, +S1/S2, no M/G/R  Resp: CTA B/L  GI: soft, NT/ND, NABS  Vascular: no LE edema B/L, RIJ TDC intact        LABS:  12-11    139  |  102  |  69[H]  ----------------------------<  85  5.2   |  20[L]  |  6.26[H]    Ca    8.2[L]      11 Dec 2024 06:59  Phos  4.0     12-11  Mg     2.00     12-11      Creatinine Trend: 6.26 <--, 5.41 <--, 7.38 <--, 6.03 <--, 4.41 <--, 5.54 <--                        10.4   8.21  )-----------( 214      ( 11 Dec 2024 06:59 )             34.4     Urine Studies:  Urinalysis Basic - ( 11 Dec 2024 06:59 )    Color:  / Appearance:  / SG:  / pH:   Gluc: 85 mg/dL / Ketone:   / Bili:  / Urobili:    Blood:  / Protein:  / Nitrite:    Leuk Esterase:  / RBC:  / WBC    Sq Epi:  / Non Sq Epi:  / Bacteria:           < from: CT Angio Chest PE Protocol w/ IV Cont (12.10.24 @ 16:36) >  IMPRESSION:  Within study limitations, no pulmonary embolism.    Other stable chronic findings, as above.    --- End of Report ---    < end of copied text >

## 2024-12-11 NOTE — PROGRESS NOTE ADULT - SUBJECTIVE AND OBJECTIVE BOX
CARDIOLOGY FOLLOW UP - Dr. Joe  Date of Service: 12-11-24 @ 13:54    CC: + exertional sob    Review of Systems:  Constitutional: No fever, weight loss, or fatigue  Respiratory: No cough, wheezing, or hemoptysis, no shortness of breath  Cardiovascular: No chest pain, palpitations, passing out, dizziness, or leg swelling  Gastrointestinal: No abd or epigastric pain. No nausea, vomiting, or hematemesis; no diarrhea or consiptaiton, no melena or hematochezia  Vascular: No edema     TELEMETRY:    PHYSICAL EXAM:  T(C): 36.8 (12-11-24 @ 11:11), Max: 36.8 (12-11-24 @ 11:11)  HR: 105 (12-11-24 @ 11:11) (68 - 105)  BP: 136/73 (12-11-24 @ 11:11) (135/53 - 151/94)  RR: 20 (12-11-24 @ 11:11) (17 - 20)  SpO2: 99% (12-11-24 @ 11:11) (96% - 100%)  Wt(kg): --  I&O's Summary      Appearance: Normal	  Cardiovascular: Normal S1 S2,RRR, No JVD, No murmurs  Respiratory: Lungs clear to auscultation	  Gastrointestinal:  Soft, Non-tender, + BS	  Extremities: Normal range of motion, No clubbing, cyanosis or edema  Vascular: Peripheral pulses palpable 2+ bilaterally       Home Medications:  cholecalciferol oral tablet: 1000 unit(s) orally once a day (06 Dec 2024 07:55)  folic acid 1 mg oral tablet: 1 tab(s) orally once a day (06 Dec 2024 07:55)  Metoprolol Succinate ER 50 mg oral tablet, extended release: 1 tab(s) orally once a day (06 Dec 2024 07:55)  sevelamer carbonate 800 mg oral tablet: 1 tab(s) orally 3 times a day (06 Dec 2024 07:55)        MEDICATIONS  (STANDING):  acetaminophen   IVPB .. 1000 milliGRAM(s) IV Intermittent once  albuterol/ipratropium for Nebulization 3 milliLiter(s) Nebulizer every 6 hours  aspirin  chewable 81 milliGRAM(s) Oral daily  calcitriol   Capsule 0.25 MICROGram(s) Oral <User Schedule>  chlorhexidine 2% Cloths 1 Application(s) Topical daily  cholecalciferol 1000 Unit(s) Oral daily  fluticasone propionate/ salmeterol 250-50 MICROgram(s) Diskus 1 Dose(s) Inhalation two times a day  folic acid 1 milliGRAM(s) Oral daily  heparin   Injectable 7500 Unit(s) SubCutaneous three times a day  heparin   Injectable. 1500 Unit(s) Dialysis. once  latanoprost 0.005% Ophthalmic Solution 1 Drop(s) Left EYE at bedtime  metoclopramide Injectable 10 milliGRAM(s) IV Push once  metoprolol succinate ER 50 milliGRAM(s) Oral daily  sevelamer carbonate 800 milliGRAM(s) Oral three times a day with meals        EKG:  RADIOLOGY:  DIAGNOSTIC TESTING:  [ ] Echocardiogram:  [ ] Catherterization:  [ ] Stress Test:  OTHER:     LABS:	 	                          10.4   8.21  )-----------( 214      ( 11 Dec 2024 06:59 )             34.4     12-11    139  |  102  |  69[H]  ----------------------------<  85  5.2   |  20[L]  |  6.26[H]    Ca    8.2[L]      11 Dec 2024 06:59  Phos  4.0     12-11  Mg     2.00     12-11            CARDIAC MARKERS:

## 2024-12-11 NOTE — PROGRESS NOTE ADULT - SUBJECTIVE AND OBJECTIVE BOX
Patient seen and examined this am. No new events    MEDICATIONS:    acetaminophen   IVPB .. 1000 milliGRAM(s) IV Intermittent once  albuterol/ipratropium for Nebulization 3 milliLiter(s) Nebulizer every 6 hours  aspirin  chewable 81 milliGRAM(s) Oral daily  benzonatate 100 milliGRAM(s) Oral every 8 hours PRN  calcitriol   Capsule 0.25 MICROGram(s) Oral <User Schedule>  chlorhexidine 2% Cloths 1 Application(s) Topical daily  cholecalciferol 1000 Unit(s) Oral daily  diphenhydrAMINE Injectable 25 milliGRAM(s) IV Push every 8 hours PRN  fluticasone propionate/ salmeterol 250-50 MICROgram(s) Diskus 1 Dose(s) Inhalation two times a day  folic acid 1 milliGRAM(s) Oral daily  heparin   Injectable 7500 Unit(s) SubCutaneous three times a day  heparin   Injectable. 1500 Unit(s) Dialysis. once  latanoprost 0.005% Ophthalmic Solution 1 Drop(s) Left EYE at bedtime  metoclopramide Injectable 10 milliGRAM(s) IV Push once  metoclopramide Injectable 10 milliGRAM(s) IV Push every 8 hours PRN  metoprolol succinate ER 50 milliGRAM(s) Oral daily  sevelamer carbonate 800 milliGRAM(s) Oral three times a day with meals  sodium chloride 0.9% Bolus. 100 milliLiter(s) IV Bolus every 5 minutes PRN  traMADol 50 milliGRAM(s) Oral every 12 hours PRN      LABS:                          10.4   8.21  )-----------( 214      ( 11 Dec 2024 06:59 )             34.4     12-11    139  |  102  |  69[H]  ----------------------------<  85  5.2   |  20[L]  |  6.26[H]    Ca    8.2[L]      11 Dec 2024 06:59  Phos  4.0     12-11  Mg     2.00     12-11      CAPILLARY BLOOD GLUCOSE          Urinalysis Basic - ( 11 Dec 2024 06:59 )    Color: x / Appearance: x / SG: x / pH: x  Gluc: 85 mg/dL / Ketone: x  / Bili: x / Urobili: x   Blood: x / Protein: x / Nitrite: x   Leuk Esterase: x / RBC: x / WBC x   Sq Epi: x / Non Sq Epi: x / Bacteria: x      I&O's Summary    Vital Signs Last 24 Hrs  T(C): 36.8 (11 Dec 2024 11:11), Max: 36.8 (11 Dec 2024 11:11)  T(F): 98.3 (11 Dec 2024 11:11), Max: 98.3 (11 Dec 2024 11:11)  HR: 105 (11 Dec 2024 11:11) (68 - 105)  BP: 136/73 (11 Dec 2024 11:11) (135/53 - 151/94)  BP(mean): --  RR: 20 (11 Dec 2024 11:11) (17 - 20)  SpO2: 99% (11 Dec 2024 11:11) (96% - 100%)    Parameters below as of 11 Dec 2024 11:11  Patient On (Oxygen Delivery Method): nasal cannula  O2 Flow (L/min): 1.5    Neuro: AAOx3; knows age, month, obeys commands, no dysarthria; calculations intact, fluent names, repeats     CN: PERRL, EOMI, visual fields full can not see shapes out of the left eye normal gaze preference, no facial palsy,     Motor: no drift in all extremeties    Sensory: Intact to LT and PP no extinction    Coordination: FTN intact b/l    < from: CT Angio Neck w/ IV Cont (12.06.24 @ 02:15) >      IMPRESSION:    CT HEAD:  No acute intracranial hemorrhage, mass effect, or midline shift.    CTA NECK:  No evidence of significant stenosis or occlusion.    CTA HEAD:  No large vessel occlusion, significant stenosis or vascular abnormality   identified.        --- End of Report ---    < end of copied text >  < from: CT Head No Cont (12.07.24 @ 09:16) >  IMPRESSION:    No acute intracranial findings.    < end of copied text >

## 2024-12-11 NOTE — PROGRESS NOTE ADULT - SUBJECTIVE AND OBJECTIVE BOX
Date of Service: 12-11-24 @ 11:49    Patient is a 74y old  Female who presents with a chief complaint of L eye vision loss (10 Dec 2024 19:19)      Any change in ROS: still feels some sob;     MEDICATIONS  (STANDING):  acetaminophen   IVPB .. 1000 milliGRAM(s) IV Intermittent once  albuterol/ipratropium for Nebulization 3 milliLiter(s) Nebulizer every 6 hours  aspirin  chewable 81 milliGRAM(s) Oral daily  calcitriol   Capsule 0.25 MICROGram(s) Oral <User Schedule>  chlorhexidine 2% Cloths 1 Application(s) Topical daily  cholecalciferol 1000 Unit(s) Oral daily  fluticasone propionate/ salmeterol 250-50 MICROgram(s) Diskus 1 Dose(s) Inhalation two times a day  folic acid 1 milliGRAM(s) Oral daily  heparin   Injectable 7500 Unit(s) SubCutaneous three times a day  heparin   Injectable. 1500 Unit(s) Dialysis. once  latanoprost 0.005% Ophthalmic Solution 1 Drop(s) Left EYE at bedtime  metoclopramide Injectable 10 milliGRAM(s) IV Push once  metoprolol succinate ER 50 milliGRAM(s) Oral daily  sevelamer carbonate 800 milliGRAM(s) Oral three times a day with meals    MEDICATIONS  (PRN):  benzonatate 100 milliGRAM(s) Oral every 8 hours PRN Cough  diphenhydrAMINE Injectable 25 milliGRAM(s) IV Push every 8 hours PRN migraine  metoclopramide Injectable 10 milliGRAM(s) IV Push every 8 hours PRN migraine  sodium chloride 0.9% Bolus. 100 milliLiter(s) IV Bolus every 5 minutes PRN SBP LESS THAN or EQUAL to 90 mmHg  traMADol 50 milliGRAM(s) Oral every 12 hours PRN for severe pain    Vital Signs Last 24 Hrs  T(C): 36.8 (11 Dec 2024 11:11), Max: 36.8 (11 Dec 2024 11:11)  T(F): 98.3 (11 Dec 2024 11:11), Max: 98.3 (11 Dec 2024 11:11)  HR: 105 (11 Dec 2024 11:11) (68 - 105)  BP: 136/73 (11 Dec 2024 11:11) (135/53 - 151/94)  BP(mean): --  RR: 20 (11 Dec 2024 11:11) (17 - 20)  SpO2: 99% (11 Dec 2024 11:11) (96% - 100%)    Parameters below as of 11 Dec 2024 11:11  Patient On (Oxygen Delivery Method): nasal cannula  O2 Flow (L/min): 1.5      I&O's Summary        Physical Exam:   GENERAL: NAD, well-groomed, well-developed  HEENT: AIRAM/   Atraumatic, Normocephalic  ENMT: No tonsillar erythema, exudates, or enlargement; Moist mucous membranes, Good dentition, No lesions  NECK: Supple, No JVD, Normal thyroid  CHEST/LUNG: Clear to auscultaion  CVS: Regular rate and rhythm; No murmurs, rubs, or gallops  GI: : Soft, Nontender, Nondistended; Bowel sounds present  NERVOUS SYSTEM:  Alert & Oriented X3  EXTREMITIES:  - edema  LYMPH: No lymphadenopathy noted  SKIN: No rashes or lesions  ENDOCRINOLOGY: No Thyromegaly  PSYCH: Appropriate    Labs:  ABG - ( 10 Dec 2024 13:05 )  pH, Arterial: 7.38  pH, Blood: x     /  pCO2: 45    /  pO2: 95    / HCO3: 27    / Base Excess: 1.0   /  SaO2: 97.6            26, 23                            10.4   8.21  )-----------( 214      ( 11 Dec 2024 06:59 )             34.4                         11.2   8.64  )-----------( 227      ( 10 Dec 2024 10:07 )             35.7                         9.9    9.32  )-----------( 228      ( 09 Dec 2024 06:45 )             31.0                         10.6   11.47 )-----------( 236      ( 08 Dec 2024 06:40 )             33.4     12-11    139  |  102  |  69[H]  ----------------------------<  85  5.2   |  20[L]  |  6.26[H]  12-10    139  |  99  |  56[H]  ----------------------------<  134[H]  4.5   |  23  |  5.41[H]  12-09    139  |  100  |  87[H]  ----------------------------<  79  5.0   |  21[L]  |  7.38[H]  12-08    139  |  99  |  61[H]  ----------------------------<  121[H]  5.1   |  22  |  6.03[H]    Ca    8.2[L]      11 Dec 2024 06:59  Ca    8.3[L]      10 Dec 2024 10:07  Phos  4.0     12-11  Phos  3.3     12-10  Mg     2.00     12-11  Mg     1.80     12-10      CAPILLARY BLOOD GLUCOSE              Urinalysis Basic - ( 11 Dec 2024 06:59 )    Color: x / Appearance: x / SG: x / pH: x  Gluc: 85 mg/dL / Ketone: x  / Bili: x / Urobili: x   Blood: x / Protein: x / Nitrite: x   Leuk Esterase: x / RBC: x / WBC x   Sq Epi: x / Non Sq Epi: x / Bacteria: x            RECENT CULTURES:  12-06 @ 13:58 .Nose Nose                No staphylococcus aureus isolated.  "PCR is more Sensitive for identifying MRSA/MSSA."      rad< from: CT Angio Chest PE Protocol w/ IV Cont (12.10.24 @ 16:36) >  Lungs/Airways/Pleura: The central airways are patent. No pleural   effusion. Mild dependent bibasilar atelectasis. No pulmonary edema or   pneumonia.    Mediastinum/Lymph nodes: Small calcified right hilar lymph nodes, likely   sequela of old granulomatous disease. Unchanged large, heterogeneous   thyroid with substernal extension, with mass effect on adjacent   vasculature and rightward deviation of the trachea.    Upper Abdomen: Unchanged pneumobilia. Cholecystectomy. Right greater than   left renal atrophy. Partially imaged infrarenal IVC filter.    Bones and Soft Tissues: No aggressive osseous lesions.    IMPRESSION:  Within study limitations, no pulmonary embolism.    Other stable chronic findings, as above.    --- End of Report ---            LEONARDO CHU M.D., Attending Radiologist  This document has been electronically signed. Dec 10 2024  4:58PM    < end of copied text >      RESPIRATORY CULTURES:          Studies  Chest X-RAY  CT SCAN Chest   Venous Dopplers: LE:   CT Abdomen  Others      rad< from: CT Angio Chest PE Protocol w/ IV Cont (12.10.24 @ 16:36) >  vasculature and rightward deviation of the trachea.    Upper Abdomen: Unchanged pneumobilia. Cholecystectomy. Right greater than   left renal atrophy. Partially imaged infrarenal IVC filter.    Bones and Soft Tissues: No aggressive osseous lesions.    IMPRESSION:  Within study limitations, no pulmonary embolism.    Other stable chronic findings, as above.    --- End of Report ---            LEONARDO CHU M.D., Attending Radiologist  This document has been electronically signed. Dec 10 2024  4:58PM    < end of copied text >  < from: Xray Chest 1 View- PORTABLE-Urgent (Xray Chest 1 View- PORTABLE-Urgent .) (12.06.24 @ 13:57) >      INTERPRETATION:  EXAMINATION: XR CHEST URGENT    CLINICAL INDICATION: sob    TECHNIQUE: Single frontal, portable view of the chest was obtained.    COMPARISON: Chest x-ray None.    FINDINGS:    Right-sided hemodialysis catheter is present unchanged.  The heart size is not enlarged.  The lungs are clear.  There is no pneumothorax or pleural effusion.  There are no acute osseous abnormalities.    IMPRESSION:  Clear lungs.    --- End of Report ---          RADHA WHEELER MD; Resident Radiologist  This document has been electronically signed.  BELIA MYRICK MD; Attending Radiologist  This document has been electronically signed. Dec  6 2024  2:55PM    < end of copied text >      ct< from: CT Angio Chest PE Protocol w/ IV Cont (12.10.24 @ 16:36) >  Mediastinum/Lymph nodes: Small calcified right hilar lymph nodes, likely   sequela of old granulomatous disease. Unchanged large, heterogeneous   thyroid with substernal extension, with mass effect on adjacent   vasculature and rightward deviation of the trachea.    Upper Abdomen: Unchanged pneumobilia. Cholecystectomy. Right greater than   left renal atrophy. Partially imaged infrarenal IVC filter.    Bones and Soft Tissues: No aggressive osseous lesions.    IMPRESSION:  Within study limitations, no pulmonary embolism.    Other stable chronic findings, as above.    --- End of Report ---      karine CHU M.D., Attending Radiologist  This document has been electronically signed. Dec 10 2024  4:58PM    < end of copied text >

## 2024-12-11 NOTE — PROGRESS NOTE ADULT - SUBJECTIVE AND OBJECTIVE BOX
SUBJECTIVE / OVERNIGHT EVENTS:pt seen and examined, pt still osb   12-11-24    MEDICATIONS  (STANDING):  acetaminophen   IVPB .. 1000 milliGRAM(s) IV Intermittent once  albuterol/ipratropium for Nebulization 3 milliLiter(s) Nebulizer every 6 hours  aspirin  chewable 81 milliGRAM(s) Oral daily  calcitriol   Capsule 0.25 MICROGram(s) Oral <User Schedule>  chlorhexidine 2% Cloths 1 Application(s) Topical daily  cholecalciferol 1000 Unit(s) Oral daily  fluticasone propionate/ salmeterol 250-50 MICROgram(s) Diskus 1 Dose(s) Inhalation two times a day  folic acid 1 milliGRAM(s) Oral daily  heparin   Injectable 7500 Unit(s) SubCutaneous three times a day  heparin   Injectable. 1500 Unit(s) Dialysis. once  latanoprost 0.005% Ophthalmic Solution 1 Drop(s) Left EYE at bedtime  metoclopramide Injectable 10 milliGRAM(s) IV Push once  metoprolol succinate ER 50 milliGRAM(s) Oral daily  sevelamer carbonate 800 milliGRAM(s) Oral three times a day with meals    MEDICATIONS  (PRN):  benzonatate 100 milliGRAM(s) Oral every 8 hours PRN Cough  diphenhydrAMINE Injectable 25 milliGRAM(s) IV Push every 8 hours PRN migraine  metoclopramide Injectable 10 milliGRAM(s) IV Push every 8 hours PRN migraine  sodium chloride 0.9% Bolus. 100 milliLiter(s) IV Bolus every 5 minutes PRN SBP LESS THAN or EQUAL to 90 mmHg  traMADol 50 milliGRAM(s) Oral every 12 hours PRN for severe pain    Vital Signs Last 24 Hrs  T(C): 36.6 (12-11-24 @ 16:05), Max: 36.8 (12-11-24 @ 11:11)  T(F): 97.9 (12-11-24 @ 16:05), Max: 98.3 (12-11-24 @ 11:11)  HR: 93 (12-11-24 @ 16:05) (68 - 105)  BP: 147/88 (12-11-24 @ 16:05) (135/53 - 151/94)  BP(mean): --  RR: 18 (12-11-24 @ 16:05) (17 - 20)  SpO2: 99% (12-11-24 @ 11:11) (96% - 100%)    Constitutional: No fever, fatigue  Skin: No rash.  Eyes: No recent vision problems or eye pain.  ENT: No congestion, ear pain, or sore throat.  Cardiovascular: No chest pain or palpation.  Respiratory: No cough, shortness of breath, congestion, or wheezing.  Gastrointestinal: No abdominal pain, nausea, vomiting, or diarrhea.  Genitourinary: No dysuria.  Musculoskeletal: No joint swelling.  Neurologic: No headache.    PHYSICAL EXAM:  GENERAL: NAD  EYES: EOMI, PERRLA  NECK: Supple, No JVD  CHEST/LUNG: crackles at bases, no wheezing  HEART:  S1 , S2 +  ABDOMEN: soft , bs+  EXTREMITIES:  edema  NEUROLOGY:alert awake      LABS:  12-11    139  |  102  |  69[H]  ----------------------------<  85  5.2   |  20[L]  |  6.26[H]    Ca    8.2[L]      11 Dec 2024 06:59  Phos  4.0     12-11  Mg     2.00     12-11      Creatinine Trend: 6.26 <--, 5.41 <--, 7.38 <--, 6.03 <--, 4.41 <--, 5.54 <--                        10.4   8.21  )-----------( 214      ( 11 Dec 2024 06:59 )             34.4     Urine Studies:  Urinalysis Basic - ( 11 Dec 2024 06:59 )    Color:  / Appearance:  / SG:  / pH:   Gluc: 85 mg/dL / Ketone:   / Bili:  / Urobili:    Blood:  / Protein:  / Nitrite:    Leuk Esterase:  / RBC:  / WBC    Sq Epi:  / Non Sq Epi:  / Bacteria:             ABG - ( 10 Dec 2024 13:05 )  pH, Arterial: 7.38  pH, Blood: x     /  pCO2: 45    /  pO2: 95    / HCO3: 27    / Base Excess: 1.0   /  SaO2: 97.6                  Consultant(s) Notes Reviewed:      Care Discussed with Consultants/Other Providers:

## 2024-12-12 ENCOUNTER — RESULT REVIEW (OUTPATIENT)
Age: 74
End: 2024-12-12

## 2024-12-12 LAB
ADD ON TEST-SPECIMEN IN LAB: SIGNIFICANT CHANGE UP
ALBUMIN SERPL ELPH-MCNC: 3.3 G/DL — SIGNIFICANT CHANGE UP (ref 3.3–5)
ANION GAP SERPL CALC-SCNC: 14 MMOL/L — SIGNIFICANT CHANGE UP (ref 7–14)
BUN SERPL-MCNC: 45 MG/DL — HIGH (ref 7–23)
CALCIUM SERPL-MCNC: 7.9 MG/DL — LOW (ref 8.4–10.5)
CHLORIDE SERPL-SCNC: 100 MMOL/L — SIGNIFICANT CHANGE UP (ref 98–107)
CO2 SERPL-SCNC: 22 MMOL/L — SIGNIFICANT CHANGE UP (ref 22–31)
CREAT SERPL-MCNC: 4.42 MG/DL — HIGH (ref 0.5–1.3)
EGFR: 10 ML/MIN/1.73M2 — LOW
FLUAV AG NPH QL: DETECTED
FLUBV AG NPH QL: SIGNIFICANT CHANGE UP
GLUCOSE SERPL-MCNC: 86 MG/DL — SIGNIFICANT CHANGE UP (ref 70–99)
HCT VFR BLD CALC: 33.9 % — LOW (ref 34.5–45)
HGB BLD-MCNC: 10.4 G/DL — LOW (ref 11.5–15.5)
MAGNESIUM SERPL-MCNC: 1.8 MG/DL — SIGNIFICANT CHANGE UP (ref 1.6–2.6)
MCHC RBC-ENTMCNC: 30.6 PG — SIGNIFICANT CHANGE UP (ref 27–34)
MCHC RBC-ENTMCNC: 30.7 G/DL — LOW (ref 32–36)
MCV RBC AUTO: 99.7 FL — SIGNIFICANT CHANGE UP (ref 80–100)
NRBC # BLD: 0 /100 WBCS — SIGNIFICANT CHANGE UP (ref 0–0)
NRBC # FLD: 0 K/UL — SIGNIFICANT CHANGE UP (ref 0–0)
PHOSPHATE SERPL-MCNC: 4.2 MG/DL — SIGNIFICANT CHANGE UP (ref 2.5–4.5)
PLATELET # BLD AUTO: 197 K/UL — SIGNIFICANT CHANGE UP (ref 150–400)
POTASSIUM SERPL-MCNC: 5 MMOL/L — SIGNIFICANT CHANGE UP (ref 3.5–5.3)
POTASSIUM SERPL-SCNC: 5 MMOL/L — SIGNIFICANT CHANGE UP (ref 3.5–5.3)
RBC # BLD: 3.4 M/UL — LOW (ref 3.8–5.2)
RBC # FLD: 14.3 % — SIGNIFICANT CHANGE UP (ref 10.3–14.5)
RSV RNA NPH QL NAA+NON-PROBE: SIGNIFICANT CHANGE UP
SARS-COV-2 RNA SPEC QL NAA+PROBE: SIGNIFICANT CHANGE UP
SODIUM SERPL-SCNC: 136 MMOL/L — SIGNIFICANT CHANGE UP (ref 135–145)
WBC # BLD: 7.57 K/UL — SIGNIFICANT CHANGE UP (ref 3.8–10.5)
WBC # FLD AUTO: 7.57 K/UL — SIGNIFICANT CHANGE UP (ref 3.8–10.5)

## 2024-12-12 PROCEDURE — 93306 TTE W/DOPPLER COMPLETE: CPT | Mod: 26

## 2024-12-12 RX ORDER — TRAMADOL HYDROCHLORIDE 300 MG/1
50 CAPSULE ORAL EVERY 12 HOURS
Refills: 0 | Status: DISCONTINUED | OUTPATIENT
Start: 2024-12-12 | End: 2024-12-13

## 2024-12-12 RX ORDER — BENZOCAINE, MENTHOL 15; 3.6 MG/1; MG/1
1 LOZENGE ORAL EVERY 8 HOURS
Refills: 0 | Status: DISCONTINUED | OUTPATIENT
Start: 2024-12-12 | End: 2024-12-13

## 2024-12-12 RX ORDER — OSELTAMIVIR PHOSPHATE 75 MG
75 CAPSULE ORAL
Refills: 0 | Status: DISCONTINUED | OUTPATIENT
Start: 2024-12-12 | End: 2024-12-12

## 2024-12-12 RX ORDER — OSELTAMIVIR PHOSPHATE 75 MG
30 CAPSULE ORAL ONCE
Refills: 0 | Status: COMPLETED | OUTPATIENT
Start: 2024-12-12 | End: 2024-12-12

## 2024-12-12 RX ORDER — OSELTAMIVIR PHOSPHATE 75 MG
30 CAPSULE ORAL
Refills: 0 | Status: DISCONTINUED | OUTPATIENT
Start: 2024-12-13 | End: 2024-12-13

## 2024-12-12 RX ORDER — HEPARIN SODIUM,PORCINE 1000/ML
1500 VIAL (ML) INJECTION ONCE
Refills: 0 | Status: COMPLETED | OUTPATIENT
Start: 2024-12-13 | End: 2024-12-13

## 2024-12-12 RX ORDER — LOSARTAN POTASSIUM 100 MG/1
25 TABLET, FILM COATED ORAL AT BEDTIME
Refills: 0 | Status: DISCONTINUED | OUTPATIENT
Start: 2024-12-12 | End: 2024-12-13

## 2024-12-12 RX ORDER — GUAIFENESIN 400 MG
100 TABLET ORAL EVERY 6 HOURS
Refills: 0 | Status: DISCONTINUED | OUTPATIENT
Start: 2024-12-12 | End: 2024-12-13

## 2024-12-12 RX ORDER — HEPARIN SODIUM,PORCINE 1000/ML
1000 VIAL (ML) INJECTION
Refills: 0 | Status: COMPLETED | OUTPATIENT
Start: 2024-12-13 | End: 2024-12-13

## 2024-12-12 RX ORDER — CALCIUM CARBONATE 500(1250)
1 TABLET ORAL ONCE
Refills: 0 | Status: COMPLETED | OUTPATIENT
Start: 2024-12-12 | End: 2024-12-12

## 2024-12-12 RX ADMIN — LATANOPROST 1 DROP(S): 50 SOLUTION/ DROPS OPHTHALMIC at 20:58

## 2024-12-12 RX ADMIN — IPRATROPIUM BROMIDE AND ALBUTEROL SULFATE 3 MILLILITER(S): 2.5; .5 SOLUTION RESPIRATORY (INHALATION) at 23:07

## 2024-12-12 RX ADMIN — Medication 7500 UNIT(S): at 20:52

## 2024-12-12 RX ADMIN — METOCLOPRAMIDE HYDROCHLORIDE 10 MILLIGRAM(S): 10 TABLET ORAL at 12:23

## 2024-12-12 RX ADMIN — BENZOCAINE, MENTHOL 1 LOZENGE: 15; 3.6 LOZENGE ORAL at 22:45

## 2024-12-12 RX ADMIN — SEVELAMER CARBONATE 800 MILLIGRAM(S): 800 TABLET, FILM COATED ORAL at 17:06

## 2024-12-12 RX ADMIN — BENZONATATE 100 MILLIGRAM(S): 100 CAPSULE ORAL at 12:22

## 2024-12-12 RX ADMIN — Medication 25 MILLIGRAM(S): at 12:23

## 2024-12-12 RX ADMIN — Medication 1000 UNIT(S): at 12:11

## 2024-12-12 RX ADMIN — FLUTICASONE PROPIONATE AND SALMETEROL XINAFOATE 1 DOSE(S): 45; 21 AEROSOL, METERED RESPIRATORY (INHALATION) at 20:53

## 2024-12-12 RX ADMIN — Medication 30 MILLIGRAM(S): at 11:04

## 2024-12-12 RX ADMIN — LOSARTAN POTASSIUM 25 MILLIGRAM(S): 100 TABLET, FILM COATED ORAL at 20:51

## 2024-12-12 RX ADMIN — Medication 7500 UNIT(S): at 04:46

## 2024-12-12 RX ADMIN — SEVELAMER CARBONATE 800 MILLIGRAM(S): 800 TABLET, FILM COATED ORAL at 12:10

## 2024-12-12 RX ADMIN — TRAMADOL HYDROCHLORIDE 50 MILLIGRAM(S): 300 CAPSULE ORAL at 02:20

## 2024-12-12 RX ADMIN — Medication 1 MILLIGRAM(S): at 12:10

## 2024-12-12 RX ADMIN — IPRATROPIUM BROMIDE AND ALBUTEROL SULFATE 3 MILLILITER(S): 2.5; .5 SOLUTION RESPIRATORY (INHALATION) at 13:09

## 2024-12-12 RX ADMIN — FLUTICASONE PROPIONATE AND SALMETEROL XINAFOATE 1 DOSE(S): 45; 21 AEROSOL, METERED RESPIRATORY (INHALATION) at 08:40

## 2024-12-12 RX ADMIN — Medication 100 MILLIGRAM(S): at 20:50

## 2024-12-12 RX ADMIN — SEVELAMER CARBONATE 800 MILLIGRAM(S): 800 TABLET, FILM COATED ORAL at 08:40

## 2024-12-12 RX ADMIN — BENZOCAINE, MENTHOL 1 LOZENGE: 15; 3.6 LOZENGE ORAL at 14:35

## 2024-12-12 RX ADMIN — IPRATROPIUM BROMIDE AND ALBUTEROL SULFATE 3 MILLILITER(S): 2.5; .5 SOLUTION RESPIRATORY (INHALATION) at 03:36

## 2024-12-12 RX ADMIN — Medication 81 MILLIGRAM(S): at 12:10

## 2024-12-12 RX ADMIN — CHLORHEXIDINE GLUCONATE 1 APPLICATION(S): 1.2 RINSE ORAL at 12:10

## 2024-12-12 RX ADMIN — Medication 1 TABLET(S): at 13:01

## 2024-12-12 RX ADMIN — Medication 7500 UNIT(S): at 12:10

## 2024-12-12 RX ADMIN — METOPROLOL TARTRATE 50 MILLIGRAM(S): 100 TABLET, FILM COATED ORAL at 04:46

## 2024-12-12 RX ADMIN — TRAMADOL HYDROCHLORIDE 50 MILLIGRAM(S): 300 CAPSULE ORAL at 19:05

## 2024-12-12 RX ADMIN — TRAMADOL HYDROCHLORIDE 50 MILLIGRAM(S): 300 CAPSULE ORAL at 03:20

## 2024-12-12 NOTE — CONSULT NOTE ADULT - ASSESSMENT
73 y/o F PMhx ESRD on HD (MWF), PE (in 2013 after TKR s/p Xarelto), LARRY (not on CPAP), migraines, HTN who presented L eye vision loss and headache  now flu A positive    influenza A, ESRD  afebrile, no leukocytosis  start tamiflu 30mg x 1 today  followed by tamiflu 30mg post-HD on 12/13 and 12/16  HD per nephrology    Recommendations  start tamiflu 30mg x 1 today  followed by tamiflu 30mg post-HD on 12/13 and 12/16    Tony Bright M.D.  [facility], Division of Infectious Diseases  [phone]  After 5pm on weekdays and all day on weekends - please call [phone]  75 y/o F PMhx ESRD on HD (MWF), PE (in 2013 after TKR s/p Xarelto), LARRY (not on CPAP), migraines, HTN who presented L eye vision loss and headache  now flu A positive    influenza A, ESRD, SOB  afebrile, no leukocytosis  flu A PCR positive  symptomatic- productive cough, sore throat, nasal congestion, myalgias  HD per nephrology    Recommendations  start tamiflu 30mg x 1 today  followed by tamiflu 30mg post-HD on 12/13 and 12/16    Tony Bright M.D.  [facility], Division of Infectious Diseases  [phone]  After 5pm on weekdays and all day on weekends - please call [phone]

## 2024-12-12 NOTE — PROVIDER CONTACT NOTE (OTHER) - SITUATION
patient complaining of chills/body aches, severe headache, cough
pt desated to 75% with a good pleth. pt was complaining of SOB with increased work of breathing

## 2024-12-12 NOTE — PROGRESS NOTE ADULT - SUBJECTIVE AND OBJECTIVE BOX
CARDIOLOGY FOLLOW UP - Dr. Joe  DATE OF SERVICE: 12/12/24    CC no acute cv events   unchanged dyspnea    REVIEW OF SYSTEMS:  CONSTITUTIONAL: No fever, weight loss, or fatigue  RESPIRATORY: No cough, wheezing, chills or hemoptysis; No Shortness of Breath  CARDIOVASCULAR: No chest pain, palpitations, passing out, dizziness  GASTROINTESTINAL: No abdominal or epigastric pain. No nausea, vomiting, or hematemesis; No diarrhea or constipation. No melena or hematochezia.      PHYSICAL EXAM:  T(C): 36.9 (12-12-24 @ 05:00), Max: 36.9 (12-12-24 @ 02:26)  HR: 95 (12-12-24 @ 05:00) (63 - 105)  BP: 153/63 (12-12-24 @ 05:00) (136/73 - 155/86)  RR: 19 (12-12-24 @ 05:00) (18 - 20)  SpO2: 98% (12-12-24 @ 05:00) (98% - 100%)  Wt(kg): --  I&O's Summary    11 Dec 2024 07:01  -  12 Dec 2024 07:00  --------------------------------------------------------  IN: 400 mL / OUT: 3200 mL / NET: -2800 mL    12 Dec 2024 07:01  -  12 Dec 2024 11:02  --------------------------------------------------------  IN: 250 mL / OUT: 0 mL / NET: 250 mL        Appearance: Normal	  Cardiovascular: Normal S1 S2,RRR, No JVD, No murmurs  Respiratory: Lungs clear to auscultation	  Gastrointestinal:  Soft, Non-tender, + BS	  Extremities: Normal range of motion, No clubbing, cyanosis or edema      Home Medications:  cholecalciferol oral tablet: 1000 unit(s) orally once a day (06 Dec 2024 07:55)  folic acid 1 mg oral tablet: 1 tab(s) orally once a day (06 Dec 2024 07:55)  Metoprolol Succinate ER 50 mg oral tablet, extended release: 1 tab(s) orally once a day (06 Dec 2024 07:55)  sevelamer carbonate 800 mg oral tablet: 1 tab(s) orally 3 times a day (06 Dec 2024 07:55)      MEDICATIONS  (STANDING):  acetaminophen   IVPB .. 1000 milliGRAM(s) IV Intermittent once  albuterol/ipratropium for Nebulization 3 milliLiter(s) Nebulizer every 6 hours  aspirin  chewable 81 milliGRAM(s) Oral daily  calcitriol   Capsule 0.25 MICROGram(s) Oral <User Schedule>  chlorhexidine 2% Cloths 1 Application(s) Topical daily  cholecalciferol 1000 Unit(s) Oral daily  fluticasone propionate/ salmeterol 250-50 MICROgram(s) Diskus 1 Dose(s) Inhalation two times a day  folic acid 1 milliGRAM(s) Oral daily  heparin   Injectable 7500 Unit(s) SubCutaneous three times a day  latanoprost 0.005% Ophthalmic Solution 1 Drop(s) Left EYE at bedtime  losartan 25 milliGRAM(s) Oral at bedtime  metoclopramide Injectable 10 milliGRAM(s) IV Push once  metoprolol succinate ER 50 milliGRAM(s) Oral daily  oseltamivir 30 milliGRAM(s) Oral once  sevelamer carbonate 800 milliGRAM(s) Oral three times a day with meals      TELEMETRY: 	    ECG:  	  RADIOLOGY:   DIAGNOSTIC TESTING:  [ ] Echocardiogram:  [ ]  Catheterization:  [ ] Stress Test:    OTHER: 	    LABS:	 	                            10.4   7.57  )-----------( 197      ( 12 Dec 2024 04:43 )             33.9     12-12    136  |  100  |  45[H]  ----------------------------<  86  5.0   |  22  |  4.42[H]    Ca    7.9[L]      12 Dec 2024 04:43  Phos  4.2     12-12  Mg     1.80     12-12    TPro  x   /  Alb  3.3  /  TBili  x   /  DBili  x   /  AST  x   /  ALT  x   /  AlkPhos  x   12-12

## 2024-12-12 NOTE — PROVIDER CONTACT NOTE (OTHER) - ACTION/TREATMENT ORDERED:
pt was put on 4L NC. respiratory therapist paged to give patient schedules. pt was more at rest after treatment. o2 stat and rate of respirations improved
flu/covid test

## 2024-12-12 NOTE — CONSULT NOTE ADULT - CONSULT REASON
sob, vision abnl
ESRD status
L eye vision loss
loss of vision OS
sob  presented with left eye  loss
flu A

## 2024-12-12 NOTE — PROVIDER CONTACT NOTE (OTHER) - BACKGROUND
pt admitted for elevated IOP. also found to be SOB due to underlying asthma
patient pmhx ESRD, HTN, vision loss, migraines

## 2024-12-12 NOTE — PROVIDER CONTACT NOTE (OTHER) - ASSESSMENT
patient vs stable, temp of 98.4, slightly tachy on tele in the 100-110s, RR 20. Pt complaining of pain, non verbal indicators present.
pt was complaining of SOB.

## 2024-12-12 NOTE — PROVIDER CONTACT NOTE (OTHER) - RECOMMENDATIONS
re-test for flu/covid. PRN tramadol given, albuterol treatment via respiratory
pt was put on 4L NC. respiratory therapist paged to give patient schedules. pt was more at rest after treatment. o2 stat and rate of respirations improved

## 2024-12-12 NOTE — CONSULT NOTE ADULT - SUBJECTIVE AND OBJECTIVE BOX
pt w/ flu A  start tamiflu 30mg x 1 today  followed by tamiflu 30mg post-HD on 12/13 and 12/16    Consult received. Full note to follow.     Tony Bright M.D.  [facility], Division of Infectious Diseases  [phone]  After 5pm on weekdays and all day on weekends - please call [phone]  [facility], Division of Infectious Diseases  JAME Ashley S. Shah, Y. Patel, G. Deangelo  [phone]  ([phone] - weekdays after 5pm and weekends)    MELISSA ARREOLA  74y, Female  [id number]    HPI--  HPI:  75 y/o F PMhx ESRD on HD (MWF), PE (in 2013 after TKR s/p Xarelto), LARRY (not on CPAP), migraines, HTN who presented L eye vision loss and headache.  Pt reports headache behind L eye (typical of prior migraines) starting Tuesday morning.  She also had total loss of vision in L eye described as vision going black.  Since then, she has been able to perceive light in L eye, but only able to see white without being able to make out objects.  She has had nausea without vomiting, and has had photophobia.  No weakness, numbness, slurred speech.  No fever, chills, chest pain, or palpitations.  Pt's headache is similar to prior migraines, but she has never had vision loss.  She also reports intermittent "electric shock" sensation in L arm along with some neck pain for the past 2 weeks.    Pt initially presented to [place] ED. She was evaluated by ophthalmology who found elevated IOP, recommended drops, and transferred pt to [facility]. She was seen by neurology and opthalmology. Received HD per nephrology.   Reports monday had myalgias and diarrhea. Symptoms progressed and now has occasionally productive cough, sore throat, nasal congestion, myalgias. States has chest pain only when coughing.  Denies fever, abd pain, n/v, dysuria.     ROS: 10 point review of systems completed, pertinent positives and negatives as per HPI.    Allergies: Sulfur (Unknown)  trimethoprim (Other; Rash)  sulfa drugs (Other; Rash)    PMH -- Osteoarthritis    Pulmonary embolism    Joint infection    Dysfunctional uterine bleeding    Obesity    Essential hypertension    Obstructive sleep apnea syndrome    History of pulmonary embolism    Arthropathy    Migraine    Morbid obesity    ESRD (end stage renal disease)      PSH -- Total knee replacement status    Status post hysterectomy    Status post cholecystectomy    Other acquired absence of organ    Status post total hysterectomy    History of total knee replacement      FH -- No pertinent family history in first degree relatives    No pertinent family history    FH: type 2 diabetes mellitus    FHx: hypertension      Social History -- denies tobacco, alcohol or illicit drug use    Physical Exam--  Vital Signs Last 24 Hrs  T(F): 98.7 (12 Dec 2024 11:55), Max: 98.7 (12 Dec 2024 11:55)  HR: 92 (12 Dec 2024 11:55) (63 - 98)  BP: 157/65 (12 Dec 2024 11:55) (142/67 - 157/65)  RR: 18 (12 Dec 2024 11:55) (18 - 20)  SpO2: 98% (12 Dec 2024 11:55) (98% - 100%)  General: nontoxic-appearing, no acute distress  HEENT: anicteric  Lungs: mild wheezes b/l  Heart: S1, S2, normal rate.  Abdomen: Soft. Nondistended. Nontender.   Neuro: no obvious focal deficits   Back: No costovertebral angle tenderness.  Extremities: No edema.   Skin: Warm. Dry. No rash.  Psychiatric: Appropriate affect and mood for situation.     Laboratory & Imaging Data--  CBC:                       10.4   7.57  )-----------( 197      ( 12 Dec 2024 04:43 )             33.9     WBC Count: 7.57 K/uL (12-12-24 @ 04:43)  WBC Count: 8.21 K/uL (12-11-24 @ 06:59)  WBC Count: 8.64 K/uL (12-10-24 @ 10:07)  WBC Count: 9.32 K/uL (12-09-24 @ 06:45)  WBC Count: 11.47 K/uL (12-08-24 @ 06:40)  WBC Count: 9.27 K/uL (12-07-24 @ 07:18)  WBC Count: 7.61 K/uL (12-05-24 @ 17:39)    CMP: 12-12    136  |  100  |  45[H]  ----------------------------<  86  5.0   |  22  |  4.42[H]    Ca    7.9[L]      12 Dec 2024 04:43  Phos  4.2     12-12  Mg     1.80     12-12    TPro  x   /  Alb  3.3  /  TBili  x   /  DBili  x   /  AST  x   /  ALT  x   /  AlkPhos  x   12-12    LIVER FUNCTIONS - ( 12 Dec 2024 04:43 )  Alb: 3.3 g/dL / Pro: x     / ALK PHOS: x     / ALT: x     / AST: x     / GGT: x           Urinalysis Basic - ( 12 Dec 2024 04:43 )    Color: x / Appearance: x / SG: x / pH: x  Gluc: 86 mg/dL / Ketone: x  / Bili: x / Urobili: x   Blood: x / Protein: x / Nitrite: x   Leuk Esterase: x / RBC: x / WBC x   Sq Epi: x / Non Sq Epi: x / Bacteria: x      Microbiology:     Culture - Nose (collected 12-06-24 @ 13:58)  Source: .Nose Nose  Final Report (12-08-24 @ 15:19):    No staphylococcus aureus isolated.    "PCR is more Sensitive for identifying MRSA/MSSA."        Radiology--  ***  Active Medications--  acetaminophen   IVPB .. 1000 milliGRAM(s) IV Intermittent once  albuterol/ipratropium for Nebulization 3 milliLiter(s) Nebulizer every 6 hours  aspirin  chewable 81 milliGRAM(s) Oral daily  benzonatate 100 milliGRAM(s) Oral every 8 hours PRN  calcitriol   Capsule 0.25 MICROGram(s) Oral <User Schedule>  calcium carbonate    500 mG (Tums) Chewable 1 Tablet(s) Chew once  chlorhexidine 2% Cloths 1 Application(s) Topical daily  cholecalciferol 1000 Unit(s) Oral daily  diphenhydrAMINE Injectable 25 milliGRAM(s) IV Push every 8 hours PRN  fluticasone propionate/ salmeterol 250-50 MICROgram(s) Diskus 1 Dose(s) Inhalation two times a day  folic acid 1 milliGRAM(s) Oral daily  heparin   Injectable 7500 Unit(s) SubCutaneous three times a day  latanoprost 0.005% Ophthalmic Solution 1 Drop(s) Left EYE at bedtime  losartan 25 milliGRAM(s) Oral at bedtime  metoclopramide Injectable 10 milliGRAM(s) IV Push once  metoclopramide Injectable 10 milliGRAM(s) IV Push every 8 hours PRN  metoprolol succinate ER 50 milliGRAM(s) Oral daily  sevelamer carbonate 800 milliGRAM(s) Oral three times a day with meals  sodium chloride 0.9% Bolus. 100 milliLiter(s) IV Bolus every 5 minutes PRN  traMADol 50 milliGRAM(s) Oral every 12 hours PRN    Antimicrobials:     Immunologic:    [facility], Division of Infectious Diseases  JAME Ashley S. Shah, Y. Patel, G. Deangelo  [phone]  ([phone] - weekdays after 5pm and weekends)    MELISSA ARREOLA  74y, Female  [phone]    HPI--  HPI:  73 y/o F PMhx ESRD on HD (MWF), PE (in 2013 after TKR s/p Xarelto), LARRY (not on CPAP), migraines, HTN who presented L eye vision loss and headache. Pt reports headache present since last week w/ loss of vision in L eye described as vision going black. Pt's headache is similar to prior migraines, but she has never had vision loss. Pt initially presented to [place] ED. She was evaluated by ophthalmology who found elevated IOP, recommended drops, and transferred pt to [facility]. She was seen by neurology and opthalmology. Received HD per nephrology.   Reports monday had myalgias and diarrhea. Symptoms progressed and now has occasionally productive cough, sore throat, nasal congestion, myalgias. States has chest pain only when coughing.  Denies fever, abd pain, n/v, dysuria.     ROS: 10 point review of systems completed, pertinent positives and negatives as per HPI.    Allergies: Sulfur (Unknown)  trimethoprim (Other; Rash)  sulfa drugs (Other; Rash)    PMH -- Osteoarthritis    Pulmonary embolism    Joint infection    Dysfunctional uterine bleeding    Obesity    Essential hypertension    Obstructive sleep apnea syndrome    History of pulmonary embolism    Arthropathy    Migraine    Morbid obesity    ESRD (end stage renal disease)      PSH -- Total knee replacement status    Status post hysterectomy    Status post cholecystectomy    Other acquired absence of organ    Status post total hysterectomy    History of total knee replacement      FH -- No pertinent family history in first degree relatives    No pertinent family history    FH: type 2 diabetes mellitus    FHx: hypertension      Social History -- denies tobacco, alcohol or illicit drug use    Physical Exam--  Vital Signs Last 24 Hrs  T(F): 98.7 (12 Dec 2024 11:55), Max: 98.7 (12 Dec 2024 11:55)  HR: 92 (12 Dec 2024 11:55) (63 - 98)  BP: 157/65 (12 Dec 2024 11:55) (142/67 - 157/65)  RR: 18 (12 Dec 2024 11:55) (18 - 20)  SpO2: 98% (12 Dec 2024 11:55) (98% - 100%)  General: nontoxic-appearing, no acute distress  HEENT: anicteric  Lungs: mild wheezes b/l  Heart: S1, S2, normal rate. R chest permcath without erythema  Abdomen: Soft. Nondistended. Nontender.   Neuro: no obvious focal deficits   Back: No costovertebral angle tenderness.  Extremities: No edema.   Skin: Warm. Dry. No rash.  Psychiatric: Appropriate affect and mood for situation.     Laboratory & Imaging Data--  CBC:                       10.4   7.57  )-----------( 197      ( 12 Dec 2024 04:43 )             33.9     WBC Count: 7.57 K/uL (12-12-24 @ 04:43)  WBC Count: 8.21 K/uL (12-11-24 @ 06:59)  WBC Count: 8.64 K/uL (12-10-24 @ 10:07)  WBC Count: 9.32 K/uL (12-09-24 @ 06:45)  WBC Count: 11.47 K/uL (12-08-24 @ 06:40)  WBC Count: 9.27 K/uL (12-07-24 @ 07:18)  WBC Count: 7.61 K/uL (12-05-24 @ 17:39)    CMP: 12-12    136  |  100  |  45[H]  ----------------------------<  86  5.0   |  22  |  4.42[H]    Ca    7.9[L]      12 Dec 2024 04:43  Phos  4.2     12-12  Mg     1.80     12-12    TPro  x   /  Alb  3.3  /  TBili  x   /  DBili  x   /  AST  x   /  ALT  x   /  AlkPhos  x   12-12    LIVER FUNCTIONS - ( 12 Dec 2024 04:43 )  Alb: 3.3 g/dL / Pro: x     / ALK PHOS: x     / ALT: x     / AST: x     / GGT: x           Urinalysis Basic - ( 12 Dec 2024 04:43 )    Color: x / Appearance: x / SG: x / pH: x  Gluc: 86 mg/dL / Ketone: x  / Bili: x / Urobili: x   Blood: x / Protein: x / Nitrite: x   Leuk Esterase: x / RBC: x / WBC x   Sq Epi: x / Non Sq Epi: x / Bacteria: x      Microbiology:     Culture - Nose (collected 12-06-24 @ 13:58)  Source: .Nose Nose  Final Report (12-08-24 @ 15:19):    No staphylococcus aureus isolated.    "PCR is more Sensitive for identifying MRSA/MSSA."        Radiology--  ***  Active Medications--  acetaminophen   IVPB .. 1000 milliGRAM(s) IV Intermittent once  albuterol/ipratropium for Nebulization 3 milliLiter(s) Nebulizer every 6 hours  aspirin  chewable 81 milliGRAM(s) Oral daily  benzonatate 100 milliGRAM(s) Oral every 8 hours PRN  calcitriol   Capsule 0.25 MICROGram(s) Oral <User Schedule>  calcium carbonate    500 mG (Tums) Chewable 1 Tablet(s) Chew once  chlorhexidine 2% Cloths 1 Application(s) Topical daily  cholecalciferol 1000 Unit(s) Oral daily  diphenhydrAMINE Injectable 25 milliGRAM(s) IV Push every 8 hours PRN  fluticasone propionate/ salmeterol 250-50 MICROgram(s) Diskus 1 Dose(s) Inhalation two times a day  folic acid 1 milliGRAM(s) Oral daily  heparin   Injectable 7500 Unit(s) SubCutaneous three times a day  latanoprost 0.005% Ophthalmic Solution 1 Drop(s) Left EYE at bedtime  losartan 25 milliGRAM(s) Oral at bedtime  metoclopramide Injectable 10 milliGRAM(s) IV Push once  metoclopramide Injectable 10 milliGRAM(s) IV Push every 8 hours PRN  metoprolol succinate ER 50 milliGRAM(s) Oral daily  sevelamer carbonate 800 milliGRAM(s) Oral three times a day with meals  sodium chloride 0.9% Bolus. 100 milliLiter(s) IV Bolus every 5 minutes PRN  traMADol 50 milliGRAM(s) Oral every 12 hours PRN    Antimicrobials:     Immunologic:

## 2024-12-12 NOTE — PROGRESS NOTE ADULT - SUBJECTIVE AND OBJECTIVE BOX
Date of Service: 12-12-24 @ 10:04    Patient is a 74y old  Female who presents with a chief complaint of L eye vision loss (12 Dec 2024 09:06)      Any change in ROS: seems to be doing  ok ;  no sob:   no cu gh ;  no phlegm      MEDICATIONS  (STANDING):  acetaminophen   IVPB .. 1000 milliGRAM(s) IV Intermittent once  albuterol/ipratropium for Nebulization 3 milliLiter(s) Nebulizer every 6 hours  aspirin  chewable 81 milliGRAM(s) Oral daily  calcitriol   Capsule 0.25 MICROGram(s) Oral <User Schedule>  chlorhexidine 2% Cloths 1 Application(s) Topical daily  cholecalciferol 1000 Unit(s) Oral daily  fluticasone propionate/ salmeterol 250-50 MICROgram(s) Diskus 1 Dose(s) Inhalation two times a day  folic acid 1 milliGRAM(s) Oral daily  heparin   Injectable 7500 Unit(s) SubCutaneous three times a day  latanoprost 0.005% Ophthalmic Solution 1 Drop(s) Left EYE at bedtime  losartan 25 milliGRAM(s) Oral at bedtime  metoclopramide Injectable 10 milliGRAM(s) IV Push once  metoprolol succinate ER 50 milliGRAM(s) Oral daily  oseltamivir 30 milliGRAM(s) Oral once  sevelamer carbonate 800 milliGRAM(s) Oral three times a day with meals    MEDICATIONS  (PRN):  benzonatate 100 milliGRAM(s) Oral every 8 hours PRN Cough  diphenhydrAMINE Injectable 25 milliGRAM(s) IV Push every 8 hours PRN migraine  metoclopramide Injectable 10 milliGRAM(s) IV Push every 8 hours PRN migraine  sodium chloride 0.9% Bolus. 100 milliLiter(s) IV Bolus every 5 minutes PRN SBP LESS THAN or EQUAL to 90 mmHg  traMADol 50 milliGRAM(s) Oral every 12 hours PRN for severe pain    Vital Signs Last 24 Hrs  T(C): 36.9 (12 Dec 2024 05:00), Max: 36.9 (12 Dec 2024 02:26)  T(F): 98.4 (12 Dec 2024 05:00), Max: 98.4 (12 Dec 2024 02:26)  HR: 95 (12 Dec 2024 05:00) (63 - 105)  BP: 153/63 (12 Dec 2024 05:00) (136/73 - 155/86)  BP(mean): --  RR: 19 (12 Dec 2024 05:00) (18 - 20)  SpO2: 98% (12 Dec 2024 05:00) (98% - 100%)    Parameters below as of 12 Dec 2024 05:00  Patient On (Oxygen Delivery Method): room air        I&O's Summary    11 Dec 2024 07:01  -  12 Dec 2024 07:00  --------------------------------------------------------  IN: 400 mL / OUT: 3200 mL / NET: -2800 mL          Physical Exam:   GENERAL: NAD, well-groomed, well-developed  HEENT: AIRAM/   Atraumatic, Normocephalic  ENMT: No tonsillar erythema, exudates, or enlargement; Moist mucous membranes, Good dentition, No lesions  NECK: Supple, No JVD, Normal thyroid  CHEST/LUNG: Clear to auscultaion  CVS: Regular rate and rhythm; No murmurs, rubs, or gallops  GI: : Soft, Nontender, Nondistended; Bowel sounds present  NERVOUS SYSTEM:  Alert & Oriented X3  EXTREMITIES:  - edema  LYMPH: No lymphadenopathy noted  SKIN: No rashes or lesions  ENDOCRINOLOGY: No Thyromegaly  PSYCH: Appropriate    Labs:  ABG - ( 10 Dec 2024 13:05 )  pH, Arterial: 7.38  pH, Blood: x     /  pCO2: 45    /  pO2: 95    / HCO3: 27    / Base Excess: 1.0   /  SaO2: 97.6            26, 23                            10.4   7.57  )-----------( 197      ( 12 Dec 2024 04:43 )             33.9                         10.4   8.21  )-----------( 214      ( 11 Dec 2024 06:59 )             34.4                         11.2   8.64  )-----------( 227      ( 10 Dec 2024 10:07 )             35.7                         9.9    9.32  )-----------( 228      ( 09 Dec 2024 06:45 )             31.0     12-12    136  |  100  |  45[H]  ----------------------------<  86  5.0   |  22  |  4.42[H]  12-11    139  |  102  |  69[H]  ----------------------------<  85  5.2   |  20[L]  |  6.26[H]  12-10    139  |  99  |  56[H]  ----------------------------<  134[H]  4.5   |  23  |  5.41[H]  12-09    139  |  100  |  87[H]  ----------------------------<  79  5.0   |  21[L]  |  7.38[H]    Ca    7.9[L]      12 Dec 2024 04:43  Ca    8.2[L]      11 Dec 2024 06:59  Ca    8.3[L]      10 Dec 2024 10:07  Phos  4.2     12-12  Phos  4.0     12-11  Phos  3.3     12-10  Mg     1.80     12-12  Mg     2.00     12-11  Mg     1.80     12-10    TPro  x   /  Alb  3.3  /  TBili  x   /  DBili  x   /  AST  x   /  ALT  x   /  AlkPhos  x   12-12    CAPILLARY BLOOD GLUCOSE      POCT Blood Glucose.: 122 mg/dL (11 Dec 2024 18:50)      LIVER FUNCTIONS - ( 12 Dec 2024 04:43 )  Alb: 3.3 g/dL / Pro: x     / ALK PHOS: x     / ALT: x     / AST: x     / GGT: x             Urinalysis Basic - ( 12 Dec 2024 04:43 )    Color: x / Appearance: x / SG: x / pH: x  Gluc: 86 mg/dL / Ketone: x  / Bili: x / Urobili: x   Blood: x / Protein: x / Nitrite: x   Leuk Esterase: x / RBC: x / WBC x   Sq Epi: x / Non Sq Epi: x / Bacteria: x            RECENT CULTURES:  12-06 @ 13:58 .Nose Nose         rad< from: CT Angio Chest PE Protocol w/ IV Cont (12.10.24 @ 16:36) >  Mediastinum/Lymph nodes: Small calcified right hilar lymph nodes, likely   sequela of old granulomatous disease. Unchanged large, heterogeneous   thyroid with substernal extension, with mass effect on adjacent   vasculature and rightward deviation of the trachea.    Upper Abdomen: Unchanged pneumobilia. Cholecystectomy. Right greater than   left renal atrophy. Partially imaged infrarenal IVC filter.    Bones and Soft Tissues: No aggressive osseous lesions.    IMPRESSION:  Within study limitations, no pulmonary embolism.    Other stable chronic findings, as above.    --- End of Report ---            LEONARDO CHU M.D., Attending Radiologist  This document has been electronically signed. Dec 10 2024  4:58PM    < end of copied text >  < from: CT Angio Chest PE Protocol w/ IV Cont (12.10.24 @ 16:36) >  CTA: Suboptimal contrast bolus to the pulmonary arteries. Within study   limitations, no pulmonary embolism (limited evaluation of several   subsegmental branches). Is mildly enlarged pulmonary artery. Left atrial   enlargement qualitatively. No pericardial effusion. Right IJ hemodialysis   catheter with tip in the distal SVC.    < end of copied text >         No staphylococcus aureus isolated.  "PCR is more Sensitive for identifying MRSA/MSSA."          RESPIRATORY CULTURES:          Studies  Chest X-RAY  CT SCAN Chest   Venous Dopplers: LE:   CT Abdomen  Others

## 2024-12-12 NOTE — PROGRESS NOTE ADULT - SUBJECTIVE AND OBJECTIVE BOX
[facility] NEPHROLOGY- PROGRESS NOTE    74y Female with history of ESRD on HD presents with L eye loss of vision. Nephrology consulted for ESRD status.     REVIEW OF SYSTEMS:  Gen: no fevers, + chills  Cards: no chest pain  Resp: + dyspnea on exertion, + cough  GI: no nausea or vomiting or diarrhea  Vascular: no LE edema    Sulfur (Unknown)  trimethoprim (Other; Rash)  sulfa drugs (Other; Rash)      Hospital Medications: Medications reviewed        VITALS:  T(F): 98.4 (12-12-24 @ 05:00), Max: 98.4 (12-12-24 @ 02:26)  HR: 95 (12-12-24 @ 05:00)  BP: 153/63 (12-12-24 @ 05:00)  RR: 19 (12-12-24 @ 05:00)  SpO2: 98% (12-12-24 @ 05:00)  Wt(kg): --    12-11 @ 07:01  -  12-12 @ 07:00  --------------------------------------------------------  IN: 400 mL / OUT: 3200 mL / NET: -2800 mL        PHYSICAL EXAM:    Gen: NAD, calm  Cards: RRR, +S1/S2, no M/G/R  Resp: CTA B/L  GI: soft, NT/ND, NABS  Vascular: no LE edema B/L, RIJ TDC intact        LABS:  12-12    136  |  100  |  45[H]  ----------------------------<  86  5.0   |  22  |  4.42[H]    Ca    7.9[L]      12 Dec 2024 04:43  Phos  4.2     12-12  Mg     1.80     12-12      Creatinine Trend: 4.42 <--, 6.26 <--, 5.41 <--, 7.38 <--, 6.03 <--, 4.41 <--, 5.54 <--                        10.4   7.57  )-----------( 197      ( 12 Dec 2024 04:43 )             33.9     Urine Studies:  Urinalysis Basic - ( 12 Dec 2024 04:43 )    Color:  / Appearance:  / SG:  / pH:   Gluc: 86 mg/dL / Ketone:   / Bili:  / Urobili:    Blood:  / Protein:  / Nitrite:    Leuk Esterase:  / RBC:  / WBC    Sq Epi:  / Non Sq Epi:  / Bacteria:

## 2024-12-12 NOTE — CONSULT NOTE ADULT - CONSULT REQUESTED DATE/TIME
05-Dec-2024 21:25
06-Dec-2024 06:02
12-Dec-2024 09:06
06-Dec-2024 11:08
06-Dec-2024 11:14
06-Dec-2024 14:05

## 2024-12-12 NOTE — PROGRESS NOTE ADULT - SUBJECTIVE AND OBJECTIVE BOX
SUBJECTIVE / OVERNIGHT EVENTS:pt seen and examined, pt still shortness of breath    12-12-24    MEDICATIONS  (STANDING):  acetaminophen   IVPB .. 1000 milliGRAM(s) IV Intermittent once  albuterol/ipratropium for Nebulization 3 milliLiter(s) Nebulizer every 6 hours  aspirin  chewable 81 milliGRAM(s) Oral daily  calcitriol   Capsule 0.25 MICROGram(s) Oral <User Schedule>  chlorhexidine 2% Cloths 1 Application(s) Topical daily  cholecalciferol 1000 Unit(s) Oral daily  fluticasone propionate/ salmeterol 250-50 MICROgram(s) Diskus 1 Dose(s) Inhalation two times a day  folic acid 1 milliGRAM(s) Oral daily  heparin   Injectable 7500 Unit(s) SubCutaneous three times a day  latanoprost 0.005% Ophthalmic Solution 1 Drop(s) Left EYE at bedtime  losartan 25 milliGRAM(s) Oral at bedtime  metoclopramide Injectable 10 milliGRAM(s) IV Push once  metoprolol succinate ER 50 milliGRAM(s) Oral daily  sevelamer carbonate 800 milliGRAM(s) Oral three times a day with meals    MEDICATIONS  (PRN):  benzocaine/menthol Lozenge 1 Lozenge Oral every 8 hours PRN Sore Throat  benzonatate 100 milliGRAM(s) Oral every 8 hours PRN Cough  diphenhydrAMINE Injectable 25 milliGRAM(s) IV Push every 8 hours PRN migraine  metoclopramide Injectable 10 milliGRAM(s) IV Push every 8 hours PRN migraine  sodium chloride 0.9% Bolus. 100 milliLiter(s) IV Bolus every 5 minutes PRN SBP LESS THAN or EQUAL to 90 mmHg  traMADol 50 milliGRAM(s) Oral every 12 hours PRN for severe pain    Vital Signs Last 24 Hrs  T(C): 37.1 (12-12-24 @ 11:55), Max: 37.1 (12-12-24 @ 11:55)  T(F): 98.7 (12-12-24 @ 11:55), Max: 98.7 (12-12-24 @ 11:55)  HR: 86 (12-12-24 @ 13:09) (63 - 98)  BP: 157/65 (12-12-24 @ 11:55) (142/67 - 157/65)  BP(mean): --  RR: 20 (12-12-24 @ 15:56) (18 - 20)  SpO2: 96% (12-12-24 @ 15:56) (96% - 100%)    Constitutional: No fever, fatigue  Skin: No rash.  Eyes: No recent vision problems or eye pain.  ENT: No congestion, ear pain, or sore throat.  Cardiovascular: No chest pain or palpation.  Respiratory: No cough, shortness of breath, congestion, or wheezing.  Gastrointestinal: No abdominal pain, nausea, vomiting, or diarrhea.  Genitourinary: No dysuria.  Musculoskeletal: No joint swelling.  Neurologic: No headache.    PHYSICAL EXAM:  GENERAL: NAD  EYES: EOMI, PERRLA  NECK: Supple, No JVD  CHEST/LUNG: crackles at bases, no wheezing  HEART:  S1 , S2 +  ABDOMEN: soft , bs+  EXTREMITIES:  edema  NEUROLOGY:alert awake    LABS:  12-12    136  |  100  |  45[H]  ----------------------------<  86  5.0   |  22  |  4.42[H]    Ca    7.9[L]      12 Dec 2024 04:43  Phos  4.2     12-12  Mg     1.80     12-12    TPro      /  Alb  3.3  /  TBili      /  DBili      /  AST      /  ALT      /  AlkPhos      12-12    Creatinine Trend: 4.42 <--, 6.26 <--, 5.41 <--, 7.38 <--, 6.03 <--, 4.41 <--                        10.4   7.57  )-----------( 197      ( 12 Dec 2024 04:43 )             33.9     Urine Studies:  Urinalysis Basic - ( 12 Dec 2024 04:43 )    Color:  / Appearance:  / SG:  / pH:   Gluc: 86 mg/dL / Ketone:   / Bili:  / Urobili:    Blood:  / Protein:  / Nitrite:    Leuk Esterase:  / RBC:  / WBC    Sq Epi:  / Non Sq Epi:  / Bacteria:               LIVER FUNCTIONS - ( 12 Dec 2024 04:43 )  Alb: 3.3 g/dL / Pro: x     / ALK PHOS: x     / ALT: x     / AST: x     / GGT: x                     Consultant(s) Notes Reviewed:      Care Discussed with Consultants/Other Providers:

## 2024-12-13 ENCOUNTER — TRANSCRIPTION ENCOUNTER (OUTPATIENT)
Age: 74
End: 2024-12-13

## 2024-12-13 VITALS
SYSTOLIC BLOOD PRESSURE: 139 MMHG | RESPIRATION RATE: 18 BRPM | DIASTOLIC BLOOD PRESSURE: 78 MMHG | TEMPERATURE: 99 F | OXYGEN SATURATION: 100 % | HEART RATE: 92 BPM

## 2024-12-13 LAB
ANION GAP SERPL CALC-SCNC: 13 MMOL/L — SIGNIFICANT CHANGE UP (ref 7–14)
BUN SERPL-MCNC: 66 MG/DL — HIGH (ref 7–23)
CALCIUM SERPL-MCNC: 7.6 MG/DL — LOW (ref 8.4–10.5)
CHLORIDE SERPL-SCNC: 101 MMOL/L — SIGNIFICANT CHANGE UP (ref 98–107)
CO2 SERPL-SCNC: 23 MMOL/L — SIGNIFICANT CHANGE UP (ref 22–31)
CREAT SERPL-MCNC: 6.27 MG/DL — HIGH (ref 0.5–1.3)
EGFR: 7 ML/MIN/1.73M2 — LOW
GLUCOSE SERPL-MCNC: 76 MG/DL — SIGNIFICANT CHANGE UP (ref 70–99)
HCT VFR BLD CALC: 32.4 % — LOW (ref 34.5–45)
HGB BLD-MCNC: 10.2 G/DL — LOW (ref 11.5–15.5)
MAGNESIUM SERPL-MCNC: 1.7 MG/DL — SIGNIFICANT CHANGE UP (ref 1.6–2.6)
MCHC RBC-ENTMCNC: 31 PG — SIGNIFICANT CHANGE UP (ref 27–34)
MCHC RBC-ENTMCNC: 31.5 G/DL — LOW (ref 32–36)
MCV RBC AUTO: 98.5 FL — SIGNIFICANT CHANGE UP (ref 80–100)
NRBC # BLD: 0 /100 WBCS — SIGNIFICANT CHANGE UP (ref 0–0)
NRBC # FLD: 0 K/UL — SIGNIFICANT CHANGE UP (ref 0–0)
PHOSPHATE SERPL-MCNC: 4.8 MG/DL — HIGH (ref 2.5–4.5)
PLATELET # BLD AUTO: 182 K/UL — SIGNIFICANT CHANGE UP (ref 150–400)
POTASSIUM SERPL-MCNC: 5 MMOL/L — SIGNIFICANT CHANGE UP (ref 3.5–5.3)
POTASSIUM SERPL-SCNC: 5 MMOL/L — SIGNIFICANT CHANGE UP (ref 3.5–5.3)
RBC # BLD: 3.29 M/UL — LOW (ref 3.8–5.2)
RBC # FLD: 13.7 % — SIGNIFICANT CHANGE UP (ref 10.3–14.5)
SODIUM SERPL-SCNC: 137 MMOL/L — SIGNIFICANT CHANGE UP (ref 135–145)
WBC # BLD: 5.9 K/UL — SIGNIFICANT CHANGE UP (ref 3.8–10.5)
WBC # FLD AUTO: 5.9 K/UL — SIGNIFICANT CHANGE UP (ref 3.8–10.5)

## 2024-12-13 RX ORDER — CALCIUM ACETATE 667 MG/5ML
1 SOLUTION ORAL
Qty: 90 | Refills: 0
Start: 2024-12-13 | End: 2025-01-11

## 2024-12-13 RX ORDER — OSELTAMIVIR PHOSPHATE 75 MG
1 CAPSULE ORAL
Qty: 1 | Refills: 0
Start: 2024-12-13 | End: 2024-12-13

## 2024-12-13 RX ORDER — FLUTICASONE PROPIONATE AND SALMETEROL XINAFOATE 45; 21 UG/1; UG/1
1 AEROSOL, METERED RESPIRATORY (INHALATION)
Qty: 2 | Refills: 0
Start: 2024-12-13 | End: 2025-01-09

## 2024-12-13 RX ORDER — LATANOPROST 50 UG/ML
1 SOLUTION/ DROPS OPHTHALMIC
Qty: 1 | Refills: 0
Start: 2024-12-13 | End: 2025-01-11

## 2024-12-13 RX ORDER — LOSARTAN POTASSIUM 100 MG/1
50 TABLET, FILM COATED ORAL AT BEDTIME
Refills: 0 | Status: DISCONTINUED | OUTPATIENT
Start: 2024-12-13 | End: 2024-12-13

## 2024-12-13 RX ORDER — ACETAMINOPHEN 500MG 500 MG/1
650 TABLET, COATED ORAL ONCE
Refills: 0 | Status: DISCONTINUED | OUTPATIENT
Start: 2024-12-13 | End: 2024-12-13

## 2024-12-13 RX ORDER — METHYLPREDNISOLONE SOD SUCC 125 MG
6 VIAL (EA) INJECTION
Qty: 1 | Refills: 0
Start: 2024-12-13 | End: 2024-12-18

## 2024-12-13 RX ORDER — CALCIUM ACETATE 667 MG/5ML
667 SOLUTION ORAL
Refills: 0 | Status: DISCONTINUED | OUTPATIENT
Start: 2024-12-13 | End: 2024-12-13

## 2024-12-13 RX ORDER — CALCITRIOL 0.5 UG/1
1 CAPSULE, LIQUID FILLED ORAL
Qty: 13 | Refills: 0
Start: 2024-12-13 | End: 2025-01-11

## 2024-12-13 RX ORDER — LOSARTAN POTASSIUM 100 MG/1
1 TABLET, FILM COATED ORAL
Qty: 30 | Refills: 0
Start: 2024-12-13 | End: 2025-01-11

## 2024-12-13 RX ADMIN — Medication 7500 UNIT(S): at 04:33

## 2024-12-13 RX ADMIN — TRAMADOL HYDROCHLORIDE 50 MILLIGRAM(S): 300 CAPSULE ORAL at 10:37

## 2024-12-13 RX ADMIN — Medication 30 MILLIGRAM(S): at 17:35

## 2024-12-13 RX ADMIN — IPRATROPIUM BROMIDE AND ALBUTEROL SULFATE 3 MILLILITER(S): 2.5; .5 SOLUTION RESPIRATORY (INHALATION) at 02:41

## 2024-12-13 RX ADMIN — Medication 1000 UNIT(S): at 08:30

## 2024-12-13 RX ADMIN — IPRATROPIUM BROMIDE AND ALBUTEROL SULFATE 3 MILLILITER(S): 2.5; .5 SOLUTION RESPIRATORY (INHALATION) at 15:02

## 2024-12-13 RX ADMIN — Medication 1000 UNIT(S): at 10:30

## 2024-12-13 RX ADMIN — Medication 7500 UNIT(S): at 13:38

## 2024-12-13 RX ADMIN — CALCIUM ACETATE 667 MILLIGRAM(S): 667 SOLUTION ORAL at 17:50

## 2024-12-13 RX ADMIN — CHLORHEXIDINE GLUCONATE 1 APPLICATION(S): 1.2 RINSE ORAL at 10:32

## 2024-12-13 RX ADMIN — Medication 1500 UNIT(S): at 06:30

## 2024-12-13 RX ADMIN — Medication 1 MILLIGRAM(S): at 10:30

## 2024-12-13 RX ADMIN — METOPROLOL TARTRATE 50 MILLIGRAM(S): 100 TABLET, FILM COATED ORAL at 10:31

## 2024-12-13 RX ADMIN — Medication 1000 UNIT(S): at 07:30

## 2024-12-13 RX ADMIN — CALCITRIOL 0.25 MICROGRAM(S): 0.5 CAPSULE, LIQUID FILLED ORAL at 10:31

## 2024-12-13 RX ADMIN — TRAMADOL HYDROCHLORIDE 50 MILLIGRAM(S): 300 CAPSULE ORAL at 13:41

## 2024-12-13 RX ADMIN — Medication 1000 UNIT(S): at 09:30

## 2024-12-13 RX ADMIN — METOCLOPRAMIDE HYDROCHLORIDE 10 MILLIGRAM(S): 10 TABLET ORAL at 13:38

## 2024-12-13 RX ADMIN — BENZONATATE 100 MILLIGRAM(S): 100 CAPSULE ORAL at 13:39

## 2024-12-13 RX ADMIN — Medication 25 MILLIGRAM(S): at 04:28

## 2024-12-13 RX ADMIN — Medication 81 MILLIGRAM(S): at 10:30

## 2024-12-13 RX ADMIN — Medication 25 MILLIGRAM(S): at 13:38

## 2024-12-13 RX ADMIN — METOCLOPRAMIDE HYDROCHLORIDE 10 MILLIGRAM(S): 10 TABLET ORAL at 04:28

## 2024-12-13 NOTE — PROGRESS NOTE ADULT - SUBJECTIVE AND OBJECTIVE BOX
[facility] NEPHROLOGY- PROGRESS NOTE    74y Female with history of ESRD on HD presents with L eye loss of vision. Nephrology consulted for ESRD status.     REVIEW OF SYSTEMS:  Gen: no fevers  Cards: no chest pain  Resp: + dyspnea on exertion, + cough  GI: no nausea or vomiting or diarrhea  Vascular: no LE edema    Sulfur (Unknown)  trimethoprim (Other; Rash)  sulfa drugs (Other; Rash)      Hospital Medications: Medications reviewed        VITALS:  T(F): 98.2 (12-13-24 @ 10:12), Max: 99 (12-12-24 @ 20:19)  HR: 94 (12-13-24 @ 10:12)  BP: 160/94 (12-13-24 @ 10:12)  RR: 18 (12-13-24 @ 10:12)  SpO2: 98% (12-13-24 @ 05:10)  Wt(kg): --    12-12 @ 07:01  -  12-13 @ 07:00  --------------------------------------------------------  IN: 750 mL / OUT: 0 mL / NET: 750 mL    12-13 @ 07:01  -  12-13 @ 13:09  --------------------------------------------------------  IN: 400 mL / OUT: 3200 mL / NET: -2800 mL        PHYSICAL EXAM:    Gen: NAD, calm  Cards: RRR, +S1/S2, no M/G/R  Resp: CTA B/L  GI: soft, NT/ND, NABS  Vascular: no LE edema B/L, RIJ TDC intact        LABS:  12-13    137  |  101  |  66[H]  ----------------------------<  76  5.0   |  23  |  6.27[H]    Ca    7.6[L]      13 Dec 2024 06:38  Phos  4.8     12-13  Mg     1.70     12-13    TPro      /  Alb  3.3  /  TBili      /  DBili      /  AST      /  ALT      /  AlkPhos      12-12    Creatinine Trend: 6.27 <--, 4.42 <--, 6.26 <--, 5.41 <--, 7.38 <--, 6.03 <--, 4.41 <--                        10.2   5.90  )-----------( 182      ( 13 Dec 2024 06:38 )             32.4     Urine Studies:  Urinalysis Basic - ( 13 Dec 2024 06:38 )    Color:  / Appearance:  / SG:  / pH:   Gluc: 76 mg/dL / Ketone:   / Bili:  / Urobili:    Blood:  / Protein:  / Nitrite:    Leuk Esterase:  / RBC:  / WBC    Sq Epi:  / Non Sq Epi:  / Bacteria:

## 2024-12-13 NOTE — PROGRESS NOTE ADULT - PROVIDER SPECIALTY LIST ADULT
Cardiology
Cardiology
Cardiology-Oncology
Nephrology
Infectious Disease
Internal Medicine
Nephrology
Neurology
Pulmonology
Pulmonology
Cardiology
Cardiology
Internal Medicine
Nephrology
Nephrology
Pulmonology
Pulmonology
Cardiology
Cardiology
Pulmonology
Pulmonology
Internal Medicine

## 2024-12-13 NOTE — DISCHARGE NOTE NURSING/CASE MANAGEMENT/SOCIAL WORK - NSDCPEFALRISK_GEN_ALL_CORE
For information on Fall & Injury Prevention, visit: [email or web address] OR  [email or web address] OR  https://www.cdc.gov/steadi/patient.html

## 2024-12-13 NOTE — DISCHARGE NOTE NURSING/CASE MANAGEMENT/SOCIAL WORK - NSDCFUADDAPPT_GEN_ALL_CORE_FT
Follow-up with his/her ophthalmologist or with [facility] Department of Ophthalmology within 1 week of after discharge at:  [address]  [place] [address]  [phone]

## 2024-12-13 NOTE — PROGRESS NOTE ADULT - PROBLEM SELECTOR PLAN 4
HD as scheduled
HD as scheduled
- continue metoprolol
HD as scheduled
HD as scheduled

## 2024-12-13 NOTE — DISCHARGE NOTE NURSING/CASE MANAGEMENT/SOCIAL WORK - FINANCIAL ASSISTANCE
[facility] provides services at a reduced cost to those who are determined to be eligible through [facility]’s financial assistance program. Information regarding [facility]’s financial assistance program can be found by going to [email or web address] or by calling [phone].

## 2024-12-13 NOTE — DIETITIAN INITIAL EVALUATION ADULT - PERTINENT MEDS FT
MEDICATIONS  (STANDING):  acetaminophen   IVPB .. 1000 milliGRAM(s) IV Intermittent once  albuterol/ipratropium for Nebulization 3 milliLiter(s) Nebulizer every 6 hours  aspirin  chewable 81 milliGRAM(s) Oral daily  calcitriol   Capsule 0.25 MICROGram(s) Oral <User Schedule>  chlorhexidine 2% Cloths 1 Application(s) Topical daily  cholecalciferol 1000 Unit(s) Oral daily  fluticasone propionate/ salmeterol 250-50 MICROgram(s) Diskus 1 Dose(s) Inhalation two times a day  folic acid 1 milliGRAM(s) Oral daily  heparin   Injectable 7500 Unit(s) SubCutaneous three times a day  latanoprost 0.005% Ophthalmic Solution 1 Drop(s) Left EYE at bedtime  losartan 25 milliGRAM(s) Oral at bedtime  metoclopramide Injectable 10 milliGRAM(s) IV Push once  metoprolol succinate ER 50 milliGRAM(s) Oral daily  oseltamivir 30 milliGRAM(s) Oral <User Schedule>  sevelamer carbonate 800 milliGRAM(s) Oral three times a day with meals    MEDICATIONS  (PRN):  benzocaine/menthol Lozenge 1 Lozenge Oral every 8 hours PRN Sore Throat  benzonatate 100 milliGRAM(s) Oral every 8 hours PRN Cough  diphenhydrAMINE Injectable 25 milliGRAM(s) IV Push every 8 hours PRN migraine  guaiFENesin Oral Liquid (Sugar-Free) 100 milliGRAM(s) Oral every 6 hours PRN Cough  metoclopramide Injectable 10 milliGRAM(s) IV Push every 8 hours PRN migraine  sodium chloride 0.9% Bolus. 100 milliLiter(s) IV Bolus every 5 minutes PRN SBP LESS THAN or EQUAL to 90 mmHg  traMADol 50 milliGRAM(s) Oral every 12 hours PRN for severe pain

## 2024-12-13 NOTE — DIETITIAN INITIAL EVALUATION ADULT - NS FNS DIET ORDER
Diet, Regular:   For patients receiving Renal Replacement - No Protein Restr, No Conc K, No Conc Phos, Low Sodium (RENAL) (12-06-24 @ 11:14) [Active]

## 2024-12-13 NOTE — PROGRESS NOTE ADULT - NUTRITIONAL ASSESSMENT
This patient has been assessed with a concern for Malnutrition and has been determined to have a diagnosis/diagnoses of Morbid obesity (BMI > 40).    This patient is being managed with:   Diet Regular-  For patients receiving Renal Replacement - No Protein Restr No Conc K No Conc Phos Low Sodium (RENAL)  Entered: Dec  6 2024 11:13AM

## 2024-12-13 NOTE — DIETITIAN INITIAL EVALUATION ADULT - ADD RECOMMEND
1. Consider adding probiotics, to promote gut health.  2. Monitor weight, labs, po intake and tolerance, bowel movement, skin integrity.

## 2024-12-13 NOTE — PROGRESS NOTE ADULT - REASON FOR ADMISSION
L eye vision loss

## 2024-12-13 NOTE — DIETITIAN INITIAL EVALUATION ADULT - ORAL INTAKE PTA/DIET HISTORY
Patient states that her weight is around 260lbs, weight fluctuation due to Dx: ESRD on HD. Patient reports fair- good po intake at home, has no known food allergies.

## 2024-12-13 NOTE — PROGRESS NOTE ADULT - PROBLEM SELECTOR PLAN 1
as per ophthal, transient vision loss OS_  - patient endorses history of migraines with visual aura - may explain current headache and could also be associated with vision loss  - Recommend neuro evaluation for migraine management as well as complete stroke work up given episode of transient vision loss  - Unable to perform dilated fundus exam OS due to dense cataract as above. Therefore unknown if patient had retinal vascular event.
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    Other stable chronic findings, as above.    < end of copied text >
as per ophthal, transient vision loss OS_  - patient endorses history of migraines with visual aura - may explain current headache and could also be associated with vision loss  - Recommend neuro evaluation for migraine management as well as complete stroke work up given episode of transient vision loss  - Unable to perform dilated fundus exam OS due to dense cataract as above. Therefore unknown if patient had retinal vascular event.  neuro eval reviewed
as per pulm , pt is feeling SOB:   she has no underlying lung disease:  she never smoked but her  exposed:  exposed to passive smoking:   she has seen pulm as an outpt and was given albuterol only to use:   VBG is ac on chr hypercarbic resp failure: :  need ABG  as VBG does not correlate well with VBG :   if pt is acidotic on ABG:  then will use NIV at night time:   cont steroids for nw:   cta
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    Other stable chronic findings, as above.  steroids as per pulm
as per ophthal, transient vision loss OS_  - patient endorses history of migraines with visual aura - may explain current headache and could also be associated with vision loss  - Recommend neuro evaluation for migraine management as well as complete stroke work up given episode of transient vision loss  - Unable to perform dilated fundus exam OS due to dense cataract as above. Therefore unknown if patient had retinal vascular event.  neuro eval reviewed
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    Other stable chronic findings, as above.  steroids as per pulm    pt cleared for dc by cards/ pulm

## 2024-12-13 NOTE — PROGRESS NOTE ADULT - ASSESSMENT
74y (1950) woman with a PMHx significant for Morbid obesity, hypertension, migraines, CKD on dialysis who presented to the ED for worsening left eye vision loss. O/e left eye vision loss  CTA no LVO  repeat CTH no acute finding    Impression: left eye visual loss. while patient has some migranous features. It would not explain extent of pt's left monocular visual loss    continue with meds PRN HA  TTE here or as aouto patient  Ophthalmology eval  Continue with Aspirin 81 for now  outpt open MRI (can no tolerate here) .  
73 y/o  F with pmh of ESRD on HD (MWF), PE (in 2013 after TKR s/p Xarelto), LARRY (not on CPAP), migraines, HTN p/w L eye vision loss and headache. 
73 y/o  F with pmh of ESRD on HD (MWF), PE (in 2013 after TKR s/p Xarelto), LARRY (not on CPAP), migraines, HTN p/w L eye vision loss and headache.  Pt reports headache behind L eye (typical of prior migraines) starting Tuesday morning.  She also had total loss of vision in L eye described as vision going black.  Since then, she has been able to perceive light in L eye, but only able to see white without being able to make out objects.  She has had nausea without vomiting, and has had photophobia.  No weakness, numbness, slurred speech.  No fever, chills, chest pain, or palpitations.  Pt's headache is similar to prior migraines, but she has never had vision loss.  She also reports intermittent "electric shock" sensation in L arm along with some neck pain for the past 2 weeks.    Pt initially presented to [place] ED yesterday AM.  She was evaluated by ophthalmology who found elevated IOP, recommended drops, and transferred pt to [facility].  In [facility] ED,  IOP was normal on opthalmology eval.  She was given morphine, Tylenol, with improvement in headache, and dorzolamide, Xalatan, and timolol drops.    (06 Dec 2024 07:04):  sheis feeling SOB:   she has no underlying lung disease:  she never smoked but her  exposed:  exposed to passive smoking:   she has seen pulm as an outpt and was given albuterol only to use:   to me she says she wheezes a lot especially when she walks; :  has no prior history of asthma   but have been wheezing for quiet some time       SOB/PINTO/WHEEZING  ESRD  HX OF PE  LARRY  HTN  LEFT EYE VISION LOSS    SOB/PINTO/WHEEZING  -She is obese:  she wheezes a lot:  she may have underlying asthma :  -she will need outpt PFT and will start rx for sob  : ? underlying copd:   -she has hx of passive smoking:   -she would need oupt PFT :   -will start her steroids  : dw neurology  : no objection     12/7 ;     -he seems OK:   -no sob:  no cough :   -she is not wheezing:    -cont BD "  she feels better to me today  :  -will decrease steroids from tomorrow     ESRD  -on HD     HX OF PE  -she has hx of pe before after the knee surgery  :  she was treated fr it:   but she also has IVCF:     LARRY  -not using cpap    HTN  -controlled    LEFT EYE VISION LOSS  -opthalmology and neuro is following:     [facility]    
73 y/o  F with pmh of ESRD on HD (MWF), PE (in 2013 after TKR s/p Xarelto), LARRY (not on CPAP), migraines, HTN p/w L eye vision loss and headache.  Pt reports headache behind L eye (typical of prior migraines) starting Tuesday morning.  She also had total loss of vision in L eye described as vision going black.  Since then, she has been able to perceive light in L eye, but only able to see white without being able to make out objects.  She has had nausea without vomiting, and has had photophobia.  No weakness, numbness, slurred speech.  No fever, chills, chest pain, or palpitations.  Pt's headache is similar to prior migraines, but she has never had vision loss.  She also reports intermittent "electric shock" sensation in L arm along with some neck pain for the past 2 weeks.    Pt initially presented to [place] ED yesterday AM.  She was evaluated by ophthalmology who found elevated IOP, recommended drops, and transferred pt to [facility].  In [facility] ED,  IOP was normal on opthalmology eval.  She was given morphine, Tylenol, with improvement in headache, and dorzolamide, Xalatan, and timolol drops.    (06 Dec 2024 07:04):  sheis feeling SOB:   she has no underlying lung disease:  she never smoked but her  exposed:  exposed to passive smoking:   she has seen pulm as an outpt and was given albuterol only to use:   to me she says she wheezes a lot especially when she walks; :  has no prior history of asthma   but have been wheezing for quiet some time       SOB/PINTO/WHEEZING  ESRD  HX OF PE  LARRY  HTN  LEFT EYE VISION LOSS    SOB/PINTO/WHEEZING  -She is obese:  she wheezes a lot:  she may have underlying asthma :  -she will need outpt PFT and will start rx for sob  : ? underlying copd:   -she has hx of passive smoking:   -she would need oupt PFT :   -will start her steroids  : dw neurology  : no objection     12/7 ;     -he seems OK:   -no sob:  no cough :   -she is not wheezing:    -cont BD "  she feels better to me today  :  -will decrease steroids from tomorrow     12/9: she feels better:  gagnon to po prednisone for 3 days only ;  she snot wheezing much    -check VBg ordered!    ESRD  -on HD     HX OF PE  -she has hx of pe before after the knee surgery  :  she was treated fr it:   but she also has IVCF:     LARRY  -not using cpap    HTN  -controlled    LEFT EYE VISION LOSS  -opthalmology and neuro is following:     jesus ACP  
73 y/o F PMhx ESRD on HD (MWF), PE (in 2013 after TKR s/p Xarelto), LARRY (not on CPAP), migraines, HTN who presented L eye vision loss and headache  now flu A positive    influenza A, ESRD  afebrile, no leukocytosis  flu A PCR positive  symptomatic- productive cough, sore throat, nasal congestion, myalgias  HD per nephrology  s/p tamiflu 30mg x 1 on 12/12    Recommendations  c/w tamiflu 30mg post-HD on today 12/13   - followed by tamiflu 30mg post-HD on 12/16  supportive care- throat lozenge, anti-tussive  no objection to discharge from ID perspective    Dr. Angella Guerrero will be covering 12/14 & 12/15. I will resume care on 12/16     Tony Bright M.D.  [facility], Division of Infectious Diseases  [phone]  After 5pm on weekdays and all day on weekends - please call [phone] 
74y Female with history of ESRD on HD presents with L eye loss of vision. Nephrology consulted for ESRD status.     1) ESRD: Last HD on 12/6 tolerated well with 2L removed. Plan for next maintenance HD today. Patient awaiting pulmonary clearance prior to AVF creation. Monitor electrolytes.    2) HTN with ESRD: BP controlled. Continue with current medications.    3) Anemia of renal disease: Hb decreasing. Patient on low dose Mircera as an outpatient. Will start Epogen with subsequent HD. Monitor Hb.    4) Secondary HPT of renal origin: Phosphorus acceptable. Continue with calcitriol 0.25 mcg MWF, renvela with meals and renal diet. Monitor serum calcium and phosphorus.      [facility] NEPHROLOGY  Derrick Castaneda M.D.  Raúl Diana D.O.  Grace Delgadillo M.D.  MD Cher Beckman, MSN, ANP-C    Telephone: [phone]  Facsimile: [phone]    [facility] [place] [address], [address]  [place] [phone]  
74y Female with history of ESRD on HD presents with L eye loss of vision. Nephrology consulted for ESRD status.     1) ESRD: Last HD on 12/9 tolerated well with 2.1L removed. Plan for next maintenance HD on 12/11. Patient with ongoing dyspnea for which can check CT-PA (no need to coordinate with HD). Monitor electrolytes.    2) HTN with ESRD: BP controlled. Continue with current medications.    3) Anemia of renal disease: Hb acceptable. Patient on low dose Mircera as an outpatient. Will start Epogen if Hb decreases. Monitor Hb.    4) Secondary HPT of renal origin: Phosphorus acceptable. Continue with calcitriol 0.25 mcg MWF, renvela with meals and renal diet. Monitor serum calcium and phosphorus.      [facility] NEPHROLOGY  Derrick Castaneda M.D.  Raúl Diana D.O.  Grace Delgadillo M.D.  MD Cher Beckman, MSN, ANP-C    Telephone: [phone]  Facsimile: [phone] [place] [address], [address]  [place] [address]  
A/P    75 y/o  F with pmh of ESRD on HD (MWF), PE (in 2013 after TKR s/p Xarelto), LARRY (not on CPAP), migraines, HTN p/w L eye vision loss and headache.     #Vision loss, left eye.   -ophthamology and neurology w/u in progress  -med fu     #migraine.   -tx per neuro    #ESRD on hemodialysis.   -hd per renal     #Essential hypertension.   -cont metoprolol.    #chronic dyspnea  -prior  cath with no severe obstructive cad, normal flling pressures, no sig PHTN  -check TTE, if LVEF normal ----plan for pharm stress  -cont vol removal with hd   --now +/ flu A- ID following    dvt ppx    
A/P    73 y/o  F with pmh of ESRD on HD (MWF), PE (in 2013 after TKR s/p Xarelto), LARRY (not on CPAP), migraines, HTN p/w L eye vision loss and headache.     #Vision loss, left eye.   -ophthamology and neurology w/u in progress    #migraine.   -tx per neuro    #ESRD on hemodialysis.   -hd per renal     #Essential hypertension.   -cont metoprolol.    #chronic dyspnea  -recent cath with no severe obstructive cad, normal flling pressures, no sig PHTN  -cont vol removal with hd   dvt ppx    55 minutes spent on total encounter; more than 50% of the visit was spent counseling and/or coordinating care by the attending physician.    
A/P    73 y/o  F with pmh of ESRD on HD (MWF), PE (in 2013 after TKR s/p Xarelto), LARRY (not on CPAP), migraines, HTN p/w L eye vision loss and headache.     #Vision loss, left eye.   -ophthamology and neurology w/u in progress  -med fu     #migraine.   -tx per neuro    #ESRD on hemodialysis.   -hd per renal     #Essential hypertension.   -cont metoprolol.    #chronic dyspnea  -prior  cath with no severe obstructive cad, normal flling pressures, no sig PHTN  -check TTE, if LVEF normal plan for pharm stress  -cont vol removal with hd       dvt ppx      35 minutes spent on total encounter; more than 50% of the visit was spent counseling and/or coordinating care by the attending physician.  
A/P    75 y/o  F with pmh of ESRD on HD (MWF), PE (in 2013 after TKR s/p Xarelto), LARRY (not on CPAP), migraines, HTN p/w L eye vision loss and headache.     #Vision loss, left eye.   -ophthamology and neurology w/u in progress    #migraine.   -tx per neuro    #ESRD on hemodialysis.   -hd per renal     #Essential hypertension.   -cont metoprolol.    #chronic dyspnea  -recent cath with no severe obstructive cad, normal flling pressures, no sig PHTN  -cont vol removal with hd   dvt ppx    35 minutes spent on total encounter; more than 50% of the visit was spent counseling and/or coordinating care by the attending physician.  
73 y/o  F with pmh of ESRD on HD (MWF), PE (in 2013 after TKR s/p Xarelto), LARRY (not on CPAP), migraines, HTN p/w L eye vision loss and headache.  Pt reports headache behind L eye (typical of prior migraines) starting Tuesday morning.  She also had total loss of vision in L eye described as vision going black.  Since then, she has been able to perceive light in L eye, but only able to see white without being able to make out objects.  She has had nausea without vomiting, and has had photophobia.  No weakness, numbness, slurred speech.  No fever, chills, chest pain, or palpitations.  Pt's headache is similar to prior migraines, but she has never had vision loss.  She also reports intermittent "electric shock" sensation in L arm along with some neck pain for the past 2 weeks.    Pt initially presented to [place] ED yesterday AM.  She was evaluated by ophthalmology who found elevated IOP, recommended drops, and transferred pt to [facility].  In [facility] ED,  IOP was normal on opthalmology eval.  She was given morphine, Tylenol, with improvement in headache, and dorzolamide, Xalatan, and timolol drops.    (06 Dec 2024 07:04):  sheis feeling SOB:   she has no underlying lung disease:  she never smoked but her  exposed:  exposed to passive smoking:   she has seen pulm as an outpt and was given albuterol only to use:   to me she says she wheezes a lot especially when she walks; :  has no prior history of asthma   but have been wheezing for quiet some time       SOB/PINTO/WHEEZING  ESRD  HX OF PE  LARRY  HTN  LEFT EYE VISION LOSS    SOB/PINTO/WHEEZING  -She is obese:  she wheezes a lot:  she may have underlying asthma :  -she will need outpt PFT and will start rx for sob  : ? underlying copd:   -she has hx of passive smoking:   -she would need oupt PFT :   -will start her steroids  : dw neurology  : no objection     12/7 ;     -he seems OK:   -no sob:  no cough :   -she is not wheezing:    -cont BD "  she feels better to me today  :  -will decrease steroids from tomorrow     12/9: she feels better:  gagnon to po prednisone for 3 days only ;  she snot wheezing much    -check VBg ordered!    12/10: she seems to be doing  ok :  +sob: : pinto;    +cough :  no phlegm    VBG is ac on chr hypercarbic resp failure: :  need ABG  as VBG does not correlate well with VBG :   if sheis acidotic on ABG:  then will use NIV at night time:   cont steroids for nw:   cta ordered as she still field pinto:  on HD     12/11  seems OK:   still sob:   no cugh ;  no phlegm   ABG was pretty decent yesterday  ; nop need for bipap    she is not wheezing:  she finished steroids : pulm wise she looks OK   ? can metoprolol be changed     12/12: she seems OK:  now she got influenza  started on Tamiflu        ESRD  -on HD     HX OF PE  -she has hx of pe before after the knee surgery  :  she was treated fr it:   but she also has IVCF:     12/10: cta is awaited   12/11: CONT CURRENT THERAPY   12/12: cta showed: Suboptimal contrast bolus to the pulmonary arteries. Within study limitations, no pulmonary embolism (limited evaluation of several subsegmental branches). Is mildly enlarged pulmonary artery. Left atrial enlargement qualitatively. No pericardial effusion. Right IJ hemodialysis catheter with tip in the distal SVC.    LARRY  -not using cpap    HTN  -controlled    LEFT EYE VISION LOSS  -opthalmology and neuro is following:     PT ; dw acp
74y Female with history of ESRD on HD presents with L eye loss of vision. Nephrology consulted for ESRD status.     1) ESRD: HD on 12/4 as an outpatient and last HD 12/6. Plan for next maintenance HD 12/9. Patient awaiting pulmonary clearance prior to AVF creation. Pulmonary consulted. Monitor electrolytes.    2) HTN with ESRD: BP controlled. Continue with current medications.    3) Anemia of renal disease: Hb acceptable. Patient on low dose Mircera as an outpatient. Will start Epogen if Hb decreases. Monitor Hb.    4) Secondary HPT of renal origin: Resume calcitriol 0.25 mcg MWF, renvela with meals and renal diet. Monitor serum calcium and phosphorus.      [facility] NEPHROLOGY  Derrick Castaneda M.D.  Raúl Diana D.O.  Grace Delgadillo M.D.  MD Cher Beckman, MSN, ANP-C    Telephone: [phone]  Facsimile: [phone]    [facility] [place] [address], [address]  [place] [address]  
74y Female with history of ESRD on HD presents with L eye loss of vision. Nephrology consulted for ESRD status.     1) ESRD: Last HD on 12/9 tolerated well with 2.1L removed. Plan for next maintenance HD today. Dyspnea not secondary to volume overload. Monitor electrolytes.    2) HTN with ESRD: BP controlled. Continue with current medications.    3) Anemia of renal disease: Hb acceptable. Patient on low dose Mircera as an outpatient. Will start Epogen if Hb decreases. Monitor Hb.    4) Secondary HPT of renal origin: Phosphorus acceptable. Continue with calcitriol 0.25 mcg MWF, renvela with meals and renal diet. Monitor serum calcium and phosphorus.      [facility] NEPHROLOGY  Derrick Castaneda M.D.  Raúl Diana D.O.  Grace Delgadillo M.D.  MD Cher Beckman, MSN, ANP-C    Telephone: [phone]  Facsimile: [phone]    [facility] [place] [address], [address]  [place] [phone]  
75 y/o  F with pmh of ESRD on HD (MWF), PE (in 2013 after TKR s/p Xarelto), LARRY (not on CPAP), migraines, HTN p/w L eye vision loss and headache.  Pt reports headache behind L eye (typical of prior migraines) starting Tuesday morning.  She also had total loss of vision in L eye described as vision going black.  Since then, she has been able to perceive light in L eye, but only able to see white without being able to make out objects.  She has had nausea without vomiting, and has had photophobia.  No weakness, numbness, slurred speech.  No fever, chills, chest pain, or palpitations.  Pt's headache is similar to prior migraines, but she has never had vision loss.  She also reports intermittent "electric shock" sensation in L arm along with some neck pain for the past 2 weeks.    Pt initially presented to [place] ED yesterday AM.  She was evaluated by ophthalmology who found elevated IOP, recommended drops, and transferred pt to [facility].  In [facility] ED,  IOP was normal on opthalmology eval.  She was given morphine, Tylenol, with improvement in headache, and dorzolamide, Xalatan, and timolol drops.    (06 Dec 2024 07:04):  sheis feeling SOB:   she has no underlying lung disease:  she never smoked but her  exposed:  exposed to passive smoking:   she has seen pulm as an outpt and was given albuterol only to use:   to me she says she wheezes a lot especially when she walks; :  has no prior history of asthma   but have been wheezing for quiet some time       SOB/PINTO/WHEEZING  ESRD  HX OF PE  LARRY  HTN  LEFT EYE VISION LOSS    SOB/PINTO/WHEEZING  -She is obese:  she wheezes a lot:  she may have underlying asthma :  -she will need outpt PFT and will start rx for sob  : ? underlying copd:   -she has hx of passive smoking:   -she would need oupt PFT :   -will start her steroids  : dw neurology  : no objection     12/7 ;     -he seems OK:   -no sob:  no cough :   -she is not wheezing:    -cont BD "  she feels better to me today  :  -will decrease steroids from tomorrow     ESRD  -on HD     HX OF PE  -she has hx of pe before after the knee surgery  :  she was treated fr it:   but she also has IVCF:     LARRY  -not using cpap    HTN  -controlled    LEFT EYE VISION LOSS  -opthalmology and neuro is following:     [facility]    
75 y/o  F with pmh of ESRD on HD (MWF), PE (in 2013 after TKR s/p Xarelto), LARRY (not on CPAP), migraines, HTN p/w L eye vision loss and headache.  Pt reports headache behind L eye (typical of prior migraines) starting Tuesday morning.  She also had total loss of vision in L eye described as vision going black.  Since then, she has been able to perceive light in L eye, but only able to see white without being able to make out objects.  She has had nausea without vomiting, and has had photophobia.  No weakness, numbness, slurred speech.  No fever, chills, chest pain, or palpitations.  Pt's headache is similar to prior migraines, but she has never had vision loss.  She also reports intermittent "electric shock" sensation in L arm along with some neck pain for the past 2 weeks.    Pt initially presented to [place] ED yesterday AM.  She was evaluated by ophthalmology who found elevated IOP, recommended drops, and transferred pt to [facility].  In [facility] ED,  IOP was normal on opthalmology eval.  She was given morphine, Tylenol, with improvement in headache, and dorzolamide, Xalatan, and timolol drops.    (06 Dec 2024 07:04):  sheis feeling SOB:   she has no underlying lung disease:  she never smoked but her  exposed:  exposed to passive smoking:   she has seen pulm as an outpt and was given albuterol only to use:   to me she says she wheezes a lot especially when she walks; :  has no prior history of asthma   but have been wheezing for quiet some time       SOB/PINTO/WHEEZING  ESRD  HX OF PE  LARRY  HTN  LEFT EYE VISION LOSS    SOB/PINTO/WHEEZING  -She is obese:  she wheezes a lot:  she may have underlying asthma :  -she will need outpt PFT and will start rx for sob  : ? underlying copd:   -she has hx of passive smoking:   -she would need oupt PFT :   -will start her steroids  : dw neurology  : no objection     12/7 ;     -he seems OK:   -no sob:  no cough :   -she is not wheezing:    -cont BD "  she feels better to me today  :  -will decrease steroids from tomorrow     12/9: she feels better:  gagnon to po prednisone for 3 days only ;  she snot wheezing much    -check VBg ordered!    12/10: she seems to be doing  ok :  +sob: : pinto;    +cough :  no phlegm    VBG is ac on chr hypercarbic resp failure: :  need ABG  as VBG does not correlate well with VBG :   if sheis acidotic on ABG:  then will use NIV at night time:   cont steroids for nw:   cta ordered as she still field pinto:  on HD     12/11  seems OK:   still sob:   no cugh ;  no phlegm   ABG was pretty decent yesterday  ; nop need for bipap    she is not wheezing:  she finished steroids : pulm wise she looks OK   ? can metoprolol be changed       ESRD  -on HD     HX OF PE  -she has hx of pe before after the knee surgery  :  she was treated fr it:   but she also has IVCF:     12/10: cta is awaited   12/11: CONT CURRENT THERAPY     LARRY  -not using cpap    HTN  -controlled    LEFT EYE VISION LOSS  -opthalmology and neuro is following:     PT
A/P    75 y/o  F with pmh of ESRD on HD (MWF), PE (in 2013 after TKR s/p Xarelto), LARRY (not on CPAP), migraines, HTN p/w L eye vision loss and headache.     #Vision loss, left eye.   -ophthamology and neurology w/u in progress  -med fu     #migraine.   -tx per neuro    #ESRD on hemodialysis.   -hd per renal     #Essential hypertension.   -cont metoprolol.    #chronic dyspnea  -recent cath with no severe obstructive cad, normal flling pressures, no sig PHTN  -cont vol removal with hd   dvt ppx  
74y Female with history of ESRD on HD presents with L eye loss of vision. Nephrology consulted for ESRD status.     1) ESRD: Last HD earlier this morning tolerated well with 2.8L removed. Plan for next maintenance HD on 12/16. Dyspnea likely secondary to Influenza. Monitor electrolytes.    2) HTN with ESRD: BP uncontrolled. Increase losartan to 50 mg QHS.    3) Anemia of renal disease: Hb acceptable. Patient on low dose Mircera as an outpatient. Will start Epogen if Hb decreases. Monitor Hb.    4) Secondary HPT of renal origin: Phosphorus acceptable. Continue with calcitriol 0.25 mcg MWF but change renvela to phoslo with meals given hypocalcemia. Check iPTH. Monitor serum calcium and phosphorus.      [facility] NEPHROLOGY  Derrick Castaneda M.D.  Raúl Diana D.O.  Grace Delgadillo M.D.  MD Cher Beckman, MSN, ANP-C    Telephone: [phone]  Facsimile: [phone] [place] [address], [address]  [place] [address]  
74y Female with history of ESRD on HD presents with L eye loss of vision. Nephrology consulted for ESRD status.     1) ESRD: Last HD on 12/11 tolerated well with 2.8L removed. Plan for next maintenance HD on 12/13. Dyspnea likely secondary to Influenza. Please change tamiflu to 30 mg STAT followed by 30 mg after every HD treatment for 5 days. Monitor electrolytes.    2) HTN with ESRD: BP borderline. Start losartan 25 mg QHS and increase UF with HD.    3) Anemia of renal disease: Hb acceptable. Patient on low dose Mircera as an outpatient. Will start Epogen if Hb decreases. Monitor Hb.    4) Secondary HPT of renal origin: Phosphorus acceptable. Continue with calcitriol 0.25 mcg MWF, renvela with meals and renal diet. Monitor serum calcium and phosphorus.      [facility] NEPHROLOGY  Derrick Castaneda M.D.  Raúl Diana D.O.  Grace Delgadillo M.D.  MD Cher Beckman, MSN, ANP-C    Telephone: [phone]  Facsimile: [phone] [place] [address], [address]  [place] [address]  
75 y/o  F with pmh of ESRD on HD (MWF), PE (in 2013 after TKR s/p Xarelto), LARRY (not on CPAP), migraines, HTN p/w L eye vision loss and headache.  Pt reports headache behind L eye (typical of prior migraines) starting Tuesday morning.  She also had total loss of vision in L eye described as vision going black.  Since then, she has been able to perceive light in L eye, but only able to see white without being able to make out objects.  She has had nausea without vomiting, and has had photophobia.  No weakness, numbness, slurred speech.  No fever, chills, chest pain, or palpitations.  Pt's headache is similar to prior migraines, but she has never had vision loss.  She also reports intermittent "electric shock" sensation in L arm along with some neck pain for the past 2 weeks.    Pt initially presented to [facility] yesterday AM.  She was evaluated by ophthalmology who found elevated IOP, recommended drops, and transferred pt to [facility].  In [facility] ED,  IOP was normal on opthalmology eval.  She was given morphine, Tylenol, with improvement in headache, and dorzolamide, Xalatan, and timolol drops.    (06 Dec 2024 07:04):  sheis feeling SOB:   she has no underlying lung disease:  she never smoked but her  exposed:  exposed to passive smoking:   she has seen pulm as an outpt and was given albuterol only to use:   to me she says she wheezes a lot especially when she walks; :  has no prior history of asthma   but have been wheezing for quiet some time       SOB/PINTO/WHEEZING  ESRD  HX OF PE  LARRY  HTN  LEFT EYE VISION LOSS    SOB/PINTO/WHEEZING  -She is obese:  she wheezes a lot:  she may have underlying asthma :  -she will need outpt PFT and will start rx for sob  : ? underlying copd:   -she has hx of passive smoking:   -she would need oupt PFT :   -will start her steroids  : dw neurology  : no objection     12/7 ;     -he seems OK:   -no sob:  no cough :   -she is not wheezing:    -cont BD "  she feels better to me today  :  -will decrease steroids from tomorrow     12/9: she feels better:  gagnon to po prednisone for 3 days only ;  she snot wheezing much    -check VBg ordered!    12/10: she seems to be doing  ok :  +sob: : pinto;    +cough :  no phlegm    VBG is ac on chr hypercarbic resp failure: :  need ABG  as VBG does not correlate well with VBG :   if sheis acidotic on ABG:  then will use NIV at night time:   cont steroids for nw:   cta ordered as she still feeld pinto:  on HD       ESRD  -on HD     HX OF PE  -she has hx of pe before after the knee surgery  :  she was treated fr it:   but she also has IVCF:     12/10: cta is awaited     LARRY  -not using cpap    HTN  -controlled    LEFT EYE VISION LOSS  -opthalmology and neuro is following:     jesus ACP/ renal   
A/P    75 y/o  F with pmh of ESRD on HD (MWF), PE (in 2013 after TKR s/p Xarelto), LARRY (not on CPAP), migraines, HTN p/w L eye vision loss and headache.     #Vision loss, left eye.   -ophthamology and neurology w/u in progress    #migraine.   -tx per neuro    #ESRD on hemodialysis.   -hd per renal     #Essential hypertension.   -cont metoprolol.    #chronic dyspnea  -recent cath with no severe obstructive cad, normal flling pressures, no sig PHTN  -cont vol removal with hd   dvt ppx  
A/P    75 y/o  F with pmh of ESRD on HD (MWF), PE (in 2013 after TKR s/p Xarelto), LARRY (not on CPAP), migraines, HTN p/w L eye vision loss and headache.     #Vision loss, left eye.   -ophthamology and neurology w/u in progress  -med fu     #migraine.   -tx per neuro    #ESRD on hemodialysis.   -hd per renal     #Essential hypertension.   -cont metoprolol.    #chronic dyspnea  -prior  cath with no severe obstructive cad, normal flling pressures, no sig PHTN  -echo wth nl LV sys fx   -cont vol removal with hd   -now +/ flu A- ID following  -defer ischemic w/u for now given flu  -cath 3/24 with no severe cad.    dvt ppx  ok for dcp from a cv standpoint    
74y Female with history of ESRD on HD presents with L eye loss of vision. Nephrology consulted for ESRD status.     1) ESRD: HD on 12/4 as an outpatient and last HD 12/6. Plan for next maintenance HD 12/9. Patient awaiting pulmonary clearance prior to AVF creation. Pulmonary consulted. Monitor electrolytes.    2) HTN with ESRD: BP controlled. Continue with current medications.    3) Anemia of renal disease: Hb acceptable. Patient on low dose Mircera as an outpatient. Will start Epogen if Hb decreases. Monitor Hb.    4) Secondary HPT of renal origin: Resume calcitriol 0.25 mcg MWF, renvela with meals and renal diet. Monitor serum calcium and phosphorus.      [facility] NEPHROLOGY  Derrick Castaneda M.D.  Raúl Diana D.O.  Grace Delgadillo M.D.  MD Cher Beckman, MSN, ANP-C    Telephone: [phone]  Facsimile: [phone]    [facility] [place] [address], [address]  [place] [address]  
73 y/o  F with pmh of ESRD on HD (MWF), PE (in 2013 after TKR s/p Xarelto), LARRY (not on CPAP), migraines, HTN p/w L eye vision loss and headache. 
75 y/o  F with pmh of ESRD on HD (MWF), PE (in 2013 after TKR s/p Xarelto), LARRY (not on CPAP), migraines, HTN p/w L eye vision loss and headache. 
73 y/o  F with pmh of ESRD on HD (MWF), PE (in 2013 after TKR s/p Xarelto), LARRY (not on CPAP), migraines, HTN p/w L eye vision loss and headache. 
73 y/o  F with pmh of ESRD on HD (MWF), PE (in 2013 after TKR s/p Xarelto), LARRY (not on CPAP), migraines, HTN p/w L eye vision loss and headache.

## 2024-12-13 NOTE — PROGRESS NOTE ADULT - PROBLEM SELECTOR PLAN 3
-as per neuro, Acute headache management: 1st line (can be given together, Q8HR PRN for HA): Metoclopramide 10mg IV, Benadryl 25mg IV, Toradol 30mg IVP  2nd line: Solumedrol 250mg IV x1.   Given patient is unable to obtain MRI brain and c spine w/o inpatient would recommend outpatient MRI without contrast   repeat CT head within 24 hours for stability   would start aspirin in the interim prior to MRI for stroke prevention.   appreciate ophthalmology recommendations.    - < from: CT Head No Cont (12.07.24 @ 09:16) >    No acute intracranial findings.    < end of copied text >
HD as scheduled
HD as scheduled
-as per neuro, Acute headache management: 1st line (can be given together, Q8HR PRN for HA): Metoclopramide 10mg IV, Benadryl 25mg IV, Toradol 30mg IVP  2nd line: Solumedrol 250mg IV x1.   Given patient is unable to obtain MRI brain and c spine w/o inpatient would recommend outpatient MRI without contrast   repeat CT head within 24 hours for stability   would start aspirin in the interim prior to MRI for stroke prevention.   appreciate ophthalmology recommendations.    - < from: CT Head No Cont (12.07.24 @ 09:16) >    No acute intracranial findings.    < end of copied text >
HD as scheduled
-as per neuro, Acute headache management: 1st line (can be given together, Q8HR PRN for HA): Metoclopramide 10mg IV, Benadryl 25mg IV, Toradol 30mg IVP  2nd line: Solumedrol 250mg IV x1.   Given patient is unable to obtain MRI brain and c spine w/o inpatient would recommend outpatient MRI without contrast   repeat CT head within 24 hours for stability   would start aspirin in the interim prior to MRI for stroke prevention.   appreciate ophthalmology recommendations.    - < from: CT Head No Cont (12.07.24 @ 09:16) >    No acute intracranial findings.    < end of copied text >
-as per neuro, Acute headache management: 1st line (can be given together, Q8HR PRN for HA): Metoclopramide 10mg IV, Benadryl 25mg IV, Toradol 30mg IVP  2nd line: Solumedrol 250mg IV x1.   Given patient is unable to obtain MRI brain and c spine w/o inpatient would recommend outpatient MRI without contrast   repeat CT head within 24 hours for stability   would start aspirin in the interim prior to MRI for stroke prevention.   appreciate ophthalmology recommendations.    - < from: CT Head No Cont (12.07.24 @ 09:16) >    No acute intracranial findings.    < end of copied text >

## 2024-12-13 NOTE — PROGRESS NOTE ADULT - PROBLEM SELECTOR PLAN 2
-as per neuro, Acute headache management: 1st line (can be given together, Q8HR PRN for HA): Metoclopramide 10mg IV, Benadryl 25mg IV, Toradol 30mg IVP  2nd line: Solumedrol 250mg IV x1.   Given patient is unable to obtain MRI brain and c spine w/o inpatient would recommend outpatient MRI without contrast   repeat CT head within 24 hours for stability   would start aspirin in the interim prior to MRI for stroke prevention.   appreciate ophthalmology recommendations.    - < from: CT Head No Cont (12.07.24 @ 09:16) >    No acute intracranial findings.    < end of copied text >
-as per neuro, Acute headache management: 1st line (can be given together, Q8HR PRN for HA): Metoclopramide 10mg IV, Benadryl 25mg IV, Toradol 30mg IVP  2nd line: Solumedrol 250mg IV x1.   Given patient is unable to obtain MRI brain and c spine w/o inpatient would recommend outpatient MRI without contrast   repeat CT head within 24 hours for stability   would start aspirin in the interim prior to MRI for stroke prevention.   appreciate ophthalmology recommendations.
as per ophthal, transient vision loss OS_  - patient endorses history of migraines with visual aura - may explain current headache and could also be associated with vision loss  - Recommend neuro evaluation for migraine management as well as complete stroke work up given episode of transient vision loss  - Unable to perform dilated fundus exam OS due to dense cataract as above. Therefore unknown if patient had retinal vascular event.  neuro eval reviewed
-as per neuro, Acute headache management: 1st line (can be given together, Q8HR PRN for HA): Metoclopramide 10mg IV, Benadryl 25mg IV, Toradol 30mg IVP  2nd line: Solumedrol 250mg IV x1.   Given patient is unable to obtain MRI brain and c spine w/o inpatient would recommend outpatient MRI without contrast   repeat CT head within 24 hours for stability   would start aspirin in the interim prior to MRI for stroke prevention.   appreciate ophthalmology recommendations.    - < from: CT Head No Cont (12.07.24 @ 09:16) >    No acute intracranial findings.    < end of copied text >
as per ophthal, transient vision loss OS_  - patient endorses history of migraines with visual aura - may explain current headache and could also be associated with vision loss  - Recommend neuro evaluation for migraine management as well as complete stroke work up given episode of transient vision loss  - Unable to perform dilated fundus exam OS due to dense cataract as above. Therefore unknown if patient had retinal vascular event.  neuro eval reviewed

## 2024-12-13 NOTE — PROGRESS NOTE ADULT - PROBLEM SELECTOR PROBLEM 1
Vision loss, left eye
Vision loss, left eye
Acute on chronic respiratory failure with hypoxia
Vision loss, left eye

## 2024-12-13 NOTE — DIETITIAN INITIAL EVALUATION ADULT - PERTINENT LABORATORY DATA
12-13    137  |  101  |  66[H]  ----------------------------<  76  5.0   |  23  |  6.27[H]    Ca    7.6[L]      13 Dec 2024 06:38  Phos  4.8     12-13  Mg     1.70     12-13    TPro  x   /  Alb  3.3  /  TBili  x   /  DBili  x   /  AST  x   /  ALT  x   /  AlkPhos  x   12-12

## 2024-12-13 NOTE — PROGRESS NOTE ADULT - TIME BILLING
Patient seen and examined.  Agree with above ACP note.  cv stable  cont current tx
agree with above  cv stable  cont current mgmt
Patient seen and examined.  Agree with above ACP note.  cv stable  defer ischemic w/u for now given flu  cath 3/24 with no severe cad
Patient seen and examined.  Agree with above ACP note.  cv stable  defer ischemic w/u for now given flu  cath 3/24 with no severe cad

## 2024-12-13 NOTE — PROGRESS NOTE ADULT - SUBJECTIVE AND OBJECTIVE BOX
CARDIOLOGY FOLLOW UP - Dr. Joe  DATE OF SERVICE: 12/13/24    CC no cp or sob       REVIEW OF SYSTEMS:  CONSTITUTIONAL: No fever, weight loss, or fatigue  RESPIRATORY: No cough, wheezing, chills or hemoptysis; No Shortness of Breath  CARDIOVASCULAR: No chest pain, palpitations, passing out, dizziness  GASTROINTESTINAL: No abdominal or epigastric pain. No nausea, vomiting, or hematemesis; No diarrhea or constipation. No melena or hematochezia.      PHYSICAL EXAM:  T(C): 36.8 (12-13-24 @ 10:12), Max: 37.2 (12-12-24 @ 20:19)  HR: 94 (12-13-24 @ 10:12) (75 - 98)  BP: 160/94 (12-13-24 @ 10:12) (155/85 - 161/82)  RR: 18 (12-13-24 @ 10:12) (17 - 20)  SpO2: 98% (12-13-24 @ 05:10) (95% - 99%)  Wt(kg): --  I&O's Summary    12 Dec 2024 07:01  -  13 Dec 2024 07:00  --------------------------------------------------------  IN: 750 mL / OUT: 0 mL / NET: 750 mL    13 Dec 2024 07:01  -  13 Dec 2024 10:43  --------------------------------------------------------  IN: 400 mL / OUT: 3200 mL / NET: -2800 mL        Appearance: Normal	  Cardiovascular: Normal S1 S2,RR  Respiratory: diminished   Gastrointestinal:  Soft, Non-tender, + BS	  Extremities: Normal range of motion, No clubbing, cyanosis or edema      Home Medications:  cholecalciferol oral tablet: 1000 unit(s) orally once a day (06 Dec 2024 07:55)  folic acid 1 mg oral tablet: 1 tab(s) orally once a day (06 Dec 2024 07:55)  Metoprolol Succinate ER 50 mg oral tablet, extended release: 1 tab(s) orally once a day (06 Dec 2024 07:55)  sevelamer carbonate 800 mg oral tablet: 1 tab(s) orally 3 times a day (06 Dec 2024 07:55)      MEDICATIONS  (STANDING):  acetaminophen   IVPB .. 1000 milliGRAM(s) IV Intermittent once  albuterol/ipratropium for Nebulization 3 milliLiter(s) Nebulizer every 6 hours  aspirin  chewable 81 milliGRAM(s) Oral daily  calcitriol   Capsule 0.25 MICROGram(s) Oral <User Schedule>  chlorhexidine 2% Cloths 1 Application(s) Topical daily  cholecalciferol 1000 Unit(s) Oral daily  fluticasone propionate/ salmeterol 250-50 MICROgram(s) Diskus 1 Dose(s) Inhalation two times a day  folic acid 1 milliGRAM(s) Oral daily  heparin   Injectable 7500 Unit(s) SubCutaneous three times a day  latanoprost 0.005% Ophthalmic Solution 1 Drop(s) Left EYE at bedtime  losartan 25 milliGRAM(s) Oral at bedtime  metoclopramide Injectable 10 milliGRAM(s) IV Push once  metoprolol succinate ER 50 milliGRAM(s) Oral daily  oseltamivir 30 milliGRAM(s) Oral <User Schedule>  sevelamer carbonate 800 milliGRAM(s) Oral three times a day with meals      TELEMETRY: nsr/sinus tach 	    ECG:  	  RADIOLOGY:   DIAGNOSTIC TESTING:  [ ] Echocardiogram:< from: TTE W or WO Ultrasound Enhancing Agent (12.12.24 @ 10:15) >  CONCLUSIONS:      1. Left ventricular wall thickness is normal. Left ventricular systolic function is normal with an ejection fraction of 64 % by Coronel's method of disks. There are no regional wall motion abnormalities seen.   2. Normal left ventricular diastolic function, with normal left ventricular filling pressure.   3. Normal left and right atrial size.   4. No significant valvular disease.   5. No pericardial effusion seen.    < end of copied text >    [ ]  Catheterization:  [ ] Stress Test:    OTHER: 	    LABS:	 	                            10.2   5.90  )-----------( 182      ( 13 Dec 2024 06:38 )             32.4     12-13    137  |  101  |  66[H]  ----------------------------<  76  5.0   |  23  |  6.27[H]    Ca    7.6[L]      13 Dec 2024 06:38  Phos  4.8     12-13  Mg     1.70     12-13    TPro  x   /  Alb  3.3  /  TBili  x   /  DBili  x   /  AST  x   /  ALT  x   /  AlkPhos  x   12-12

## 2024-12-13 NOTE — DISCHARGE NOTE NURSING/CASE MANAGEMENT/SOCIAL WORK - PATIENT PORTAL LINK FT
You can access the FollowMyHealth Patient Portal offered by [facility] by registering at the following website: [email or web address]. By joining [employer]’s FollowMyHealth portal, you will also be able to view your health information using other applications (apps) compatible with our system.

## 2024-12-13 NOTE — PROGRESS NOTE ADULT - PROBLEM SELECTOR PROBLEM 2
Migraine
Vision loss, left eye
Migraine
Migraine
Vision loss, left eye

## 2024-12-13 NOTE — PROGRESS NOTE ADULT - SUBJECTIVE AND OBJECTIVE BOX
[facility], Division of Infectious Diseases  [facility]el, S. [facility], [facility]  [phone]  ([phone] - weekdays after 5pm and weekends)    Name: MELISSA ARREOLA  Age/Gender: 74y Female  MRN: [id number]    Interval History:  Notes reviewed.   No concerning overnight events.  Afebrile.   reports continued myalgias, sore throat, cough    Allergies: Sulfur (Unknown)  trimethoprim (Other; Rash)  sulfa drugs (Other; Rash)      Objective:  Vitals:   T(F): 98.9 (12-13-24 @ 06:20), Max: 99 (12-12-24 @ 20:19)  HR: 83 (12-13-24 @ 06:20) (75 - 98)  BP: 161/82 (12-13-24 @ 06:20) (155/85 - 161/82)  RR: 18 (12-13-24 @ 06:20) (17 - 20)  SpO2: 98% (12-13-24 @ 05:10) (95% - 99%)  Physical Examination:  General: no acute distress  HEENT: anicteric  Cardio: S1, S2, normal rate, R chest permcath without erythema  Resp: breathing comfortably on RA   Abd: soft, NT, ND  Ext: no LE edema  Skin: warm, dry    Laboratory Studies:  CBC:                       10.2   5.90  )-----------( 182      ( 13 Dec 2024 06:38 )             32.4     WBC Trend:  5.90 12-13-24 @ 06:38  7.57 12-12-24 @ 04:43  8.21 12-11-24 @ 06:59  8.64 12-10-24 @ 10:07  9.32 12-09-24 @ 06:45  11.47 12-08-24 @ 06:40  9.27 12-07-24 @ 07:18    CMP: 12-13    137  |  101  |  66[H]  ----------------------------<  76  5.0   |  23  |  6.27[H]    Ca    7.6[L]      13 Dec 2024 06:38  Phos  4.8     12-13  Mg     1.70     12-13    TPro  x   /  Alb  3.3  /  TBili  x   /  DBili  x   /  AST  x   /  ALT  x   /  AlkPhos  x   12-12      LIVER FUNCTIONS - ( 12 Dec 2024 04:43 )  Alb: 3.3 g/dL / Pro: x     / ALK PHOS: x     / ALT: x     / AST: x     / GGT: x             Urinalysis Basic - ( 13 Dec 2024 06:38 )    Color: x / Appearance: x / SG: x / pH: x  Gluc: 76 mg/dL / Ketone: x  / Bili: x / Urobili: x   Blood: x / Protein: x / Nitrite: x   Leuk Esterase: x / RBC: x / WBC x   Sq Epi: x / Non Sq Epi: x / Bacteria: x      Microbiology: reviewed     Culture - Nose (collected 12-06-24 @ 13:58)  Source: .Nose Nose  Final Report (12-08-24 @ 15:19):    No staphylococcus aureus isolated.    "PCR is more Sensitive for identifying MRSA/MSSA."        Radiology: reviewed     Medications:  acetaminophen   IVPB .. 1000 milliGRAM(s) IV Intermittent once  albuterol/ipratropium for Nebulization 3 milliLiter(s) Nebulizer every 6 hours  aspirin  chewable 81 milliGRAM(s) Oral daily  benzocaine/menthol Lozenge 1 Lozenge Oral every 8 hours PRN  benzonatate 100 milliGRAM(s) Oral every 8 hours PRN  calcitriol   Capsule 0.25 MICROGram(s) Oral <User Schedule>  chlorhexidine 2% Cloths 1 Application(s) Topical daily  cholecalciferol 1000 Unit(s) Oral daily  diphenhydrAMINE Injectable 25 milliGRAM(s) IV Push every 8 hours PRN  fluticasone propionate/ salmeterol 250-50 MICROgram(s) Diskus 1 Dose(s) Inhalation two times a day  folic acid 1 milliGRAM(s) Oral daily  guaiFENesin Oral Liquid (Sugar-Free) 100 milliGRAM(s) Oral every 6 hours PRN  heparin   Injectable 7500 Unit(s) SubCutaneous three times a day  heparin   Injectable. 1000 Unit(s) Dialysis. every 1 hour  latanoprost 0.005% Ophthalmic Solution 1 Drop(s) Left EYE at bedtime  losartan 25 milliGRAM(s) Oral at bedtime  metoclopramide Injectable 10 milliGRAM(s) IV Push once  metoclopramide Injectable 10 milliGRAM(s) IV Push every 8 hours PRN  metoprolol succinate ER 50 milliGRAM(s) Oral daily  oseltamivir 30 milliGRAM(s) Oral <User Schedule>  sevelamer carbonate 800 milliGRAM(s) Oral three times a day with meals  sodium chloride 0.9% Bolus. 100 milliLiter(s) IV Bolus every 5 minutes PRN  traMADol 50 milliGRAM(s) Oral every 12 hours PRN    Antimicrobials:  oseltamivir 30 milliGRAM(s) Oral <User Schedule>

## 2024-12-13 NOTE — DISCHARGE NOTE NURSING/CASE MANAGEMENT/SOCIAL WORK - NSDCVIVACCINE_GEN_ALL_CORE_FT
COVID-19, mRNA, LNP-S, PF, 100 mcg/ 0.5 mL dose (Moderna); 30-Apr-2022 16:48; Sailaja Godinez (RN); [employer] [id number] (Exp. Date: 28-May-2022); IntraMuscular; Deltoid Left.; 0.25 milliLiter(s);   influenza, injectable, quadrivalent, preservative free; 01-Feb-2018 15:10; Mey Hastings (RN); [employer]; [id number]; IntraMuscular; Deltoid Left.; 0.5 milliLiter(s); VIS (VIS Published: 07-Aug-2015, VIS Presented: 01-Feb-2018);

## 2024-12-13 NOTE — PROGRESS NOTE ADULT - SUBJECTIVE AND OBJECTIVE BOX
SUBJECTIVE / OVERNIGHT EVENTS:pt seen and examined, pt still shortness of breath    12-13-24    MEDICATIONS  (STANDING):  acetaminophen     Tablet .. 650 milliGRAM(s) Oral once  acetaminophen   IVPB .. 1000 milliGRAM(s) IV Intermittent once  albuterol/ipratropium for Nebulization 3 milliLiter(s) Nebulizer every 6 hours  aspirin  chewable 81 milliGRAM(s) Oral daily  calcitriol   Capsule 0.25 MICROGram(s) Oral <User Schedule>  calcium acetate 667 milliGRAM(s) Oral three times a day with meals  chlorhexidine 2% Cloths 1 Application(s) Topical daily  cholecalciferol 1000 Unit(s) Oral daily  fluticasone propionate/ salmeterol 250-50 MICROgram(s) Diskus 1 Dose(s) Inhalation two times a day  folic acid 1 milliGRAM(s) Oral daily  heparin   Injectable 7500 Unit(s) SubCutaneous three times a day  latanoprost 0.005% Ophthalmic Solution 1 Drop(s) Left EYE at bedtime  losartan 50 milliGRAM(s) Oral at bedtime  metoclopramide Injectable 10 milliGRAM(s) IV Push once  metoprolol succinate ER 50 milliGRAM(s) Oral daily  oseltamivir 30 milliGRAM(s) Oral <User Schedule>    MEDICATIONS  (PRN):  benzocaine/menthol Lozenge 1 Lozenge Oral every 8 hours PRN Sore Throat  diphenhydrAMINE Injectable 25 milliGRAM(s) IV Push every 8 hours PRN migraine  guaiFENesin Oral Liquid (Sugar-Free) 100 milliGRAM(s) Oral every 6 hours PRN Cough  metoclopramide Injectable 10 milliGRAM(s) IV Push every 8 hours PRN migraine  sodium chloride 0.9% Bolus. 100 milliLiter(s) IV Bolus every 5 minutes PRN SBP LESS THAN or EQUAL to 90 mmHg  traMADol 50 milliGRAM(s) Oral every 12 hours PRN for severe pain    Vital Signs Last 24 Hrs  T(C): 36.7 (12-13-24 @ 18:36), Max: 37.2 (12-13-24 @ 06:20)  T(F): 98 (12-13-24 @ 18:36), Max: 98.9 (12-13-24 @ 06:20)  HR: 90 (12-13-24 @ 18:36) (75 - 95)  BP: 144/79 (12-13-24 @ 18:36) (144/79 - 161/82)  BP(mean): --  RR: 16 (12-13-24 @ 18:36) (16 - 18)  SpO2: 100% (12-13-24 @ 18:36) (95% - 100%)    Constitutional: No fever, fatigue  Skin: No rash.  Eyes: No recent vision problems or eye pain.  ENT: No congestion, ear pain, or sore throat.  Cardiovascular: No chest pain or palpation.  Respiratory: No cough, shortness of breath, congestion, or wheezing.  Gastrointestinal: No abdominal pain, nausea, vomiting, or diarrhea.  Genitourinary: No dysuria.  Musculoskeletal: No joint swelling.  Neurologic: No headache.    PHYSICAL EXAM:  GENERAL: NAD  EYES: EOMI, PERRLA  NECK: Supple, No JVD  CHEST/LUNG: crackles at bases, no wheezing  HEART:  S1 , S2 +  ABDOMEN: soft , bs+  EXTREMITIES:  edema  NEUROLOGY:alert awake    LABS:  12-13    137  |  101  |  66[H]  ----------------------------<  76  5.0   |  23  |  6.27[H]    Ca    7.6[L]      13 Dec 2024 06:38  Phos  4.8     12-13  Mg     1.70     12-13    TPro      /  Alb  3.3  /  TBili      /  DBili      /  AST      /  ALT      /  AlkPhos      12-12    Creatinine Trend: 6.27 <--, 4.42 <--, 6.26 <--, 5.41 <--, 7.38 <--, 6.03 <--, 4.41 <--                        10.2   5.90  )-----------( 182      ( 13 Dec 2024 06:38 )             32.4     Urine Studies:  Urinalysis Basic - ( 13 Dec 2024 06:38 )    Color:  / Appearance:  / SG:  / pH:   Gluc: 76 mg/dL / Ketone:   / Bili:  / Urobili:    Blood:  / Protein:  / Nitrite:    Leuk Esterase:  / RBC:  / WBC    Sq Epi:  / Non Sq Epi:  / Bacteria:               LIVER FUNCTIONS - ( 12 Dec 2024 04:43 )  Alb: 3.3 g/dL / Pro: x     / ALK PHOS: x     / ALT: x     / AST: x     / GGT: x                 Consultant(s) Notes Reviewed:      Care Discussed with Consultants/Other Providers:

## 2024-12-13 NOTE — DIETITIAN INITIAL EVALUATION ADULT - OTHER INFO
75 y/o  F with pmh of ESRD on HD (MWF), PE (in 2013 after TKR s/p Xarelto), LARRY (not on CPAP), migraines, HTN p/w L eye vision loss and headache, per chart.    Per tray observation, patient consumed >75% of breakfast this morning. Per RN flow sheet, patient is consuming % of meals. Patient denies any difficulty chewing or swallowing, any nausea, vomiting, constipation during visit. c/o diarrhea due to antibiotics use, reports last episode 12/13. Consider adding probiotics, to promote gut health. Current weight: 117.9kg/260lbs (12/10, per RN flow sheet, dosing weight), consistent with the reported usual body weight. Per Porfirio JOHNSON, weight history: 122.4kg (5/31). Noted patient has non-sig favorable weight loss of -4.5kg/-3.7%BW x 7months. Continue to monitor weight trend. Noted elevated phos- 4.8(12/13), on phos binder. Patient is on cholecalciferol, folic acid for micronutrient support. RD discussed renal diet recommendations with patient during visit.

## 2024-12-13 NOTE — PROGRESS NOTE ADULT - PROBLEM SELECTOR PROBLEM 4
ESRD on hemodialysis
Essential hypertension
ESRD on hemodialysis
ESRD on hemodialysis
Essential hypertension
ESRD on hemodialysis
Essential hypertension

## 2025-02-20 ENCOUNTER — APPOINTMENT (OUTPATIENT)
Dept: PULMONOLOGY | Facility: CLINIC | Age: 75
End: 2025-02-20

## 2025-02-21 ENCOUNTER — INPATIENT (INPATIENT)
Facility: HOSPITAL | Age: 75
LOS: 6 days | Discharge: HOME | End: 2025-02-28
Attending: INTERNAL MEDICINE | Admitting: INTERNAL MEDICINE
Payer: MEDICARE

## 2025-02-21 VITALS
WEIGHT: 266.76 LBS | DIASTOLIC BLOOD PRESSURE: 75 MMHG | HEART RATE: 85 BPM | HEIGHT: 65 IN | RESPIRATION RATE: 16 BRPM | TEMPERATURE: 98 F | OXYGEN SATURATION: 98 % | SYSTOLIC BLOOD PRESSURE: 137 MMHG

## 2025-02-21 DIAGNOSIS — N18.6 END STAGE RENAL DISEASE: ICD-10-CM

## 2025-02-21 DIAGNOSIS — I10 ESSENTIAL (PRIMARY) HYPERTENSION: ICD-10-CM

## 2025-02-21 DIAGNOSIS — M79.604 PAIN IN RIGHT LEG: ICD-10-CM

## 2025-02-21 DIAGNOSIS — M25.561 PAIN IN RIGHT KNEE: ICD-10-CM

## 2025-02-21 DIAGNOSIS — Z29.9 ENCOUNTER FOR PROPHYLACTIC MEASURES, UNSPECIFIED: ICD-10-CM

## 2025-02-21 LAB
ADD ON TEST-SPECIMEN IN LAB: SIGNIFICANT CHANGE UP
ALBUMIN SERPL ELPH-MCNC: 3.7 G/DL — SIGNIFICANT CHANGE UP (ref 3.3–5)
ALP SERPL-CCNC: 176 U/L — HIGH (ref 40–120)
ALT FLD-CCNC: 14 U/L — SIGNIFICANT CHANGE UP (ref 4–33)
ANION GAP SERPL CALC-SCNC: 19 MMOL/L — HIGH (ref 7–14)
APTT BLD: 28.7 SEC — SIGNIFICANT CHANGE UP (ref 24.5–35.6)
AST SERPL-CCNC: 15 U/L — SIGNIFICANT CHANGE UP (ref 4–32)
BASOPHILS # BLD AUTO: 0.02 K/UL — SIGNIFICANT CHANGE UP (ref 0–0.2)
BASOPHILS NFR BLD AUTO: 0.3 % — SIGNIFICANT CHANGE UP (ref 0–2)
BILIRUB SERPL-MCNC: 0.3 MG/DL — SIGNIFICANT CHANGE UP (ref 0.2–1.2)
BUN SERPL-MCNC: 71 MG/DL — HIGH (ref 7–23)
CALCIUM SERPL-MCNC: 8.4 MG/DL — SIGNIFICANT CHANGE UP (ref 8.4–10.5)
CHLORIDE SERPL-SCNC: 97 MMOL/L — LOW (ref 98–107)
CO2 SERPL-SCNC: 24 MMOL/L — SIGNIFICANT CHANGE UP (ref 22–31)
CREAT SERPL-MCNC: 7.97 MG/DL — HIGH (ref 0.5–1.3)
CRP SERPL-MCNC: 81 MG/L — HIGH
DIALYSIS INSTRUMENT RESULT - HEPATITIS B SURFACE ANTIGEN: NEGATIVE — SIGNIFICANT CHANGE UP
DIALYSIS INSTRUMENT RESULT - HEPATITIS B SURFACE ANTIGEN: NEGATIVE — SIGNIFICANT CHANGE UP
EGFR: 5 ML/MIN/1.73M2 — LOW
EOSINOPHIL # BLD AUTO: 0.2 K/UL — SIGNIFICANT CHANGE UP (ref 0–0.5)
EOSINOPHIL NFR BLD AUTO: 2.9 % — SIGNIFICANT CHANGE UP (ref 0–6)
GLUCOSE SERPL-MCNC: 90 MG/DL — SIGNIFICANT CHANGE UP (ref 70–99)
HCT VFR BLD CALC: 35.2 % — SIGNIFICANT CHANGE UP (ref 34.5–45)
HGB BLD-MCNC: 11.6 G/DL — SIGNIFICANT CHANGE UP (ref 11.5–15.5)
IANC: 4.75 K/UL — SIGNIFICANT CHANGE UP (ref 1.8–7.4)
IMM GRANULOCYTES NFR BLD AUTO: 0.4 % — SIGNIFICANT CHANGE UP (ref 0–0.9)
INR BLD: 1.01 RATIO — SIGNIFICANT CHANGE UP (ref 0.85–1.16)
LYMPHOCYTES # BLD AUTO: 1.67 K/UL — SIGNIFICANT CHANGE UP (ref 1–3.3)
LYMPHOCYTES # BLD AUTO: 23.8 % — SIGNIFICANT CHANGE UP (ref 13–44)
MAGNESIUM SERPL-MCNC: 1.9 MG/DL — SIGNIFICANT CHANGE UP (ref 1.6–2.6)
MCHC RBC-ENTMCNC: 31.5 PG — SIGNIFICANT CHANGE UP (ref 27–34)
MCHC RBC-ENTMCNC: 33 G/DL — SIGNIFICANT CHANGE UP (ref 32–36)
MCV RBC AUTO: 95.7 FL — SIGNIFICANT CHANGE UP (ref 80–100)
MONOCYTES # BLD AUTO: 0.34 K/UL — SIGNIFICANT CHANGE UP (ref 0–0.9)
MONOCYTES NFR BLD AUTO: 4.9 % — SIGNIFICANT CHANGE UP (ref 2–14)
NEUTROPHILS # BLD AUTO: 4.75 K/UL — SIGNIFICANT CHANGE UP (ref 1.8–7.4)
NEUTROPHILS NFR BLD AUTO: 67.7 % — SIGNIFICANT CHANGE UP (ref 43–77)
NRBC # BLD AUTO: 0 K/UL — SIGNIFICANT CHANGE UP (ref 0–0)
NRBC # FLD: 0 K/UL — SIGNIFICANT CHANGE UP (ref 0–0)
NRBC BLD AUTO-RTO: 0 /100 WBCS — SIGNIFICANT CHANGE UP (ref 0–0)
PHOSPHATE SERPL-MCNC: 5.9 MG/DL — HIGH (ref 2.5–4.5)
PLATELET # BLD AUTO: 246 K/UL — SIGNIFICANT CHANGE UP (ref 150–400)
POTASSIUM SERPL-MCNC: 4.9 MMOL/L — SIGNIFICANT CHANGE UP (ref 3.5–5.3)
POTASSIUM SERPL-SCNC: 4.9 MMOL/L — SIGNIFICANT CHANGE UP (ref 3.5–5.3)
PROT SERPL-MCNC: 7.7 G/DL — SIGNIFICANT CHANGE UP (ref 6–8.3)
PROTHROM AB SERPL-ACNC: 11.7 SEC — SIGNIFICANT CHANGE UP (ref 9.9–13.4)
RBC # BLD: 3.68 M/UL — LOW (ref 3.8–5.2)
RBC # FLD: 13 % — SIGNIFICANT CHANGE UP (ref 10.3–14.5)
SODIUM SERPL-SCNC: 140 MMOL/L — SIGNIFICANT CHANGE UP (ref 135–145)
WBC # BLD: 7.01 K/UL — SIGNIFICANT CHANGE UP (ref 3.8–10.5)
WBC # FLD AUTO: 7.01 K/UL — SIGNIFICANT CHANGE UP (ref 3.8–10.5)

## 2025-02-21 PROCEDURE — 73552 X-RAY EXAM OF FEMUR 2/>: CPT | Mod: 26,RT

## 2025-02-21 PROCEDURE — 93971 EXTREMITY STUDY: CPT | Mod: 26,LT

## 2025-02-21 PROCEDURE — 99223 1ST HOSP IP/OBS HIGH 75: CPT

## 2025-02-21 PROCEDURE — 73590 X-RAY EXAM OF LOWER LEG: CPT | Mod: 26,RT

## 2025-02-21 PROCEDURE — 99285 EMERGENCY DEPT VISIT HI MDM: CPT

## 2025-02-21 PROCEDURE — 73564 X-RAY EXAM KNEE 4 OR MORE: CPT | Mod: 26,RT

## 2025-02-21 RX ORDER — MELATONIN 5 MG
3 TABLET ORAL AT BEDTIME
Refills: 0 | Status: DISCONTINUED | OUTPATIENT
Start: 2025-02-21 | End: 2025-02-28

## 2025-02-21 RX ORDER — ACETAMINOPHEN 500 MG/5ML
1000 LIQUID (ML) ORAL ONCE
Refills: 0 | Status: COMPLETED | OUTPATIENT
Start: 2025-02-21 | End: 2025-02-21

## 2025-02-21 RX ORDER — MAGNESIUM, ALUMINUM HYDROXIDE 200-200 MG
30 TABLET,CHEWABLE ORAL EVERY 4 HOURS
Refills: 0 | Status: DISCONTINUED | OUTPATIENT
Start: 2025-02-21 | End: 2025-02-24

## 2025-02-21 RX ORDER — CALCITRIOL 0.5 UG/1
0.25 CAPSULE, GELATIN COATED ORAL
Refills: 0 | Status: DISCONTINUED | OUTPATIENT
Start: 2025-02-21 | End: 2025-02-28

## 2025-02-21 RX ORDER — HEPARIN SODIUM 1000 [USP'U]/ML
7500 INJECTION INTRAVENOUS; SUBCUTANEOUS EVERY 12 HOURS
Refills: 0 | Status: DISCONTINUED | OUTPATIENT
Start: 2025-02-21 | End: 2025-02-28

## 2025-02-21 RX ORDER — ALBUTEROL SULFATE 2.5 MG/3ML
2 VIAL, NEBULIZER (ML) INHALATION EVERY 6 HOURS
Refills: 0 | Status: DISCONTINUED | OUTPATIENT
Start: 2025-02-21 | End: 2025-02-28

## 2025-02-21 RX ORDER — METOPROLOL SUCCINATE 50 MG/1
100 TABLET, EXTENDED RELEASE ORAL
Refills: 0 | Status: DISCONTINUED | OUTPATIENT
Start: 2025-02-21 | End: 2025-02-28

## 2025-02-21 RX ORDER — ACETAMINOPHEN 500 MG/5ML
650 LIQUID (ML) ORAL EVERY 6 HOURS
Refills: 0 | Status: DISCONTINUED | OUTPATIENT
Start: 2025-02-21 | End: 2025-02-28

## 2025-02-21 RX ORDER — MUPIROCIN CALCIUM 20 MG/G
1 CREAM TOPICAL
Refills: 0 | Status: COMPLETED | OUTPATIENT
Start: 2025-02-21 | End: 2025-02-26

## 2025-02-21 RX ORDER — FOLIC ACID 1 MG/1
1 TABLET ORAL DAILY
Refills: 0 | Status: DISCONTINUED | OUTPATIENT
Start: 2025-02-21 | End: 2025-02-28

## 2025-02-21 RX ORDER — ONDANSETRON HCL/PF 4 MG/2 ML
4 VIAL (ML) INJECTION EVERY 8 HOURS
Refills: 0 | Status: DISCONTINUED | OUTPATIENT
Start: 2025-02-21 | End: 2025-02-28

## 2025-02-21 RX ORDER — HEPARIN SODIUM 1000 [USP'U]/ML
1 INJECTION INTRAVENOUS; SUBCUTANEOUS ONCE
Refills: 0 | Status: COMPLETED | OUTPATIENT
Start: 2025-02-21 | End: 2025-02-21

## 2025-02-21 RX ORDER — LOSARTAN POTASSIUM 100 MG/1
50 TABLET, FILM COATED ORAL DAILY
Refills: 0 | Status: DISCONTINUED | OUTPATIENT
Start: 2025-02-21 | End: 2025-02-24

## 2025-02-21 RX ORDER — METOPROLOL SUCCINATE 50 MG/1
1 TABLET, EXTENDED RELEASE ORAL
Refills: 0 | DISCHARGE

## 2025-02-21 RX ADMIN — HEPARIN SODIUM 7500 UNIT(S): 1000 INJECTION INTRAVENOUS; SUBCUTANEOUS at 17:30

## 2025-02-21 RX ADMIN — Medication 1000 MILLIGRAM(S): at 14:11

## 2025-02-21 RX ADMIN — Medication 4 MILLIGRAM(S): at 23:25

## 2025-02-21 RX ADMIN — Medication 4 MILLIGRAM(S): at 14:11

## 2025-02-21 RX ADMIN — Medication 1 DOSE(S): at 23:00

## 2025-02-21 RX ADMIN — Medication 4 MILLIGRAM(S): at 09:00

## 2025-02-21 RX ADMIN — METOPROLOL SUCCINATE 100 MILLIGRAM(S): 50 TABLET, EXTENDED RELEASE ORAL at 17:30

## 2025-02-21 RX ADMIN — Medication 400 MILLIGRAM(S): at 13:11

## 2025-02-21 RX ADMIN — Medication 4 MILLIGRAM(S): at 22:57

## 2025-02-21 RX ADMIN — Medication 4 MILLIGRAM(S): at 13:11

## 2025-02-21 RX ADMIN — Medication 667 MILLIGRAM(S): at 17:29

## 2025-02-21 RX ADMIN — MUPIROCIN CALCIUM 1 APPLICATION(S): 20 CREAM TOPICAL at 23:00

## 2025-02-21 RX ADMIN — HEPARIN SODIUM 1 DOSE(S): 1000 INJECTION INTRAVENOUS; SUBCUTANEOUS at 22:04

## 2025-02-21 RX ADMIN — Medication 4 MILLIGRAM(S): at 08:01

## 2025-02-21 NOTE — ED PROVIDER NOTE - PROGRESS NOTE DETAILS
Kurtis Crystal DO (PGY-2) Patient reevaluated at bedside. Patient still not able to ambulate. Labs non actionable, no white count, added ESR and CRP however very low suspicion for septic joint as patient able to bend her knee and there is no warmth or swelling of the joint and no overlying skin changes. Patient TBA not able to ambulate and for dialysis. Endorsed to Dr. Babb. Martin Florez DO (ED Attending):     Blood work reviewed.  No anemia or leukocytosis.  Renal function consistent with end-stage renal disease, potassium is 4.9.    DVT study of the right lower extremity negative for DVT.  X-ray imaging of the femur knee and tib-fib which was negative for acute findings asides from narrow femoral component cement interface consistent with an sclerotic margination, new from prior which would correlate with degree of loosening per the radiologist.    Patient does have an elevated CRP to 81 however clinically I have a low suspicion for septic joint as patient is able to flex and extend passively, no leukocytosis, no erythema noted.    Patient admitted to medicine for dialysis and inability to ambulate secondary to pain.

## 2025-02-21 NOTE — ED ADULT NURSE REASSESSMENT NOTE - NS ED NURSE REASSESS COMMENT FT1
PT is resting in stretcher, easily arousable to verbal stimuli. no apparent distress noted at this time. hand off will be given to primary nurse when return to area.
patient alert ox4 came in c/o right lower leg pain. denies injury. denies chest pain/SOB. respirations even and unlabored. skin warm and  dry. saline lock to left AC intact. patient going for US. awaiting for Us results.

## 2025-02-21 NOTE — H&P ADULT - TIME BILLING
Patient encounter, including chart review, medication review, patient interview, ordering labs and medications, interpreting labs and imaging results, coordination of care with consultants, and discussing plan with patient, granddaughter at bedside, and healthcare team.

## 2025-02-21 NOTE — CONSULT NOTE ADULT - SUBJECTIVE AND OBJECTIVE BOX
[facility] NEPHROLOGY- CONSULTATION NOTE    74 year old female with known past medical history of OA (s/p bilateral total knee replacements complicated by septic arthropathy), previous PE and DVTs (no longer on AC), ESRD, HTN presents to the hospital for severe pain in her right knee. The patient is unable to walk due to the pain on this knee. She came to the ER instead of going to her scheduled dialysis session today due to the pain. The Nephrology service has been consulted for further management of ESRD. The patient is seen by our group, specifically Dr. Diana, at [facility]. Her last outpatient dialysis treatment was on Wednesday 2/19.    PAST MEDICAL & SURGICAL HISTORY:  Osteoarthritis  Pulmonary embolism  Joint infection  Dysfunctional uterine bleeding  Obesity  Essential hypertension  Obstructive sleep apnea syndrome    History of pulmonary embolism  2012 s/p IVC filter    Arthropathy  Arthritis    Migraine  Migraines    Morbid obesity  ESRD (end stage renal disease)  Total knee replacement status    Status post hysterectomy  Status post cholecystectomy    Other acquired absence of organ  History of cholecystectomy    Status post total hysterectomy  partial    History of total knee replacement  with revisions x 3      Allergies:  sulfa drugs (Other; Rash)  trimethoprim (Other; Rash)  Sulfur (Unknown)    Home Medications Reviewed  Hospital Medications:   MEDICATIONS  (STANDING):  heparin Lock (1,000 Units/mL) Injectable 1 Dose(s) Catheter once  heparin Lock (1,000 Units/mL) Injectable 1 Dose(s) Catheter once  mupirocin 2% Ointment 1 Application(s) Both Nostrils two times a day    SOCIAL HISTORY: No current alcohol, smoking, or illicit drug usage.     FAMILY HISTORY:  FH: type 2 diabetes mellitus  FHx: hypertension      REVIEW OF SYSTEMS:  CONSTITUTIONAL: No weakness, fevers or chills  EYES/ENT: No visual changes;  No vertigo or throat pain   NECK: No pain or stiffness  RESPIRATORY: No cough, wheezing, hemoptysis; No shortness of breath  CARDIOVASCULAR: No chest pain or palpitations.  GASTROINTESTINAL: No abdominal or epigastric pain. No nausea, vomiting, or hematemesis; No diarrhea or constipation. No melena or hematochezia.  GENITOURINARY: No dysuria, frequency, foamy urine, urinary urgency, incontinence or hematuria  MUSCULOSKELETAL: Pain in both knees, but more severe in the right knee rather than the left.  NEUROLOGICAL: No numbness or weakness  SKIN: No itching, burning, rashes, or lesions   VASCULAR: No bilateral lower extremity edema.     VITALS:  T(F): 98 (02-21-25 @ 12:15), Max: 98.2 (02-21-25 @ 08:10)  HR: 78 (02-21-25 @ 12:15)  BP: 125/65 (02-21-25 @ 12:15)  RR: 20 (02-21-25 @ 12:15)  SpO2: 100% (02-21-25 @ 12:15)  Wt(kg): --    Height (cm): 165.1 (02-21 @ 06:38)  Weight (kg): 121 (02-21 @ 06:38)  BMI (kg/m2): 44.4 (02-21 @ 06:38)  BSA (m2): 2.24 (02-21 @ 06:38)    PHYSICAL EXAM:  Constitutional: Sitting in bed, in no acute distress. Speaking in complete sentences.  HEENT: anicteric sclera, oropharynx clear, MMM  Neck: No JVD  Respiratory: CTAB, no wheezes, rales or rhonchi  Cardiovascular: S1, S2, RRR  Gastrointestinal: BS+, soft, NT/ND  Extremities: Edema more prominent at right kneecap. Right knee joint erythematous and tender upon palpation. Left knee joint also tender to palpation, but non-erythematous.  Neurological: Alert and oriented to person, place, and time. No focal deficits.  : No CVA tenderness. No sharma.   Skin: No rashes  Vascular Access: Right IJ tunneled catheter.    LABS:  02-21    140  |  97[L]  |  71[H]  ----------------------------<  90  4.9   |  24  |  7.97[H]    Ca    8.4      21 Feb 2025 07:42  Phos  5.9     02-21  Mg     1.90     02-21    TPro  7.7  /  Alb  3.7  /  TBili  0.3  /  DBili      /  AST  15  /  ALT  14  /  AlkPhos  176[H]  02-21    Creatinine Trend: 7.97 <--                        11.6   7.01  )-----------( 246      ( 21 Feb 2025 07:42 )             35.2     Urine Studies:  Urinalysis Basic - ( 21 Feb 2025 07:42 )    Color:  / Appearance:  / SG:  / pH:   Gluc: 90 mg/dL / Ketone:   / Bili:  / Urobili:    Blood:  / Protein:  / Nitrite:    Leuk Esterase:  / RBC:  / WBC    Sq Epi:  / Non Sq Epi:  / Bacteria:         RADIOLOGY & ADDITIONAL STUDIES:      < from: Xray Tibia, Fibula, Femur Knee 4 Views, Right (02.21.25 @ 09:51) >  IMPRESSION:  Intact indwelling bilateral constrained longstem revision total knee   prostheses.    Stable focal lucency involving anterior distal femoral cortex. Otherwise,   no suspected acute appearing periprosthetic fractures. Narrow femoral   component cement bone interface lucency with thin sclerotic margination,   new from prior which could correlate with degree of loosening. No   additional suspicious periprosthetic lucencies.    Congruent ankle mortise with smooth intact talar dome. Preserved   visualized midfoot and hindfoot joint spaces and no joint margin   erosions. Plantar and tiny posterior calcaneal enthesophytes.    Generalized osteopenia otherwise no discrete lytic or blastic lesions.    < end of copied text >      < from: US Duplex Venous Lower Ext Ltd, Right (02.21.25 @ 08:36) >  No evidence of right lower extremity deep venous thrombosis.    < end of copied text >

## 2025-02-21 NOTE — H&P ADULT - NSHPPHYSICALEXAM_GEN_ALL_CORE
GENERAL: No acute distress  HEAD:  Normocephalic  EYES: Conjunctiva and sclera clear  NECK: Supple  CHEST/LUNG: CTAB. R chest permacath C/D/I  HEART: Regular rate and rhythm  ABDOMEN: Soft, non-tender, non-distended  EXTREMITIES:  No clubbing, cyanosis, or edema. Limited ROM of RLE (hip and knee flexion) 2/2 pain. No ttp, increased warmth, erythema, or swelling  NEUROLOGY: A&O x 3  SKIN: No rashes or lesions to visible skin

## 2025-02-21 NOTE — H&P ADULT - PROBLEM SELECTOR PLAN 1
-Hx of b/l TKR done at [facility]  -Patient with right lower leg pain s/p recent fall, otherwise previously ambulatory without issues  -XR showing "Stable focal lucency involving anterior distal femoral cortex. Narrow femoral component cement bone interface lucency with thin sclerotic margination, new from prior which could correlate with degree of loosening." Will discuss findings with radiology/ortho  -CRP elevated to 81, f/u ESR. Low c/f septic joint/PJI at this time  -Continue morphine PRN for severe pain  -Fall precautions  -PT eval -Hx of b/l TKR done at [facility]  -Patient with right lower leg pain s/p recent fall, otherwise previously ambulatory without issues  -XR showing "Stable focal lucency involving anterior distal femoral cortex. Narrow femoral component cement bone interface lucency with thin sclerotic margination, new from prior which could correlate with degree of loosening." Discussed XR findings with ortho- no acute surgical intervention at this time  -CRP elevated to 81, f/u ESR. Low c/f septic joint/PJI at this time  -Continue morphine PRN for severe pain  -Fall precautions  -PT eval

## 2025-02-21 NOTE — H&P ADULT - ASSESSMENT
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

## 2025-02-21 NOTE — H&P ADULT - NSHPLABSRESULTS_GEN_ALL_CORE
LABS:                         11.6   7.01  )-----------( 246      ( 21 Feb 2025 07:42 )             35.2     02-21    140  |  97[L]  |  71[H]  ----------------------------<  90  4.9   |  24  |  7.97[H]    Ca    8.4      21 Feb 2025 07:42  Phos  5.9     02-21  Mg     1.90     02-21    TPro  7.7  /  Alb  3.7  /  TBili  0.3  /  DBili  x   /  AST  15  /  ALT  14  /  AlkPhos  176[H]  02-21    PT/INR - ( 21 Feb 2025 07:42 )   PT: 11.7 sec;   INR: 1.01 ratio      PTT - ( 21 Feb 2025 07:42 )  PTT:28.7 sec  Urinalysis Basic - ( 21 Feb 2025 07:42 )    Color: x / Appearance: x / SG: x / pH: x  Gluc: 90 mg/dL / Ketone: x  / Bili: x / Urobili: x   Blood: x / Protein: x / Nitrite: x   Leuk Esterase: x / RBC: x / WBC x   Sq Epi: x / Non Sq Epi: x / Bacteria: x    RADIOLOGY, EKG & ADDITIONAL TESTS: Reviewed.

## 2025-02-21 NOTE — ED PROVIDER NOTE - CLINICAL SUMMARY MEDICAL DECISION MAKING FREE TEXT BOX
Patient is a 74-year-old female presenting for 2 days of right knee pain nonambulatory.  She has a history notable for ESRD on dialysis and was ordered for lead to 50 mL PEs DVTs not on AC, bilateral knee replacements with septic joints.  Took Tylenol this morning.  Vital signs nonactionable, afebrile, nontachycardic, nonhypoxic  On exam patient with posterior calf tenderness and mildly limited flexion of the right knee.  No overlying skin changes  DDx/plan- Will obtain ultrasound DVT study due to history of DVT/PE not on AC.  Given patient currently not hypoxic denies shortness of breath or tachycardic will not repeat PE study at this time–most recent study was done in 12/2024 found to be normal.  Will also consider septic joint however less likely given no overlying erythema, no fevers or systemic symptoms, able to flex knee passively.  Less likely fracture/dislocation given atraumatic however will obtain x-rays.

## 2025-02-21 NOTE — ED PROVIDER NOTE - PHYSICAL EXAMINATION
General: Obese, well appearing, interactive, well nourished, no apparent distress, ncat  HEENT: EOMI, PERRLA, normal mucosa, normal oropharynx, no lesions on the lips or on oral mucosa, normal external ear  Neck: supple, no lymphadenopathy, full range of motion, no nuchal rigidity  CV: RRR, normal S1 and S2 with no murmur, capillary refill less than two seconds  Resp: lungs CTA b/l, good aeration bilaterally, symmetric chest wall   Abd: non-distended, soft, non-tender  : no CVA tenderness  MSK: full range of motion, no cyanosis, no edema, no clubbing, no immobility  Neuro: CN II-XII grossly intact, muscle strength 5/5 in all extremities, normal gait  Skin: no rashes, skin intact General: Obese, well appearing, interactive, well nourished, no apparent distress, ncat  HEENT: EOMI, PERRLA, normal mucosa, normal oropharynx, no lesions on the lips or on oral mucosa, normal external ear  Neck: supple, no lymphadenopathy, full range of motion, no nuchal rigidity  CV: RRR, normal S1 and S2 with no murmur, capillary refill less than two seconds  Resp: lungs CTA b/l, good aeration bilaterally, symmetric chest wall   Abd: non-distended, soft, non-tender  MSK: LLE surgical scar over anterior knee, able to flex about 30 degrees, mild posterior calf ttp, no overlying erythema or rashes, +PT/DP pulses, warm and well perfused, no edema, no clubbing  Neuro: CN II-XII grossly intact, muscle strength 5/5 in all extremities  Skin: no rashes, skin intact

## 2025-02-21 NOTE — H&P ADULT - HISTORY OF PRESENT ILLNESS
73 y/o F with PMHx of ESRD on HD MWF, OA s/p b/l TKR c/b PJI and provoked PE (2013, no longer on xarelto), LARRY not on CPAP, migraines, HTN, who presented with RLE pain x 2 days a/w ambulatory dysfunction. Pt reports a recent fall several days to 1 week ago prior to onset of pain. It is located in her lower leg from the knee down and is sharp in nature. She states her current symptoms of pain feels similar to her prior prosthetic joint infections. Her last knee surgery was over 10 years ago. She denies any f/c, recent injuries, or other acute or associated symptoms. She missed 1 dialysis session due to inability to bear weight on the leg while using her walker.    ED Course:  Afebrile, /75, HR 85, RR 16, 98% on RA  Given IV tylenol and IV morphine for pain  Admitted to Medicine for further management

## 2025-02-21 NOTE — CONSULT NOTE ADULT - ASSESSMENT
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  Nephrology consulted for ESRD status.     1) ESRD: Most recent outpatient HD on Wednesday 2/19. Schedule HD today and plan for maintenance HD on M/W/F. Need daily monitoring of intake, output and standing weights. Assess electrolytes, volume status, acid-base to determine if additional dialysis sessions are needed. Dose medications for CrCl < 15.    2) HTN with ESRD: Check BP multiple times in a day. Continue current anti-hypertensive regimen.    3) Anemia of renal disease: Hb acceptable. No need for CAROLYN if Hb is > 11.5 g/dL. Check outpatient Iron results to determine if patient is receiving Iron supplementation.    4) Secondary HPT of renal origin: Check serum calcium, albumin, and phosphorus daily. Was taking Calcitriol 0.25 mcg MWF, check PTH to determine if this is still needed. Continue Phoslo 1 tab TID with meals. Goal Phosphorus is 3.5 - 5.5 mg/dL.      [facility] NEPHROLOGY  Derrick Castaneda M.D.  Raúl Diana D.O.  Grace Delgadillo M.D.  MD Cher Beckman, MSN, ANP-C    Telephone: [phone]  Facsimile: [phone]    [address] [place] [address], [address]  [place] [address]  
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    #Right leg pain.   -Hx of b/l TKR done at [facility]  -w/u per med    #ESRD  -hd per renal     #Hypertension.   -Continue home losartan 50mg qd and metop XL 100mg BID.    #chronic dyspnea  -stable  -previous cath 3/2024 no severe cad  -cont vol removal with hd       78 minutes spent on total encounter; more than 50% of the visit was spent counseling and/or coordinating care by the attending physician.

## 2025-02-21 NOTE — CONSULT NOTE ADULT - SUBJECTIVE AND OBJECTIVE BOX
CARDIOLOGY CONSULT NOTE - DR. CHAN        Date of Service: 02-21-25 @ 14:10      HPI:        PAST MEDICAL & SURGICAL HISTORY:  Osteoarthritis      Pulmonary embolism      Joint infection      Dysfunctional uterine bleeding      Obesity      Essential hypertension  Hypertension      Obstructive sleep apnea syndrome  Obstructive sleep apnea      History of pulmonary embolism  2012 s/p IVC filter      Arthropathy  Arthritis      Migraine  Migraines      Morbid obesity      ESRD (end stage renal disease)      Total knee replacement status      Status post hysterectomy      Status post cholecystectomy      Other acquired absence of organ  History of cholecystectomy      Status post total hysterectomy  partial      History of total knee replacement  with revisions x 3            PREVIOUS DIAGNOSTIC TESTING:    [ ] Echocardiogram:  [ ]  Catheterization:  [ ] Stress Test:  	    MEDICATIONS:    Home Medications:  folic acid 1 mg oral tablet: 1 tab(s) orally once a day (06 Dec 2024 07:55)  metoprolol succinate 100 mg oral capsule, extended release: 1 cap(s) orally 2 times a day (21 Feb 2025 14:05)      MEDICATIONS  (STANDING):  calcitriol   Capsule 0.25 MICROGram(s) Oral <User Schedule>  calcium acetate 667 milliGRAM(s) Oral three times a day with meals  fluticasone propionate/ salmeterol 250-50 MICROgram(s) Diskus 1 Dose(s) Inhalation two times a day  folic acid 1 milliGRAM(s) Oral daily  heparin   Injectable 7500 Unit(s) SubCutaneous every 12 hours  heparin Lock (1,000 Units/mL) Injectable 1 Dose(s) Catheter once  heparin Lock (1,000 Units/mL) Injectable 1 Dose(s) Catheter once  losartan 50 milliGRAM(s) Oral daily  metoprolol succinate  milliGRAM(s) Oral two times a day  mupirocin 2% Ointment 1 Application(s) Both Nostrils two times a day      FAMILY HISTORY:  FH: type 2 diabetes mellitus    FHx: hypertension        SOCIAL HISTORY:    [ ] Non-smoker  [ ] Smoker  [ ] Alcohol    Allergies    sulfa drugs (Other; Rash)  trimethoprim (Other; Rash)  Sulfur (Unknown)    Intolerances    	    REVIEW OF SYSTEMS:  CONSTITUTIONAL: No fever, weight loss, or fatigue  EYES: No eye pain, visual disturbances, or discharge  ENMT:  No difficulty hearing, tinnitus, vertigo; No sinus or throat pain  NECK: No pain or stiffness  RESPIRATORY: No cough, wheezing, chills or hemoptysis; No Shortness of Breath  CARDIOVASCULAR: as HPI  GASTROINTESTINAL: No abdominal or epigastric pain. No nausea, vomiting, or hematemesis; No diarrhea or constipation. No melena or hematochezia.  GENITOURINARY: No dysuria, frequency, hematuria, or incontinence  NEUROLOGICAL: No headaches, memory loss, loss of strength, numbness, or tremors  SKIN: No itching, burning, rashes, or lesions   	  [ ] All others negative	  [ ] Unable to obtain    PHYSICAL EXAM:    T(C): 36.7 (02-21-25 @ 12:15), Max: 36.8 (02-21-25 @ 08:10)  HR: 78 (02-21-25 @ 12:15) (76 - 85)  BP: 125/65 (02-21-25 @ 12:15) (125/65 - 153/76)  RR: 20 (02-21-25 @ 12:15) (16 - 20)  SpO2: 100% (02-21-25 @ 12:15) (97% - 100%)  Wt(kg): --  I&O's Summary    Daily Height in cm: 165.1 (21 Feb 2025 06:38)    Daily     Appearance: Normal	  Psychiatry: A & O x 3, Mood & affect appropriate  HEENT:   Normal oral mucosa, PERRL, EOMI	  Lymphatic: No lymphadenopathy  Cardiovascular: Normal S1 S2,RRR, No JVD, No murmurs  Respiratory: Lungs clear to auscultation	  Gastrointestinal:  Soft, Non-tender, + BS	  Skin: No rashes, No ecchymoses, No cyanosis	  Neurologic: Non-focal  Extremities: Normal range of motion, No clubbing, cyanosis or edema  Vascular: Peripheral pulses palpable 2+ bilaterally    TELEMETRY: 	    ECG:  	  RADIOLOGY:  OTHER: 	  	  LABS:	 	    CARDIAC MARKERS:        proBNP:     Lipid Profile:   HgA1c:   TSH:                           11.6   7.01  )-----------( 246      ( 21 Feb 2025 07:42 )             35.2     02-21    140  |  97[L]  |  71[H]  ----------------------------<  90  4.9   |  24  |  7.97[H]    Ca    8.4      21 Feb 2025 07:42  Phos  5.9     02-21  Mg     1.90     02-21    TPro  7.7  /  Alb  3.7  /  TBili  0.3  /  DBili  x   /  AST  15  /  ALT  14  /  AlkPhos  176[H]  02-21    PT/INR - ( 21 Feb 2025 07:42 )   PT: 11.7 sec;   INR: 1.01 ratio         PTT - ( 21 Feb 2025 07:42 )  PTT:28.7 sec    Creatinine: 7.97 mg/dL (02-21-25 @ 07:42)        ASSESSMENT/PLAN: 	               CARDIOLOGY CONSULT NOTE - DR. CHAN        Date of Service: 02-21-25 @ 16:19      HPI:  75 y/o F with PMHx of ESRD on HD MWF, OA s/p b/l TKR c/b PJI and provoked PE (2013, no longer on xarelto), LARRY not on CPAP, migraines, HTN, who presented with RLE pain x 2 days a/w ambulatory dysfunction. Pt reports a recent fall several days to 1 week ago prior to onset of pain. It is located in her lower leg from the knee down and is sharp in nature. She states her current symptoms of pain feels similar to her prior prosthetic joint infections. Her last knee surgery was over 10 years ago. She denies any f/c, recent injuries, or other acute or associated symptoms. She missed 1 dialysis session due to inability to bear weight on the leg while using her walker.    ED Course:  Afebrile, /75, HR 85, RR 16, 98% on RA  Given IV tylenol and IV morphine for pain  Admitted to Medicine for further management (21 Feb 2025 14:19)    no cp, inc sob    PAST MEDICAL & SURGICAL HISTORY:  Osteoarthritis      Pulmonary embolism      Joint infection      Dysfunctional uterine bleeding      Obesity      Essential hypertension  Hypertension      Obstructive sleep apnea syndrome  Obstructive sleep apnea      History of pulmonary embolism  2012 s/p IVC filter      Arthropathy  Arthritis      Migraine  Migraines      Morbid obesity      ESRD (end stage renal disease)      Total knee replacement status      Status post hysterectomy      Status post cholecystectomy      Other acquired absence of organ  History of cholecystectomy      Status post total hysterectomy  partial      History of total knee replacement  with revisions x 3            PREVIOUS DIAGNOSTIC TESTING:    [ ] Echocardiogram:  [ ]  Catheterization:  [ ] Stress Test:  	    MEDICATIONS:    Home Medications:  folic acid 1 mg oral tablet: 1 tab(s) orally once a day (06 Dec 2024 07:55)  metoprolol succinate 100 mg oral capsule, extended release: 1 cap(s) orally 2 times a day (21 Feb 2025 14:05)      MEDICATIONS  (STANDING):  calcitriol   Capsule 0.25 MICROGram(s) Oral <User Schedule>  calcium acetate 667 milliGRAM(s) Oral three times a day with meals  fluticasone propionate/ salmeterol 250-50 MICROgram(s) Diskus 1 Dose(s) Inhalation two times a day  folic acid 1 milliGRAM(s) Oral daily  heparin   Injectable 7500 Unit(s) SubCutaneous every 12 hours  heparin Lock (1,000 Units/mL) Injectable 1 Dose(s) Catheter once  heparin Lock (1,000 Units/mL) Injectable 1 Dose(s) Catheter once  losartan 50 milliGRAM(s) Oral daily  metoprolol succinate  milliGRAM(s) Oral two times a day  mupirocin 2% Ointment 1 Application(s) Both Nostrils two times a day      FAMILY HISTORY:  FH: type 2 diabetes mellitus    FHx: hypertension        SOCIAL HISTORY:    [x ] Non-smoker  [ ] Smoker  [ ] Alcohol    Allergies    sulfa drugs (Other; Rash)  trimethoprim (Other; Rash)  Sulfur (Unknown)    Intolerances    	    REVIEW OF SYSTEMS:  CONSTITUTIONAL: No fever, weight loss, or fatigue  EYES: No eye pain, visual disturbances, or discharge  ENMT:  No difficulty hearing, tinnitus, vertigo; No sinus or throat pain  NECK: No pain or stiffness  RESPIRATORY: No cough, wheezing, chills or hemoptysis; No Shortness of Breath  CARDIOVASCULAR: as HPI  GASTROINTESTINAL: No abdominal or epigastric pain. No nausea, vomiting, or hematemesis; No diarrhea or constipation. No melena or hematochezia.  GENITOURINARY: No dysuria, frequency, hematuria, or incontinence  NEUROLOGICAL: No headaches, memory loss, loss of strength, numbness, or tremors  SKIN: No itching, burning, rashes, or lesions   	  [ ] All others negative	  [ ] Unable to obtain    PHYSICAL EXAM:    T(C): 36.7 (02-21-25 @ 12:15), Max: 36.8 (02-21-25 @ 08:10)  HR: 78 (02-21-25 @ 12:15) (76 - 85)  BP: 125/65 (02-21-25 @ 12:15) (125/65 - 153/76)  RR: 20 (02-21-25 @ 12:15) (16 - 20)  SpO2: 100% (02-21-25 @ 12:15) (97% - 100%)  Wt(kg): --  I&O's Summary    Daily Height in cm: 165.1 (21 Feb 2025 06:38)    Daily     Appearance: Normal	  Psychiatry: A & O x 3, Mood & affect appropriate  HEENT:   Normal oral mucosa, PERRL, EOMI	  Lymphatic: No lymphadenopathy  Cardiovascular: Normal S1 S2,RRR, No JVD, No murmurs  Respiratory: Lungs clear to auscultation	  Gastrointestinal:  Soft, Non-tender, + BS	  Skin: No rashes, No ecchymoses, No cyanosis	  Neurologic: Non-focal  Extremities: Normal range of motion, No clubbing, cyanosis or edema  Vascular: Peripheral pulses palpable 2+ bilaterally    TELEMETRY: 	    ECG:  	  RADIOLOGY:  OTHER: 	  	  LABS:	 	    CARDIAC MARKERS:        proBNP:     Lipid Profile:   HgA1c:   TSH:                           11.6   7.01  )-----------( 246      ( 21 Feb 2025 07:42 )             35.2     02-21    140  |  97[L]  |  71[H]  ----------------------------<  90  4.9   |  24  |  7.97[H]    Ca    8.4      21 Feb 2025 07:42  Phos  5.9     02-21  Mg     1.90     02-21    TPro  7.7  /  Alb  3.7  /  TBili  0.3  /  DBili  x   /  AST  15  /  ALT  14  /  AlkPhos  176[H]  02-21    PT/INR - ( 21 Feb 2025 07:42 )   PT: 11.7 sec;   INR: 1.01 ratio         PTT - ( 21 Feb 2025 07:42 )  PTT:28.7 sec    Creatinine: 7.97 mg/dL (02-21-25 @ 07:42)        ASSESSMENT/PLAN:

## 2025-02-21 NOTE — ED PROVIDER NOTE - OBJECTIVE STATEMENT
Patient is a 74-year-old female past medical history of OA status post bilateral TKR complicated by septic arthropathy, previous PE and DVTs no longer on AC, ESRD MWF, HTN presents c/o  R knee pain x 2 days radiating down the leg. Describes the pain as posterior, has been unable to ambulate and worse with weight bearing. Missed dialysis today. Denies any recent falls/trauma. Denies fevers, chills, CP, SOB, n/v/d.

## 2025-02-21 NOTE — ED ADULT NURSE NOTE - OBJECTIVE STATEMENT
Pt c/o 10/10 right leg pain, denies injury, Pt states that she usually ambulated with a walker, however the pain started yesterday and when she got up at  3am for dialysis. she was not able to stand or bear weight because if the pain. H/o bilateral knee replacement and DVT. waiting on MD for evaluation.

## 2025-02-21 NOTE — ED ADULT TRIAGE NOTE - CHIEF COMPLAINT QUOTE
pt c/o right leg pain. denies any injury or fall. ambulatory with a walker at baseline but having great difficulty ambulating at this time. hx bilateral knee replacements, HTN,  ESRD MWF

## 2025-02-21 NOTE — ED PROVIDER NOTE - ATTENDING CONTRIBUTION TO CARE
74-year-old female with a past medical history of end-stage renal disease on hemodialysis Monday Wednesday Friday, pulmonary embolism not anticoagulated since 2016, LARRY, hypertension presents to the ED complaining of right knee pain that radiates to the right lower extremity that started yesterday.  She states that it "feels like her prior joint infection".  She denies any trauma, fevers, chills, diaphoresis, leg swelling, hip pain or any other symptoms.    GENERAL: NAD, activity normal for age, well developed/ well nourished, no cyanosis, pallor, or diaphoresis.  RESPIRATORY: respiratory effort normal, speaks in full sentences, no tripod position, no accessory muscle use. Lungs clear to auscultation without rhonchi, wheezes, rales  CARDIAC: Regular rate and rhythm without murmurs, rubs or gallops, no peripheral edema. 2+ radial/PT and DP pulses bilaterally  ABDOMINAL: Soft, ND/NT. No evidence of fluid wave. No pulsatile masses on exam, rebound tenderness, Santos sign or pain over Mcburney's point.  MUSCLES/EXTREMITIES: Patient able to flex the right knee at about 30 degrees, limitations are due to pain.  There are no overlying skin changes.  No lower extremity swelling.  2+ PT and DP pulses bilaterally.  She has tenderness along the popliteal fossa.  Full range of motion at the bilateral hips, nontender.  SKIN: Warm, pink and dry. No rashes, dermatoses, petechiae or lesions.  NEUROLOGICAL: Speech is clear and appropriate. Normal level of consciousness.  PSYCH: Normal mood and affect. Judgement/competence is appropriate    MDM:  Patient presents to the ED due to atraumatic right lower extremity pain.  Vital signs are stable and afebrile.  Patient has a history of PE and DVT not anticoagulated.  She denies any respiratory symptoms.  I have a low suspicion for PE at this time.    Will obtain CBC, CMP, x-ray the right lower extremity and DVT study.

## 2025-02-22 LAB
ALBUMIN SERPL ELPH-MCNC: 3.5 G/DL — SIGNIFICANT CHANGE UP (ref 3.3–5)
ALP SERPL-CCNC: 168 U/L — HIGH (ref 40–120)
ALT FLD-CCNC: 12 U/L — SIGNIFICANT CHANGE UP (ref 4–33)
ANION GAP SERPL CALC-SCNC: 16 MMOL/L — HIGH (ref 7–14)
AST SERPL-CCNC: 12 U/L — SIGNIFICANT CHANGE UP (ref 4–32)
BILIRUB SERPL-MCNC: 0.3 MG/DL — SIGNIFICANT CHANGE UP (ref 0.2–1.2)
BUN SERPL-MCNC: 33 MG/DL — HIGH (ref 7–23)
CALCIUM SERPL-MCNC: 8.5 MG/DL — SIGNIFICANT CHANGE UP (ref 8.4–10.5)
CALCIUM SERPL-MCNC: 8.5 MG/DL — SIGNIFICANT CHANGE UP (ref 8.4–10.5)
CHLORIDE SERPL-SCNC: 98 MMOL/L — SIGNIFICANT CHANGE UP (ref 98–107)
CO2 SERPL-SCNC: 24 MMOL/L — SIGNIFICANT CHANGE UP (ref 22–31)
CREAT SERPL-MCNC: 5.08 MG/DL — HIGH (ref 0.5–1.3)
CULTURE RESULTS: ABNORMAL
EGFR: 8 ML/MIN/1.73M2 — LOW
GLUCOSE SERPL-MCNC: 96 MG/DL — SIGNIFICANT CHANGE UP (ref 70–99)
MAGNESIUM SERPL-MCNC: 1.8 MG/DL — SIGNIFICANT CHANGE UP (ref 1.6–2.6)
MRSA PCR RESULT.: SIGNIFICANT CHANGE UP
PHOSPHATE SERPL-MCNC: 4.4 MG/DL — SIGNIFICANT CHANGE UP (ref 2.5–4.5)
POTASSIUM SERPL-MCNC: 4.3 MMOL/L — SIGNIFICANT CHANGE UP (ref 3.5–5.3)
POTASSIUM SERPL-SCNC: 4.3 MMOL/L — SIGNIFICANT CHANGE UP (ref 3.5–5.3)
PROT SERPL-MCNC: 7.2 G/DL — SIGNIFICANT CHANGE UP (ref 6–8.3)
PTH-INTACT FLD-MCNC: 236 PG/ML — HIGH (ref 15–65)
S AUREUS DNA NOSE QL NAA+PROBE: DETECTED
SODIUM SERPL-SCNC: 138 MMOL/L — SIGNIFICANT CHANGE UP (ref 135–145)
SPECIMEN SOURCE: SIGNIFICANT CHANGE UP

## 2025-02-22 RX ORDER — INFLUENZA A VIRUS A/IDAHO/07/2018 (H1N1) ANTIGEN (MDCK CELL DERIVED, PROPIOLACTONE INACTIVATED, INFLUENZA A VIRUS A/INDIANA/08/2018 (H3N2) ANTIGEN (MDCK CELL DERIVED, PROPIOLACTONE INACTIVATED), INFLUENZA B VIRUS B/SINGAPORE/INFTT-16-0610/2016 ANTIGEN (MDCK CELL DERIVED, PROPIOLACTONE INACTIVATED), INFLUENZA B VIRUS B/IOWA/06/2017 ANTIGEN (MDCK CELL DERIVED, PROPIOLACTONE INACTIVATED) 15; 15; 15; 15 UG/.5ML; UG/.5ML; UG/.5ML; UG/.5ML
0.5 INJECTION, SUSPENSION INTRAMUSCULAR ONCE
Refills: 0 | Status: DISCONTINUED | OUTPATIENT
Start: 2025-02-22 | End: 2025-02-28

## 2025-02-22 RX ADMIN — Medication 1 APPLICATION(S): at 12:26

## 2025-02-22 RX ADMIN — Medication 2 MILLIGRAM(S): at 02:58

## 2025-02-22 RX ADMIN — MUPIROCIN CALCIUM 1 APPLICATION(S): 20 CREAM TOPICAL at 17:37

## 2025-02-22 RX ADMIN — Medication 4 MILLIGRAM(S): at 09:21

## 2025-02-22 RX ADMIN — Medication 4 MILLIGRAM(S): at 08:02

## 2025-02-22 RX ADMIN — Medication 667 MILLIGRAM(S): at 17:36

## 2025-02-22 RX ADMIN — Medication 2 MILLIGRAM(S): at 03:20

## 2025-02-22 RX ADMIN — METOPROLOL SUCCINATE 100 MILLIGRAM(S): 50 TABLET, EXTENDED RELEASE ORAL at 05:48

## 2025-02-22 RX ADMIN — HEPARIN SODIUM 7500 UNIT(S): 1000 INJECTION INTRAVENOUS; SUBCUTANEOUS at 05:46

## 2025-02-22 RX ADMIN — Medication 4 MILLIGRAM(S): at 18:35

## 2025-02-22 RX ADMIN — Medication 1 DOSE(S): at 08:09

## 2025-02-22 RX ADMIN — HEPARIN SODIUM 7500 UNIT(S): 1000 INJECTION INTRAVENOUS; SUBCUTANEOUS at 17:46

## 2025-02-22 RX ADMIN — FOLIC ACID 1 MILLIGRAM(S): 1 TABLET ORAL at 12:26

## 2025-02-22 RX ADMIN — Medication 4 MILLIGRAM(S): at 17:33

## 2025-02-22 RX ADMIN — Medication 667 MILLIGRAM(S): at 08:09

## 2025-02-22 RX ADMIN — METOPROLOL SUCCINATE 100 MILLIGRAM(S): 50 TABLET, EXTENDED RELEASE ORAL at 17:36

## 2025-02-22 RX ADMIN — LOSARTAN POTASSIUM 50 MILLIGRAM(S): 100 TABLET, FILM COATED ORAL at 05:48

## 2025-02-22 RX ADMIN — MUPIROCIN CALCIUM 1 APPLICATION(S): 20 CREAM TOPICAL at 12:27

## 2025-02-22 RX ADMIN — Medication 650 MILLIGRAM(S): at 13:26

## 2025-02-22 RX ADMIN — Medication 667 MILLIGRAM(S): at 12:26

## 2025-02-22 RX ADMIN — Medication 4 MILLIGRAM(S): at 09:02

## 2025-02-22 RX ADMIN — Medication 650 MILLIGRAM(S): at 12:26

## 2025-02-22 NOTE — PATIENT PROFILE ADULT - FALL HARM RISK - HARM RISK INTERVENTIONS

## 2025-02-22 NOTE — PHYSICAL THERAPY INITIAL EVALUATION ADULT - PERTINENT HX OF CURRENT PROBLEM, REHAB EVAL
As per chart, patient is a 74 year old female with PMHx of ESRD on HD MWF, OA s/p b/l TKR c/b PJI and provoked PE (2013, no longer on xarelto), LARRY not on CPAP, migraines, HTN, who presented with RLE pain x 2 days a/w ambulatory dysfunction. Pt reports a recent fall several days to 1 week ago prior to onset of pain. It is located in her lower leg from the knee down and is sharp in nature. She states her current symptoms of pain feels similar to her prior prosthetic joint infections. Her last knee surgery was over 10 years ago.

## 2025-02-22 NOTE — PHYSICAL THERAPY INITIAL EVALUATION ADULT - GENERAL OBSERVATIONS, REHAB EVAL
Pt found semi reclined in bed; HR 78bpm; spoke with SAMMI Ayala, who advised patient may participate.

## 2025-02-22 NOTE — PATIENT PROFILE ADULT - NSPROIMPLANTSMEDDEV_GEN_A_NUR
Patient laying in bed being bathed by HHA. Patient declines any pain and discomfort. Patient behavior indicators negative for pain and discomfort. Patient lower extremities appear thinner than last visit. HHA agrees. Per spouse patient is sleeping good  A lot. He sttaes that patient is still eating two meals a day. He states she is not drinking as much. Writer discussed with patient spouse and HHA about cooridinating visits so that extra hands are available to change the patient catheter. Both verbalized understanding. Medications: 6 prefilled syringes with 1mL of Haldol                      4 empty syringes                      2mL Haldol in bottle                      Approximately 16mL in Morphine Bottle.   Mail order refill confirmation# [id number]      Supplies:  XL Gloves
Artificial joint

## 2025-02-22 NOTE — PHYSICAL THERAPY INITIAL EVALUATION ADULT - ADDITIONAL COMMENTS
Pt lives in a private home with 5 to 6 steps to enter with railings. Patient can then remain on first floor.

## 2025-02-23 LAB
ALBUMIN SERPL ELPH-MCNC: 3.5 G/DL — SIGNIFICANT CHANGE UP (ref 3.3–5)
ALP SERPL-CCNC: 167 U/L — HIGH (ref 40–120)
ALT FLD-CCNC: 12 U/L — SIGNIFICANT CHANGE UP (ref 4–33)
ANION GAP SERPL CALC-SCNC: 15 MMOL/L — HIGH (ref 7–14)
AST SERPL-CCNC: 11 U/L — SIGNIFICANT CHANGE UP (ref 4–32)
BILIRUB SERPL-MCNC: 0.2 MG/DL — SIGNIFICANT CHANGE UP (ref 0.2–1.2)
BUN SERPL-MCNC: 51 MG/DL — HIGH (ref 7–23)
CALCIUM SERPL-MCNC: 8.4 MG/DL — SIGNIFICANT CHANGE UP (ref 8.4–10.5)
CHLORIDE SERPL-SCNC: 95 MMOL/L — LOW (ref 98–107)
CO2 SERPL-SCNC: 25 MMOL/L — SIGNIFICANT CHANGE UP (ref 22–31)
CREAT SERPL-MCNC: 6.8 MG/DL — HIGH (ref 0.5–1.3)
EGFR: 6 ML/MIN/1.73M2 — LOW
GLUCOSE SERPL-MCNC: 80 MG/DL — SIGNIFICANT CHANGE UP (ref 70–99)
HAV IGM SER-ACNC: SIGNIFICANT CHANGE UP
HBV CORE AB SER-ACNC: SIGNIFICANT CHANGE UP
HBV CORE IGM SER-ACNC: SIGNIFICANT CHANGE UP
HBV SURFACE AB SER-ACNC: <3 MIU/ML — LOW
HBV SURFACE AB SER-ACNC: <3 MIU/ML — LOW
HBV SURFACE AB SER-ACNC: ABNORMAL
HBV SURFACE AG SER-ACNC: SIGNIFICANT CHANGE UP
HCV AB S/CO SERPL IA: 0.52 S/CO — SIGNIFICANT CHANGE UP (ref 0–0.79)
HCV AB SERPL-IMP: SIGNIFICANT CHANGE UP
MAGNESIUM SERPL-MCNC: 1.9 MG/DL — SIGNIFICANT CHANGE UP (ref 1.6–2.6)
PHOSPHATE SERPL-MCNC: 5.9 MG/DL — HIGH (ref 2.5–4.5)
POTASSIUM SERPL-MCNC: 4.7 MMOL/L — SIGNIFICANT CHANGE UP (ref 3.5–5.3)
POTASSIUM SERPL-SCNC: 4.7 MMOL/L — SIGNIFICANT CHANGE UP (ref 3.5–5.3)
PROT SERPL-MCNC: 7.1 G/DL — SIGNIFICANT CHANGE UP (ref 6–8.3)
SODIUM SERPL-SCNC: 135 MMOL/L — SIGNIFICANT CHANGE UP (ref 135–145)

## 2025-02-23 RX ORDER — SENNA 187 MG
2 TABLET ORAL AT BEDTIME
Refills: 0 | Status: DISCONTINUED | OUTPATIENT
Start: 2025-02-23 | End: 2025-02-28

## 2025-02-23 RX ORDER — POLYETHYLENE GLYCOL 3350 17 G/17G
17 POWDER, FOR SOLUTION ORAL DAILY
Refills: 0 | Status: COMPLETED | OUTPATIENT
Start: 2025-02-24 | End: 2025-02-26

## 2025-02-23 RX ADMIN — Medication 667 MILLIGRAM(S): at 10:02

## 2025-02-23 RX ADMIN — Medication 667 MILLIGRAM(S): at 12:22

## 2025-02-23 RX ADMIN — MUPIROCIN CALCIUM 1 APPLICATION(S): 20 CREAM TOPICAL at 05:26

## 2025-02-23 RX ADMIN — MUPIROCIN CALCIUM 1 APPLICATION(S): 20 CREAM TOPICAL at 17:08

## 2025-02-23 RX ADMIN — Medication 667 MILLIGRAM(S): at 17:07

## 2025-02-23 RX ADMIN — Medication 4 MILLIGRAM(S): at 01:19

## 2025-02-23 RX ADMIN — Medication 650 MILLIGRAM(S): at 07:04

## 2025-02-23 RX ADMIN — LOSARTAN POTASSIUM 50 MILLIGRAM(S): 100 TABLET, FILM COATED ORAL at 05:26

## 2025-02-23 RX ADMIN — Medication 4 MILLIGRAM(S): at 10:18

## 2025-02-23 RX ADMIN — Medication 4 MILLIGRAM(S): at 01:49

## 2025-02-23 RX ADMIN — FOLIC ACID 1 MILLIGRAM(S): 1 TABLET ORAL at 10:02

## 2025-02-23 RX ADMIN — METOPROLOL SUCCINATE 100 MILLIGRAM(S): 50 TABLET, EXTENDED RELEASE ORAL at 05:26

## 2025-02-23 RX ADMIN — Medication 650 MILLIGRAM(S): at 07:34

## 2025-02-23 RX ADMIN — Medication 2 TABLET(S): at 20:50

## 2025-02-23 RX ADMIN — Medication 4 MILLIGRAM(S): at 20:50

## 2025-02-23 RX ADMIN — Medication 4 MILLIGRAM(S): at 21:20

## 2025-02-23 RX ADMIN — HEPARIN SODIUM 7500 UNIT(S): 1000 INJECTION INTRAVENOUS; SUBCUTANEOUS at 17:08

## 2025-02-23 RX ADMIN — Medication 1 DOSE(S): at 10:18

## 2025-02-23 RX ADMIN — Medication 1 DOSE(S): at 20:50

## 2025-02-23 RX ADMIN — Medication 1 APPLICATION(S): at 10:03

## 2025-02-23 RX ADMIN — Medication 4 MILLIGRAM(S): at 15:29

## 2025-02-23 RX ADMIN — HEPARIN SODIUM 7500 UNIT(S): 1000 INJECTION INTRAVENOUS; SUBCUTANEOUS at 05:26

## 2025-02-23 RX ADMIN — METOPROLOL SUCCINATE 100 MILLIGRAM(S): 50 TABLET, EXTENDED RELEASE ORAL at 17:07

## 2025-02-24 LAB
ALBUMIN SERPL ELPH-MCNC: 3.4 G/DL — SIGNIFICANT CHANGE UP (ref 3.3–5)
ALP SERPL-CCNC: 169 U/L — HIGH (ref 40–120)
ALT FLD-CCNC: 13 U/L — SIGNIFICANT CHANGE UP (ref 4–33)
ANION GAP SERPL CALC-SCNC: 19 MMOL/L — HIGH (ref 7–14)
AST SERPL-CCNC: 13 U/L — SIGNIFICANT CHANGE UP (ref 4–32)
BILIRUB SERPL-MCNC: 0.3 MG/DL — SIGNIFICANT CHANGE UP (ref 0.2–1.2)
BUN SERPL-MCNC: 67 MG/DL — HIGH (ref 7–23)
CALCIUM SERPL-MCNC: 8.3 MG/DL — LOW (ref 8.4–10.5)
CHLORIDE SERPL-SCNC: 91 MMOL/L — LOW (ref 98–107)
CO2 SERPL-SCNC: 22 MMOL/L — SIGNIFICANT CHANGE UP (ref 22–31)
CREAT SERPL-MCNC: 7.73 MG/DL — HIGH (ref 0.5–1.3)
EGFR: 5 ML/MIN/1.73M2 — LOW
GLUCOSE SERPL-MCNC: 84 MG/DL — SIGNIFICANT CHANGE UP (ref 70–99)
HCT VFR BLD CALC: 32.7 % — LOW (ref 34.5–45)
HGB BLD-MCNC: 10.6 G/DL — LOW (ref 11.5–15.5)
MAGNESIUM SERPL-MCNC: 1.9 MG/DL — SIGNIFICANT CHANGE UP (ref 1.6–2.6)
MCHC RBC-ENTMCNC: 31.5 PG — SIGNIFICANT CHANGE UP (ref 27–34)
MCHC RBC-ENTMCNC: 32.4 G/DL — SIGNIFICANT CHANGE UP (ref 32–36)
MCV RBC AUTO: 97 FL — SIGNIFICANT CHANGE UP (ref 80–100)
NRBC # BLD AUTO: 0 K/UL — SIGNIFICANT CHANGE UP (ref 0–0)
NRBC # FLD: 0 K/UL — SIGNIFICANT CHANGE UP (ref 0–0)
NRBC BLD AUTO-RTO: 0 /100 WBCS — SIGNIFICANT CHANGE UP (ref 0–0)
PHOSPHATE SERPL-MCNC: 6.4 MG/DL — HIGH (ref 2.5–4.5)
PLATELET # BLD AUTO: 192 K/UL — SIGNIFICANT CHANGE UP (ref 150–400)
POTASSIUM SERPL-MCNC: 5.3 MMOL/L — SIGNIFICANT CHANGE UP (ref 3.5–5.3)
POTASSIUM SERPL-SCNC: 5.3 MMOL/L — SIGNIFICANT CHANGE UP (ref 3.5–5.3)
PROT SERPL-MCNC: 7.1 G/DL — SIGNIFICANT CHANGE UP (ref 6–8.3)
RBC # BLD: 3.37 M/UL — LOW (ref 3.8–5.2)
RBC # FLD: 12.7 % — SIGNIFICANT CHANGE UP (ref 10.3–14.5)
SODIUM SERPL-SCNC: 132 MMOL/L — LOW (ref 135–145)
WBC # BLD: 6.26 K/UL — SIGNIFICANT CHANGE UP (ref 3.8–10.5)
WBC # FLD AUTO: 6.26 K/UL — SIGNIFICANT CHANGE UP (ref 3.8–10.5)

## 2025-02-24 RX ORDER — LOSARTAN POTASSIUM 100 MG/1
25 TABLET, FILM COATED ORAL AT BEDTIME
Refills: 0 | Status: DISCONTINUED | OUTPATIENT
Start: 2025-02-25 | End: 2025-02-28

## 2025-02-24 RX ORDER — ACETAMINOPHEN 500 MG/5ML
1000 LIQUID (ML) ORAL ONCE
Refills: 0 | Status: DISCONTINUED | OUTPATIENT
Start: 2025-02-24 | End: 2025-02-24

## 2025-02-24 RX ORDER — SEVELAMER HYDROCHLORIDE 800 MG/1
1600 TABLET ORAL
Refills: 0 | Status: DISCONTINUED | OUTPATIENT
Start: 2025-02-24 | End: 2025-02-28

## 2025-02-24 RX ADMIN — Medication 4 MILLIGRAM(S): at 09:50

## 2025-02-24 RX ADMIN — POLYETHYLENE GLYCOL 3350 17 GRAM(S): 17 POWDER, FOR SOLUTION ORAL at 12:53

## 2025-02-24 RX ADMIN — SEVELAMER HYDROCHLORIDE 1600 MILLIGRAM(S): 800 TABLET ORAL at 22:25

## 2025-02-24 RX ADMIN — Medication 1 DOSE(S): at 22:21

## 2025-02-24 RX ADMIN — Medication 650 MILLIGRAM(S): at 16:36

## 2025-02-24 RX ADMIN — MUPIROCIN CALCIUM 1 APPLICATION(S): 20 CREAM TOPICAL at 05:31

## 2025-02-24 RX ADMIN — Medication 667 MILLIGRAM(S): at 12:53

## 2025-02-24 RX ADMIN — Medication 1 APPLICATION(S): at 12:53

## 2025-02-24 RX ADMIN — Medication 4 MILLIGRAM(S): at 08:56

## 2025-02-24 RX ADMIN — HEPARIN SODIUM 7500 UNIT(S): 1000 INJECTION INTRAVENOUS; SUBCUTANEOUS at 05:29

## 2025-02-24 RX ADMIN — METOPROLOL SUCCINATE 100 MILLIGRAM(S): 50 TABLET, EXTENDED RELEASE ORAL at 05:29

## 2025-02-24 RX ADMIN — LOSARTAN POTASSIUM 50 MILLIGRAM(S): 100 TABLET, FILM COATED ORAL at 05:29

## 2025-02-24 RX ADMIN — Medication 1 DOSE(S): at 08:38

## 2025-02-24 RX ADMIN — Medication 4 MILLIGRAM(S): at 09:24

## 2025-02-24 RX ADMIN — Medication 667 MILLIGRAM(S): at 08:37

## 2025-02-24 RX ADMIN — FOLIC ACID 1 MILLIGRAM(S): 1 TABLET ORAL at 12:53

## 2025-02-24 RX ADMIN — METOPROLOL SUCCINATE 100 MILLIGRAM(S): 50 TABLET, EXTENDED RELEASE ORAL at 22:25

## 2025-02-24 RX ADMIN — CALCITRIOL 0.25 MICROGRAM(S): 0.5 CAPSULE, GELATIN COATED ORAL at 08:37

## 2025-02-24 RX ADMIN — HEPARIN SODIUM 7500 UNIT(S): 1000 INJECTION INTRAVENOUS; SUBCUTANEOUS at 22:30

## 2025-02-24 RX ADMIN — MUPIROCIN CALCIUM 1 APPLICATION(S): 20 CREAM TOPICAL at 22:30

## 2025-02-24 NOTE — PROGRESS NOTE ADULT - TIME BILLING
Agree with above ACP note.  cv stable  cont current tx  vol removal with hf  cont bp meds  med/renal/ortho f/u

## 2025-02-24 NOTE — CHART NOTE - NSCHARTNOTEFT_GEN_A_CORE
ORTHOPEDIC CHART REVIEW     Orthopedics service called by medicine provider Joanna Gupta regarding this patient. Orthopedic recommendations are based on history and exam provided by the consulting team, and chart/imaging review. No physical exam performed during this encounter.     Per Joanna Gupta, patient is a 73 y/o F with PMHx of HTN, ESRD on HD (MWF), OA s/p b/l TKR c/b PJI who presented with RLE pain x 2 days after a recent fall 1 week ago c/b ambulatory dysfunction.    Official read: < from: Xray Knee 4 Views, Right (02.21.25 @ 09:51) >    IMPRESSION:  Intact indwelling bilateral constrained longstem revision total knee prostheses.    Stable focal lucency involving anterior distal femoral cortex. Otherwise,   no suspected acute appearing periprosthetic fractures. Narrow femoral   component cement bone interface lucency with thin sclerotic margination,   new from prior which could correlate with degree of loosening. No   additional suspicious periprosthetic lucencies.    Congruent ankle mortise with smooth intact talar dome. Preserved   visualized midfoot and hindfoot joint spaces and no joint margin   erosions. Plantar and tiny posterior calcaneal enthesophytes.    Generalized osteopenia otherwise no discrete lytic or blastic lesions.    --- End of Report ---      Recommendations  - Imaging results do not show any acute changes. Stable findings and intact hardware.  - FU outpatient with original surgeon of total knee arthroplasty    Please note this is NOT a formal orthopedic consult. If patient status changes or a formal consult is required, please page [phone] to place an orthopedic consult for complete evaluation.

## 2025-02-25 LAB
ALBUMIN SERPL ELPH-MCNC: 3.5 G/DL — SIGNIFICANT CHANGE UP (ref 3.3–5)
ALP SERPL-CCNC: 174 U/L — HIGH (ref 40–120)
ALT FLD-CCNC: 13 U/L — SIGNIFICANT CHANGE UP (ref 4–33)
ANION GAP SERPL CALC-SCNC: 14 MMOL/L — SIGNIFICANT CHANGE UP (ref 7–14)
AST SERPL-CCNC: 15 U/L — SIGNIFICANT CHANGE UP (ref 4–32)
BILIRUB SERPL-MCNC: 0.2 MG/DL — SIGNIFICANT CHANGE UP (ref 0.2–1.2)
BUN SERPL-MCNC: 37 MG/DL — HIGH (ref 7–23)
CALCIUM SERPL-MCNC: 8.6 MG/DL — SIGNIFICANT CHANGE UP (ref 8.4–10.5)
CHLORIDE SERPL-SCNC: 95 MMOL/L — LOW (ref 98–107)
CO2 SERPL-SCNC: 27 MMOL/L — SIGNIFICANT CHANGE UP (ref 22–31)
CREAT SERPL-MCNC: 5.46 MG/DL — HIGH (ref 0.5–1.3)
EGFR: 8 ML/MIN/1.73M2 — LOW
GLUCOSE SERPL-MCNC: 95 MG/DL — SIGNIFICANT CHANGE UP (ref 70–99)
HCT VFR BLD CALC: 32 % — LOW (ref 34.5–45)
HGB BLD-MCNC: 10.2 G/DL — LOW (ref 11.5–15.5)
MAGNESIUM SERPL-MCNC: 2 MG/DL — SIGNIFICANT CHANGE UP (ref 1.6–2.6)
MCHC RBC-ENTMCNC: 31.4 PG — SIGNIFICANT CHANGE UP (ref 27–34)
MCHC RBC-ENTMCNC: 31.9 G/DL — LOW (ref 32–36)
MCV RBC AUTO: 98.5 FL — SIGNIFICANT CHANGE UP (ref 80–100)
NRBC # BLD AUTO: 0 K/UL — SIGNIFICANT CHANGE UP (ref 0–0)
NRBC # FLD: 0 K/UL — SIGNIFICANT CHANGE UP (ref 0–0)
NRBC BLD AUTO-RTO: 0 /100 WBCS — SIGNIFICANT CHANGE UP (ref 0–0)
PHOSPHATE SERPL-MCNC: 4.4 MG/DL — SIGNIFICANT CHANGE UP (ref 2.5–4.5)
PLATELET # BLD AUTO: 170 K/UL — SIGNIFICANT CHANGE UP (ref 150–400)
POTASSIUM SERPL-MCNC: 5.1 MMOL/L — SIGNIFICANT CHANGE UP (ref 3.5–5.3)
POTASSIUM SERPL-SCNC: 5.1 MMOL/L — SIGNIFICANT CHANGE UP (ref 3.5–5.3)
PROT SERPL-MCNC: 7.1 G/DL — SIGNIFICANT CHANGE UP (ref 6–8.3)
RBC # BLD: 3.25 M/UL — LOW (ref 3.8–5.2)
RBC # FLD: 13.1 % — SIGNIFICANT CHANGE UP (ref 10.3–14.5)
SODIUM SERPL-SCNC: 136 MMOL/L — SIGNIFICANT CHANGE UP (ref 135–145)
WBC # BLD: 4.31 K/UL — SIGNIFICANT CHANGE UP (ref 3.8–10.5)
WBC # FLD AUTO: 4.31 K/UL — SIGNIFICANT CHANGE UP (ref 3.8–10.5)

## 2025-02-25 PROCEDURE — 72220 X-RAY EXAM SACRUM TAILBONE: CPT | Mod: 26

## 2025-02-25 PROCEDURE — 72100 X-RAY EXAM L-S SPINE 2/3 VWS: CPT | Mod: 26

## 2025-02-25 RX ORDER — OXYCODONE HYDROCHLORIDE 30 MG/1
5 TABLET ORAL ONCE
Refills: 0 | Status: DISCONTINUED | OUTPATIENT
Start: 2025-02-25 | End: 2025-02-25

## 2025-02-25 RX ADMIN — HEPARIN SODIUM 7500 UNIT(S): 1000 INJECTION INTRAVENOUS; SUBCUTANEOUS at 23:27

## 2025-02-25 RX ADMIN — OXYCODONE HYDROCHLORIDE 5 MILLIGRAM(S): 30 TABLET ORAL at 23:24

## 2025-02-25 RX ADMIN — SEVELAMER HYDROCHLORIDE 1600 MILLIGRAM(S): 800 TABLET ORAL at 08:37

## 2025-02-25 RX ADMIN — SEVELAMER HYDROCHLORIDE 1600 MILLIGRAM(S): 800 TABLET ORAL at 17:24

## 2025-02-25 RX ADMIN — FOLIC ACID 1 MILLIGRAM(S): 1 TABLET ORAL at 12:34

## 2025-02-25 RX ADMIN — OXYCODONE HYDROCHLORIDE 5 MILLIGRAM(S): 30 TABLET ORAL at 14:30

## 2025-02-25 RX ADMIN — METOPROLOL SUCCINATE 100 MILLIGRAM(S): 50 TABLET, EXTENDED RELEASE ORAL at 06:24

## 2025-02-25 RX ADMIN — POLYETHYLENE GLYCOL 3350 17 GRAM(S): 17 POWDER, FOR SOLUTION ORAL at 12:35

## 2025-02-25 RX ADMIN — Medication 1 APPLICATION(S): at 12:40

## 2025-02-25 RX ADMIN — MUPIROCIN CALCIUM 1 APPLICATION(S): 20 CREAM TOPICAL at 06:25

## 2025-02-25 RX ADMIN — Medication 2 MILLIGRAM(S): at 01:01

## 2025-02-25 RX ADMIN — HEPARIN SODIUM 7500 UNIT(S): 1000 INJECTION INTRAVENOUS; SUBCUTANEOUS at 12:33

## 2025-02-25 RX ADMIN — LOSARTAN POTASSIUM 25 MILLIGRAM(S): 100 TABLET, FILM COATED ORAL at 22:03

## 2025-02-25 RX ADMIN — Medication 1 DOSE(S): at 08:37

## 2025-02-25 RX ADMIN — Medication 2 PUFF(S): at 22:03

## 2025-02-25 RX ADMIN — Medication 1 DOSE(S): at 21:54

## 2025-02-25 RX ADMIN — SEVELAMER HYDROCHLORIDE 1600 MILLIGRAM(S): 800 TABLET ORAL at 12:33

## 2025-02-25 RX ADMIN — Medication 2 MILLIGRAM(S): at 01:54

## 2025-02-25 RX ADMIN — METOPROLOL SUCCINATE 100 MILLIGRAM(S): 50 TABLET, EXTENDED RELEASE ORAL at 22:03

## 2025-02-25 RX ADMIN — Medication 4 MILLIGRAM(S): at 09:49

## 2025-02-25 RX ADMIN — MUPIROCIN CALCIUM 1 APPLICATION(S): 20 CREAM TOPICAL at 21:54

## 2025-02-25 RX ADMIN — OXYCODONE HYDROCHLORIDE 5 MILLIGRAM(S): 30 TABLET ORAL at 13:35

## 2025-02-25 NOTE — PROVIDER CONTACT NOTE (OTHER) - RECOMMENDATIONS
pt says Tylenol does not work for her can you order her something else
pt says Tylenol does not work for her can you order morphine
Continue to monitor

## 2025-02-26 LAB
ALBUMIN SERPL ELPH-MCNC: 3.4 G/DL — SIGNIFICANT CHANGE UP (ref 3.3–5)
ALP SERPL-CCNC: 157 U/L — HIGH (ref 40–120)
ALT FLD-CCNC: 11 U/L — SIGNIFICANT CHANGE UP (ref 4–33)
ANION GAP SERPL CALC-SCNC: 17 MMOL/L — HIGH (ref 7–14)
AST SERPL-CCNC: 13 U/L — SIGNIFICANT CHANGE UP (ref 4–32)
BILIRUB SERPL-MCNC: 0.2 MG/DL — SIGNIFICANT CHANGE UP (ref 0.2–1.2)
BUN SERPL-MCNC: 51 MG/DL — HIGH (ref 7–23)
CALCIUM SERPL-MCNC: 8.3 MG/DL — LOW (ref 8.4–10.5)
CHLORIDE SERPL-SCNC: 98 MMOL/L — SIGNIFICANT CHANGE UP (ref 98–107)
CO2 SERPL-SCNC: 23 MMOL/L — SIGNIFICANT CHANGE UP (ref 22–31)
CREAT SERPL-MCNC: 6.89 MG/DL — HIGH (ref 0.5–1.3)
EGFR: 6 ML/MIN/1.73M2 — LOW
GAMMA INTERFERON BACKGROUND BLD IA-ACNC: 0.03 IU/ML — SIGNIFICANT CHANGE UP
GLUCOSE SERPL-MCNC: 89 MG/DL — SIGNIFICANT CHANGE UP (ref 70–99)
HCT VFR BLD CALC: 30.5 % — LOW (ref 34.5–45)
HGB BLD-MCNC: 9.7 G/DL — LOW (ref 11.5–15.5)
M TB IFN-G BLD-IMP: NEGATIVE — SIGNIFICANT CHANGE UP
M TB IFN-G CD4+ BCKGRND COR BLD-ACNC: 0 IU/ML — SIGNIFICANT CHANGE UP
M TB IFN-G CD4+CD8+ BCKGRND COR BLD-ACNC: 0 IU/ML — SIGNIFICANT CHANGE UP
MAGNESIUM SERPL-MCNC: 2 MG/DL — SIGNIFICANT CHANGE UP (ref 1.6–2.6)
MCHC RBC-ENTMCNC: 31.2 PG — SIGNIFICANT CHANGE UP (ref 27–34)
MCHC RBC-ENTMCNC: 31.8 G/DL — LOW (ref 32–36)
MCV RBC AUTO: 98.1 FL — SIGNIFICANT CHANGE UP (ref 80–100)
NRBC # BLD AUTO: 0 K/UL — SIGNIFICANT CHANGE UP (ref 0–0)
NRBC # FLD: 0 K/UL — SIGNIFICANT CHANGE UP (ref 0–0)
NRBC BLD AUTO-RTO: 0 /100 WBCS — SIGNIFICANT CHANGE UP (ref 0–0)
PHOSPHATE SERPL-MCNC: 4.9 MG/DL — HIGH (ref 2.5–4.5)
PLATELET # BLD AUTO: 182 K/UL — SIGNIFICANT CHANGE UP (ref 150–400)
POTASSIUM SERPL-MCNC: 5.3 MMOL/L — SIGNIFICANT CHANGE UP (ref 3.5–5.3)
POTASSIUM SERPL-SCNC: 5.3 MMOL/L — SIGNIFICANT CHANGE UP (ref 3.5–5.3)
PROT SERPL-MCNC: 6.7 G/DL — SIGNIFICANT CHANGE UP (ref 6–8.3)
QUANT TB PLUS MITOGEN MINUS NIL: 3.98 IU/ML — SIGNIFICANT CHANGE UP
RBC # BLD: 3.11 M/UL — LOW (ref 3.8–5.2)
RBC # FLD: 12.9 % — SIGNIFICANT CHANGE UP (ref 10.3–14.5)
SODIUM SERPL-SCNC: 138 MMOL/L — SIGNIFICANT CHANGE UP (ref 135–145)
WBC # BLD: 4.67 K/UL — SIGNIFICANT CHANGE UP (ref 3.8–10.5)
WBC # FLD AUTO: 4.67 K/UL — SIGNIFICANT CHANGE UP (ref 3.8–10.5)

## 2025-02-26 RX ORDER — OXYCODONE HYDROCHLORIDE 30 MG/1
5 TABLET ORAL EVERY 12 HOURS
Refills: 0 | Status: DISCONTINUED | OUTPATIENT
Start: 2025-02-26 | End: 2025-02-28

## 2025-02-26 RX ORDER — EPOETIN ALFA 10000 [IU]/ML
8000 SOLUTION INTRAVENOUS; SUBCUTANEOUS
Refills: 0 | Status: DISCONTINUED | OUTPATIENT
Start: 2025-02-26 | End: 2025-02-28

## 2025-02-26 RX ADMIN — LOSARTAN POTASSIUM 25 MILLIGRAM(S): 100 TABLET, FILM COATED ORAL at 21:31

## 2025-02-26 RX ADMIN — MUPIROCIN CALCIUM 1 APPLICATION(S): 20 CREAM TOPICAL at 09:15

## 2025-02-26 RX ADMIN — Medication 1 APPLICATION(S): at 15:09

## 2025-02-26 RX ADMIN — FOLIC ACID 1 MILLIGRAM(S): 1 TABLET ORAL at 15:04

## 2025-02-26 RX ADMIN — METOPROLOL SUCCINATE 100 MILLIGRAM(S): 50 TABLET, EXTENDED RELEASE ORAL at 15:04

## 2025-02-26 RX ADMIN — OXYCODONE HYDROCHLORIDE 5 MILLIGRAM(S): 30 TABLET ORAL at 16:00

## 2025-02-26 RX ADMIN — HEPARIN SODIUM 7500 UNIT(S): 1000 INJECTION INTRAVENOUS; SUBCUTANEOUS at 21:31

## 2025-02-26 RX ADMIN — SEVELAMER HYDROCHLORIDE 1600 MILLIGRAM(S): 800 TABLET ORAL at 09:14

## 2025-02-26 RX ADMIN — SEVELAMER HYDROCHLORIDE 1600 MILLIGRAM(S): 800 TABLET ORAL at 18:11

## 2025-02-26 RX ADMIN — HEPARIN SODIUM 7500 UNIT(S): 1000 INJECTION INTRAVENOUS; SUBCUTANEOUS at 15:05

## 2025-02-26 RX ADMIN — Medication 1 DOSE(S): at 21:31

## 2025-02-26 RX ADMIN — Medication 1 APPLICATION(S): at 15:08

## 2025-02-26 RX ADMIN — SEVELAMER HYDROCHLORIDE 1600 MILLIGRAM(S): 800 TABLET ORAL at 15:04

## 2025-02-26 RX ADMIN — METOPROLOL SUCCINATE 100 MILLIGRAM(S): 50 TABLET, EXTENDED RELEASE ORAL at 21:31

## 2025-02-26 RX ADMIN — OXYCODONE HYDROCHLORIDE 5 MILLIGRAM(S): 30 TABLET ORAL at 15:04

## 2025-02-26 RX ADMIN — CALCITRIOL 0.25 MICROGRAM(S): 0.5 CAPSULE, GELATIN COATED ORAL at 09:14

## 2025-02-26 RX ADMIN — POLYETHYLENE GLYCOL 3350 17 GRAM(S): 17 POWDER, FOR SOLUTION ORAL at 15:17

## 2025-02-26 RX ADMIN — OXYCODONE HYDROCHLORIDE 5 MILLIGRAM(S): 30 TABLET ORAL at 00:24

## 2025-02-26 RX ADMIN — Medication 1 DOSE(S): at 09:14

## 2025-02-26 NOTE — PROVIDER CONTACT NOTE (OTHER) - SITUATION
/54
/56
pt is complaining of 10/10 pain in her right knee
pt reporting 10/10 pain in right leg
pt reporting 10/10 leg pain
Pt has a BP of 125/50
Patient had 1 emesis episode.
pt complaining of shortness of breath and chest tightness
pt /53
pt is complaining of 10/10 pain in her right knee

## 2025-02-26 NOTE — PROVIDER CONTACT NOTE (OTHER) - DATE AND TIME:
22-Feb-2025 02:45
24-Feb-2025 22:40
25-Feb-2025 23:45
25-Feb-2025 22:54
26-Feb-2025 02:00
25-Feb-2025 06:25
22-Feb-2025 09:18
24-Feb-2025 09:20
25-Feb-2025 23:45
25-Feb-2025 21:54

## 2025-02-26 NOTE — PROVIDER CONTACT NOTE (OTHER) - NAME OF MD/NP/PA/DO NOTIFIED:
Liss Edouard
Liss Gunderson
Liss Gunderson
Liss S
Liss Gunderson
Aditi Bartlett, NP
Liss Gunderson
Joanna Gupta
Liss S

## 2025-02-26 NOTE — PROVIDER CONTACT NOTE (OTHER) - BACKGROUND
pt admitted for right knee pain
pt admitted for right knee pain
pt came in for r knee pain
Pt admitted with Right knee pain, PMH of ESRD, LARRY, and obesity.
74 Y.O F admitted for right knee pain. PMHx ESRD, LARRY, osteoarthritis
pt admitted for right knee pain
pt came in for r knee pain
pt admitted for right knee pain

## 2025-02-26 NOTE — PROVIDER CONTACT NOTE (OTHER) - REASON
Patient had 1 emesis episode.
Pt has a BP of 125/50
pt /53
pt is complaining of 10/10 pain in her right knee
pt is complaining of 10/10 pain in her right knee
/56
pt reporting 10/10 leg pain
pt reporting 10/10 pain in right leg
pt complaining of shortness of breath and chest tightness
/54

## 2025-02-26 NOTE — PROVIDER CONTACT NOTE (OTHER) - ASSESSMENT
AOx4. Patient was given morphine for pain earlier this morning. Patient reported sudden onset of nausea followed by emesis right after eating breakfast. Output was yellow color. Patient denies chest pain. vitals taken and stable, documented. no other symptoms reported
pt A&Ox4. no s/s of distress noted. pt reporting shortness of breath, shortness of breath resolved with Advair and Proventil. 98.1 temp, 93 HR, 121/56, 17 RR, 100 spo2
pt a&ox4. VS 97.8 temp, 100 HR, 129/64 BP, 17 RR, 99 spo2. pt states sharp aching pain in leg, the same pain she has been having. pt has not been ambulating because of leg pain. pt can not be given PRN medication because she already received at 22:57
pt A&Ox4. pt stating she feels short of breath and chest tightness. no s/s of distress noted. VS 98.1 temp, 93 HR, 121/56 BP, 17 RR, 100 spo2. Advair 250/50 given
pt A&Ox4. no s/s of distress noted. 100 HR, 107/54 BP, 18 RR, 100 spo2. 36.8 degrees celsius temp
pt grimacing and guarding. reporting 10/10 pain in right leg. VS 98.2 temp, 100 HR, 107/54, 18 RR, [id number]
pt A&Ox4. No s/s of distress noted.  VS 97.7 temp. 88 HR. 119/53 BP, 18 RR, 96 spo2. pt denies sob, dizziness and lightheadedness
pt is A&ox4 complaining of pain in her right knee 10/10 aching
Pt is asymptomatic, no complaints.
pt is A&ox4 complaining of pain in her right knee 10/10 aching

## 2025-02-26 NOTE — PROVIDER CONTACT NOTE (OTHER) - ACTION/TREATMENT ORDERED:
Continue to monitor
provider made aware, provider stated to give PRN Proventil HFA
Zofran PRN given.
provider ordered one time dose
provider made aware, no further actions at this time
provider made aware, no further actions at this time
provider notified, 2mg of morphine ordered for breakthrough pain. provider stated to have pt stay in bed for now until ortho can come and see her
provider made aware. no further actions at this time
provider notified, STAT oxycodone ordered.
provider aware

## 2025-02-27 LAB
ALBUMIN SERPL ELPH-MCNC: 3.6 G/DL — SIGNIFICANT CHANGE UP (ref 3.3–5)
ALP SERPL-CCNC: 154 U/L — HIGH (ref 40–120)
ALT FLD-CCNC: 14 U/L — SIGNIFICANT CHANGE UP (ref 4–33)
ANION GAP SERPL CALC-SCNC: 13 MMOL/L — SIGNIFICANT CHANGE UP (ref 7–14)
AST SERPL-CCNC: 18 U/L — SIGNIFICANT CHANGE UP (ref 4–32)
BILIRUB SERPL-MCNC: 0.3 MG/DL — SIGNIFICANT CHANGE UP (ref 0.2–1.2)
BUN SERPL-MCNC: 35 MG/DL — HIGH (ref 7–23)
CALCIUM SERPL-MCNC: 8.8 MG/DL — SIGNIFICANT CHANGE UP (ref 8.4–10.5)
CHLORIDE SERPL-SCNC: 99 MMOL/L — SIGNIFICANT CHANGE UP (ref 98–107)
CO2 SERPL-SCNC: 25 MMOL/L — SIGNIFICANT CHANGE UP (ref 22–31)
CREAT SERPL-MCNC: 5.28 MG/DL — HIGH (ref 0.5–1.3)
EGFR: 8 ML/MIN/1.73M2 — LOW
GLUCOSE SERPL-MCNC: 84 MG/DL — SIGNIFICANT CHANGE UP (ref 70–99)
HCT VFR BLD CALC: 31.7 % — LOW (ref 34.5–45)
HGB BLD-MCNC: 9.8 G/DL — LOW (ref 11.5–15.5)
MAGNESIUM SERPL-MCNC: 2 MG/DL — SIGNIFICANT CHANGE UP (ref 1.6–2.6)
MCHC RBC-ENTMCNC: 30.6 PG — SIGNIFICANT CHANGE UP (ref 27–34)
MCHC RBC-ENTMCNC: 30.9 G/DL — LOW (ref 32–36)
MCV RBC AUTO: 99.1 FL — SIGNIFICANT CHANGE UP (ref 80–100)
NRBC # BLD AUTO: 0 K/UL — SIGNIFICANT CHANGE UP (ref 0–0)
NRBC # FLD: 0 K/UL — SIGNIFICANT CHANGE UP (ref 0–0)
NRBC BLD AUTO-RTO: 0 /100 WBCS — SIGNIFICANT CHANGE UP (ref 0–0)
PHOSPHATE SERPL-MCNC: 3.6 MG/DL — SIGNIFICANT CHANGE UP (ref 2.5–4.5)
PLATELET # BLD AUTO: 167 K/UL — SIGNIFICANT CHANGE UP (ref 150–400)
POTASSIUM SERPL-MCNC: 5.2 MMOL/L — SIGNIFICANT CHANGE UP (ref 3.5–5.3)
POTASSIUM SERPL-SCNC: 5.2 MMOL/L — SIGNIFICANT CHANGE UP (ref 3.5–5.3)
PROT SERPL-MCNC: 6.8 G/DL — SIGNIFICANT CHANGE UP (ref 6–8.3)
RBC # BLD: 3.2 M/UL — LOW (ref 3.8–5.2)
RBC # FLD: 12.8 % — SIGNIFICANT CHANGE UP (ref 10.3–14.5)
SODIUM SERPL-SCNC: 137 MMOL/L — SIGNIFICANT CHANGE UP (ref 135–145)
WBC # BLD: 5 K/UL — SIGNIFICANT CHANGE UP (ref 3.8–10.5)
WBC # FLD AUTO: 5 K/UL — SIGNIFICANT CHANGE UP (ref 3.8–10.5)

## 2025-02-27 RX ORDER — HEPARIN SODIUM 1000 [USP'U]/ML
1000 INJECTION INTRAVENOUS; SUBCUTANEOUS ONCE
Refills: 0 | Status: COMPLETED | OUTPATIENT
Start: 2025-02-28 | End: 2025-02-28

## 2025-02-27 RX ORDER — HEPARIN SODIUM 1000 [USP'U]/ML
500 INJECTION INTRAVENOUS; SUBCUTANEOUS
Refills: 0 | Status: DISCONTINUED | OUTPATIENT
Start: 2025-02-28 | End: 2025-02-28

## 2025-02-27 RX ADMIN — Medication 1 APPLICATION(S): at 10:28

## 2025-02-27 RX ADMIN — Medication 1 DOSE(S): at 08:48

## 2025-02-27 RX ADMIN — HEPARIN SODIUM 7500 UNIT(S): 1000 INJECTION INTRAVENOUS; SUBCUTANEOUS at 21:48

## 2025-02-27 RX ADMIN — SEVELAMER HYDROCHLORIDE 1600 MILLIGRAM(S): 800 TABLET ORAL at 18:01

## 2025-02-27 RX ADMIN — Medication 1 APPLICATION(S): at 10:27

## 2025-02-27 RX ADMIN — SEVELAMER HYDROCHLORIDE 1600 MILLIGRAM(S): 800 TABLET ORAL at 12:28

## 2025-02-27 RX ADMIN — SEVELAMER HYDROCHLORIDE 1600 MILLIGRAM(S): 800 TABLET ORAL at 08:48

## 2025-02-27 RX ADMIN — OXYCODONE HYDROCHLORIDE 5 MILLIGRAM(S): 30 TABLET ORAL at 05:27

## 2025-02-27 RX ADMIN — OXYCODONE HYDROCHLORIDE 5 MILLIGRAM(S): 30 TABLET ORAL at 18:03

## 2025-02-27 RX ADMIN — HEPARIN SODIUM 7500 UNIT(S): 1000 INJECTION INTRAVENOUS; SUBCUTANEOUS at 10:29

## 2025-02-27 RX ADMIN — METOPROLOL SUCCINATE 100 MILLIGRAM(S): 50 TABLET, EXTENDED RELEASE ORAL at 10:27

## 2025-02-27 RX ADMIN — FOLIC ACID 1 MILLIGRAM(S): 1 TABLET ORAL at 10:26

## 2025-02-27 RX ADMIN — Medication 1 DOSE(S): at 21:48

## 2025-02-27 RX ADMIN — METOPROLOL SUCCINATE 100 MILLIGRAM(S): 50 TABLET, EXTENDED RELEASE ORAL at 21:48

## 2025-02-27 RX ADMIN — OXYCODONE HYDROCHLORIDE 5 MILLIGRAM(S): 30 TABLET ORAL at 19:00

## 2025-02-27 RX ADMIN — LOSARTAN POTASSIUM 25 MILLIGRAM(S): 100 TABLET, FILM COATED ORAL at 21:48

## 2025-02-27 RX ADMIN — OXYCODONE HYDROCHLORIDE 5 MILLIGRAM(S): 30 TABLET ORAL at 06:27

## 2025-02-27 NOTE — PROGRESS NOTE ADULT - PROBLEM SELECTOR PLAN 3
-Continue home losartan 50mg qd and metop XL 100mg BID

## 2025-02-27 NOTE — CHART NOTE - NSCHARTNOTEFT_GEN_A_CORE
Patient will require a standard wheelchair due to ___.  The beneficiary has a mobility limitation that significantly impairs her ability to participate in one ore more MRADLs such as toileting, feeding, dressing, grooming, and bathing in customary locations in the home.  The patient's mobility limitation cannot be sufficiently resolved by the use of an appropriately fitted cane or walker.  The patient is unable to ambulate with a walker.  Use of a manual wheelchair will significantly improve the beneficiary's ability to participate in MRADLs and the beneficiary will use it on a regular bases in the home.  The beneficiary is able and willing to use the wheelchair in the home.  The beneficiary has sufficient upper extremity function and other physical and mental capabilities needed to safely self-propel the manual wheelchair that is provided in the home during a typical day. Patient will require a standard wheelchair due to osteoarthritis and chronic knee pain s/p knee replacement and joint infection.  The beneficiary has a mobility limitation that significantly impairs her ability to participate in one ore more MRADLs such as toileting, feeding, dressing, grooming, and bathing in customary locations in the home.  The patient's mobility limitation cannot be sufficiently resolved by the use of an appropriately fitted cane or walker.  The patient is unable to ambulate with a walker.  Use of a manual wheelchair will significantly improve the beneficiary's ability to participate in MRADLs and the beneficiary will use it on a regular bases in the home.  The beneficiary is able and willing to use the wheelchair in the home.  The beneficiary has sufficient upper extremity function and other physical and mental capabilities needed to safely self-propel the manual wheelchair that is provided in the home during a typical day.

## 2025-02-27 NOTE — CHART NOTE - NSCHARTNOTEFT_GEN_A_CORE
Patient will require a shower chair due to osteoarthritis and chronic knee pain s/p knee replacement and joint infection.  The beneficiary has a mobility limitation that significantly impairs her ability to participate in bathing in the standing position for a prolonged period of time.  Use of a shower chair will significantly improve the beneficiary's ability to participate in MRADLs and the beneficiary will use it on a regular bases in the home.  The beneficiary is able and willing to use a shower chair in the home.

## 2025-02-27 NOTE — PROGRESS NOTE ADULT - PROBLEM SELECTOR PLAN 4
DVT ppx: SQH  Diet: Renal

## 2025-02-27 NOTE — PROGRESS NOTE ADULT - PROBLEM SELECTOR PLAN 1
-Hx of b/l TKR done at [facility]  -Patient with right lower leg pain s/p recent fall, otherwise previously ambulatory without issues  -XR showing "Stable focal lucency involving anterior distal femoral cortex. Narrow femoral component cement bone interface lucency with thin sclerotic margination, new from prior which could correlate with degree of loosening." Discussed XR findings with ortho- no acute surgical intervention at this time  -CRP elevated to 81, f/u ESR. Low c/f septic joint/PJI at this time  -Continue morphine PRN for severe pain  -Fall precautions  -PT eval  as per ortho , no orthopedic intervention    pt also c/o back pain intermittently, no bowel / bladder incontinence -< from: Xray Lumbar Spine AP + Lateral (02.25.25 @ 18:43) >    No acute displaced fracture.    < end of copied text >  poss dc
-Hx of b/l TKR done at [facility]  -Patient with right lower leg pain s/p recent fall, otherwise previously ambulatory without issues  -XR showing "Stable focal lucency involving anterior distal femoral cortex. Narrow femoral component cement bone interface lucency with thin sclerotic margination, new from prior which could correlate with degree of loosening." Discussed XR findings with ortho- no acute surgical intervention at this time  -CRP elevated to 81, f/u ESR. Low c/f septic joint/PJI at this time  -Continue morphine PRN for severe pain  -Fall precautions  -PT eval  as per ortho , no orthopedic intervention
-Hx of b/l TKR done at [facility]  -Patient with right lower leg pain s/p recent fall, otherwise previously ambulatory without issues  -XR showing "Stable focal lucency involving anterior distal femoral cortex. Narrow femoral component cement bone interface lucency with thin sclerotic margination, new from prior which could correlate with degree of loosening." Discussed XR findings with ortho- no acute surgical intervention at this time  -CRP elevated to 81, f/u ESR. Low c/f septic joint/PJI at this time  -Continue morphine PRN for severe pain  -Fall precautions  -PT eval  as per ortho , no orthopedic intervention    pt also c/o back pain intermittently, no bowel / bladder incontinence -< from: Xray Lumbar Spine AP + Lateral (02.25.25 @ 18:43) >    No acute displaced fracture.    < end of copied text >
-Hx of b/l TKR done at [facility]  -Patient with right lower leg pain s/p recent fall, otherwise previously ambulatory without issues  -XR showing "Stable focal lucency involving anterior distal femoral cortex. Narrow femoral component cement bone interface lucency with thin sclerotic margination, new from prior which could correlate with degree of loosening." Discussed XR findings with ortho- no acute surgical intervention at this time  -CRP elevated to 81, f/u ESR. Low c/f septic joint/PJI at this time  -Continue morphine PRN for severe pain  -Fall precautions  -PT eval
-Hx of b/l TKR done at [facility]  -Patient with right lower leg pain s/p recent fall, otherwise previously ambulatory without issues  -XR showing "Stable focal lucency involving anterior distal femoral cortex. Narrow femoral component cement bone interface lucency with thin sclerotic margination, new from prior which could correlate with degree of loosening." Discussed XR findings with ortho- no acute surgical intervention at this time  -CRP elevated to 81, f/u ESR. Low c/f septic joint/PJI at this time  -Continue morphine PRN for severe pain  -Fall precautions  -PT eval  as per ortho , no orthopedic intervention    pt also c/o back pain intermittently, no bowel / bladder incontinence - will obtain spine eval
-Hx of b/l TKR done at [facility]  -Patient with right lower leg pain s/p recent fall, otherwise previously ambulatory without issues  -XR showing "Stable focal lucency involving anterior distal femoral cortex. Narrow femoral component cement bone interface lucency with thin sclerotic margination, new from prior which could correlate with degree of loosening." Discussed XR findings with ortho- no acute surgical intervention at this time  -CRP elevated to 81, f/u ESR. Low c/f septic joint/PJI at this time  -Continue morphine PRN for severe pain  -Fall precautions  -PT eval

## 2025-02-28 ENCOUNTER — TRANSCRIPTION ENCOUNTER (OUTPATIENT)
Age: 75
End: 2025-02-28

## 2025-02-28 VITALS
HEART RATE: 73 BPM | WEIGHT: 270.29 LBS | OXYGEN SATURATION: 100 % | TEMPERATURE: 98 F | DIASTOLIC BLOOD PRESSURE: 71 MMHG | RESPIRATION RATE: 17 BRPM | SYSTOLIC BLOOD PRESSURE: 144 MMHG

## 2025-02-28 PROCEDURE — 93010 ELECTROCARDIOGRAM REPORT: CPT

## 2025-02-28 RX ORDER — ACETAMINOPHEN 500 MG/5ML
2 LIQUID (ML) ORAL
Qty: 0 | Refills: 0 | DISCHARGE
Start: 2025-02-28

## 2025-02-28 RX ORDER — OXYCODONE HYDROCHLORIDE 30 MG/1
1 TABLET ORAL
Qty: 6 | Refills: 0
Start: 2025-02-28 | End: 2025-03-02

## 2025-02-28 RX ORDER — LOSARTAN POTASSIUM 100 MG/1
1 TABLET, FILM COATED ORAL
Qty: 30 | Refills: 0
Start: 2025-02-28 | End: 2025-03-29

## 2025-02-28 RX ADMIN — HEPARIN SODIUM 7500 UNIT(S): 1000 INJECTION INTRAVENOUS; SUBCUTANEOUS at 09:15

## 2025-02-28 RX ADMIN — Medication 1 APPLICATION(S): at 15:23

## 2025-02-28 RX ADMIN — SEVELAMER HYDROCHLORIDE 1600 MILLIGRAM(S): 800 TABLET ORAL at 15:20

## 2025-02-28 RX ADMIN — METOPROLOL SUCCINATE 100 MILLIGRAM(S): 50 TABLET, EXTENDED RELEASE ORAL at 10:14

## 2025-02-28 RX ADMIN — OXYCODONE HYDROCHLORIDE 5 MILLIGRAM(S): 30 TABLET ORAL at 10:14

## 2025-02-28 RX ADMIN — EPOETIN ALFA 8000 UNIT(S): 10000 SOLUTION INTRAVENOUS; SUBCUTANEOUS at 11:41

## 2025-02-28 RX ADMIN — SEVELAMER HYDROCHLORIDE 1600 MILLIGRAM(S): 800 TABLET ORAL at 09:15

## 2025-02-28 RX ADMIN — Medication 1 DOSE(S): at 09:14

## 2025-02-28 RX ADMIN — HEPARIN SODIUM 500 UNIT(S): 1000 INJECTION INTRAVENOUS; SUBCUTANEOUS at 12:04

## 2025-02-28 RX ADMIN — OXYCODONE HYDROCHLORIDE 5 MILLIGRAM(S): 30 TABLET ORAL at 11:08

## 2025-02-28 RX ADMIN — HEPARIN SODIUM 1000 UNIT(S): 1000 INJECTION INTRAVENOUS; SUBCUTANEOUS at 11:06

## 2025-02-28 RX ADMIN — CALCITRIOL 0.25 MICROGRAM(S): 0.5 CAPSULE, GELATIN COATED ORAL at 09:16

## 2025-02-28 RX ADMIN — FOLIC ACID 1 MILLIGRAM(S): 1 TABLET ORAL at 15:21

## 2025-02-28 NOTE — DIETITIAN INITIAL EVALUATION ADULT - NS FNS DIET ORDER
Diet, Regular:   Caffeine Free (NOCAFF)  For patients receiving Renal Replacement - No Protein Restr, No Conc K, No Conc Phos, Low Sodium (RENAL) (02-23-25 @ 17:22) [Active]

## 2025-02-28 NOTE — DISCHARGE NOTE PROVIDER - CARE PROVIDER_API CALL
Your Primary Care Doctor,   Phone: (   )    -  Fax: (   )    -  Follow Up Time:     Your Nephrologist,   Phone: (   )    -  Fax: (   )    -  Follow Up Time:     Your Cardiologist,   Phone: (   )    -  Fax: (   )    -  Follow Up Time:     Nawaf Joe  Cardiovascular Disease  [address], [address]  [place] [address]  Phone: [phone]  Fax: [phone]  Follow Up Time:

## 2025-02-28 NOTE — DIETITIAN INITIAL EVALUATION ADULT - ORAL INTAKE PTA/DIET HISTORY
Patient reports her appetite has been good at baseline. Endorses follows low salt diet at home. Pt denies any recent weight loss.

## 2025-02-28 NOTE — DISCHARGE NOTE PROVIDER - HOSPITAL COURSE
Patient seen and evaluated. Reviewed discharge medications with patient and attending. All new medications requiring new prescriptions were sent to the pharmacy of patient's choice. Reviewed need for prescription for previous home medications and new prescriptions sent if requested. Medically cleared/stable for discharge as per Dr. Babb on 2/28/2025 with appropriate follow up. Patient understands and agrees with plan of care.     Patient seen and evaluated. Reviewed discharge medications with patient and attending. All new medications requiring new prescriptions were sent to the pharmacy of patient's choice. Reviewed need for prescription for previous home medications, none requested. Medically cleared/stable for discharge as per Dr. Babb on 2/28/2025 with appropriate follow up. Patient understands and agrees with plan of care. 74 F with PMHx of ESRD on HD MWF, OA s/p b/l TKR c/b PJI and provoked PE (2013, no longer on xarelto), LARRY not on CPAP, migraines, HTN, who presented with RLE pain x 2 days after recent fall, admitted with ambulatory dysfunction.     Right leg pain  - Patient with right lower leg pain s/p recent fall, otherwise previously ambulatory without issues  - CRP elevated to 81, f/u ESR. Low c/f septic joint/PJI at this time  - Ortho consulted 2/24: imaging results do not show any acute changes, stable findings and intact hardware. FU outpatient with original surgeon of total knee arthroplasty  - PT eval: Outpatient PT    Palpitations  - Reports episode of palpitations, now resolved  - Cardiology following, ?Hx of Afib as pt states she was told a long time ago she had AFib but is no longer on A/C at home  - EKG shows ____  - Follow up with Cardiology outpatient for possible Holter monitoring    Patient seen and evaluated. Reviewed discharge medications with patient and attending. All new medications requiring new prescriptions were sent to the pharmacy of patient's choice. Reviewed need for prescription for previous home medications, none requested. Medically cleared/stable for discharge as per Dr. Babb on 2/28/2025 with appropriate follow up. Patient understands and agrees with plan of care.

## 2025-02-28 NOTE — PROGRESS NOTE ADULT - ASSESSMENT
74y Female with history of ESRD on HD presents with RLE pain. Nephrology consulted for ESRD status.    1) ESRD: Last HD on 2/24 with intradialytic hypotension and 1.1L removed. Plan for next maintenance HD this morning. Outpatient vascular surgeon unable to perform AVF creation while patient admitted. Will defer procedure as an outpatient. Monitor electrolytes.    2) HTN with ESRD: BP low normal. Continue with current medications. Monitor BP.    3) Anemia of renal disease: Hb low. Start Epo 8K with HD. Monitor Hb.    4) Secondary HPT of renal origin: Phosphorus acceptable. Continue with calcitriol 0.25 mcg MWF and renvela 2 tabs with meals.      [facility] NEPHROLOGY  Derrick Castaneda M.D.  Raúl Dinaa D.O.  Grace Delgadillo M.D.  MD Cher Beckman, MSN, ANP-C    Telephone: [phone]  Facsimile: [phone]    [facility] [place] [address], [address]  [place] [address]  
74y Female with history of ESRD on HD presents with RLE pain. Nephrology consulted for ESRD status.    1) ESRD: Last HD on 2/26 tolerated well with 2L removed. Plan for next maintenance HD today prior to discharge. Outpatient vascular surgeon unable to perform AVF creation while patient admitted. Will defer procedure as an outpatient. Monitor electrolytes.    2) HTN with ESRD: BP controlled. Continue with current medications. Monitor BP.    3) Anemia of renal disease: Hb low. Continue with Epo 8K with HD. Monitor Hb.    4) Secondary HPT of renal origin: Phosphorus acceptable. Continue with calcitriol 0.25 mcg MWF and renvela 2 tabs with meals.      [facility] NEPHROLOGY  Derrick Castaneda M.D.  Raúl Diana D.O.  Grace Delgadillo M.D.  MD Cher Beckman, MSN, ANP-C    Telephone: [phone]  Facsimile: [phone] [place] [address], [address]  [place] [address]  
A/P    73 y/o  F with pmh of ESRD on HD (MWF), PE (in 2013 after TKR s/p Xarelto), LARRY (not on CPAP), migraines, HTN p/w L eye vision loss and headache.     #Vision loss, left eye.   -ophthamology and neurology w/u in progress  -med fu     #migraine.   -tx per neuro    #ESRD on hemodialysis.   -hd per renal     #Essential hypertension.   -cont metoprolol.    #chronic dyspnea  -prior  cath with no severe obstructive cad, normal flling pressures, no sig PHTN  -check TTE, if LVEF normal ----plan for pharm stress  -cont vol removal with hd   --now +/ flu A- ID following    dvt ppx    35 minutes spent on total encounter; more than 50% of the visit was spent counseling and/or coordinating care by the attending physician.  
73 y/o F with PMHx of ESRD on HD MWF, OA s/p b/l TKR c/b PJI and provoked PE in 2013 (no longer on xarelto), LARRY not on CPAP, migraines, HTN, who presented with RLE pain x 2 days after a recent fall c/b ambulatory dysfunction.
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  Nephrology consulted for ESRD status.     1) ESRD: Most recent HD on 2/21. Next HD 2/24. Continue maintenance HD on M/W/F.   New N/V today.  ? viral gastroenteritis?  Will decrease UF with HD tomorrow.    2) HTN with ESRD: Continue current anti-hypertensive regimen.    3) Anemia of renal disease: Hb acceptable. No need for CAROLYN if Hb is > 11.5 g/dL. Check outpatient Iron results to determine if patient is receiving Iron supplementation.    4) Secondary HPT of renal origin: Check serum calcium, albumin, and phosphorus daily. Was taking Calcitriol 0.25 mcg MWF, check PTH to determine if this is still needed. Continue Phoslo 1 tab TID with meals. Goal Phosphorus is 3.5 - 5.5 mg/dL.      [facility] NEPHROLOGY  Derrick Castaneda M.D.  Raúl Diana D.O.  Grace Delgadillo M.D.  MD Cher Beckman, MSN, ANP-C    Telephone: [phone]  Facsimile: [phone] [place] [address], [address]  [place] [id number]  
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  Nephrology consulted for ESRD status.     1) ESRD: Most recent HD on 2/21. Next HD 2/24. Contiue maintenance HD on M/W/F.     2) HTN with ESRD: Continue current anti-hypertensive regimen.    3) Anemia of renal disease: Hb acceptable. No need for CAROLYN if Hb is > 11.5 g/dL. Check outpatient Iron results to determine if patient is receiving Iron supplementation.    4) Secondary HPT of renal origin: Check serum calcium, albumin, and phosphorus daily. Was taking Calcitriol 0.25 mcg MWF, check PTH to determine if this is still needed. Continue Phoslo 1 tab TID with meals. Goal Phosphorus is 3.5 - 5.5 mg/dL.      [facility] NEPHROLOGY  Derrick Castaneda M.D.  Raúl Diana D.O.  Grace Delgadillo M.D.  MD Cher Beckman, MSN, ANP-C    Telephone: [phone]  Facsimile: [phone] [place] [address], [address]  [place] [address]  
74y Female with history of ESRD on HD presents with RLE pain. Nephrology consulted for ESRD status.    1) ESRD: Last HD on 2/24 with intradialytic hypotension and 1.1L removed. Plan for next maintenance HD on 2/26. Will discuss with vascular surgery regarding creation of AVF while patient admitted. Monitor electrolytes.    2) HTN with ESRD: BP low normal for which losartan decreased to 25 mg QHS. Monitor BP.    3) Anemia of renal disease: Hb acceptable. Epogen if Hb decreases further.    4) Secondary HPT of renal origin: Phosphorus acceptable. Continue with calcitriol 0.25 mcg MWF and renvela 2 tabs with meals.      [facility] NEPHROLOGY  Derrick Castaneda M.D.  Raúl Diana D.O.  Grace Delgadillo M.D.  MD Cher Beckman, MSN, ANP-C    Telephone: [phone]  Facsimile: [phone] [place] [address], [address]  [place] [address]  
74y Female with history of ESRD on HD presents with RLE pain. Nephrology consulted for ESRD status.    1) ESRD: Last HD on 2/26 tolerated well with 2L removed. Plan for next maintenance HD on 2/28 (can be done as an outpatient if patient discharged today). Outpatient vascular surgeon unable to perform AVF creation while patient admitted. Will defer procedure as an outpatient. Monitor electrolytes.    2) HTN with ESRD: BP low normal. Continue with current medications. Monitor BP.    3) Anemia of renal disease: Hb low. Continue with Epo 8K with HD. Monitor Hb.    4) Secondary HPT of renal origin: Phosphorus acceptable. Continue with calcitriol 0.25 mcg MWF and renvela 2 tabs with meals.      [facility] NEPHROLOGY  Derrick Castaneda M.D.  Raúl Diana D.O.  Grace Delgadillo M.D.  MD Cher Beckman, MSN, ANP-C    Telephone: [phone]  Facsimile: [phone] [place] [address], [address]  [place] [address]  
A/P  73 y/o F with PMHx of ESRD on HD MWF, OA s/p b/l TKR c/b PJI and provoked PE in 2013 (no longer on xarelto), LARRY not on CPAP, migraines, HTN, who presented with RLE pain x 2 days after a recent fall c/b ambulatory dysfunction.    #Right leg pain.   -Hx of b/l TKR done at [facility]  -w/u per med    #ESRD  -hd per renal     #Hypertension.   -Continue home losartan 50mg qd and metop XL 100mg BID.    #chronic dyspnea  -stable  -previous cath 3/2024 no severe cad  -echo from 12/2024 nl LV sys fx  No significant valvular disease.  -cta chest 12/2024 Within study limitations, no pulmonary embolism.  -cont vol removal with hd     dvt ppx   
A/P  73 y/o F with PMHx of ESRD on HD MWF, OA s/p b/l TKR c/b PJI and provoked PE in 2013 (no longer on xarelto), LARRY not on CPAP, migraines, HTN, who presented with RLE pain x 2 days after a recent fall c/b ambulatory dysfunction.    #Right leg pain.   -Hx of b/l TKR done at [facility]  -w/u per med/ortho     #Palpitations  #?Hx of Afib  -pt reports palpitations last night after HD  -pt states she was told a long time ago she had AFib, but is no longer on A/C at home  -check EKG  -place pt on telemetry monitor   -cont BB     #ESRD  -hd per renal     #Hypertension.   -Continue meds     #chronic dyspnea  -stable  -previous cath 3/2024 no severe cad  -echo from 12/2024 nl LV sys fx  No significant valvular disease.  -cta chest 12/2024 Within study limitations, no pulmonary embolism.  -cont vol removal with hd     dvt ppx       
A/P  75 y/o F with PMHx of ESRD on HD MWF, OA s/p b/l TKR c/b PJI and provoked PE in 2013 (no longer on xarelto), LARRY not on CPAP, migraines, HTN, who presented with RLE pain x 2 days after a recent fall c/b ambulatory dysfunction.    #Right leg pain.   -Hx of b/l TKR done at [facility]  -w/u per med/ortho     #ESRD  -hd per renal     #Hypertension.   -Continue meds     #chronic dyspnea  -stable  -previous cath 3/2024 no severe cad  -echo from 12/2024 nl LV sys fx  No significant valvular disease.  -cta chest 12/2024 Within study limitations, no pulmonary embolism.  -cont vol removal with hd     dvt ppx   
74y Female with history of ESRD on HD presents with RLE pain. Nephrology consulted for ESRD status.    1) ESRD: Plan for next maintenance HD today. Will discuss with vascular surgery regarding creation of AVF while patient admitted. Monitor electrolytes.    2) HTN with ESRD: BP low normal. Decrease losartan to 25 mg and change to QHS.    3) Anemia of renal disease: Hb acceptable. Will start Epogen if Hb < 10.5.    4) Secondary HPT of renal origin: Continue with calcitriol 0.25 mcg MWF but change phoslo to renvela 2 tabs with meals given hyperphosphatemia.      [facility] NEPHROLOGY  Derrick Castaneda M.D.  Raúl Diana D.O.  Grace Delgadillo M.D.  MD Cher Beckman, MSN, ANP-C    Telephone: [phone]  Facsimile: [phone]    [address] [place] [address], [address]  [place] [address]  
A/P    75 y/o  F with pmh of ESRD on HD (MWF), PE (in 2013 after TKR s/p Xarelto), LARRY (not on CPAP), migraines, HTN p/w L eye vision loss and headache.     #Vision loss, left eye.   -ophthamology and neurology w/u in progress  -med fu     #migraine.   -tx per neuro    #ESRD on hemodialysis.   -hd per renal     #Essential hypertension.   -cont metoprolol.    #chronic dyspnea  -prior  cath with no severe obstructive cad, normal flling pressures, no sig PHTN  -check TTE, if LVEF normal ----plan for pharm stress  -cont vol removal with hd   --now +/ flu A- ID following    dvt ppx    35 minutes spent on total encounter; more than 50% of the visit was spent counseling and/or coordinating care by the attending physician.  
A/P  73 y/o F with PMHx of ESRD on HD MWF, OA s/p b/l TKR c/b PJI and provoked PE in 2013 (no longer on xarelto), LARRY not on CPAP, migraines, HTN, who presented with RLE pain x 2 days after a recent fall c/b ambulatory dysfunction.    #Right leg pain.   -Hx of b/l TKR done at [facility]  -w/u per med/ortho     #ESRD  -hd per renal     #Hypertension.   -Continue meds     #chronic dyspnea  -stable  -previous cath 3/2024 no severe cad  -echo from 12/2024 nl LV sys fx  No significant valvular disease.  -cta chest 12/2024 Within study limitations, no pulmonary embolism.  -cont vol removal with hd     dvt ppx       
A/P  73 y/o F with PMHx of ESRD on HD MWF, OA s/p b/l TKR c/b PJI and provoked PE in 2013 (no longer on xarelto), LARRY not on CPAP, migraines, HTN, who presented with RLE pain x 2 days after a recent fall c/b ambulatory dysfunction.    #Right leg pain.   -Hx of b/l TKR done at [facility]  -w/u per med/ortho     #ESRD  -hd per renal     #Hypertension.   -Continue meds     #chronic dyspnea  -stable  -previous cath 3/2024 no severe cad  -echo from 12/2024 nl LV sys fx  No significant valvular disease.  -cta chest 12/2024 Within study limitations, no pulmonary embolism.  -cont vol removal with hd     dvt ppx       35 minutes spent on total encounter; more than 50% of the visit was spent counseling and/or coordinating care by the attending physician.  
73 y/o F with PMHx of ESRD on HD MWF, OA s/p b/l TKR c/b PJI and provoked PE in 2013 (no longer on xarelto), LARRY not on CPAP, migraines, HTN, who presented with RLE pain x 2 days after a recent fall c/b ambulatory dysfunction.
73 y/o F with PMHx of ESRD on HD MWF, OA s/p b/l TKR c/b PJI and provoked PE in 2013 (no longer on xarelto), LARRY not on CPAP, migraines, HTN, who presented with RLE pain x 2 days after a recent fall c/b ambulatory dysfunction.
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

## 2025-02-28 NOTE — DISCHARGE NOTE NURSING/CASE MANAGEMENT/SOCIAL WORK - NSDCPEFALRISK_GEN_ALL_CORE
For information on Fall & Injury Prevention, visit: [email or web address] OR  [email or web address] OR  https://www.cdc.gov/steadi/patient.html

## 2025-02-28 NOTE — DISCHARGE NOTE NURSING/CASE MANAGEMENT/SOCIAL WORK - NSTRANSFERBELONGINGSRESP_GEN_A_NUR
Reason for Call:  Form, our goal is to have forms completed with 72 hours, however, some forms may require a visit or additional information.    Type of letter, form or note:  Home Health Certification    Who is the form from?: Home care, [facility]    Where did the form come from: form was faxed in    What clinic location was the form placed at?: [facility]     Where the form was placed: Given to physician    What number is listed as a contact on the form?:     F# [phone]   P # [phone]   Additional comments:     Call taken on 3/28/2024 at 7:50 AM by Arnol Schwab    yes

## 2025-02-28 NOTE — DISCHARGE NOTE PROVIDER - CARE PROVIDERS DIRECT ADDRESSES
,DirectAddress_Unknown,DirectAddress_Unknown,DirectAddress_Unknown,[email or web address]

## 2025-02-28 NOTE — DISCHARGE NOTE PROVIDER - NSDCMRMEDTOKEN_GEN_ALL_CORE_FT
albuterol 90 mcg/inh inhalation aerosol: 2 puff(s) inhaled every 6 hours as needed for , Shortness of Breath and/or Wheezing  calcitriol 0.25 mcg oral capsule: 1 cap(s) orally 3 times a week on Monday, Wednesday, Friday  calcium acetate 667 mg oral tablet: 1 tab(s) orally 3 times a day  fluticasone-salmeterol 250 mcg-50 mcg inhalation powder: 1 dose(s) inhaled 2 times a day  folic acid 1 mg oral tablet: 1 tab(s) orally once a day  losartan 50 mg oral tablet: 1 tab(s) orally once a day (at bedtime)  metoprolol succinate 100 mg oral capsule, extended release: 1 cap(s) orally 2 times a day  Physical Therapy: Please evaluate and treat (ICD10: M25.561)   acetaminophen 325 mg oral tablet: 2 tab(s) orally every 6 hours as needed for mild-moderate pain  albuterol 90 mcg/inh inhalation aerosol: 2 puff(s) inhaled every 6 hours as needed for , Shortness of Breath and/or Wheezing  calcitriol 0.25 mcg oral capsule: 1 cap(s) orally 3 times a week on Monday, Wednesday, Friday  calcium acetate 667 mg oral tablet: 1 tab(s) orally 3 times a day  fluticasone-salmeterol 250 mcg-50 mcg inhalation powder: 1 dose(s) inhaled 2 times a day  folic acid 1 mg oral tablet: 1 tab(s) orally once a day  losartan 25 mg oral tablet: 1 tab(s) orally once a day (at bedtime)  metoprolol succinate 100 mg oral capsule, extended release: 1 cap(s) orally 2 times a day  oxyCODONE 5 mg oral tablet: 1 tab(s) orally every 12 hours as needed for  severe pain MDD: 2 TAB

## 2025-02-28 NOTE — DISCHARGE NOTE PROVIDER - NSDCFUADDAPPT_GEN_ALL_CORE_FT
Follow up with your primary care doctor within one week of discharge. If you do not have one, you can call the medicine clinic at [phone] to make an appointment.

## 2025-02-28 NOTE — DIETITIAN INITIAL EVALUATION ADULT - OTHER CALCULATIONS
Of note, pt's adm Ht 5'5, as per previous RD note dated 12/13/24 pt's Ht 5'4. Noted pt with HIE hx of Ht consistently 5'5. IBW calculated based on Ht 5'5.   IBW+10% (137.5lbs) was used to calculate nutritional needs. Fluid needs defer to MD.

## 2025-02-28 NOTE — CHART NOTE - NSCHARTNOTEFT_GEN_A_CORE
Medically cleared for discharge per Dr. Babb.    Reviewed Cardiology note from today, recommending EKG and Tele monitoring for episode of palpitations yesterday.      Discussed w/ vick Harvey for dc with outpatient follow up if EKG shows sinus rhythm.    EKG today performed and reviewed, NSR w/ HR 98 post-HD.  Patient will follow up with Dr. Joe as an outpatient and is in agreement with the plan.      Sirena Melissa NP-BC  Department of Medicine  In House Pager #[phone]

## 2025-02-28 NOTE — DISCHARGE NOTE PROVIDER - PROVIDER TOKENS
FREE:[LAST:[Your Primary Care Doctor],PHONE:[(   )    -],FAX:[(   )    -]],FREE:[LAST:[Your Nephrologist],PHONE:[(   )    -],FAX:[(   )    -]],FREE:[LAST:[Your Cardiologist],PHONE:[(   )    -],FAX:[(   )    -]],PROVIDER:[TOKEN:[[id number]]]

## 2025-02-28 NOTE — PROGRESS NOTE ADULT - REASON FOR ADMISSION
RLE pain, inability to walk

## 2025-02-28 NOTE — DIETITIAN INITIAL EVALUATION ADULT - PROBLEM SELECTOR PLAN 1
-Hx of b/l TKR done at [facility]  -Patient with right lower leg pain s/p recent fall, otherwise previously ambulatory without issues  -XR showing "Stable focal lucency involving anterior distal femoral cortex. Narrow femoral component cement bone interface lucency with thin sclerotic margination, new from prior which could correlate with degree of loosening." Discussed XR findings with ortho- no acute surgical intervention at this time  -CRP elevated to 81, f/u ESR. Low c/f septic joint/PJI at this time  -Continue morphine PRN for severe pain  -Fall precautions  -PT eval

## 2025-02-28 NOTE — DIETITIAN INITIAL EVALUATION ADULT - PERTINENT MEDS FT
MEDICATIONS  (STANDING):  calcitriol   Capsule 0.25 MICROGram(s) Oral <User Schedule>  chlorhexidine 2% Cloths 1 Application(s) Topical daily  chlorhexidine 2% Cloths 1 Application(s) Topical daily  epoetin kiley-epbx (RETACRIT) Injectable 8000 Unit(s) IV Push <User Schedule>  fluticasone propionate/ salmeterol 250-50 MICROgram(s) Diskus 1 Dose(s) Inhalation two times a day  folic acid 1 milliGRAM(s) Oral daily  heparin   Injectable 7500 Unit(s) SubCutaneous every 12 hours  heparin   Injectable. 500 Unit(s) Dialysis. every 1 hour  influenza  Vaccine (HIGH DOSE) 0.5 milliLiter(s) IntraMuscular once  losartan 25 milliGRAM(s) Oral at bedtime  metoprolol succinate  milliGRAM(s) Oral two times a day  sevelamer carbonate 1600 milliGRAM(s) Oral three times a day with meals    MEDICATIONS  (PRN):  acetaminophen     Tablet .. 650 milliGRAM(s) Oral every 6 hours PRN Temp greater or equal to 38C (100.4F), Mild Pain (1 - 3)  albuterol    90 MICROgram(s) HFA Inhaler 2 Puff(s) Inhalation every 6 hours PRN , Shortness of Breath and/or Wheezing  melatonin 3 milliGRAM(s) Oral at bedtime PRN Insomnia  ondansetron Injectable 4 milliGRAM(s) IV Push every 8 hours PRN Nausea and/or Vomiting  oxyCODONE    IR 5 milliGRAM(s) Oral every 12 hours PRN Severe Pain (7 - 10)  senna 2 Tablet(s) Oral at bedtime PRN Constipation  sodium chloride 0.9% Bolus. 100 milliLiter(s) IV Bolus every 5 minutes PRN SBP LESS THAN or EQUAL to 90 mmHg

## 2025-02-28 NOTE — DIETITIAN INITIAL EVALUATION ADULT - NS FNS WEIGHT CHANGE REASON
As per HIE hx pt's previous weight 259.5 (12/11/24). Pt adm weight 282.10 (2/2125). Pt noted with 16lbs/6.16% x 2 months, likely weight gain due to fluid accumulation given pt is on HD./unintentional

## 2025-02-28 NOTE — DISCHARGE NOTE NURSING/CASE MANAGEMENT/SOCIAL WORK - NSDCFUADDAPPT_GEN_ALL_CORE_FT
Follow up with your primary care doctor within one week of discharge. If you do not have one, you can call the medicine clinic at [phone] to make an appointment.

## 2025-02-28 NOTE — DISCHARGE NOTE PROVIDER - NSDCCPCAREPLAN_GEN_ALL_CORE_FT
PRINCIPAL DISCHARGE DIAGNOSIS  Diagnosis: Right leg pain  Assessment and Plan of Treatment: You were admitted to the hospital with leg pain after a fall.  You were seen by Orthopedics who reviewed all imaging performed, imaging results do not show any acute changes, stable findings and intact hardware. Follow up outpatient with original knee surgeon for further monitoring and management.      SECONDARY DISCHARGE DIAGNOSES  Diagnosis: ESRD (end stage renal disease)  Assessment and Plan of Treatment: Follow up with dialysis center as scheduled.    Diagnosis: Palpitations  Assessment and Plan of Treatment: While in the hospital, you had an episode of palpitations which has since resolved.  You were seen by the Cardiologist who recommends outpatient follow up for possible Holter monitor to evaluate for atrial fibrillation and other abnormal heart rhythms.    Diagnosis: Hypertension  Assessment and Plan of Treatment: You have a history of high blood pressure.  Please continue medications as currently prescribed.  Please continue low salt, low cholesterol (DASH) diet. Follow up with your primary care doctor for further management.

## 2025-02-28 NOTE — DISCHARGE NOTE NURSING/CASE MANAGEMENT/SOCIAL WORK - FINANCIAL ASSISTANCE
[facility] provides services at a reduced cost to those who are determined to be eligible through [facility]’s financial assistance program. Information regarding [facility]’s financial assistance program can be found by going to [email or web address] or by calling [phone].

## 2025-02-28 NOTE — PROGRESS NOTE ADULT - PROVIDER SPECIALTY LIST ADULT
Cardiology
Nephrology
Nephrology
Cardiology
Cardiology
Nephrology
Nephrology
Cardiology
Nephrology
Internal Medicine

## 2025-02-28 NOTE — DISCHARGE NOTE NURSING/CASE MANAGEMENT/SOCIAL WORK - PATIENT PORTAL LINK FT
You can access the FollowMyHealth Patient Portal offered by [facility] by registering at the following website: [email or web address]. By joining [email or web address]’s FollowMyHealth portal, you will also be able to view your health information using other applications (apps) compatible with our system.

## 2025-02-28 NOTE — DIETITIAN INITIAL EVALUATION ADULT - PERTINENT LABORATORY DATA
02-27    137  |  99  |  35[H]  ----------------------------<  84  5.2   |  25  |  5.28[H]    Ca    8.8      27 Feb 2025 06:15  Phos  3.6     02-27  Mg     2.00     02-27    TPro  6.8  /  Alb  3.6  /  TBili  0.3  /  DBili  x   /  AST  18  /  ALT  14  /  AlkPhos  154[H]  02-27

## 2025-02-28 NOTE — DIETITIAN INITIAL EVALUATION ADULT - OTHER INFO
Per chart review, 73 y/o F with PMHx of ESRD on HD MWF, OA s/p b/l TKR c/b PJI and provoked PE in 2013 (no longer on xarelto), LARRY not on CPAP, migraines, HTN, who presented with RLE pain x 2 days after a recent fall c/b ambulatory dysfunction.    Patient seen at bedside. Reports her appetite remains good in hospital, able to consume all her meals. At the time of the interview, transporter came to take her to dialysis. Limited subjective information obtained from pt. Comprehensive chart review completed, pt currently on Renal / No Caffine diet, on regular diet consistency. No chewing or swallowing difficulties noted. Pt's labs notable with resolved hyperphosphatemia, Phos binder in use. Pt's adm weight 282.1lbs (2/21), and as per RN flow sheet pt's post HD weights 275.7 (2/24) 282.6 (2/21). Pt unable to recall her UBW. As per HIE weight hx pt's previous weight 259.5 (12/22/24), 269 (5/31/24), 271.3 (3/23/24) . Weigh trend reflects weight fluctuations likely due to fluid shift 2/2 pt on HD. Unable to provide nutrition education given pt is going for HD. Pt made aware RD remains available PRN. RD to remain available for further nutritional interventions as indicated.

## 2025-02-28 NOTE — DISCHARGE NOTE NURSING/CASE MANAGEMENT/SOCIAL WORK - NSDCVIVACCINE_GEN_ALL_CORE_FT
COVID-19, mRNA, LNP-S, PF, 100 mcg/ 0.5 mL dose (Moderna); 30-Apr-2022 16:48; Sailaja Godinez (RN); [employer] [id number] (Exp. Date: 28-May-2022); IntraMuscular; Deltoid Left.; 0.25 milliLiter(s);   influenza, injectable, quadrivalent, preservative free; 01-Feb-2018 15:10; Mey Hastings (RN); [employer]; [id number]; IntraMuscular; Deltoid Left.; 0.5 milliLiter(s); VIS (VIS Published: 07-Aug-2015, VIS Presented: 01-Feb-2018);

## 2025-02-28 NOTE — PROGRESS NOTE ADULT - SUBJECTIVE AND OBJECTIVE BOX
CARDIOLOGY FOLLOW UP - Dr. Joe  Date of Service: 02-23-25 @ 12:04    CC: no events    Review of Systems:  Constitutional: No fever, weight loss, or fatigue  Respiratory: No cough, wheezing, or hemoptysis, no shortness of breath  Cardiovascular: No chest pain, palpitations, passing out, dizziness, or leg swelling  Gastrointestinal: No abd or epigastric pain. No nausea, vomiting, or hematemesis; no diarrhea or consiptaiton, no melena or hematochezia  Vascular: No edema     TELEMETRY:    PHYSICAL EXAM:  T(C): 36.7 (02-23-25 @ 05:02), Max: 36.9 (02-22-25 @ 17:28)  HR: 79 (02-23-25 @ 10:10) (79 - 98)  BP: 117/53 (02-23-25 @ 10:10) (108/50 - 132/60)  RR: 19 (02-23-25 @ 09:29) (18 - 19)  SpO2: 97% (02-23-25 @ 09:29) (96% - 98%)  Wt(kg): --  I&O's Summary      Appearance: Normal	  Cardiovascular: Normal S1 S2,RRR, No JVD, No murmurs  Respiratory: Lungs clear to auscultation	  Gastrointestinal:  Soft, Non-tender, + BS	  Extremities: Normal range of motion, No clubbing, cyanosis or edema  Vascular: Peripheral pulses palpable 2+ bilaterally       Home Medications:  folic acid 1 mg oral tablet: 1 tab(s) orally once a day (06 Dec 2024 07:55)  metoprolol succinate 100 mg oral capsule, extended release: 1 cap(s) orally 2 times a day (21 Feb 2025 14:05)        MEDICATIONS  (STANDING):  calcitriol   Capsule 0.25 MICROGram(s) Oral <User Schedule>  calcium acetate 667 milliGRAM(s) Oral three times a day with meals  chlorhexidine 2% Cloths 1 Application(s) Topical daily  fluticasone propionate/ salmeterol 250-50 MICROgram(s) Diskus 1 Dose(s) Inhalation two times a day  folic acid 1 milliGRAM(s) Oral daily  heparin   Injectable 7500 Unit(s) SubCutaneous every 12 hours  influenza  Vaccine (HIGH DOSE) 0.5 milliLiter(s) IntraMuscular once  losartan 50 milliGRAM(s) Oral daily  metoprolol succinate  milliGRAM(s) Oral two times a day  mupirocin 2% Ointment 1 Application(s) Both Nostrils two times a day        EKG:  RADIOLOGY:  DIAGNOSTIC TESTING:  [ ] Echocardiogram:  [ ] Catherterization:  [ ] Stress Test:  OTHER:     LABS:	 	      02-23    135  |  95[L]  |  51[H]  ----------------------------<  80  4.7   |  25  |  6.80[H]    Ca    8.4      23 Feb 2025 06:29  Phos  5.9     02-23  Mg     1.90     02-23    TPro  7.1  /  Alb  3.5  /  TBili  0.2  /  DBili  x   /  AST  11  /  ALT  12  /  AlkPhos  167[H]  02-23          CARDIAC MARKERS:                  
CARDIOLOGY FOLLOW UP - Dr. Joe  Date of Service: 02-26-25 @ 13:02    CC: no events    Review of Systems:  Constitutional: No fever, weight loss, or fatigue  Respiratory: No cough, wheezing, or hemoptysis, no shortness of breath  Cardiovascular: No chest pain, palpitations, passing out, dizziness, or leg swelling  Gastrointestinal: No abd or epigastric pain. No nausea, vomiting, or hematemesis; no diarrhea or consiptaiton, no melena or hematochezia  Vascular: No edema     TELEMETRY:    PHYSICAL EXAM:  T(C): 36.7 (02-26-25 @ 10:00), Max: 36.8 (02-25-25 @ 23:10)  HR: 81 (02-26-25 @ 10:00) (81 - 100)  BP: 118/59 (02-26-25 @ 10:00) (107/54 - 142/59)  RR: 18 (02-26-25 @ 10:00) (17 - 18)  SpO2: 97% (02-26-25 @ 09:27) (96% - 100%)  Wt(kg): --  I&O's Summary      Appearance: Normal	  Cardiovascular: Normal S1 S2,RRR, No JVD, No murmurs  Respiratory: Lungs clear to auscultation	  Gastrointestinal:  Soft, Non-tender, + BS	  Extremities: Normal range of motion, No clubbing, cyanosis or edema  Vascular: Peripheral pulses palpable 2+ bilaterally       Home Medications:  folic acid 1 mg oral tablet: 1 tab(s) orally once a day (06 Dec 2024 07:55)  metoprolol succinate 100 mg oral capsule, extended release: 1 cap(s) orally 2 times a day (21 Feb 2025 14:05)        MEDICATIONS  (STANDING):  calcitriol   Capsule 0.25 MICROGram(s) Oral <User Schedule>  chlorhexidine 2% Cloths 1 Application(s) Topical daily  chlorhexidine 2% Cloths 1 Application(s) Topical daily  epoetin kiley-epbx (RETACRIT) Injectable 8000 Unit(s) IV Push <User Schedule>  fluticasone propionate/ salmeterol 250-50 MICROgram(s) Diskus 1 Dose(s) Inhalation two times a day  folic acid 1 milliGRAM(s) Oral daily  heparin   Injectable 7500 Unit(s) SubCutaneous every 12 hours  influenza  Vaccine (HIGH DOSE) 0.5 milliLiter(s) IntraMuscular once  losartan 25 milliGRAM(s) Oral at bedtime  metoprolol succinate  milliGRAM(s) Oral two times a day  polyethylene glycol 3350 17 Gram(s) Oral daily  sevelamer carbonate 1600 milliGRAM(s) Oral three times a day with meals        EKG:  RADIOLOGY:  DIAGNOSTIC TESTING:  [ ] Echocardiogram:  [ ] Catherterization:  [ ] Stress Test:  OTHER:     LABS:	 	                          9.7    4.67  )-----------( 182      ( 26 Feb 2025 06:22 )             30.5     02-26    138  |  98  |  51[H]  ----------------------------<  89  5.3   |  23  |  6.89[H]    Ca    8.3[L]      26 Feb 2025 06:22  Phos  4.9     02-26  Mg     2.00     02-26    TPro  6.7  /  Alb  3.4  /  TBili  0.2  /  DBili  x   /  AST  13  /  ALT  11  /  AlkPhos  157[H]  02-26          CARDIAC MARKERS:                  
[facility] NEPHROLOGY- PROGRESS NOTE    74y Female with history of ESRD on HD presents with RLE pain. Nephrology consulted for ESRD status.    REVIEW OF SYSTEMS:  Gen: no fevers  Cards: no chest pain  Resp: + dyspnea with exertion (chronic)  GI: no nausea or vomiting or diarrhea  Vascular: no LE edema    sulfa drugs (Other; Rash)  trimethoprim (Other; Rash)  Sulfur (Unknown)      Hospital Medications: Medications reviewed      VITALS:  T(F): 98.6 (02-25-25 @ 10:00), Max: 98.6 (02-25-25 @ 10:00)  HR: 86 (02-25-25 @ 10:00)  BP: 112/50 (02-25-25 @ 10:00)  RR: 18 (02-25-25 @ 10:00)  SpO2: 96% (02-25-25 @ 10:00)  Wt(kg): --    02-24 @ 07:01  -  02-25 @ 07:00  --------------------------------------------------------  IN: 400 mL / OUT: 1590 mL / NET: -1190 mL        PHYSICAL EXAM:    Gen: NAD, calm  Cards: RRR, +S1/S2, no M/G/R  Resp: CTA B/L  GI: soft, NT/ND, NABS  Vascular: non-pitting RLE edema, RIJ TDC intact      LABS:  02-25    136  |  95[L]  |  37[H]  ----------------------------<  95  5.1   |  27  |  5.46[H]    Ca    8.6      25 Feb 2025 06:56  Phos  4.4     02-25  Mg     2.00     02-25    TPro  7.1  /  Alb  3.5  /  TBili  0.2  /  DBili      /  AST  15  /  ALT  13  /  AlkPhos  174[H]  02-25    Creatinine Trend: 5.46 <--, 7.73 <--, 6.80 <--, 5.08 <--, 7.97 <--                        10.2   4.31  )-----------( 170      ( 25 Feb 2025 06:56 )             32.0     Urine Studies:  Urinalysis Basic - ( 25 Feb 2025 06:56 )    Color:  / Appearance:  / SG:  / pH:   Gluc: 95 mg/dL / Ketone:   / Bili:  / Urobili:    Blood:  / Protein:  / Nitrite:    Leuk Esterase:  / RBC:  / WBC    Sq Epi:  / Non Sq Epi:  / Bacteria:         
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 The patient is unable to walk due to the pain on this knee. She came to the ER instead of going to her scheduled dialysis session today due to the pain. The Nephrology service has been consulted for further management of ESRD. The patient is seen by our group, specifically Dr. Diana, at [facility]. Her last outpatient dialysis treatment was on Wednesday 2/19.    2/23: Pt c/o nausea and vomiting new today.     REVIEW OF SYSTEMS: no h/a, cp, sob, abd pain.     VITALS:  T(F): 98.2 (02-23-25 @ 09:29), Max: 98.3 (02-22-25 @ 21:19)  HR: 91 (02-23-25 @ 16:59)  BP: 134/57 (02-23-25 @ 16:59)  RR: 18 (02-23-25 @ 16:59)  SpO2: 98% (02-23-25 @ 16:59)    PHYSICAL EXAM:  Constitutional: NAD. No fevers  HEENT: anicteric sclera, oropharynx clear, MMM  Respiratory: CTAB, no wheezes, rales or rhonchi  Cardiovascular: S1, S2, RRR  Gastrointestinal: BS+, soft, NT/ND, NEW N/V but no abdominal tenderness.  Extremities: Edema more prominent at right patella. Right knee joint erythematous and tender upon palpation. Left knee joint also tender to palpation, but non-erythematous.  Neurological: Alert and oriented to person, place, and time. No focal deficits.  : No CVA tenderness. No sharma.   Skin: No rashes  Vascular Access: Right IJ tunneled catheter.      LABS:  02-23    135  |  95[L]  |  51[H]  ----------------------------<  80  4.7   |  25  |  6.80[H]    Ca    8.4      23 Feb 2025 06:29  Phos  5.9     02-23  Mg     1.90     02-23    TPro  7.1  /  Alb  3.5  /  TBili  0.2  /  DBili      /  AST  11  /  ALT  12  /  AlkPhos  167[H]  02-23    Creatinine Trend: 6.80 <--, 5.08 <--, 7.97 <--    Urine Studies:  Urinalysis Basic - ( 23 Feb 2025 06:29 )    Color:  / Appearance:  / SG:  / pH:   Gluc: 80 mg/dL / Ketone:   / Bili:  / Urobili:    Blood:  / Protein:  / Nitrite:    Leuk Esterase:  / RBC:  / WBC    Sq Epi:  / Non Sq Epi:  / Bacteria:     
    SUBJECTIVE / OVERNIGHT EVENTS:pt seen and examined  02-26-25     MEDICATIONS  (STANDING):  calcitriol   Capsule 0.25 MICROGram(s) Oral <User Schedule>  chlorhexidine 2% Cloths 1 Application(s) Topical daily  chlorhexidine 2% Cloths 1 Application(s) Topical daily  epoetin kiley-epbx (RETACRIT) Injectable 8000 Unit(s) IV Push <User Schedule>  fluticasone propionate/ salmeterol 250-50 MICROgram(s) Diskus 1 Dose(s) Inhalation two times a day  folic acid 1 milliGRAM(s) Oral daily  heparin   Injectable 7500 Unit(s) SubCutaneous every 12 hours  influenza  Vaccine (HIGH DOSE) 0.5 milliLiter(s) IntraMuscular once  losartan 25 milliGRAM(s) Oral at bedtime  metoprolol succinate  milliGRAM(s) Oral two times a day  sevelamer carbonate 1600 milliGRAM(s) Oral three times a day with meals    MEDICATIONS  (PRN):  acetaminophen     Tablet .. 650 milliGRAM(s) Oral every 6 hours PRN Temp greater or equal to 38C (100.4F), Mild Pain (1 - 3)  albuterol    90 MICROgram(s) HFA Inhaler 2 Puff(s) Inhalation every 6 hours PRN , Shortness of Breath and/or Wheezing  melatonin 3 milliGRAM(s) Oral at bedtime PRN Insomnia  ondansetron Injectable 4 milliGRAM(s) IV Push every 8 hours PRN Nausea and/or Vomiting  oxyCODONE    IR 5 milliGRAM(s) Oral every 12 hours PRN Severe Pain (7 - 10)  senna 2 Tablet(s) Oral at bedtime PRN Constipation  sodium chloride 0.9% Bolus. 100 milliLiter(s) IV Bolus every 5 minutes PRN SBP LESS THAN or EQUAL to 90 mmHg    Vital Signs Last 24 Hrs  T(C): 36.1 (02-26-25 @ 18:00), Max: 36.8 (02-25-25 @ 23:10)  T(F): 97 (02-26-25 @ 18:00), Max: 98.2 (02-25-25 @ 23:10)  HR: 83 (02-26-25 @ 18:00) (81 - 100)  BP: 136/66 (02-26-25 @ 18:00) (107/54 - 142/59)  BP(mean): --  RR: 18 (02-26-25 @ 18:00) (17 - 18)  SpO2: 97% (02-26-25 @ 18:00) (96% - 100%)      Constitutional: No fever, fatigue  Skin: No rash.  Eyes: No recent vision problems or eye pain.  ENT: No congestion, ear pain, or sore throat.  Cardiovascular: No chest pain or palpation.  Respiratory: No cough, shortness of breath, congestion, or wheezing.  Gastrointestinal: No abdominal pain, nausea, vomiting, or diarrhea.  Genitourinary: No dysuria.  Musculoskeletal: No joint swelling.  Neurologic: No headache.    PHYSICAL EXAM:  GENERAL: NAD  EYES: EOMI, PERRLA  NECK: Supple, No JVD  CHEST/LUNG: dec breath sounds at bases  HEART:  S1 , S2 +  ABDOMEN: soft , bs+  EXTREMITIES:  edema  NEUROLOGY:alert awake    LABS:  02-26    138  |  98  |  51[H]  ----------------------------<  89  5.3   |  23  |  6.89[H]    Ca    8.3[L]      26 Feb 2025 06:22  Phos  4.9     02-26  Mg     2.00     02-26    TPro  6.7  /  Alb  3.4  /  TBili  0.2  /  DBili      /  AST  13  /  ALT  11  /  AlkPhos  157[H]  02-26    Creatinine Trend: 6.89 <--, 5.46 <--, 7.73 <--, 6.80 <--, 5.08 <--, 7.97 <--                        9.7    4.67  )-----------( 182      ( 26 Feb 2025 06:22 )             30.5     Urine Studies:  Urinalysis Basic - ( 26 Feb 2025 06:22 )    Color:  / Appearance:  / SG:  / pH:   Gluc: 89 mg/dL / Ketone:   / Bili:  / Urobili:    Blood:  / Protein:  / Nitrite:    Leuk Esterase:  / RBC:  / WBC    Sq Epi:  / Non Sq Epi:  / Bacteria:               LIVER FUNCTIONS - ( 26 Feb 2025 06:22 )  Alb: 3.4 g/dL / Pro: 6.7 g/dL / ALK PHOS: 157 U/L / ALT: 11 U/L / AST: 13 U/L / GGT: x             
CARDIOLOGY FOLLOW UP - Dr. Joe  Date of Service: 02-22-25 @ 08:46    CC: events noted    Review of Systems:  Constitutional: No fever, weight loss, or fatigue  Respiratory: No cough, wheezing, or hemoptysis, no shortness of breath  Cardiovascular: No chest pain, palpitations, passing out, dizziness, or leg swelling  Gastrointestinal: No abd or epigastric pain. No nausea, vomiting, or hematemesis; no diarrhea or consiptaiton, no melena or hematochezia  Vascular: No edema     TELEMETRY:    PHYSICAL EXAM:  T(C): 36.9 (02-22-25 @ 08:09), Max: 37 (02-21-25 @ 18:15)  HR: 97 (02-22-25 @ 08:09) (78 - 101)  BP: 146/58 (02-22-25 @ 08:09) (125/65 - 146/58)  RR: 18 (02-22-25 @ 08:09) (17 - 20)  SpO2: 93% (02-22-25 @ 08:09) (93% - 100%)  Wt(kg): --  I&O's Summary    21 Feb 2025 07:01  -  22 Feb 2025 07:00  --------------------------------------------------------  IN: 400 mL / OUT: 2000 mL / NET: -1600 mL        Appearance: Normal	  Cardiovascular: Normal S1 S2,RRR, No JVD, No murmurs  Respiratory: Lungs clear to auscultation	  Gastrointestinal:  Soft, Non-tender, + BS	  Extremities: Normal range of motion, No clubbing, cyanosis or edema  Vascular: Peripheral pulses palpable 2+ bilaterally       Home Medications:  folic acid 1 mg oral tablet: 1 tab(s) orally once a day (06 Dec 2024 07:55)  metoprolol succinate 100 mg oral capsule, extended release: 1 cap(s) orally 2 times a day (21 Feb 2025 14:05)        MEDICATIONS  (STANDING):  calcitriol   Capsule 0.25 MICROGram(s) Oral <User Schedule>  calcium acetate 667 milliGRAM(s) Oral three times a day with meals  chlorhexidine 2% Cloths 1 Application(s) Topical daily  fluticasone propionate/ salmeterol 250-50 MICROgram(s) Diskus 1 Dose(s) Inhalation two times a day  folic acid 1 milliGRAM(s) Oral daily  heparin   Injectable 7500 Unit(s) SubCutaneous every 12 hours  influenza  Vaccine (HIGH DOSE) 0.5 milliLiter(s) IntraMuscular once  losartan 50 milliGRAM(s) Oral daily  metoprolol succinate  milliGRAM(s) Oral two times a day  mupirocin 2% Ointment 1 Application(s) Both Nostrils two times a day        EKG:  RADIOLOGY:  DIAGNOSTIC TESTING:  [ ] Echocardiogram:  [ ] Catherterization:  [ ] Stress Test:  OTHER:     LABS:	 	                          11.6   7.01  )-----------( 246      ( 21 Feb 2025 07:42 )             35.2     02-22    138  |  98  |  33[H]  ----------------------------<  96  4.3   |  24  |  5.08[H]    Ca    8.5      22 Feb 2025 05:32  Phos  4.4     02-22  Mg     1.80     02-22    TPro  7.2  /  Alb  3.5  /  TBili  0.3  /  DBili  x   /  AST  12  /  ALT  12  /  AlkPhos  168[H]  02-22      PT/INR - ( 21 Feb 2025 07:42 )   PT: 11.7 sec;   INR: 1.01 ratio         PTT - ( 21 Feb 2025 07:42 )  PTT:28.7 sec    CARDIAC MARKERS:                  
[facility] NEPHROLOGY- PROGRESS NOTE    74y Female with history of ESRD on HD presents with RLE pain. Nephrology consulted for ESRD status.    REVIEW OF SYSTEMS:  Gen: no fevers  Cards: no chest pain  Resp: + dyspnea with exertion (chronic)  GI: no nausea or vomiting or diarrhea  Vascular: no LE edema    sulfa drugs (Other; Rash)  trimethoprim (Other; Rash)  Sulfur (Unknown)      Hospital Medications: Medications reviewed      VITALS:  T(F): 98.1 (02-26-25 @ 10:00), Max: 98.2 (02-25-25 @ 23:10)  HR: 81 (02-26-25 @ 10:00)  BP: 118/59 (02-26-25 @ 10:00)  RR: 18 (02-26-25 @ 10:00)  SpO2: 97% (02-26-25 @ 09:27)  Wt(kg): --        PHYSICAL EXAM:    Gen: NAD, calm  Cards: RRR, +S1/S2, no M/G/R  Resp: CTA B/L  GI: soft, NT/ND, NABS  Vascular: non-pitting RLE edema, RIJ TDC intact        LABS:  02-26    138  |  98  |  51[H]  ----------------------------<  89  5.3   |  23  |  6.89[H]    Ca    8.3[L]      26 Feb 2025 06:22  Phos  4.9     02-26  Mg     2.00     02-26    TPro  6.7  /  Alb  3.4  /  TBili  0.2  /  DBili      /  AST  13  /  ALT  11  /  AlkPhos  157[H]  02-26    Creatinine Trend: 6.89 <--, 5.46 <--, 7.73 <--, 6.80 <--, 5.08 <--, 7.97 <--                        9.7    4.67  )-----------( 182      ( 26 Feb 2025 06:22 )             30.5     Urine Studies:  Urinalysis Basic - ( 26 Feb 2025 06:22 )    Color:  / Appearance:  / SG:  / pH:   Gluc: 89 mg/dL / Ketone:   / Bili:  / Urobili:    Blood:  / Protein:  / Nitrite:    Leuk Esterase:  / RBC:  / WBC    Sq Epi:  / Non Sq Epi:  / Bacteria:                 
CARDIOLOGY FOLLOW UP - Dr. Joe  DATE OF SERVICE: 2/25/25    CC no acute cv events       REVIEW OF SYSTEMS:  CONSTITUTIONAL: No fever, weight loss, or fatigue  RESPIRATORY: No cough, wheezing, chills or hemoptysis; No Shortness of Breath  CARDIOVASCULAR: No chest pain, palpitations, passing out, dizziness  GASTROINTESTINAL: No abdominal or epigastric pain. No nausea, vomiting, or hematemesis; No diarrhea or constipation. No melena or hematochezia.      PHYSICAL EXAM:  T(C): 37 (02-25-25 @ 10:00), Max: 37 (02-25-25 @ 10:00)  HR: 86 (02-25-25 @ 10:00) (81 - 98)  BP: 112/50 (02-25-25 @ 10:00) (106/68 - 139/73)  RR: 18 (02-25-25 @ 10:00) (17 - 18)  SpO2: 96% (02-25-25 @ 10:00) (96% - 98%)  Wt(kg): --  I&O's Summary    24 Feb 2025 07:01  -  25 Feb 2025 07:00  --------------------------------------------------------  IN: 400 mL / OUT: 1590 mL / NET: -1190 mL        Appearance: Normal	  Cardiovascular: Normal S1 S2,RRR, No JVD, No murmurs  Respiratory: Lungs clear to auscultation	  Gastrointestinal:  Soft, Non-tender, + BS	  Extremities: Normal range of motion, No clubbing, cyanosis or edema      Home Medications:  folic acid 1 mg oral tablet: 1 tab(s) orally once a day (06 Dec 2024 07:55)  metoprolol succinate 100 mg oral capsule, extended release: 1 cap(s) orally 2 times a day (21 Feb 2025 14:05)      MEDICATIONS  (STANDING):  calcitriol   Capsule 0.25 MICROGram(s) Oral <User Schedule>  chlorhexidine 2% Cloths 1 Application(s) Topical daily  chlorhexidine 2% Cloths 1 Application(s) Topical daily  fluticasone propionate/ salmeterol 250-50 MICROgram(s) Diskus 1 Dose(s) Inhalation two times a day  folic acid 1 milliGRAM(s) Oral daily  heparin   Injectable 7500 Unit(s) SubCutaneous every 12 hours  influenza  Vaccine (HIGH DOSE) 0.5 milliLiter(s) IntraMuscular once  losartan 25 milliGRAM(s) Oral at bedtime  metoprolol succinate  milliGRAM(s) Oral two times a day  mupirocin 2% Ointment 1 Application(s) Both Nostrils two times a day  polyethylene glycol 3350 17 Gram(s) Oral daily  sevelamer carbonate 1600 milliGRAM(s) Oral three times a day with meals      TELEMETRY: 	    ECG:  	  RADIOLOGY:   DIAGNOSTIC TESTING:  [ ] Echocardiogram:  [ ]  Catheterization:  [ ] Stress Test:    OTHER: 	    LABS:	 	                            10.2   4.31  )-----------( 170      ( 25 Feb 2025 06:56 )             32.0     02-25    136  |  95[L]  |  37[H]  ----------------------------<  95  5.1   |  27  |  5.46[H]    Ca    8.6      25 Feb 2025 06:56  Phos  4.4     02-25  Mg     2.00     02-25    TPro  7.1  /  Alb  3.5  /  TBili  0.2  /  DBili  x   /  AST  15  /  ALT  13  /  AlkPhos  174[H]  02-25            
[facility] NEPHROLOGY- PROGRESS NOTE    74y Female with history of ESRD on HD presents with RLE pain. Nephrology consulted for ESRD status.    REVIEW OF SYSTEMS:  Gen: no fevers  Cards: no chest pain  Resp: + dyspnea with exertion (chronic), + cough  GI: no nausea or vomiting or diarrhea  Vascular: no LE edema    sulfa drugs (Other; Rash)  trimethoprim (Other; Rash)  Sulfur (Unknown)      Hospital Medications: Medications reviewed      VITALS:  T(F): 98 (02-28-25 @ 10:45), Max: 98.4 (02-27-25 @ 21:48)  HR: 77 (02-28-25 @ 10:45)  BP: 126/67 (02-28-25 @ 10:45)  RR: 17 (02-28-25 @ 10:45)  SpO2: 100% (02-28-25 @ 10:45)  Wt(kg): --        PHYSICAL EXAM:    Gen: NAD, calm  Cards: RRR, +S1/S2, no M/G/R  Resp: CTA B/L  GI: soft, NT/ND, NABS  Vascular: non-pitting RLE edema, RIJ TDC intact        LABS:  02-27    137  |  99  |  35[H]  ----------------------------<  84  5.2   |  25  |  5.28[H]    Ca    8.8      27 Feb 2025 06:15  Phos  3.6     02-27  Mg     2.00     02-27    TPro  6.8  /  Alb  3.6  /  TBili  0.3  /  DBili      /  AST  18  /  ALT  14  /  AlkPhos  154[H]  02-27    Creatinine Trend: 5.28 <--, 6.89 <--, 5.46 <--, 7.73 <--, 6.80 <--, 5.08 <--                        9.8    5.00  )-----------( 167      ( 27 Feb 2025 06:15 )             31.7     Urine Studies:  Urinalysis Basic - ( 27 Feb 2025 06:15 )    Color:  / Appearance:  / SG:  / pH:   Gluc: 84 mg/dL / Ketone:   / Bili:  / Urobili:    Blood:  / Protein:  / Nitrite:    Leuk Esterase:  / RBC:  / WBC    Sq Epi:  / Non Sq Epi:  / Bacteria:                       
[facility] NEPHROLOGY- PROGRESS NOTE    74y Female with history of ESRD on HD presents with RLE pain. Nephrology consulted for ESRD status.    REVIEW OF SYSTEMS:  Gen: no fevers  Cards: no chest pain  Resp: + dyspnea with exertion (chronic), + cough  GI: no nausea or vomiting or diarrhea  Vascular: no LE edema    sulfa drugs (Other; Rash)  trimethoprim (Other; Rash)  Sulfur (Unknown)      Hospital Medications: Medications reviewed      VITALS:  T(F): 98.2 (02-27-25 @ 10:25), Max: 98.2 (02-27-25 @ 10:25)  HR: 85 (02-27-25 @ 10:25)  BP: 122/48 (02-27-25 @ 10:25)  RR: 18 (02-27-25 @ 10:25)  SpO2: 98% (02-27-25 @ 10:25)  Wt(kg): --    02-26 @ 07:01  -  02-27 @ 07:00  --------------------------------------------------------  IN: 400 mL / OUT: 2400 mL / NET: -2000 mL        PHYSICAL EXAM:    Gen: NAD, calm  Cards: RRR, +S1/S2, no M/G/R  Resp: CTA B/L  GI: soft, NT/ND, NABS  Vascular: non-pitting RLE edema, RIJ TDC intact        LABS:  02-27    137  |  99  |  35[H]  ----------------------------<  84  5.2   |  25  |  5.28[H]    Ca    8.8      27 Feb 2025 06:15  Phos  3.6     02-27  Mg     2.00     02-27    TPro  6.8  /  Alb  3.6  /  TBili  0.3  /  DBili      /  AST  18  /  ALT  14  /  AlkPhos  154[H]  02-27    Creatinine Trend: 5.28 <--, 6.89 <--, 5.46 <--, 7.73 <--, 6.80 <--, 5.08 <--, 7.97 <--                        9.8    5.00  )-----------( 167      ( 27 Feb 2025 06:15 )             31.7     Urine Studies:  Urinalysis Basic - ( 27 Feb 2025 06:15 )    Color:  / Appearance:  / SG:  / pH:   Gluc: 84 mg/dL / Ketone:   / Bili:  / Urobili:    Blood:  / Protein:  / Nitrite:    Leuk Esterase:  / RBC:  / WBC    Sq Epi:  / Non Sq Epi:  / Bacteria:                 
    SUBJECTIVE / OVERNIGHT EVENTS:pt seen and examined  02-27-25     MEDICATIONS  (STANDING):  calcitriol   Capsule 0.25 MICROGram(s) Oral <User Schedule>  chlorhexidine 2% Cloths 1 Application(s) Topical daily  chlorhexidine 2% Cloths 1 Application(s) Topical daily  epoetin kiley-epbx (RETACRIT) Injectable 8000 Unit(s) IV Push <User Schedule>  fluticasone propionate/ salmeterol 250-50 MICROgram(s) Diskus 1 Dose(s) Inhalation two times a day  folic acid 1 milliGRAM(s) Oral daily  heparin   Injectable 7500 Unit(s) SubCutaneous every 12 hours  influenza  Vaccine (HIGH DOSE) 0.5 milliLiter(s) IntraMuscular once  losartan 25 milliGRAM(s) Oral at bedtime  metoprolol succinate  milliGRAM(s) Oral two times a day  sevelamer carbonate 1600 milliGRAM(s) Oral three times a day with meals    MEDICATIONS  (PRN):  acetaminophen     Tablet .. 650 milliGRAM(s) Oral every 6 hours PRN Temp greater or equal to 38C (100.4F), Mild Pain (1 - 3)  albuterol    90 MICROgram(s) HFA Inhaler 2 Puff(s) Inhalation every 6 hours PRN , Shortness of Breath and/or Wheezing  melatonin 3 milliGRAM(s) Oral at bedtime PRN Insomnia  ondansetron Injectable 4 milliGRAM(s) IV Push every 8 hours PRN Nausea and/or Vomiting  oxyCODONE    IR 5 milliGRAM(s) Oral every 12 hours PRN Severe Pain (7 - 10)  senna 2 Tablet(s) Oral at bedtime PRN Constipation  sodium chloride 0.9% Bolus. 100 milliLiter(s) IV Bolus every 5 minutes PRN SBP LESS THAN or EQUAL to 90 mmHg      Vital Signs Last 24 Hrs  T(C): 36.9 (02-27-25 @ 21:48), Max: 36.9 (02-27-25 @ 21:48)  T(F): 98.4 (02-27-25 @ 21:48), Max: 98.4 (02-27-25 @ 21:48)  HR: 85 (02-27-25 @ 21:48) (81 - 86)  BP: 138/66 (02-27-25 @ 21:48) (122/48 - 140/74)  BP(mean): --  RR: 18 (02-27-25 @ 21:48) (18 - 18)  SpO2: 100% (02-27-25 @ 21:48) (98% - 100%)    Constitutional: No fever, fatigue  Skin: No rash.  Eyes: No recent vision problems or eye pain.  ENT: No congestion, ear pain, or sore throat.  Cardiovascular: No chest pain or palpation.  Respiratory: No cough, shortness of breath, congestion, or wheezing.  Gastrointestinal: No abdominal pain, nausea, vomiting, or diarrhea.  Genitourinary: No dysuria.  Musculoskeletal: No joint swelling.  Neurologic: No headache.    PHYSICAL EXAM:  GENERAL: NAD  EYES: EOMI, PERRLA  NECK: Supple, No JVD  CHEST/LUNG: dec breath sounds at bases  HEART:  S1 , S2 +  ABDOMEN: soft , bs+  EXTREMITIES:  edema  NEUROLOGY:alert awake    LABS:  02-27    137  |  99  |  35[H]  ----------------------------<  84  5.2   |  25  |  5.28[H]    Ca    8.8      27 Feb 2025 06:15  Phos  3.6     02-27  Mg     2.00     02-27    TPro  6.8  /  Alb  3.6  /  TBili  0.3  /  DBili      /  AST  18  /  ALT  14  /  AlkPhos  154[H]  02-27    Creatinine Trend: 5.28 <--, 6.89 <--, 5.46 <--, 7.73 <--, 6.80 <--, 5.08 <--, 7.97 <--                        9.8    5.00  )-----------( 167      ( 27 Feb 2025 06:15 )             31.7     Urine Studies:  Urinalysis Basic - ( 27 Feb 2025 06:15 )    Color:  / Appearance:  / SG:  / pH:   Gluc: 84 mg/dL / Ketone:   / Bili:  / Urobili:    Blood:  / Protein:  / Nitrite:    Leuk Esterase:  / RBC:  / WBC    Sq Epi:  / Non Sq Epi:  / Bacteria:               LIVER FUNCTIONS - ( 27 Feb 2025 06:15 )  Alb: 3.6 g/dL / Pro: 6.8 g/dL / ALK PHOS: 154 U/L / ALT: 14 U/L / AST: 18 U/L / GGT: x             
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 The patient is unable to walk due to the pain on this knee. She came to the ER instead of going to her scheduled dialysis session today due to the pain. The Nephrology service has been consulted for further management of ESRD. The patient is seen by our group, specifically Dr. Diana, at [facility]. Her last outpatient dialysis treatment was on Wednesday 2/19.      REVIEW OF SYSTEMS: no h/a, cp, sob, abd pain.     VITALS:  T(F): 98.5 (02-22-25 @ 17:28), Max: 98.9 (02-22-25 @ 10:51)  HR: 92 (02-22-25 @ 17:28)  BP: 108/50 (02-22-25 @ 17:28)  RR: 18 (02-22-25 @ 17:28)  SpO2: 97% (02-22-25 @ 17:28)  Wt(kg): --    02-21 @ 07:01  -  02-22 @ 07:00  --------------------------------------------------------  IN: 400 mL / OUT: 2000 mL / NET: -1600 mL      Height (cm): 165.1 (02-21 @ 22:30)  Weight (kg): 128.2 (02-21 @ 22:30)  BMI (kg/m2): 47 (02-21 @ 22:30)  BSA (m2): 2.29 (02-21 @ 22:30)    PHYSICAL EXAM:  Constitutional: Sitting in bed, in no acute distress. Speaking in complete sentences.  HEENT: anicteric sclera, oropharynx clear, MMM  Respiratory: CTAB, no wheezes, rales or rhonchi  Cardiovascular: S1, S2, RRR  Gastrointestinal: BS+, soft, NT/ND  Extremities: Edema more prominent at right patella. Right knee joint erythematous and tender upon palpation. Left knee joint also tender to palpation, but non-erythematous.  Neurological: Alert and oriented to person, place, and time. No focal deficits.  : No CVA tenderness. No sharma.   Skin: No rashes  Vascular Access: Right IJ tunneled catheter.      LABS:  02-22    138  |  98  |  33[H]  ----------------------------<  96  4.3   |  24  |  5.08[H]    Ca    8.5      22 Feb 2025 05:32  Phos  4.4     02-22  Mg     1.80     02-22    TPro  7.2  /  Alb  3.5  /  TBili  0.3  /  DBili      /  AST  12  /  ALT  12  /  AlkPhos  168[H]  02-22    Creatinine Trend: 5.08 <--, 7.97 <--                        11.6   7.01  )-----------( 246      ( 21 Feb 2025 07:42 )             35.2     Urine Studies:  Urinalysis Basic - ( 22 Feb 2025 05:32 )    Color:  / Appearance:  / SG:  / pH:   Gluc: 96 mg/dL / Ketone:   / Bili:  / Urobili:    Blood:  / Protein:  / Nitrite:    Leuk Esterase:  / RBC:  / WBC    Sq Epi:  / Non Sq Epi:  / Bacteria:               
CARDIOLOGY FOLLOW UP - Dr. Joe    Patient Name: MELISSA ARREOLA    DATE OF SERVICE: 02-28-25 @ 09:32    Patient seen and examined    CC no CP; +SOB, same as baseline per pt (chronic )  +palpitations last night, now resolved       REVIEW OF SYSTEMS:  CONSTITUTIONAL: No fever, weight loss, or fatigue  RESPIRATORY: No cough, wheezing, chills or hemoptysis  CARDIOVASCULAR: No chest pain, passing out, dizziness  GASTROINTESTINAL: No abdominal or epigastric pain. No nausea, vomiting, or hematemesis; No diarrhea or constipation. No melena or hematochezia.      PHYSICAL EXAM:  T(C): 36.9 (02-27-25 @ 21:48), Max: 36.9 (02-27-25 @ 21:48)  HR: 85 (02-27-25 @ 21:48) (85 - 86)  BP: 138/66 (02-27-25 @ 21:48) (122/48 - 140/74)  RR: 18 (02-27-25 @ 21:48) (18 - 18)  SpO2: 100% (02-27-25 @ 21:48) (98% - 100%)  Wt(kg): --  I&O's Summary      Appearance: Normal	  Cardiovascular: Normal S1 S2,RRR, No JVD, No murmurs  Respiratory: Lungs clear to auscultation	  Gastrointestinal:  Soft, Non-tender, + BS	  Extremities: Normal range of motion, No clubbing, cyanosis or edema      Home Medications:  folic acid 1 mg oral tablet: 1 tab(s) orally once a day (06 Dec 2024 07:55)  metoprolol succinate 100 mg oral capsule, extended release: 1 cap(s) orally 2 times a day (21 Feb 2025 14:05)      MEDICATIONS  (STANDING):  calcitriol   Capsule 0.25 MICROGram(s) Oral <User Schedule>  chlorhexidine 2% Cloths 1 Application(s) Topical daily  chlorhexidine 2% Cloths 1 Application(s) Topical daily  epoetin kiley-epbx (RETACRIT) Injectable 8000 Unit(s) IV Push <User Schedule>  fluticasone propionate/ salmeterol 250-50 MICROgram(s) Diskus 1 Dose(s) Inhalation two times a day  folic acid 1 milliGRAM(s) Oral daily  heparin   Injectable 7500 Unit(s) SubCutaneous every 12 hours  heparin   Injectable. 1000 Unit(s) Dialysis. once  heparin   Injectable. 500 Unit(s) Dialysis. every 1 hour  influenza  Vaccine (HIGH DOSE) 0.5 milliLiter(s) IntraMuscular once  losartan 25 milliGRAM(s) Oral at bedtime  metoprolol succinate  milliGRAM(s) Oral two times a day  sevelamer carbonate 1600 milliGRAM(s) Oral three times a day with meals      TELEMETRY: 	    ECG:  	  RADIOLOGY:   DIAGNOSTIC TESTING:  [ ] Echocardiogram:  [ ]  Catheterization:  [ ] Stress Test:    OTHER: 	    LABS:	 	                            9.8    5.00  )-----------( 167      ( 27 Feb 2025 06:15 )             31.7     02-27    137  |  99  |  35[H]  ----------------------------<  84  5.2   |  25  |  5.28[H]    Ca    8.8      27 Feb 2025 06:15  Phos  3.6     02-27  Mg     2.00     02-27    TPro  6.8  /  Alb  3.6  /  TBili  0.3  /  DBili  x   /  AST  18  /  ALT  14  /  AlkPhos  154[H]  02-27      Lipid Profile:   Hgb A1C:      BNP:     Creatinine: 5.28 mg/dL (02-27-25 @ 06:15)  Creatinine: 6.89 mg/dL (02-26-25 @ 06:22)  Creatinine: 5.46 mg/dL (02-25-25 @ 06:56)      Hemoglobin: 9.8 g/dL (02-27-25 @ 06:15)  Hemoglobin: 9.7 g/dL (02-26-25 @ 06:22)  Hemoglobin: 10.2 g/dL (02-25-25 @ 06:56)      
CARDIOLOGY FOLLOW UP - Dr. Joe  DATE OF SERVICE: 2/24/25    CC no cp   some nguyen      REVIEW OF SYSTEMS:  CONSTITUTIONAL: No fever, weight loss, or fatigue  RESPIRATORY: No cough, wheezing, chills or hemoptysis; No Shortness of Breath  CARDIOVASCULAR: No chest pain, palpitations, passing out, dizziness  GASTROINTESTINAL: No abdominal or epigastric pain. No nausea, vomiting, or hematemesis; No diarrhea or constipation. No melena or hematochezia.      PHYSICAL EXAM:  T(C): 36.6 (02-24-25 @ 10:10), Max: 36.9 (02-23-25 @ 20:45)  HR: 91 (02-24-25 @ 10:10) (88 - 98)  BP: 110/80 (02-24-25 @ 10:10) (110/80 - 137/62)  RR: 18 (02-24-25 @ 10:10) (18 - 18)  SpO2: 100% (02-24-25 @ 10:10) (95% - 100%)  Wt(kg): --  I&O's Summary      Appearance: Normal	  Cardiovascular: Normal S1 S2,RRR, No JVD, No murmurs  Respiratory: Lungs clear to auscultation	  Gastrointestinal:  Soft, Non-tender, + BS	  Extremities: Normal range of motion, No clubbing, cyanosis or edema      Home Medications:  folic acid 1 mg oral tablet: 1 tab(s) orally once a day (06 Dec 2024 07:55)  metoprolol succinate 100 mg oral capsule, extended release: 1 cap(s) orally 2 times a day (21 Feb 2025 14:05)      MEDICATIONS  (STANDING):  calcitriol   Capsule 0.25 MICROGram(s) Oral <User Schedule>  calcium acetate 667 milliGRAM(s) Oral three times a day with meals  chlorhexidine 2% Cloths 1 Application(s) Topical daily  fluticasone propionate/ salmeterol 250-50 MICROgram(s) Diskus 1 Dose(s) Inhalation two times a day  folic acid 1 milliGRAM(s) Oral daily  heparin   Injectable 7500 Unit(s) SubCutaneous every 12 hours  influenza  Vaccine (HIGH DOSE) 0.5 milliLiter(s) IntraMuscular once  losartan 50 milliGRAM(s) Oral daily  metoprolol succinate  milliGRAM(s) Oral two times a day  mupirocin 2% Ointment 1 Application(s) Both Nostrils two times a day  polyethylene glycol 3350 17 Gram(s) Oral daily      TELEMETRY: 	    ECG:  	  RADIOLOGY:   DIAGNOSTIC TESTING:  [ ] Echocardiogram:  [ ]  Catheterization:  [ ] Stress Test:    OTHER: 	    LABS:	 	                            10.6   6.26  )-----------( 192      ( 24 Feb 2025 06:30 )             32.7     02-24    132[L]  |  91[L]  |  67[H]  ----------------------------<  84  5.3   |  22  |  7.73[H]    Ca    8.3[L]      24 Feb 2025 06:30  Phos  6.4     02-24  Mg     1.90     02-24    TPro  7.1  /  Alb  3.4  /  TBili  0.3  /  DBili  x   /  AST  13  /  ALT  13  /  AlkPhos  169[H]  02-24            
CARDIOLOGY FOLLOW UP - Dr. Joe  DATE OF SERVICE: 2/27/25    CC no acute cv events       REVIEW OF SYSTEMS:  CONSTITUTIONAL: No fever, weight loss, or fatigue  RESPIRATORY: No cough, wheezing, chills or hemoptysis; No Shortness of Breath  CARDIOVASCULAR: No chest pain, palpitations, passing out, dizziness  GASTROINTESTINAL: No abdominal or epigastric pain. No nausea, vomiting, or hematemesis; No diarrhea or constipation. No melena or hematochezia.      PHYSICAL EXAM:  T(C): 36.4 (02-27-25 @ 05:30), Max: 36.7 (02-26-25 @ 13:50)  HR: 81 (02-27-25 @ 05:30) (81 - 93)  BP: 137/63 (02-27-25 @ 05:30) (132/68 - 137/63)  RR: 18 (02-27-25 @ 05:30) (18 - 18)  SpO2: 98% (02-27-25 @ 05:30) (97% - 98%)  Wt(kg): --  I&O's Summary    26 Feb 2025 07:01  -  27 Feb 2025 07:00  --------------------------------------------------------  IN: 400 mL / OUT: 2400 mL / NET: -2000 mL        Appearance: Normal	  Cardiovascular: Normal S1 S2,RRR, No JVD, No murmurs  Respiratory: Lungs clear to auscultation	  Gastrointestinal:  Soft, Non-tender, + BS	  Extremities: Normal range of motion, No clubbing, cyanosis or edema      Home Medications:  folic acid 1 mg oral tablet: 1 tab(s) orally once a day (06 Dec 2024 07:55)  metoprolol succinate 100 mg oral capsule, extended release: 1 cap(s) orally 2 times a day (21 Feb 2025 14:05)      MEDICATIONS  (STANDING):  calcitriol   Capsule 0.25 MICROGram(s) Oral <User Schedule>  chlorhexidine 2% Cloths 1 Application(s) Topical daily  chlorhexidine 2% Cloths 1 Application(s) Topical daily  epoetin kiley-epbx (RETACRIT) Injectable 8000 Unit(s) IV Push <User Schedule>  fluticasone propionate/ salmeterol 250-50 MICROgram(s) Diskus 1 Dose(s) Inhalation two times a day  folic acid 1 milliGRAM(s) Oral daily  heparin   Injectable 7500 Unit(s) SubCutaneous every 12 hours  influenza  Vaccine (HIGH DOSE) 0.5 milliLiter(s) IntraMuscular once  losartan 25 milliGRAM(s) Oral at bedtime  metoprolol succinate  milliGRAM(s) Oral two times a day  sevelamer carbonate 1600 milliGRAM(s) Oral three times a day with meals      TELEMETRY: 	    ECG:  	  RADIOLOGY:   DIAGNOSTIC TESTING:  [ ] Echocardiogram:  [ ]  Catheterization:  [ ] Stress Test:    OTHER: 	    LABS:	 	                            9.8    5.00  )-----------( 167      ( 27 Feb 2025 06:15 )             31.7     02-27    137  |  99  |  35[H]  ----------------------------<  84  5.2   |  25  |  5.28[H]    Ca    8.8      27 Feb 2025 06:15  Phos  3.6     02-27  Mg     2.00     02-27    TPro  6.8  /  Alb  3.6  /  TBili  0.3  /  DBili  x   /  AST  18  /  ALT  14  /  AlkPhos  154[H]  02-27            
[facility] NEPHROLOGY- PROGRESS NOTE    74y Female with history of ESRD on HD presents with RLE pain. Nephrology consulted for ESRD status.    REVIEW OF SYSTEMS:  Gen: no fevers  Cards: no chest pain  Resp: + dyspnea with exertion (chronic)  GI: no nausea or vomiting or diarrhea  Vascular: no LE edema    sulfa drugs (Other; Rash)  trimethoprim (Other; Rash)  Sulfur (Unknown)      Hospital Medications: Medications reviewed    VITALS:  T(F): 97.9 (02-24-25 @ 10:10), Max: 98.4 (02-23-25 @ 20:45)  HR: 91 (02-24-25 @ 10:10)  BP: 110/80 (02-24-25 @ 10:10)  RR: 18 (02-24-25 @ 10:10)  SpO2: 100% (02-24-25 @ 10:10)  Wt(kg): --  Height (cm): 165.1 (02-21 @ 22:30)  Weight (kg): 128.2 (02-21 @ 22:30)  BMI (kg/m2): 47 (02-21 @ 22:30)  BSA (m2): 2.29 (02-21 @ 22:30)      PHYSICAL EXAM:    Gen: NAD, calm  Cards: RRR, +S1/S2, no M/G/R  Resp: CTA B/L  GI: soft, NT/ND, NABS  Vascular: non-pitting RLE edema, RIJ TDC intact    LABS:  02-24    132[L]  |  91[L]  |  67[H]  ----------------------------<  84  5.3   |  22  |  7.73[H]    Ca    8.3[L]      24 Feb 2025 06:30  Phos  6.4     02-24  Mg     1.90     02-24    TPro  7.1  /  Alb  3.4  /  TBili  0.3  /  DBili      /  AST  13  /  ALT  13  /  AlkPhos  169[H]  02-24    Creatinine Trend: 7.73 <--, 6.80 <--, 5.08 <--, 7.97 <--                        10.6   6.26  )-----------( 192      ( 24 Feb 2025 06:30 )             32.7     Urine Studies:  Urinalysis Basic - ( 24 Feb 2025 06:30 )    Color:  / Appearance:  / SG:  / pH:   Gluc: 84 mg/dL / Ketone:   / Bili:  / Urobili:    Blood:  / Protein:  / Nitrite:    Leuk Esterase:  / RBC:  / WBC    Sq Epi:  / Non Sq Epi:  / Bacteria:           RADIOLOGY & ADDITIONAL STUDIES:
    SUBJECTIVE / OVERNIGHT EVENTS:pt seen and examined  02-23-25     MEDICATIONS  (STANDING):  calcitriol   Capsule 0.25 MICROGram(s) Oral <User Schedule>  calcium acetate 667 milliGRAM(s) Oral three times a day with meals  chlorhexidine 2% Cloths 1 Application(s) Topical daily  fluticasone propionate/ salmeterol 250-50 MICROgram(s) Diskus 1 Dose(s) Inhalation two times a day  folic acid 1 milliGRAM(s) Oral daily  heparin   Injectable 7500 Unit(s) SubCutaneous every 12 hours  influenza  Vaccine (HIGH DOSE) 0.5 milliLiter(s) IntraMuscular once  losartan 50 milliGRAM(s) Oral daily  metoprolol succinate  milliGRAM(s) Oral two times a day  mupirocin 2% Ointment 1 Application(s) Both Nostrils two times a day    MEDICATIONS  (PRN):  acetaminophen     Tablet .. 650 milliGRAM(s) Oral every 6 hours PRN Temp greater or equal to 38C (100.4F), Mild Pain (1 - 3)  albuterol    90 MICROgram(s) HFA Inhaler 2 Puff(s) Inhalation every 6 hours PRN , Shortness of Breath and/or Wheezing  aluminum hydroxide/magnesium hydroxide/simethicone Suspension 30 milliLiter(s) Oral every 4 hours PRN Dyspepsia  melatonin 3 milliGRAM(s) Oral at bedtime PRN Insomnia  morphine  - Injectable 4 milliGRAM(s) IV Push every 8 hours PRN Severe Pain (7 - 10)  ondansetron Injectable 4 milliGRAM(s) IV Push every 8 hours PRN Nausea and/or Vomiting  senna 2 Tablet(s) Oral at bedtime PRN Constipation    Vital Signs Last 24 Hrs  T(C): 36.9 (02-23-25 @ 20:45), Max: 36.9 (02-23-25 @ 20:45)  T(F): 98.4 (02-23-25 @ 20:45), Max: 98.4 (02-23-25 @ 20:45)  HR: 98 (02-23-25 @ 20:45) (79 - 98)  BP: 137/62 (02-23-25 @ 20:45) (111/53 - 137/62)  BP(mean): --  RR: 18 (02-23-25 @ 20:45) (18 - 19)  SpO2: 95% (02-23-25 @ 20:45) (95% - 98%)        I&O's Summary    21 Feb 2025 07:01  -  22 Feb 2025 07:00  --------------------------------------------------------  IN: 400 mL / OUT: 2000 mL / NET: -1600 mL        Constitutional: No fever, fatigue  Skin: No rash.  Eyes: No recent vision problems or eye pain.  ENT: No congestion, ear pain, or sore throat.  Cardiovascular: No chest pain or palpation.  Respiratory: No cough, shortness of breath, congestion, or wheezing.  Gastrointestinal: No abdominal pain, nausea, vomiting, or diarrhea.  Genitourinary: No dysuria.  Musculoskeletal: No joint swelling.  Neurologic: No headache.    PHYSICAL EXAM:  GENERAL: NAD  EYES: EOMI, PERRLA  NECK: Supple, No JVD  CHEST/LUNG: dec breath sounds at basess  HEART:  S1 , S2 +  ABDOMEN: soft , bs+  EXTREMITIES:  edema  NEUROLOGY:alert awake      LABS:  02-23    135  |  95[L]  |  51[H]  ----------------------------<  80  4.7   |  25  |  6.80[H]    Ca    8.4      23 Feb 2025 06:29  Phos  5.9     02-23  Mg     1.90     02-23    TPro  7.1  /  Alb  3.5  /  TBili  0.2  /  DBili      /  AST  11  /  ALT  12  /  AlkPhos  167[H]  02-23    Creatinine Trend: 6.80 <--, 5.08 <--, 7.97 <--    Urine Studies:  Urinalysis Basic - ( 23 Feb 2025 06:29 )    Color:  / Appearance:  / SG:  / pH:   Gluc: 80 mg/dL / Ketone:   / Bili:  / Urobili:    Blood:  / Protein:  / Nitrite:    Leuk Esterase:  / RBC:  / WBC    Sq Epi:  / Non Sq Epi:  / Bacteria:               LIVER FUNCTIONS - ( 23 Feb 2025 06:29 )  Alb: 3.5 g/dL / Pro: 7.1 g/dL / ALK PHOS: 167 U/L / ALT: 12 U/L / AST: 11 U/L / GGT: x             
    SUBJECTIVE / OVERNIGHT EVENTS:pt seen and examined  02-25-25     MEDICATIONS  (STANDING):  calcitriol   Capsule 0.25 MICROGram(s) Oral <User Schedule>  chlorhexidine 2% Cloths 1 Application(s) Topical daily  chlorhexidine 2% Cloths 1 Application(s) Topical daily  fluticasone propionate/ salmeterol 250-50 MICROgram(s) Diskus 1 Dose(s) Inhalation two times a day  folic acid 1 milliGRAM(s) Oral daily  heparin   Injectable 7500 Unit(s) SubCutaneous every 12 hours  influenza  Vaccine (HIGH DOSE) 0.5 milliLiter(s) IntraMuscular once  losartan 25 milliGRAM(s) Oral at bedtime  metoprolol succinate  milliGRAM(s) Oral two times a day  mupirocin 2% Ointment 1 Application(s) Both Nostrils two times a day  polyethylene glycol 3350 17 Gram(s) Oral daily  sevelamer carbonate 1600 milliGRAM(s) Oral three times a day with meals    MEDICATIONS  (PRN):  acetaminophen     Tablet .. 650 milliGRAM(s) Oral every 6 hours PRN Temp greater or equal to 38C (100.4F), Mild Pain (1 - 3)  albuterol    90 MICROgram(s) HFA Inhaler 2 Puff(s) Inhalation every 6 hours PRN , Shortness of Breath and/or Wheezing  melatonin 3 milliGRAM(s) Oral at bedtime PRN Insomnia  ondansetron Injectable 4 milliGRAM(s) IV Push every 8 hours PRN Nausea and/or Vomiting  senna 2 Tablet(s) Oral at bedtime PRN Constipation  sodium chloride 0.9% Bolus. 100 milliLiter(s) IV Bolus every 5 minutes PRN SBP LESS THAN or EQUAL to 90 mmHg    Vital Signs Last 24 Hrs  T(C): 37 (02-25-25 @ 10:00), Max: 37 (02-25-25 @ 10:00)  T(F): 98.6 (02-25-25 @ 10:00), Max: 98.6 (02-25-25 @ 10:00)  HR: 86 (02-25-25 @ 10:00) (86 - 98)  BP: 112/50 (02-25-25 @ 10:00) (106/68 - 123/61)  BP(mean): --  RR: 18 (02-25-25 @ 10:00) (18 - 18)  SpO2: 96% (02-25-25 @ 10:00) (96% - 97%)        Constitutional: No fever, fatigue  Skin: No rash.  Eyes: No recent vision problems or eye pain.  ENT: No congestion, ear pain, or sore throat.  Cardiovascular: No chest pain or palpation.  Respiratory: No cough, shortness of breath, congestion, or wheezing.  Gastrointestinal: No abdominal pain, nausea, vomiting, or diarrhea.  Genitourinary: No dysuria.  Musculoskeletal: No joint swelling.  Neurologic: No headache.    PHYSICAL EXAM:  GENERAL: NAD  EYES: EOMI, PERRLA  NECK: Supple, No JVD  CHEST/LUNG: dec breath sounds at basess  HEART:  S1 , S2 +  ABDOMEN: soft , bs+  EXTREMITIES:  edema  NEUROLOGY:alert awake    LABS:  02-25    136  |  95[L]  |  37[H]  ----------------------------<  95  5.1   |  27  |  5.46[H]    Ca    8.6      25 Feb 2025 06:56  Phos  4.4     02-25  Mg     2.00     02-25    TPro  7.1  /  Alb  3.5  /  TBili  0.2  /  DBili      /  AST  15  /  ALT  13  /  AlkPhos  174[H]  02-25    Creatinine Trend: 5.46 <--, 7.73 <--, 6.80 <--, 5.08 <--, 7.97 <--                        10.2   4.31  )-----------( 170      ( 25 Feb 2025 06:56 )             32.0     Urine Studies:  Urinalysis Basic - ( 25 Feb 2025 06:56 )    Color:  / Appearance:  / SG:  / pH:   Gluc: 95 mg/dL / Ketone:   / Bili:  / Urobili:    Blood:  / Protein:  / Nitrite:    Leuk Esterase:  / RBC:  / WBC    Sq Epi:  / Non Sq Epi:  / Bacteria:               LIVER FUNCTIONS - ( 25 Feb 2025 06:56 )  Alb: 3.5 g/dL / Pro: 7.1 g/dL / ALK PHOS: 174 U/L / ALT: 13 U/L / AST: 15 U/L / GGT: x             
    SUBJECTIVE / OVERNIGHT EVENTS:pt seen and examined  02-22-25     MEDICATIONS  (STANDING):  calcitriol   Capsule 0.25 MICROGram(s) Oral <User Schedule>  calcium acetate 667 milliGRAM(s) Oral three times a day with meals  chlorhexidine 2% Cloths 1 Application(s) Topical daily  fluticasone propionate/ salmeterol 250-50 MICROgram(s) Diskus 1 Dose(s) Inhalation two times a day  folic acid 1 milliGRAM(s) Oral daily  heparin   Injectable 7500 Unit(s) SubCutaneous every 12 hours  influenza  Vaccine (HIGH DOSE) 0.5 milliLiter(s) IntraMuscular once  losartan 50 milliGRAM(s) Oral daily  metoprolol succinate  milliGRAM(s) Oral two times a day  mupirocin 2% Ointment 1 Application(s) Both Nostrils two times a day    MEDICATIONS  (PRN):  acetaminophen     Tablet .. 650 milliGRAM(s) Oral every 6 hours PRN Temp greater or equal to 38C (100.4F), Mild Pain (1 - 3)  albuterol    90 MICROgram(s) HFA Inhaler 2 Puff(s) Inhalation every 6 hours PRN , Shortness of Breath and/or Wheezing  aluminum hydroxide/magnesium hydroxide/simethicone Suspension 30 milliLiter(s) Oral every 4 hours PRN Dyspepsia  melatonin 3 milliGRAM(s) Oral at bedtime PRN Insomnia  morphine  - Injectable 4 milliGRAM(s) IV Push every 8 hours PRN Severe Pain (7 - 10)  ondansetron Injectable 4 milliGRAM(s) IV Push every 8 hours PRN Nausea and/or Vomiting    T(C): 36.8 (02-22-25 @ 21:19), Max: 37.2 (02-22-25 @ 10:51)  HR: 96 (02-22-25 @ 21:19) (88 - 107)  BP: 121/93 (02-22-25 @ 21:19) (108/50 - 146/58)  RR: 18 (02-22-25 @ 21:19) (17 - 18)  SpO2: 96% (02-22-25 @ 21:19) (93% - 100%)    CAPILLARY BLOOD GLUCOSE        I&O's Summary    21 Feb 2025 07:01  -  22 Feb 2025 07:00  --------------------------------------------------------  IN: 400 mL / OUT: 2000 mL / NET: -1600 mL        Constitutional: No fever, fatigue  Skin: No rash.  Eyes: No recent vision problems or eye pain.  ENT: No congestion, ear pain, or sore throat.  Cardiovascular: No chest pain or palpation.  Respiratory: No cough, shortness of breath, congestion, or wheezing.  Gastrointestinal: No abdominal pain, nausea, vomiting, or diarrhea.  Genitourinary: No dysuria.  Musculoskeletal: No joint swelling.  Neurologic: No headache.    PHYSICAL EXAM:  GENERAL: NAD  EYES: EOMI, PERRLA  NECK: Supple, No JVD  CHEST/LUNG: dec breath sounds at basess  HEART:  S1 , S2 +  ABDOMEN: soft , bs+  EXTREMITIES:  edema  NEUROLOGY:alert awake      LABS:                        11.6   7.01  )-----------( 246      ( 21 Feb 2025 07:42 )             35.2     02-22    138  |  98  |  33[H]  ----------------------------<  96  4.3   |  24  |  5.08[H]    Ca    8.5      22 Feb 2025 05:32  Phos  4.4     02-22  Mg     1.80     02-22    TPro  7.2  /  Alb  3.5  /  TBili  0.3  /  DBili  x   /  AST  12  /  ALT  12  /  AlkPhos  168[H]  02-22    PT/INR - ( 21 Feb 2025 07:42 )   PT: 11.7 sec;   INR: 1.01 ratio         PTT - ( 21 Feb 2025 07:42 )  PTT:28.7 sec      Urinalysis Basic - ( 22 Feb 2025 05:32 )    Color: x / Appearance: x / SG: x / pH: x  Gluc: 96 mg/dL / Ketone: x  / Bili: x / Urobili: x   Blood: x / Protein: x / Nitrite: x   Leuk Esterase: x / RBC: x / WBC x   Sq Epi: x / Non Sq Epi: x / Bacteria: x        RADIOLOGY & ADDITIONAL TESTS:    Imaging Personally Reviewed:    Consultant(s) Notes Reviewed:      Care Discussed with Consultants/Other Providers:  
    SUBJECTIVE / OVERNIGHT EVENTS:pt seen and examined  02-24-25     MEDICATIONS  (STANDING):  acetaminophen   IVPB .. 1000 milliGRAM(s) IV Intermittent once  calcitriol   Capsule 0.25 MICROGram(s) Oral <User Schedule>  chlorhexidine 2% Cloths 1 Application(s) Topical daily  chlorhexidine 2% Cloths 1 Application(s) Topical daily  fluticasone propionate/ salmeterol 250-50 MICROgram(s) Diskus 1 Dose(s) Inhalation two times a day  folic acid 1 milliGRAM(s) Oral daily  heparin   Injectable 7500 Unit(s) SubCutaneous every 12 hours  influenza  Vaccine (HIGH DOSE) 0.5 milliLiter(s) IntraMuscular once  metoprolol succinate  milliGRAM(s) Oral two times a day  mupirocin 2% Ointment 1 Application(s) Both Nostrils two times a day  polyethylene glycol 3350 17 Gram(s) Oral daily  sevelamer carbonate 1600 milliGRAM(s) Oral three times a day with meals    MEDICATIONS  (PRN):  acetaminophen     Tablet .. 650 milliGRAM(s) Oral every 6 hours PRN Temp greater or equal to 38C (100.4F), Mild Pain (1 - 3)  albuterol    90 MICROgram(s) HFA Inhaler 2 Puff(s) Inhalation every 6 hours PRN , Shortness of Breath and/or Wheezing  melatonin 3 milliGRAM(s) Oral at bedtime PRN Insomnia  ondansetron Injectable 4 milliGRAM(s) IV Push every 8 hours PRN Nausea and/or Vomiting  senna 2 Tablet(s) Oral at bedtime PRN Constipation  sodium chloride 0.9% Bolus. 100 milliLiter(s) IV Bolus every 5 minutes PRN SBP LESS THAN or EQUAL to 90 mmHg    Vital Signs Last 24 Hrs  T(C): 36.8 (02-24-25 @ 20:05), Max: 36.8 (02-24-25 @ 20:05)  T(F): 98.2 (02-24-25 @ 20:05), Max: 98.2 (02-24-25 @ 20:05)  HR: 98 (02-24-25 @ 20:05) (81 - 98)  BP: 139/73 (02-24-25 @ 20:05) (110/80 - 139/73)  BP(mean): --  RR: 18 (02-24-25 @ 20:05) (17 - 18)  SpO2: 98% (02-24-25 @ 20:05) (97% - 100%)    IN: 400 mL / OUT: 2000 mL / NET: -1600 mL        Constitutional: No fever, fatigue  Skin: No rash.  Eyes: No recent vision problems or eye pain.  ENT: No congestion, ear pain, or sore throat.  Cardiovascular: No chest pain or palpation.  Respiratory: No cough, shortness of breath, congestion, or wheezing.  Gastrointestinal: No abdominal pain, nausea, vomiting, or diarrhea.  Genitourinary: No dysuria.  Musculoskeletal: No joint swelling.  Neurologic: No headache.    PHYSICAL EXAM:  GENERAL: NAD  EYES: EOMI, PERRLA  NECK: Supple, No JVD  CHEST/LUNG: dec breath sounds at basess  HEART:  S1 , S2 +  ABDOMEN: soft , bs+  EXTREMITIES:  edema  NEUROLOGY:alert awake      LABS:  02-24    132[L]  |  91[L]  |  67[H]  ----------------------------<  84  5.3   |  22  |  7.73[H]    Ca    8.3[L]      24 Feb 2025 06:30  Phos  6.4     02-24  Mg     1.90     02-24    TPro  7.1  /  Alb  3.4  /  TBili  0.3  /  DBili      /  AST  13  /  ALT  13  /  AlkPhos  169[H]  02-24    Creatinine Trend: 7.73 <--, 6.80 <--, 5.08 <--, 7.97 <--                        10.6   6.26  )-----------( 192      ( 24 Feb 2025 06:30 )             32.7     Urine Studies:  Urinalysis Basic - ( 24 Feb 2025 06:30 )    Color:  / Appearance:  / SG:  / pH:   Gluc: 84 mg/dL / Ketone:   / Bili:  / Urobili:    Blood:  / Protein:  / Nitrite:    Leuk Esterase:  / RBC:  / WBC    Sq Epi:  / Non Sq Epi:  / Bacteria:               LIVER FUNCTIONS - ( 24 Feb 2025 06:30 )  Alb: 3.4 g/dL / Pro: 7.1 g/dL / ALK PHOS: 169 U/L / ALT: 13 U/L / AST: 13 U/L / GGT: x

## 2025-04-01 ENCOUNTER — APPOINTMENT (OUTPATIENT)
Dept: ORTHOPEDIC SURGERY | Facility: CLINIC | Age: 75
End: 2025-04-01
Payer: OTHER MISCELLANEOUS

## 2025-04-01 DIAGNOSIS — M25.561 PAIN IN RIGHT KNEE: ICD-10-CM

## 2025-04-01 PROCEDURE — 99203 OFFICE O/P NEW LOW 30 MIN: CPT

## 2025-04-01 RX ORDER — ACETAMINOPHEN AND CODEINE PHOSPHATE 300; 30 MG/1; MG/1
300-30 TABLET ORAL
Qty: 10 | Refills: 0 | Status: ACTIVE | COMMUNITY
Start: 2025-04-01 | End: 1900-01-01

## 2025-04-29 ENCOUNTER — APPOINTMENT (OUTPATIENT)
Dept: ORTHOPEDIC SURGERY | Facility: CLINIC | Age: 75
End: 2025-04-29

## 2025-05-16 NOTE — DISCHARGE NOTE PROVIDER - NSCORESITESY/N_GEN_A_CORE_RD
Inpatient Nutrition Assessment    Admit Date: 5/7/2025   Total duration of encounter: 9 days   Patient Age: 74 y.o.    Nutrition Recommendation/Prescription     Continue soft and bite sized diet as tolerated  Continue Boost Plus BID (360 kcal and 14 gm protein per serving). If PO intake remains good, can discontinue.  Monitor PO intake, labs and weight    Communication of Recommendations: reviewed with patient    Nutrition Assessment     Malnutrition Assessment/Nutrition-Focused Physical Exam       Malnutrition Level: other (see comments) (Does not meet criteria) (05/13/25 1330)  Energy Intake (Malnutrition): less than or equal to 50% for greater than or equal to 5 days (05/13/25 1330)  Weight Loss (Malnutrition): other (see comments) (Does not meet criteria) (05/13/25 1330)                                         Fluid Accumulation (Malnutrition): other (see comments) (Not present) (05/13/25 1330)        A minimum of two characteristics is recommended for diagnosis of either severe or non-severe malnutrition.    Chart Review    Reason Seen: follow-up    Malnutrition Screening Tool Results   Have you recently lost weight without trying?: No  Have you been eating poorly because of a decreased appetite?: No   MST Score: 0   Diagnosis:  Worsening encephalopathy, Cefepime-induced neurotoxicity?-resolved   Right lower lobe pneumonia; possibly due to aspiration pneumonia  Fall and weakness  Leukocytosis - resolved  Hypercalcemia, corrected 11    Relevant Medical History: bipolar disorder, anxiety, dementia, Parkinson's, HTN, HLD, overactive bladder, chronic constipation, COPD     Scheduled Medications:  albuterol-ipratropium, 3 mL, Q4H  DULoxetine, 60 mg, Daily  fluticasone furoate-vilanteroL, 1 puff, Daily  furosemide (LASIX) injection, 20 mg, Daily  nystatin-triamcinolone, , TID  polyethylene glycol, 17 g, Daily  senna, 8.6 mg, Daily  tetrahydrozoline 0.05%, 1 drop, BID  traZODone, 25 mg, QHS    Continuous Infusions:    PRN Medications:  acetaminophen, 650 mg, Q4H PRN  albuterol-ipratropium, 3 mL, Q4H PRN  aluminum-magnesium hydroxide-simethicone, 30 mL, QID PRN  bisacodyL, 10 mg, Daily PRN  dextrose 50%, 12.5 g, PRN  dextrose 50%, 25 g, PRN  glucagon (human recombinant), 1 mg, PRN  glucose, 16 g, PRN  glucose, 24 g, PRN  melatonin, 6 mg, Nightly PRN  sodium chloride 0.9%, 10 mL, PRN    Calorie Containing IV Medications: no significant kcals from medications at this time    Recent Labs   Lab 05/10/25  1026 05/11/25  0447 05/12/25  0718 05/13/25  0538 05/14/25  0623 05/15/25  0515 05/16/25  0704   * 133* 136 133* 135*  --  137   K 5.0 4.7 4.6 4.4 3.8  --  4.2   CALCIUM 10.5* 10.3* 10.6* 10.3* 10.3*  --  9.4   PHOS  --   --   --   --   --   --  3.2   MG  --   --   --   --   --   --  1.50*   CL 90* 91* 93* 92* 91*  --  95*   CO2 36* 31 31 30 36*  --  36*   BUN 14.0 19.3 28.5* 31.4* 27.1*  --  17.7   CREATININE 0.78 0.68 0.71 0.85 0.93  --  0.71   EGFRNORACEVR >60 >60 >60 >60 >60  --  >60   * 110 149* 116* 124*  --  121*   BILITOT  --   --   --   --   --   --  0.4   ALKPHOS  --   --   --   --   --   --  58   ALT  --   --   --   --   --   --  12   AST  --   --   --   --   --   --  13   ALBUMIN  --   --   --   --   --   --  3.0*   WBC  --   --   --   --  11.24 10.04 11.15   HGB  --   --   --   --  12.3 11.7* 11.5*   HCT  --   --   --   --  38.9 37.2 36.7*     Nutrition Orders:  Diet Soft & Bite Sized (IDDSI Level 6)  Dietary nutrition supplements BID; Boost Plus Nutritional Drink - Rich Chocolate    Appetite/Oral Intake: good/% of meals  Factors Affecting Nutritional Intake: impaired cognitive status/motor control and difficulty/impaired swallowing  Social Needs Impacting Access to Food: none identified  Food/Scientologist/Cultural Preferences: none reported  Food Allergies: no known food allergies  Last Bowel Movement: 05/12/25  Wound(s):     Wound 05/08/25 0308 Abrasion(s) Left-Tissue loss description: Partial  "thickness       Wound 25 0309 Abrasion(s) Right-Tissue loss description: Partial thickness       Wound 25 0310 Pressure Injury Perirectal-Tissue loss description: Not applicable     Comments    : Pt NPO x2 days / mental status, mentation appears to be improving. Poor PO intake prior to NPO noted, with 0-25% intake of meals documented. Pt agreed to ONS once diet advanced. Pt reports good appetite PTA. Denies n/v/d/c; LBM . Denies unintentional weight loss. Weight appears stable per EMR weight history review.     5/15: Pt reports good appetite/PO intake. Denies n/v. Will continue ONS BID to ensure adequate nutrition intake. If PO intake remains good, can discontinue.    Anthropometrics    Height: 5' 4.5" (163.8 cm), Height Method: Stated  Last Weight: 91.9 kg (202 lb 9.6 oz) (25 000), Weight Method: Bed Scale  BMI (Calculated): 34.3  BMI Classification: obese grade I (BMI 30-34.9)     Ideal Body Weight (IBW), Female: 122.5 lb     % Ideal Body Weight, Female (lb): 165.39 %                    Usual Body Weight (UBW), k.1 kg  % Usual Body Weight: 105.73     Usual Weight Provided By: EMR weight history and patient denies unintentional weight loss    Wt Readings from Last 5 Encounters:   25 91.9 kg (202 lb 9.6 oz)   24 87.1 kg (192 lb)   08/10/20 87.1 kg (192 lb 0.3 oz)     Weight Change(s) Since Admission:   Wt Readings from Last 1 Encounters:   25 0009 91.9 kg (202 lb 9.6 oz)   25 0000 91.9 kg (202 lb 9.6 oz)   25 184 88.5 kg (195 lb)   Admit Weight: 88.5 kg (195 lb) (25), Weight Method: Stated    Estimated Needs    Weight Used For Calorie Calculations: 91.9 kg (202 lb 9.6 oz)  Energy Calorie Requirements (kcal): 8959-6751 kcal (1.1-1.2 SF)  Energy Need Method: [place]-St Jeor  Weight Used For Protein Calculations: 91.9 kg (202 lb 9.6 oz)  Protein Requirements: 92 gm (1.0 gm/kg)  Fluid Requirements (mL): 2298 mL (25 mL/kg)        Enteral " Nutrition     Patient not receiving enteral nutrition at this time.    Parenteral Nutrition     Patient not receiving parenteral nutrition support at this time.    Evaluation of Received Nutrient Intake    Calories: meeting estimated needs  Protein: meeting estimated needs    Patient Education     Not applicable.    Nutrition Diagnosis     PES: Inadequate oral intake related to acute illness as evidenced by poor PO intake. (resolved)     Nutrition Interventions     Intervention(s): general/healthful diet, commercial beverage, and collaboration with other providers    Goal: Meet greater than 80% of nutritional needs by follow-up. (goal met)    Nutrition Goals & Monitoring     Dietitian will monitor: food and beverage intake and gastrointestinal profile  Discharge planning: resume home regimen  Nutrition Risk/Follow-Up: dietitian will follow-up one time per week   Please consult if re-assessment needed sooner.       Yes

## 2025-08-01 ENCOUNTER — EMERGENCY (EMERGENCY)
Facility: HOSPITAL | Age: 75
LOS: 1 days | End: 2025-08-01
Attending: STUDENT IN AN ORGANIZED HEALTH CARE EDUCATION/TRAINING PROGRAM | Admitting: STUDENT IN AN ORGANIZED HEALTH CARE EDUCATION/TRAINING PROGRAM
Payer: MEDICARE

## 2025-08-01 VITALS
WEIGHT: 268.96 LBS | TEMPERATURE: 98 F | DIASTOLIC BLOOD PRESSURE: 68 MMHG | OXYGEN SATURATION: 98 % | HEART RATE: 71 BPM | SYSTOLIC BLOOD PRESSURE: 100 MMHG | RESPIRATION RATE: 16 BRPM

## 2025-08-01 VITALS
DIASTOLIC BLOOD PRESSURE: 67 MMHG | OXYGEN SATURATION: 99 % | RESPIRATION RATE: 16 BRPM | SYSTOLIC BLOOD PRESSURE: 119 MMHG | HEART RATE: 80 BPM | TEMPERATURE: 98 F

## 2025-08-01 LAB
ALBUMIN SERPL ELPH-MCNC: 3.6 G/DL — SIGNIFICANT CHANGE UP (ref 3.3–5)
ALP SERPL-CCNC: 172 U/L — HIGH (ref 40–120)
ALT FLD-CCNC: 17 U/L — SIGNIFICANT CHANGE UP (ref 4–33)
ANION GAP SERPL CALC-SCNC: 20 MMOL/L — HIGH (ref 7–14)
AST SERPL-CCNC: 17 U/L — SIGNIFICANT CHANGE UP (ref 4–32)
BASOPHILS # BLD AUTO: 0.02 K/UL — SIGNIFICANT CHANGE UP (ref 0–0.2)
BASOPHILS NFR BLD AUTO: 0.3 % — SIGNIFICANT CHANGE UP (ref 0–2)
BILIRUB SERPL-MCNC: 0.3 MG/DL — SIGNIFICANT CHANGE UP (ref 0.2–1.2)
BLOOD GAS VENOUS COMPREHENSIVE RESULT: SIGNIFICANT CHANGE UP
BUN SERPL-MCNC: 58 MG/DL — HIGH (ref 7–23)
CALCIUM SERPL-MCNC: 8.3 MG/DL — LOW (ref 8.4–10.5)
CHLORIDE SERPL-SCNC: 98 MMOL/L — SIGNIFICANT CHANGE UP (ref 98–107)
CO2 SERPL-SCNC: 22 MMOL/L — SIGNIFICANT CHANGE UP (ref 22–31)
CREAT SERPL-MCNC: 9.01 MG/DL — HIGH (ref 0.5–1.3)
EGFR: 4 ML/MIN/1.73M2 — LOW
EGFR: 4 ML/MIN/1.73M2 — LOW
EOSINOPHIL # BLD AUTO: 0.16 K/UL — SIGNIFICANT CHANGE UP (ref 0–0.5)
EOSINOPHIL NFR BLD AUTO: 2.3 % — SIGNIFICANT CHANGE UP (ref 0–6)
GLUCOSE SERPL-MCNC: 90 MG/DL — SIGNIFICANT CHANGE UP (ref 70–99)
HCT VFR BLD CALC: 34.1 % — LOW (ref 34.5–45)
HGB BLD-MCNC: 10.8 G/DL — LOW (ref 11.5–15.5)
IMM GRANULOCYTES # BLD AUTO: 0.04 K/UL — SIGNIFICANT CHANGE UP (ref 0–0.07)
IMM GRANULOCYTES NFR BLD AUTO: 0.6 % — SIGNIFICANT CHANGE UP (ref 0–0.9)
LIDOCAIN IGE QN: 39 U/L — SIGNIFICANT CHANGE UP (ref 7–60)
LYMPHOCYTES # BLD AUTO: 1.42 K/UL — SIGNIFICANT CHANGE UP (ref 1–3.3)
LYMPHOCYTES NFR BLD AUTO: 20.8 % — SIGNIFICANT CHANGE UP (ref 13–44)
MCHC RBC-ENTMCNC: 30.9 PG — SIGNIFICANT CHANGE UP (ref 27–34)
MCHC RBC-ENTMCNC: 31.7 G/DL — LOW (ref 32–36)
MCV RBC AUTO: 97.7 FL — SIGNIFICANT CHANGE UP (ref 80–100)
MONOCYTES # BLD AUTO: 0.45 K/UL — SIGNIFICANT CHANGE UP (ref 0–0.9)
MONOCYTES NFR BLD AUTO: 6.6 % — SIGNIFICANT CHANGE UP (ref 2–14)
NEUTROPHILS # BLD AUTO: 4.74 K/UL — SIGNIFICANT CHANGE UP (ref 1.8–7.4)
NEUTROPHILS NFR BLD AUTO: 69.4 % — SIGNIFICANT CHANGE UP (ref 43–77)
NRBC # BLD AUTO: 0 K/UL — SIGNIFICANT CHANGE UP (ref 0–0)
NRBC # FLD: 0 K/UL — SIGNIFICANT CHANGE UP (ref 0–0)
NRBC BLD AUTO-RTO: 0 /100 WBCS — SIGNIFICANT CHANGE UP (ref 0–0)
PLATELET # BLD AUTO: 194 K/UL — SIGNIFICANT CHANGE UP (ref 150–400)
PMV BLD: 10.2 FL — SIGNIFICANT CHANGE UP (ref 7–13)
POTASSIUM SERPL-MCNC: 4.6 MMOL/L — SIGNIFICANT CHANGE UP (ref 3.5–5.3)
POTASSIUM SERPL-SCNC: 4.6 MMOL/L — SIGNIFICANT CHANGE UP (ref 3.5–5.3)
PROT SERPL-MCNC: 7.9 G/DL — SIGNIFICANT CHANGE UP (ref 6–8.3)
RBC # BLD: 3.49 M/UL — LOW (ref 3.8–5.2)
RBC # FLD: 13.1 % — SIGNIFICANT CHANGE UP (ref 10.3–14.5)
SODIUM SERPL-SCNC: 140 MMOL/L — SIGNIFICANT CHANGE UP (ref 135–145)
WBC # BLD: 6.83 K/UL — SIGNIFICANT CHANGE UP (ref 3.8–10.5)
WBC # FLD AUTO: 6.83 K/UL — SIGNIFICANT CHANGE UP (ref 3.8–10.5)

## 2025-08-01 PROCEDURE — 74177 CT ABD & PELVIS W/CONTRAST: CPT | Mod: 26

## 2025-08-01 PROCEDURE — 99285 EMERGENCY DEPT VISIT HI MDM: CPT

## 2025-08-01 RX ORDER — ACETAMINOPHEN 500 MG/5ML
1000 LIQUID (ML) ORAL ONCE
Refills: 0 | Status: COMPLETED | OUTPATIENT
Start: 2025-08-01 | End: 2025-08-01

## 2025-08-01 RX ORDER — HYDROMORPHONE/SOD CHLOR,ISO/PF 2 MG/10 ML
0.5 SYRINGE (ML) INJECTION ONCE
Refills: 0 | Status: DISCONTINUED | OUTPATIENT
Start: 2025-08-01 | End: 2025-08-01

## 2025-08-01 RX ADMIN — Medication 500 MILLILITER(S): at 08:32

## 2025-08-01 RX ADMIN — Medication 400 MILLIGRAM(S): at 08:32

## 2025-08-01 RX ADMIN — Medication 0.5 MILLIGRAM(S): at 11:33

## (undated) NOTE — ED AVS SNAPSHOT
Jonas Ordonez   MRN: [id number]    Department:  [facility] Emergency Department   Date of Visit:  10/10/2019           Disclosure     Insurance plans vary and the physician(s) referred by the ER may not be covered by your plan.  Please cont within the next three months to obtain basic health screening including reassessment of your blood pressure.     IF THERE IS ANY CHANGE OR WORSENING OF YOUR CONDITION, CALL YOUR PRIMARY CARE PHYSICIAN AT ONCE OR RETURN IMMEDIATELY TO THE EMERGENCY DEPARTMEN

## (undated) NOTE — ED AVS SNAPSHOT
Will Jett   MRN: [id number]    Department:  [facility] Emergency Department   Date of Visit:  11/11/2019           Disclosure     Insurance plans vary and the physician(s) referred by the ER may not be covered by your plan.  Please within the next three months to obtain basic health screening including reassessment of your blood pressure.     IF THERE IS ANY CHANGE OR WORSENING OF YOUR CONDITION, CALL YOUR PRIMARY CARE PHYSICIAN AT ONCE OR RETURN IMMEDIATELY TO THE EMERGENCY DEPARTMEN